# Patient Record
Sex: FEMALE | Race: BLACK OR AFRICAN AMERICAN | Employment: UNEMPLOYED
[De-identification: names, ages, dates, MRNs, and addresses within clinical notes are randomized per-mention and may not be internally consistent; named-entity substitution may affect disease eponyms.]

---

## 2017-09-24 ENCOUNTER — HEALTH MAINTENANCE LETTER (OUTPATIENT)
Age: 52
End: 2017-09-24

## 2021-05-30 ENCOUNTER — RECORDS - HEALTHEAST (OUTPATIENT)
Dept: ADMINISTRATIVE | Facility: CLINIC | Age: 56
End: 2021-05-30

## 2022-01-01 ENCOUNTER — HOSPITAL ENCOUNTER (INPATIENT)
Facility: CLINIC | Age: 57
LOS: 5 days | Discharge: HOSPICE | DRG: 843 | End: 2022-10-02
Attending: HOSPITALIST | Admitting: HOSPITALIST
Payer: COMMERCIAL

## 2022-01-01 ENCOUNTER — HOSPITAL ENCOUNTER (INPATIENT)
Facility: CLINIC | Age: 57
LOS: 20 days | Discharge: HOME HEALTH | DRG: 166 | End: 2022-09-03
Attending: STUDENT IN AN ORGANIZED HEALTH CARE EDUCATION/TRAINING PROGRAM | Admitting: INTERNAL MEDICINE
Payer: MEDICARE

## 2022-01-01 ENCOUNTER — LAB (OUTPATIENT)
Dept: LAB | Facility: CLINIC | Age: 57
End: 2022-01-01
Attending: SURGERY
Payer: COMMERCIAL

## 2022-01-01 ENCOUNTER — TELEPHONE (OUTPATIENT)
Dept: INTERVENTIONAL RADIOLOGY/VASCULAR | Facility: CLINIC | Age: 57
End: 2022-01-01

## 2022-01-01 ENCOUNTER — PATIENT OUTREACH (OUTPATIENT)
Dept: CARE COORDINATION | Facility: CLINIC | Age: 57
End: 2022-01-01

## 2022-01-01 ENCOUNTER — APPOINTMENT (OUTPATIENT)
Dept: SPEECH THERAPY | Facility: CLINIC | Age: 57
DRG: 166 | End: 2022-01-01
Attending: STUDENT IN AN ORGANIZED HEALTH CARE EDUCATION/TRAINING PROGRAM
Payer: MEDICARE

## 2022-01-01 ENCOUNTER — TRANSCRIBE ORDERS (OUTPATIENT)
Dept: OTHER | Age: 57
End: 2022-01-01

## 2022-01-01 ENCOUNTER — APPOINTMENT (OUTPATIENT)
Dept: INTERVENTIONAL RADIOLOGY/VASCULAR | Facility: CLINIC | Age: 57
End: 2022-01-01
Attending: SURGERY
Payer: COMMERCIAL

## 2022-01-01 ENCOUNTER — TELEPHONE (OUTPATIENT)
Dept: OTHER | Facility: CLINIC | Age: 57
End: 2022-01-01
Payer: COMMERCIAL

## 2022-01-01 ENCOUNTER — OFFICE VISIT (OUTPATIENT)
Dept: OTHER | Facility: CLINIC | Age: 57
End: 2022-01-01
Payer: COMMERCIAL

## 2022-01-01 ENCOUNTER — OFFICE VISIT (OUTPATIENT)
Dept: OTHER | Facility: CLINIC | Age: 57
End: 2022-01-01
Attending: SURGERY
Payer: COMMERCIAL

## 2022-01-01 ENCOUNTER — APPOINTMENT (OUTPATIENT)
Dept: OCCUPATIONAL THERAPY | Facility: CLINIC | Age: 57
DRG: 166 | End: 2022-01-01
Attending: STUDENT IN AN ORGANIZED HEALTH CARE EDUCATION/TRAINING PROGRAM
Payer: MEDICARE

## 2022-01-01 ENCOUNTER — HOSPITAL ENCOUNTER (OUTPATIENT)
Dept: ULTRASOUND IMAGING | Facility: CLINIC | Age: 57
End: 2022-03-01
Attending: SURGERY
Payer: COMMERCIAL

## 2022-01-01 ENCOUNTER — HOSPITAL ENCOUNTER (OUTPATIENT)
Facility: CLINIC | Age: 57
Discharge: HOME | End: 2022-08-11
Admitting: RADIOLOGY
Payer: COMMERCIAL

## 2022-01-01 ENCOUNTER — APPOINTMENT (OUTPATIENT)
Dept: GENERAL RADIOLOGY | Facility: CLINIC | Age: 57
DRG: 843 | End: 2022-01-01
Attending: INTERNAL MEDICINE
Payer: COMMERCIAL

## 2022-01-01 ENCOUNTER — TELEPHONE (OUTPATIENT)
Dept: OTHER | Facility: CLINIC | Age: 57
End: 2022-01-01

## 2022-01-01 ENCOUNTER — APPOINTMENT (OUTPATIENT)
Dept: CT IMAGING | Facility: CLINIC | Age: 57
DRG: 166 | End: 2022-01-01
Attending: INTERNAL MEDICINE
Payer: MEDICARE

## 2022-01-01 ENCOUNTER — APPOINTMENT (OUTPATIENT)
Dept: SURGERY | Facility: PHYSICIAN GROUP | Age: 57
End: 2022-01-01
Payer: COMMERCIAL

## 2022-01-01 ENCOUNTER — HOSPITAL ENCOUNTER (INPATIENT)
Facility: CLINIC | Age: 57
LOS: 3 days | End: 2022-10-05
Attending: INTERNAL MEDICINE | Admitting: INTERNAL MEDICINE
Payer: COMMERCIAL

## 2022-01-01 ENCOUNTER — HOSPITAL ENCOUNTER (OUTPATIENT)
Facility: CLINIC | Age: 57
Discharge: HOME | End: 2022-06-28
Attending: SURGERY | Admitting: SURGERY
Payer: COMMERCIAL

## 2022-01-01 ENCOUNTER — MEDICAL CORRESPONDENCE (OUTPATIENT)
Dept: HEALTH INFORMATION MANAGEMENT | Facility: CLINIC | Age: 57
End: 2022-01-01

## 2022-01-01 ENCOUNTER — ANESTHESIA EVENT (OUTPATIENT)
Dept: SURGERY | Facility: CLINIC | Age: 57
End: 2022-01-01
Payer: COMMERCIAL

## 2022-01-01 ENCOUNTER — ANESTHESIA (OUTPATIENT)
Dept: SURGERY | Facility: CLINIC | Age: 57
End: 2022-01-01
Payer: COMMERCIAL

## 2022-01-01 ENCOUNTER — HOSPITAL ENCOUNTER (OUTPATIENT)
Facility: CLINIC | Age: 57
Discharge: HOME | End: 2022-05-31
Admitting: RADIOLOGY
Payer: COMMERCIAL

## 2022-01-01 ENCOUNTER — APPOINTMENT (OUTPATIENT)
Dept: ULTRASOUND IMAGING | Facility: CLINIC | Age: 57
DRG: 252 | End: 2022-01-01
Attending: EMERGENCY MEDICINE
Payer: COMMERCIAL

## 2022-01-01 ENCOUNTER — HOSPITAL ENCOUNTER (OUTPATIENT)
Facility: CLINIC | Age: 57
Discharge: HOME | End: 2022-03-25
Attending: SURGERY | Admitting: SURGERY
Payer: COMMERCIAL

## 2022-01-01 ENCOUNTER — DOCUMENTATION ONLY (OUTPATIENT)
Dept: OTHER | Facility: CLINIC | Age: 57
End: 2022-01-01

## 2022-01-01 ENCOUNTER — ANESTHESIA EVENT (OUTPATIENT)
Dept: SURGERY | Facility: CLINIC | Age: 57
DRG: 252 | End: 2022-01-01
Payer: COMMERCIAL

## 2022-01-01 ENCOUNTER — ANESTHESIA (OUTPATIENT)
Dept: SURGERY | Facility: CLINIC | Age: 57
DRG: 252 | End: 2022-01-01
Payer: COMMERCIAL

## 2022-01-01 ENCOUNTER — APPOINTMENT (OUTPATIENT)
Dept: INTERVENTIONAL RADIOLOGY/VASCULAR | Facility: CLINIC | Age: 57
DRG: 166 | End: 2022-01-01
Attending: NURSE PRACTITIONER
Payer: MEDICARE

## 2022-01-01 ENCOUNTER — APPOINTMENT (OUTPATIENT)
Dept: MRI IMAGING | Facility: CLINIC | Age: 57
DRG: 166 | End: 2022-01-01
Attending: INTERNAL MEDICINE
Payer: MEDICARE

## 2022-01-01 ENCOUNTER — HOSPITAL ENCOUNTER (OUTPATIENT)
Dept: ULTRASOUND IMAGING | Facility: CLINIC | Age: 57
Discharge: HOME | End: 2022-08-10
Payer: COMMERCIAL

## 2022-01-01 ENCOUNTER — APPOINTMENT (OUTPATIENT)
Dept: PHYSICAL THERAPY | Facility: CLINIC | Age: 57
DRG: 166 | End: 2022-01-01
Attending: STUDENT IN AN ORGANIZED HEALTH CARE EDUCATION/TRAINING PROGRAM
Payer: MEDICARE

## 2022-01-01 ENCOUNTER — HOSPITAL ENCOUNTER (OUTPATIENT)
Dept: ULTRASOUND IMAGING | Facility: CLINIC | Age: 57
Discharge: HOME | End: 2022-05-18
Attending: SURGERY
Payer: COMMERCIAL

## 2022-01-01 ENCOUNTER — TRANSFERRED RECORDS (OUTPATIENT)
Dept: HEALTH INFORMATION MANAGEMENT | Facility: CLINIC | Age: 57
End: 2022-01-01

## 2022-01-01 ENCOUNTER — APPOINTMENT (OUTPATIENT)
Dept: CARDIOLOGY | Facility: CLINIC | Age: 57
DRG: 166 | End: 2022-01-01
Attending: INTERNAL MEDICINE
Payer: MEDICARE

## 2022-01-01 ENCOUNTER — LAB REQUISITION (OUTPATIENT)
Dept: LAB | Facility: CLINIC | Age: 57
End: 2022-01-01
Payer: MEDICARE

## 2022-01-01 ENCOUNTER — APPOINTMENT (OUTPATIENT)
Dept: OCCUPATIONAL THERAPY | Facility: CLINIC | Age: 57
DRG: 166 | End: 2022-01-01
Attending: INTERNAL MEDICINE
Payer: MEDICARE

## 2022-01-01 ENCOUNTER — APPOINTMENT (OUTPATIENT)
Dept: ULTRASOUND IMAGING | Facility: CLINIC | Age: 57
DRG: 252 | End: 2022-01-01
Attending: STUDENT IN AN ORGANIZED HEALTH CARE EDUCATION/TRAINING PROGRAM
Payer: COMMERCIAL

## 2022-01-01 ENCOUNTER — APPOINTMENT (OUTPATIENT)
Dept: ULTRASOUND IMAGING | Facility: CLINIC | Age: 57
DRG: 843 | End: 2022-01-01
Attending: NURSE PRACTITIONER
Payer: COMMERCIAL

## 2022-01-01 ENCOUNTER — APPOINTMENT (OUTPATIENT)
Dept: PHYSICAL THERAPY | Facility: CLINIC | Age: 57
DRG: 166 | End: 2022-01-01
Attending: INTERNAL MEDICINE
Payer: MEDICARE

## 2022-01-01 ENCOUNTER — HOSPITAL ENCOUNTER (INPATIENT)
Facility: CLINIC | Age: 57
LOS: 6 days | Discharge: HOME | DRG: 252 | End: 2022-07-13
Attending: EMERGENCY MEDICINE | Admitting: INTERNAL MEDICINE
Payer: COMMERCIAL

## 2022-01-01 ENCOUNTER — APPOINTMENT (OUTPATIENT)
Dept: SPEECH THERAPY | Facility: CLINIC | Age: 57
DRG: 166 | End: 2022-01-01
Attending: INTERNAL MEDICINE
Payer: MEDICARE

## 2022-01-01 ENCOUNTER — APPOINTMENT (OUTPATIENT)
Dept: ULTRASOUND IMAGING | Facility: CLINIC | Age: 57
DRG: 166 | End: 2022-01-01
Attending: NURSE PRACTITIONER
Payer: MEDICARE

## 2022-01-01 ENCOUNTER — APPOINTMENT (OUTPATIENT)
Dept: INTERVENTIONAL RADIOLOGY/VASCULAR | Facility: CLINIC | Age: 57
DRG: 252 | End: 2022-01-01
Attending: SURGERY
Payer: COMMERCIAL

## 2022-01-01 VITALS
HEIGHT: 65 IN | BODY MASS INDEX: 21.38 KG/M2 | SYSTOLIC BLOOD PRESSURE: 131 MMHG | HEART RATE: 103 BPM | WEIGHT: 128.31 LBS | TEMPERATURE: 97.3 F | OXYGEN SATURATION: 100 % | DIASTOLIC BLOOD PRESSURE: 84 MMHG | RESPIRATION RATE: 12 BRPM

## 2022-01-01 VITALS — SYSTOLIC BLOOD PRESSURE: 150 MMHG | DIASTOLIC BLOOD PRESSURE: 88 MMHG | HEART RATE: 98 BPM

## 2022-01-01 VITALS
TEMPERATURE: 97.9 F | RESPIRATION RATE: 16 BRPM | SYSTOLIC BLOOD PRESSURE: 99 MMHG | OXYGEN SATURATION: 90 % | DIASTOLIC BLOOD PRESSURE: 63 MMHG | BODY MASS INDEX: 21.61 KG/M2 | WEIGHT: 129.85 LBS | HEART RATE: 96 BPM

## 2022-01-01 VITALS
RESPIRATION RATE: 18 BRPM | WEIGHT: 124 LBS | SYSTOLIC BLOOD PRESSURE: 128 MMHG | HEIGHT: 67 IN | HEART RATE: 88 BPM | BODY MASS INDEX: 19.46 KG/M2 | OXYGEN SATURATION: 95 % | DIASTOLIC BLOOD PRESSURE: 80 MMHG | TEMPERATURE: 97.7 F

## 2022-01-01 VITALS — OXYGEN SATURATION: 98 % | DIASTOLIC BLOOD PRESSURE: 71 MMHG | SYSTOLIC BLOOD PRESSURE: 131 MMHG | HEART RATE: 87 BPM

## 2022-01-01 VITALS
HEIGHT: 65 IN | DIASTOLIC BLOOD PRESSURE: 65 MMHG | RESPIRATION RATE: 16 BRPM | HEART RATE: 87 BPM | OXYGEN SATURATION: 100 % | BODY MASS INDEX: 23.88 KG/M2 | WEIGHT: 143.3 LBS | SYSTOLIC BLOOD PRESSURE: 112 MMHG | TEMPERATURE: 97.8 F

## 2022-01-01 VITALS
DIASTOLIC BLOOD PRESSURE: 87 MMHG | TEMPERATURE: 98.4 F | HEART RATE: 73 BPM | RESPIRATION RATE: 16 BRPM | WEIGHT: 125 LBS | OXYGEN SATURATION: 94 % | SYSTOLIC BLOOD PRESSURE: 163 MMHG | BODY MASS INDEX: 20.83 KG/M2 | HEIGHT: 65 IN

## 2022-01-01 VITALS
DIASTOLIC BLOOD PRESSURE: 65 MMHG | TEMPERATURE: 97.2 F | HEART RATE: 88 BPM | SYSTOLIC BLOOD PRESSURE: 128 MMHG | OXYGEN SATURATION: 99 % | RESPIRATION RATE: 14 BRPM

## 2022-01-01 VITALS
HEIGHT: 66 IN | HEART RATE: 85 BPM | WEIGHT: 135 LBS | SYSTOLIC BLOOD PRESSURE: 152 MMHG | BODY MASS INDEX: 21.69 KG/M2 | OXYGEN SATURATION: 98 % | DIASTOLIC BLOOD PRESSURE: 83 MMHG

## 2022-01-01 VITALS
RESPIRATION RATE: 20 BRPM | HEART RATE: 106 BPM | TEMPERATURE: 98.6 F | OXYGEN SATURATION: 98 % | DIASTOLIC BLOOD PRESSURE: 51 MMHG | SYSTOLIC BLOOD PRESSURE: 75 MMHG

## 2022-01-01 VITALS — SYSTOLIC BLOOD PRESSURE: 161 MMHG | DIASTOLIC BLOOD PRESSURE: 86 MMHG | HEART RATE: 84 BPM

## 2022-01-01 VITALS
HEIGHT: 65 IN | RESPIRATION RATE: 16 BRPM | OXYGEN SATURATION: 96 % | TEMPERATURE: 98.2 F | SYSTOLIC BLOOD PRESSURE: 140 MMHG | BODY MASS INDEX: 22.99 KG/M2 | WEIGHT: 138 LBS | DIASTOLIC BLOOD PRESSURE: 89 MMHG | HEART RATE: 68 BPM

## 2022-01-01 VITALS — DIASTOLIC BLOOD PRESSURE: 61 MMHG | SYSTOLIC BLOOD PRESSURE: 109 MMHG | HEART RATE: 83 BPM

## 2022-01-01 DIAGNOSIS — Z99.2 ESRD (END STAGE RENAL DISEASE) ON DIALYSIS (H): ICD-10-CM

## 2022-01-01 DIAGNOSIS — I87.8 PROMINENT VEIN: ICD-10-CM

## 2022-01-01 DIAGNOSIS — N18.6 ESRD (END STAGE RENAL DISEASE) ON DIALYSIS (H): Primary | ICD-10-CM

## 2022-01-01 DIAGNOSIS — G89.18 ACUTE POST-OPERATIVE PAIN: Primary | ICD-10-CM

## 2022-01-01 DIAGNOSIS — Z01.818 PREOP TESTING: ICD-10-CM

## 2022-01-01 DIAGNOSIS — I82.291: ICD-10-CM

## 2022-01-01 DIAGNOSIS — Z79.2 NEED FOR PROPHYLACTIC ANTIBIOTIC: Primary | ICD-10-CM

## 2022-01-01 DIAGNOSIS — Z11.59 ENCOUNTER FOR SCREENING FOR OTHER VIRAL DISEASES: Primary | ICD-10-CM

## 2022-01-01 DIAGNOSIS — Z09 SURGICAL FOLLOW-UP CARE: Primary | ICD-10-CM

## 2022-01-01 DIAGNOSIS — N18.6 ESRD (END STAGE RENAL DISEASE) ON DIALYSIS (H): ICD-10-CM

## 2022-01-01 DIAGNOSIS — Z72.0 TOBACCO ABUSE: ICD-10-CM

## 2022-01-01 DIAGNOSIS — T82.9XXA COMPLICATION OF VASCULAR ACCESS FOR DIALYSIS, INITIAL ENCOUNTER: ICD-10-CM

## 2022-01-01 DIAGNOSIS — I87.1 SVC SYNDROME: ICD-10-CM

## 2022-01-01 DIAGNOSIS — C79.89 METASTATIC CANCER TO CHEST WALL (H): ICD-10-CM

## 2022-01-01 DIAGNOSIS — I82.291: Primary | ICD-10-CM

## 2022-01-01 DIAGNOSIS — C7A.8 NEUROENDOCRINE CANCER (H): Primary | ICD-10-CM

## 2022-01-01 DIAGNOSIS — Z99.2 ESRD (END STAGE RENAL DISEASE) ON DIALYSIS (H): Primary | ICD-10-CM

## 2022-01-01 DIAGNOSIS — N18.9 CRF (CHRONIC RENAL FAILURE): Primary | ICD-10-CM

## 2022-01-01 DIAGNOSIS — I82.290 BRACHIOCEPHALIC VEIN THROMBOSIS (H): ICD-10-CM

## 2022-01-01 DIAGNOSIS — I77.0 AVF (ARTERIOVENOUS FISTULA) (H): ICD-10-CM

## 2022-01-01 DIAGNOSIS — R62.7 FAILURE TO THRIVE IN ADULT: Primary | ICD-10-CM

## 2022-01-01 DIAGNOSIS — I82.622 ACUTE DEEP VEIN THROMBOSIS (DVT) OF BRACHIAL VEIN OF LEFT UPPER EXTREMITY (H): ICD-10-CM

## 2022-01-01 DIAGNOSIS — C34.90 LUNG CANCER (H): Primary | ICD-10-CM

## 2022-01-01 DIAGNOSIS — I10 BENIGN ESSENTIAL HYPERTENSION: Primary | ICD-10-CM

## 2022-01-01 DIAGNOSIS — I77.0 AVF (ARTERIOVENOUS FISTULA) (H): Primary | ICD-10-CM

## 2022-01-01 DIAGNOSIS — S40.022A: Primary | ICD-10-CM

## 2022-01-01 DIAGNOSIS — Z11.59 ENCOUNTER FOR SCREENING FOR OTHER VIRAL DISEASES: ICD-10-CM

## 2022-01-01 DIAGNOSIS — Z71.89 OTHER SPECIFIED COUNSELING: ICD-10-CM

## 2022-01-01 DIAGNOSIS — C78.7 METASTASIS TO LIVER (H): ICD-10-CM

## 2022-01-01 DIAGNOSIS — M79.89 LEFT ARM SWELLING: ICD-10-CM

## 2022-01-01 LAB
% LINING CELLS, BODY FLUID: 36 %
ABO/RH(D): NORMAL
ALBUMIN SERPL-MCNC: 1.8 G/DL (ref 3.4–5)
ALBUMIN SERPL-MCNC: 2.1 G/DL (ref 3.4–5)
ALBUMIN SERPL-MCNC: 2.2 G/DL (ref 3.4–5)
ALBUMIN SERPL-MCNC: 2.3 G/DL (ref 3.4–5)
ALBUMIN SERPL-MCNC: 2.3 G/DL (ref 3.4–5)
ALBUMIN SERPL-MCNC: 2.5 G/DL (ref 3.4–5)
ALBUMIN SERPL-MCNC: 2.6 G/DL (ref 3.4–5)
ALBUMIN SERPL-MCNC: 2.8 G/DL (ref 3.4–5)
ALBUMIN SERPL-MCNC: 2.8 G/DL (ref 3.4–5)
ALP SERPL-CCNC: 202 U/L (ref 40–150)
ALP SERPL-CCNC: 211 U/L (ref 40–150)
ALP SERPL-CCNC: 215 U/L (ref 40–150)
ALP SERPL-CCNC: 235 U/L (ref 40–150)
ALP SERPL-CCNC: 257 U/L (ref 40–150)
ALP SERPL-CCNC: 26 U/L (ref 40–150)
ALP SERPL-CCNC: 32 U/L (ref 40–150)
ALP SERPL-CCNC: 34 U/L (ref 40–150)
ALT SERPL W P-5'-P-CCNC: 106 U/L (ref 0–50)
ALT SERPL W P-5'-P-CCNC: 106 U/L (ref 0–50)
ALT SERPL W P-5'-P-CCNC: 107 U/L (ref 0–50)
ALT SERPL W P-5'-P-CCNC: 114 U/L (ref 0–50)
ALT SERPL W P-5'-P-CCNC: 114 U/L (ref 0–50)
ALT SERPL W P-5'-P-CCNC: 19 U/L (ref 0–50)
ALT SERPL W P-5'-P-CCNC: 20 U/L (ref 0–50)
ALT SERPL W P-5'-P-CCNC: 22 U/L (ref 0–50)
AMMONIA PLAS-SCNC: 21 UMOL/L (ref 10–50)
ANION GAP SERPL CALCULATED.3IONS-SCNC: 10 MMOL/L (ref 3–14)
ANION GAP SERPL CALCULATED.3IONS-SCNC: 11 MMOL/L (ref 3–14)
ANION GAP SERPL CALCULATED.3IONS-SCNC: 6 MMOL/L (ref 3–14)
ANION GAP SERPL CALCULATED.3IONS-SCNC: 7 MMOL/L (ref 3–14)
ANION GAP SERPL CALCULATED.3IONS-SCNC: 8 MMOL/L (ref 3–14)
ANION GAP SERPL CALCULATED.3IONS-SCNC: 9 MMOL/L (ref 3–14)
ANTIBODY SCREEN: NEGATIVE
APPEARANCE FLD: CLEAR
APTT PPP: 117 SECONDS (ref 22–38)
APTT PPP: 125 SECONDS (ref 22–38)
APTT PPP: 158 SECONDS (ref 22–38)
APTT PPP: 204 SECONDS (ref 22–38)
APTT PPP: 26 SECONDS (ref 22–38)
APTT PPP: 31 SECONDS (ref 22–38)
APTT PPP: 43 SECONDS (ref 22–38)
APTT PPP: 54 SECONDS (ref 22–38)
APTT PPP: 61 SECONDS (ref 22–38)
APTT PPP: 70 SECONDS (ref 22–38)
APTT PPP: 71 SECONDS (ref 22–38)
APTT PPP: 73 SECONDS (ref 22–38)
APTT PPP: 74 SECONDS (ref 22–38)
AST SERPL W P-5'-P-CCNC: 43 U/L (ref 0–45)
AST SERPL W P-5'-P-CCNC: 48 U/L (ref 0–45)
AST SERPL W P-5'-P-CCNC: 518 U/L (ref 0–45)
AST SERPL W P-5'-P-CCNC: 631 U/L (ref 0–45)
AST SERPL W P-5'-P-CCNC: 637 U/L (ref 0–45)
AST SERPL W P-5'-P-CCNC: 716 U/L (ref 0–45)
AST SERPL W P-5'-P-CCNC: 864 U/L (ref 0–45)
AST SERPL W P-5'-P-CCNC: 96 U/L (ref 0–45)
ATRIAL RATE - MUSE: 106 BPM
ATRIAL RATE - MUSE: 74 BPM
ATRIAL RATE - MUSE: 83 BPM
BACTERIA BLD CULT: NO GROWTH
BACTERIA BLD CULT: NO GROWTH
BACTERIA PLR CULT: NO GROWTH
BASE EXCESS BLDA CALC-SCNC: 3.8 MMOL/L (ref -9–1.8)
BASE EXCESS BLDV CALC-SCNC: -0.2 MMOL/L (ref -7.7–1.9)
BASE EXCESS BLDV CALC-SCNC: -0.9 MMOL/L (ref -7.7–1.9)
BASE EXCESS BLDV CALC-SCNC: -0.9 MMOL/L (ref -7.7–1.9)
BASE EXCESS BLDV CALC-SCNC: 1.1 MMOL/L (ref -7.7–1.9)
BASE EXCESS BLDV CALC-SCNC: 1.4 MMOL/L (ref -7.7–1.9)
BASOPHILS # BLD AUTO: 0 10E3/UL (ref 0–0.2)
BASOPHILS # BLD AUTO: 0 10E3/UL (ref 0–0.2)
BASOPHILS # BLD AUTO: 0.1 10E3/UL (ref 0–0.2)
BASOPHILS NFR BLD AUTO: 0 %
BASOPHILS NFR BLD AUTO: 1 %
BASOPHILS NFR BLD AUTO: 1 %
BILIRUB DIRECT SERPL-MCNC: 0.3 MG/DL (ref 0–0.2)
BILIRUB SERPL-MCNC: 0.3 MG/DL (ref 0.2–1.3)
BILIRUB SERPL-MCNC: 0.4 MG/DL (ref 0.2–1.3)
BILIRUB SERPL-MCNC: 0.4 MG/DL (ref 0.2–1.3)
BILIRUB SERPL-MCNC: 0.8 MG/DL (ref 0.2–1.3)
BILIRUB SERPL-MCNC: 1.1 MG/DL (ref 0.2–1.3)
BILIRUB SERPL-MCNC: 1.2 MG/DL (ref 0.2–1.3)
BLD PROD TYP BPU: NORMAL
BLOOD COMPONENT TYPE: NORMAL
BUN SERPL-MCNC: 103 MG/DL (ref 7–30)
BUN SERPL-MCNC: 13 MG/DL (ref 7–30)
BUN SERPL-MCNC: 16 MG/DL (ref 7–30)
BUN SERPL-MCNC: 18 MG/DL (ref 7–30)
BUN SERPL-MCNC: 19 MG/DL (ref 7–30)
BUN SERPL-MCNC: 19 MG/DL (ref 7–30)
BUN SERPL-MCNC: 20 MG/DL (ref 7–30)
BUN SERPL-MCNC: 24 MG/DL (ref 7–30)
BUN SERPL-MCNC: 24 MG/DL (ref 7–30)
BUN SERPL-MCNC: 25 MG/DL (ref 7–30)
BUN SERPL-MCNC: 26 MG/DL (ref 7–30)
BUN SERPL-MCNC: 28 MG/DL (ref 7–30)
BUN SERPL-MCNC: 29 MG/DL (ref 7–30)
BUN SERPL-MCNC: 32 MG/DL (ref 7–30)
BUN SERPL-MCNC: 33 MG/DL (ref 7–30)
BUN SERPL-MCNC: 41 MG/DL (ref 7–30)
BUN SERPL-MCNC: 58 MG/DL (ref 7–30)
BUN SERPL-MCNC: 59 MG/DL (ref 7–30)
BUN SERPL-MCNC: 62 MG/DL (ref 7–30)
BUN SERPL-MCNC: 63 MG/DL (ref 7–30)
BUN SERPL-MCNC: 66 MG/DL (ref 7–30)
BUN SERPL-MCNC: 68 MG/DL (ref 7–30)
BUN SERPL-MCNC: 69 MG/DL (ref 7–30)
BUN SERPL-MCNC: 72 MG/DL (ref 7–30)
CA-I BLD-MCNC: 5.1 MG/DL (ref 4.4–5.2)
CALCIUM SERPL-MCNC: 10 MG/DL (ref 8.5–10.1)
CALCIUM SERPL-MCNC: 10.4 MG/DL (ref 8.5–10.1)
CALCIUM SERPL-MCNC: 10.4 MG/DL (ref 8.5–10.1)
CALCIUM SERPL-MCNC: 10.5 MG/DL (ref 8.5–10.1)
CALCIUM SERPL-MCNC: 10.7 MG/DL (ref 8.5–10.1)
CALCIUM SERPL-MCNC: 10.8 MG/DL (ref 8.5–10.1)
CALCIUM SERPL-MCNC: 8.3 MG/DL (ref 8.5–10.1)
CALCIUM SERPL-MCNC: 8.6 MG/DL (ref 8.5–10.1)
CALCIUM SERPL-MCNC: 8.7 MG/DL (ref 8.5–10.1)
CALCIUM SERPL-MCNC: 8.7 MG/DL (ref 8.5–10.1)
CALCIUM SERPL-MCNC: 8.8 MG/DL (ref 8.5–10.1)
CALCIUM SERPL-MCNC: 8.9 MG/DL (ref 8.5–10.1)
CALCIUM SERPL-MCNC: 8.9 MG/DL (ref 8.5–10.1)
CALCIUM SERPL-MCNC: 9.1 MG/DL (ref 8.5–10.1)
CALCIUM SERPL-MCNC: 9.2 MG/DL (ref 8.5–10.1)
CALCIUM SERPL-MCNC: 9.2 MG/DL (ref 8.5–10.1)
CALCIUM SERPL-MCNC: 9.3 MG/DL (ref 8.5–10.1)
CALCIUM SERPL-MCNC: 9.4 MG/DL (ref 8.5–10.1)
CALCIUM SERPL-MCNC: 9.5 MG/DL (ref 8.5–10.1)
CALCIUM SERPL-MCNC: 9.6 MG/DL (ref 8.5–10.1)
CALCIUM SERPL-MCNC: 9.7 MG/DL (ref 8.5–10.1)
CALCIUM SERPL-MCNC: 9.9 MG/DL (ref 8.5–10.1)
CELL COUNT BODY FLUID SOURCE: ABNORMAL
CHLORIDE BLD-SCNC: 100 MMOL/L (ref 94–109)
CHLORIDE BLD-SCNC: 101 MMOL/L (ref 94–109)
CHLORIDE BLD-SCNC: 102 MMOL/L (ref 94–109)
CHLORIDE BLD-SCNC: 102 MMOL/L (ref 94–109)
CHLORIDE BLD-SCNC: 103 MMOL/L (ref 94–109)
CHLORIDE BLD-SCNC: 109 MMOL/L (ref 94–109)
CHLORIDE BLD-SCNC: 96 MMOL/L (ref 94–109)
CHLORIDE BLD-SCNC: 97 MMOL/L (ref 94–109)
CHLORIDE BLD-SCNC: 98 MMOL/L (ref 94–109)
CHLORIDE BLD-SCNC: 99 MMOL/L (ref 94–109)
CK SERPL-CCNC: 713 U/L (ref 30–225)
CO2 SERPL-SCNC: 22 MMOL/L (ref 20–32)
CO2 SERPL-SCNC: 23 MMOL/L (ref 20–32)
CO2 SERPL-SCNC: 23 MMOL/L (ref 20–32)
CO2 SERPL-SCNC: 24 MMOL/L (ref 20–32)
CO2 SERPL-SCNC: 25 MMOL/L (ref 20–32)
CO2 SERPL-SCNC: 26 MMOL/L (ref 20–32)
CO2 SERPL-SCNC: 27 MMOL/L (ref 20–32)
CO2 SERPL-SCNC: 28 MMOL/L (ref 20–32)
CO2 SERPL-SCNC: 28 MMOL/L (ref 20–32)
CO2 SERPL-SCNC: 29 MMOL/L (ref 20–32)
CO2 SERPL-SCNC: 30 MMOL/L (ref 20–32)
CO2 SERPL-SCNC: 30 MMOL/L (ref 20–32)
CO2 SERPL-SCNC: 31 MMOL/L (ref 20–32)
CODING SYSTEM: NORMAL
COLOR FLD: ABNORMAL
CREAT SERPL-MCNC: 2.95 MG/DL (ref 0.52–1.04)
CREAT SERPL-MCNC: 3.01 MG/DL (ref 0.52–1.04)
CREAT SERPL-MCNC: 3.55 MG/DL (ref 0.52–1.04)
CREAT SERPL-MCNC: 3.81 MG/DL (ref 0.52–1.04)
CREAT SERPL-MCNC: 3.84 MG/DL (ref 0.52–1.04)
CREAT SERPL-MCNC: 3.85 MG/DL (ref 0.52–1.04)
CREAT SERPL-MCNC: 4.23 MG/DL (ref 0.52–1.04)
CREAT SERPL-MCNC: 4.3 MG/DL (ref 0.52–1.04)
CREAT SERPL-MCNC: 4.45 MG/DL (ref 0.52–1.04)
CREAT SERPL-MCNC: 4.48 MG/DL (ref 0.52–1.04)
CREAT SERPL-MCNC: 4.58 MG/DL (ref 0.52–1.04)
CREAT SERPL-MCNC: 4.73 MG/DL (ref 0.52–1.04)
CREAT SERPL-MCNC: 4.8 MG/DL (ref 0.52–1.04)
CREAT SERPL-MCNC: 4.85 MG/DL (ref 0.52–1.04)
CREAT SERPL-MCNC: 4.91 MG/DL (ref 0.52–1.04)
CREAT SERPL-MCNC: 4.99 MG/DL (ref 0.52–1.04)
CREAT SERPL-MCNC: 5.08 MG/DL (ref 0.52–1.04)
CREAT SERPL-MCNC: 5.08 MG/DL (ref 0.52–1.04)
CREAT SERPL-MCNC: 5.16 MG/DL (ref 0.52–1.04)
CREAT SERPL-MCNC: 5.68 MG/DL (ref 0.52–1.04)
CREAT SERPL-MCNC: 5.97 MG/DL (ref 0.52–1.04)
CREAT SERPL-MCNC: 6.25 MG/DL (ref 0.52–1.04)
CREAT SERPL-MCNC: 6.3 MG/DL (ref 0.52–1.04)
CREAT SERPL-MCNC: 6.76 MG/DL (ref 0.52–1.04)
CREAT SERPL-MCNC: 7.28 MG/DL (ref 0.52–1.04)
CREAT SERPL-MCNC: 8.25 MG/DL (ref 0.52–1.04)
CROSSMATCH: NORMAL
CRP SERPL-MCNC: 13.1 MG/L (ref 0–8)
DIASTOLIC BLOOD PRESSURE - MUSE: NORMAL MMHG
EOSINOPHIL # BLD AUTO: 0 10E3/UL (ref 0–0.7)
EOSINOPHIL # BLD AUTO: 0.1 10E3/UL (ref 0–0.7)
EOSINOPHIL # BLD AUTO: 0.1 10E3/UL (ref 0–0.7)
EOSINOPHIL NFR BLD AUTO: 1 %
ERYTHROCYTE [DISTWIDTH] IN BLOOD BY AUTOMATED COUNT: 12.8 % (ref 10–15)
ERYTHROCYTE [DISTWIDTH] IN BLOOD BY AUTOMATED COUNT: 13.1 % (ref 10–15)
ERYTHROCYTE [DISTWIDTH] IN BLOOD BY AUTOMATED COUNT: 13.1 % (ref 10–15)
ERYTHROCYTE [DISTWIDTH] IN BLOOD BY AUTOMATED COUNT: 13.3 % (ref 10–15)
ERYTHROCYTE [DISTWIDTH] IN BLOOD BY AUTOMATED COUNT: 13.4 % (ref 10–15)
ERYTHROCYTE [DISTWIDTH] IN BLOOD BY AUTOMATED COUNT: 13.6 % (ref 10–15)
ERYTHROCYTE [DISTWIDTH] IN BLOOD BY AUTOMATED COUNT: 13.6 % (ref 10–15)
ERYTHROCYTE [DISTWIDTH] IN BLOOD BY AUTOMATED COUNT: 13.7 % (ref 10–15)
ERYTHROCYTE [DISTWIDTH] IN BLOOD BY AUTOMATED COUNT: 13.7 % (ref 10–15)
ERYTHROCYTE [DISTWIDTH] IN BLOOD BY AUTOMATED COUNT: 13.8 % (ref 10–15)
ERYTHROCYTE [DISTWIDTH] IN BLOOD BY AUTOMATED COUNT: 13.9 % (ref 10–15)
ERYTHROCYTE [DISTWIDTH] IN BLOOD BY AUTOMATED COUNT: 13.9 % (ref 10–15)
ERYTHROCYTE [DISTWIDTH] IN BLOOD BY AUTOMATED COUNT: 14.2 % (ref 10–15)
ERYTHROCYTE [DISTWIDTH] IN BLOOD BY AUTOMATED COUNT: 14.5 % (ref 10–15)
ERYTHROCYTE [DISTWIDTH] IN BLOOD BY AUTOMATED COUNT: 15.6 % (ref 10–15)
ERYTHROCYTE [DISTWIDTH] IN BLOOD BY AUTOMATED COUNT: 15.7 % (ref 10–15)
ERYTHROCYTE [DISTWIDTH] IN BLOOD BY AUTOMATED COUNT: 15.8 % (ref 10–15)
ERYTHROCYTE [DISTWIDTH] IN BLOOD BY AUTOMATED COUNT: 15.9 % (ref 10–15)
ERYTHROCYTE [DISTWIDTH] IN BLOOD BY AUTOMATED COUNT: 15.9 % (ref 10–15)
ERYTHROCYTE [DISTWIDTH] IN BLOOD BY AUTOMATED COUNT: 16 % (ref 10–15)
ERYTHROCYTE [DISTWIDTH] IN BLOOD BY AUTOMATED COUNT: 16 % (ref 10–15)
ERYTHROCYTE [DISTWIDTH] IN BLOOD BY AUTOMATED COUNT: 16.5 % (ref 10–15)
ERYTHROCYTE [DISTWIDTH] IN BLOOD BY AUTOMATED COUNT: 16.8 % (ref 10–15)
ERYTHROCYTE [DISTWIDTH] IN BLOOD BY AUTOMATED COUNT: 17.2 % (ref 10–15)
ERYTHROCYTE [DISTWIDTH] IN BLOOD BY AUTOMATED COUNT: 17.2 % (ref 10–15)
FERRITIN SERPL-MCNC: 1546 NG/ML (ref 8–252)
FIBRINOGEN PPP-MCNC: 532 MG/DL (ref 170–490)
GFR SERPL CREATININE-BSD FRML MDRD: 10 ML/MIN/1.73M2
GFR SERPL CREATININE-BSD FRML MDRD: 11 ML/MIN/1.73M2
GFR SERPL CREATININE-BSD FRML MDRD: 12 ML/MIN/1.73M2
GFR SERPL CREATININE-BSD FRML MDRD: 13 ML/MIN/1.73M2
GFR SERPL CREATININE-BSD FRML MDRD: 14 ML/MIN/1.73M2
GFR SERPL CREATININE-BSD FRML MDRD: 17 ML/MIN/1.73M2
GFR SERPL CREATININE-BSD FRML MDRD: 18 ML/MIN/1.73M2
GFR SERPL CREATININE-BSD FRML MDRD: 5 ML/MIN/1.73M2
GFR SERPL CREATININE-BSD FRML MDRD: 6 ML/MIN/1.73M2
GFR SERPL CREATININE-BSD FRML MDRD: 7 ML/MIN/1.73M2
GFR SERPL CREATININE-BSD FRML MDRD: 8 ML/MIN/1.73M2
GFR SERPL CREATININE-BSD FRML MDRD: 8 ML/MIN/1.73M2
GFR SERPL CREATININE-BSD FRML MDRD: 9 ML/MIN/1.73M2
GLUCOSE BLD-MCNC: 106 MG/DL (ref 70–99)
GLUCOSE BLD-MCNC: 107 MG/DL (ref 70–99)
GLUCOSE BLD-MCNC: 109 MG/DL (ref 70–99)
GLUCOSE BLD-MCNC: 112 MG/DL (ref 70–99)
GLUCOSE BLD-MCNC: 127 MG/DL (ref 70–99)
GLUCOSE BLD-MCNC: 128 MG/DL (ref 70–99)
GLUCOSE BLD-MCNC: 148 MG/DL (ref 70–99)
GLUCOSE BLD-MCNC: 175 MG/DL (ref 70–99)
GLUCOSE BLD-MCNC: 67 MG/DL (ref 70–99)
GLUCOSE BLD-MCNC: 70 MG/DL (ref 70–99)
GLUCOSE BLD-MCNC: 74 MG/DL (ref 70–99)
GLUCOSE BLD-MCNC: 82 MG/DL (ref 70–99)
GLUCOSE BLD-MCNC: 84 MG/DL (ref 70–99)
GLUCOSE BLD-MCNC: 90 MG/DL (ref 70–99)
GLUCOSE BLD-MCNC: 91 MG/DL (ref 70–99)
GLUCOSE BLD-MCNC: 93 MG/DL (ref 70–99)
GLUCOSE BLD-MCNC: 93 MG/DL (ref 70–99)
GLUCOSE BLD-MCNC: 94 MG/DL (ref 70–99)
GLUCOSE BLD-MCNC: 94 MG/DL (ref 70–99)
GLUCOSE BLD-MCNC: 95 MG/DL (ref 70–99)
GLUCOSE BLD-MCNC: 96 MG/DL (ref 70–99)
GLUCOSE BLD-MCNC: 97 MG/DL (ref 70–99)
GLUCOSE BLD-MCNC: 98 MG/DL (ref 70–99)
GLUCOSE BLD-MCNC: 99 MG/DL (ref 70–99)
GLUCOSE BLDC GLUCOMTR-MCNC: 124 MG/DL (ref 70–99)
GLUCOSE BLDC GLUCOMTR-MCNC: 154 MG/DL (ref 70–99)
GLUCOSE BLDC GLUCOMTR-MCNC: 60 MG/DL (ref 70–99)
GLUCOSE BLDC GLUCOMTR-MCNC: 80 MG/DL (ref 70–99)
GLUCOSE BLDC GLUCOMTR-MCNC: 87 MG/DL (ref 70–99)
GLUCOSE BLDC GLUCOMTR-MCNC: 88 MG/DL (ref 70–99)
GLUCOSE BLDC GLUCOMTR-MCNC: 89 MG/DL (ref 70–99)
GLUCOSE BLDC GLUCOMTR-MCNC: 97 MG/DL (ref 70–99)
GLUCOSE BODY FLUID SOURCE: NORMAL
GLUCOSE FLD-MCNC: 79 MG/DL
GRAM STAIN RESULT: NORMAL
GRAM STAIN RESULT: NORMAL
HBA1C MFR BLD: 5.1 % (ref 0–5.6)
HBA1C MFR BLD: 5.5 % (ref 0–5.6)
HBV SURFACE AB SERPL IA-ACNC: 103.04 M[IU]/ML
HBV SURFACE AB SERPL IA-ACNC: 17.8 M[IU]/ML
HBV SURFACE AB SERPL IA-ACNC: REACTIVE M[IU]/ML
HBV SURFACE AG SERPL QL IA: NONREACTIVE
HBV SURFACE AG SERPL QL IA: NONREACTIVE
HCO3 BLD-SCNC: 29 MMOL/L (ref 21–28)
HCO3 BLDV-SCNC: 25 MMOL/L (ref 21–28)
HCO3 BLDV-SCNC: 26 MMOL/L (ref 21–28)
HCO3 BLDV-SCNC: 26 MMOL/L (ref 21–28)
HCO3 BLDV-SCNC: 27 MMOL/L (ref 21–28)
HCO3 BLDV-SCNC: 28 MMOL/L (ref 21–28)
HCT VFR BLD AUTO: 19.4 % (ref 35–47)
HCT VFR BLD AUTO: 21.5 % (ref 35–47)
HCT VFR BLD AUTO: 22 % (ref 35–47)
HCT VFR BLD AUTO: 22.1 % (ref 35–47)
HCT VFR BLD AUTO: 23.7 % (ref 35–47)
HCT VFR BLD AUTO: 23.9 % (ref 35–47)
HCT VFR BLD AUTO: 24.1 % (ref 35–47)
HCT VFR BLD AUTO: 24.5 % (ref 35–47)
HCT VFR BLD AUTO: 24.7 % (ref 35–47)
HCT VFR BLD AUTO: 25.1 % (ref 35–47)
HCT VFR BLD AUTO: 26.2 % (ref 35–47)
HCT VFR BLD AUTO: 26.4 % (ref 35–47)
HCT VFR BLD AUTO: 26.5 % (ref 35–47)
HCT VFR BLD AUTO: 26.8 % (ref 35–47)
HCT VFR BLD AUTO: 27.8 % (ref 35–47)
HCT VFR BLD AUTO: 27.9 % (ref 35–47)
HCT VFR BLD AUTO: 28 % (ref 35–47)
HCT VFR BLD AUTO: 28.1 % (ref 35–47)
HCT VFR BLD AUTO: 28.5 % (ref 35–47)
HCT VFR BLD AUTO: 28.7 % (ref 35–47)
HCT VFR BLD AUTO: 29 % (ref 35–47)
HCT VFR BLD AUTO: 31 % (ref 35–47)
HCT VFR BLD AUTO: 32.1 % (ref 35–47)
HGB BLD-MCNC: 10.4 G/DL (ref 11.7–15.7)
HGB BLD-MCNC: 5.6 G/DL (ref 11.7–15.7)
HGB BLD-MCNC: 5.6 G/DL (ref 11.7–15.7)
HGB BLD-MCNC: 6.1 G/DL (ref 11.7–15.7)
HGB BLD-MCNC: 6.6 G/DL (ref 11.7–15.7)
HGB BLD-MCNC: 6.8 G/DL (ref 11.7–15.7)
HGB BLD-MCNC: 6.9 G/DL (ref 11.7–15.7)
HGB BLD-MCNC: 7 G/DL (ref 11.7–15.7)
HGB BLD-MCNC: 7.3 G/DL (ref 11.7–15.7)
HGB BLD-MCNC: 7.3 G/DL (ref 11.7–15.7)
HGB BLD-MCNC: 7.4 G/DL (ref 11.7–15.7)
HGB BLD-MCNC: 7.5 G/DL (ref 11.7–15.7)
HGB BLD-MCNC: 7.6 G/DL (ref 11.7–15.7)
HGB BLD-MCNC: 7.7 G/DL (ref 11.7–15.7)
HGB BLD-MCNC: 7.8 G/DL (ref 11.7–15.7)
HGB BLD-MCNC: 7.9 G/DL (ref 11.7–15.7)
HGB BLD-MCNC: 8 G/DL (ref 11.7–15.7)
HGB BLD-MCNC: 8.1 G/DL (ref 11.7–15.7)
HGB BLD-MCNC: 8.2 G/DL (ref 11.7–15.7)
HGB BLD-MCNC: 8.2 G/DL (ref 11.7–15.7)
HGB BLD-MCNC: 8.3 G/DL (ref 11.7–15.7)
HGB BLD-MCNC: 8.4 G/DL (ref 11.7–15.7)
HGB BLD-MCNC: 8.4 G/DL (ref 11.7–15.7)
HGB BLD-MCNC: 8.5 G/DL (ref 11.7–15.7)
HGB BLD-MCNC: 8.5 G/DL (ref 11.7–15.7)
HGB BLD-MCNC: 8.7 G/DL (ref 11.7–15.7)
HGB BLD-MCNC: 8.9 G/DL (ref 11.7–15.7)
HGB BLD-MCNC: 9 G/DL (ref 11.7–15.7)
HGB BLD-MCNC: 9.1 G/DL (ref 11.7–15.7)
HGB BLD-MCNC: 9.1 G/DL (ref 11.7–15.7)
HGB BLD-MCNC: 9.2 G/DL (ref 11.7–15.7)
HGB BLD-MCNC: 9.3 G/DL (ref 11.7–15.7)
HGB BLD-MCNC: 9.3 G/DL (ref 11.7–15.7)
HGB BLD-MCNC: 9.4 G/DL (ref 11.7–15.7)
HGB BLD-MCNC: 9.4 G/DL (ref 11.7–15.7)
HGB BLD-MCNC: 9.6 G/DL (ref 11.7–15.7)
HGB BLD-MCNC: 9.7 G/DL (ref 11.7–15.7)
HGB BLD-MCNC: 9.8 G/DL (ref 11.7–15.7)
HIV 1+2 AB+HIV1 P24 AG SERPL QL IA: NONREACTIVE
HOLD SPECIMEN: NORMAL
IMM GRANULOCYTES # BLD: 0 10E3/UL
IMM GRANULOCYTES # BLD: 0 10E3/UL
IMM GRANULOCYTES # BLD: 0.2 10E3/UL
IMM GRANULOCYTES NFR BLD: 0 %
IMM GRANULOCYTES NFR BLD: 0 %
IMM GRANULOCYTES NFR BLD: 1 %
INR PPP: 0.99 (ref 0.85–1.15)
INR PPP: 1.03 (ref 0.85–1.15)
INR PPP: 1.06 (ref 0.85–1.15)
INR PPP: 1.08 (ref 0.85–1.15)
INR PPP: 1.1 (ref 0.85–1.15)
INR PPP: 1.31 (ref 0.85–1.15)
INR PPP: 1.45 (ref 0.85–1.15)
INR PPP: 1.65 (ref 0.85–1.15)
INR PPP: 1.68 (ref 0.85–1.15)
INR PPP: 1.72 (ref 0.85–1.15)
INR PPP: 1.82 (ref 0.85–1.15)
INR PPP: 1.98 (ref 0.85–1.15)
INR PPP: 2.52 (ref 0.85–1.15)
INR PPP: 2.74 (ref 0.85–1.15)
INR PPP: 3.31 (ref 0.85–1.15)
INTERPRETATION ECG - MUSE: NORMAL
IRON SATN MFR SERPL: 87 % (ref 15–46)
IRON SERPL-MCNC: 130 UG/DL (ref 35–180)
ISSUE DATE AND TIME: NORMAL
LACTATE SERPL-SCNC: 1.1 MMOL/L (ref 0.7–2)
LACTATE SERPL-SCNC: 1.2 MMOL/L (ref 0.7–2)
LACTATE SERPL-SCNC: 1.2 MMOL/L (ref 0.7–2)
LACTATE SERPL-SCNC: 1.3 MMOL/L (ref 0.7–2)
LACTATE SERPL-SCNC: 1.4 MMOL/L (ref 0.7–2)
LACTATE SERPL-SCNC: 2.2 MMOL/L (ref 0.7–2)
LD BODY BODY FLUID SOURCE: NORMAL
LDH FLD L TO P-CCNC: 441 U/L
LDH SERPL L TO P-CCNC: 2005 U/L (ref 81–234)
LVEF ECHO: NORMAL
LYMPHOCYTES # BLD AUTO: 0.5 10E3/UL (ref 0.8–5.3)
LYMPHOCYTES # BLD AUTO: 0.7 10E3/UL (ref 0.8–5.3)
LYMPHOCYTES # BLD AUTO: 1.5 10E3/UL (ref 0.8–5.3)
LYMPHOCYTES NFR BLD AUTO: 15 %
LYMPHOCYTES NFR BLD AUTO: 24 %
LYMPHOCYTES NFR BLD AUTO: 4 %
LYMPHOCYTES NFR FLD MANUAL: 54 %
MAGNESIUM SERPL-MCNC: 1.7 MG/DL (ref 1.6–2.3)
MAGNESIUM SERPL-MCNC: 2.3 MG/DL (ref 1.6–2.3)
MCH RBC QN AUTO: 28.6 PG (ref 26.5–33)
MCH RBC QN AUTO: 28.7 PG (ref 26.5–33)
MCH RBC QN AUTO: 28.7 PG (ref 26.5–33)
MCH RBC QN AUTO: 28.8 PG (ref 26.5–33)
MCH RBC QN AUTO: 28.8 PG (ref 26.5–33)
MCH RBC QN AUTO: 28.9 PG (ref 26.5–33)
MCH RBC QN AUTO: 29 PG (ref 26.5–33)
MCH RBC QN AUTO: 29.1 PG (ref 26.5–33)
MCH RBC QN AUTO: 29.1 PG (ref 26.5–33)
MCH RBC QN AUTO: 29.2 PG (ref 26.5–33)
MCH RBC QN AUTO: 29.6 PG (ref 26.5–33)
MCH RBC QN AUTO: 29.6 PG (ref 26.5–33)
MCH RBC QN AUTO: 29.7 PG (ref 26.5–33)
MCH RBC QN AUTO: 29.7 PG (ref 26.5–33)
MCH RBC QN AUTO: 29.8 PG (ref 26.5–33)
MCH RBC QN AUTO: 29.9 PG (ref 26.5–33)
MCH RBC QN AUTO: 30.1 PG (ref 26.5–33)
MCH RBC QN AUTO: 30.3 PG (ref 26.5–33)
MCH RBC QN AUTO: 30.5 PG (ref 26.5–33)
MCH RBC QN AUTO: 31 PG (ref 26.5–33)
MCHC RBC AUTO-ENTMCNC: 31.3 G/DL (ref 31.5–36.5)
MCHC RBC AUTO-ENTMCNC: 31.3 G/DL (ref 31.5–36.5)
MCHC RBC AUTO-ENTMCNC: 31.5 G/DL (ref 31.5–36.5)
MCHC RBC AUTO-ENTMCNC: 31.5 G/DL (ref 31.5–36.5)
MCHC RBC AUTO-ENTMCNC: 31.7 G/DL (ref 31.5–36.5)
MCHC RBC AUTO-ENTMCNC: 31.8 G/DL (ref 31.5–36.5)
MCHC RBC AUTO-ENTMCNC: 31.8 G/DL (ref 31.5–36.5)
MCHC RBC AUTO-ENTMCNC: 31.9 G/DL (ref 31.5–36.5)
MCHC RBC AUTO-ENTMCNC: 32.1 G/DL (ref 31.5–36.5)
MCHC RBC AUTO-ENTMCNC: 32.1 G/DL (ref 31.5–36.5)
MCHC RBC AUTO-ENTMCNC: 32.2 G/DL (ref 31.5–36.5)
MCHC RBC AUTO-ENTMCNC: 32.3 G/DL (ref 31.5–36.5)
MCHC RBC AUTO-ENTMCNC: 32.4 G/DL (ref 31.5–36.5)
MCHC RBC AUTO-ENTMCNC: 32.5 G/DL (ref 31.5–36.5)
MCHC RBC AUTO-ENTMCNC: 32.5 G/DL (ref 31.5–36.5)
MCHC RBC AUTO-ENTMCNC: 32.6 G/DL (ref 31.5–36.5)
MCHC RBC AUTO-ENTMCNC: 32.7 G/DL (ref 31.5–36.5)
MCHC RBC AUTO-ENTMCNC: 32.9 G/DL (ref 31.5–36.5)
MCHC RBC AUTO-ENTMCNC: 33.1 G/DL (ref 31.5–36.5)
MCHC RBC AUTO-ENTMCNC: 33.2 G/DL (ref 31.5–36.5)
MCHC RBC AUTO-ENTMCNC: 33.3 G/DL (ref 31.5–36.5)
MCHC RBC AUTO-ENTMCNC: 33.7 G/DL (ref 31.5–36.5)
MCHC RBC AUTO-ENTMCNC: 34 G/DL (ref 31.5–36.5)
MCV RBC AUTO: 87 FL (ref 78–100)
MCV RBC AUTO: 88 FL (ref 78–100)
MCV RBC AUTO: 89 FL (ref 78–100)
MCV RBC AUTO: 89 FL (ref 78–100)
MCV RBC AUTO: 90 FL (ref 78–100)
MCV RBC AUTO: 91 FL (ref 78–100)
MCV RBC AUTO: 92 FL (ref 78–100)
MCV RBC AUTO: 93 FL (ref 78–100)
MCV RBC AUTO: 94 FL (ref 78–100)
MCV RBC AUTO: 95 FL (ref 78–100)
MCV RBC AUTO: 96 FL (ref 78–100)
MCV RBC AUTO: 96 FL (ref 78–100)
MONOCYTES # BLD AUTO: 0.3 10E3/UL (ref 0–1.3)
MONOCYTES # BLD AUTO: 0.6 10E3/UL (ref 0–1.3)
MONOCYTES # BLD AUTO: 0.8 10E3/UL (ref 0–1.3)
MONOCYTES NFR BLD AUTO: 6 %
MONOCYTES NFR BLD AUTO: 6 %
MONOCYTES NFR BLD AUTO: 9 %
MONOS+MACROS NFR FLD MANUAL: 7 %
MRSA DNA SPEC QL NAA+PROBE: NEGATIVE
NEUTROPHILS # BLD AUTO: 11 10E3/UL (ref 1.6–8.3)
NEUTROPHILS # BLD AUTO: 3.4 10E3/UL (ref 1.6–8.3)
NEUTROPHILS # BLD AUTO: 4.3 10E3/UL (ref 1.6–8.3)
NEUTROPHILS NFR BLD AUTO: 65 %
NEUTROPHILS NFR BLD AUTO: 77 %
NEUTROPHILS NFR BLD AUTO: 88 %
NEUTS BAND NFR FLD MANUAL: 3 %
NRBC # BLD AUTO: 0 10E3/UL
NRBC BLD AUTO-RTO: 0 /100
NT-PROBNP SERPL-MCNC: 3583 PG/ML (ref 0–900)
NT-PROBNP SERPL-MCNC: ABNORMAL PG/ML (ref 0–900)
O2/TOTAL GAS SETTING VFR VENT: 0 %
O2/TOTAL GAS SETTING VFR VENT: 21 %
O2/TOTAL GAS SETTING VFR VENT: 40 %
O2/TOTAL GAS SETTING VFR VENT: 44 %
O2/TOTAL GAS SETTING VFR VENT: 98 %
O2/TOTAL GAS SETTING VFR VENT: NORMAL %
OXYHGB MFR BLDV: 74 % (ref 70–75)
P AXIS - MUSE: 71 DEGREES
P AXIS - MUSE: 82 DEGREES
P AXIS - MUSE: 86 DEGREES
PATH REPORT.COMMENTS IMP SPEC: ABNORMAL
PATH REPORT.COMMENTS IMP SPEC: YES
PATH REPORT.COMMENTS IMP SPEC: YES
PATH REPORT.FINAL DX SPEC: ABNORMAL
PATH REPORT.FINAL DX SPEC: ABNORMAL
PATH REPORT.GROSS SPEC: ABNORMAL
PATH REPORT.GROSS SPEC: ABNORMAL
PATH REPORT.MICROSCOPIC SPEC OTHER STN: ABNORMAL
PATH REPORT.MICROSCOPIC SPEC OTHER STN: ABNORMAL
PATH REPORT.RELEVANT HX SPEC: ABNORMAL
PATH REPORT.RELEVANT HX SPEC: ABNORMAL
PCO2 BLD: 43 MM HG (ref 35–45)
PCO2 BLDV: 44 MM HG (ref 40–50)
PCO2 BLDV: 48 MM HG (ref 40–50)
PCO2 BLDV: 50 MM HG (ref 40–50)
PCO2 BLDV: 50 MM HG (ref 40–50)
PCO2 BLDV: 54 MM HG (ref 40–50)
PH BLD: 7.43 [PH] (ref 7.35–7.45)
PH BLDV: 7.31 [PH] (ref 7.32–7.43)
PH BLDV: 7.33 [PH] (ref 7.32–7.43)
PH BLDV: 7.33 [PH] (ref 7.32–7.43)
PH BLDV: 7.36 [PH] (ref 7.32–7.43)
PH BLDV: 7.36 [PH] (ref 7.32–7.43)
PHOSPHATE SERPL-MCNC: 2.2 MG/DL (ref 2.5–4.5)
PHOSPHATE SERPL-MCNC: 2.4 MG/DL (ref 2.5–4.5)
PHOSPHATE SERPL-MCNC: 3.3 MG/DL (ref 2.5–4.5)
PHOSPHATE SERPL-MCNC: 3.4 MG/DL (ref 2.5–4.5)
PHOSPHATE SERPL-MCNC: 3.9 MG/DL (ref 2.5–4.5)
PHOSPHATE SERPL-MCNC: 4.8 MG/DL (ref 2.5–4.5)
PHOTO IMAGE: ABNORMAL
PLATELET # BLD AUTO: 100 10E3/UL (ref 150–450)
PLATELET # BLD AUTO: 103 10E3/UL (ref 150–450)
PLATELET # BLD AUTO: 104 10E3/UL (ref 150–450)
PLATELET # BLD AUTO: 108 10E3/UL (ref 150–450)
PLATELET # BLD AUTO: 194 10E3/UL (ref 150–450)
PLATELET # BLD AUTO: 220 10E3/UL (ref 150–450)
PLATELET # BLD AUTO: 234 10E3/UL (ref 150–450)
PLATELET # BLD AUTO: 235 10E3/UL (ref 150–450)
PLATELET # BLD AUTO: 264 10E3/UL (ref 150–450)
PLATELET # BLD AUTO: 264 10E3/UL (ref 150–450)
PLATELET # BLD AUTO: 277 10E3/UL (ref 150–450)
PLATELET # BLD AUTO: 284 10E3/UL (ref 150–450)
PLATELET # BLD AUTO: 288 10E3/UL (ref 150–450)
PLATELET # BLD AUTO: 294 10E3/UL (ref 150–450)
PLATELET # BLD AUTO: 297 10E3/UL (ref 150–450)
PLATELET # BLD AUTO: 304 10E3/UL (ref 150–450)
PLATELET # BLD AUTO: 307 10E3/UL (ref 150–450)
PLATELET # BLD AUTO: 314 10E3/UL (ref 150–450)
PLATELET # BLD AUTO: 320 10E3/UL (ref 150–450)
PLATELET # BLD AUTO: 333 10E3/UL (ref 150–450)
PLATELET # BLD AUTO: 371 10E3/UL (ref 150–450)
PLATELET # BLD AUTO: 372 10E3/UL (ref 150–450)
PLATELET # BLD AUTO: 376 10E3/UL (ref 150–450)
PLATELET # BLD AUTO: 80 10E3/UL (ref 150–450)
PLATELET # BLD AUTO: 84 10E3/UL (ref 150–450)
PLATELET # BLD AUTO: ABNORMAL 10*3/UL
PO2 BLD: 62 MM HG (ref 80–105)
PO2 BLDV: 36 MM HG (ref 25–47)
PO2 BLDV: 37 MM HG (ref 25–47)
PO2 BLDV: 43 MM HG (ref 25–47)
PO2 BLDV: 46 MM HG (ref 25–47)
PO2 BLDV: 47 MM HG (ref 25–47)
POTASSIUM BLD-SCNC: 3.3 MMOL/L (ref 3.4–5.3)
POTASSIUM BLD-SCNC: 3.4 MMOL/L (ref 3.4–5.3)
POTASSIUM BLD-SCNC: 3.4 MMOL/L (ref 3.4–5.3)
POTASSIUM BLD-SCNC: 3.5 MMOL/L (ref 3.4–5.3)
POTASSIUM BLD-SCNC: 3.6 MMOL/L (ref 3.4–5.3)
POTASSIUM BLD-SCNC: 3.6 MMOL/L (ref 3.4–5.3)
POTASSIUM BLD-SCNC: 3.7 MMOL/L (ref 3.4–5.3)
POTASSIUM BLD-SCNC: 3.8 MMOL/L (ref 3.4–5.3)
POTASSIUM BLD-SCNC: 3.9 MMOL/L (ref 3.4–5.3)
POTASSIUM BLD-SCNC: 3.9 MMOL/L (ref 3.4–5.3)
POTASSIUM BLD-SCNC: 4 MMOL/L (ref 3.4–5.3)
POTASSIUM BLD-SCNC: 4 MMOL/L (ref 3.4–5.3)
POTASSIUM BLD-SCNC: 4.1 MMOL/L (ref 3.4–5.3)
POTASSIUM BLD-SCNC: 4.2 MMOL/L (ref 3.4–5.3)
POTASSIUM BLD-SCNC: 4.2 MMOL/L (ref 3.4–5.3)
POTASSIUM BLD-SCNC: 4.3 MMOL/L (ref 3.4–5.3)
POTASSIUM BLD-SCNC: 4.3 MMOL/L (ref 3.4–5.3)
POTASSIUM BLD-SCNC: 4.6 MMOL/L (ref 3.4–5.3)
PR INTERVAL - MUSE: 162 MS
PR INTERVAL - MUSE: 168 MS
PR INTERVAL - MUSE: 186 MS
PROCALCITONIN SERPL-MCNC: 72.44 NG/ML
PROCALCITONIN SERPL-MCNC: >200 NG/ML
PROT FLD-MCNC: 1.8 G/DL
PROT SERPL-MCNC: 4.9 G/DL (ref 6.8–8.8)
PROT SERPL-MCNC: 4.9 G/DL (ref 6.8–8.8)
PROT SERPL-MCNC: 5.1 G/DL (ref 6.8–8.8)
PROT SERPL-MCNC: 5.2 G/DL (ref 6.8–8.8)
PROT SERPL-MCNC: 5.2 G/DL (ref 6.8–8.8)
PROT SERPL-MCNC: 5.3 G/DL (ref 6.8–8.8)
PROT SERPL-MCNC: 5.5 G/DL (ref 6.8–8.8)
PROT SERPL-MCNC: 5.6 G/DL (ref 6.8–8.8)
PROT SERPL-MCNC: 5.7 G/DL (ref 6.8–8.8)
PROTEIN BODY FLUID SOURCE: NORMAL
QRS DURATION - MUSE: 76 MS
QRS DURATION - MUSE: 78 MS
QRS DURATION - MUSE: 80 MS
QT - MUSE: 332 MS
QT - MUSE: 388 MS
QT - MUSE: 402 MS
QTC - MUSE: 430 MS
QTC - MUSE: 441 MS
QTC - MUSE: 472 MS
R AXIS - MUSE: 45 DEGREES
R AXIS - MUSE: 5 DEGREES
R AXIS - MUSE: 97 DEGREES
RADIOLOGIST FLAGS: ABNORMAL
RBC # BLD AUTO: 2.19 10E6/UL (ref 3.8–5.2)
RBC # BLD AUTO: 2.31 10E6/UL (ref 3.8–5.2)
RBC # BLD AUTO: 2.35 10E6/UL (ref 3.8–5.2)
RBC # BLD AUTO: 2.44 10E6/UL (ref 3.8–5.2)
RBC # BLD AUTO: 2.55 10E6/UL (ref 3.8–5.2)
RBC # BLD AUTO: 2.55 10E6/UL (ref 3.8–5.2)
RBC # BLD AUTO: 2.56 10E6/UL (ref 3.8–5.2)
RBC # BLD AUTO: 2.59 10E6/UL (ref 3.8–5.2)
RBC # BLD AUTO: 2.66 10E6/UL (ref 3.8–5.2)
RBC # BLD AUTO: 2.69 10E6/UL (ref 3.8–5.2)
RBC # BLD AUTO: 2.74 10E6/UL (ref 3.8–5.2)
RBC # BLD AUTO: 2.76 10E6/UL (ref 3.8–5.2)
RBC # BLD AUTO: 2.82 10E6/UL (ref 3.8–5.2)
RBC # BLD AUTO: 2.85 10E6/UL (ref 3.8–5.2)
RBC # BLD AUTO: 2.9 10E6/UL (ref 3.8–5.2)
RBC # BLD AUTO: 2.92 10E6/UL (ref 3.8–5.2)
RBC # BLD AUTO: 3.07 10E6/UL (ref 3.8–5.2)
RBC # BLD AUTO: 3.08 10E6/UL (ref 3.8–5.2)
RBC # BLD AUTO: 3.13 10E6/UL (ref 3.8–5.2)
RBC # BLD AUTO: 3.18 10E6/UL (ref 3.8–5.2)
RBC # BLD AUTO: 3.2 10E6/UL (ref 3.8–5.2)
RBC # BLD AUTO: 3.24 10E6/UL (ref 3.8–5.2)
RBC # BLD AUTO: 3.28 10E6/UL (ref 3.8–5.2)
RBC # BLD AUTO: 3.37 10E6/UL (ref 3.8–5.2)
RBC # BLD AUTO: 3.64 10E6/UL (ref 3.8–5.2)
RETICS # AUTO: 0.04 10E6/UL (ref 0.03–0.1)
RETICS/RBC NFR AUTO: 2 % (ref 0.5–2)
SA TARGET DNA: NEGATIVE
SARS-COV-2 RNA RESP QL NAA+PROBE: NEGATIVE
SODIUM SERPL-SCNC: 130 MMOL/L (ref 133–144)
SODIUM SERPL-SCNC: 131 MMOL/L (ref 133–144)
SODIUM SERPL-SCNC: 132 MMOL/L (ref 133–144)
SODIUM SERPL-SCNC: 133 MMOL/L (ref 133–144)
SODIUM SERPL-SCNC: 134 MMOL/L (ref 133–144)
SODIUM SERPL-SCNC: 135 MMOL/L (ref 133–144)
SODIUM SERPL-SCNC: 136 MMOL/L (ref 133–144)
SODIUM SERPL-SCNC: 137 MMOL/L (ref 133–144)
SODIUM SERPL-SCNC: 137 MMOL/L (ref 133–144)
SODIUM SERPL-SCNC: 138 MMOL/L (ref 133–144)
SODIUM SERPL-SCNC: 139 MMOL/L (ref 133–144)
SPECIMEN EXPIRATION DATE: NORMAL
SYSTOLIC BLOOD PRESSURE - MUSE: NORMAL MMHG
T AXIS - MUSE: 70 DEGREES
T AXIS - MUSE: 76 DEGREES
T AXIS - MUSE: 91 DEGREES
TIBC SERPL-MCNC: 149 UG/DL (ref 240–430)
TROPONIN I SERPL HS-MCNC: 49 NG/L
UFH PPP CHRO-ACNC: 0.18 IU/ML
UFH PPP CHRO-ACNC: 0.25 IU/ML
UFH PPP CHRO-ACNC: 0.26 IU/ML
UFH PPP CHRO-ACNC: 0.3 IU/ML
UFH PPP CHRO-ACNC: 0.31 IU/ML
UFH PPP CHRO-ACNC: 0.33 IU/ML
UFH PPP CHRO-ACNC: 0.34 IU/ML
UFH PPP CHRO-ACNC: 0.34 IU/ML
UFH PPP CHRO-ACNC: 0.36 IU/ML
UFH PPP CHRO-ACNC: 0.37 IU/ML
UFH PPP CHRO-ACNC: 0.37 IU/ML
UFH PPP CHRO-ACNC: 0.4 IU/ML
UFH PPP CHRO-ACNC: 0.42 IU/ML
UFH PPP CHRO-ACNC: 0.46 IU/ML
UFH PPP CHRO-ACNC: 0.52 IU/ML
UFH PPP CHRO-ACNC: 0.54 IU/ML
UFH PPP CHRO-ACNC: 0.58 IU/ML
UFH PPP CHRO-ACNC: 0.61 IU/ML
UFH PPP CHRO-ACNC: 0.61 IU/ML
UFH PPP CHRO-ACNC: 0.62 IU/ML
UFH PPP CHRO-ACNC: 0.76 IU/ML
UFH PPP CHRO-ACNC: 0.77 IU/ML
UFH PPP CHRO-ACNC: <0.1 IU/ML
UFH PPP CHRO-ACNC: >1.1 IU/ML
UNIT ABO/RH: NORMAL
UNIT NUMBER: NORMAL
UNIT STATUS: NORMAL
UNIT TYPE ISBT: 5100
UNIT TYPE ISBT: 9500
UPPER GI ENDOSCOPY: NORMAL
UPPER GI ENDOSCOPY: NORMAL
URATE SERPL-MCNC: 5.5 MG/DL (ref 2.6–6)
VANCOMYCIN SERPL-MCNC: 18.5 MG/L
VANCOMYCIN SERPL-MCNC: 23.3 MG/L
VENTRICULAR RATE- MUSE: 106 BPM
VENTRICULAR RATE- MUSE: 74 BPM
VENTRICULAR RATE- MUSE: 83 BPM
VIT B12 SERPL-MCNC: 942 PG/ML (ref 232–1245)
WBC # BLD AUTO: 10 10E3/UL (ref 4–11)
WBC # BLD AUTO: 10 10E3/UL (ref 4–11)
WBC # BLD AUTO: 12.5 10E3/UL (ref 4–11)
WBC # BLD AUTO: 14.1 10E3/UL (ref 4–11)
WBC # BLD AUTO: 15.5 10E3/UL (ref 4–11)
WBC # BLD AUTO: 15.9 10E3/UL (ref 4–11)
WBC # BLD AUTO: 16.1 10E3/UL (ref 4–11)
WBC # BLD AUTO: 18.3 10E3/UL (ref 4–11)
WBC # BLD AUTO: 4.4 10E3/UL (ref 4–11)
WBC # BLD AUTO: 4.8 10E3/UL (ref 4–11)
WBC # BLD AUTO: 4.9 10E3/UL (ref 4–11)
WBC # BLD AUTO: 5 10E3/UL (ref 4–11)
WBC # BLD AUTO: 5.3 10E3/UL (ref 4–11)
WBC # BLD AUTO: 5.9 10E3/UL (ref 4–11)
WBC # BLD AUTO: 6.4 10E3/UL (ref 4–11)
WBC # BLD AUTO: 6.5 10E3/UL (ref 4–11)
WBC # BLD AUTO: 6.7 10E3/UL (ref 4–11)
WBC # BLD AUTO: 6.9 10E3/UL (ref 4–11)
WBC # BLD AUTO: 7 10E3/UL (ref 4–11)
WBC # BLD AUTO: 7.7 10E3/UL (ref 4–11)
WBC # BLD AUTO: 7.9 10E3/UL (ref 4–11)
WBC # BLD AUTO: 8.3 10E3/UL (ref 4–11)
WBC # BLD AUTO: 8.5 10E3/UL (ref 4–11)
WBC # BLD AUTO: 9 10E3/UL (ref 4–11)
WBC # BLD AUTO: 9.6 10E3/UL (ref 4–11)
WBC # FLD AUTO: 148 /UL

## 2022-01-01 PROCEDURE — 90935 HEMODIALYSIS ONE EVALUATION: CPT | Performed by: INTERNAL MEDICINE

## 2022-01-01 PROCEDURE — 90937 HEMODIALYSIS REPEATED EVAL: CPT

## 2022-01-01 PROCEDURE — C1725 CATH, TRANSLUMIN NON-LASER: HCPCS

## 2022-01-01 PROCEDURE — 250N000013 HC RX MED GY IP 250 OP 250 PS 637

## 2022-01-01 PROCEDURE — 80048 BASIC METABOLIC PNL TOTAL CA: CPT | Performed by: INTERNAL MEDICINE

## 2022-01-01 PROCEDURE — 36415 COLL VENOUS BLD VENIPUNCTURE: CPT | Performed by: INTERNAL MEDICINE

## 2022-01-01 PROCEDURE — C1769 GUIDE WIRE: HCPCS

## 2022-01-01 PROCEDURE — 272N000001 HC OR GENERAL SUPPLY STERILE: Performed by: SURGERY

## 2022-01-01 PROCEDURE — 250N000013 HC RX MED GY IP 250 OP 250 PS 637: Performed by: INTERNAL MEDICINE

## 2022-01-01 PROCEDURE — 250N000013 HC RX MED GY IP 250 OP 250 PS 637: Performed by: PHYSICIAN ASSISTANT

## 2022-01-01 PROCEDURE — 92526 ORAL FUNCTION THERAPY: CPT | Mod: GN | Performed by: SPEECH-LANGUAGE PATHOLOGIST

## 2022-01-01 PROCEDURE — 86901 BLOOD TYPING SEROLOGIC RH(D): CPT | Performed by: INTERNAL MEDICINE

## 2022-01-01 PROCEDURE — 250N000009 HC RX 250: Performed by: NURSE PRACTITIONER

## 2022-01-01 PROCEDURE — 99207 PR NO BILLABLE SERVICE THIS VISIT: CPT | Mod: GV | Performed by: INTERNAL MEDICINE

## 2022-01-01 PROCEDURE — 027V3ZZ DILATION OF SUPERIOR VENA CAVA, PERCUTANEOUS APPROACH: ICD-10-PCS | Performed by: RADIOLOGY

## 2022-01-01 PROCEDURE — 250N000011 HC RX IP 250 OP 636: Performed by: NURSE PRACTITIONER

## 2022-01-01 PROCEDURE — 250N000013 HC RX MED GY IP 250 OP 250 PS 637: Performed by: STUDENT IN AN ORGANIZED HEALTH CARE EDUCATION/TRAINING PROGRAM

## 2022-01-01 PROCEDURE — 250N000011 HC RX IP 250 OP 636: Performed by: NURSE ANESTHETIST, CERTIFIED REGISTERED

## 2022-01-01 PROCEDURE — 04QY3ZZ REPAIR LOWER ARTERY, PERCUTANEOUS APPROACH: ICD-10-PCS | Performed by: SURGERY

## 2022-01-01 PROCEDURE — 36415 COLL VENOUS BLD VENIPUNCTURE: CPT | Performed by: HOSPITALIST

## 2022-01-01 PROCEDURE — 36902 INTRO CATH DIALYSIS CIRCUIT: CPT

## 2022-01-01 PROCEDURE — 272N000105 HC DEVICE CLIP QUICK: Performed by: INTERNAL MEDICINE

## 2022-01-01 PROCEDURE — 999N000054 HC STATISTIC EKG NON-CHARGEABLE

## 2022-01-01 PROCEDURE — 250N000011 HC RX IP 250 OP 636: Performed by: STUDENT IN AN ORGANIZED HEALTH CARE EDUCATION/TRAINING PROGRAM

## 2022-01-01 PROCEDURE — 77334 RADIATION TREATMENT AID(S): CPT

## 2022-01-01 PROCEDURE — 999N000154 HC STATISTIC RADIOLOGY XRAY, US, CT, MAR, NM

## 2022-01-01 PROCEDURE — 250N000011 HC RX IP 250 OP 636: Performed by: INTERNAL MEDICINE

## 2022-01-01 PROCEDURE — 80202 ASSAY OF VANCOMYCIN: CPT | Performed by: HOSPITALIST

## 2022-01-01 PROCEDURE — 250N000009 HC RX 250: Performed by: SURGERY

## 2022-01-01 PROCEDURE — G0378 HOSPITAL OBSERVATION PER HR: HCPCS

## 2022-01-01 PROCEDURE — 85018 HEMOGLOBIN: CPT | Performed by: INTERNAL MEDICINE

## 2022-01-01 PROCEDURE — 99231 SBSQ HOSP IP/OBS SF/LOW 25: CPT | Performed by: NURSE PRACTITIONER

## 2022-01-01 PROCEDURE — 05CY3ZZ EXTIRPATION OF MATTER FROM UPPER VEIN, PERCUTANEOUS APPROACH: ICD-10-PCS | Performed by: SURGERY

## 2022-01-01 PROCEDURE — 250N000011 HC RX IP 250 OP 636: Performed by: HOSPITALIST

## 2022-01-01 PROCEDURE — 250N000009 HC RX 250: Performed by: REGISTERED NURSE

## 2022-01-01 PROCEDURE — 999N000127 HC STATISTIC PERIPHERAL IV START W US GUIDANCE

## 2022-01-01 PROCEDURE — 250N000013 HC RX MED GY IP 250 OP 250 PS 637: Performed by: NURSE PRACTITIONER

## 2022-01-01 PROCEDURE — 120N000013 HC R&B IMCU

## 2022-01-01 PROCEDURE — 250N000009 HC RX 250: Performed by: INTERNAL MEDICINE

## 2022-01-01 PROCEDURE — P9045 ALBUMIN (HUMAN), 5%, 250 ML: HCPCS | Performed by: INTERNAL MEDICINE

## 2022-01-01 PROCEDURE — 710N000009 HC RECOVERY PHASE 1, LEVEL 1, PER MIN: Performed by: SURGERY

## 2022-01-01 PROCEDURE — 86923 COMPATIBILITY TEST ELECTRIC: CPT | Performed by: INTERNAL MEDICINE

## 2022-01-01 PROCEDURE — 634N000001 HC RX 634: Performed by: INTERNAL MEDICINE

## 2022-01-01 PROCEDURE — 999N000128 HC STATISTIC PERIPHERAL IV START W/O US GUIDANCE

## 2022-01-01 PROCEDURE — 83605 ASSAY OF LACTIC ACID: CPT | Performed by: NURSE PRACTITIONER

## 2022-01-01 PROCEDURE — 110N000005 HC R&B HOSPICE, ACCENT

## 2022-01-01 PROCEDURE — 83615 LACTATE (LD) (LDH) ENZYME: CPT | Performed by: NURSE PRACTITIONER

## 2022-01-01 PROCEDURE — 250N000013 HC RX MED GY IP 250 OP 250 PS 637: Performed by: HOSPITALIST

## 2022-01-01 PROCEDURE — 99233 SBSQ HOSP IP/OBS HIGH 50: CPT | Performed by: INTERNAL MEDICINE

## 2022-01-01 PROCEDURE — 85610 PROTHROMBIN TIME: CPT | Performed by: PHYSICIAN ASSISTANT

## 2022-01-01 PROCEDURE — 85610 PROTHROMBIN TIME: CPT | Performed by: INTERNAL MEDICINE

## 2022-01-01 PROCEDURE — 77387 GUIDANCE FOR RADJ TX DLVR: CPT

## 2022-01-01 PROCEDURE — 84100 ASSAY OF PHOSPHORUS: CPT | Performed by: NURSE PRACTITIONER

## 2022-01-01 PROCEDURE — C9113 INJ PANTOPRAZOLE SODIUM, VIA: HCPCS | Performed by: INTERNAL MEDICINE

## 2022-01-01 PROCEDURE — 87205 SMEAR GRAM STAIN: CPT | Performed by: NURSE PRACTITIONER

## 2022-01-01 PROCEDURE — 85610 PROTHROMBIN TIME: CPT | Performed by: NURSE PRACTITIONER

## 2022-01-01 PROCEDURE — 360N000076 HC SURGERY LEVEL 3, PER MIN: Performed by: SURGERY

## 2022-01-01 PROCEDURE — U0003 INFECTIOUS AGENT DETECTION BY NUCLEIC ACID (DNA OR RNA); SEVERE ACUTE RESPIRATORY SYNDROME CORONAVIRUS 2 (SARS-COV-2) (CORONAVIRUS DISEASE [COVID-19]), AMPLIFIED PROBE TECHNIQUE, MAKING USE OF HIGH THROUGHPUT TECHNOLOGIES AS DESCRIBED BY CMS-2020-01-R: HCPCS | Performed by: EMERGENCY MEDICINE

## 2022-01-01 PROCEDURE — P9040 RBC LEUKOREDUCED IRRADIATED: HCPCS | Performed by: INTERNAL MEDICINE

## 2022-01-01 PROCEDURE — 36415 COLL VENOUS BLD VENIPUNCTURE: CPT | Performed by: EMERGENCY MEDICINE

## 2022-01-01 PROCEDURE — 272N000196 HC ACCESSORY CR5

## 2022-01-01 PROCEDURE — C1768 GRAFT, VASCULAR: HCPCS | Performed by: SURGERY

## 2022-01-01 PROCEDURE — 99239 HOSP IP/OBS DSCHRG MGMT >30: CPT | Performed by: INTERNAL MEDICINE

## 2022-01-01 PROCEDURE — 250N000011 HC RX IP 250 OP 636: Performed by: PHYSICIAN ASSISTANT

## 2022-01-01 PROCEDURE — 258N000003 HC RX IP 258 OP 636: Performed by: INTERNAL MEDICINE

## 2022-01-01 PROCEDURE — C1788 PORT, INDWELLING, IMP: HCPCS

## 2022-01-01 PROCEDURE — 85520 HEPARIN ASSAY: CPT | Performed by: INTERNAL MEDICINE

## 2022-01-01 PROCEDURE — 85018 HEMOGLOBIN: CPT | Performed by: HOSPITALIST

## 2022-01-01 PROCEDURE — 82310 ASSAY OF CALCIUM: CPT | Performed by: INTERNAL MEDICINE

## 2022-01-01 PROCEDURE — 77412 RADIATION TX DELIVERY LVL 3: CPT

## 2022-01-01 PROCEDURE — 250N000011 HC RX IP 250 OP 636: Performed by: SURGERY

## 2022-01-01 PROCEDURE — 255N000002 HC RX 255 OP 636: Performed by: RADIOLOGY

## 2022-01-01 PROCEDURE — 99232 SBSQ HOSP IP/OBS MODERATE 35: CPT | Performed by: INTERNAL MEDICINE

## 2022-01-01 PROCEDURE — 85520 HEPARIN ASSAY: CPT | Performed by: STUDENT IN AN ORGANIZED HEALTH CARE EDUCATION/TRAINING PROGRAM

## 2022-01-01 PROCEDURE — G0463 HOSPITAL OUTPT CLINIC VISIT: HCPCS

## 2022-01-01 PROCEDURE — 99232 SBSQ HOSP IP/OBS MODERATE 35: CPT | Performed by: HOSPITALIST

## 2022-01-01 PROCEDURE — 272N000566 HC SHEATH CR3

## 2022-01-01 PROCEDURE — 87206 SMEAR FLUORESCENT/ACID STAI: CPT | Performed by: NURSE PRACTITIONER

## 2022-01-01 PROCEDURE — 99254 IP/OBS CNSLTJ NEW/EST MOD 60: CPT | Mod: 25 | Performed by: INTERNAL MEDICINE

## 2022-01-01 PROCEDURE — 85027 COMPLETE CBC AUTOMATED: CPT | Performed by: INTERNAL MEDICINE

## 2022-01-01 PROCEDURE — 272N000302 HC DEVICE INFLATION CR5

## 2022-01-01 PROCEDURE — 85730 THROMBOPLASTIN TIME PARTIAL: CPT | Performed by: EMERGENCY MEDICINE

## 2022-01-01 PROCEDURE — 250N000025 HC SEVOFLURANE, PER MIN: Performed by: SURGERY

## 2022-01-01 PROCEDURE — 85520 HEPARIN ASSAY: CPT | Performed by: HOSPITALIST

## 2022-01-01 PROCEDURE — 370N000017 HC ANESTHESIA TECHNICAL FEE, PER MIN: Performed by: SURGERY

## 2022-01-01 PROCEDURE — 36592 COLLECT BLOOD FROM PICC: CPT | Performed by: NURSE PRACTITIONER

## 2022-01-01 PROCEDURE — 71045 X-RAY EXAM CHEST 1 VIEW: CPT

## 2022-01-01 PROCEDURE — 03CY3ZZ EXTIRPATION OF MATTER FROM UPPER ARTERY, PERCUTANEOUS APPROACH: ICD-10-PCS | Performed by: SURGERY

## 2022-01-01 PROCEDURE — 80053 COMPREHEN METABOLIC PANEL: CPT | Performed by: PHYSICIAN ASSISTANT

## 2022-01-01 PROCEDURE — 82607 VITAMIN B-12: CPT | Performed by: STUDENT IN AN ORGANIZED HEALTH CARE EDUCATION/TRAINING PROGRAM

## 2022-01-01 PROCEDURE — 85027 COMPLETE CBC AUTOMATED: CPT | Performed by: HOSPITALIST

## 2022-01-01 PROCEDURE — 85027 COMPLETE CBC AUTOMATED: CPT | Performed by: PHYSICIAN ASSISTANT

## 2022-01-01 PROCEDURE — 272N000104 HC DEVICE CLIP RESOLUTION, EACH: Performed by: INTERNAL MEDICINE

## 2022-01-01 PROCEDURE — 77295 3-D RADIOTHERAPY PLAN: CPT

## 2022-01-01 PROCEDURE — 120N000001 HC R&B MED SURG/OB

## 2022-01-01 PROCEDURE — 85730 THROMBOPLASTIN TIME PARTIAL: CPT | Performed by: NURSE PRACTITIONER

## 2022-01-01 PROCEDURE — 85610 PROTHROMBIN TIME: CPT | Performed by: HOSPITALIST

## 2022-01-01 PROCEDURE — 43255 EGD CONTROL BLEEDING ANY: CPT | Performed by: INTERNAL MEDICINE

## 2022-01-01 PROCEDURE — P9040 RBC LEUKOREDUCED IRRADIATED: HCPCS | Performed by: NURSE PRACTITIONER

## 2022-01-01 PROCEDURE — 250N000009 HC RX 250: Performed by: NURSE ANESTHETIST, CERTIFIED REGISTERED

## 2022-01-01 PROCEDURE — 74176 CT ABD & PELVIS W/O CONTRAST: CPT

## 2022-01-01 PROCEDURE — 76937 US GUIDE VASCULAR ACCESS: CPT

## 2022-01-01 PROCEDURE — 88342 IMHCHEM/IMCYTCHM 1ST ANTB: CPT | Mod: 26 | Performed by: PATHOLOGY

## 2022-01-01 PROCEDURE — 77336 RADIATION PHYSICS CONSULT: CPT

## 2022-01-01 PROCEDURE — 85014 HEMATOCRIT: CPT | Performed by: PHYSICIAN ASSISTANT

## 2022-01-01 PROCEDURE — 99238 HOSP IP/OBS DSCHRG MGMT 30/<: CPT | Performed by: NURSE PRACTITIONER

## 2022-01-01 PROCEDURE — 250N000011 HC RX IP 250 OP 636: Performed by: EMERGENCY MEDICINE

## 2022-01-01 PROCEDURE — 99223 1ST HOSP IP/OBS HIGH 75: CPT | Performed by: INTERNAL MEDICINE

## 2022-01-01 PROCEDURE — 97535 SELF CARE MNGMENT TRAINING: CPT | Mod: GO | Performed by: OCCUPATIONAL THERAPIST

## 2022-01-01 PROCEDURE — 92610 EVALUATE SWALLOWING FUNCTION: CPT | Mod: GN | Performed by: SPEECH-LANGUAGE PATHOLOGIST

## 2022-01-01 PROCEDURE — 82040 ASSAY OF SERUM ALBUMIN: CPT | Performed by: INTERNAL MEDICINE

## 2022-01-01 PROCEDURE — 86923 COMPATIBILITY TEST ELECTRIC: CPT | Performed by: NURSE PRACTITIONER

## 2022-01-01 PROCEDURE — 258N000003 HC RX IP 258 OP 636: Performed by: NURSE ANESTHETIST, CERTIFIED REGISTERED

## 2022-01-01 PROCEDURE — 83605 ASSAY OF LACTIC ACID: CPT | Performed by: INTERNAL MEDICINE

## 2022-01-01 PROCEDURE — 80053 COMPREHEN METABOLIC PANEL: CPT | Performed by: INTERNAL MEDICINE

## 2022-01-01 PROCEDURE — 84100 ASSAY OF PHOSPHORUS: CPT | Performed by: HOSPITALIST

## 2022-01-01 PROCEDURE — G0463 HOSPITAL OUTPT CLINIC VISIT: HCPCS | Mod: 25

## 2022-01-01 PROCEDURE — 87040 BLOOD CULTURE FOR BACTERIA: CPT | Performed by: NURSE PRACTITIONER

## 2022-01-01 PROCEDURE — 258N000003 HC RX IP 258 OP 636: Performed by: SURGERY

## 2022-01-01 PROCEDURE — 85027 COMPLETE CBC AUTOMATED: CPT | Performed by: EMERGENCY MEDICINE

## 2022-01-01 PROCEDURE — 258N000003 HC RX IP 258 OP 636: Performed by: NURSE PRACTITIONER

## 2022-01-01 PROCEDURE — 99253 IP/OBS CNSLTJ NEW/EST LOW 45: CPT | Performed by: INTERNAL MEDICINE

## 2022-01-01 PROCEDURE — 99221 1ST HOSP IP/OBS SF/LOW 40: CPT | Performed by: SURGERY

## 2022-01-01 PROCEDURE — 93010 ELECTROCARDIOGRAM REPORT: CPT | Performed by: INTERNAL MEDICINE

## 2022-01-01 PROCEDURE — 93306 TTE W/DOPPLER COMPLETE: CPT | Mod: 26 | Performed by: INTERNAL MEDICINE

## 2022-01-01 PROCEDURE — 06H033Z INSERTION OF INFUSION DEVICE INTO INFERIOR VENA CAVA, PERCUTANEOUS APPROACH: ICD-10-PCS | Performed by: RADIOLOGY

## 2022-01-01 PROCEDURE — 85025 COMPLETE CBC W/AUTO DIFF WBC: CPT | Performed by: NURSE PRACTITIONER

## 2022-01-01 PROCEDURE — 96366 THER/PROPH/DIAG IV INF ADDON: CPT

## 2022-01-01 PROCEDURE — 84484 ASSAY OF TROPONIN QUANT: CPT | Performed by: INTERNAL MEDICINE

## 2022-01-01 PROCEDURE — 36415 COLL VENOUS BLD VENIPUNCTURE: CPT | Performed by: NURSE PRACTITIONER

## 2022-01-01 PROCEDURE — 80069 RENAL FUNCTION PANEL: CPT | Performed by: INTERNAL MEDICINE

## 2022-01-01 PROCEDURE — C9803 HOPD COVID-19 SPEC COLLECT: HCPCS

## 2022-01-01 PROCEDURE — 85730 THROMBOPLASTIN TIME PARTIAL: CPT | Performed by: INTERNAL MEDICINE

## 2022-01-01 PROCEDURE — 250N000011 HC RX IP 250 OP 636: Performed by: RADIOLOGY

## 2022-01-01 PROCEDURE — 99024 POSTOP FOLLOW-UP VISIT: CPT

## 2022-01-01 PROCEDURE — P9047 ALBUMIN (HUMAN), 25%, 50ML: HCPCS | Performed by: INTERNAL MEDICINE

## 2022-01-01 PROCEDURE — 03743ZZ DILATION OF LEFT SUBCLAVIAN ARTERY, PERCUTANEOUS APPROACH: ICD-10-PCS | Performed by: RADIOLOGY

## 2022-01-01 PROCEDURE — 71260 CT THORAX DX C+: CPT

## 2022-01-01 PROCEDURE — 86706 HEP B SURFACE ANTIBODY: CPT | Performed by: INTERNAL MEDICINE

## 2022-01-01 PROCEDURE — 0W3P8ZZ CONTROL BLEEDING IN GASTROINTESTINAL TRACT, VIA NATURAL OR ARTIFICIAL OPENING ENDOSCOPIC: ICD-10-PCS | Performed by: INTERNAL MEDICINE

## 2022-01-01 PROCEDURE — 82803 BLOOD GASES ANY COMBINATION: CPT

## 2022-01-01 PROCEDURE — 37607 LIG/BANDING ANGIOACS AV FSTL: CPT | Mod: LT | Performed by: SURGERY

## 2022-01-01 PROCEDURE — 84155 ASSAY OF PROTEIN SERUM: CPT | Performed by: NURSE PRACTITIONER

## 2022-01-01 PROCEDURE — 83036 HEMOGLOBIN GLYCOSYLATED A1C: CPT | Performed by: SURGERY

## 2022-01-01 PROCEDURE — 71046 X-RAY EXAM CHEST 2 VIEWS: CPT

## 2022-01-01 PROCEDURE — 36415 COLL VENOUS BLD VENIPUNCTURE: CPT | Performed by: PHYSICIAN ASSISTANT

## 2022-01-01 PROCEDURE — 36901 INTRO CATH DIALYSIS CIRCUIT: CPT

## 2022-01-01 PROCEDURE — 85610 PROTHROMBIN TIME: CPT | Performed by: EMERGENCY MEDICINE

## 2022-01-01 PROCEDURE — 97161 PT EVAL LOW COMPLEX 20 MIN: CPT | Mod: GP

## 2022-01-01 PROCEDURE — 255N000002 HC RX 255 OP 636: Performed by: INTERNAL MEDICINE

## 2022-01-01 PROCEDURE — 36830 ARTERY-VEIN NONAUTOGRAFT: CPT | Mod: LT | Performed by: SURGERY

## 2022-01-01 PROCEDURE — 258N000003 HC RX IP 258 OP 636

## 2022-01-01 PROCEDURE — 85018 HEMOGLOBIN: CPT | Performed by: NURSE PRACTITIONER

## 2022-01-01 PROCEDURE — 272N000194 HC ACCESSORY CR3

## 2022-01-01 PROCEDURE — 250N000011 HC RX IP 250 OP 636: Performed by: REGISTERED NURSE

## 2022-01-01 PROCEDURE — 82248 BILIRUBIN DIRECT: CPT | Performed by: NURSE PRACTITIONER

## 2022-01-01 PROCEDURE — 86850 RBC ANTIBODY SCREEN: CPT | Performed by: HOSPITALIST

## 2022-01-01 PROCEDURE — 5A1D70Z PERFORMANCE OF URINARY FILTRATION, INTERMITTENT, LESS THAN 6 HOURS PER DAY: ICD-10-PCS | Performed by: INTERNAL MEDICINE

## 2022-01-01 PROCEDURE — 99152 MOD SED SAME PHYS/QHP 5/>YRS: CPT

## 2022-01-01 PROCEDURE — 0JHL0WZ INSERTION OF TOTALLY IMPLANTABLE VASCULAR ACCESS DEVICE INTO RIGHT UPPER LEG SUBCUTANEOUS TISSUE AND FASCIA, OPEN APPROACH: ICD-10-PCS | Performed by: RADIOLOGY

## 2022-01-01 PROCEDURE — 86140 C-REACTIVE PROTEIN: CPT | Performed by: INTERNAL MEDICINE

## 2022-01-01 PROCEDURE — 87075 CULTR BACTERIA EXCEPT BLOOD: CPT | Performed by: NURSE PRACTITIONER

## 2022-01-01 PROCEDURE — 82805 BLOOD GASES W/O2 SATURATION: CPT | Performed by: NURSE PRACTITIONER

## 2022-01-01 PROCEDURE — 99223 1ST HOSP IP/OBS HIGH 75: CPT | Mod: AI | Performed by: INTERNAL MEDICINE

## 2022-01-01 PROCEDURE — 999N000208 ECHOCARDIOGRAM COMPLETE

## 2022-01-01 PROCEDURE — 80053 COMPREHEN METABOLIC PANEL: CPT | Performed by: NURSE PRACTITIONER

## 2022-01-01 PROCEDURE — U0003 INFECTIOUS AGENT DETECTION BY NUCLEIC ACID (DNA OR RNA); SEVERE ACUTE RESPIRATORY SYNDROME CORONAVIRUS 2 (SARS-COV-2) (CORONAVIRUS DISEASE [COVID-19]), AMPLIFIED PROBE TECHNIQUE, MAKING USE OF HIGH THROUGHPUT TECHNOLOGIES AS DESCRIBED BY CMS-2020-01-R: HCPCS

## 2022-01-01 PROCEDURE — 96365 THER/PROPH/DIAG IV INF INIT: CPT

## 2022-01-01 PROCEDURE — 99232 SBSQ HOSP IP/OBS MODERATE 35: CPT

## 2022-01-01 PROCEDURE — 97535 SELF CARE MNGMENT TRAINING: CPT | Mod: GO

## 2022-01-01 PROCEDURE — 89051 BODY FLUID CELL COUNT: CPT | Performed by: NURSE PRACTITIONER

## 2022-01-01 PROCEDURE — 250N000009 HC RX 250: Performed by: STUDENT IN AN ORGANIZED HEALTH CARE EDUCATION/TRAINING PROGRAM

## 2022-01-01 PROCEDURE — 70553 MRI BRAIN STEM W/O & W/DYE: CPT

## 2022-01-01 PROCEDURE — 85520 HEPARIN ASSAY: CPT | Performed by: EMERGENCY MEDICINE

## 2022-01-01 PROCEDURE — 250N000009 HC RX 250: Performed by: RADIOLOGY

## 2022-01-01 PROCEDURE — 85049 AUTOMATED PLATELET COUNT: CPT | Performed by: NURSE PRACTITIONER

## 2022-01-01 PROCEDURE — 250N000013 HC RX MED GY IP 250 OP 250 PS 637: Performed by: ANESTHESIOLOGY

## 2022-01-01 PROCEDURE — 88305 TISSUE EXAM BY PATHOLOGIST: CPT | Mod: 26 | Performed by: PATHOLOGY

## 2022-01-01 PROCEDURE — 99222 1ST HOSP IP/OBS MODERATE 55: CPT | Performed by: PHYSICIAN ASSISTANT

## 2022-01-01 PROCEDURE — 97530 THERAPEUTIC ACTIVITIES: CPT | Mod: GP | Performed by: PHYSICAL THERAPIST

## 2022-01-01 PROCEDURE — 99231 SBSQ HOSP IP/OBS SF/LOW 25: CPT | Performed by: INTERNAL MEDICINE

## 2022-01-01 PROCEDURE — 82803 BLOOD GASES ANY COMBINATION: CPT | Performed by: INTERNAL MEDICINE

## 2022-01-01 PROCEDURE — 99207 PR APP CREDIT; MD BILLING SHARED VISIT: CPT | Performed by: INTERNAL MEDICINE

## 2022-01-01 PROCEDURE — 92526 ORAL FUNCTION THERAPY: CPT | Mod: GN

## 2022-01-01 PROCEDURE — 93990 DOPPLER FLOW TESTING: CPT

## 2022-01-01 PROCEDURE — 88341 IMHCHEM/IMCYTCHM EA ADD ANTB: CPT | Mod: 26 | Performed by: PATHOLOGY

## 2022-01-01 PROCEDURE — 83735 ASSAY OF MAGNESIUM: CPT | Performed by: NURSE PRACTITIONER

## 2022-01-01 PROCEDURE — 85520 HEPARIN ASSAY: CPT | Performed by: OTOLARYNGOLOGY

## 2022-01-01 PROCEDURE — 36415 COLL VENOUS BLD VENIPUNCTURE: CPT | Performed by: SURGERY

## 2022-01-01 PROCEDURE — U0005 INFEC AGEN DETEC AMPLI PROBE: HCPCS

## 2022-01-01 PROCEDURE — 87389 HIV-1 AG W/HIV-1&-2 AB AG IA: CPT | Performed by: INTERNAL MEDICINE

## 2022-01-01 PROCEDURE — 85027 COMPLETE CBC AUTOMATED: CPT | Performed by: NURSE PRACTITIONER

## 2022-01-01 PROCEDURE — 999N000163 HC STATISTIC SIMPLE TUBE INSERTION/CHARGE, PORT, CATH, FISTULOGRAM

## 2022-01-01 PROCEDURE — 83735 ASSAY OF MAGNESIUM: CPT | Performed by: HOSPITALIST

## 2022-01-01 PROCEDURE — 86850 RBC ANTIBODY SCREEN: CPT | Performed by: SURGERY

## 2022-01-01 PROCEDURE — 272N000706 US THORACENTESIS

## 2022-01-01 PROCEDURE — P9040 RBC LEUKOREDUCED IRRADIATED: HCPCS | Performed by: STUDENT IN AN ORGANIZED HEALTH CARE EDUCATION/TRAINING PROGRAM

## 2022-01-01 PROCEDURE — 0CJS8ZZ INSPECTION OF LARYNX, VIA NATURAL OR ARTIFICIAL OPENING ENDOSCOPIC: ICD-10-PCS | Performed by: OTOLARYNGOLOGY

## 2022-01-01 PROCEDURE — 99024 POSTOP FOLLOW-UP VISIT: CPT | Performed by: SURGERY

## 2022-01-01 PROCEDURE — 84100 ASSAY OF PHOSPHORUS: CPT | Performed by: SURGERY

## 2022-01-01 PROCEDURE — 85048 AUTOMATED LEUKOCYTE COUNT: CPT | Performed by: INTERNAL MEDICINE

## 2022-01-01 PROCEDURE — 96376 TX/PRO/DX INJ SAME DRUG ADON: CPT

## 2022-01-01 PROCEDURE — 250N000011 HC RX IP 250 OP 636

## 2022-01-01 PROCEDURE — 80202 ASSAY OF VANCOMYCIN: CPT

## 2022-01-01 PROCEDURE — 0W993ZZ DRAINAGE OF RIGHT PLEURAL CAVITY, PERCUTANEOUS APPROACH: ICD-10-PCS | Performed by: RADIOLOGY

## 2022-01-01 PROCEDURE — 999N000040 HC STATISTIC CONSULT NO CHARGE VASC ACCESS

## 2022-01-01 PROCEDURE — 70450 CT HEAD/BRAIN W/O DYE: CPT

## 2022-01-01 PROCEDURE — 80048 BASIC METABOLIC PNL TOTAL CA: CPT | Performed by: SURGERY

## 2022-01-01 PROCEDURE — 99207 PR NO BILLABLE SERVICE THIS VISIT: CPT

## 2022-01-01 PROCEDURE — 99221 1ST HOSP IP/OBS SF/LOW 40: CPT | Performed by: INTERNAL MEDICINE

## 2022-01-01 PROCEDURE — 82945 GLUCOSE OTHER FLUID: CPT | Performed by: NURSE PRACTITIONER

## 2022-01-01 PROCEDURE — 85384 FIBRINOGEN ACTIVITY: CPT | Performed by: NURSE PRACTITIONER

## 2022-01-01 PROCEDURE — 99285 EMERGENCY DEPT VISIT HI MDM: CPT | Mod: 25

## 2022-01-01 PROCEDURE — 87340 HEPATITIS B SURFACE AG IA: CPT | Performed by: INTERNAL MEDICINE

## 2022-01-01 PROCEDURE — 85520 HEPARIN ASSAY: CPT | Performed by: SURGERY

## 2022-01-01 PROCEDURE — G0500 MOD SEDAT ENDO SERVICE >5YRS: HCPCS | Performed by: INTERNAL MEDICINE

## 2022-01-01 PROCEDURE — 85045 AUTOMATED RETICULOCYTE COUNT: CPT | Performed by: INTERNAL MEDICINE

## 2022-01-01 PROCEDURE — 80048 BASIC METABOLIC PNL TOTAL CA: CPT | Performed by: STUDENT IN AN ORGANIZED HEALTH CARE EDUCATION/TRAINING PROGRAM

## 2022-01-01 PROCEDURE — 36600 WITHDRAWAL OF ARTERIAL BLOOD: CPT

## 2022-01-01 PROCEDURE — 86850 RBC ANTIBODY SCREEN: CPT | Performed by: INTERNAL MEDICINE

## 2022-01-01 PROCEDURE — 80048 BASIC METABOLIC PNL TOTAL CA: CPT | Performed by: PHYSICIAN ASSISTANT

## 2022-01-01 PROCEDURE — 36415 COLL VENOUS BLD VENIPUNCTURE: CPT | Performed by: STUDENT IN AN ORGANIZED HEALTH CARE EDUCATION/TRAINING PROGRAM

## 2022-01-01 PROCEDURE — 93005 ELECTROCARDIOGRAM TRACING: CPT

## 2022-01-01 PROCEDURE — 84100 ASSAY OF PHOSPHORUS: CPT | Performed by: INTERNAL MEDICINE

## 2022-01-01 PROCEDURE — 272N000602 HC WOUND GLUE CR1

## 2022-01-01 PROCEDURE — 99223 1ST HOSP IP/OBS HIGH 75: CPT | Mod: AI | Performed by: HOSPITALIST

## 2022-01-01 PROCEDURE — P9040 RBC LEUKOREDUCED IRRADIATED: HCPCS | Performed by: HOSPITALIST

## 2022-01-01 PROCEDURE — 255N000002 HC RX 255 OP 636

## 2022-01-01 PROCEDURE — P9045 ALBUMIN (HUMAN), 5%, 250 ML: HCPCS | Performed by: NURSE PRACTITIONER

## 2022-01-01 PROCEDURE — 84157 ASSAY OF PROTEIN OTHER: CPT | Performed by: NURSE PRACTITIONER

## 2022-01-01 PROCEDURE — 86901 BLOOD TYPING SEROLOGIC RH(D): CPT | Performed by: NURSE PRACTITIONER

## 2022-01-01 PROCEDURE — 36591 DRAW BLOOD OFF VENOUS DEVICE: CPT

## 2022-01-01 PROCEDURE — 99223 1ST HOSP IP/OBS HIGH 75: CPT | Performed by: REGISTERED NURSE

## 2022-01-01 PROCEDURE — 710N000012 HC RECOVERY PHASE 2, PER MINUTE: Performed by: SURGERY

## 2022-01-01 PROCEDURE — 80048 BASIC METABOLIC PNL TOTAL CA: CPT | Performed by: EMERGENCY MEDICINE

## 2022-01-01 PROCEDURE — 999N000141 HC STATISTIC PRE-PROCEDURE NURSING ASSESSMENT: Performed by: SURGERY

## 2022-01-01 PROCEDURE — 85610 PROTHROMBIN TIME: CPT | Performed by: RADIOLOGY

## 2022-01-01 PROCEDURE — 36415 COLL VENOUS BLD VENIPUNCTURE: CPT | Performed by: RADIOLOGY

## 2022-01-01 PROCEDURE — 84550 ASSAY OF BLOOD/URIC ACID: CPT | Performed by: INTERNAL MEDICINE

## 2022-01-01 PROCEDURE — 99207 PR APP CREDIT; MD BILLING SHARED VISIT: CPT | Performed by: NURSE PRACTITIONER

## 2022-01-01 PROCEDURE — 88112 CYTOPATH CELL ENHANCE TECH: CPT | Mod: 26 | Performed by: PATHOLOGY

## 2022-01-01 PROCEDURE — U0003 INFECTIOUS AGENT DETECTION BY NUCLEIC ACID (DNA OR RNA); SEVERE ACUTE RESPIRATORY SYNDROME CORONAVIRUS 2 (SARS-COV-2) (CORONAVIRUS DISEASE [COVID-19]), AMPLIFIED PROBE TECHNIQUE, MAKING USE OF HIGH THROUGHPUT TECHNOLOGIES AS DESCRIBED BY CMS-2020-01-R: HCPCS | Performed by: INTERNAL MEDICINE

## 2022-01-01 PROCEDURE — 88307 TISSUE EXAM BY PATHOLOGIST: CPT | Mod: TC | Performed by: NURSE PRACTITIONER

## 2022-01-01 PROCEDURE — 36832 AV FISTULA REVISION OPEN: CPT | Mod: 78 | Performed by: SURGERY

## 2022-01-01 PROCEDURE — 77293 RESPIRATOR MOTION MGMT SIMUL: CPT

## 2022-01-01 PROCEDURE — 85730 THROMBOPLASTIN TIME PARTIAL: CPT | Performed by: HOSPITALIST

## 2022-01-01 PROCEDURE — 82310 ASSAY OF CALCIUM: CPT | Performed by: SURGERY

## 2022-01-01 PROCEDURE — 84145 PROCALCITONIN (PCT): CPT | Performed by: INTERNAL MEDICINE

## 2022-01-01 PROCEDURE — 83880 ASSAY OF NATRIURETIC PEPTIDE: CPT

## 2022-01-01 PROCEDURE — 86923 COMPATIBILITY TEST ELECTRIC: CPT

## 2022-01-01 PROCEDURE — 85025 COMPLETE CBC W/AUTO DIFF WBC: CPT | Performed by: EMERGENCY MEDICINE

## 2022-01-01 PROCEDURE — 86923 COMPATIBILITY TEST ELECTRIC: CPT | Performed by: HOSPITALIST

## 2022-01-01 PROCEDURE — 99153 MOD SED SAME PHYS/QHP EA: CPT | Performed by: INTERNAL MEDICINE

## 2022-01-01 PROCEDURE — 85520 HEPARIN ASSAY: CPT | Performed by: NURSE PRACTITIONER

## 2022-01-01 PROCEDURE — 250N000013 HC RX MED GY IP 250 OP 250 PS 637: Performed by: SURGERY

## 2022-01-01 PROCEDURE — 87641 MR-STAPH DNA AMP PROBE: CPT | Performed by: INTERNAL MEDICINE

## 2022-01-01 PROCEDURE — 88307 TISSUE EXAM BY PATHOLOGIST: CPT | Mod: 26 | Performed by: PATHOLOGY

## 2022-01-01 PROCEDURE — 93971 EXTREMITY STUDY: CPT | Mod: LT

## 2022-01-01 PROCEDURE — 85027 COMPLETE CBC AUTOMATED: CPT | Performed by: RADIOLOGY

## 2022-01-01 PROCEDURE — 77290 THER RAD SIMULAJ FIELD CPLX: CPT

## 2022-01-01 PROCEDURE — 99233 SBSQ HOSP IP/OBS HIGH 50: CPT | Performed by: HOSPITALIST

## 2022-01-01 PROCEDURE — 36821 AV FUSION DIRECT ANY SITE: CPT | Mod: LT | Performed by: SURGERY

## 2022-01-01 PROCEDURE — 272N000431 US BIOPSY LIVER

## 2022-01-01 PROCEDURE — 36907 BALO ANGIOP CTR DIALYSIS SEG: CPT

## 2022-01-01 PROCEDURE — 86901 BLOOD TYPING SEROLOGIC RH(D): CPT | Performed by: HOSPITALIST

## 2022-01-01 PROCEDURE — 99153 MOD SED SAME PHYS/QHP EA: CPT

## 2022-01-01 PROCEDURE — P9040 RBC LEUKOREDUCED IRRADIATED: HCPCS

## 2022-01-01 PROCEDURE — 70491 CT SOFT TISSUE NECK W/DYE: CPT

## 2022-01-01 PROCEDURE — 258N000003 HC RX IP 258 OP 636: Performed by: HOSPITALIST

## 2022-01-01 PROCEDURE — 77300 RADIATION THERAPY DOSE PLAN: CPT

## 2022-01-01 PROCEDURE — 272N000116 HC CATH CR1

## 2022-01-01 PROCEDURE — 82374 ASSAY BLOOD CARBON DIOXIDE: CPT | Performed by: HOSPITALIST

## 2022-01-01 PROCEDURE — 99222 1ST HOSP IP/OBS MODERATE 55: CPT | Performed by: INTERNAL MEDICINE

## 2022-01-01 PROCEDURE — 05QY3ZZ REPAIR UPPER VEIN, PERCUTANEOUS APPROACH: ICD-10-PCS | Performed by: SURGERY

## 2022-01-01 PROCEDURE — 86923 COMPATIBILITY TEST ELECTRIC: CPT | Performed by: STUDENT IN AN ORGANIZED HEALTH CARE EDUCATION/TRAINING PROGRAM

## 2022-01-01 PROCEDURE — 258N000003 HC RX IP 258 OP 636: Performed by: REGISTERED NURSE

## 2022-01-01 PROCEDURE — 84145 PROCALCITONIN (PCT): CPT | Performed by: HOSPITALIST

## 2022-01-01 PROCEDURE — 0FB23ZX EXCISION OF LEFT LOBE LIVER, PERCUTANEOUS APPROACH, DIAGNOSTIC: ICD-10-PCS | Performed by: RADIOLOGY

## 2022-01-01 PROCEDURE — A9585 GADOBUTROL INJECTION: HCPCS | Performed by: INTERNAL MEDICINE

## 2022-01-01 PROCEDURE — 88112 CYTOPATH CELL ENHANCE TECH: CPT | Mod: TC | Performed by: NURSE PRACTITIONER

## 2022-01-01 PROCEDURE — 96375 TX/PRO/DX INJ NEW DRUG ADDON: CPT

## 2022-01-01 PROCEDURE — 250N000011 HC RX IP 250 OP 636: Performed by: ANESTHESIOLOGY

## 2022-01-01 PROCEDURE — 82310 ASSAY OF CALCIUM: CPT | Performed by: NURSE PRACTITIONER

## 2022-01-01 PROCEDURE — 82728 ASSAY OF FERRITIN: CPT | Performed by: INTERNAL MEDICINE

## 2022-01-01 PROCEDURE — 85018 HEMOGLOBIN: CPT | Performed by: SURGERY

## 2022-01-01 PROCEDURE — 83880 ASSAY OF NATRIURETIC PEPTIDE: CPT | Performed by: INTERNAL MEDICINE

## 2022-01-01 PROCEDURE — 99203 OFFICE O/P NEW LOW 30 MIN: CPT | Performed by: SURGERY

## 2022-01-01 PROCEDURE — 272N000564 HC SHEATH CR2

## 2022-01-01 PROCEDURE — 97165 OT EVAL LOW COMPLEX 30 MIN: CPT | Mod: GO | Performed by: OCCUPATIONAL THERAPIST

## 2022-01-01 PROCEDURE — 83550 IRON BINDING TEST: CPT | Performed by: INTERNAL MEDICINE

## 2022-01-01 PROCEDURE — 82330 ASSAY OF CALCIUM: CPT | Performed by: NURSE PRACTITIONER

## 2022-01-01 PROCEDURE — 84155 ASSAY OF PROTEIN SERUM: CPT | Performed by: INTERNAL MEDICINE

## 2022-01-01 PROCEDURE — 82140 ASSAY OF AMMONIA: CPT | Performed by: INTERNAL MEDICINE

## 2022-01-01 PROCEDURE — 82550 ASSAY OF CK (CPK): CPT | Performed by: INTERNAL MEDICINE

## 2022-01-01 PROCEDURE — 97116 GAIT TRAINING THERAPY: CPT | Mod: GP | Performed by: PHYSICAL THERAPIST

## 2022-01-01 PROCEDURE — 258N000001 HC RX 258: Performed by: HOSPITALIST

## 2022-01-01 DEVICE — PROCOL BIOLOGIC VASCULAR GRAFT, 6MM X 30 CM
Type: IMPLANTABLE DEVICE | Site: ARM | Status: FUNCTIONAL
Brand: PROCOL BIOLOGIC VASCULAR GRAFT

## 2022-01-01 RX ORDER — HEPARIN SODIUM 10000 [USP'U]/100ML
0-5000 INJECTION, SOLUTION INTRAVENOUS CONTINUOUS
Status: DISCONTINUED | OUTPATIENT
Start: 2022-01-01 | End: 2022-01-01

## 2022-01-01 RX ORDER — ALBUMIN (HUMAN) 12.5 G/50ML
50 SOLUTION INTRAVENOUS
Status: DISCONTINUED | OUTPATIENT
Start: 2022-01-01 | End: 2022-01-01

## 2022-01-01 RX ORDER — HEPARIN SODIUM 1000 [USP'U]/ML
INJECTION, SOLUTION INTRAVENOUS; SUBCUTANEOUS PRN
Status: DISCONTINUED | OUTPATIENT
Start: 2022-01-01 | End: 2022-01-01

## 2022-01-01 RX ORDER — BUPIVACAINE HYDROCHLORIDE 2.5 MG/ML
INJECTION, SOLUTION INFILTRATION; PERINEURAL PRN
Status: DISCONTINUED | OUTPATIENT
Start: 2022-01-01 | End: 2022-01-01 | Stop reason: HOSPADM

## 2022-01-01 RX ORDER — CALCIUM ACETATE 667 MG/1
2001 CAPSULE ORAL
COMMUNITY

## 2022-01-01 RX ORDER — EPHEDRINE SULFATE 50 MG/ML
INJECTION, SOLUTION INTRAMUSCULAR; INTRAVENOUS; SUBCUTANEOUS PRN
Status: DISCONTINUED | OUTPATIENT
Start: 2022-01-01 | End: 2022-01-01

## 2022-01-01 RX ORDER — HYDROMORPHONE HYDROCHLORIDE 1 MG/ML
.3-.5 INJECTION, SOLUTION INTRAMUSCULAR; INTRAVENOUS; SUBCUTANEOUS
Status: DISCONTINUED | OUTPATIENT
Start: 2022-01-01 | End: 2022-01-01 | Stop reason: HOSPADM

## 2022-01-01 RX ORDER — PROCHLORPERAZINE 25 MG
25 SUPPOSITORY, RECTAL RECTAL EVERY 12 HOURS PRN
Status: CANCELLED | OUTPATIENT
Start: 2022-01-01

## 2022-01-01 RX ORDER — LORAZEPAM 2 MG/ML
0.5 INJECTION INTRAMUSCULAR DAILY PRN
Status: DISCONTINUED | OUTPATIENT
Start: 2022-01-01 | End: 2022-01-01 | Stop reason: HOSPADM

## 2022-01-01 RX ORDER — BISACODYL 10 MG
10 SUPPOSITORY, RECTAL RECTAL
Status: CANCELLED | OUTPATIENT
Start: 2022-10-05

## 2022-01-01 RX ORDER — FENTANYL CITRATE 0.05 MG/ML
50 INJECTION, SOLUTION INTRAMUSCULAR; INTRAVENOUS EVERY 5 MIN PRN
Status: DISCONTINUED | OUTPATIENT
Start: 2022-01-01 | End: 2022-01-01 | Stop reason: HOSPADM

## 2022-01-01 RX ORDER — PANTOPRAZOLE SODIUM 40 MG/1
40 TABLET, DELAYED RELEASE ORAL
Status: DISCONTINUED | OUTPATIENT
Start: 2022-01-01 | End: 2022-01-01 | Stop reason: HOSPADM

## 2022-01-01 RX ORDER — CALCIUM ACETATE 667 MG/1
2001 CAPSULE ORAL
Status: DISCONTINUED | OUTPATIENT
Start: 2022-01-01 | End: 2022-01-01 | Stop reason: HOSPADM

## 2022-01-01 RX ORDER — NALOXONE HYDROCHLORIDE 0.4 MG/ML
0.2 INJECTION, SOLUTION INTRAMUSCULAR; INTRAVENOUS; SUBCUTANEOUS
Status: DISCONTINUED | OUTPATIENT
Start: 2022-01-01 | End: 2022-01-01 | Stop reason: HOSPADM

## 2022-01-01 RX ORDER — FENTANYL CITRATE 50 UG/ML
25-50 INJECTION, SOLUTION INTRAMUSCULAR; INTRAVENOUS EVERY 5 MIN PRN
Status: DISCONTINUED | OUTPATIENT
Start: 2022-01-01 | End: 2022-01-01

## 2022-01-01 RX ORDER — DIAZEPAM 10 MG/2ML
5 INJECTION, SOLUTION INTRAMUSCULAR; INTRAVENOUS EVERY 6 HOURS PRN
Status: CANCELLED | OUTPATIENT
Start: 2022-01-01

## 2022-01-01 RX ORDER — NALOXONE HYDROCHLORIDE 0.4 MG/ML
0.2 INJECTION, SOLUTION INTRAMUSCULAR; INTRAVENOUS; SUBCUTANEOUS
Status: CANCELLED | OUTPATIENT
Start: 2022-01-01

## 2022-01-01 RX ORDER — ACETAMINOPHEN 650 MG/1
650 SUPPOSITORY RECTAL EVERY 6 HOURS PRN
Status: DISCONTINUED | OUTPATIENT
Start: 2022-01-01 | End: 2022-10-05 | Stop reason: HOSPADM

## 2022-01-01 RX ORDER — PANTOPRAZOLE SODIUM 40 MG/1
40 TABLET, DELAYED RELEASE ORAL
Status: DISCONTINUED | OUTPATIENT
Start: 2022-01-01 | End: 2022-01-01

## 2022-01-01 RX ORDER — HEPARIN SODIUM 1000 [USP'U]/ML
INJECTION, SOLUTION INTRAVENOUS; SUBCUTANEOUS PRN
Status: DISCONTINUED | OUTPATIENT
Start: 2022-01-01 | End: 2022-01-01 | Stop reason: HOSPADM

## 2022-01-01 RX ORDER — NALOXONE HYDROCHLORIDE 0.4 MG/ML
0.4 INJECTION, SOLUTION INTRAMUSCULAR; INTRAVENOUS; SUBCUTANEOUS
Status: DISCONTINUED | OUTPATIENT
Start: 2022-01-01 | End: 2022-01-01 | Stop reason: HOSPADM

## 2022-01-01 RX ORDER — FLUMAZENIL 0.1 MG/ML
0.2 INJECTION, SOLUTION INTRAVENOUS
Status: DISCONTINUED | OUTPATIENT
Start: 2022-01-01 | End: 2022-01-01 | Stop reason: HOSPADM

## 2022-01-01 RX ORDER — ONDANSETRON 4 MG/1
4 TABLET, ORALLY DISINTEGRATING ORAL EVERY 6 HOURS PRN
Status: CANCELLED | OUTPATIENT
Start: 2022-01-01

## 2022-01-01 RX ORDER — AMOXICILLIN 250 MG
1 CAPSULE ORAL 2 TIMES DAILY PRN
Status: CANCELLED | OUTPATIENT
Start: 2022-01-01

## 2022-01-01 RX ORDER — LIDOCAINE 40 MG/G
CREAM TOPICAL
Status: CANCELLED | OUTPATIENT
Start: 2022-01-01

## 2022-01-01 RX ORDER — HYDROMORPHONE HYDROCHLORIDE 1 MG/ML
0.3 INJECTION, SOLUTION INTRAMUSCULAR; INTRAVENOUS; SUBCUTANEOUS
Status: DISCONTINUED | OUTPATIENT
Start: 2022-01-01 | End: 2022-01-01

## 2022-01-01 RX ORDER — SUCRALFATE 1 G/1
1 TABLET ORAL
Qty: 120 TABLET | Refills: 0 | Status: SHIPPED | OUTPATIENT
Start: 2022-01-01

## 2022-01-01 RX ORDER — CALCIUM ACETATE 667 MG/1
2001 CAPSULE ORAL
Status: DISCONTINUED | OUTPATIENT
Start: 2022-01-01 | End: 2022-01-01

## 2022-01-01 RX ORDER — ONDANSETRON 4 MG/1
4 TABLET, ORALLY DISINTEGRATING ORAL EVERY 6 HOURS PRN
Status: DISCONTINUED | OUTPATIENT
Start: 2022-01-01 | End: 2022-01-01

## 2022-01-01 RX ORDER — ONDANSETRON 4 MG/1
4 TABLET, ORALLY DISINTEGRATING ORAL EVERY 6 HOURS PRN
Status: DISCONTINUED | OUTPATIENT
Start: 2022-01-01 | End: 2022-10-05 | Stop reason: HOSPADM

## 2022-01-01 RX ORDER — HEPARIN SODIUM 1000 [USP'U]/ML
INJECTION, SOLUTION INTRAVENOUS; SUBCUTANEOUS
Status: DISCONTINUED
Start: 2022-01-01 | End: 2022-01-01 | Stop reason: HOSPADM

## 2022-01-01 RX ORDER — LORAZEPAM 2 MG/ML
1 CONCENTRATE ORAL
Status: CANCELLED | OUTPATIENT
Start: 2022-01-01

## 2022-01-01 RX ORDER — WARFARIN SODIUM 5 MG/1
5 TABLET ORAL
Status: COMPLETED | OUTPATIENT
Start: 2022-01-01 | End: 2022-01-01

## 2022-01-01 RX ORDER — CEFEPIME HYDROCHLORIDE 1 G/1
1 INJECTION, POWDER, FOR SOLUTION INTRAMUSCULAR; INTRAVENOUS EVERY 24 HOURS
Status: DISCONTINUED | OUTPATIENT
Start: 2022-01-01 | End: 2022-01-01

## 2022-01-01 RX ORDER — IOPAMIDOL 612 MG/ML
50 INJECTION, SOLUTION INTRAVASCULAR ONCE
Status: COMPLETED | OUTPATIENT
Start: 2022-01-01 | End: 2022-01-01

## 2022-01-01 RX ORDER — AMOXICILLIN 250 MG
2 CAPSULE ORAL 2 TIMES DAILY PRN
Status: CANCELLED | OUTPATIENT
Start: 2022-01-01

## 2022-01-01 RX ORDER — FENTANYL CITRATE 50 UG/ML
25-50 INJECTION, SOLUTION INTRAMUSCULAR; INTRAVENOUS EVERY 5 MIN PRN
Status: DISCONTINUED | OUTPATIENT
Start: 2022-01-01 | End: 2022-01-01 | Stop reason: HOSPADM

## 2022-01-01 RX ORDER — NALOXONE HYDROCHLORIDE 0.4 MG/ML
0.2 INJECTION, SOLUTION INTRAMUSCULAR; INTRAVENOUS; SUBCUTANEOUS
Status: DISCONTINUED | OUTPATIENT
Start: 2022-01-01 | End: 2022-01-01

## 2022-01-01 RX ORDER — NICOTINE POLACRILEX 4 MG
15-30 LOZENGE BUCCAL
Status: DISCONTINUED | OUTPATIENT
Start: 2022-01-01 | End: 2022-01-01

## 2022-01-01 RX ORDER — LISINOPRIL 10 MG/1
10 TABLET ORAL EVERY EVENING
Status: ON HOLD | COMMUNITY
Start: 2022-01-01 | End: 2022-01-01

## 2022-01-01 RX ORDER — CEFAZOLIN SODIUM 500 MG/2.2ML
500 INJECTION, POWDER, FOR SOLUTION INTRAMUSCULAR; INTRAVENOUS
Status: CANCELLED | OUTPATIENT
Start: 2022-01-01

## 2022-01-01 RX ORDER — ALLOPURINOL 100 MG/1
100 TABLET ORAL EVERY EVENING
Qty: 30 TABLET | Refills: 0 | Status: SHIPPED | OUTPATIENT
Start: 2022-01-01

## 2022-01-01 RX ORDER — ISOSORBIDE MONONITRATE 30 MG/1
30 TABLET, EXTENDED RELEASE ORAL DAILY
Status: ON HOLD | COMMUNITY
End: 2022-01-01

## 2022-01-01 RX ORDER — AMPICILLIN AND SULBACTAM 2; 1 G/1; G/1
3 INJECTION, POWDER, FOR SOLUTION INTRAMUSCULAR; INTRAVENOUS EVERY 6 HOURS
Status: DISCONTINUED | OUTPATIENT
Start: 2022-01-01 | End: 2022-01-01 | Stop reason: DRUGHIGH

## 2022-01-01 RX ORDER — OLANZAPINE 5 MG/1
5 TABLET, ORALLY DISINTEGRATING ORAL EVERY 6 HOURS PRN
Status: DISCONTINUED | OUTPATIENT
Start: 2022-01-01 | End: 2022-10-05 | Stop reason: HOSPADM

## 2022-01-01 RX ORDER — ALLOPURINOL 100 MG/1
100 TABLET ORAL EVERY EVENING
Status: DISCONTINUED | OUTPATIENT
Start: 2022-01-01 | End: 2022-01-01 | Stop reason: HOSPADM

## 2022-01-01 RX ORDER — WARFARIN SODIUM 7.5 MG/1
7.5 TABLET ORAL
Status: COMPLETED | OUTPATIENT
Start: 2022-01-01 | End: 2022-01-01

## 2022-01-01 RX ORDER — ONDANSETRON 2 MG/ML
4 INJECTION INTRAMUSCULAR; INTRAVENOUS EVERY 30 MIN PRN
Status: DISCONTINUED | OUTPATIENT
Start: 2022-01-01 | End: 2022-01-01 | Stop reason: HOSPADM

## 2022-01-01 RX ORDER — OXYCODONE HYDROCHLORIDE 5 MG/1
5 TABLET ORAL EVERY 4 HOURS PRN
Status: DISCONTINUED | OUTPATIENT
Start: 2022-01-01 | End: 2022-01-01 | Stop reason: HOSPADM

## 2022-01-01 RX ORDER — LIDOCAINE HYDROCHLORIDE 10 MG/ML
10 INJECTION, SOLUTION EPIDURAL; INFILTRATION; INTRACAUDAL; PERINEURAL ONCE
Status: COMPLETED | OUTPATIENT
Start: 2022-01-01 | End: 2022-01-01

## 2022-01-01 RX ORDER — NITROGLYCERIN 0.4 MG/1
0.4 TABLET SUBLINGUAL EVERY 5 MIN PRN
COMMUNITY
End: 2022-01-01

## 2022-01-01 RX ORDER — ATORVASTATIN CALCIUM 20 MG/1
20 TABLET, FILM COATED ORAL DAILY
COMMUNITY
Start: 2020-10-05

## 2022-01-01 RX ORDER — ATORVASTATIN CALCIUM 20 MG/1
20 TABLET, FILM COATED ORAL DAILY
Status: DISCONTINUED | OUTPATIENT
Start: 2022-01-01 | End: 2022-01-01 | Stop reason: HOSPADM

## 2022-01-01 RX ORDER — HEPARIN SODIUM 200 [USP'U]/100ML
1 INJECTION, SOLUTION INTRAVENOUS CONTINUOUS PRN
Status: DISCONTINUED | OUTPATIENT
Start: 2022-01-01 | End: 2022-01-01 | Stop reason: HOSPADM

## 2022-01-01 RX ORDER — ACETAMINOPHEN 325 MG/1
975 TABLET ORAL ONCE
Status: COMPLETED | OUTPATIENT
Start: 2022-01-01 | End: 2022-01-01

## 2022-01-01 RX ORDER — LANOLIN ALCOHOL/MO/W.PET/CERES
3 CREAM (GRAM) TOPICAL AT BEDTIME
Status: DISCONTINUED | OUTPATIENT
Start: 2022-01-01 | End: 2022-01-01 | Stop reason: HOSPADM

## 2022-01-01 RX ORDER — ONDANSETRON 2 MG/ML
4 INJECTION INTRAMUSCULAR; INTRAVENOUS EVERY 6 HOURS PRN
Status: DISCONTINUED | OUTPATIENT
Start: 2022-01-01 | End: 2022-01-01 | Stop reason: HOSPADM

## 2022-01-01 RX ORDER — AMLODIPINE BESYLATE 5 MG/1
5 TABLET ORAL EVERY EVENING
Qty: 30 TABLET | Refills: 0 | Status: SHIPPED | OUTPATIENT
Start: 2022-01-01

## 2022-01-01 RX ORDER — ONDANSETRON 2 MG/ML
INJECTION INTRAMUSCULAR; INTRAVENOUS PRN
Status: DISCONTINUED | OUTPATIENT
Start: 2022-01-01 | End: 2022-01-01

## 2022-01-01 RX ORDER — HEPARIN SODIUM,PORCINE 10 UNIT/ML
5-10 VIAL (ML) INTRAVENOUS EVERY 24 HOURS
Status: DISCONTINUED | OUTPATIENT
Start: 2022-01-01 | End: 2022-01-01

## 2022-01-01 RX ORDER — OXYCODONE HYDROCHLORIDE 5 MG/1
5 TABLET ORAL ONCE
Status: COMPLETED | OUTPATIENT
Start: 2022-01-01 | End: 2022-01-01

## 2022-01-01 RX ORDER — OXYCODONE HYDROCHLORIDE 5 MG/1
5 TABLET ORAL EVERY 4 HOURS PRN
Status: DISCONTINUED | OUTPATIENT
Start: 2022-01-01 | End: 2022-01-01

## 2022-01-01 RX ORDER — AMPICILLIN AND SULBACTAM 2; 1 G/1; G/1
3 INJECTION, POWDER, FOR SOLUTION INTRAMUSCULAR; INTRAVENOUS EVERY 24 HOURS
Status: DISCONTINUED | OUTPATIENT
Start: 2022-01-01 | End: 2022-01-01

## 2022-01-01 RX ORDER — FLUMAZENIL 0.1 MG/ML
0.2 INJECTION, SOLUTION INTRAVENOUS
Status: DISCONTINUED | OUTPATIENT
Start: 2022-01-01 | End: 2022-01-01

## 2022-01-01 RX ORDER — ACETAMINOPHEN 325 MG/1
975 TABLET ORAL EVERY 8 HOURS
Status: DISCONTINUED | OUTPATIENT
Start: 2022-01-01 | End: 2022-01-01

## 2022-01-01 RX ORDER — HYDRALAZINE HYDROCHLORIDE 50 MG/1
50 TABLET, FILM COATED ORAL 3 TIMES DAILY
Status: DISCONTINUED | OUTPATIENT
Start: 2022-01-01 | End: 2022-01-01 | Stop reason: HOSPADM

## 2022-01-01 RX ORDER — CARVEDILOL 12.5 MG/1
12.5 TABLET ORAL 2 TIMES DAILY WITH MEALS
Status: DISCONTINUED | OUTPATIENT
Start: 2022-01-01 | End: 2022-01-01

## 2022-01-01 RX ORDER — ACETAMINOPHEN 325 MG/1
650 TABLET ORAL EVERY 6 HOURS
Status: DISCONTINUED | OUTPATIENT
Start: 2022-01-01 | End: 2022-01-01 | Stop reason: HOSPADM

## 2022-01-01 RX ORDER — LISINOPRIL 10 MG/1
10 TABLET ORAL EVERY EVENING
Status: DISCONTINUED | OUTPATIENT
Start: 2022-01-01 | End: 2022-01-01

## 2022-01-01 RX ORDER — PROCHLORPERAZINE MALEATE 10 MG
10 TABLET ORAL EVERY 6 HOURS PRN
Status: DISCONTINUED | OUTPATIENT
Start: 2022-01-01 | End: 2022-10-05 | Stop reason: HOSPADM

## 2022-01-01 RX ORDER — DEXTROSE MONOHYDRATE 25 G/50ML
25-50 INJECTION, SOLUTION INTRAVENOUS
Status: DISCONTINUED | OUTPATIENT
Start: 2022-01-01 | End: 2022-01-01

## 2022-01-01 RX ORDER — LIDOCAINE 40 MG/G
CREAM TOPICAL
Status: DISCONTINUED | OUTPATIENT
Start: 2022-01-01 | End: 2022-10-05 | Stop reason: HOSPADM

## 2022-01-01 RX ORDER — ACETAMINOPHEN 650 MG/1
650 SUPPOSITORY RECTAL EVERY 6 HOURS PRN
Status: DISCONTINUED | OUTPATIENT
Start: 2022-01-01 | End: 2022-01-01 | Stop reason: HOSPADM

## 2022-01-01 RX ORDER — NITROGLYCERIN 0.4 MG/1
0.4 TABLET SUBLINGUAL EVERY 5 MIN PRN
Status: DISCONTINUED | OUTPATIENT
Start: 2022-01-01 | End: 2022-01-01 | Stop reason: HOSPADM

## 2022-01-01 RX ORDER — AMOXICILLIN 250 MG
1 CAPSULE ORAL 2 TIMES DAILY PRN
Status: DISCONTINUED | OUTPATIENT
Start: 2022-01-01 | End: 2022-10-05 | Stop reason: HOSPADM

## 2022-01-01 RX ORDER — NALOXONE HYDROCHLORIDE 0.4 MG/ML
0.4 INJECTION, SOLUTION INTRAMUSCULAR; INTRAVENOUS; SUBCUTANEOUS
Status: DISCONTINUED | OUTPATIENT
Start: 2022-01-01 | End: 2022-01-01

## 2022-01-01 RX ORDER — ONDANSETRON 2 MG/ML
4 INJECTION INTRAMUSCULAR; INTRAVENOUS EVERY 6 HOURS PRN
Status: DISCONTINUED | OUTPATIENT
Start: 2022-01-01 | End: 2022-01-01

## 2022-01-01 RX ORDER — SODIUM CHLORIDE 9 MG/ML
INJECTION, SOLUTION INTRAVENOUS CONTINUOUS PRN
Status: DISCONTINUED | OUTPATIENT
Start: 2022-01-01 | End: 2022-01-01

## 2022-01-01 RX ORDER — ISOSORBIDE MONONITRATE 30 MG/1
30 TABLET, EXTENDED RELEASE ORAL DAILY
Status: ON HOLD | COMMUNITY
Start: 2022-01-01 | End: 2022-01-01

## 2022-01-01 RX ORDER — CARVEDILOL 12.5 MG/1
12.5 TABLET ORAL 2 TIMES DAILY WITH MEALS
Status: DISCONTINUED | OUTPATIENT
Start: 2022-01-01 | End: 2022-01-01 | Stop reason: HOSPADM

## 2022-01-01 RX ORDER — SALIVA STIMULANT COMB. NO.3
1 SPRAY, NON-AEROSOL (ML) MUCOUS MEMBRANE
Status: DISCONTINUED | OUTPATIENT
Start: 2022-01-01 | End: 2022-10-05 | Stop reason: HOSPADM

## 2022-01-01 RX ORDER — LIDOCAINE HYDROCHLORIDE 20 MG/ML
INJECTION, SOLUTION INFILTRATION; PERINEURAL PRN
Status: DISCONTINUED | OUTPATIENT
Start: 2022-01-01 | End: 2022-01-01

## 2022-01-01 RX ORDER — CARVEDILOL 25 MG/1
25 TABLET ORAL 2 TIMES DAILY WITH MEALS
Status: ON HOLD | COMMUNITY
End: 2022-01-01

## 2022-01-01 RX ORDER — FENTANYL CITRATE 50 UG/ML
INJECTION, SOLUTION INTRAMUSCULAR; INTRAVENOUS PRN
Status: DISCONTINUED | OUTPATIENT
Start: 2022-01-01 | End: 2022-01-01

## 2022-01-01 RX ORDER — LIDOCAINE 40 MG/G
CREAM TOPICAL
Status: DISCONTINUED | OUTPATIENT
Start: 2022-01-01 | End: 2022-01-01 | Stop reason: HOSPADM

## 2022-01-01 RX ORDER — ONDANSETRON 4 MG/1
4 TABLET, ORALLY DISINTEGRATING ORAL EVERY 30 MIN PRN
Status: DISCONTINUED | OUTPATIENT
Start: 2022-01-01 | End: 2022-01-01 | Stop reason: HOSPADM

## 2022-01-01 RX ORDER — GADOBUTROL 604.72 MG/ML
6 INJECTION INTRAVENOUS ONCE
Status: COMPLETED | OUTPATIENT
Start: 2022-01-01 | End: 2022-01-01

## 2022-01-01 RX ORDER — CARVEDILOL 12.5 MG/1
12.5 TABLET ORAL
COMMUNITY
Start: 2022-01-01 | End: 2022-01-01

## 2022-01-01 RX ORDER — LIDOCAINE 40 MG/G
CREAM TOPICAL
Status: DISCONTINUED | OUTPATIENT
Start: 2022-01-01 | End: 2022-01-01

## 2022-01-01 RX ORDER — HEPARIN SODIUM 10000 [USP'U]/100ML
0-5000 INJECTION, SOLUTION INTRAVENOUS CONTINUOUS
Status: DISCONTINUED | OUTPATIENT
Start: 2022-01-01 | End: 2022-01-01 | Stop reason: HOSPADM

## 2022-01-01 RX ORDER — HYDRALAZINE HYDROCHLORIDE 50 MG/1
50 TABLET, FILM COATED ORAL
COMMUNITY
Start: 2021-09-05 | End: 2022-01-01

## 2022-01-01 RX ORDER — NALOXONE HYDROCHLORIDE 0.4 MG/ML
0.4 INJECTION, SOLUTION INTRAMUSCULAR; INTRAVENOUS; SUBCUTANEOUS
Status: CANCELLED | OUTPATIENT
Start: 2022-01-01

## 2022-01-01 RX ORDER — FAMOTIDINE 10 MG
10 TABLET ORAL DAILY
Status: ON HOLD | COMMUNITY
End: 2022-01-01

## 2022-01-01 RX ORDER — HEPARIN SODIUM (PORCINE) LOCK FLUSH IV SOLN 100 UNIT/ML 100 UNIT/ML
500 SOLUTION INTRAVENOUS EVERY 8 HOURS
Status: DISCONTINUED | OUTPATIENT
Start: 2022-01-01 | End: 2022-01-01

## 2022-01-01 RX ORDER — ATROPINE SULFATE 10 MG/ML
2 SOLUTION/ DROPS OPHTHALMIC EVERY 4 HOURS PRN
Status: CANCELLED | OUTPATIENT
Start: 2022-01-01

## 2022-01-01 RX ORDER — HYDROMORPHONE HYDROCHLORIDE 1 MG/ML
.3-.5 INJECTION, SOLUTION INTRAMUSCULAR; INTRAVENOUS; SUBCUTANEOUS
Status: CANCELLED | OUTPATIENT
Start: 2022-01-01

## 2022-01-01 RX ORDER — SALIVA STIMULANT COMB. NO.3
1 SPRAY, NON-AEROSOL (ML) MUCOUS MEMBRANE
Status: CANCELLED | OUTPATIENT
Start: 2022-01-01

## 2022-01-01 RX ORDER — OXYCODONE AND ACETAMINOPHEN 5; 325 MG/1; MG/1
1 TABLET ORAL ONCE
Status: COMPLETED | OUTPATIENT
Start: 2022-01-01 | End: 2022-01-01

## 2022-01-01 RX ORDER — HYDRALAZINE HYDROCHLORIDE 25 MG/1
50 TABLET, FILM COATED ORAL 3 TIMES DAILY
Status: DISCONTINUED | OUTPATIENT
Start: 2022-01-01 | End: 2022-01-01

## 2022-01-01 RX ORDER — GLYCOPYRROLATE 0.2 MG/ML
0.2 INJECTION, SOLUTION INTRAMUSCULAR; INTRAVENOUS EVERY 4 HOURS PRN
Status: CANCELLED | OUTPATIENT
Start: 2022-01-01

## 2022-01-01 RX ORDER — LIDOCAINE HYDROCHLORIDE 10 MG/ML
INJECTION, SOLUTION EPIDURAL; INFILTRATION; INTRACAUDAL; PERINEURAL
Status: DISCONTINUED
Start: 2022-01-01 | End: 2022-01-01 | Stop reason: HOSPADM

## 2022-01-01 RX ORDER — PANTOPRAZOLE SODIUM 40 MG/1
40 TABLET, DELAYED RELEASE ORAL
Qty: 60 TABLET | Refills: 0 | Status: SHIPPED | OUTPATIENT
Start: 2022-01-01

## 2022-01-01 RX ORDER — PROPOFOL 10 MG/ML
INJECTION, EMULSION INTRAVENOUS CONTINUOUS PRN
Status: DISCONTINUED | OUTPATIENT
Start: 2022-01-01 | End: 2022-01-01

## 2022-01-01 RX ORDER — ACETAMINOPHEN 650 MG/1
650 SUPPOSITORY RECTAL EVERY 6 HOURS PRN
Status: CANCELLED | OUTPATIENT
Start: 2022-01-01

## 2022-01-01 RX ORDER — NALOXONE HYDROCHLORIDE 0.4 MG/ML
0.2 INJECTION, SOLUTION INTRAMUSCULAR; INTRAVENOUS; SUBCUTANEOUS
Status: DISCONTINUED | OUTPATIENT
Start: 2022-01-01 | End: 2022-10-05 | Stop reason: HOSPADM

## 2022-01-01 RX ORDER — OXYCODONE HYDROCHLORIDE 5 MG/1
5 TABLET ORAL EVERY 6 HOURS PRN
Status: DISCONTINUED | OUTPATIENT
Start: 2022-01-01 | End: 2022-01-01

## 2022-01-01 RX ORDER — LISINOPRIL 20 MG/1
20 TABLET ORAL EVERY EVENING
Status: DISCONTINUED | OUTPATIENT
Start: 2022-01-01 | End: 2022-01-01 | Stop reason: HOSPADM

## 2022-01-01 RX ORDER — HEPARIN SODIUM 1000 [USP'U]/ML
500 INJECTION, SOLUTION INTRAVENOUS; SUBCUTANEOUS CONTINUOUS
Status: DISCONTINUED | OUTPATIENT
Start: 2022-01-01 | End: 2022-01-01

## 2022-01-01 RX ORDER — CYCLOBENZAPRINE HCL 10 MG
10 TABLET ORAL 3 TIMES DAILY PRN
Status: ON HOLD | COMMUNITY
End: 2022-01-01

## 2022-01-01 RX ORDER — MAGNESIUM HYDROXIDE 1200 MG/15ML
LIQUID ORAL PRN
Status: DISCONTINUED | OUTPATIENT
Start: 2022-01-01 | End: 2022-01-01 | Stop reason: HOSPADM

## 2022-01-01 RX ORDER — PROCHLORPERAZINE 25 MG
25 SUPPOSITORY, RECTAL RECTAL EVERY 12 HOURS PRN
Status: DISCONTINUED | OUTPATIENT
Start: 2022-01-01 | End: 2022-10-05 | Stop reason: HOSPADM

## 2022-01-01 RX ORDER — LORAZEPAM 2 MG/ML
1 INJECTION INTRAMUSCULAR ONCE
Status: DISCONTINUED | OUTPATIENT
Start: 2022-01-01 | End: 2022-01-01

## 2022-01-01 RX ORDER — DIAZEPAM 10 MG/2ML
5 INJECTION, SOLUTION INTRAMUSCULAR; INTRAVENOUS EVERY 6 HOURS PRN
Status: DISCONTINUED | OUTPATIENT
Start: 2022-01-01 | End: 2022-01-01 | Stop reason: HOSPADM

## 2022-01-01 RX ORDER — SODIUM CHLORIDE, SODIUM LACTATE, POTASSIUM CHLORIDE, CALCIUM CHLORIDE 600; 310; 30; 20 MG/100ML; MG/100ML; MG/100ML; MG/100ML
INJECTION, SOLUTION INTRAVENOUS CONTINUOUS
Status: DISCONTINUED | OUTPATIENT
Start: 2022-01-01 | End: 2022-01-01 | Stop reason: HOSPADM

## 2022-01-01 RX ORDER — HYDROMORPHONE HCL IN WATER/PF 6 MG/30 ML
0.2 PATIENT CONTROLLED ANALGESIA SYRINGE INTRAVENOUS
Status: DISCONTINUED | OUTPATIENT
Start: 2022-01-01 | End: 2022-01-01 | Stop reason: HOSPADM

## 2022-01-01 RX ORDER — HEPARIN SODIUM,PORCINE 10 UNIT/ML
5-10 VIAL (ML) INTRAVENOUS
Status: DISCONTINUED | OUTPATIENT
Start: 2022-01-01 | End: 2022-01-01

## 2022-01-01 RX ORDER — SUCRALFATE 1 G/1
1 TABLET ORAL
Status: DISCONTINUED | OUTPATIENT
Start: 2022-01-01 | End: 2022-01-01 | Stop reason: HOSPADM

## 2022-01-01 RX ORDER — LISINOPRIL 20 MG/1
20 TABLET ORAL EVERY EVENING
Qty: 30 TABLET | Refills: 0 | Status: ON HOLD | OUTPATIENT
Start: 2022-01-01 | End: 2022-01-01

## 2022-01-01 RX ORDER — HEPARIN SODIUM 200 [USP'U]/100ML
3 INJECTION, SOLUTION INTRAVENOUS CONTINUOUS PRN
Status: DISCONTINUED | OUTPATIENT
Start: 2022-01-01 | End: 2022-01-01 | Stop reason: HOSPADM

## 2022-01-01 RX ORDER — OXYCODONE HYDROCHLORIDE 5 MG/1
5-10 TABLET ORAL EVERY 4 HOURS PRN
Qty: 10 TABLET | Refills: 0 | Status: ON HOLD | OUTPATIENT
Start: 2022-01-01 | End: 2022-01-01

## 2022-01-01 RX ORDER — ACETAMINOPHEN 325 MG/1
325 TABLET ORAL ONCE
Status: COMPLETED | OUTPATIENT
Start: 2022-01-01 | End: 2022-01-01

## 2022-01-01 RX ORDER — CEFAZOLIN SODIUM 500 MG/2.2ML
500 INJECTION, POWDER, FOR SOLUTION INTRAMUSCULAR; INTRAVENOUS
Status: DISCONTINUED | OUTPATIENT
Start: 2022-01-01 | End: 2022-01-01 | Stop reason: HOSPADM

## 2022-01-01 RX ORDER — BISACODYL 10 MG
10 SUPPOSITORY, RECTAL RECTAL
Status: DISCONTINUED | OUTPATIENT
Start: 2022-10-05 | End: 2022-01-01 | Stop reason: HOSPADM

## 2022-01-01 RX ORDER — DOXERCALCIFEROL 4 UG/2ML
4 INJECTION INTRAVENOUS
Status: COMPLETED | OUTPATIENT
Start: 2022-01-01 | End: 2022-01-01

## 2022-01-01 RX ORDER — IOPAMIDOL 755 MG/ML
62 INJECTION, SOLUTION INTRAVASCULAR ONCE
Status: COMPLETED | OUTPATIENT
Start: 2022-01-01 | End: 2022-01-01

## 2022-01-01 RX ORDER — ONDANSETRON 2 MG/ML
4 INJECTION INTRAMUSCULAR; INTRAVENOUS EVERY 6 HOURS PRN
Status: CANCELLED | OUTPATIENT
Start: 2022-01-01

## 2022-01-01 RX ORDER — ONDANSETRON 4 MG/1
4 TABLET, ORALLY DISINTEGRATING ORAL EVERY 6 HOURS PRN
Status: DISCONTINUED | OUTPATIENT
Start: 2022-01-01 | End: 2022-01-01 | Stop reason: HOSPADM

## 2022-01-01 RX ORDER — BUPIVACAINE HYDROCHLORIDE 2.5 MG/ML
INJECTION, SOLUTION EPIDURAL; INFILTRATION; INTRACAUDAL
Status: DISCONTINUED
Start: 2022-01-01 | End: 2022-01-01 | Stop reason: HOSPADM

## 2022-01-01 RX ORDER — NALOXONE HYDROCHLORIDE 0.4 MG/ML
0.1 INJECTION, SOLUTION INTRAMUSCULAR; INTRAVENOUS; SUBCUTANEOUS
Status: DISCONTINUED | OUTPATIENT
Start: 2022-01-01 | End: 2022-10-05 | Stop reason: HOSPADM

## 2022-01-01 RX ORDER — BISACODYL 10 MG
10 SUPPOSITORY, RECTAL RECTAL DAILY PRN
Status: DISCONTINUED | OUTPATIENT
Start: 2022-01-01 | End: 2022-01-01 | Stop reason: HOSPADM

## 2022-01-01 RX ORDER — PROPOFOL 10 MG/ML
INJECTION, EMULSION INTRAVENOUS PRN
Status: DISCONTINUED | OUTPATIENT
Start: 2022-01-01 | End: 2022-01-01

## 2022-01-01 RX ORDER — HYDRALAZINE HYDROCHLORIDE 20 MG/ML
2.5-5 INJECTION INTRAMUSCULAR; INTRAVENOUS EVERY 10 MIN PRN
Status: DISCONTINUED | OUTPATIENT
Start: 2022-01-01 | End: 2022-01-01 | Stop reason: HOSPADM

## 2022-01-01 RX ORDER — FENTANYL CITRATE 50 UG/ML
INJECTION, SOLUTION INTRAMUSCULAR; INTRAVENOUS PRN
Status: COMPLETED | OUTPATIENT
Start: 2022-01-01 | End: 2022-01-01

## 2022-01-01 RX ORDER — CALCIUM ACETATE 667 MG/1
CAPSULE ORAL
Status: ON HOLD | COMMUNITY
Start: 2022-01-01 | End: 2022-01-01

## 2022-01-01 RX ORDER — NALOXONE HYDROCHLORIDE 0.4 MG/ML
0.1 INJECTION, SOLUTION INTRAMUSCULAR; INTRAVENOUS; SUBCUTANEOUS
Status: DISCONTINUED | OUTPATIENT
Start: 2022-01-01 | End: 2022-01-01 | Stop reason: HOSPADM

## 2022-01-01 RX ORDER — LORAZEPAM 2 MG/ML
1 CONCENTRATE ORAL
Status: DISCONTINUED | OUTPATIENT
Start: 2022-01-01 | End: 2022-10-05 | Stop reason: HOSPADM

## 2022-01-01 RX ORDER — PROCHLORPERAZINE 25 MG
25 SUPPOSITORY, RECTAL RECTAL EVERY 12 HOURS PRN
Status: DISCONTINUED | OUTPATIENT
Start: 2022-01-01 | End: 2022-01-01 | Stop reason: HOSPADM

## 2022-01-01 RX ORDER — AMOXICILLIN 250 MG
1-2 CAPSULE ORAL 2 TIMES DAILY
Qty: 30 TABLET | Refills: 0 | Status: ON HOLD | OUTPATIENT
Start: 2022-01-01 | End: 2022-01-01

## 2022-01-01 RX ORDER — SALIVA STIMULANT COMB. NO.3
1 SPRAY, NON-AEROSOL (ML) MUCOUS MEMBRANE
Status: DISCONTINUED | OUTPATIENT
Start: 2022-01-01 | End: 2022-01-01 | Stop reason: HOSPADM

## 2022-01-01 RX ORDER — HYDROMORPHONE HCL IN WATER/PF 6 MG/30 ML
0.2 PATIENT CONTROLLED ANALGESIA SYRINGE INTRAVENOUS EVERY 5 MIN PRN
Status: DISCONTINUED | OUTPATIENT
Start: 2022-01-01 | End: 2022-01-01 | Stop reason: HOSPADM

## 2022-01-01 RX ORDER — CEFAZOLIN SODIUM 2 G/100ML
2 INJECTION, SOLUTION INTRAVENOUS
Status: COMPLETED | OUTPATIENT
Start: 2022-01-01 | End: 2022-01-01

## 2022-01-01 RX ORDER — MIDODRINE HYDROCHLORIDE 5 MG/1
5 TABLET ORAL
Status: DISCONTINUED | OUTPATIENT
Start: 2022-01-01 | End: 2022-01-01

## 2022-01-01 RX ORDER — FENTANYL CITRATE 50 UG/ML
25 INJECTION, SOLUTION INTRAMUSCULAR; INTRAVENOUS
Status: DISCONTINUED | OUTPATIENT
Start: 2022-01-01 | End: 2022-01-01 | Stop reason: HOSPADM

## 2022-01-01 RX ORDER — ACETAMINOPHEN 325 MG/1
650 TABLET ORAL EVERY 6 HOURS PRN
Status: DISCONTINUED | OUTPATIENT
Start: 2022-01-01 | End: 2022-01-01 | Stop reason: HOSPADM

## 2022-01-01 RX ORDER — SODIUM CHLORIDE 9 MG/ML
INJECTION, SOLUTION INTRAVENOUS CONTINUOUS
Status: ACTIVE | OUTPATIENT
Start: 2022-01-01 | End: 2022-01-01

## 2022-01-01 RX ORDER — ONDANSETRON 2 MG/ML
4 INJECTION INTRAMUSCULAR; INTRAVENOUS EVERY 6 HOURS PRN
Status: DISCONTINUED | OUTPATIENT
Start: 2022-01-01 | End: 2022-10-05 | Stop reason: HOSPADM

## 2022-01-01 RX ORDER — ISOSORBIDE MONONITRATE 60 MG/1
60 TABLET, EXTENDED RELEASE ORAL DAILY
Status: ON HOLD | COMMUNITY
Start: 2021-09-05 | End: 2022-01-01

## 2022-01-01 RX ORDER — WARFARIN SODIUM 5 MG/1
5 TABLET ORAL
Status: DISCONTINUED | OUTPATIENT
Start: 2022-01-01 | End: 2022-01-01 | Stop reason: HOSPADM

## 2022-01-01 RX ORDER — ACETAMINOPHEN 325 MG/1
650 TABLET ORAL EVERY 6 HOURS PRN
COMMUNITY
Start: 2022-01-01

## 2022-01-01 RX ORDER — CEFAZOLIN SODIUM/WATER 2 G/20 ML
2 SYRINGE (ML) INTRAVENOUS SEE ADMIN INSTRUCTIONS
Status: CANCELLED | OUTPATIENT
Start: 2022-01-01

## 2022-01-01 RX ORDER — CARVEDILOL 25 MG/1
25 TABLET ORAL 2 TIMES DAILY WITH MEALS
Status: DISCONTINUED | OUTPATIENT
Start: 2022-01-01 | End: 2022-01-01 | Stop reason: HOSPADM

## 2022-01-01 RX ORDER — HEPARIN SODIUM (PORCINE) LOCK FLUSH IV SOLN 100 UNIT/ML 100 UNIT/ML
5-10 SOLUTION INTRAVENOUS
Status: DISCONTINUED | OUTPATIENT
Start: 2022-01-01 | End: 2022-01-01

## 2022-01-01 RX ORDER — WARFARIN SODIUM 2.5 MG/1
6.25 TABLET ORAL
Status: ON HOLD | COMMUNITY
End: 2022-01-01

## 2022-01-01 RX ORDER — LORAZEPAM 2 MG/ML
1 CONCENTRATE ORAL
Status: DISCONTINUED | OUTPATIENT
Start: 2022-01-01 | End: 2022-01-01 | Stop reason: HOSPADM

## 2022-01-01 RX ORDER — PIPERACILLIN SODIUM, TAZOBACTAM SODIUM 2; .25 G/10ML; G/10ML
2.25 INJECTION, POWDER, LYOPHILIZED, FOR SOLUTION INTRAVENOUS EVERY 6 HOURS
Status: DISCONTINUED | OUTPATIENT
Start: 2022-01-01 | End: 2022-01-01

## 2022-01-01 RX ORDER — LISINOPRIL 10 MG/1
10 TABLET ORAL DAILY
Status: ON HOLD | COMMUNITY
End: 2022-01-01

## 2022-01-01 RX ORDER — CALCIUM ACETATE 667 MG/1
1334 CAPSULE ORAL
Status: DISCONTINUED | OUTPATIENT
Start: 2022-01-01 | End: 2022-01-01

## 2022-01-01 RX ORDER — PROCHLORPERAZINE MALEATE 10 MG
10 TABLET ORAL EVERY 6 HOURS PRN
Status: DISCONTINUED | OUTPATIENT
Start: 2022-01-01 | End: 2022-01-01 | Stop reason: HOSPADM

## 2022-01-01 RX ORDER — AMLODIPINE BESYLATE 5 MG/1
5 TABLET ORAL EVERY EVENING
Status: DISCONTINUED | OUTPATIENT
Start: 2022-01-01 | End: 2022-01-01

## 2022-01-01 RX ORDER — OXYCODONE HYDROCHLORIDE 5 MG/1
5 TABLET ORAL
Status: COMPLETED | OUTPATIENT
Start: 2022-01-01 | End: 2022-01-01

## 2022-01-01 RX ORDER — GLYCOPYRROLATE 0.2 MG/ML
0.2 INJECTION, SOLUTION INTRAMUSCULAR; INTRAVENOUS EVERY 4 HOURS PRN
Status: DISCONTINUED | OUTPATIENT
Start: 2022-01-01 | End: 2022-10-05 | Stop reason: HOSPADM

## 2022-01-01 RX ORDER — WARFARIN SODIUM 2.5 MG/1
5 TABLET ORAL
Status: ON HOLD | COMMUNITY
End: 2022-01-01

## 2022-01-01 RX ORDER — DEXAMETHASONE SODIUM PHOSPHATE 4 MG/ML
INJECTION, SOLUTION INTRA-ARTICULAR; INTRALESIONAL; INTRAMUSCULAR; INTRAVENOUS; SOFT TISSUE PRN
Status: DISCONTINUED | OUTPATIENT
Start: 2022-01-01 | End: 2022-01-01

## 2022-01-01 RX ORDER — LORAZEPAM 0.5 MG/1
1 TABLET ORAL ONCE
Status: COMPLETED | OUTPATIENT
Start: 2022-01-01 | End: 2022-01-01

## 2022-01-01 RX ORDER — AMLODIPINE BESYLATE 10 MG/1
10 TABLET ORAL EVERY EVENING
Status: DISCONTINUED | OUTPATIENT
Start: 2022-01-01 | End: 2022-01-01 | Stop reason: HOSPADM

## 2022-01-01 RX ORDER — CARBOXYMETHYLCELLULOSE SODIUM 5 MG/ML
1-2 SOLUTION/ DROPS OPHTHALMIC
Status: CANCELLED | OUTPATIENT
Start: 2022-01-01

## 2022-01-01 RX ORDER — LISINOPRIL 20 MG/1
20 TABLET ORAL DAILY
Status: DISCONTINUED | OUTPATIENT
Start: 2022-01-01 | End: 2022-01-01

## 2022-01-01 RX ORDER — AMOXICILLIN 500 MG
1200 CAPSULE ORAL DAILY
COMMUNITY
End: 2022-01-01

## 2022-01-01 RX ORDER — HYDROMORPHONE HCL IN WATER/PF 6 MG/30 ML
.2-.5 PATIENT CONTROLLED ANALGESIA SYRINGE INTRAVENOUS
Status: DISCONTINUED | OUTPATIENT
Start: 2022-01-01 | End: 2022-10-05 | Stop reason: HOSPADM

## 2022-01-01 RX ORDER — HYDROMORPHONE HYDROCHLORIDE 1 MG/ML
0.5 INJECTION, SOLUTION INTRAMUSCULAR; INTRAVENOUS; SUBCUTANEOUS
Status: DISCONTINUED | OUTPATIENT
Start: 2022-01-01 | End: 2022-01-01 | Stop reason: HOSPADM

## 2022-01-01 RX ORDER — MIDODRINE HYDROCHLORIDE 5 MG/1
10 TABLET ORAL
Status: DISCONTINUED | OUTPATIENT
Start: 2022-01-01 | End: 2022-01-01

## 2022-01-01 RX ORDER — AMLODIPINE BESYLATE 10 MG/1
10 TABLET ORAL EVERY EVENING
Status: ON HOLD | COMMUNITY
Start: 2022-01-01 | End: 2022-01-01

## 2022-01-01 RX ORDER — WARFARIN SODIUM 2.5 MG/1
5 TABLET ORAL DAILY
Qty: 60 TABLET | Refills: 0 | Status: SHIPPED | OUTPATIENT
Start: 2022-01-01 | End: 2022-01-01

## 2022-01-01 RX ORDER — ATROPINE SULFATE 10 MG/ML
2 SOLUTION/ DROPS OPHTHALMIC EVERY 4 HOURS PRN
Status: DISCONTINUED | OUTPATIENT
Start: 2022-01-01 | End: 2022-01-01 | Stop reason: HOSPADM

## 2022-01-01 RX ORDER — POLYETHYLENE GLYCOL 3350 17 G/17G
17 POWDER, FOR SOLUTION ORAL 2 TIMES DAILY PRN
Status: DISCONTINUED | OUTPATIENT
Start: 2022-01-01 | End: 2022-01-01 | Stop reason: HOSPADM

## 2022-01-01 RX ORDER — AMOXICILLIN 250 MG
1 CAPSULE ORAL 2 TIMES DAILY
Status: DISCONTINUED | OUTPATIENT
Start: 2022-01-01 | End: 2022-01-01 | Stop reason: HOSPADM

## 2022-01-01 RX ORDER — AMLODIPINE BESYLATE 10 MG/1
10 TABLET ORAL
Status: ON HOLD | COMMUNITY
Start: 2021-07-15 | End: 2022-01-01

## 2022-01-01 RX ORDER — BISACODYL 10 MG
10 SUPPOSITORY, RECTAL RECTAL
Status: DISCONTINUED | OUTPATIENT
Start: 2022-10-05 | End: 2022-10-05 | Stop reason: HOSPADM

## 2022-01-01 RX ORDER — ATROPINE SULFATE 10 MG/ML
2 SOLUTION/ DROPS OPHTHALMIC EVERY 4 HOURS PRN
Status: DISCONTINUED | OUTPATIENT
Start: 2022-01-01 | End: 2022-10-05 | Stop reason: HOSPADM

## 2022-01-01 RX ORDER — RENO CAPS 100; 1.5; 1.7; 20; 10; 1; 150; 5; 6 MG/1; MG/1; MG/1; MG/1; MG/1; MG/1; UG/1; MG/1; UG/1
1 CAPSULE ORAL DAILY
COMMUNITY
Start: 2021-01-01

## 2022-01-01 RX ORDER — GLYCOPYRROLATE 0.2 MG/ML
0.2 INJECTION, SOLUTION INTRAMUSCULAR; INTRAVENOUS EVERY 4 HOURS PRN
Status: DISCONTINUED | OUTPATIENT
Start: 2022-01-01 | End: 2022-01-01 | Stop reason: HOSPADM

## 2022-01-01 RX ORDER — CEFAZOLIN SODIUM/WATER 2 G/20 ML
SYRINGE (ML) INTRAVENOUS PRN
Status: DISCONTINUED | OUTPATIENT
Start: 2022-01-01 | End: 2022-01-01

## 2022-01-01 RX ORDER — LORAZEPAM 1 MG/1
1 TABLET ORAL
Status: DISCONTINUED | OUTPATIENT
Start: 2022-01-01 | End: 2022-10-05 | Stop reason: HOSPADM

## 2022-01-01 RX ORDER — LORAZEPAM 1 MG/1
1 TABLET ORAL
Status: DISCONTINUED | OUTPATIENT
Start: 2022-01-01 | End: 2022-01-01 | Stop reason: HOSPADM

## 2022-01-01 RX ORDER — PIPERACILLIN SODIUM, TAZOBACTAM SODIUM 2; .25 G/10ML; G/10ML
2.25 INJECTION, POWDER, LYOPHILIZED, FOR SOLUTION INTRAVENOUS EVERY 8 HOURS
Status: DISCONTINUED | OUTPATIENT
Start: 2022-01-01 | End: 2022-01-01

## 2022-01-01 RX ORDER — AMOXICILLIN 250 MG
2 CAPSULE ORAL 2 TIMES DAILY PRN
Status: DISCONTINUED | OUTPATIENT
Start: 2022-01-01 | End: 2022-01-01 | Stop reason: HOSPADM

## 2022-01-01 RX ORDER — AMOXICILLIN 250 MG
2 CAPSULE ORAL 2 TIMES DAILY PRN
Status: DISCONTINUED | OUTPATIENT
Start: 2022-01-01 | End: 2022-10-05 | Stop reason: HOSPADM

## 2022-01-01 RX ORDER — CARVEDILOL 12.5 MG/1
12.5 TABLET ORAL 2 TIMES DAILY WITH MEALS
Qty: 60 TABLET | Refills: 0 | Status: SHIPPED | OUTPATIENT
Start: 2022-01-01

## 2022-01-01 RX ORDER — OXYCODONE HYDROCHLORIDE 5 MG/1
5 TABLET ORAL EVERY 6 HOURS PRN
Qty: 12 TABLET | Refills: 0 | Status: ON HOLD | OUTPATIENT
Start: 2022-01-01 | End: 2022-01-01

## 2022-01-01 RX ORDER — AMOXICILLIN 250 MG
1 CAPSULE ORAL 2 TIMES DAILY PRN
Status: DISCONTINUED | OUTPATIENT
Start: 2022-01-01 | End: 2022-01-01 | Stop reason: HOSPADM

## 2022-01-01 RX ORDER — HYDROMORPHONE HYDROCHLORIDE 1 MG/ML
0.5 INJECTION, SOLUTION INTRAMUSCULAR; INTRAVENOUS; SUBCUTANEOUS
Status: DISCONTINUED | OUTPATIENT
Start: 2022-01-01 | End: 2022-01-01

## 2022-01-01 RX ORDER — CARVEDILOL 12.5 MG/1
12.5 TABLET ORAL 2 TIMES DAILY WITH MEALS
Status: ON HOLD | COMMUNITY
Start: 2022-01-01 | End: 2022-01-01

## 2022-01-01 RX ORDER — HYDRALAZINE HYDROCHLORIDE 50 MG/1
50 TABLET, FILM COATED ORAL 3 TIMES DAILY
Status: ON HOLD | COMMUNITY
End: 2022-01-01

## 2022-01-01 RX ORDER — CARVEDILOL 25 MG/1
25 TABLET ORAL 2 TIMES DAILY WITH MEALS
Qty: 60 TABLET | Refills: 0 | Status: ON HOLD | OUTPATIENT
Start: 2022-01-01 | End: 2022-01-01

## 2022-01-01 RX ORDER — CEFAZOLIN SODIUM 500 MG/2.2ML
500 INJECTION, POWDER, FOR SOLUTION INTRAMUSCULAR; INTRAVENOUS
Status: COMPLETED | OUTPATIENT
Start: 2022-01-01 | End: 2022-01-01

## 2022-01-01 RX ORDER — ALLOPURINOL 100 MG/1
100 TABLET ORAL EVERY EVENING
Status: DISCONTINUED | OUTPATIENT
Start: 2022-01-01 | End: 2022-01-01

## 2022-01-01 RX ORDER — AMLODIPINE BESYLATE 10 MG/1
10 TABLET ORAL EVERY EVENING
Status: DISCONTINUED | OUTPATIENT
Start: 2022-01-01 | End: 2022-01-01

## 2022-01-01 RX ORDER — ACETAMINOPHEN 325 MG/1
650 TABLET ORAL
Status: DISCONTINUED | OUTPATIENT
Start: 2022-01-01 | End: 2022-01-01 | Stop reason: HOSPADM

## 2022-01-01 RX ORDER — ONDANSETRON 4 MG/1
4 TABLET, ORALLY DISINTEGRATING ORAL EVERY 6 HOURS PRN
Qty: 12 TABLET | Refills: 0 | Status: SHIPPED | OUTPATIENT
Start: 2022-01-01

## 2022-01-01 RX ORDER — CARBOXYMETHYLCELLULOSE SODIUM 5 MG/ML
1-2 SOLUTION/ DROPS OPHTHALMIC
Status: DISCONTINUED | OUTPATIENT
Start: 2022-01-01 | End: 2022-10-05 | Stop reason: HOSPADM

## 2022-01-01 RX ORDER — OLANZAPINE 5 MG/1
5 TABLET, ORALLY DISINTEGRATING ORAL EVERY 6 HOURS PRN
Status: CANCELLED | OUTPATIENT
Start: 2022-01-01

## 2022-01-01 RX ORDER — VANCOMYCIN HYDROCHLORIDE 500 MG/10ML
500 INJECTION, POWDER, LYOPHILIZED, FOR SOLUTION INTRAVENOUS ONCE
Status: COMPLETED | OUTPATIENT
Start: 2022-01-01 | End: 2022-01-01

## 2022-01-01 RX ORDER — HYDRALAZINE HYDROCHLORIDE 25 MG/1
TABLET, FILM COATED ORAL
Status: ON HOLD | COMMUNITY
Start: 2021-06-17 | End: 2022-01-01

## 2022-01-01 RX ORDER — ACETAMINOPHEN 325 MG/1
650 TABLET ORAL EVERY 4 HOURS PRN
Qty: 50 TABLET | Refills: 0 | Status: SHIPPED | OUTPATIENT
Start: 2022-01-01 | End: 2022-01-01

## 2022-01-01 RX ORDER — OXYCODONE HYDROCHLORIDE 5 MG/1
5 TABLET ORAL EVERY 6 HOURS PRN
Qty: 12 TABLET | Refills: 0 | Status: SHIPPED | OUTPATIENT
Start: 2022-01-01

## 2022-01-01 RX ORDER — HYDROXYZINE HYDROCHLORIDE 25 MG/1
25 TABLET, FILM COATED ORAL EVERY 6 HOURS PRN
Status: DISCONTINUED | OUTPATIENT
Start: 2022-01-01 | End: 2022-01-01 | Stop reason: HOSPADM

## 2022-01-01 RX ORDER — ATORVASTATIN CALCIUM 10 MG/1
20 TABLET, FILM COATED ORAL DAILY
Status: DISCONTINUED | OUTPATIENT
Start: 2022-01-01 | End: 2022-01-01

## 2022-01-01 RX ORDER — LORAZEPAM 1 MG/1
1 TABLET ORAL
Status: CANCELLED | OUTPATIENT
Start: 2022-01-01

## 2022-01-01 RX ORDER — CARVEDILOL 25 MG/1
25 TABLET ORAL 2 TIMES DAILY WITH MEALS
Status: DISCONTINUED | OUTPATIENT
Start: 2022-01-01 | End: 2022-01-01

## 2022-01-01 RX ORDER — OXYCODONE HYDROCHLORIDE 5 MG/1
5-10 TABLET ORAL EVERY 4 HOURS PRN
Status: DISCONTINUED | OUTPATIENT
Start: 2022-01-01 | End: 2022-01-01 | Stop reason: HOSPADM

## 2022-01-01 RX ORDER — ISOSORBIDE MONONITRATE 30 MG/1
1 TABLET, EXTENDED RELEASE ORAL DAILY
Status: ON HOLD | COMMUNITY
Start: 2021-09-05 | End: 2022-01-01

## 2022-01-01 RX ORDER — CARBOXYMETHYLCELLULOSE SODIUM 5 MG/ML
1-2 SOLUTION/ DROPS OPHTHALMIC
Status: DISCONTINUED | OUTPATIENT
Start: 2022-01-01 | End: 2022-01-01 | Stop reason: HOSPADM

## 2022-01-01 RX ORDER — LABETALOL HYDROCHLORIDE 5 MG/ML
10 INJECTION, SOLUTION INTRAVENOUS
Status: DISCONTINUED | OUTPATIENT
Start: 2022-01-01 | End: 2022-01-01 | Stop reason: HOSPADM

## 2022-01-01 RX ORDER — LORAZEPAM 2 MG/ML
0.5 INJECTION INTRAMUSCULAR ONCE
Status: COMPLETED | OUTPATIENT
Start: 2022-01-01 | End: 2022-01-01

## 2022-01-01 RX ORDER — HYDROMORPHONE HYDROCHLORIDE 1 MG/ML
.3-.5 INJECTION, SOLUTION INTRAMUSCULAR; INTRAVENOUS; SUBCUTANEOUS
Status: DISCONTINUED | OUTPATIENT
Start: 2022-01-01 | End: 2022-01-01

## 2022-01-01 RX ORDER — HEPARIN SODIUM (PORCINE) LOCK FLUSH IV SOLN 100 UNIT/ML 100 UNIT/ML
5-10 SOLUTION INTRAVENOUS
Status: DISCONTINUED | OUTPATIENT
Start: 2022-01-01 | End: 2022-01-01 | Stop reason: HOSPADM

## 2022-01-01 RX ORDER — PROCHLORPERAZINE MALEATE 10 MG
10 TABLET ORAL EVERY 6 HOURS PRN
Status: CANCELLED | OUTPATIENT
Start: 2022-01-01

## 2022-01-01 RX ORDER — FENTANYL CITRATE 50 UG/ML
25 INJECTION, SOLUTION INTRAMUSCULAR; INTRAVENOUS EVERY 5 MIN PRN
Status: DISCONTINUED | OUTPATIENT
Start: 2022-01-01 | End: 2022-01-01 | Stop reason: HOSPADM

## 2022-01-01 RX ORDER — SODIUM CHLORIDE, SODIUM LACTATE, POTASSIUM CHLORIDE, CALCIUM CHLORIDE 600; 310; 30; 20 MG/100ML; MG/100ML; MG/100ML; MG/100ML
INJECTION, SOLUTION INTRAVENOUS CONTINUOUS
Status: DISCONTINUED | OUTPATIENT
Start: 2022-01-01 | End: 2022-01-01

## 2022-01-01 RX ORDER — IOPAMIDOL 755 MG/ML
125 INJECTION, SOLUTION INTRAVASCULAR ONCE
Status: COMPLETED | OUTPATIENT
Start: 2022-01-01 | End: 2022-01-01

## 2022-01-01 RX ORDER — CARVEDILOL 3.12 MG/1
3.12 TABLET ORAL 2 TIMES DAILY WITH MEALS
Status: DISCONTINUED | OUTPATIENT
Start: 2022-01-01 | End: 2022-01-01

## 2022-01-01 RX ORDER — DIAZEPAM 10 MG/2ML
5 INJECTION, SOLUTION INTRAMUSCULAR; INTRAVENOUS EVERY 6 HOURS PRN
Status: DISCONTINUED | OUTPATIENT
Start: 2022-01-01 | End: 2022-10-05 | Stop reason: HOSPADM

## 2022-01-01 RX ORDER — OLANZAPINE 5 MG/1
5 TABLET, ORALLY DISINTEGRATING ORAL EVERY 6 HOURS PRN
Status: DISCONTINUED | OUTPATIENT
Start: 2022-01-01 | End: 2022-01-01 | Stop reason: HOSPADM

## 2022-01-01 RX ORDER — NALOXONE HYDROCHLORIDE 0.4 MG/ML
0.1 INJECTION, SOLUTION INTRAMUSCULAR; INTRAVENOUS; SUBCUTANEOUS
Status: CANCELLED | OUTPATIENT
Start: 2022-01-01

## 2022-01-01 RX ORDER — DIMENHYDRINATE 50 MG/ML
25 INJECTION, SOLUTION INTRAMUSCULAR; INTRAVENOUS
Status: DISCONTINUED | OUTPATIENT
Start: 2022-01-01 | End: 2022-01-01 | Stop reason: HOSPADM

## 2022-01-01 RX ORDER — WARFARIN SODIUM 5 MG/1
5 TABLET ORAL ONCE
Status: COMPLETED | OUTPATIENT
Start: 2022-01-01 | End: 2022-01-01

## 2022-01-01 RX ORDER — HYDROMORPHONE HCL IN WATER/PF 6 MG/30 ML
0.2 PATIENT CONTROLLED ANALGESIA SYRINGE INTRAVENOUS
Status: DISCONTINUED | OUTPATIENT
Start: 2022-01-01 | End: 2022-01-01

## 2022-01-01 RX ORDER — MEPERIDINE HYDROCHLORIDE 25 MG/ML
12.5 INJECTION INTRAMUSCULAR; INTRAVENOUS; SUBCUTANEOUS
Status: DISCONTINUED | OUTPATIENT
Start: 2022-01-01 | End: 2022-01-01 | Stop reason: HOSPADM

## 2022-01-01 RX ORDER — LISINOPRIL 20 MG/1
20 TABLET ORAL DAILY
Status: ON HOLD | COMMUNITY
End: 2022-01-01

## 2022-01-01 RX ADMIN — MELATONIN TAB 3 MG 3 MG: 3 TAB at 21:01

## 2022-01-01 RX ADMIN — SODIUM CHLORIDE 300 ML: 9 INJECTION, SOLUTION INTRAVENOUS at 08:59

## 2022-01-01 RX ADMIN — PANTOPRAZOLE SODIUM 40 MG: 40 TABLET, DELAYED RELEASE ORAL at 06:54

## 2022-01-01 RX ADMIN — CARVEDILOL 3.12 MG: 3.12 TABLET, FILM COATED ORAL at 17:49

## 2022-01-01 RX ADMIN — Medication 325 MG: at 12:00

## 2022-01-01 RX ADMIN — CALCIUM ACETATE 667 MG: 667 CAPSULE ORAL at 18:32

## 2022-01-01 RX ADMIN — SODIUM CHLORIDE 250 ML: 9 INJECTION, SOLUTION INTRAVENOUS at 15:16

## 2022-01-01 RX ADMIN — PANTOPRAZOLE SODIUM 40 MG: 40 INJECTION, POWDER, FOR SOLUTION INTRAVENOUS at 16:27

## 2022-01-01 RX ADMIN — HUMAN ALBUMIN MICROSPHERES AND PERFLUTREN 9 ML: 10; .22 INJECTION, SOLUTION INTRAVENOUS at 16:24

## 2022-01-01 RX ADMIN — PHENYLEPHRINE HYDROCHLORIDE 100 MCG: 10 INJECTION INTRAVENOUS at 10:00

## 2022-01-01 RX ADMIN — Medication 1 MG: at 09:05

## 2022-01-01 RX ADMIN — CARVEDILOL 25 MG: 25 TABLET, FILM COATED ORAL at 18:42

## 2022-01-01 RX ADMIN — Medication 0.5 G: at 16:01

## 2022-01-01 RX ADMIN — ROCURONIUM BROMIDE 40 MG: 50 INJECTION, SOLUTION INTRAVENOUS at 15:35

## 2022-01-01 RX ADMIN — FAMOTIDINE 20 MG: 10 INJECTION INTRAVENOUS at 04:56

## 2022-01-01 RX ADMIN — AMLODIPINE BESYLATE 10 MG: 10 TABLET ORAL at 20:06

## 2022-01-01 RX ADMIN — EPOETIN ALFA-EPBX 4000 UNITS: 4000 INJECTION, SOLUTION INTRAVENOUS; SUBCUTANEOUS at 10:12

## 2022-01-01 RX ADMIN — ALLOPURINOL 100 MG: 100 TABLET ORAL at 19:38

## 2022-01-01 RX ADMIN — Medication 5 ML: at 09:19

## 2022-01-01 RX ADMIN — OXYCODONE HYDROCHLORIDE 5 MG: 5 TABLET ORAL at 09:36

## 2022-01-01 RX ADMIN — Medication 1 MG: at 17:01

## 2022-01-01 RX ADMIN — HYDROMORPHONE HYDROCHLORIDE 0.2 MG: 0.2 INJECTION, SOLUTION INTRAMUSCULAR; INTRAVENOUS; SUBCUTANEOUS at 00:48

## 2022-01-01 RX ADMIN — ALLOPURINOL 100 MG: 100 TABLET ORAL at 21:09

## 2022-01-01 RX ADMIN — HYDROMORPHONE HYDROCHLORIDE 0.3 MG: 1 INJECTION, SOLUTION INTRAMUSCULAR; INTRAVENOUS; SUBCUTANEOUS at 06:47

## 2022-01-01 RX ADMIN — OXYCODONE HYDROCHLORIDE 5 MG: 5 TABLET ORAL at 10:33

## 2022-01-01 RX ADMIN — PIPERACILLIN SODIUM AND TAZOBACTAM SODIUM 2.25 G: 2; .25 INJECTION, POWDER, LYOPHILIZED, FOR SOLUTION INTRAVENOUS at 13:31

## 2022-01-01 RX ADMIN — ACETAMINOPHEN 650 MG: 325 TABLET ORAL at 21:22

## 2022-01-01 RX ADMIN — CALCIUM ACETATE 2001 MG: 667 CAPSULE ORAL at 17:00

## 2022-01-01 RX ADMIN — Medication 1 CAPSULE: at 08:31

## 2022-01-01 RX ADMIN — ACETAMINOPHEN 975 MG: 325 TABLET ORAL at 15:30

## 2022-01-01 RX ADMIN — MIDODRINE HYDROCHLORIDE 5 MG: 5 TABLET ORAL at 12:23

## 2022-01-01 RX ADMIN — SENNOSIDES AND DOCUSATE SODIUM 1 TABLET: 8.6; 5 TABLET ORAL at 21:22

## 2022-01-01 RX ADMIN — CALCIUM ACETATE 667 MG: 667 CAPSULE ORAL at 18:56

## 2022-01-01 RX ADMIN — SODIUM CHLORIDE 250 ML: 9 INJECTION, SOLUTION INTRAVENOUS at 08:59

## 2022-01-01 RX ADMIN — SODIUM CHLORIDE: 9 INJECTION, SOLUTION INTRAVENOUS at 15:26

## 2022-01-01 RX ADMIN — CALCIUM ACETATE 2001 MG: 667 CAPSULE ORAL at 08:41

## 2022-01-01 RX ADMIN — PHENYLEPHRINE HYDROCHLORIDE 100 MCG: 10 INJECTION INTRAVENOUS at 15:49

## 2022-01-01 RX ADMIN — IOPAMIDOL 62 ML: 755 INJECTION, SOLUTION INTRAVENOUS at 14:55

## 2022-01-01 RX ADMIN — FENTANYL CITRATE 25 MCG: 50 INJECTION, SOLUTION INTRAMUSCULAR; INTRAVENOUS at 13:44

## 2022-01-01 RX ADMIN — PIPERACILLIN SODIUM AND TAZOBACTAM SODIUM 2.25 G: 2; .25 INJECTION, POWDER, LYOPHILIZED, FOR SOLUTION INTRAVENOUS at 23:41

## 2022-01-01 RX ADMIN — CALCIUM ACETATE 1334 MG: 667 CAPSULE ORAL at 08:11

## 2022-01-01 RX ADMIN — SENNOSIDES AND DOCUSATE SODIUM 1 TABLET: 8.6; 5 TABLET ORAL at 16:35

## 2022-01-01 RX ADMIN — OXYCODONE HYDROCHLORIDE 5 MG: 5 TABLET ORAL at 21:46

## 2022-01-01 RX ADMIN — TOPICAL ANESTHETIC 1 SPRAY: 200 SPRAY DENTAL; PERIODONTAL at 13:45

## 2022-01-01 RX ADMIN — OXYCODONE HYDROCHLORIDE 5 MG: 5 TABLET ORAL at 13:27

## 2022-01-01 RX ADMIN — CEFEPIME HYDROCHLORIDE 1 G: 1 INJECTION, POWDER, FOR SOLUTION INTRAMUSCULAR; INTRAVENOUS at 22:44

## 2022-01-01 RX ADMIN — OXYCODONE HYDROCHLORIDE 5 MG: 5 TABLET ORAL at 00:53

## 2022-01-01 RX ADMIN — SENNOSIDES AND DOCUSATE SODIUM 1 TABLET: 8.6; 5 TABLET ORAL at 13:01

## 2022-01-01 RX ADMIN — PANTOPRAZOLE SODIUM 40 MG: 40 TABLET, DELAYED RELEASE ORAL at 15:40

## 2022-01-01 RX ADMIN — LIDOCAINE HYDROCHLORIDE 10 ML: 10 INJECTION, SOLUTION INFILTRATION; PERINEURAL at 16:10

## 2022-01-01 RX ADMIN — SUCRALFATE 1 G: 1 TABLET ORAL at 22:07

## 2022-01-01 RX ADMIN — OXYCODONE HYDROCHLORIDE 10 MG: 5 TABLET ORAL at 15:48

## 2022-01-01 RX ADMIN — SUCRALFATE 1 G: 1 TABLET ORAL at 13:27

## 2022-01-01 RX ADMIN — OXYCODONE HYDROCHLORIDE 5 MG: 5 TABLET ORAL at 08:54

## 2022-01-01 RX ADMIN — FENTANYL CITRATE 25 MCG: 50 INJECTION, SOLUTION INTRAMUSCULAR; INTRAVENOUS at 09:55

## 2022-01-01 RX ADMIN — SODIUM CHLORIDE 250 ML: 9 INJECTION, SOLUTION INTRAVENOUS at 10:58

## 2022-01-01 RX ADMIN — SODIUM CHLORIDE 300 ML: 9 INJECTION, SOLUTION INTRAVENOUS at 07:30

## 2022-01-01 RX ADMIN — CALCIUM ACETATE 667 MG: 667 CAPSULE ORAL at 14:05

## 2022-01-01 RX ADMIN — DEXTROSE MONOHYDRATE 25 ML: 25 INJECTION, SOLUTION INTRAVENOUS at 06:16

## 2022-01-01 RX ADMIN — GLYCOPYRROLATE 0.2 MG: 0.4 INJECTION INTRAMUSCULAR; INTRAVENOUS at 06:13

## 2022-01-01 RX ADMIN — ATORVASTATIN CALCIUM 20 MG: 20 TABLET, FILM COATED ORAL at 08:23

## 2022-01-01 RX ADMIN — Medication 1 CAPSULE: at 09:19

## 2022-01-01 RX ADMIN — ALLOPURINOL 100 MG: 100 TABLET ORAL at 20:35

## 2022-01-01 RX ADMIN — DOXERCALCIFEROL 4 MCG: 4 INJECTION, SOLUTION INTRAVENOUS at 10:50

## 2022-01-01 RX ADMIN — PHENYLEPHRINE HYDROCHLORIDE 100 MCG: 10 INJECTION INTRAVENOUS at 10:10

## 2022-01-01 RX ADMIN — ALLOPURINOL 100 MG: 100 TABLET ORAL at 21:24

## 2022-01-01 RX ADMIN — SENNOSIDES AND DOCUSATE SODIUM 1 TABLET: 8.6; 5 TABLET ORAL at 21:27

## 2022-01-01 RX ADMIN — ATORVASTATIN CALCIUM 20 MG: 20 TABLET, FILM COATED ORAL at 21:35

## 2022-01-01 RX ADMIN — CALCIUM ACETATE 667 MG: 667 CAPSULE ORAL at 09:23

## 2022-01-01 RX ADMIN — Medication 1 MG: at 07:41

## 2022-01-01 RX ADMIN — ATORVASTATIN CALCIUM 20 MG: 20 TABLET, FILM COATED ORAL at 08:04

## 2022-01-01 RX ADMIN — CARVEDILOL 12.5 MG: 12.5 TABLET, FILM COATED ORAL at 10:45

## 2022-01-01 RX ADMIN — LISINOPRIL 10 MG: 10 TABLET ORAL at 20:07

## 2022-01-01 RX ADMIN — HYDROMORPHONE HYDROCHLORIDE 0.2 MG: 0.2 INJECTION, SOLUTION INTRAMUSCULAR; INTRAVENOUS; SUBCUTANEOUS at 19:06

## 2022-01-01 RX ADMIN — EPOETIN ALFA-EPBX 1000 UNITS: 2000 INJECTION, SOLUTION INTRAVENOUS; SUBCUTANEOUS at 09:25

## 2022-01-01 RX ADMIN — ONDANSETRON 4 MG: 2 INJECTION INTRAMUSCULAR; INTRAVENOUS at 02:38

## 2022-01-01 RX ADMIN — ALBUMIN HUMAN 12.5 G: 0.05 INJECTION, SOLUTION INTRAVENOUS at 22:41

## 2022-01-01 RX ADMIN — SUCRALFATE 1 G: 1 TABLET ORAL at 22:02

## 2022-01-01 RX ADMIN — PANTOPRAZOLE SODIUM 40 MG: 40 TABLET, DELAYED RELEASE ORAL at 17:48

## 2022-01-01 RX ADMIN — ACETAMINOPHEN 650 MG: 325 TABLET ORAL at 13:54

## 2022-01-01 RX ADMIN — SUCRALFATE 1 G: 1 TABLET ORAL at 21:24

## 2022-01-01 RX ADMIN — OXYCODONE HYDROCHLORIDE 2.5 MG: 5 TABLET ORAL at 14:37

## 2022-01-01 RX ADMIN — CARVEDILOL 12.5 MG: 12.5 TABLET, FILM COATED ORAL at 18:27

## 2022-01-01 RX ADMIN — HEPARIN SODIUM 700 UNITS/HR: 10000 INJECTION, SOLUTION INTRAVENOUS at 19:57

## 2022-01-01 RX ADMIN — HYDROMORPHONE HYDROCHLORIDE 0.5 MG: 1 INJECTION, SOLUTION INTRAMUSCULAR; INTRAVENOUS; SUBCUTANEOUS at 20:25

## 2022-01-01 RX ADMIN — ATORVASTATIN CALCIUM 20 MG: 20 TABLET, FILM COATED ORAL at 08:22

## 2022-01-01 RX ADMIN — CARVEDILOL 3.12 MG: 3.12 TABLET, FILM COATED ORAL at 12:40

## 2022-01-01 RX ADMIN — OXYCODONE HYDROCHLORIDE 5 MG: 5 TABLET ORAL at 23:41

## 2022-01-01 RX ADMIN — Medication 1 CAPSULE: at 08:04

## 2022-01-01 RX ADMIN — OXYCODONE HYDROCHLORIDE 5 MG: 5 TABLET ORAL at 21:32

## 2022-01-01 RX ADMIN — LIDOCAINE HYDROCHLORIDE 10 ML: 10 INJECTION, SOLUTION EPIDURAL; INFILTRATION; INTRACAUDAL; PERINEURAL at 13:05

## 2022-01-01 RX ADMIN — OXYCODONE HYDROCHLORIDE 5 MG: 5 TABLET ORAL at 17:57

## 2022-01-01 RX ADMIN — SODIUM CHLORIDE 1250 ML: 9 INJECTION, SOLUTION INTRAVENOUS at 21:12

## 2022-01-01 RX ADMIN — CALCIUM ACETATE 2001 MG: 667 CAPSULE ORAL at 13:35

## 2022-01-01 RX ADMIN — CALCIUM ACETATE 2001 MG: 667 CAPSULE ORAL at 07:41

## 2022-01-01 RX ADMIN — HYDROMORPHONE HYDROCHLORIDE 0.5 MG: 1 INJECTION, SOLUTION INTRAMUSCULAR; INTRAVENOUS; SUBCUTANEOUS at 09:44

## 2022-01-01 RX ADMIN — OXYCODONE HYDROCHLORIDE 10 MG: 5 TABLET ORAL at 17:10

## 2022-01-01 RX ADMIN — PANTOPRAZOLE SODIUM 40 MG: 40 TABLET, DELAYED RELEASE ORAL at 09:20

## 2022-01-01 RX ADMIN — ACETAMINOPHEN 650 MG: 325 TABLET ORAL at 09:05

## 2022-01-01 RX ADMIN — ATORVASTATIN CALCIUM 20 MG: 20 TABLET, FILM COATED ORAL at 08:49

## 2022-01-01 RX ADMIN — CALCIUM ACETATE 1334 MG: 667 CAPSULE ORAL at 12:24

## 2022-01-01 RX ADMIN — PANTOPRAZOLE SODIUM 40 MG: 40 INJECTION, POWDER, FOR SOLUTION INTRAVENOUS at 22:05

## 2022-01-01 RX ADMIN — CARVEDILOL 12.5 MG: 12.5 TABLET, FILM COATED ORAL at 19:11

## 2022-01-01 RX ADMIN — PIPERACILLIN SODIUM AND TAZOBACTAM SODIUM 2.25 G: 2; .25 INJECTION, POWDER, LYOPHILIZED, FOR SOLUTION INTRAVENOUS at 17:18

## 2022-01-01 RX ADMIN — CALCIUM ACETATE 2001 MG: 667 CAPSULE ORAL at 13:01

## 2022-01-01 RX ADMIN — ACETAMINOPHEN 650 MG: 325 TABLET ORAL at 07:57

## 2022-01-01 RX ADMIN — ROCURONIUM BROMIDE 10 MG: 50 INJECTION, SOLUTION INTRAVENOUS at 16:15

## 2022-01-01 RX ADMIN — FENTANYL CITRATE 25 MCG: 50 INJECTION, SOLUTION INTRAMUSCULAR; INTRAVENOUS at 10:51

## 2022-01-01 RX ADMIN — TOPICAL ANESTHETIC 1 EACH: 200 SPRAY DENTAL; PERIODONTAL at 09:52

## 2022-01-01 RX ADMIN — Medication: at 15:17

## 2022-01-01 RX ADMIN — ONDANSETRON 4 MG: 2 INJECTION INTRAMUSCULAR; INTRAVENOUS at 16:38

## 2022-01-01 RX ADMIN — WARFARIN SODIUM 5 MG: 5 TABLET ORAL at 00:13

## 2022-01-01 RX ADMIN — MIDODRINE HYDROCHLORIDE 5 MG: 5 TABLET ORAL at 08:29

## 2022-01-01 RX ADMIN — CALCIUM ACETATE 2001 MG: 667 CAPSULE ORAL at 17:39

## 2022-01-01 RX ADMIN — Medication 1 CAPSULE: at 12:40

## 2022-01-01 RX ADMIN — VANCOMYCIN HYDROCHLORIDE 750 MG: 1 INJECTION, POWDER, LYOPHILIZED, FOR SOLUTION INTRAVENOUS at 18:14

## 2022-01-01 RX ADMIN — ATORVASTATIN CALCIUM 20 MG: 20 TABLET, FILM COATED ORAL at 21:22

## 2022-01-01 RX ADMIN — FENTANYL CITRATE 25 MCG: 50 INJECTION, SOLUTION INTRAMUSCULAR; INTRAVENOUS at 10:06

## 2022-01-01 RX ADMIN — PANTOPRAZOLE SODIUM 40 MG: 40 INJECTION, POWDER, FOR SOLUTION INTRAVENOUS at 06:11

## 2022-01-01 RX ADMIN — EPOETIN ALFA-EPBX 2000 UNITS: 2000 INJECTION, SOLUTION INTRAVENOUS; SUBCUTANEOUS at 11:53

## 2022-01-01 RX ADMIN — HYDRALAZINE HYDROCHLORIDE 50 MG: 50 TABLET, FILM COATED ORAL at 21:22

## 2022-01-01 RX ADMIN — SENNOSIDES AND DOCUSATE SODIUM 1 TABLET: 8.6; 5 TABLET ORAL at 13:46

## 2022-01-01 RX ADMIN — ALLOPURINOL 100 MG: 100 TABLET ORAL at 19:10

## 2022-01-01 RX ADMIN — FENTANYL CITRATE 50 MCG: 50 INJECTION, SOLUTION INTRAMUSCULAR; INTRAVENOUS at 13:39

## 2022-01-01 RX ADMIN — PANTOPRAZOLE SODIUM 40 MG: 40 TABLET, DELAYED RELEASE ORAL at 13:12

## 2022-01-01 RX ADMIN — SUCRALFATE 1 G: 1 TABLET ORAL at 18:56

## 2022-01-01 RX ADMIN — HYDROMORPHONE HYDROCHLORIDE 0.2 MG: 0.2 INJECTION, SOLUTION INTRAMUSCULAR; INTRAVENOUS; SUBCUTANEOUS at 20:29

## 2022-01-01 RX ADMIN — PANTOPRAZOLE SODIUM 40 MG: 40 INJECTION, POWDER, FOR SOLUTION INTRAVENOUS at 17:58

## 2022-01-01 RX ADMIN — PROPOFOL 150 MG: 10 INJECTION, EMULSION INTRAVENOUS at 07:52

## 2022-01-01 RX ADMIN — PANTOPRAZOLE SODIUM 40 MG: 40 TABLET, DELAYED RELEASE ORAL at 10:28

## 2022-01-01 RX ADMIN — CARVEDILOL 12.5 MG: 12.5 TABLET, FILM COATED ORAL at 17:12

## 2022-01-01 RX ADMIN — CARVEDILOL 25 MG: 25 TABLET, FILM COATED ORAL at 17:35

## 2022-01-01 RX ADMIN — HYDROMORPHONE HYDROCHLORIDE 0.2 MG: 0.2 INJECTION, SOLUTION INTRAMUSCULAR; INTRAVENOUS; SUBCUTANEOUS at 11:23

## 2022-01-01 RX ADMIN — SODIUM CHLORIDE 250 ML: 9 INJECTION, SOLUTION INTRAVENOUS at 07:55

## 2022-01-01 RX ADMIN — FENTANYL CITRATE 25 MCG: 50 INJECTION, SOLUTION INTRAMUSCULAR; INTRAVENOUS at 09:56

## 2022-01-01 RX ADMIN — CEFEPIME HYDROCHLORIDE 1 G: 1 INJECTION, POWDER, FOR SOLUTION INTRAMUSCULAR; INTRAVENOUS at 19:42

## 2022-01-01 RX ADMIN — ATORVASTATIN CALCIUM 20 MG: 20 TABLET, FILM COATED ORAL at 13:28

## 2022-01-01 RX ADMIN — PANTOPRAZOLE SODIUM 40 MG: 40 INJECTION, POWDER, FOR SOLUTION INTRAVENOUS at 06:40

## 2022-01-01 RX ADMIN — OXYCODONE HYDROCHLORIDE 10 MG: 5 TABLET ORAL at 18:26

## 2022-01-01 RX ADMIN — ALLOPURINOL 100 MG: 100 TABLET ORAL at 19:37

## 2022-01-01 RX ADMIN — SUCRALFATE 1 G: 1 TABLET ORAL at 08:48

## 2022-01-01 RX ADMIN — PIPERACILLIN SODIUM AND TAZOBACTAM SODIUM 2.25 G: 2; .25 INJECTION, POWDER, LYOPHILIZED, FOR SOLUTION INTRAVENOUS at 09:19

## 2022-01-01 RX ADMIN — PANTOPRAZOLE SODIUM 40 MG: 40 INJECTION, POWDER, FOR SOLUTION INTRAVENOUS at 18:13

## 2022-01-01 RX ADMIN — PHENYLEPHRINE HYDROCHLORIDE 100 MCG: 10 INJECTION INTRAVENOUS at 10:46

## 2022-01-01 RX ADMIN — HYDRALAZINE HYDROCHLORIDE 50 MG: 50 TABLET, FILM COATED ORAL at 13:38

## 2022-01-01 RX ADMIN — ALLOPURINOL 100 MG: 100 TABLET ORAL at 20:20

## 2022-01-01 RX ADMIN — CARVEDILOL 25 MG: 25 TABLET, FILM COATED ORAL at 07:42

## 2022-01-01 RX ADMIN — HYDRALAZINE HYDROCHLORIDE 50 MG: 50 TABLET, FILM COATED ORAL at 13:34

## 2022-01-01 RX ADMIN — HYDROMORPHONE HYDROCHLORIDE 0.2 MG: 0.2 INJECTION, SOLUTION INTRAMUSCULAR; INTRAVENOUS; SUBCUTANEOUS at 11:12

## 2022-01-01 RX ADMIN — LIDOCAINE HYDROCHLORIDE 10 ML: 10; .005 INJECTION, SOLUTION EPIDURAL; INFILTRATION; INTRACAUDAL; PERINEURAL at 16:12

## 2022-01-01 RX ADMIN — OXYCODONE HYDROCHLORIDE 5 MG: 5 TABLET ORAL at 16:32

## 2022-01-01 RX ADMIN — OXYCODONE HYDROCHLORIDE 5 MG: 5 TABLET ORAL at 04:58

## 2022-01-01 RX ADMIN — Medication 1 MG: at 21:22

## 2022-01-01 RX ADMIN — SUCRALFATE 1 G: 1 TABLET ORAL at 18:14

## 2022-01-01 RX ADMIN — SODIUM CHLORIDE 300 ML: 9 INJECTION, SOLUTION INTRAVENOUS at 15:17

## 2022-01-01 RX ADMIN — PANTOPRAZOLE SODIUM 40 MG: 40 TABLET, DELAYED RELEASE ORAL at 06:41

## 2022-01-01 RX ADMIN — SUCRALFATE 1 G: 1 TABLET ORAL at 08:40

## 2022-01-01 RX ADMIN — HYDROMORPHONE HYDROCHLORIDE 0.5 MG: 1 INJECTION, SOLUTION INTRAMUSCULAR; INTRAVENOUS; SUBCUTANEOUS at 21:09

## 2022-01-01 RX ADMIN — ALLOPURINOL 100 MG: 100 TABLET ORAL at 20:25

## 2022-01-01 RX ADMIN — CALCIUM ACETATE 2001 MG: 667 CAPSULE ORAL at 15:59

## 2022-01-01 RX ADMIN — ATORVASTATIN CALCIUM 20 MG: 10 TABLET, FILM COATED ORAL at 08:41

## 2022-01-01 RX ADMIN — AMPICILLIN SODIUM AND SULBACTAM SODIUM 3 G: 2; 1 INJECTION, POWDER, FOR SOLUTION INTRAMUSCULAR; INTRAVENOUS at 15:36

## 2022-01-01 RX ADMIN — LISINOPRIL 10 MG: 10 TABLET ORAL at 21:22

## 2022-01-01 RX ADMIN — LIDOCAINE HYDROCHLORIDE 30 ML: 20 SOLUTION ORAL; TOPICAL at 06:44

## 2022-01-01 RX ADMIN — HEPARIN SODIUM 2000 UNITS: 1000 INJECTION INTRAVENOUS; SUBCUTANEOUS at 10:08

## 2022-01-01 RX ADMIN — Medication 1 CAPSULE: at 09:00

## 2022-01-01 RX ADMIN — CARVEDILOL 12.5 MG: 12.5 TABLET, FILM COATED ORAL at 09:05

## 2022-01-01 RX ADMIN — PIPERACILLIN SODIUM AND TAZOBACTAM SODIUM 2.25 G: 2; .25 INJECTION, POWDER, LYOPHILIZED, FOR SOLUTION INTRAVENOUS at 05:02

## 2022-01-01 RX ADMIN — HYDROMORPHONE HYDROCHLORIDE 0.5 MG: 1 INJECTION, SOLUTION INTRAMUSCULAR; INTRAVENOUS; SUBCUTANEOUS at 08:41

## 2022-01-01 RX ADMIN — MIDODRINE HYDROCHLORIDE 10 MG: 5 TABLET ORAL at 09:22

## 2022-01-01 RX ADMIN — OXYCODONE HYDROCHLORIDE 5 MG: 5 TABLET ORAL at 04:48

## 2022-01-01 RX ADMIN — SUCRALFATE 1 G: 1 TABLET ORAL at 16:30

## 2022-01-01 RX ADMIN — ATORVASTATIN CALCIUM 20 MG: 20 TABLET, FILM COATED ORAL at 09:19

## 2022-01-01 RX ADMIN — SUCRALFATE 1 G: 1 TABLET ORAL at 12:22

## 2022-01-01 RX ADMIN — FENTANYL CITRATE 25 MCG: 50 INJECTION, SOLUTION INTRAMUSCULAR; INTRAVENOUS at 10:15

## 2022-01-01 RX ADMIN — OXYCODONE HYDROCHLORIDE 5 MG: 5 TABLET ORAL at 04:10

## 2022-01-01 RX ADMIN — CARVEDILOL 25 MG: 25 TABLET, FILM COATED ORAL at 09:04

## 2022-01-01 RX ADMIN — HYDROMORPHONE HYDROCHLORIDE 0.5 MG: 1 INJECTION, SOLUTION INTRAMUSCULAR; INTRAVENOUS; SUBCUTANEOUS at 12:00

## 2022-01-01 RX ADMIN — CALCIUM ACETATE 2001 MG: 667 CAPSULE ORAL at 13:38

## 2022-01-01 RX ADMIN — OXYCODONE HYDROCHLORIDE 10 MG: 5 TABLET ORAL at 13:54

## 2022-01-01 RX ADMIN — ATORVASTATIN CALCIUM 20 MG: 20 TABLET, FILM COATED ORAL at 12:49

## 2022-01-01 RX ADMIN — GADOBUTROL 6 ML: 604.72 INJECTION INTRAVENOUS at 17:42

## 2022-01-01 RX ADMIN — ALLOPURINOL 100 MG: 100 TABLET ORAL at 20:44

## 2022-01-01 RX ADMIN — PANTOPRAZOLE SODIUM 40 MG: 40 TABLET, DELAYED RELEASE ORAL at 17:54

## 2022-01-01 RX ADMIN — MIDODRINE HYDROCHLORIDE 5 MG: 5 TABLET ORAL at 08:02

## 2022-01-01 RX ADMIN — IOPAMIDOL 20 ML: 612 INJECTION, SOLUTION INTRAVENOUS at 10:17

## 2022-01-01 RX ADMIN — ALBUMIN HUMAN 12.5 G: 50 SOLUTION INTRAVENOUS at 06:17

## 2022-01-01 RX ADMIN — PANTOPRAZOLE SODIUM 40 MG: 40 TABLET, DELAYED RELEASE ORAL at 18:47

## 2022-01-01 RX ADMIN — MIDAZOLAM HYDROCHLORIDE 1 MG: 1 INJECTION, SOLUTION INTRAMUSCULAR; INTRAVENOUS at 13:22

## 2022-01-01 RX ADMIN — PIPERACILLIN SODIUM AND TAZOBACTAM SODIUM 2.25 G: 2; .25 INJECTION, POWDER, LYOPHILIZED, FOR SOLUTION INTRAVENOUS at 15:58

## 2022-01-01 RX ADMIN — Medication 5 MG: at 10:34

## 2022-01-01 RX ADMIN — ALBUMIN HUMAN 250 ML: 50 SOLUTION INTRAVENOUS at 09:35

## 2022-01-01 RX ADMIN — SUCRALFATE 1 G: 1 TABLET ORAL at 06:41

## 2022-01-01 RX ADMIN — SODIUM CHLORIDE 300 ML: 9 INJECTION, SOLUTION INTRAVENOUS at 13:46

## 2022-01-01 RX ADMIN — OXYCODONE HYDROCHLORIDE 5 MG: 5 TABLET ORAL at 06:00

## 2022-01-01 RX ADMIN — PROPOFOL 10 MCG/KG/MIN: 10 INJECTION, EMULSION INTRAVENOUS at 08:19

## 2022-01-01 RX ADMIN — EPOETIN ALFA-EPBX 4000 UNITS: 4000 INJECTION, SOLUTION INTRAVENOUS; SUBCUTANEOUS at 10:11

## 2022-01-01 RX ADMIN — AMLODIPINE BESYLATE 10 MG: 10 TABLET ORAL at 19:49

## 2022-01-01 RX ADMIN — PANTOPRAZOLE SODIUM 40 MG: 40 TABLET, DELAYED RELEASE ORAL at 16:32

## 2022-01-01 RX ADMIN — OXYCODONE HYDROCHLORIDE 5 MG: 5 TABLET ORAL at 09:32

## 2022-01-01 RX ADMIN — SODIUM CHLORIDE: 9 INJECTION, SOLUTION INTRAVENOUS at 07:46

## 2022-01-01 RX ADMIN — CARVEDILOL 12.5 MG: 12.5 TABLET, FILM COATED ORAL at 17:43

## 2022-01-01 RX ADMIN — PANTOPRAZOLE SODIUM 80 MG: 40 INJECTION, POWDER, FOR SOLUTION INTRAVENOUS at 13:04

## 2022-01-01 RX ADMIN — SUCRALFATE 1 G: 1 TABLET ORAL at 06:16

## 2022-01-01 RX ADMIN — APIXABAN 5 MG: 5 TABLET, FILM COATED ORAL at 09:52

## 2022-01-01 RX ADMIN — SUCRALFATE 1 G: 1 TABLET ORAL at 12:40

## 2022-01-01 RX ADMIN — Medication 500 UNITS: at 16:24

## 2022-01-01 RX ADMIN — HYDROMORPHONE HYDROCHLORIDE 0.5 MG: 1 INJECTION, SOLUTION INTRAMUSCULAR; INTRAVENOUS; SUBCUTANEOUS at 10:51

## 2022-01-01 RX ADMIN — ACETAMINOPHEN 975 MG: 325 TABLET ORAL at 09:16

## 2022-01-01 RX ADMIN — OXYCODONE HYDROCHLORIDE 5 MG: 5 TABLET ORAL at 17:14

## 2022-01-01 RX ADMIN — EPOETIN ALFA-EPBX 6000 UNITS: 3000 INJECTION, SOLUTION INTRAVENOUS; SUBCUTANEOUS at 10:52

## 2022-01-01 RX ADMIN — PHENYLEPHRINE HYDROCHLORIDE 100 MCG: 10 INJECTION INTRAVENOUS at 10:25

## 2022-01-01 RX ADMIN — ACETAMINOPHEN 650 MG: 325 TABLET ORAL at 00:47

## 2022-01-01 RX ADMIN — HEPARIN SODIUM 900 UNITS/HR: 10000 INJECTION, SOLUTION INTRAVENOUS at 22:40

## 2022-01-01 RX ADMIN — CARVEDILOL 12.5 MG: 12.5 TABLET, FILM COATED ORAL at 12:56

## 2022-01-01 RX ADMIN — ALLOPURINOL 100 MG: 100 TABLET ORAL at 21:23

## 2022-01-01 RX ADMIN — LIDOCAINE HYDROCHLORIDE 80 MG: 20 INJECTION, SOLUTION INFILTRATION; PERINEURAL at 07:52

## 2022-01-01 RX ADMIN — HEPARIN SODIUM 1150 UNITS/HR: 10000 INJECTION, SOLUTION INTRAVENOUS at 22:57

## 2022-01-01 RX ADMIN — PANTOPRAZOLE SODIUM 40 MG: 40 INJECTION, POWDER, FOR SOLUTION INTRAVENOUS at 18:36

## 2022-01-01 RX ADMIN — Medication 1 CAPSULE: at 09:03

## 2022-01-01 RX ADMIN — ACETAMINOPHEN 650 MG: 325 TABLET ORAL at 20:33

## 2022-01-01 RX ADMIN — HEPARIN SODIUM 1000 UNITS/HR: 10000 INJECTION, SOLUTION INTRAVENOUS at 00:16

## 2022-01-01 RX ADMIN — OXYCODONE HYDROCHLORIDE 5 MG: 5 TABLET ORAL at 06:32

## 2022-01-01 RX ADMIN — CARVEDILOL 25 MG: 25 TABLET, FILM COATED ORAL at 09:19

## 2022-01-01 RX ADMIN — ACETAMINOPHEN 650 MG: 325 TABLET ORAL at 13:34

## 2022-01-01 RX ADMIN — HYDRALAZINE HYDROCHLORIDE 50 MG: 50 TABLET, FILM COATED ORAL at 14:40

## 2022-01-01 RX ADMIN — CALCIUM ACETATE 2001 MG: 667 CAPSULE ORAL at 12:40

## 2022-01-01 RX ADMIN — LORAZEPAM 1 MG: 0.5 TABLET ORAL at 08:04

## 2022-01-01 RX ADMIN — OXYCODONE HYDROCHLORIDE 5 MG: 5 TABLET ORAL at 14:38

## 2022-01-01 RX ADMIN — HYDROMORPHONE HYDROCHLORIDE 0.2 MG: 0.2 INJECTION, SOLUTION INTRAMUSCULAR; INTRAVENOUS; SUBCUTANEOUS at 23:35

## 2022-01-01 RX ADMIN — ATORVASTATIN CALCIUM 20 MG: 20 TABLET, FILM COATED ORAL at 08:11

## 2022-01-01 RX ADMIN — ATORVASTATIN CALCIUM 20 MG: 20 TABLET, FILM COATED ORAL at 09:20

## 2022-01-01 RX ADMIN — HEPARIN SODIUM 900 UNITS/HR: 10000 INJECTION, SOLUTION INTRAVENOUS at 00:47

## 2022-01-01 RX ADMIN — Medication 1 CAPSULE: at 09:22

## 2022-01-01 RX ADMIN — HEPARIN SODIUM 2200 UNITS: 1000 INJECTION INTRAVENOUS; SUBCUTANEOUS at 12:09

## 2022-01-01 RX ADMIN — OXYCODONE HYDROCHLORIDE 2.5 MG: 5 TABLET ORAL at 04:37

## 2022-01-01 RX ADMIN — ATORVASTATIN CALCIUM 20 MG: 20 TABLET, FILM COATED ORAL at 10:45

## 2022-01-01 RX ADMIN — FENTANYL CITRATE 25 MCG: 50 INJECTION, SOLUTION INTRAMUSCULAR; INTRAVENOUS at 16:02

## 2022-01-01 RX ADMIN — OXYCODONE HYDROCHLORIDE 5 MG: 5 TABLET ORAL at 08:05

## 2022-01-01 RX ADMIN — OXYCODONE HYDROCHLORIDE 5 MG: 5 TABLET ORAL at 04:04

## 2022-01-01 RX ADMIN — Medication 1 CAPSULE: at 10:20

## 2022-01-01 RX ADMIN — CEFAZOLIN SODIUM 2 G: 2 INJECTION, SOLUTION INTRAVENOUS at 15:10

## 2022-01-01 RX ADMIN — CALCIUM ACETATE 2001 MG: 667 CAPSULE ORAL at 17:38

## 2022-01-01 RX ADMIN — CARVEDILOL 25 MG: 25 TABLET, FILM COATED ORAL at 18:25

## 2022-01-01 RX ADMIN — OXYCODONE HYDROCHLORIDE 5 MG: 5 TABLET ORAL at 13:18

## 2022-01-01 RX ADMIN — ATORVASTATIN CALCIUM 20 MG: 20 TABLET, FILM COATED ORAL at 19:51

## 2022-01-01 RX ADMIN — ALLOPURINOL 100 MG: 100 TABLET ORAL at 19:41

## 2022-01-01 RX ADMIN — Medication 5 MG: at 13:23

## 2022-01-01 RX ADMIN — SODIUM CHLORIDE: 9 INJECTION, SOLUTION INTRAVENOUS at 18:47

## 2022-01-01 RX ADMIN — CALCIUM ACETATE 2001 MG: 667 CAPSULE ORAL at 09:05

## 2022-01-01 RX ADMIN — LIDOCAINE HYDROCHLORIDE 5 ML: 10 INJECTION, SOLUTION INFILTRATION; PERINEURAL at 09:57

## 2022-01-01 RX ADMIN — ALBUMIN HUMAN 12.5 G: 50 SOLUTION INTRAVENOUS at 00:45

## 2022-01-01 RX ADMIN — OXYCODONE HYDROCHLORIDE 5 MG: 5 TABLET ORAL at 17:29

## 2022-01-01 RX ADMIN — OXYCODONE HYDROCHLORIDE 5 MG: 5 TABLET ORAL at 10:45

## 2022-01-01 RX ADMIN — PANTOPRAZOLE SODIUM 40 MG: 40 TABLET, DELAYED RELEASE ORAL at 08:31

## 2022-01-01 RX ADMIN — HYDRALAZINE HYDROCHLORIDE 50 MG: 50 TABLET, FILM COATED ORAL at 09:17

## 2022-01-01 RX ADMIN — SUCRALFATE 1 G: 1 TABLET ORAL at 22:23

## 2022-01-01 RX ADMIN — HYDROMORPHONE HYDROCHLORIDE 0.2 MG: 0.2 INJECTION, SOLUTION INTRAMUSCULAR; INTRAVENOUS; SUBCUTANEOUS at 01:22

## 2022-01-01 RX ADMIN — SODIUM CHLORIDE 250 ML: 9 INJECTION, SOLUTION INTRAVENOUS at 09:14

## 2022-01-01 RX ADMIN — CARVEDILOL 12.5 MG: 12.5 TABLET, FILM COATED ORAL at 18:16

## 2022-01-01 RX ADMIN — CALCIUM ACETATE 2001 MG: 667 CAPSULE ORAL at 17:55

## 2022-01-01 RX ADMIN — MIDAZOLAM HYDROCHLORIDE 0.5 MG: 1 INJECTION, SOLUTION INTRAMUSCULAR; INTRAVENOUS at 16:02

## 2022-01-01 RX ADMIN — CARVEDILOL 25 MG: 25 TABLET, FILM COATED ORAL at 09:20

## 2022-01-01 RX ADMIN — HEPARIN SODIUM 1000 UNITS/HR: 10000 INJECTION, SOLUTION INTRAVENOUS at 08:31

## 2022-01-01 RX ADMIN — CARVEDILOL 12.5 MG: 12.5 TABLET, FILM COATED ORAL at 17:33

## 2022-01-01 RX ADMIN — OXYCODONE HYDROCHLORIDE 5 MG: 5 TABLET ORAL at 08:31

## 2022-01-01 RX ADMIN — SODIUM CHLORIDE 250 ML: 9 INJECTION, SOLUTION INTRAVENOUS at 10:10

## 2022-01-01 RX ADMIN — HEPARIN SODIUM 2300 UNITS: 1000 INJECTION INTRAVENOUS; SUBCUTANEOUS at 12:10

## 2022-01-01 RX ADMIN — CALCIUM ACETATE 2001 MG: 667 CAPSULE ORAL at 09:19

## 2022-01-01 RX ADMIN — HYDROMORPHONE HYDROCHLORIDE 0.2 MG: 0.2 INJECTION, SOLUTION INTRAMUSCULAR; INTRAVENOUS; SUBCUTANEOUS at 14:34

## 2022-01-01 RX ADMIN — OXYCODONE HYDROCHLORIDE AND ACETAMINOPHEN 1 TABLET: 5; 325 TABLET ORAL at 12:00

## 2022-01-01 RX ADMIN — EPOETIN ALFA-EPBX 4000 UNITS: 4000 INJECTION, SOLUTION INTRAVENOUS; SUBCUTANEOUS at 10:50

## 2022-01-01 RX ADMIN — HYDROMORPHONE HYDROCHLORIDE 0.2 MG: 0.2 INJECTION, SOLUTION INTRAMUSCULAR; INTRAVENOUS; SUBCUTANEOUS at 04:34

## 2022-01-01 RX ADMIN — MIDAZOLAM 1 MG: 1 INJECTION INTRAMUSCULAR; INTRAVENOUS at 13:52

## 2022-01-01 RX ADMIN — OXYCODONE HYDROCHLORIDE 5 MG: 5 TABLET ORAL at 10:41

## 2022-01-01 RX ADMIN — PANTOPRAZOLE SODIUM 40 MG: 40 INJECTION, POWDER, FOR SOLUTION INTRAVENOUS at 11:27

## 2022-01-01 RX ADMIN — PANTOPRAZOLE SODIUM 40 MG: 40 INJECTION, POWDER, FOR SOLUTION INTRAVENOUS at 06:08

## 2022-01-01 RX ADMIN — PANTOPRAZOLE SODIUM 40 MG: 40 TABLET, DELAYED RELEASE ORAL at 16:11

## 2022-01-01 RX ADMIN — Medication 5 MG: at 08:31

## 2022-01-01 RX ADMIN — ATORVASTATIN CALCIUM 20 MG: 20 TABLET, FILM COATED ORAL at 09:22

## 2022-01-01 RX ADMIN — OXYCODONE HYDROCHLORIDE 5 MG: 5 TABLET ORAL at 08:41

## 2022-01-01 RX ADMIN — ACETAMINOPHEN 650 MG: 325 TABLET ORAL at 03:20

## 2022-01-01 RX ADMIN — HYDROMORPHONE HYDROCHLORIDE 0.2 MG: 0.2 INJECTION, SOLUTION INTRAMUSCULAR; INTRAVENOUS; SUBCUTANEOUS at 09:20

## 2022-01-01 RX ADMIN — EPOETIN ALFA-EPBX 4000 UNITS: 4000 INJECTION, SOLUTION INTRAVENOUS; SUBCUTANEOUS at 09:50

## 2022-01-01 RX ADMIN — HYDROMORPHONE HYDROCHLORIDE 0.2 MG: 0.2 INJECTION, SOLUTION INTRAMUSCULAR; INTRAVENOUS; SUBCUTANEOUS at 06:17

## 2022-01-01 RX ADMIN — HEPARIN SODIUM 1050 UNITS/HR: 10000 INJECTION, SOLUTION INTRAVENOUS at 10:45

## 2022-01-01 RX ADMIN — CALCIUM ACETATE 2001 MG: 667 CAPSULE ORAL at 09:20

## 2022-01-01 RX ADMIN — SUCRALFATE 1 G: 1 TABLET ORAL at 13:35

## 2022-01-01 RX ADMIN — ALLOPURINOL 100 MG: 100 TABLET ORAL at 21:13

## 2022-01-01 RX ADMIN — HYDROMORPHONE HYDROCHLORIDE 0.5 MG: 1 INJECTION, SOLUTION INTRAMUSCULAR; INTRAVENOUS; SUBCUTANEOUS at 16:49

## 2022-01-01 RX ADMIN — ACETAMINOPHEN 650 MG: 325 TABLET ORAL at 20:06

## 2022-01-01 RX ADMIN — CARVEDILOL 12.5 MG: 12.5 TABLET, FILM COATED ORAL at 17:01

## 2022-01-01 RX ADMIN — ATORVASTATIN CALCIUM 20 MG: 20 TABLET, FILM COATED ORAL at 09:16

## 2022-01-01 RX ADMIN — PANTOPRAZOLE SODIUM 40 MG: 40 TABLET, DELAYED RELEASE ORAL at 08:23

## 2022-01-01 RX ADMIN — SUCRALFATE 1 G: 1 TABLET ORAL at 06:32

## 2022-01-01 RX ADMIN — PIPERACILLIN SODIUM AND TAZOBACTAM SODIUM 2.25 G: 2; .25 INJECTION, POWDER, LYOPHILIZED, FOR SOLUTION INTRAVENOUS at 14:02

## 2022-01-01 RX ADMIN — MIDAZOLAM 2 MG: 1 INJECTION INTRAMUSCULAR; INTRAVENOUS at 10:02

## 2022-01-01 RX ADMIN — ACETAMINOPHEN 650 MG: 325 TABLET ORAL at 18:18

## 2022-01-01 RX ADMIN — Medication 1 CAPSULE: at 08:29

## 2022-01-01 RX ADMIN — Medication 1 MG: at 13:02

## 2022-01-01 RX ADMIN — HYDRALAZINE HYDROCHLORIDE 50 MG: 50 TABLET, FILM COATED ORAL at 09:05

## 2022-01-01 RX ADMIN — LORAZEPAM 1 MG: 2 LIQUID ORAL at 18:43

## 2022-01-01 RX ADMIN — MELATONIN TAB 3 MG 3 MG: 3 TAB at 21:21

## 2022-01-01 RX ADMIN — OXYCODONE HYDROCHLORIDE 5 MG: 5 TABLET ORAL at 03:27

## 2022-01-01 RX ADMIN — CARVEDILOL 25 MG: 25 TABLET, FILM COATED ORAL at 13:50

## 2022-01-01 RX ADMIN — CALCIUM ACETATE 2001 MG: 667 CAPSULE ORAL at 13:34

## 2022-01-01 RX ADMIN — Medication 1 CAPSULE: at 12:52

## 2022-01-01 RX ADMIN — SENNOSIDES AND DOCUSATE SODIUM 1 TABLET: 8.6; 5 TABLET ORAL at 21:35

## 2022-01-01 RX ADMIN — HYDROMORPHONE HYDROCHLORIDE 0.5 MG: 1 INJECTION, SOLUTION INTRAMUSCULAR; INTRAVENOUS; SUBCUTANEOUS at 13:04

## 2022-01-01 RX ADMIN — OXYCODONE HYDROCHLORIDE 5 MG: 5 TABLET ORAL at 12:27

## 2022-01-01 RX ADMIN — Medication 5 MG: at 08:22

## 2022-01-01 RX ADMIN — Medication 5 MG: at 13:03

## 2022-01-01 RX ADMIN — PHENYLEPHRINE HYDROCHLORIDE 100 MCG: 10 INJECTION INTRAVENOUS at 09:07

## 2022-01-01 RX ADMIN — ALLOPURINOL 100 MG: 100 TABLET ORAL at 20:36

## 2022-01-01 RX ADMIN — ATORVASTATIN CALCIUM 20 MG: 10 TABLET, FILM COATED ORAL at 08:31

## 2022-01-01 RX ADMIN — PHENYLEPHRINE HYDROCHLORIDE 100 MCG: 10 INJECTION INTRAVENOUS at 07:54

## 2022-01-01 RX ADMIN — SUCRALFATE 1 G: 1 TABLET ORAL at 12:50

## 2022-01-01 RX ADMIN — OXYCODONE HYDROCHLORIDE 10 MG: 5 TABLET ORAL at 08:12

## 2022-01-01 RX ADMIN — ALLOPURINOL 100 MG: 100 TABLET ORAL at 19:56

## 2022-01-01 RX ADMIN — FENTANYL CITRATE 50 MCG: 50 INJECTION, SOLUTION INTRAMUSCULAR; INTRAVENOUS at 08:18

## 2022-01-01 RX ADMIN — SODIUM CHLORIDE 300 ML: 9 INJECTION, SOLUTION INTRAVENOUS at 09:35

## 2022-01-01 RX ADMIN — ONDANSETRON 4 MG: 2 INJECTION INTRAMUSCULAR; INTRAVENOUS at 08:03

## 2022-01-01 RX ADMIN — Medication 1 CAPSULE: at 08:49

## 2022-01-01 RX ADMIN — Medication 1 CAPSULE: at 13:28

## 2022-01-01 RX ADMIN — PANTOPRAZOLE SODIUM 40 MG: 40 TABLET, DELAYED RELEASE ORAL at 19:42

## 2022-01-01 RX ADMIN — SENNOSIDES AND DOCUSATE SODIUM 1 TABLET: 8.6; 5 TABLET ORAL at 20:33

## 2022-01-01 RX ADMIN — HEPARIN SODIUM 900 UNITS/HR: 10000 INJECTION, SOLUTION INTRAVENOUS at 19:18

## 2022-01-01 RX ADMIN — OXYCODONE HYDROCHLORIDE 5 MG: 5 TABLET ORAL at 00:24

## 2022-01-01 RX ADMIN — OXYCODONE HYDROCHLORIDE 5 MG: 5 TABLET ORAL at 15:11

## 2022-01-01 RX ADMIN — OXYCODONE HYDROCHLORIDE 5 MG: 5 TABLET ORAL at 10:30

## 2022-01-01 RX ADMIN — CARVEDILOL 12.5 MG: 12.5 TABLET, FILM COATED ORAL at 17:38

## 2022-01-01 RX ADMIN — CALCIUM ACETATE 2001 MG: 667 CAPSULE ORAL at 11:19

## 2022-01-01 RX ADMIN — Medication: at 09:18

## 2022-01-01 RX ADMIN — OXYCODONE HYDROCHLORIDE 5 MG: 5 TABLET ORAL at 17:46

## 2022-01-01 RX ADMIN — PROPOFOL 30 MG: 10 INJECTION, EMULSION INTRAVENOUS at 10:41

## 2022-01-01 RX ADMIN — OXYCODONE HYDROCHLORIDE 5 MG: 5 TABLET ORAL at 15:28

## 2022-01-01 RX ADMIN — CARVEDILOL 12.5 MG: 12.5 TABLET, FILM COATED ORAL at 19:10

## 2022-01-01 RX ADMIN — HYDROMORPHONE HYDROCHLORIDE 0.5 MG: 1 INJECTION, SOLUTION INTRAMUSCULAR; INTRAVENOUS; SUBCUTANEOUS at 10:55

## 2022-01-01 RX ADMIN — HEPARIN SODIUM 3000 UNITS: 1000 INJECTION INTRAVENOUS; SUBCUTANEOUS at 10:56

## 2022-01-01 RX ADMIN — HYDROMORPHONE HYDROCHLORIDE 0.2 MG: 0.2 INJECTION, SOLUTION INTRAMUSCULAR; INTRAVENOUS; SUBCUTANEOUS at 20:56

## 2022-01-01 RX ADMIN — PROPOFOL 30 MG: 10 INJECTION, EMULSION INTRAVENOUS at 08:18

## 2022-01-01 RX ADMIN — OXYCODONE HYDROCHLORIDE 10 MG: 5 TABLET ORAL at 17:44

## 2022-01-01 RX ADMIN — FENTANYL CITRATE 25 MCG: 50 INJECTION, SOLUTION INTRAMUSCULAR; INTRAVENOUS at 10:05

## 2022-01-01 RX ADMIN — CARVEDILOL 25 MG: 25 TABLET, FILM COATED ORAL at 17:29

## 2022-01-01 RX ADMIN — SUCRALFATE 1 G: 1 TABLET ORAL at 16:32

## 2022-01-01 RX ADMIN — CARVEDILOL 12.5 MG: 12.5 TABLET, FILM COATED ORAL at 17:36

## 2022-01-01 RX ADMIN — CALCIUM ACETATE 2001 MG: 667 CAPSULE ORAL at 12:51

## 2022-01-01 RX ADMIN — MIDAZOLAM HYDROCHLORIDE 0.5 MG: 1 INJECTION, SOLUTION INTRAMUSCULAR; INTRAVENOUS at 10:50

## 2022-01-01 RX ADMIN — CEFEPIME HYDROCHLORIDE 1 G: 1 INJECTION, POWDER, FOR SOLUTION INTRAMUSCULAR; INTRAVENOUS at 22:25

## 2022-01-01 RX ADMIN — IOPAMIDOL 78 ML: 612 INJECTION, SOLUTION INTRAVENOUS at 11:16

## 2022-01-01 RX ADMIN — OXYCODONE HYDROCHLORIDE 5 MG: 5 TABLET ORAL at 03:14

## 2022-01-01 RX ADMIN — SODIUM CHLORIDE 250 ML: 9 INJECTION, SOLUTION INTRAVENOUS at 09:49

## 2022-01-01 RX ADMIN — HYDROMORPHONE HYDROCHLORIDE 0.2 MG: 0.2 INJECTION, SOLUTION INTRAMUSCULAR; INTRAVENOUS; SUBCUTANEOUS at 13:26

## 2022-01-01 RX ADMIN — ACETAMINOPHEN 650 MG: 325 TABLET ORAL at 08:41

## 2022-01-01 RX ADMIN — MIDODRINE HYDROCHLORIDE 10 MG: 5 TABLET ORAL at 17:05

## 2022-01-01 RX ADMIN — SUCRALFATE 1 G: 1 TABLET ORAL at 20:15

## 2022-01-01 RX ADMIN — AMLODIPINE BESYLATE 10 MG: 10 TABLET ORAL at 23:48

## 2022-01-01 RX ADMIN — HEPARIN SODIUM (PORCINE) LOCK FLUSH IV SOLN 100 UNIT/ML 5 ML: 100 SOLUTION at 14:13

## 2022-01-01 RX ADMIN — SODIUM CHLORIDE 300 ML: 9 INJECTION, SOLUTION INTRAVENOUS at 09:25

## 2022-01-01 RX ADMIN — LIDOCAINE HYDROCHLORIDE 10 ML: 10 INJECTION, SOLUTION EPIDURAL; INFILTRATION; INTRACAUDAL; PERINEURAL at 10:05

## 2022-01-01 RX ADMIN — SUCRALFATE 1 G: 1 TABLET ORAL at 08:14

## 2022-01-01 RX ADMIN — SODIUM CHLORIDE 250 ML: 9 INJECTION, SOLUTION INTRAVENOUS at 10:53

## 2022-01-01 RX ADMIN — Medication 1 CAPSULE: at 18:36

## 2022-01-01 RX ADMIN — PANTOPRAZOLE SODIUM 40 MG: 40 TABLET, DELAYED RELEASE ORAL at 08:12

## 2022-01-01 RX ADMIN — CEFAZOLIN 500 MG: 225 INJECTION, POWDER, FOR SOLUTION INTRAMUSCULAR; INTRAVENOUS at 12:30

## 2022-01-01 RX ADMIN — FENTANYL CITRATE 50 MCG: 50 INJECTION, SOLUTION INTRAMUSCULAR; INTRAVENOUS at 13:22

## 2022-01-01 RX ADMIN — OXYCODONE HYDROCHLORIDE 5 MG: 5 TABLET ORAL at 18:55

## 2022-01-01 RX ADMIN — PANTOPRAZOLE SODIUM 40 MG: 40 TABLET, DELAYED RELEASE ORAL at 06:36

## 2022-01-01 RX ADMIN — ATORVASTATIN CALCIUM 20 MG: 20 TABLET, FILM COATED ORAL at 09:00

## 2022-01-01 RX ADMIN — SUCRALFATE 1 G: 1 TABLET ORAL at 13:48

## 2022-01-01 RX ADMIN — HEPARIN SODIUM 3 BAG: 200 INJECTION, SOLUTION INTRAVENOUS at 09:46

## 2022-01-01 RX ADMIN — ALLOPURINOL 100 MG: 100 TABLET ORAL at 20:30

## 2022-01-01 RX ADMIN — MIDODRINE HYDROCHLORIDE 5 MG: 5 TABLET ORAL at 17:39

## 2022-01-01 RX ADMIN — CARVEDILOL 25 MG: 25 TABLET, FILM COATED ORAL at 08:04

## 2022-01-01 RX ADMIN — CARVEDILOL 25 MG: 25 TABLET, FILM COATED ORAL at 17:59

## 2022-01-01 RX ADMIN — OXYCODONE HYDROCHLORIDE 5 MG: 5 TABLET ORAL at 23:45

## 2022-01-01 RX ADMIN — PROPOFOL 180 MG: 10 INJECTION, EMULSION INTRAVENOUS at 15:34

## 2022-01-01 RX ADMIN — HYDRALAZINE HYDROCHLORIDE 50 MG: 50 TABLET, FILM COATED ORAL at 20:34

## 2022-01-01 RX ADMIN — IOPAMIDOL 125 ML: 755 INJECTION, SOLUTION INTRAVENOUS at 00:15

## 2022-01-01 RX ADMIN — HYDROMORPHONE HYDROCHLORIDE 0.4 MG: 1 INJECTION, SOLUTION INTRAMUSCULAR; INTRAVENOUS; SUBCUTANEOUS at 14:03

## 2022-01-01 RX ADMIN — PHENYLEPHRINE HYDROCHLORIDE 100 MCG: 10 INJECTION INTRAVENOUS at 10:16

## 2022-01-01 RX ADMIN — OXYCODONE HYDROCHLORIDE 10 MG: 5 TABLET ORAL at 21:35

## 2022-01-01 RX ADMIN — MIDAZOLAM HYDROCHLORIDE 0.5 MG: 1 INJECTION, SOLUTION INTRAMUSCULAR; INTRAVENOUS at 10:15

## 2022-01-01 RX ADMIN — VANCOMYCIN HYDROCHLORIDE 1250 MG: 10 INJECTION, POWDER, LYOPHILIZED, FOR SOLUTION INTRAVENOUS at 22:45

## 2022-01-01 RX ADMIN — OXYCODONE HYDROCHLORIDE 5 MG: 5 TABLET ORAL at 06:39

## 2022-01-01 RX ADMIN — FENTANYL CITRATE 25 MCG: 50 INJECTION, SOLUTION INTRAMUSCULAR; INTRAVENOUS at 09:46

## 2022-01-01 RX ADMIN — PANTOPRAZOLE SODIUM 40 MG: 40 INJECTION, POWDER, FOR SOLUTION INTRAVENOUS at 06:32

## 2022-01-01 RX ADMIN — PANTOPRAZOLE SODIUM 40 MG: 40 TABLET, DELAYED RELEASE ORAL at 08:48

## 2022-01-01 RX ADMIN — AMLODIPINE BESYLATE 10 MG: 10 TABLET ORAL at 19:54

## 2022-01-01 RX ADMIN — OXYCODONE HYDROCHLORIDE 10 MG: 5 TABLET ORAL at 10:01

## 2022-01-01 RX ADMIN — OXYCODONE HYDROCHLORIDE 5 MG: 5 TABLET ORAL at 02:06

## 2022-01-01 RX ADMIN — PIPERACILLIN SODIUM AND TAZOBACTAM SODIUM 2.25 G: 2; .25 INJECTION, POWDER, LYOPHILIZED, FOR SOLUTION INTRAVENOUS at 05:17

## 2022-01-01 RX ADMIN — PANTOPRAZOLE SODIUM 40 MG: 40 TABLET, DELAYED RELEASE ORAL at 17:26

## 2022-01-01 RX ADMIN — PANTOPRAZOLE SODIUM 40 MG: 40 TABLET, DELAYED RELEASE ORAL at 07:50

## 2022-01-01 RX ADMIN — PANTOPRAZOLE SODIUM 40 MG: 40 TABLET, DELAYED RELEASE ORAL at 17:04

## 2022-01-01 RX ADMIN — CARVEDILOL 12.5 MG: 12.5 TABLET, FILM COATED ORAL at 10:02

## 2022-01-01 RX ADMIN — AMPICILLIN SODIUM AND SULBACTAM SODIUM 3 G: 2; 1 INJECTION, POWDER, FOR SOLUTION INTRAMUSCULAR; INTRAVENOUS at 15:30

## 2022-01-01 RX ADMIN — HYDROMORPHONE HYDROCHLORIDE 0.5 MG: 1 INJECTION, SOLUTION INTRAMUSCULAR; INTRAVENOUS; SUBCUTANEOUS at 12:22

## 2022-01-01 RX ADMIN — OXYCODONE HYDROCHLORIDE 5 MG: 5 TABLET ORAL at 20:41

## 2022-01-01 RX ADMIN — CALCIUM ACETATE 2001 MG: 667 CAPSULE ORAL at 18:13

## 2022-01-01 RX ADMIN — PROPOFOL 20 MG: 10 INJECTION, EMULSION INTRAVENOUS at 13:05

## 2022-01-01 RX ADMIN — ATORVASTATIN CALCIUM 20 MG: 20 TABLET, FILM COATED ORAL at 09:04

## 2022-01-01 RX ADMIN — WARFARIN SODIUM 7.5 MG: 7.5 TABLET ORAL at 18:42

## 2022-01-01 RX ADMIN — HEPARIN SODIUM 900 UNITS/HR: 10000 INJECTION, SOLUTION INTRAVENOUS at 19:56

## 2022-01-01 RX ADMIN — PANTOPRAZOLE SODIUM 40 MG: 40 TABLET, DELAYED RELEASE ORAL at 08:41

## 2022-01-01 RX ADMIN — ALLOPURINOL 100 MG: 100 TABLET ORAL at 20:15

## 2022-01-01 RX ADMIN — HYDROMORPHONE HYDROCHLORIDE 0.2 MG: 0.2 INJECTION, SOLUTION INTRAMUSCULAR; INTRAVENOUS; SUBCUTANEOUS at 11:43

## 2022-01-01 RX ADMIN — HEPARIN SODIUM 2000 UNITS: 1000 INJECTION INTRAVENOUS; SUBCUTANEOUS at 16:06

## 2022-01-01 RX ADMIN — ACETAMINOPHEN 975 MG: 325 TABLET ORAL at 02:39

## 2022-01-01 RX ADMIN — IOPAMIDOL 24 ML: 612 INJECTION, SOLUTION INTRAVENOUS at 13:54

## 2022-01-01 RX ADMIN — SUCRALFATE 1 G: 1 TABLET ORAL at 17:43

## 2022-01-01 RX ADMIN — DIAZEPAM 5 MG: 5 INJECTION, SOLUTION INTRAMUSCULAR; INTRAVENOUS at 05:01

## 2022-01-01 RX ADMIN — CARVEDILOL 12.5 MG: 12.5 TABLET, FILM COATED ORAL at 08:20

## 2022-01-01 RX ADMIN — FENTANYL CITRATE 25 MCG: 50 INJECTION, SOLUTION INTRAMUSCULAR; INTRAVENOUS at 13:47

## 2022-01-01 RX ADMIN — OXYCODONE HYDROCHLORIDE 5 MG: 5 TABLET ORAL at 13:57

## 2022-01-01 RX ADMIN — PANTOPRAZOLE SODIUM 40 MG: 40 TABLET, DELAYED RELEASE ORAL at 08:14

## 2022-01-01 RX ADMIN — EPOETIN ALFA-EPBX 4000 UNITS: 4000 INJECTION, SOLUTION INTRAVENOUS; SUBCUTANEOUS at 10:52

## 2022-01-01 RX ADMIN — CARVEDILOL 25 MG: 25 TABLET, FILM COATED ORAL at 17:39

## 2022-01-01 RX ADMIN — OXYCODONE HYDROCHLORIDE 2.5 MG: 5 TABLET ORAL at 08:28

## 2022-01-01 RX ADMIN — ATORVASTATIN CALCIUM 20 MG: 20 TABLET, FILM COATED ORAL at 20:34

## 2022-01-01 RX ADMIN — OXYCODONE HYDROCHLORIDE 5 MG: 5 TABLET ORAL at 04:00

## 2022-01-01 RX ADMIN — CALCIUM ACETATE 2001 MG: 667 CAPSULE ORAL at 16:35

## 2022-01-01 RX ADMIN — OXYCODONE HYDROCHLORIDE 5 MG: 5 TABLET ORAL at 21:17

## 2022-01-01 RX ADMIN — DOXERCALCIFEROL 4 MCG: 4 INJECTION, SOLUTION INTRAVENOUS at 10:51

## 2022-01-01 RX ADMIN — HYDROMORPHONE HYDROCHLORIDE 0.5 MG: 1 INJECTION, SOLUTION INTRAMUSCULAR; INTRAVENOUS; SUBCUTANEOUS at 02:46

## 2022-01-01 RX ADMIN — Medication 1 MG: at 20:36

## 2022-01-01 RX ADMIN — HEPARIN SODIUM 5000 UNITS: 1000 INJECTION INTRAVENOUS; SUBCUTANEOUS at 13:23

## 2022-01-01 RX ADMIN — DOXERCALCIFEROL 4 MCG: 4 INJECTION, SOLUTION INTRAVENOUS at 10:52

## 2022-01-01 RX ADMIN — HEPARIN SODIUM 1000 UNITS/HR: 10000 INJECTION, SOLUTION INTRAVENOUS at 17:30

## 2022-01-01 RX ADMIN — PHENYLEPHRINE HYDROCHLORIDE 200 MCG: 10 INJECTION INTRAVENOUS at 10:49

## 2022-01-01 RX ADMIN — ONDANSETRON 4 MG: 2 INJECTION INTRAMUSCULAR; INTRAVENOUS at 13:34

## 2022-01-01 RX ADMIN — PIPERACILLIN SODIUM AND TAZOBACTAM SODIUM 2.25 G: 2; .25 INJECTION, POWDER, LYOPHILIZED, FOR SOLUTION INTRAVENOUS at 21:07

## 2022-01-01 RX ADMIN — LIDOCAINE HYDROCHLORIDE 100 MG: 20 INJECTION, SOLUTION INFILTRATION; PERINEURAL at 12:34

## 2022-01-01 RX ADMIN — PANTOPRAZOLE SODIUM 40 MG: 40 INJECTION, POWDER, FOR SOLUTION INTRAVENOUS at 17:50

## 2022-01-01 RX ADMIN — LIDOCAINE HYDROCHLORIDE 3 ML: 10 INJECTION, SOLUTION INFILTRATION; PERINEURAL at 10:26

## 2022-01-01 RX ADMIN — HEPARIN SODIUM 1000 UNITS/HR: 10000 INJECTION, SOLUTION INTRAVENOUS at 00:35

## 2022-01-01 RX ADMIN — SODIUM CHLORIDE 300 ML: 9 INJECTION, SOLUTION INTRAVENOUS at 09:49

## 2022-01-01 RX ADMIN — SODIUM CHLORIDE: 9 INJECTION, SOLUTION INTRAVENOUS at 12:30

## 2022-01-01 RX ADMIN — HYDROMORPHONE HYDROCHLORIDE 0.2 MG: 0.2 INJECTION, SOLUTION INTRAMUSCULAR; INTRAVENOUS; SUBCUTANEOUS at 06:46

## 2022-01-01 RX ADMIN — ALBUMIN HUMAN 25 G: 50 SOLUTION INTRAVENOUS at 22:18

## 2022-01-01 RX ADMIN — ALLOPURINOL 100 MG: 100 TABLET ORAL at 22:56

## 2022-01-01 RX ADMIN — DOXERCALCIFEROL 4 MCG: 4 INJECTION, SOLUTION INTRAVENOUS at 10:24

## 2022-01-01 RX ADMIN — CALCIUM ACETATE 667 MG: 667 CAPSULE ORAL at 08:04

## 2022-01-01 RX ADMIN — PANTOPRAZOLE SODIUM 40 MG: 40 INJECTION, POWDER, FOR SOLUTION INTRAVENOUS at 22:28

## 2022-01-01 RX ADMIN — OXYCODONE HYDROCHLORIDE 5 MG: 5 TABLET ORAL at 06:41

## 2022-01-01 RX ADMIN — PIPERACILLIN SODIUM AND TAZOBACTAM SODIUM 2.25 G: 2; .25 INJECTION, POWDER, LYOPHILIZED, FOR SOLUTION INTRAVENOUS at 09:23

## 2022-01-01 RX ADMIN — OXYCODONE HYDROCHLORIDE 5 MG: 5 TABLET ORAL at 09:17

## 2022-01-01 RX ADMIN — HYDROMORPHONE HYDROCHLORIDE 0.5 MG: 0.2 INJECTION, SOLUTION INTRAMUSCULAR; INTRAVENOUS; SUBCUTANEOUS at 20:22

## 2022-01-01 RX ADMIN — OXYCODONE HYDROCHLORIDE 5 MG: 5 TABLET ORAL at 07:50

## 2022-01-01 RX ADMIN — ATORVASTATIN CALCIUM 20 MG: 20 TABLET, FILM COATED ORAL at 08:48

## 2022-01-01 RX ADMIN — AMLODIPINE BESYLATE 10 MG: 10 TABLET ORAL at 20:35

## 2022-01-01 RX ADMIN — WARFARIN SODIUM 7.5 MG: 7.5 TABLET ORAL at 17:39

## 2022-01-01 RX ADMIN — HYDRALAZINE HYDROCHLORIDE 50 MG: 50 TABLET, FILM COATED ORAL at 07:42

## 2022-01-01 RX ADMIN — Medication 5 MG: at 08:19

## 2022-01-01 RX ADMIN — SENNOSIDES AND DOCUSATE SODIUM 1 TABLET: 8.6; 5 TABLET ORAL at 09:05

## 2022-01-01 RX ADMIN — OXYCODONE HYDROCHLORIDE 2.5 MG: 5 TABLET ORAL at 20:06

## 2022-01-01 RX ADMIN — HYDRALAZINE HYDROCHLORIDE 50 MG: 50 TABLET, FILM COATED ORAL at 19:53

## 2022-01-01 RX ADMIN — SODIUM CHLORIDE 300 ML: 9 INJECTION, SOLUTION INTRAVENOUS at 10:10

## 2022-01-01 RX ADMIN — PANTOPRAZOLE SODIUM 40 MG: 40 TABLET, DELAYED RELEASE ORAL at 18:22

## 2022-01-01 RX ADMIN — SENNOSIDES AND DOCUSATE SODIUM 1 TABLET: 8.6; 5 TABLET ORAL at 20:35

## 2022-01-01 RX ADMIN — EPOETIN ALFA-EPBX 2000 UNITS: 2000 INJECTION, SOLUTION INTRAVENOUS; SUBCUTANEOUS at 09:15

## 2022-01-01 RX ADMIN — PANTOPRAZOLE SODIUM 40 MG: 40 TABLET, DELAYED RELEASE ORAL at 19:52

## 2022-01-01 RX ADMIN — OXYCODONE HYDROCHLORIDE 5 MG: 5 TABLET ORAL at 18:06

## 2022-01-01 RX ADMIN — CARVEDILOL 3.12 MG: 3.12 TABLET, FILM COATED ORAL at 10:31

## 2022-01-01 RX ADMIN — AMLODIPINE BESYLATE 10 MG: 10 TABLET ORAL at 21:21

## 2022-01-01 RX ADMIN — Medication: at 10:57

## 2022-01-01 RX ADMIN — Medication 1 CAPSULE: at 12:48

## 2022-01-01 RX ADMIN — HEPARIN SODIUM 250 UNITS/HR: 10000 INJECTION, SOLUTION INTRAVENOUS at 19:40

## 2022-01-01 RX ADMIN — MELATONIN TAB 3 MG 3 MG: 3 TAB at 21:35

## 2022-01-01 RX ADMIN — OXYCODONE HYDROCHLORIDE 5 MG: 5 TABLET ORAL at 06:10

## 2022-01-01 RX ADMIN — FENTANYL CITRATE 25 MCG: 50 INJECTION, SOLUTION INTRAMUSCULAR; INTRAVENOUS at 07:52

## 2022-01-01 RX ADMIN — HYDRALAZINE HYDROCHLORIDE 50 MG: 50 TABLET, FILM COATED ORAL at 13:55

## 2022-01-01 RX ADMIN — SUCRALFATE 1 G: 1 TABLET ORAL at 18:22

## 2022-01-01 RX ADMIN — ACETAMINOPHEN 650 MG: 325 TABLET ORAL at 13:38

## 2022-01-01 RX ADMIN — SODIUM CHLORIDE 300 ML: 9 INJECTION, SOLUTION INTRAVENOUS at 09:14

## 2022-01-01 RX ADMIN — FENTANYL CITRATE 25 MCG: 50 INJECTION, SOLUTION INTRAMUSCULAR; INTRAVENOUS at 08:15

## 2022-01-01 RX ADMIN — MIDAZOLAM 2 MG: 1 INJECTION INTRAMUSCULAR; INTRAVENOUS at 13:46

## 2022-01-01 RX ADMIN — PANTOPRAZOLE SODIUM 40 MG: 40 TABLET, DELAYED RELEASE ORAL at 09:04

## 2022-01-01 RX ADMIN — LISINOPRIL 10 MG: 10 TABLET ORAL at 20:35

## 2022-01-01 RX ADMIN — Medication 1 CAPSULE: at 13:48

## 2022-01-01 RX ADMIN — OXYCODONE HYDROCHLORIDE 5 MG: 5 TABLET ORAL at 08:04

## 2022-01-01 RX ADMIN — PHENYLEPHRINE HYDROCHLORIDE 100 MCG: 10 INJECTION INTRAVENOUS at 09:10

## 2022-01-01 RX ADMIN — OXYCODONE HYDROCHLORIDE 5 MG: 5 TABLET ORAL at 08:03

## 2022-01-01 RX ADMIN — HYDROMORPHONE HYDROCHLORIDE 0.2 MG: 0.2 INJECTION, SOLUTION INTRAMUSCULAR; INTRAVENOUS; SUBCUTANEOUS at 09:15

## 2022-01-01 RX ADMIN — ONDANSETRON 4 MG: 2 INJECTION INTRAMUSCULAR; INTRAVENOUS at 17:58

## 2022-01-01 RX ADMIN — EPOETIN ALFA-EPBX 8000 UNITS: 4000 INJECTION, SOLUTION INTRAVENOUS; SUBCUTANEOUS at 11:54

## 2022-01-01 RX ADMIN — HYDROMORPHONE HYDROCHLORIDE 0.2 MG: 0.2 INJECTION, SOLUTION INTRAMUSCULAR; INTRAVENOUS; SUBCUTANEOUS at 17:31

## 2022-01-01 RX ADMIN — FENTANYL CITRATE 50 MCG: 50 INJECTION, SOLUTION INTRAMUSCULAR; INTRAVENOUS at 15:34

## 2022-01-01 RX ADMIN — CALCIUM ACETATE 2001 MG: 667 CAPSULE ORAL at 17:56

## 2022-01-01 RX ADMIN — HEPARIN SODIUM 1200 UNITS/HR: 10000 INJECTION, SOLUTION INTRAVENOUS at 10:20

## 2022-01-01 RX ADMIN — OXYCODONE HYDROCHLORIDE 5 MG: 5 TABLET ORAL at 01:51

## 2022-01-01 RX ADMIN — CALCIUM ACETATE 2001 MG: 667 CAPSULE ORAL at 13:26

## 2022-01-01 RX ADMIN — PIPERACILLIN SODIUM AND TAZOBACTAM SODIUM 2.25 G: 2; .25 INJECTION, POWDER, LYOPHILIZED, FOR SOLUTION INTRAVENOUS at 22:34

## 2022-01-01 RX ADMIN — OXYCODONE HYDROCHLORIDE 10 MG: 5 TABLET ORAL at 05:15

## 2022-01-01 RX ADMIN — SODIUM CHLORIDE 250 ML: 9 INJECTION, SOLUTION INTRAVENOUS at 10:50

## 2022-01-01 RX ADMIN — OXYCODONE HYDROCHLORIDE 5 MG: 5 TABLET ORAL at 22:05

## 2022-01-01 RX ADMIN — OXYCODONE HYDROCHLORIDE 5 MG: 5 TABLET ORAL at 13:41

## 2022-01-01 RX ADMIN — HYDRALAZINE HYDROCHLORIDE 50 MG: 50 TABLET, FILM COATED ORAL at 13:50

## 2022-01-01 RX ADMIN — OXYCODONE HYDROCHLORIDE 5 MG: 5 TABLET ORAL at 18:28

## 2022-01-01 RX ADMIN — CEFEPIME HYDROCHLORIDE 1 G: 1 INJECTION, POWDER, FOR SOLUTION INTRAMUSCULAR; INTRAVENOUS at 20:36

## 2022-01-01 RX ADMIN — ONDANSETRON 4 MG: 2 INJECTION INTRAMUSCULAR; INTRAVENOUS at 10:58

## 2022-01-01 RX ADMIN — ALLOPURINOL 100 MG: 100 TABLET ORAL at 19:52

## 2022-01-01 RX ADMIN — HYDROMORPHONE HYDROCHLORIDE 0.5 MG: 1 INJECTION, SOLUTION INTRAMUSCULAR; INTRAVENOUS; SUBCUTANEOUS at 09:03

## 2022-01-01 RX ADMIN — CARVEDILOL 12.5 MG: 12.5 TABLET, FILM COATED ORAL at 09:22

## 2022-01-01 RX ADMIN — LISINOPRIL 20 MG: 20 TABLET ORAL at 19:53

## 2022-01-01 RX ADMIN — PANTOPRAZOLE SODIUM 40 MG: 40 TABLET, DELAYED RELEASE ORAL at 15:52

## 2022-01-01 RX ADMIN — LISINOPRIL 10 MG: 10 TABLET ORAL at 19:49

## 2022-01-01 RX ADMIN — SUCRALFATE 1 G: 1 TABLET ORAL at 21:13

## 2022-01-01 RX ADMIN — HEPARIN SODIUM 1000 UNITS/HR: 10000 INJECTION, SOLUTION INTRAVENOUS at 20:31

## 2022-01-01 RX ADMIN — Medication 1 MG: at 16:36

## 2022-01-01 RX ADMIN — HEPARIN SODIUM 850 UNITS/HR: 10000 INJECTION, SOLUTION INTRAVENOUS at 07:50

## 2022-01-01 RX ADMIN — Medication 1 CAPSULE: at 10:45

## 2022-01-01 RX ADMIN — ALBUMIN (HUMAN) 250 ML: 12.5 INJECTION, SOLUTION INTRAVENOUS at 13:45

## 2022-01-01 RX ADMIN — HYDROMORPHONE HYDROCHLORIDE 0.5 MG: 1 INJECTION, SOLUTION INTRAMUSCULAR; INTRAVENOUS; SUBCUTANEOUS at 04:14

## 2022-01-01 RX ADMIN — OXYCODONE HYDROCHLORIDE 5 MG: 5 TABLET ORAL at 19:37

## 2022-01-01 RX ADMIN — ALBUMIN HUMAN 50 ML: 0.25 SOLUTION INTRAVENOUS at 16:00

## 2022-01-01 RX ADMIN — PHENYLEPHRINE HYDROCHLORIDE 50 MCG: 10 INJECTION INTRAVENOUS at 08:22

## 2022-01-01 RX ADMIN — Medication 5 ML: at 12:18

## 2022-01-01 RX ADMIN — Medication 1 CAPSULE: at 08:41

## 2022-01-01 RX ADMIN — SUCRALFATE 1 G: 1 TABLET ORAL at 21:23

## 2022-01-01 RX ADMIN — HYDROMORPHONE HYDROCHLORIDE 0.2 MG: 0.2 INJECTION, SOLUTION INTRAMUSCULAR; INTRAVENOUS; SUBCUTANEOUS at 14:05

## 2022-01-01 RX ADMIN — HYDROMORPHONE HYDROCHLORIDE 0.2 MG: 0.2 INJECTION, SOLUTION INTRAMUSCULAR; INTRAVENOUS; SUBCUTANEOUS at 22:59

## 2022-01-01 RX ADMIN — PHENYLEPHRINE HYDROCHLORIDE 100 MCG: 10 INJECTION INTRAVENOUS at 07:58

## 2022-01-01 RX ADMIN — PHENYLEPHRINE HYDROCHLORIDE 0.5 MCG/KG/MIN: 10 INJECTION INTRAVENOUS at 15:43

## 2022-01-01 RX ADMIN — MIDAZOLAM 2 MG: 1 INJECTION INTRAMUSCULAR; INTRAVENOUS at 09:52

## 2022-01-01 RX ADMIN — CALCIUM ACETATE 2001 MG: 667 CAPSULE ORAL at 18:07

## 2022-01-01 RX ADMIN — ATORVASTATIN CALCIUM 20 MG: 20 TABLET, FILM COATED ORAL at 20:06

## 2022-01-01 RX ADMIN — HYDRALAZINE HYDROCHLORIDE 50 MG: 50 TABLET, FILM COATED ORAL at 14:08

## 2022-01-01 RX ADMIN — PHENYLEPHRINE HYDROCHLORIDE 150 MCG: 10 INJECTION INTRAVENOUS at 08:05

## 2022-01-01 RX ADMIN — OXYCODONE HYDROCHLORIDE 10 MG: 5 TABLET ORAL at 03:56

## 2022-01-01 RX ADMIN — CEFAZOLIN 500 MG: 225 INJECTION, POWDER, FOR SOLUTION INTRAMUSCULAR; INTRAVENOUS at 07:46

## 2022-01-01 RX ADMIN — OXYCODONE HYDROCHLORIDE 5 MG: 5 TABLET ORAL at 18:47

## 2022-01-01 RX ADMIN — HYDROMORPHONE HYDROCHLORIDE 0.5 MG: 1 INJECTION, SOLUTION INTRAMUSCULAR; INTRAVENOUS; SUBCUTANEOUS at 07:23

## 2022-01-01 RX ADMIN — HYDROMORPHONE HYDROCHLORIDE 0.2 MG: 0.2 INJECTION, SOLUTION INTRAMUSCULAR; INTRAVENOUS; SUBCUTANEOUS at 21:22

## 2022-01-01 RX ADMIN — ALBUMIN HUMAN 12.5 G: 50 SOLUTION INTRAVENOUS at 21:19

## 2022-01-01 RX ADMIN — MIDAZOLAM HYDROCHLORIDE 1 MG: 1 INJECTION, SOLUTION INTRAMUSCULAR; INTRAVENOUS at 13:40

## 2022-01-01 RX ADMIN — LISINOPRIL 20 MG: 20 TABLET ORAL at 20:34

## 2022-01-01 RX ADMIN — PANTOPRAZOLE SODIUM 40 MG: 40 INJECTION, POWDER, FOR SOLUTION INTRAVENOUS at 04:26

## 2022-01-01 RX ADMIN — SUCRALFATE 1 G: 1 TABLET ORAL at 12:24

## 2022-01-01 RX ADMIN — ACETAMINOPHEN 975 MG: 325 TABLET ORAL at 17:37

## 2022-01-01 RX ADMIN — HYDROMORPHONE HYDROCHLORIDE 0.2 MG: 0.2 INJECTION, SOLUTION INTRAMUSCULAR; INTRAVENOUS; SUBCUTANEOUS at 08:39

## 2022-01-01 RX ADMIN — ONDANSETRON 4 MG: 2 INJECTION INTRAMUSCULAR; INTRAVENOUS at 10:29

## 2022-01-01 RX ADMIN — SUCRALFATE 1 G: 1 TABLET ORAL at 15:34

## 2022-01-01 RX ADMIN — ALLOPURINOL 100 MG: 100 TABLET ORAL at 21:07

## 2022-01-01 RX ADMIN — ATORVASTATIN CALCIUM 20 MG: 20 TABLET, FILM COATED ORAL at 10:01

## 2022-01-01 RX ADMIN — DOXERCALCIFEROL 4 MCG: 4 INJECTION, SOLUTION INTRAVENOUS at 09:51

## 2022-01-01 RX ADMIN — CARVEDILOL 25 MG: 25 TABLET, FILM COATED ORAL at 17:54

## 2022-01-01 RX ADMIN — SODIUM CHLORIDE 300 ML: 9 INJECTION, SOLUTION INTRAVENOUS at 10:53

## 2022-01-01 RX ADMIN — MIDODRINE HYDROCHLORIDE 5 MG: 5 TABLET ORAL at 13:12

## 2022-01-01 RX ADMIN — AMLODIPINE BESYLATE 10 MG: 10 TABLET ORAL at 20:34

## 2022-01-01 RX ADMIN — CARVEDILOL 12.5 MG: 12.5 TABLET, FILM COATED ORAL at 09:16

## 2022-01-01 RX ADMIN — HEPARIN SODIUM 850 UNITS/HR: 10000 INJECTION, SOLUTION INTRAVENOUS at 15:20

## 2022-01-01 RX ADMIN — CALCIUM ACETATE 1334 MG: 667 CAPSULE ORAL at 09:22

## 2022-01-01 RX ADMIN — LIDOCAINE HYDROCHLORIDE 60 MG: 20 INJECTION, SOLUTION INFILTRATION; PERINEURAL at 15:34

## 2022-01-01 RX ADMIN — ONDANSETRON 4 MG: 4 TABLET, ORALLY DISINTEGRATING ORAL at 09:12

## 2022-01-01 RX ADMIN — HEPARIN SODIUM 5000 UNITS: 1000 INJECTION INTRAVENOUS; SUBCUTANEOUS at 09:09

## 2022-01-01 RX ADMIN — FENTANYL CITRATE 50 MCG: 50 INJECTION, SOLUTION INTRAMUSCULAR; INTRAVENOUS at 10:13

## 2022-01-01 RX ADMIN — PANTOPRAZOLE SODIUM 40 MG: 40 INJECTION, POWDER, FOR SOLUTION INTRAVENOUS at 00:54

## 2022-01-01 RX ADMIN — OXYCODONE HYDROCHLORIDE 5 MG: 5 TABLET ORAL at 19:46

## 2022-01-01 RX ADMIN — HYDROMORPHONE HYDROCHLORIDE 0.3 MG: 1 INJECTION, SOLUTION INTRAMUSCULAR; INTRAVENOUS; SUBCUTANEOUS at 08:37

## 2022-01-01 RX ADMIN — HYDRALAZINE HYDROCHLORIDE 50 MG: 50 TABLET, FILM COATED ORAL at 20:35

## 2022-01-01 RX ADMIN — SUCRALFATE 1 G: 1 TABLET ORAL at 07:50

## 2022-01-01 RX ADMIN — CALCIUM ACETATE 2001 MG: 667 CAPSULE ORAL at 17:36

## 2022-01-01 RX ADMIN — OXYCODONE HYDROCHLORIDE 5 MG: 5 TABLET ORAL at 12:48

## 2022-01-01 RX ADMIN — PIPERACILLIN SODIUM AND TAZOBACTAM SODIUM 2.25 G: 2; .25 INJECTION, POWDER, LYOPHILIZED, FOR SOLUTION INTRAVENOUS at 01:28

## 2022-01-01 RX ADMIN — SUCRALFATE 1 G: 1 TABLET ORAL at 08:23

## 2022-01-01 RX ADMIN — VANCOMYCIN HYDROCHLORIDE 500 MG: 500 INJECTION, POWDER, LYOPHILIZED, FOR SOLUTION INTRAVENOUS at 16:42

## 2022-01-01 RX ADMIN — HYDROMORPHONE HYDROCHLORIDE 0.2 MG: 0.2 INJECTION, SOLUTION INTRAMUSCULAR; INTRAVENOUS; SUBCUTANEOUS at 19:22

## 2022-01-01 RX ADMIN — AMPICILLIN SODIUM AND SULBACTAM SODIUM 3 G: 2; 1 INJECTION, POWDER, FOR SOLUTION INTRAMUSCULAR; INTRAVENOUS at 02:11

## 2022-01-01 RX ADMIN — FENTANYL CITRATE 50 MCG: 50 INJECTION, SOLUTION INTRAMUSCULAR; INTRAVENOUS at 12:34

## 2022-01-01 RX ADMIN — SODIUM CHLORIDE 500 ML: 9 INJECTION, SOLUTION INTRAVENOUS at 19:56

## 2022-01-01 RX ADMIN — LORAZEPAM 0.5 MG: 2 INJECTION INTRAMUSCULAR at 16:28

## 2022-01-01 RX ADMIN — ONDANSETRON 4 MG: 2 INJECTION INTRAMUSCULAR; INTRAVENOUS at 14:02

## 2022-01-01 RX ADMIN — LORAZEPAM 1 MG: 2 LIQUID ORAL at 09:15

## 2022-01-01 RX ADMIN — PROPOFOL 200 MG: 10 INJECTION, EMULSION INTRAVENOUS at 12:34

## 2022-01-01 RX ADMIN — SODIUM CHLORIDE 70 ML: 900 INJECTION INTRAVENOUS at 00:15

## 2022-01-01 RX ADMIN — SODIUM CHLORIDE 300 ML: 9 INJECTION, SOLUTION INTRAVENOUS at 10:11

## 2022-01-01 RX ADMIN — PHENYLEPHRINE HYDROCHLORIDE 100 MCG: 10 INJECTION INTRAVENOUS at 10:34

## 2022-01-01 RX ADMIN — MIDAZOLAM HYDROCHLORIDE 1 MG: 1 INJECTION, SOLUTION INTRAMUSCULAR; INTRAVENOUS at 09:46

## 2022-01-01 RX ADMIN — HEPARIN SODIUM 1000 UNITS/HR: 10000 INJECTION, SOLUTION INTRAVENOUS at 13:04

## 2022-01-01 RX ADMIN — SODIUM CHLORIDE 60 ML: 9 INJECTION, SOLUTION INTRAVENOUS at 14:55

## 2022-01-01 RX ADMIN — SODIUM CHLORIDE 250 ML: 9 INJECTION, SOLUTION INTRAVENOUS at 10:11

## 2022-01-01 RX ADMIN — SUCRALFATE 1 G: 1 TABLET ORAL at 21:33

## 2022-01-01 RX ADMIN — ATORVASTATIN CALCIUM 20 MG: 20 TABLET, FILM COATED ORAL at 10:27

## 2022-01-01 RX ADMIN — WARFARIN SODIUM 5 MG: 5 TABLET ORAL at 17:38

## 2022-01-01 RX ADMIN — PROPOFOL 20 MG: 10 INJECTION, EMULSION INTRAVENOUS at 07:53

## 2022-01-01 RX ADMIN — SUGAMMADEX 200 MG: 100 INJECTION, SOLUTION INTRAVENOUS at 16:58

## 2022-01-01 RX ADMIN — FENTANYL CITRATE 25 MCG: 50 INJECTION, SOLUTION INTRAMUSCULAR; INTRAVENOUS at 13:07

## 2022-01-01 RX ADMIN — CALCIUM ACETATE 667 MG: 667 CAPSULE ORAL at 17:32

## 2022-01-01 RX ADMIN — HEPARIN SODIUM 2 BAG: 200 INJECTION, SOLUTION INTRAVENOUS at 10:10

## 2022-01-01 RX ADMIN — DEXAMETHASONE SODIUM PHOSPHATE 4 MG: 4 INJECTION, SOLUTION INTRA-ARTICULAR; INTRALESIONAL; INTRAMUSCULAR; INTRAVENOUS; SOFT TISSUE at 07:52

## 2022-01-01 RX ADMIN — PHENYLEPHRINE HYDROCHLORIDE 150 MCG: 10 INJECTION INTRAVENOUS at 08:08

## 2022-01-01 RX ADMIN — CALCIUM ACETATE 2001 MG: 667 CAPSULE ORAL at 13:54

## 2022-01-01 RX ADMIN — OXYCODONE HYDROCHLORIDE 5 MG: 5 TABLET ORAL at 01:28

## 2022-01-01 RX ADMIN — OXYCODONE HYDROCHLORIDE 5 MG: 5 TABLET ORAL at 19:38

## 2022-01-01 RX ADMIN — ACETAMINOPHEN 650 MG: 325 TABLET ORAL at 03:59

## 2022-01-01 RX ADMIN — PHENYLEPHRINE HYDROCHLORIDE 100 MCG: 10 INJECTION INTRAVENOUS at 10:31

## 2022-01-01 RX ADMIN — CALCIUM ACETATE 1334 MG: 667 CAPSULE ORAL at 17:35

## 2022-01-01 RX ADMIN — ACETAMINOPHEN 650 MG: 325 TABLET ORAL at 10:47

## 2022-01-01 RX ADMIN — PANTOPRAZOLE SODIUM 40 MG: 40 TABLET, DELAYED RELEASE ORAL at 17:43

## 2022-01-01 RX ADMIN — OXYCODONE HYDROCHLORIDE 5 MG: 5 TABLET ORAL at 20:47

## 2022-01-01 RX ADMIN — CALCIUM ACETATE 667 MG: 667 CAPSULE ORAL at 18:27

## 2022-01-01 RX ADMIN — LIDOCAINE HYDROCHLORIDE 2 ML: 10 INJECTION, SOLUTION INFILTRATION; PERINEURAL at 13:30

## 2022-01-01 RX ADMIN — OXYCODONE HYDROCHLORIDE 5 MG: 5 TABLET ORAL at 17:01

## 2022-01-01 RX ADMIN — HEPARIN SODIUM 1000 UNITS/HR: 10000 INJECTION, SOLUTION INTRAVENOUS at 15:46

## 2022-01-01 RX ADMIN — OXYCODONE HYDROCHLORIDE 5 MG: 5 TABLET ORAL at 15:31

## 2022-01-01 RX ADMIN — SODIUM CHLORIDE 250 ML: 9 INJECTION, SOLUTION INTRAVENOUS at 18:50

## 2022-01-01 RX ADMIN — LIDOCAINE HYDROCHLORIDE 4 ML: 10 INJECTION, SOLUTION INFILTRATION; PERINEURAL at 14:32

## 2022-01-01 RX ADMIN — FENTANYL CITRATE 50 MCG: 50 INJECTION, SOLUTION INTRAMUSCULAR; INTRAVENOUS at 13:52

## 2022-01-01 RX ADMIN — CALCIUM ACETATE 1334 MG: 667 CAPSULE ORAL at 09:03

## 2022-01-01 RX ADMIN — WARFARIN SODIUM 5 MG: 5 TABLET ORAL at 17:36

## 2022-01-01 RX ADMIN — HYDRALAZINE HYDROCHLORIDE 50 MG: 50 TABLET, FILM COATED ORAL at 19:49

## 2022-01-01 RX ADMIN — Medication 1 MG: at 02:39

## 2022-01-01 RX ADMIN — SUCRALFATE 1 G: 1 TABLET ORAL at 17:04

## 2022-01-01 RX ADMIN — MIDAZOLAM HYDROCHLORIDE 0.5 MG: 1 INJECTION, SOLUTION INTRAMUSCULAR; INTRAVENOUS at 13:48

## 2022-01-01 RX ADMIN — HYDRALAZINE HYDROCHLORIDE 50 MG: 50 TABLET, FILM COATED ORAL at 20:06

## 2022-01-01 RX ADMIN — PROPOFOL 30 MG: 10 INJECTION, EMULSION INTRAVENOUS at 08:50

## 2022-01-01 RX ADMIN — CEFEPIME HYDROCHLORIDE 1 G: 1 INJECTION, POWDER, FOR SOLUTION INTRAMUSCULAR; INTRAVENOUS at 19:52

## 2022-01-01 RX ADMIN — SODIUM CHLORIDE 250 ML: 9 INJECTION, SOLUTION INTRAVENOUS at 09:25

## 2022-01-01 RX ADMIN — PROPOFOL 20 MG: 10 INJECTION, EMULSION INTRAVENOUS at 13:04

## 2022-01-01 RX ADMIN — Medication 5 MG: at 08:08

## 2022-01-01 RX ADMIN — LORAZEPAM 0.5 MG: 2 INJECTION INTRAMUSCULAR at 08:05

## 2022-01-01 RX ADMIN — HYDROMORPHONE HYDROCHLORIDE 0.2 MG: 0.2 INJECTION, SOLUTION INTRAMUSCULAR; INTRAVENOUS; SUBCUTANEOUS at 15:50

## 2022-01-01 RX ADMIN — Medication 5 ML: at 12:40

## 2022-01-01 RX ADMIN — SUCRALFATE 1 G: 1 TABLET ORAL at 06:54

## 2022-01-01 RX ADMIN — OXYCODONE HYDROCHLORIDE 5 MG: 5 TABLET ORAL at 15:40

## 2022-01-01 RX ADMIN — HEPARIN SODIUM 3 BAG: 200 INJECTION, SOLUTION INTRAVENOUS at 13:32

## 2022-01-01 RX ADMIN — HYDROMORPHONE HYDROCHLORIDE 0.2 MG: 0.2 INJECTION, SOLUTION INTRAMUSCULAR; INTRAVENOUS; SUBCUTANEOUS at 04:21

## 2022-01-01 RX ADMIN — SODIUM CHLORIDE 300 ML: 9 INJECTION, SOLUTION INTRAVENOUS at 10:58

## 2022-01-01 RX ADMIN — MIDAZOLAM HYDROCHLORIDE 1 MG: 1 INJECTION, SOLUTION INTRAMUSCULAR; INTRAVENOUS at 10:05

## 2022-01-01 RX ADMIN — SUCRALFATE 1 G: 1 TABLET ORAL at 18:17

## 2022-01-01 RX ADMIN — CARVEDILOL 3.12 MG: 3.12 TABLET, FILM COATED ORAL at 17:51

## 2022-01-01 RX ADMIN — PANTOPRAZOLE SODIUM 40 MG: 40 TABLET, DELAYED RELEASE ORAL at 15:58

## 2022-01-01 RX ADMIN — SODIUM CHLORIDE 300 ML: 9 INJECTION, SOLUTION INTRAVENOUS at 10:49

## 2022-01-01 ASSESSMENT — ACTIVITIES OF DAILY LIVING (ADL)
ADLS_ACUITY_SCORE: 22
ADLS_ACUITY_SCORE: 26
ADLS_ACUITY_SCORE: 26
ADLS_ACUITY_SCORE: 31
TOILETING_ISSUES: NO
ADLS_ACUITY_SCORE: 26
ADLS_ACUITY_SCORE: 28
ADLS_ACUITY_SCORE: 24
ADLS_ACUITY_SCORE: 29
ADLS_ACUITY_SCORE: 39
ADLS_ACUITY_SCORE: 31
ADLS_ACUITY_SCORE: 28
ADLS_ACUITY_SCORE: 26
ADLS_ACUITY_SCORE: 22
ADLS_ACUITY_SCORE: 26
ADLS_ACUITY_SCORE: 39
ADLS_ACUITY_SCORE: 26
ADLS_ACUITY_SCORE: 30
ADLS_ACUITY_SCORE: 39
ADLS_ACUITY_SCORE: 28
ADLS_ACUITY_SCORE: 22
ADLS_ACUITY_SCORE: 27
ADLS_ACUITY_SCORE: 28
ADLS_ACUITY_SCORE: 31
ADLS_ACUITY_SCORE: 31
ADLS_ACUITY_SCORE: 26
ADLS_ACUITY_SCORE: 31
ADLS_ACUITY_SCORE: 26
ADLS_ACUITY_SCORE: 22
ADLS_ACUITY_SCORE: 23
ADLS_ACUITY_SCORE: 22
DRESSING/BATHING_DIFFICULTY: NO
ADLS_ACUITY_SCORE: 26
ADLS_ACUITY_SCORE: 37
ADLS_ACUITY_SCORE: 27
ADLS_ACUITY_SCORE: 23
ADLS_ACUITY_SCORE: 26
ADLS_ACUITY_SCORE: 26
WALKING_OR_CLIMBING_STAIRS_DIFFICULTY: NO
ADLS_ACUITY_SCORE: 26
ADLS_ACUITY_SCORE: 26
ADLS_ACUITY_SCORE: 22
EATING/SWALLOWING: SWALLOWING SOLID FOOD;SWALLOWING LIQUIDS
ADLS_ACUITY_SCORE: 26
ADLS_ACUITY_SCORE: 25
ADLS_ACUITY_SCORE: 20
ADLS_ACUITY_SCORE: 39
ADLS_ACUITY_SCORE: 39
ADLS_ACUITY_SCORE: 26
ADLS_ACUITY_SCORE: 39
ADLS_ACUITY_SCORE: 31
WEAR_GLASSES_OR_BLIND: NO
ADLS_ACUITY_SCORE: 39
ADLS_ACUITY_SCORE: 39
ADLS_ACUITY_SCORE: 30
ADLS_ACUITY_SCORE: 26
ADLS_ACUITY_SCORE: 28
ADLS_ACUITY_SCORE: 22
ADLS_ACUITY_SCORE: 22
ADLS_ACUITY_SCORE: 27
ADLS_ACUITY_SCORE: 26
ADLS_ACUITY_SCORE: 31
ADLS_ACUITY_SCORE: 26
ADLS_ACUITY_SCORE: 24
ADLS_ACUITY_SCORE: 31
ADLS_ACUITY_SCORE: 22
ADLS_ACUITY_SCORE: 35
ADLS_ACUITY_SCORE: 30
ADLS_ACUITY_SCORE: 30
DIFFICULTY_EATING/SWALLOWING: YES
ADLS_ACUITY_SCORE: 27
ADLS_ACUITY_SCORE: 22
ADLS_ACUITY_SCORE: 28
ADLS_ACUITY_SCORE: 26
ADLS_ACUITY_SCORE: 23
FALL_HISTORY_WITHIN_LAST_SIX_MONTHS: NO
ADLS_ACUITY_SCORE: 39
ADLS_ACUITY_SCORE: 27
ADLS_ACUITY_SCORE: 23
ADLS_ACUITY_SCORE: 26
ADLS_ACUITY_SCORE: 31
ADLS_ACUITY_SCORE: 22
ADLS_ACUITY_SCORE: 26
ADLS_ACUITY_SCORE: 29
ADLS_ACUITY_SCORE: 22
ADLS_ACUITY_SCORE: 31
ADLS_ACUITY_SCORE: 22
ADLS_ACUITY_SCORE: 25
ADLS_ACUITY_SCORE: 31
ADLS_ACUITY_SCORE: 22
ADLS_ACUITY_SCORE: 22
ADLS_ACUITY_SCORE: 31
ADLS_ACUITY_SCORE: 29
ADLS_ACUITY_SCORE: 23
ADLS_ACUITY_SCORE: 26
ADLS_ACUITY_SCORE: 22
ADLS_ACUITY_SCORE: 31
ADLS_ACUITY_SCORE: 26
ADLS_ACUITY_SCORE: 26
ADLS_ACUITY_SCORE: 39
ADLS_ACUITY_SCORE: 31
DRESSING/BATHING_DIFFICULTY: NO
ADLS_ACUITY_SCORE: 26
ADLS_ACUITY_SCORE: 39
ADLS_ACUITY_SCORE: 39
DOING_ERRANDS_INDEPENDENTLY_DIFFICULTY: NO
ADLS_ACUITY_SCORE: 23
ADLS_ACUITY_SCORE: 24
ADLS_ACUITY_SCORE: 31
ADLS_ACUITY_SCORE: 27
ADLS_ACUITY_SCORE: 29
ADLS_ACUITY_SCORE: 23
ADLS_ACUITY_SCORE: 22
ADLS_ACUITY_SCORE: 31
ADLS_ACUITY_SCORE: 39
DOING_ERRANDS_INDEPENDENTLY_DIFFICULTY: NO
ADLS_ACUITY_SCORE: 31
WEAR_GLASSES_OR_BLIND: NO
ADLS_ACUITY_SCORE: 29
ADLS_ACUITY_SCORE: 28
ADLS_ACUITY_SCORE: 26
ADLS_ACUITY_SCORE: 31
ADLS_ACUITY_SCORE: 26
ADLS_ACUITY_SCORE: 23
ADLS_ACUITY_SCORE: 28
ADLS_ACUITY_SCORE: 39
ADLS_ACUITY_SCORE: 20
ADLS_ACUITY_SCORE: 26
ADLS_ACUITY_SCORE: 37
ADLS_ACUITY_SCORE: 26
ADLS_ACUITY_SCORE: 39
ADLS_ACUITY_SCORE: 26
ADLS_ACUITY_SCORE: 26
ADLS_ACUITY_SCORE: 28
ADLS_ACUITY_SCORE: 31
TOILETING_ISSUES: NO
ADLS_ACUITY_SCORE: 23
ADLS_ACUITY_SCORE: 26
ADLS_ACUITY_SCORE: 27
ADLS_ACUITY_SCORE: 39
ADLS_ACUITY_SCORE: 26
DIFFICULTY_EATING/SWALLOWING: NO
ADLS_ACUITY_SCORE: 28
ADLS_ACUITY_SCORE: 28
ADLS_ACUITY_SCORE: 29
ADLS_ACUITY_SCORE: 26
ADLS_ACUITY_SCORE: 27
ADLS_ACUITY_SCORE: 31
ADLS_ACUITY_SCORE: 27
ADLS_ACUITY_SCORE: 27
ADLS_ACUITY_SCORE: 23
ADLS_ACUITY_SCORE: 26
ADLS_ACUITY_SCORE: 27
ADLS_ACUITY_SCORE: 27
ADLS_ACUITY_SCORE: 26
ADLS_ACUITY_SCORE: 22
ADLS_ACUITY_SCORE: 26
ADLS_ACUITY_SCORE: 23
ADLS_ACUITY_SCORE: 29
ADLS_ACUITY_SCORE: 26
ADLS_ACUITY_SCORE: 35
ADLS_ACUITY_SCORE: 31
ADLS_ACUITY_SCORE: 26
ADLS_ACUITY_SCORE: 22
ADLS_ACUITY_SCORE: 26
ADLS_ACUITY_SCORE: 27
ADLS_ACUITY_SCORE: 26
ADLS_ACUITY_SCORE: 23
ADLS_ACUITY_SCORE: 26
ADLS_ACUITY_SCORE: 27
ADLS_ACUITY_SCORE: 27
ADLS_ACUITY_SCORE: 30
ADLS_ACUITY_SCORE: 26
ADLS_ACUITY_SCORE: 26
ADLS_ACUITY_SCORE: 28
ADLS_ACUITY_SCORE: 26
ADLS_ACUITY_SCORE: 30
ADLS_ACUITY_SCORE: 39
ADLS_ACUITY_SCORE: 26
ADLS_ACUITY_SCORE: 22
ADLS_ACUITY_SCORE: 30
CHANGE_IN_FUNCTIONAL_STATUS_SINCE_ONSET_OF_CURRENT_ILLNESS/INJURY: YES
ADLS_ACUITY_SCORE: 39
ADLS_ACUITY_SCORE: 31
ADLS_ACUITY_SCORE: 26
ADLS_ACUITY_SCORE: 30
ADLS_ACUITY_SCORE: 20
CONCENTRATING,_REMEMBERING_OR_MAKING_DECISIONS_DIFFICULTY: NO
ADLS_ACUITY_SCORE: 22
ADLS_ACUITY_SCORE: 26
ADLS_ACUITY_SCORE: 31
ADLS_ACUITY_SCORE: 22
ADLS_ACUITY_SCORE: 30
ADLS_ACUITY_SCORE: 26
ADLS_ACUITY_SCORE: 22
WALKING_OR_CLIMBING_STAIRS_DIFFICULTY: NO
ADLS_ACUITY_SCORE: 26
ADLS_ACUITY_SCORE: 22
ADLS_ACUITY_SCORE: 31
ADLS_ACUITY_SCORE: 22
ADLS_ACUITY_SCORE: 27
ADLS_ACUITY_SCORE: 26
DEPENDENT_IADLS:: INDEPENDENT
ADLS_ACUITY_SCORE: 26
ADLS_ACUITY_SCORE: 30
ADLS_ACUITY_SCORE: 39
ADLS_ACUITY_SCORE: 26
ADLS_ACUITY_SCORE: 39
ADLS_ACUITY_SCORE: 26
ADLS_ACUITY_SCORE: 27
ADLS_ACUITY_SCORE: 22
ADLS_ACUITY_SCORE: 28
ADLS_ACUITY_SCORE: 23
ADLS_ACUITY_SCORE: 27
ADLS_ACUITY_SCORE: 39
ADLS_ACUITY_SCORE: 27
ADLS_ACUITY_SCORE: 39
ADLS_ACUITY_SCORE: 31
ADLS_ACUITY_SCORE: 24
DRESSING/BATHING_DIFFICULTY: NO
ADLS_ACUITY_SCORE: 27
ADLS_ACUITY_SCORE: 23
ADLS_ACUITY_SCORE: 31
PREVIOUS_RESPONSIBILITIES: HOUSEKEEPING;LAUNDRY;DRIVING
ADLS_ACUITY_SCORE: 29
ADLS_ACUITY_SCORE: 31
ADLS_ACUITY_SCORE: 26
ADLS_ACUITY_SCORE: 39
ADLS_ACUITY_SCORE: 20
DOING_ERRANDS_INDEPENDENTLY_DIFFICULTY: YES
ADLS_ACUITY_SCORE: 26
ADLS_ACUITY_SCORE: 26
ADLS_ACUITY_SCORE: 31
ADLS_ACUITY_SCORE: 23
ADLS_ACUITY_SCORE: 26
ADLS_ACUITY_SCORE: 30
ADLS_ACUITY_SCORE: 29
ADLS_ACUITY_SCORE: 39
ADLS_ACUITY_SCORE: 31
ADLS_ACUITY_SCORE: 26
ADLS_ACUITY_SCORE: 31
ADLS_ACUITY_SCORE: 26
ADLS_ACUITY_SCORE: 22
ADLS_ACUITY_SCORE: 26
ADLS_ACUITY_SCORE: 27
ADLS_ACUITY_SCORE: 39
ADLS_ACUITY_SCORE: 24
ADLS_ACUITY_SCORE: 31
FALL_HISTORY_WITHIN_LAST_SIX_MONTHS: NO
ADLS_ACUITY_SCORE: 26
ADLS_ACUITY_SCORE: 20
ADLS_ACUITY_SCORE: 30
ADLS_ACUITY_SCORE: 26
ADLS_ACUITY_SCORE: 26
CONCENTRATING,_REMEMBERING_OR_MAKING_DECISIONS_DIFFICULTY: NO
ADLS_ACUITY_SCORE: 27
ADLS_ACUITY_SCORE: 28
ADLS_ACUITY_SCORE: 26
FALL_HISTORY_WITHIN_LAST_SIX_MONTHS: NO
ADLS_ACUITY_SCORE: 27
ADLS_ACUITY_SCORE: 39
ADLS_ACUITY_SCORE: 35
ADLS_ACUITY_SCORE: 31
CONCENTRATING,_REMEMBERING_OR_MAKING_DECISIONS_DIFFICULTY: NO
ADLS_ACUITY_SCORE: 31
ADLS_ACUITY_SCORE: 31
ADLS_ACUITY_SCORE: 26
ADLS_ACUITY_SCORE: 39
ADLS_ACUITY_SCORE: 30
ADLS_ACUITY_SCORE: 26
ADLS_ACUITY_SCORE: 27
ADLS_ACUITY_SCORE: 26
ADLS_ACUITY_SCORE: 26
ADLS_ACUITY_SCORE: 35
ADLS_ACUITY_SCORE: 26
ADLS_ACUITY_SCORE: 26
ADLS_ACUITY_SCORE: 39
ADLS_ACUITY_SCORE: 30
ADLS_ACUITY_SCORE: 27
ADLS_ACUITY_SCORE: 26
ADLS_ACUITY_SCORE: 26
ADLS_ACUITY_SCORE: 28
ADLS_ACUITY_SCORE: 26
ADLS_ACUITY_SCORE: 26
ADLS_ACUITY_SCORE: 22
ADLS_ACUITY_SCORE: 26
ADLS_ACUITY_SCORE: 26
ADLS_ACUITY_SCORE: 37
ADLS_ACUITY_SCORE: 31
ADLS_ACUITY_SCORE: 26
ADLS_ACUITY_SCORE: 26
ADLS_ACUITY_SCORE: 29
ADLS_ACUITY_SCORE: 30
ADLS_ACUITY_SCORE: 26
ADLS_ACUITY_SCORE: 31
ADLS_ACUITY_SCORE: 26
ADLS_ACUITY_SCORE: 28
ADLS_ACUITY_SCORE: 23
ADLS_ACUITY_SCORE: 26
ADLS_ACUITY_SCORE: 44
ADLS_ACUITY_SCORE: 22
ADLS_ACUITY_SCORE: 39
ADLS_ACUITY_SCORE: 31
ADLS_ACUITY_SCORE: 22
ADLS_ACUITY_SCORE: 26
ADLS_ACUITY_SCORE: 26
ADLS_ACUITY_SCORE: 23
ADLS_ACUITY_SCORE: 23
ADLS_ACUITY_SCORE: 30
ADLS_ACUITY_SCORE: 31
ADLS_ACUITY_SCORE: 39
ADLS_ACUITY_SCORE: 27
ADLS_ACUITY_SCORE: 39
ADLS_ACUITY_SCORE: 26
ADLS_ACUITY_SCORE: 22
ADLS_ACUITY_SCORE: 26
ADLS_ACUITY_SCORE: 22
ADLS_ACUITY_SCORE: 23
TOILETING_ISSUES: NO
ADLS_ACUITY_SCORE: 26
ADLS_ACUITY_SCORE: 26
ADLS_ACUITY_SCORE: 35
ADLS_ACUITY_SCORE: 26
ADLS_ACUITY_SCORE: 24
ADLS_ACUITY_SCORE: 31
ADLS_ACUITY_SCORE: 22
ADLS_ACUITY_SCORE: 22
ADLS_ACUITY_SCORE: 44
ADLS_ACUITY_SCORE: 27
ADLS_ACUITY_SCORE: 23
ADLS_ACUITY_SCORE: 24
ADLS_ACUITY_SCORE: 26
ADLS_ACUITY_SCORE: 23
ADLS_ACUITY_SCORE: 29
ADLS_ACUITY_SCORE: 27
ADLS_ACUITY_SCORE: 26
ADLS_ACUITY_SCORE: 26
WEAR_GLASSES_OR_BLIND: NO
ADLS_ACUITY_SCORE: 31
ADLS_ACUITY_SCORE: 26
ADLS_ACUITY_SCORE: 22
ADLS_ACUITY_SCORE: 39
ADLS_ACUITY_SCORE: 26
ADLS_ACUITY_SCORE: 27
DIFFICULTY_EATING/SWALLOWING: NO
ADLS_ACUITY_SCORE: 26
ADLS_ACUITY_SCORE: 26
CHANGE_IN_FUNCTIONAL_STATUS_SINCE_ONSET_OF_CURRENT_ILLNESS/INJURY: NO
ADLS_ACUITY_SCORE: 26
ADLS_ACUITY_SCORE: 29
ADLS_ACUITY_SCORE: 26
ADLS_ACUITY_SCORE: 30
ADLS_ACUITY_SCORE: 26
ADLS_ACUITY_SCORE: 27
ADLS_ACUITY_SCORE: 26
ADLS_ACUITY_SCORE: 26
ADLS_ACUITY_SCORE: 22
ADLS_ACUITY_SCORE: 23
ADLS_ACUITY_SCORE: 26
ADLS_ACUITY_SCORE: 22
ADLS_ACUITY_SCORE: 24
ADLS_ACUITY_SCORE: 26
ADLS_ACUITY_SCORE: 26
ADLS_ACUITY_SCORE: 22
CHANGE_IN_FUNCTIONAL_STATUS_SINCE_ONSET_OF_CURRENT_ILLNESS/INJURY: NO
ADLS_ACUITY_SCORE: 30
ADLS_ACUITY_SCORE: 26
WALKING_OR_CLIMBING_STAIRS_DIFFICULTY: NO
ADLS_ACUITY_SCORE: 22
ADLS_ACUITY_SCORE: 31
ADLS_ACUITY_SCORE: 30
ADLS_ACUITY_SCORE: 31
ADLS_ACUITY_SCORE: 26
ADLS_ACUITY_SCORE: 35
ADLS_ACUITY_SCORE: 26
ADLS_ACUITY_SCORE: 39
ADLS_ACUITY_SCORE: 26
ADLS_ACUITY_SCORE: 31
ADLS_ACUITY_SCORE: 39
ADLS_ACUITY_SCORE: 39
ADLS_ACUITY_SCORE: 23
ADLS_ACUITY_SCORE: 22
ADLS_ACUITY_SCORE: 31
ADLS_ACUITY_SCORE: 26
ADLS_ACUITY_SCORE: 24
ADLS_ACUITY_SCORE: 39
ADLS_ACUITY_SCORE: 29
ADLS_ACUITY_SCORE: 35
ADLS_ACUITY_SCORE: 26
ADLS_ACUITY_SCORE: 31
ADLS_ACUITY_SCORE: 39
ADLS_ACUITY_SCORE: 26

## 2022-01-01 ASSESSMENT — ENCOUNTER SYMPTOMS
HEMATOLOGIC/LYMPHATIC NEGATIVE: 1
SEIZURES: 1
CONSTITUTIONAL NEGATIVE: 1
FEVER: 0
GASTROINTESTINAL NEGATIVE: 1
RESPIRATORY NEGATIVE: 1
ENDOCRINE NEGATIVE: 1
NECK PAIN: 1
CONSTIPATION: 1
NEUROLOGICAL NEGATIVE: 1
MYALGIAS: 1
CARDIOVASCULAR NEGATIVE: 1
EYES NEGATIVE: 1
SHORTNESS OF BREATH: 1
MUSCULOSKELETAL NEGATIVE: 1
PSYCHIATRIC NEGATIVE: 1
COUGH: 1
ALLERGIC/IMMUNOLOGIC NEGATIVE: 1

## 2022-01-01 ASSESSMENT — COPD QUESTIONNAIRES
COPD: 1

## 2022-01-01 ASSESSMENT — LIFESTYLE VARIABLES
TOBACCO_USE: 1

## 2022-02-02 NOTE — TELEPHONE ENCOUNTER
Scheduled for 2/15/22.       Aimee Panchal    [facility]   [phone]

## 2022-02-02 NOTE — TELEPHONE ENCOUNTER
[facility] nurse called to get patient in for a consult for possible new dialysis creation. Pts insurance is no longer accepted with [facility] where she was going before and needs to switch to another provider.    ? Possible previous fistula that has failed according to care everywhere notes.     Dialysis center will call back in a few days to see if patient has been scheduled. FYI   Detail Level: Detailed

## 2022-02-02 NOTE — TELEPHONE ENCOUNTER
"- pt has 2 charts -    [facility]    Who is the name of the provider?:  New Self Referral       What is the location you see this provider at?: [place]    Can we leave a detailed message on this number? Yes    Reason for call:  New Self Referral for possibe fistuala creation. Currently has fistula in R arm and states that it is \"not working\".     Imaging: [facility], asked pt to have imaging faxed to us.     Call back: [phone]      Aimee Panchal    [facility]  [facility]   [phone]      "

## 2022-02-02 NOTE — TELEPHONE ENCOUNTER
Brief chart review:    10/26/21 - RUE venogram performed for right arm swelling.  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  11/10/21 - ligation of right radiocephalic AVF  11/22/21 - LOV with Vascular surgery at [facility]    Patient will need left upper extremity vein mapping and in clinic consult with Vascular Surgery.  Please schedule at next available.  Order pended.    Appt note:  Ref by Claudia for fistula creation; history of ligation of right radiocephalic AVF on 11/10/21 at [facility]; chronic occlusion of right innominate vein; dialyzing via CVC.    Lindsey Noel, BSN, RN-[facility] [place]

## 2022-03-01 NOTE — PROGRESS NOTES
[facility] INITIAL VASCULAR SURGERY CONSULT    Impression:   1.  End-stage renal disease on dialysis.  She currently dialyzes via a left IJ tunneled catheter.    2.  History of a right Jayson AV fistula.    3.  Right innominate vein thrombosis.    Plan:   I reviewed all the above with Gina and her daughter.  They understand that with the right innominate vein thrombosis we are unable to place other accesses in her right arm given her prior history of significant post procedure edema.  She has inadequate veins of the left forearm on ultrasound.  She has a reasonable left basilic vein which joins her brachial vein at the mid humeral level but will likely work.  I favor creation of a first stage left brachiobasilic AV fistula.  This will be scheduled as an outpatient on a future Friday at [facility].  Assuming adequate basilic vein maturation, she will be scheduled for a transposition at a future date.      HPI:   Gina Simpson is a 56-year-old female who is dialysis dependent secondary to hypertension.  She is status post creation of a right Jayson AV fistula few months ago at [facility].  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  This could not be recanalized.  She was being evaluated for creation of a left upper extremity AV fistula.  For insurance reasons she is now transferring her care to [facility] and she is referred to me today to discuss creation of a new left arm AV access.    She was accompanied today by her daughter.  She has no prior history of left upper extremity AV access.  Her past medical history is otherwise noncontributory.      CURRENT MEDICATIONS  amLODIPine (NORVASC) 10 MG tablet, Take 10 mg by mouth  atorvastatin (LIPITOR) 20 MG tablet, Take 20 mg by mouth  calcium acetate (PHOSLO) 667 MG CAPS capsule, TAKE 3 CAPSULES BY  MOUTH 3 TIMES DAILY WITH MEALS  carvedilol (COREG) 12.5 MG tablet, Take 12.5 mg by mouth  hydrALAZINE (APRESOLINE) 50 MG tablet, Take 50 mg by mouth  isosorbide mononitrate (IMDUR) 30 MG 24 hr tablet, Take 1 tablet by mouth daily  isosorbide mononitrate (IMDUR) 60 MG 24 hr tablet, Take 60 mg by mouth  lisinopril (ZESTRIL) 10 MG tablet, TAKE 1 TABLET BY MOUTH EVERYDAY AT BEDTIME  amLODIPine (NORVASC) 10 MG tablet, TAKE 1 TABLET BY MOUTH EVERY DAY IN THE EVENING  B Complex-C-Folic Acid (DEEPALI CAPS) 1 MG CAPS, TAKE 1 CAPSULE BY MOUTH EVERY DAY  calcium acetate (PHOSLO) 667 MG CAPS capsule,   carvedilol (COREG) 12.5 MG tablet, TAKE 1 TABLET BY MOUTH TWO TIMES A DAY WITH MEALS.  hydrALAZINE (APRESOLINE) 25 MG tablet, TAKE 1.5 TABLETS BY MOUTH TWO TIMES A DAY.  hydrALAZINE (APRESOLINE) 50 MG tablet, TAKE 1 TABLET BY MOUTH 3 TIMES DAILY  isosorbide mononitrate (IMDUR) 30 MG 24 hr tablet, Take 30 mg by mouth daily    No current facility-administered medications on file prior to visit.        PAST MEDICAL HISTORY  History reviewed. No pertinent past medical history.      PAST SURGICAL HISTORY:  History reviewed. No pertinent surgical history.    ALLERGIES   Not on File    FAMILY HISTORY  History reviewed. No pertinent family history.    SOCIAL HISTORY  Social History     Tobacco Use     Smoking status: Current Every Day Smoker     Years: 40.00     Types: Cigarettes     Smokeless tobacco: Never Used     Tobacco comment: 6 cigarettes per day.    Substance Use Topics     Alcohol use: Never     Drug use: None       ROS:   Review of Systems   Constitutional: Negative.   HENT: Negative.    Eyes: Negative.    Cardiovascular: Negative.    Respiratory: Negative.    Endocrine: Negative.    Hematologic/Lymphatic: Negative.    Skin: Negative.    Musculoskeletal: Negative.    Gastrointestinal: Negative.    Genitourinary:        Dialyzes on Tuesdays, Thursdays, and Saturdays at [facility] in [place].  She utilizes a left IJ tunneled catheter.  "  Neurological: Negative.    Psychiatric/Behavioral: Negative.    Allergic/Immunologic: Negative.          EXAM:  BP (!) 152/83 (BP Location: Right arm, Patient Position: Sitting, Cuff Size: Adult Regular)   Pulse 85   Ht 5' 5.5\" (1.664 m)   Wt 135 lb (61.2 kg)   SpO2 98%   BMI 22.12 kg/m    Physical Exam  Constitutional:       Appearance: Normal appearance.   HENT:      Head: Normocephalic and atraumatic.   Eyes:      General: No scleral icterus.     Extraocular Movements: Extraocular movements intact.      Pupils: Pupils are equal, round, and reactive to light.   Neck:      Comments: Left IJ tunneled dialysis catheter  Cardiovascular:      Pulses:           Radial pulses are 0 on the right side and 2+ on the left side.      Comments: No prominent superficial veins in the left forearm.  Musculoskeletal:         General: Normal range of motion.      Cervical back: Normal range of motion.   Skin:     General: Skin is warm and dry.      Findings: No rash.   Neurological:      General: No focal deficit present.      Mental Status: She is alert and oriented to person, place, and time. Mental status is at baseline.   Psychiatric:         Mood and Affect: Mood normal.         Behavior: Behavior normal.         Thought Content: Thought content normal.         Judgment: Judgment normal.       Labs:  LIPID RESULTS:  No results found for: CHOL, HDL, LDL, TRIG, CHOLHDLRATIO    CBC RESULTS:  No results found for: WBC, RBC, HGB, HCT, MCV, MCH, MCHC, RDW, PLT    BMP RESULTS:  No results found for: NA, POTASSIUM, CHLORIDE, CO2, ANIONGAP, GLC, BUN, CR, GFRESTIMATED, GFRESTBLACK, JEWELL     A1C RESULTS:  No results found for: A1C      Imaging:    EXAM: LEFT UPPER EXTREMITY VENOUS ULTRASOUND AND VEIN MAPPING     INDICATION: Chronic renal failure, preoperative vein mapping for  possible dialysis AV fistula      TECHNIQUE: Duplex imaging was performed utilizing gray-scale,  compression, augmentation as appropriate, color-flow, " Doppler waveform  analysis, and spectral Doppler imaging. The cephalic and basilic veins  were measured bilaterally.     COMPARISON: None.     FINDINGS:  LEFT ARM: The visualized innominate and subclavian veins are patent  with normal phasic waveforms. The left basilic, and brachial veins are  patent and compressible. The left cephalic vein is not visualized at  the distal humerus suggestive of chronic occlusion. Where visualized  in the forearm, it measures 1.7 to 3.3 mm in diameter. Where  visualized in the upper arm, it measures 1.1 to 3.0 mm in diameter.  The left basilic vein appears patent measuring 2.3 to 4.7 mm in  diameter and courses into the brachial vein at the level of the  mid/upper left arm.                                                                      IMPRESSION:   1.  The cephalic vein is nonvisualized level of the distal humerus  suggestive of chronic occlusion. Basilic vein appears patent.  Measurement of the superficial veins as above.      ORTIZ WILKINSON MD      Total length of this encounter was 30 minutes.        Akash Miller MD

## 2022-03-01 NOTE — PROGRESS NOTES
"[facility]    [facility] T-TH-Sat via CVC.     Patient is here for a consult to discuss Potential AVF Creation.    Pt is currently taking no meds that would impact our treatment plan.    BP (!) 152/83 (BP Location: Right arm, Patient Position: Sitting, Cuff Size: Adult Regular)   Pulse 85   Ht 5' 5.5\" (1.664 m)   Wt 135 lb (61.2 kg)   SpO2 98%   BMI 22.12 kg/m      The provider has been notified that the patient has no concerns.     Questions patient would like addressed today are: N/A.    Refills are needed: No    Has homecare services and agency name:  Janeen Toth RN      "

## 2022-03-01 NOTE — NURSING NOTE
Patient Education    Procedure: CREATION OF FIRST STAGE LEFT BRACHIOBASILIC AV-FISTULA  Diagnosis: ESRD ON DIALYSIS  Anticoagulation Instruction: N/A  Pre-Operative Physical Exam: You need to have a pre-op physical exam within 30 days of your procedure. Your procedure may be cancelled if you do not have a current History and Physical. Call your PCP's office to schedule.  Allergies:  Updated in Epic  Bowel Prep: NPO per protocol.   Post Procedure Education: [facility] patient post-procedure fact sheet reviewed with patient.    COVID-19 test to be done within 2-4 days prior to procedure. Our surgery scheduler or staff at the hospital will call you to coordinate the COVID test date/time. Once COVID test is obtained, pt to isolate to reduce risk of exposure up to date of surgery.    Learner(s):patient and family  Method: Listening, Reading  Barriers to Learning:No Barrier  Outcome: Patient did verbalize understanding of above education.    Lindsey Noel, BSN, RN-[facility]

## 2022-03-07 NOTE — TELEPHONE ENCOUNTER
[facility]    Who is the name of the provider?:  Paul      What is the location you see this provider at?: [place]    Reason for call:  Requesting report on plan for access.     Can we leave a detailed message on this number?  YES - can speak with any nurse

## 2022-03-07 NOTE — TELEPHONE ENCOUNTER
Returned call to [facility] [phone] .  I explained Gina is being scheduled for CREATION OF FIRST STAGE LEFT BRACHIOBASILIC AV-FISTULA but the date of surgery is not known yet. She verbalized understanding.    Corrie KEMPN, RN    [facility]  Vascular [facility]  Office: [phone]  Fax: [phone]

## 2022-03-15 NOTE — TELEPHONE ENCOUNTER
Jaimie followed up wanting a status update.    Brian Mckeon I   [facility]  Vascular [facility]   [address] [place] [address] [address] [address] [phone]

## 2022-03-15 NOTE — TELEPHONE ENCOUNTER
Spoke with Gina this morning regarding possible dates/times.  Called her back this afternoon to provide date/time of surgery and post op.  I told her I will have central scheduling call to schedule her COVID testing.  Also spoke with nurse at [facility] as patient does not have PCP.  Asked [facility] to talk with patient at next dialysis run and see if they can help navigate her to a PCP.

## 2022-03-25 NOTE — BRIEF OP NOTE
[facility]    Brief Operative Note    Pre-operative diagnosis: ESRD (end stage renal disease) on dialysis (H) [N18.6, Z99.2]  Post-operative diagnosis Same as pre-operative diagnosis    Procedure: Procedure(s):  CREATION OF FIRST STAGE LEFT BRACHIOBASILIC ARTERIOVENOUS FISTULA  Surgeon: Surgeon(s) and Role:     * Akash Miller MD - Primary  Anesthesia: General   Estimated Blood Loss: 10 mL from 3/25/2022 12:30 PM to 3/25/2022  2:24 PM      Drains: None  Specimens: * No specimens in log *  Findings:   see op note for details.  Complications: None.  Implants: * No implants in log *      Megan Alford MD  03/25/22  2:25 PM

## 2022-03-25 NOTE — ANESTHESIA PREPROCEDURE EVALUATION
Anesthesia Pre-Procedure Evaluation    Patient: Gina Simpson   MRN: [id number] : 1965        Procedure : Procedure(s):  CREATION OF FIRST STAGE LEFT BRACHIOBASILIC ARTERIOVENOUS FISTULA          Past Medical History:   Diagnosis Date     Anemia      Coronary artery disease      Dialysis complication      Dialysis patient (H)      Embolism (H)      ESRD (end stage renal disease) (H)      Hypertension      Ischemic cardiomyopathy      Renal failure       Past Surgical History:   Procedure Laterality Date     ANESTH,UPPER ARM AV FISTULA REPAIR       WISDOM TEETH        No Known Allergies   Social History     Tobacco Use     Smoking status: Current Every Day Smoker     Years: 40.00     Types: Cigarettes     Smokeless tobacco: Never Used     Tobacco comment: 6 cigarettes per day.    Substance Use Topics     Alcohol use: Never      Wt Readings from Last 1 Encounters:   22 62.6 kg (138 lb)        Anesthesia Evaluation   Pt has had prior anesthetic. Type: General.    No history of anesthetic complications       ROS/MED HX  ENT/Pulmonary:     (+) tobacco use, COPD,     Neurologic:       Cardiovascular:     (+) Dyslipidemia hypertension--CAD -past MI --CHF     METS/Exercise Tolerance:     Hematologic:     (+) History of blood clots (Hx of jugular thrombosis, provoked, completed course of Eliquis), anemia,     Musculoskeletal:       GI/Hepatic:       Renal/Genitourinary:     (+) renal disease, type: ESRD, Pt requires dialysis, type: Hemodialysis,     Endo:       Psychiatric/Substance Use:       Infectious Disease:       Malignancy:       Other:            Physical Exam    Airway        Mallampati: II   TM distance: > 3 FB   Neck ROM: full   Mouth opening: > 3 cm    Respiratory Devices and Support         Dental       (+) caps      Cardiovascular          Rhythm and rate: regular and normal     Pulmonary           breath sounds clear to auscultation           OUTSIDE LABS:  CBC: No results found for: WBC,  HGB, HCT, PLT  BMP: No results found for: NA, POTASSIUM, CHLORIDE, CO2, BUN, CR, GLC  COAGS: No results found for: PTT, INR, FIBR  POC: No results found for: BGM, HCG, HCGS  HEPATIC: No results found for: ALBUMIN, PROTTOTAL, ALT, AST, GGT, ALKPHOS, BILITOTAL, BILIDIRECT, CALDERON  OTHER: No results found for: PH, LACT, A1C, JEWELL, PHOS, MAG, LIPASE, AMYLASE, TSH, T4, T3, CRP, SED    Anesthesia Plan    ASA Status:  4   NPO Status:  NPO Appropriate    Anesthesia Type: General.     - Airway: LMA   Induction: Intravenous.   Maintenance: Balanced.        Consents    Anesthesia Plan(s) and associated risks, benefits, and realistic alternatives discussed. Questions answered and patient/representative(s) expressed understanding.    - Discussed:     - Discussed with:  Patient         Postoperative Care    Pain management: IV analgesics, Multi-modal analgesia.   PONV prophylaxis: Ondansetron (or other 5HT-3)     Comments:                Akash Reid MD

## 2022-03-25 NOTE — ANESTHESIA POSTPROCEDURE EVALUATION
Patient: Gina Simpson    Procedure: Procedure(s):  CREATION OF FIRST STAGE LEFT BRACHIOBASILIC ARTERIOVENOUS FISTULA       Anesthesia Type:  General    Note:     Postop Pain Control: Uneventful            Sign Out: Well controlled pain   PONV: No   Neuro/Psych: Uneventful            Sign Out: Acceptable/Baseline neuro status   Airway/Respiratory: Uneventful            Sign Out: Acceptable/Baseline resp. status   CV/Hemodynamics: Uneventful            Sign Out: Acceptable CV status   Other NRE: NONE   DID A NON-ROUTINE EVENT OCCUR? No           Last vitals:  Vitals Value Taken Time   /99 03/25/22 1530   Temp 36.8  C (98.2  F) 03/25/22 1515   Pulse 75 03/25/22 1536   Resp 0 03/25/22 1536   SpO2 96 % 03/25/22 1536   Vitals shown include unvalidated device data.    Electronically Signed By: Akash Reid MD  March 25, 2022  3:36 PM

## 2022-03-25 NOTE — DISCHARGE INSTRUCTIONS
Same Day Surgery Discharge Instructions for  Sedation and General Anesthesia       It's not unusual to feel dizzy, light-headed or faint for up to 24 hours after surgery or while taking pain medication.  If you have these symptoms: sit for a few minutes before standing and have someone assist you when you get up to walk or use the bathroom.      You should rest and relax for the next 24 hours. We recommend you make arrangements to have an adult stay with you for at least 24 hours after your discharge.  Avoid hazardous and strenuous activity.      DO NOT DRIVE any vehicle or operate mechanical equipment for 24 hours following the end of your surgery.  Even though you may feel normal, your reactions may be affected by the medication you have received.      Do not drink alcoholic beverages for 24 hours following surgery.       Slowly progress to your regular diet as you feel able. It's not unusual to feel nauseated and/or vomit after receiving anesthesia.  If you develop these symptoms, drink clear liquids (apple juice, ginger ale, broth, 7-up, etc. ) until you feel better.  If your nausea and vomiting persists for 24 hours, please notify your surgeon.        All narcotic pain medications, along with inactivity and anesthesia, can cause constipation. Drinking plenty of liquids and increasing fiber intake will help.      For any questions of a medical nature, call your surgeon.      Do not make important decisions for 24 hours.      If you had general anesthesia, you may have a sore throat for a couple of days related to the breathing tube used during surgery.  You may use Cepacol lozenges to help with this discomfort.  If it worsens or if you develop a fever, contact your surgeon.       If you feel your pain is not well managed with the pain medications prescribed by your surgeon, please contact your surgeon's office to let them know so they can address your concerns.       CoVid 19 Information    We want to give you  information regarding Covid. Please consult your primary care provider with any questions you might have.     Patient who have symptoms (cough, fever, or shortness of breath), need to isolate for 7 days from when symptoms started OR 72 hours after fever resolves (without fever reducing medications) AND improvement of respiratory symptoms (whichever is longer).      Isolate yourself at home (in own room/own bathroom if possible)    Do Not allow any visitors    Do Not go to work or school    Do Not go to Jewish,  centers, shopping, or other public places.    Do Not shake hands.    Avoid close and intimate contact with others (hugging, kissing).    Follow CDC recommendations for household cleaning of frequently touched services.     After the initial 7 days, continue to isolate yourself from household members as much as possible. To continue decrease the risk of community spread and exposure, you and any members of your household should limit activities in public for 14 days after starting home isolation.     You can reference the following CDC link for helpful home isolation/care tips:  https://www.cdc.gov/coronavirus/2019-ncov/downloads/10Things.pdf    Protect Others:    Cover Your Mouth and Nose with a mask, disposable tissue or wash cloth to avoid spreading germs to others.    Wash your hands and face frequently with soap and water    Call Your Primary Doctor If: Breathing difficulty develops or you become worse.    For more information about COVID19 and options for caring for yourself at home, please visit the CDC website at https://www.cdc.gov/coronavirus/2019-ncov/about/steps-when-sick.html  For more options for care at [facility], please visit our website at [email or web address]        ARTERIAL VENOUS FISTULA  Discharge Instructions       You may be able to feel the blood flowing through your fistula, it feels similar to a purring cat. This is called a  "\"thrill\"  Remove outer dressing in 48 hours, leave steri strips (small white pieces of tape) on.  Let them fall off on their own or gently remove them in 7 days.  Okay to shower once outer dressing is removed.  No soaking for 4 weeks.    Call your Surgeon If You Have Any of the Following:  Fever of 100.4 F or higher  Signs of infection at the incision site, such as increased redness or swelling, warmth, worsening pain, bleeding, or foul-smelling drainage  Lack of a \"thrill\" (you can t feel it)  Pain or numbness in your fingers, hand, or arm  Bleeding, redness, or warmth around your fistula  Sudden bulging of the fistula (more than usual; a slight bulge is normal)   Follow-Up  The doctor will check your fistula within 1 to 2 weeks after the procedure.     It will likely take about 6 to 8 weeks for the fistula to enlarge enough to start dialysis. After that, make sure the fistula is checked each time you have dialysis.       Today you were given 975 mg of Tylenol at 3:30pm. The recommended daily maximum dose is 4000 mg.       **If you have questions or concerns about your procedure,   call Dr. Miller at [phone]**  "

## 2022-03-25 NOTE — OP NOTE
Procedure Date: 03/25/2022    PREOPERATIVE DIAGNOSIS:  End-stage renal disease.    POSTOPERATIVE DIAGNOSIS:  End-stage renal disease.    PROCEDURE PERFORMED:  Creation of first stage left brachiobasilic AV fistula.    SURGEON:  Chaz Miller MD    :  Megan Alford MD    ANESTHESIA:  LMA general anesthesia.    ESTIMATED BLOOD LOSS:  10 mL    INDICATIONS FOR PROCEDURE:  This patient is a 56-year-old female who is dialysis dependent.  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  That procedure was performed at an outside institution.  She presents at this time for creation of a new fistula.  She has inadequate veins of the forearm.  Preoperative vein mapping suggests a moderate-sized basilic vein.  Our plan for today will be construction of a first stage left brachiobasilic AV fistula.    OPERATIVE FINDINGS:  The basilic vein merges with the brachial vein at the mid humeral level.  Below that point, it is at least a 5 mm structure and gives off a good 3 mm median cubital vein.  We used the median cubital vein and anastomosed it to a 4 mm brachial artery just below the antecubital crease.  At the completion of this procedure, there was a thrill palpated in the median cubital vein and a 2+ palpable left radial artery pulse at the wrist.    DESCRIPTION OF PROCEDURE:  After informed consent was obtained, the patient was brought to the operating room and placed on the table in a supine position.  LMA general anesthesia was achieved without incident.  I utilized a portable ultrasound to map and melissa the course of the basilic vein with findings noted as above.  Her left upper extremity was prepped and draped in the usual sterile fashion.  A transverse skin incision was made 1 fingerbreadth below the antecubital crease.  Dissection proceeded sharply downward to isolate the median cubital vein, which was mobilized throughout the length of our  incision.  Dissection continued more posteriorly to expose the brachial artery at this level.  The brachial artery was dissected free for about 2 cm, and proximal and distal control was achieved with vessel loops.  The patient was systemically heparinized with 5000 units of intravenous heparin.  After a 5-minute circulation time, proximal and distal control was achieved on the brachial artery with vessel loops.  A 6 mm brachial arteriotomy was made, and I incised a thin rim of the brachial artery wall.  Heparinized saline was instilled up and down the brachial artery.  Our median cubital vein was cut to an appropriate length in a spatulated manner.  We proceeded with an end-to-side median cubital vein to distal brachial artery anastomosis using running 7-0 Prolene suture.  Prior to securing the suture line, all vessel loops were briefly released to flush any debris out of the arterial lumen.  The anastomosis was subsequently secured and observed to be hemostatic as flow was restored first up the vein.  Ultimately, we released control of the distal brachial artery.  Surgicel gauze and gentle compression was held over the anastomosis for a few minutes.  We utilized the pinpoint electrocautery.  We had satisfactory hemostasis.  The wound was infiltrated with 1% lidocaine and 0.25% Marcaine.      The wound was then closed in layers with interrupted subdermal sutures, and skin was closed with 4-0 Monocryl running subcuticular stitch.  Biologic glue was placed over the incision.  Final sponge and needle count were reported as correct.  She tolerated the procedure without incident.  She was returned awake and hemodynamically stable to the recovery room.  Again, there was a 2+ palpable pulse in the left radial artery at the wrist and a palpable thrill in the median cubital vein at case completion.    Akash Miller MD        D: 03/25/2022   T: 03/25/2022   MT: [facility]    Name:     GIO HAMMOND  MRN:       [id number]        Account:        [id number]   :      1965           Procedure Date: 2022     Document: [id number]

## 2022-03-25 NOTE — ANESTHESIA CARE TRANSFER NOTE
Patient: Gina Simpson    Procedure: Procedure(s):  CREATION OF FIRST STAGE LEFT BRACHIOBASILIC ARTERIOVENOUS FISTULA       Diagnosis: ESRD (end stage renal disease) on dialysis (H) [N18.6, Z99.2]  Diagnosis Additional Information: No value filed.    Anesthesia Type:   General     Note:    Oropharynx: oropharynx clear of all foreign objects and spontaneously breathing  Level of Consciousness: drowsy  Oxygen Supplementation: face mask  Level of Supplemental Oxygen (L/min / FiO2): 8  Independent Airway: airway patency satisfactory and stable  Dentition: dentition unchanged  Vital Signs Stable: post-procedure vital signs reviewed and stable  Report to RN Given: handoff report given  Patient transferred to: PACU    Handoff Report: Identifed the Patient, Identified the Reponsible Provider, Reviewed the pertinent medical history, Discussed the surgical course, Reviewed Intra-OP anesthesia mangement and issues during anesthesia, Set expectations for post-procedure period and Allowed opportunity for questions and acknowledgement of understanding      Vitals:  Vitals Value Taken Time   /102 03/25/22 1426   Temp     Pulse 64 03/25/22 1427   Resp 15 03/25/22 1427   SpO2 100 % 03/25/22 1427   Vitals shown include unvalidated device data.    Electronically Signed By: ERIC Perkins CRNA  March 25, 2022  2:28 PM

## 2022-04-06 NOTE — TELEPHONE ENCOUNTER
Per Dr. Miller's visit on 4/6/22, pt to follow up in approximately 6 weeks with the following:    AVF US     In clinic visit to discuss results    Appt note: 6 week follow up to 4/6/22 visit; history of first stage left brachiobasilic AVF    Routing to scheduling to contact patient to coordinate above.    Lindsey Noel, VIRIDIANAN, RN-[facility] [place]

## 2022-04-06 NOTE — PROGRESS NOTES
Gina Simpson returns POD #12 status post creation of a first stage left brachiobasilic AV fistula.  Apart from some mild left arm supriya- incisional swelling she otherwise has no complaints.    Exam:  Well-developed female alert and oriented x3 no acute distress.  Blood pressure 150/88 with a pulse of 88.  Nicely healing left antecubital incision.  Palpable thrill in the left median cubital vein.  2+ palpable left radial pulse at the wrist.    ASSESSMENT:  POD #12 status post for stage left brachiobasilic AV fistula clinically doing well.    RECOMMENDATION:  I reviewed the above with Gina and her daughter.  She will resume her vein building exercises.  She will continue to avoid left arm venipunctures.  Follow-up will be with me in roughly 6 weeks for a repeat left AV fistula ultrasound.  Assuming adequate vein maturation, she will then be considered for a second stage transposition.    All of their questions were answered and they verbalized full understanding to the above and agreement with this management plan.    Chaz Miller MD

## 2022-04-06 NOTE — PROGRESS NOTES
[facility]        Patient is here for a  follow up.      Pt is currently taking Statin.    BP (!) 150/88 (BP Location: Right arm, Patient Position: Chair, Cuff Size: Adult Regular)   Pulse 98   Breastfeeding No     The provider has been notified that the patient has no concerns.     Questions patient would like addressed today are: N/A.    Refills are needed: N/A    Has homecare services and agency name:  Janeen Wu MA

## 2022-04-07 NOTE — TELEPHONE ENCOUNTER
Jaimie ZAAYS RN at [facility] [phone] requested the following for patient:      Future Appointments   Date Time Provider Department Center   5/18/2022  1:00 PM [id number]   5/18/2022  2:00 PM Akash Miller MD [facility]     Brian Mckeon I   [address] [id number] [phone]

## 2022-04-07 NOTE — TELEPHONE ENCOUNTER
[facility]    Who is the name of the provider?:  Paul      What is the location you see this provider at?: [place]    Reason for call:  Jaimie hameed [facility] is requesting a summary from yesterdays office visit with Dr. Miller.     Can we leave a detailed message on this number?  YES     Brian Mckeon I   [facility]  Vascular [facility]   [address] [place] [id number] [phone]

## 2022-04-07 NOTE — TELEPHONE ENCOUNTER
Notes faxed via [employer] to [phone].  VIRIDIANA SánchezN, RN-[facility] [place]

## 2022-05-18 NOTE — PROGRESS NOTES
Gina Simpson returns roughly 7 weeks status post creation of a first stage left brachiobasilic AV fistula.  She now complains of increased swelling of her left arm.  She does have a left IJ tunneled catheter.  She also complains of a fullness in her neck with occasional mild irritation or difficulty in swallowing.  She is status post creation of a right Jayson AV fistula last year at [facility].  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    Gina presents today for routine postoperative right AV fistula ultrasound.    Exam:  Well-developed female alert and oriented x3.  Blood pressure 161/86 with a pulse of 84.  Prominent veins on the right aspect of her neck.  Her left upper extremity is obviously larger than the right.  Circumference at the mid humeral level on the right is 29 cm.  Circumference at the mid humeral level on the left is 32 cm.  Palpable thrill in her fistula.  2+ palpable left radial pulse at the wrist.    Imaging:  Left AV fistula ultrasound today shows nice maturation of her basilic vein.  There is a stenosis near the AV anastomosis that involves the median cubital vein.  The remainder of her basilic vein has nicely matured.    ASSESSMENT  1.  7 weeks status post first stage left brachiobasilic AV fistula now with worsening left upper arm swelling.  She has a left IJ tunneled dialysis catheter in place.  She has history of right innominate vein thrombosis.  Her left basilic vein has adequately matured to consider her for a second stage transposition.    RECOMMENDATION:  I reviewed all the above with Gina.  I am concerned about a developing left-sided central venous stenosis potentially secondary to the existing left IJ tunneled dialysis catheter.  She has a history of a right innominate vein thrombosis.  We will consult our interventional radiologic colleagues to determine the  best possible imaging to evaluate her central veins.  We will contact her with the results of the study.  If she in fact has a developing stenosis of the left innominate vein we will need to reconsider treatment options.  For now I plan to hold off on the second stage left arm transposition.  She may eventually require vascular medicine consultation if the left innominate vein is thrombosed.  She would need work-up to rule out a hypercoagulable syndrome.  Ultimate treatment recommendations will be pending the outcome of the as yet undetermined study to evaluate her central venous anatomy.    Total length of this encounter was 20 minutes.    Chaz Miller MD

## 2022-05-18 NOTE — PROGRESS NOTES
[facility]        Patient is here for a  follow up.     Pt is currently taking Statin.    BP (!) 161/86 (BP Location: Right arm, Patient Position: Chair, Cuff Size: Adult Regular)   Pulse 84   Breastfeeding No     The provider has been notified that the patient has no concerns.     Questions patient would like addressed today are: N/A.    Refills are needed: N/A    Has homecare services and agency name:  Janeen Wu MA

## 2022-05-20 NOTE — TELEPHONE ENCOUNTER
Dr. Miller and Dr. Magana spoke regarding this patient and recent c/o of neck fullness/swelling, visible neck veins.  Recommendation for patient to have a diagnostic only left upper extremity fistulogram to r/o central venous stenosis.    Spoke with patient and discuss recommendations.  She verbalized understanding and would like to proceed with scheduling.    Patient Education    Procedure: left upper extremity fistulogram, r/o central venous stenosis; dx only, no fistula intervention  Diagnosis: s/p first stage brachiobasilic AVF with known right innominate vein stenosis; neck swelling/fullness, prominent neck veins.  Anticoagulation Instruction: n/a  Pre-Operative Physical Exam: You need to have a pre-op physical exam within 30 days of your procedure. Your procedure may be cancelled if you do not have a current History and Physical. Call your PCP's office to schedule.  Allergies:  Updated in Epic  Bowel Prep: n/a  NPO per protocol.   Post Procedure Education: [facility] patient post-procedure fact sheet reviewed with patient.    COVID-19 test to be done within 2-4 days prior to procedure. Our surgery scheduler or staff at the hospital will call you to coordinate the COVID test date/time. Once COVID test is obtained, pt to isolate to reduce risk of exposure up to date of surgery.    Showering instructions reviewed: N/A    Learner(s):patient  Method: Listening  Barriers to Learning:No Barrier  Outcome: Patient did verbalize understanding of above education.    Lindsey Noel, BSN, RN-[facility]

## 2022-05-23 NOTE — TELEPHONE ENCOUNTER
Spoke with Gina about scheduling procedure.  She needs a Tuesday or Thursday.  Called  Gina back and she is scheduled for her fistulogram on 05/31/22.  She will have her pre-op at the [facility] in [place], [place] along with her COVID test.  Patient is aware it must be Nasal PCR and no earlier than 05/27/22.

## 2022-05-27 NOTE — TELEPHONE ENCOUNTER
Spoke with Gina on 05/23/22 about possible dates and times.  Spoke with patient multiple times over the last few days to figure out pre-op, COVID testing, etc.  Confirmed all details with patient yesterday.  ERI Mosquera will do the pre-op the morning of procedure since patient can't get into PCP until 06/08/22 for a pre-op and procedure needs to be done sooner.

## 2022-05-31 NOTE — PRE-PROCEDURE
GENERAL PRE-PROCEDURE:   Procedure:  Left upper arm fistula fistulagram with possible angioplasty and/or stent placement with IV moderate sedation   Date/Time:  5/31/2022 9:55 AM    Written consent obtained?: Yes    Risks and benefits: Risks, benefits and alternatives were discussed    Consent given by:  Patient  Patient states understanding of procedure being performed: Yes    Patient's understanding of procedure matches consent: Yes    Procedure consent matches procedure scheduled: Yes    Expected level of sedation:  Moderate  Appropriately NPO:  Yes  ASA Class:  3  Mallampati  :  Grade 1- soft palate, uvula, tonsillar pillars, and posterior pharyngeal wall visible  Lungs:  Lungs clear with good breath sounds bilaterally and other (comment)  Lung exam comment:  Decreased in bases   Heart:  Normal heart sounds and rate  History & Physical reviewed:  History and physical reviewed and no updates needed  Statement of review:  I have reviewed the lab findings, diagnostic data, medications, and the plan for sedation    Abbreviated H and P for ZEFERINO fistula fistulagram with Sedation    Reason for Procedure: Left arm, chest and facial swelling     HPI: Recent history of left facial, neck, chest and arm swelling which is very uncomfortable and causes a choking sensation. Recent history of first stage left upper arm fistula creation per Dr Miller 7 weeks ago. Patient also has a left internal jugular tunneled dialysis catheter which could possibly be causing venous occlusion. Per Dr Torres the consent includes possible intervention, though he is aware that Dr Miller wants diagnostic only, and this is if he sees something he could intervene on and discusses with Dr Miller first.     PMH:  Past Medical History:   Diagnosis Date     Anemia      Coronary artery disease      Dialysis complication      Dialysis patient (H)      Embolism (H)      ESRD (end stage renal disease) (H)      Hypertension      Ischemic cardiomyopathy       Renal failure       ROS: 10 point ROS neg other than the symptoms noted above in the HPI.    History of sleep apnea? No  History of problems with sedation? None  History of blood thinners? None   NPO? Yes  Current smoker?   Negative for recent fever, cough, chest or sinus congestion, SOB, weight loss, chest pain, diarrhea or constipation.     Focused Physical exam pertinent to procedure:          (Details of heart, lung and mallampati assessment in pre procedure assessment)  General/neuro: Alert, oriented X 3, pleasant, cooperative, woman in NAD  Recent vital signs Temp:  [98.4  F (36.9  C)] 98.4  F (36.9  C)  Pulse:  [75] 75  Resp:  [16] 16  BP: (141)/(87) 141/87  SpO2:  [96 %] 96 %    Other:     A/P:Reviewed history, medications, allergies, clinical information and pre procedure assessment with MD.  Patient consent obtained by myself and she is approved for moderate sedation for above procedure and will proceed when called for.      Thanks [facility] Interventional Radiology CNP ([phone]) (phone [phone])

## 2022-05-31 NOTE — PROGRESS NOTES
Care Suites Post Procedure Note    Patient Information  Name: Gina Simpson  Age: 56 year old    Post Procedure  Time patient returned to Care Suites: 1130  Concerns/abnormal assessment: no  If abnormal assessment, provider notified: N/A  Plan/Other: Recover in care suites and discharge later this afternoon    Dee Dee Kilgore RN

## 2022-05-31 NOTE — IR NOTE
Interventional Radiology Intra-procedural Nursing Note    Patient Name: Gina Simpson  Medical Record Number: [id number]  Today's Date: May 31, 2022    Procedure: Left upper extremity dialysis fistulogram with possible intervention and iv moderate sedation  Start time: 1023  End time: 1114  Report provided to: ESPERANZA RN  Patient depart time and location: 1130 to CS21    Note: Patient entered Interventional Radiology Suite number 1 via cart. Patient awake, alert and orientated. Assisted onto procedural table in supine position. Prepped and draped.  Dr. Multani in room. Time out and procedure started. Vital signs stable. Telemetry reading SR.    Procedure well tolerated by patient without complications. Procedure end with debrief by Dr. Multani.  Manual pressure applied until hemostasis achieved. Quick clot and tegaderm dressing applied to Left upper extremity interventional procedure access site.    Administered medication totals:  Lidocaine 1% 2 mL Intradermal  Versed 1 mg IVP  Fentanyl 50 mcg IVP    Last dose of sedation administered at 1050.

## 2022-05-31 NOTE — DISCHARGE SUMMARY
Care Suites Discharge Nursing Note    Patient Information  Name: Gina Simpson  Age: 56 year old    Discharge Education:  Discharge instructions reviewed: Yes  Additional education/resources provided: no  Patient/patient representative verbalizes understanding: Yes  Patient discharging on new medications: No  Medication education completed: Yes    Discharge Plans:   Discharge location: home  Discharge ride contacted: Yes  Approximate discharge time: 1430    Discharge Criteria:  Discharge criteria met and vital signs stable: Yes    Patient Belongs:  Patient belongings returned to patient: Yes    Tristin Cornelius RN

## 2022-05-31 NOTE — DISCHARGE INSTRUCTIONS
Fistulagram Discharge Instructions     After you go home:    You may resume your normal diet  Have an adult stay with you for 6 hours if you received sedation       For 24 hours - due to the sedation you received:  Relax and take it easy  Do NOT make any important or legal decisions  Do NOT drive or operate machines at home or at work  Do NOT drink alcohol    Care of Puncture Site:    For the first 48 hrs, check your puncture site every couple hours while you are awake   You may remove/change the bandage tomorrow  You may shower tomorrow  No tub baths, whirlpools or swimming until your puncture site has fully healed  If puncture site starts to bleed - apply light pressure to site for 5 minutes or until bleeding stops     Activity     You should try to elevate your arm for the remainder of today to prevent increased swelling  You may go back to your normal activity in 24 hours   Wait 48 hours before lifting, straining, exercise or other strenuous activity    Medicines:    You may resume all your medications  For minor pain, you may take Acetaminophen (Tylenol) or Ibuprofen (Advil)                 Call the provider who ordered this procedure if:    Increased pain or a large or growing hard lump around the site  Blood or fluid is draining from the site  The site is red, swollen, hot or tender  Chills or a fever greater than 101 F (38 C)  Pain that is getting worse  Any questions or concerns      Call  911 or go to the Emergency Room if:  Severe pain or trouble breathing  Bleeding that you cannot control      If you have questions call:          [facility] @ [phone]        The provider who performed your procedure was ______Dr Multani___________.

## 2022-05-31 NOTE — PROGRESS NOTES
Care Suites Admission Nursing Note    Patient Information  Name: Gina Simpson  Age: 56 year old  Reason for admission: fistulagram  Care Suites arrival time: 0900    Visitor Information  Name: Monica - Dtr - [phone]  Informed of visitor restrictions: Yes  1 visitor allowed per patient   Visitor must screen negative for COVID symptoms   Visitor must wear a mask  Waiting rooms closed to visitors    Patient Admission/Assessment   Pre-procedure assessment complete: Yes  If abnormal assessment/labs, provider notified: N/A  NPO: Yes  Medications held per instructions/orders: N/A  Consent: declined  If applicable, pregnancy test status: declined  Patient oriented to room: Yes  Education/questions answered: Yes  Plan/other: Review labs, obtain consent and proceed with scheduled treatment or intervention      Discharge Planning  Discharge name/phone number: Monica - Dtr - [phone]  Overnight post sedation caregiver:Monica White Dtr - [phone]  Discharge location: [place]    Tristin Cornelius RN         PATIENT/VISITOR WELLNESS SCREENING    Step 1 Patient Screening    1. In the last month, have you been in contact with someone who was confirmed or suspected to have Coronavirus/COVID-19? No    2. Do you have the following symptoms?  Fever/Chills? No   Cough? No   Shortness of breath? No   New loss of taste or smell? No  Sore throat? No  Muscle or body aches? No  Headaches? No  Fatigue? No  Vomiting or diarrhea? No

## 2022-06-02 NOTE — TELEPHONE ENCOUNTER
Case received for procedure SECOND STAGE LEFT BRACHIOBASILIC ARTERIAL VENOUS FISTULA.     CaseID: [id number]      Aimee Panchal    [facility]   [phone]

## 2022-06-02 NOTE — TELEPHONE ENCOUNTER
Spoke with pt to go over available dates/times for surgery.     Preferred date: Tuesday, Thursday  Dates to avoid: Has an appt on 6/6 and would like to schedule after that.   Dialysis: RENU Panchal    [facility]   [phone]

## 2022-06-02 NOTE — TELEPHONE ENCOUNTER
Patient Education completed with patient via phone.    Procedure: Second stage left brachio-basilic AVF  Diagnosis: ESRD on dialysis  Anticoagulation Instruction: n/a  Pre-Operative Physical Exam: You need to have a pre-op physical exam within 30 days of your procedure. Your procedure may be cancelled if you do not have a current History and Physical. Call your PCP's office to schedule.  Allergies:  Updated in Epic  Bowel Prep: n/a  NPO per protocol.   Post Procedure Education: [facility] patient post-procedure fact sheet reviewed with patient.    COVID-19 testing for Same Day Surgery procedures:    1 to 2 days before your procedure, take an at-home, rapid antigen test. You can buy these at many pharmacy stores. Or you can order free, at-home tests at [email or web address]/tests. If you can't find an at-home, rapid antigen test, please schedule a PCR test with [facility] by calling [phone]-XZABPEVA, or visiting [email or web address].     Once COVID test is obtained, pt to isolate to reduce risk of exposure up to date of surgery.    Showering instructions reviewed: Yes    Learner(s):patient  Method: Listening  Barriers to Learning:No Barrier  Outcome: Patient did verbalize understanding of above education.    Written surgery order given to surgery scheduler.    VIRIDIANA SánchezN, RN-[facility]

## 2022-06-09 NOTE — TELEPHONE ENCOUNTER
Spoke with pt to go over surgery date/time, pre-op, covid testing, and post-op instructions.       Aimee Panchal    [facility]   [phone]

## 2022-06-10 NOTE — TELEPHONE ENCOUNTER
Type of surgery: SECOND STAGE LEFT BRACHIO-BASILIC ARTERIOVENOUS FISTULA   Location of surgery: [facility]  Date and time of surgery: 6/28/22 at 7:30 am  Surgeon: Dr. Miller  Pre-Op Appt Date: Dr. Santos  Post-Op Appt Date: 7/12/22   Packet sent out: Yes, mailed  Pre-cert/Authorization completed:  Yes  Date: 6/10/22      Aimee Panchal    [facility]   [phone]

## 2022-06-22 NOTE — TELEPHONE ENCOUNTER
Mailed surgery packet was returned.     Spoke to pt to verify address. She states that this is the correct address but did add apartment number.     Address was updated and resent to patient.       Aimee Panchal    [facility]   [phone]

## 2022-06-27 NOTE — ANESTHESIA PREPROCEDURE EVALUATION
Anesthesia Pre-Procedure Evaluation    Patient: Gina Simpson   MRN: [id number] : 1965        Procedure : Procedure(s):  SECOND STAGE LEFT BRACHIO-BASILIC ARTERIOVENOUS FISTULA          Past Medical History:   Diagnosis Date     Anemia      Coronary artery disease      Dialysis complication      Dialysis patient (H)      Embolism (H)      ESRD (end stage renal disease) (H)      Hypertension      Ischemic cardiomyopathy      Renal failure       Past Surgical History:   Procedure Laterality Date     ANESTH,UPPER ARM AV FISTULA REPAIR       CREATE FISTULA ARTERIOVENOUS UPPER EXTREMITY Left 3/25/2022    Procedure: CREATION OF FIRST STAGE LEFT BRACHIOBASILIC ARTERIOVENOUS FISTULA;  Surgeon: Akash Miller MD;  Location: [facility] OR     IR DIALYSIS FISTULOGRAM LEFT  2022     WISDOM TEETH        No Known Allergies   Social History     Tobacco Use     Smoking status: Current Every Day Smoker     Years: 40.00     Types: Cigarettes     Smokeless tobacco: Never Used     Tobacco comment: 6 cigarettes per day.    Substance Use Topics     Alcohol use: Never      Wt Readings from Last 1 Encounters:   22 56.7 kg (125 lb)        Anesthesia Evaluation   Pt has had prior anesthetic. Type: General.    No history of anesthetic complications       ROS/MED HX  ENT/Pulmonary:     (+) tobacco use, Current use, COPD,  (-) sleep apnea   Neurologic:  - neg neurologic ROS     Cardiovascular:     (+) Dyslipidemia hypertension--CAD -past MI -stent-Drug Eluting Stent. CHF Last EF: 40 - 45%     METS/Exercise Tolerance:     Hematologic: Comments: R innonimate vein thrombosis    (+) History of blood clots (Hx of jugular thrombosis, provoked, completed course of Eliquis), pt is not anticoagulated, anemia,     Musculoskeletal:       GI/Hepatic:    (-) GERD   Renal/Genitourinary:     (+) renal disease, type: ESRD, Pt requires dialysis, type: Hemodialysis,     Endo:    (-) Type II DM   Psychiatric/Substance Use:       Infectious  Disease:       Malignancy:       Other:            Physical Exam    Airway        Mallampati: II   TM distance: > 3 FB   Neck ROM: full   Mouth opening: > 3 cm    Respiratory Devices and Support         Dental       (+) missing and caps      Cardiovascular   cardiovascular exam normal          Pulmonary   pulmonary exam normal                OUTSIDE LABS:  CBC: No results found for: WBC, HGB, HCT, PLT  BMP:   Lab Results   Component Value Date     03/25/2022    POTASSIUM 4.6 03/25/2022    CHLORIDE 109 03/25/2022    CO2 23 03/25/2022    BUN 68 (H) 03/25/2022    CR 8.25 (H) 03/25/2022    GLC 90 03/25/2022     COAGS:   Lab Results   Component Value Date    INR 1.03 05/31/2022     POC: No results found for: BGM, HCG, HCGS  HEPATIC: No results found for: ALBUMIN, PROTTOTAL, ALT, AST, GGT, ALKPHOS, BILITOTAL, BILIDIRECT, CALDERON  OTHER:   Lab Results   Component Value Date    A1C 5.1 03/25/2022    JEWELL 9.2 03/25/2022       Anesthesia Plan    ASA Status:  3   NPO Status:  NPO Appropriate    Anesthesia Type: General.     - Airway: LMA   Induction: Intravenous, Propofol.   Maintenance: Balanced.        Consents    Anesthesia Plan(s) and associated risks, benefits, and realistic alternatives discussed. Questions answered and patient/representative(s) expressed understanding.    - Discussed:     - Discussed with:  Patient         Postoperative Care    Pain management: IV analgesics.   PONV prophylaxis: Ondansetron (or other 5HT-3), Dexamethasone or Solumedrol, Background Propofol Infusion     Comments:                Dee Ceja MD

## 2022-06-28 NOTE — ANESTHESIA CARE TRANSFER NOTE
Patient: Gina Simpson    Procedure: Procedure(s):  LEFT BRACHIO-BASILIC ARTERIOVENOUS FISTULA WITH BIOPROSTHETIC GRAFT  Ligate fistula arteriovenous upper extremity       Diagnosis: ESRD (end stage renal disease) on dialysis (H) [N18.6, Z99.2]  Diagnosis Additional Information: No value filed.    Anesthesia Type:   General     Note:    Oropharynx: oropharynx clear of all foreign objects and spontaneously breathing  Level of Consciousness: awake  Oxygen Supplementation: face mask  Level of Supplemental Oxygen (L/min / FiO2): 6  Independent Airway: airway patency satisfactory and stable  Dentition: dentition unchanged  Vital Signs Stable: post-procedure vital signs reviewed and stable  Report to RN Given: handoff report given  Patient transferred to: PACU  Comments: At end of procedure, spontaneous respirations, adequate tidal volumes, followed commands to voice, LMA removed atraumatically, oropharynx suctioned, airway patent after LMA removal. Oxygen via facemask at 6 liters per minute to PACU. Oxygen tubing connected to wall O2 in PACU, SpO2, NiBP, and EKG monitors and alarms on and functioning, Cuauhtemoc Hugger warmer connected to patient gown, report on patient's clinical status given to PACU RN, RN questions answered.  Handoff Report: Identifed the Patient, Identified the Reponsible Provider, Reviewed the pertinent medical history, Discussed the surgical course, Reviewed Intra-OP anesthesia mangement and issues during anesthesia, Set expectations for post-procedure period and Allowed opportunity for questions and acknowledgement of understanding      Vitals:  Vitals Value Taken Time   BP     Temp     Pulse     Resp     SpO2         Electronically Signed By: ERIC Vickers CRNA  June 28, 2022  11:18 AM

## 2022-06-28 NOTE — OP NOTE
Procedure Date: 06/28/2022    PREOPERATIVE DIAGNOSIS:  End-stage renal disease, status post first stage left brachiobasilic AV fistula formation.    POSTOPERATIVE DIAGNOSIS:  End-stage renal disease, status post first stage left brachiobasilic arteriovenous fistula formation.    PROCEDURES PERFORMED:     1.  Ligation of left brachiobasilic AV fistula.  2.  Creation of left brachial artery to axillary vein ProCol AV bridge graft.    SURGEON:  Chaz Miller MD.    :  Kentrell Valenzuela MD.    ANESTHESIA:  LMA general anesthesia.    ESTIMATED BLOOD LOSS:  20 mL.    INDICATIONS FOR PROCEDURE:  This patient is a 56-year-old female who is status post placement of a right arm AV access at an outside facility a few years ago.  She subsequently developed significant right arm swelling and is noted to have a chronically occluded right innominate vein, which previously could not be recanalized.  She is now 2 months status post creation of a first stage left brachiobasilic AV fistula.  She has an existing left IJ tunneled dialysis catheter.  A few weeks ago, she developed left arm swelling and venography demonstrated a stenosis of the left innominate vein, which was appropriately balloon dilated.  Additionally, she has venous anatomy, such that her basilic vein merges with her brachial vein in the lower third of her humerus.  Given all of the above, I felt that attempts at a formal second stage brachiobasilic transposition was inappropriate, as I would be sacrificing multiple venous collaterals and potentially exacerbating left arm edema.  I chose to ligate the existing first stage fistula and proceeded with creation of a standard left brachial artery to axillary vein ProCol AV graft.    OPERATIVE FINDINGS:  Upon ligating the fistula, we had an excellent multiphasic Doppler signal in the radial artery and ulnar artery at the wrist.  The brachial artery above the antecubital crease was an excellent quality 5 mm structure.   The axillary vein was an excellent quality 7 or 8 mm structure.  We created a standard brachial artery to axillary vein ProCol AV graft.  At completion, there was a very nice thrill palpated along the course of the ProCol graft and continued with multiphasic Doppler signals at the wrist with a systolic blood pressure in the 90s.    DESCRIPTION OF TECHNIQUE:  After informed consent was obtained, the patient was brought to the operating room and placed on the table in a supine position.  LMA general anesthesia was achieved without incident.  I interrogated her left upper extremity with a portable ultrasound and found the venous anatomy as noted above.  I chose to proceed with my planned ligation of her existing fistula and creation of the AV bridge graft.  Her left upper extremity was prepped and draped in the usual sterile fashion.  Timeout was called, and we verified the patient's identity, the operative site, and the proposed procedure.  We reincised the old transverse incision 1 fingerbreadth below the antecubital crease.  Dissection proceeded sharply downward to identify the median cubital vein anastomosed to the brachial artery at that level.  It was dissected free and a vascular clamp was placed across the vein at the level of the anastomosis.  The vein was ligated and transected.  The remnant venous cuff on the brachial artery side was oversewn with a double running layer of 5-0 Prolene suture.  Surgicel gauze was placed over this incision.  Next, a vertical incision was made centered over the brachial artery just above the antecubital crease.  Dissection proceeded sharply downward to expose that vessel for 2 cm.  Proximal and distal control was achieved with vessel loops.  A vertical incision was made up near the axilla over the pre-marked course of the axillary vein.  Dissection proceeded sharply downward to identify the axillary vein.  We preserved all venous side branches and the proximal and distal  control was achieved with vessel loops.  The Camille-Wijennifer tunneler was used to create a gentle curvilinear subcutaneous tunnel connecting the 2 incisions.  That area had been locally anesthetized with a lidocaine/Marcaine mixture.  Our ProCol graft had been appropriately benched on the back table.  The patient was given 5000 units of intravenous heparin.  After a 5-minute circulation time, proximal and distal control was achieved of the brachial artery.  A 6 mm brachial arteriotomy was made.  The ProCol graft was appropriately oriented.  We proceeded with an end-to-side brachial artery to ProCol graft anastomosis using running 6-0 Prolene suture.  That anastomosis was secured and observed to be hemostatic as flow was restored back down the left arm.  The graft was given 5 minutes to appropriately elongate.  It did require a single repair suture on her previously ligated venous side branch.  Our graft was then connected to the tunneler and drawn through our subcutaneous tunnel, taking great care to avoid any twisting or kinking.  Proximal and distal control was achieved on the exposed axillary vein.  A 12 mm venotomy was made.  Our graft was cut to length in an appropriately spatulated manner.  We proceeded with an end-to-side ProCol graft to axillary vein anastomosis using running 6-0 Prolene suture.  That anastomosis was subsequently secured.  Prior to doing so, we ensured that we could pass a 5 mm dilator through the toe of the anastomosis and up into the axillary vein.  Immediately noted was an excellent thrill along the course of the ProCol graft.  Hemostasis for all 3 incisions was assured.  All 3 incisions had been injected with our lidocaine/Marcaine mixture.  Closure then proceeded utilizing interrupted 3-0 Vicryl, taking care to avoid any fascial impingement on the graft.  Skin for all 3 incisions was closed with 4-0 Monocryl subcuticular sutures.  Steri-Strips and sterile dressings were applied.  Final  sponge and needle count were reported as correct.  The patient tolerated this procedure without incident.  She was subsequently returned, extubated and hemodynamically stable to the outpatient recovery area.    Akash Miller MD        D: 2022   T: 2022   MT: NAOMI    Name:     GIO HAMMOND  MRN:      [id number]        Account:        [id number]   :      1965           Procedure Date: 2022     Document: [id number]

## 2022-06-28 NOTE — INTERVAL H&P NOTE
SUBJECTIVE:   Torsten Nicholson is a 4 month old male, here for a routine health maintenance visit,   accompanied by his mother and sister.    Patient was roomed by: Shakira Madera CMA on 2019 at 4:45 PM    Do you have any forms to be completed?  no    SOCIAL HISTORY  Child lives with: mother, father and sister  Who takes care of your infant:   Language(s) spoken at home: English  Recent family changes/social stressors: none noted    SAFETY/HEALTH RISK  Is your child around anyone who smokes?  No   TB exposure:           None  Car seat less than 6 years old, in the back seat, rear-facing, 5-point restraint: Yes    DAILY ACTIVITIES  WATER SOURCE:  WELL WATER    NUTRITION: breastmilk     SLEEP       Arrangements:    bassinet    sleeps on back  Problems    none    ELIMINATION     Stools:    normal breast milk stools    normal wet diapers    HEARING/VISION: no concerns, hearing and vision subjectively normal.    DEVELOPMENT  Screening tool used, reviewed with parent or guardian: No screening tool used   Milestones (by observation/ exam/ report) 75-90% ile   PERSONAL/ SOCIAL/COGNITIVE:    Smiles responsively    Looks at hands/feet    Recognizes familiar people  LANGUAGE:    Squeals,  coos    Responds to sound    Laughs  GROSS MOTOR:    Starting to roll    Bears weight    Head more steady  FINE MOTOR/ ADAPTIVE:    Hands together    Grasps rattle or toy    Eyes follow 180 degrees    QUESTIONS/CONCERNS: None    PROBLEM LIST  Patient Active Problem List   Diagnosis     Single liveborn, born in hospital, delivered     Heart murmur     Congenital short femur     MEDICATIONS  Current Outpatient Medications   Medication Sig Dispense Refill     cholecalciferol (VITAMIN D/ D-VI-SOL) 400 UNIT/ML LIQD liquid Take 400 Units by mouth daily        ALLERGY  No Known Allergies    IMMUNIZATIONS  Immunization History   Administered Date(s) Administered     DTAP-IPV/HIB (PENTACEL) 2019     Hep B, Peds or Adolescent 2019  I have reviewed the surgical (or preoperative) H&P that is linked to this encounter, and examined the patient. There are no significant changes    Clinical Conditions Present on Arrival:  Clinically Significant Risk Factors Present on Admission                         "    Pneumo Conj 13-V (2010&after) 2019     Rotavirus, monovalent, 2-dose 2019       HEALTH HISTORY SINCE LAST VISIT  No surgery, major illness or injury since last physical exam    ROS  Constitutional, eye, ENT, skin, respiratory, cardiac, and GI are normal except as otherwise noted.    OBJECTIVE:   EXAM  Temp 99.7  F (37.6  C) (Rectal)   Ht 2' 0.75\" (0.629 m)   Wt 15 lb 1 oz (6.832 kg)   HC 15.83\" (40.2 cm)   BMI 17.29 kg/m    10 %ile based on WHO (Boys, 0-2 years) head circumference-for-age based on Head Circumference recorded on 2019.  40 %ile based on WHO (Boys, 0-2 years) weight-for-age data based on Weight recorded on 2019.  29 %ile based on WHO (Boys, 0-2 years) Length-for-age data based on Length recorded on 2019.  56 %ile based on WHO (Boys, 0-2 years) weight-for-recumbent length based on body measurements available as of 2019.  GENERAL: Active, alert, in no acute distress.  SKIN: Clear. No significant rash, abnormal pigmentation or lesions  HEAD: Normocephalic. Normal fontanels and sutures.  EYES: Conjunctivae and cornea normal. Red reflexes present bilaterally.  EARS: Normal canals. Tympanic membranes are normal; gray and translucent.  NOSE: Normal without discharge.  MOUTH/THROAT: Clear. No oral lesions.  NECK: Supple, no masses.  LYMPH NODES: No adenopathy  LUNGS: Clear. No rales, rhonchi, wheezing or retractions  HEART: Regular rhythm. Normal S1/S2. No murmurs. Normal femoral pulses.  ABDOMEN: Soft, non-tender, not distended, no masses or hepatosplenomegaly. Normal umbilicus and bowel sounds.   GENITALIA: Normal male external genitalia. Iraj stage I,  Testes descended bilateraly, no hernia or hydrocele.    EXTREMITIES: right thigh is noticeably shorter than left thigh - difficult to assess hip on right side; left leg and hip are normal  NEUROLOGIC: Normal tone throughout. Normal reflexes for age    ASSESSMENT/PLAN:   1. Encounter for routine child health " examination w/o abnormal findings  Appropriate growth and development  - DTAP - HIB - IPV VACCINE, IM USE (Pentacel) [13687]  - PNEUMOCOCCAL CONJ VACCINE 13 VALENT IM [95011]  - ROTAVIRUS VACC 2 DOSE ORAL    2. Congenital longitudinal deficiency of right femur  Follows at [facility]  Unsure if right hip is normal - will be followed at [place]      Anticipatory Guidance  The following topics were discussed:  SOCIAL / FAMILY    on stomach to play    reading to baby  NUTRITION:    solid food introduction at 4-6 months old  HEALTH/ SAFETY:    teething    sleep patterns    safe crib    car seat    falls/ rolling    sunscreen/ insect repellent    Preventive Care Plan  Immunizations     See orders in EpicCare.  I reviewed the signs and symptoms of adverse effects and when to seek medical care if they should arise.  Referrals/Ongoing Specialty care: Ongoing Specialty care by Orthopedics at [place]  See other orders in [facility]    Resources:  [place] Child and Teen Checkups (C&TC) Schedule of Age-Related Screening Standards     FOLLOW-UP:    6 month Preventive Care visit    STEPHY Garcia [facility]

## 2022-06-28 NOTE — ANESTHESIA PROCEDURE NOTES
Airway       Patient location during procedure: OR  Staff -        CRNA: Raiza Torres APRN CRNA       Performed By: CRNA  Consent for Airway        Urgency: elective  Indications and Patient Condition       Indications for airway management: supriya-procedural       Induction type:intravenous       Mask difficulty assessment: 0 - not attempted    Final Airway Details       Final airway type: supraglottic airway    Supraglottic Airway Details        Type: LMA       Brand: I-Gel       LMA size: 4    Post intubation assessment        Placement verified by: capnometry, equal breath sounds and chest rise        Number of attempts at approach: 1       Ease of procedure: easy       Dentition: Unchanged

## 2022-06-28 NOTE — BRIEF OP NOTE
[facility] [place]    Brief Operative Note    Pre-operative diagnosis: ESRD (end stage renal disease) on dialysis (H) [N18.6, Z99.2]  Post-operative diagnosis Same as pre-operative diagnosis    Procedure:   1) Left brachio-axillary arteriovenous graft creation using Procol graft     Surgeon: Surgeon(s) and Role:     * Akash Miller MD - Primary     * Kentrell Valenzuela MD - Fellow - Assisting  Anesthesia: General   Estimated Blood Loss: 20 mL from 6/28/2022  7:45 AM to 6/28/2022 11:14 AM      Drains: None  Specimens: * No specimens in log *  Findings:   Palpable thrill over brachioaxillary arteriovenous graft . Monophasic radial and ulnar signals   Complications: None.  Implants:   Implant Name Type Inv. Item Serial No.  Lot No. LRB No. Used Action   GRAFT VASC BIOPROSTHESIS PROCOL [id number] -  Graft GRAFT VASC BIOPROSTHESIS PROCOL [id number] [facility] VASCULAR IN [id number] Left 1 Implanted

## 2022-06-28 NOTE — ANESTHESIA POSTPROCEDURE EVALUATION
Patient: Gina Simpson    Procedure: Procedure(s):  LEFT BRACHIO-BASILIC ARTERIOVENOUS FISTULA WITH BIOPROSTHETIC GRAFT  Ligate fistula arteriovenous upper extremity       Anesthesia Type:  General    Note:  Disposition: Outpatient   Postop Pain Control: Uneventful            Sign Out: Well controlled pain   PONV: No   Neuro/Psych: Uneventful            Sign Out: Acceptable/Baseline neuro status   Airway/Respiratory: Uneventful            Sign Out: Acceptable/Baseline resp. status   CV/Hemodynamics: Uneventful            Sign Out: Acceptable CV status; No obvious hypovolemia; No obvious fluid overload   Other NRE: NONE   DID A NON-ROUTINE EVENT OCCUR? No           Last vitals:  Vitals Value Taken Time   /77 06/28/22 1200   Temp 36.3  C (97.3  F) 06/28/22 1115   Pulse 80 06/28/22 1202   Resp 12 06/28/22 1202   SpO2 91 % 06/28/22 1202   Vitals shown include unvalidated device data.    Electronically Signed By: Dee Ceja MD  June 28, 2022  12:07 PM

## 2022-07-07 PROBLEM — I82.622 ACUTE DEEP VEIN THROMBOSIS (DVT) OF BRACHIAL VEIN OF LEFT UPPER EXTREMITY (H): Status: ACTIVE | Noted: 2022-01-01

## 2022-07-07 NOTE — ED PROVIDER NOTES
History   Chief Complaint:  Post-op Problem     The history is provided by the patient and medical records.      Gina Simpson is a 56 year old female with history of coronary artery disease, cardiomyopathy, NSTEMI, COPD, hypertension, and ESRD on dialysis who presents with left upper extremity swelling. The patient had a second left brachio-basilic arteriovenous fistula with bioprosthetic graft done by Dr. Miller on 06/28. Two days after the surgery, she had a sudden onset of shortness of breath and sensation of fullness in her neck. This was then followed by left upper extremity swelling and pain. She denies fevers. She endorses a chronic dry cough due to tobacco use and constipation. She was prescribed pain medications, however has not taken them due to constipation. She is not currently anticoagulated. Her last dialysis was today, without any complications.     Review of Systems   Constitutional: Negative for fever.   Respiratory: Positive for cough and shortness of breath.    Gastrointestinal: Positive for constipation.   Musculoskeletal: Positive for myalgias (LUE; swelling) and neck pain.   All other systems reviewed and are negative.    Allergies:  The patient has no known allergies.     Medications:  Norvasc  Lipitor  Coreg  Apresoline  Imdur  Zestril  Nitrostat     Past Medical History:     Anemia associated with chronic renal failure   Hypertension  End stage renal disease on dialysis   Ischemic cardiomyopathy   Coronary atherosclerosis   Hyperlipidemia   Acute embolism and thrombosis of right internal jugular vein   Hypertensive urgency   Coronary artery disease   NSTEMI  COPD     Past Surgical History:    AV fistula repair, left upper extremity   AV fistula creation, left upper extremity   AV fistula ligation, left upper extremity   [place] teeth extraction   Coronary angiogram      Family History:    Mother - Hypertension  Father - Substance Abuse   Sister - Kidney Disorder     Social  "History:  The patient presents to the ED with a family member.   PCP:  Clari Garza MD    Physical Exam     Patient Vitals for the past 24 hrs:   BP Temp Temp src Pulse Resp SpO2 Height Weight   07/07/22 0000 (!) 146/81 -- -- 93 13 96 % -- --   07/06/22 2329 -- -- -- -- -- 97 % -- --   07/06/22 2215 -- -- -- -- -- 94 % -- --   07/06/22 2200 -- -- -- -- -- 97 % -- --   07/06/22 2145 -- -- -- -- -- 97 % -- --   07/06/22 2130 -- -- -- -- -- 97 % -- --   07/06/22 2115 -- -- -- -- -- 96 % -- --   07/06/22 2100 -- -- -- -- -- 97 % -- --   07/06/22 2045 -- -- -- -- -- 97 % -- --   07/06/22 2015 -- -- -- -- -- 98 % -- --   07/06/22 2000 116/73 -- -- 81 -- 98 % -- --   07/06/22 1937 117/65 98.5  F (36.9  C) Temporal 95 28 98 % 1.651 m (5' 5\") 55.8 kg (123 lb)     Physical Exam  Vitals and nursing note reviewed.   Constitutional:       General: She is not in acute distress.     Appearance: She is not diaphoretic.   HENT:      Head: Atraumatic.      Mouth/Throat:      Pharynx: Oropharynx is clear. No oropharyngeal exudate.   Eyes:      General: No scleral icterus.     Pupils: Pupils are equal, round, and reactive to light.   Neck:      Comments: Jugular vein distention present  Cardiovascular:      Rate and Rhythm: Normal rate and regular rhythm.      Heart sounds: Normal heart sounds.   Pulmonary:      Effort: No respiratory distress.      Breath sounds: Normal breath sounds.   Abdominal:      General: Bowel sounds are normal.      Palpations: Abdomen is soft.      Tenderness: There is no abdominal tenderness.   Musculoskeletal:         General: Swelling (Diffusely to the left upper and lower arm) present. No tenderness.      Comments: Left chest dialysis fistula in place   Skin:     General: Skin is warm.      Findings: No rash.      Comments: Left upper extremity well healed surgical wound with no bleeding   Neurological:      General: No focal deficit present.      Mental Status: She is oriented to person, " place, and time. Mental status is at baseline.   Psychiatric:         Mood and Affect: Mood normal.         Behavior: Behavior normal.       Emergency Department Course   Imaging:  US Upper Extremity Venous Duplex Left   Final Result   Abnormal   IMPRESSION:    1.  Partially occlusive thrombus within the left brachial vein at the level of the midhumerus    2.  Thrombus within the basilic vein proximal to the AV fistula            [Urgent Result: Partially occlusive DVT of the left brachial vein and superficial thrombophlebitis of the basilic vein]      Finding was identified on 7/6/2022 11:28 PM.       Dr DUYEN VEGAS was contacted by me on 7/6/2022 11:54 PM and verbalized understanding of the critical result .         US Ext Arterial Venous Dialys Acs Graft    (Results Pending)   Report per radiology    Laboratory:  Labs Ordered and Resulted from Time of ED Arrival to Time of ED Departure   BASIC METABOLIC PANEL - Abnormal       Result Value    Sodium 136      Potassium 3.3 (*)     Chloride 100      Carbon Dioxide (CO2) 30      Anion Gap 6      Urea Nitrogen 13      Creatinine 3.81 (*)     Calcium 10.4 (*)     Glucose 148 (*)     GFR Estimate 13 (*)    CBC WITH PLATELETS AND DIFFERENTIAL - Abnormal    WBC Count 6.5      RBC Count 3.28 (*)     Hemoglobin 9.4 (*)     Hematocrit 29.0 (*)     MCV 88      MCH 28.7      MCHC 32.4      RDW 12.8      Platelet Count 288      % Neutrophils 65      % Lymphocytes 24      % Monocytes 9      % Eosinophils 1      % Basophils 1      % Immature Granulocytes 0      NRBCs per 100 WBC 0      Absolute Neutrophils 4.3      Absolute Lymphocytes 1.5      Absolute Monocytes 0.6      Absolute Eosinophils 0.0      Absolute Basophils 0.0      Absolute Immature Granulocytes 0.0      Absolute NRBCs 0.0     INR - Normal    INR 0.99     PARTIAL THROMBOPLASTIN TIME - Normal    aPTT 26        Emergency Department Course:     Reviewed:  I reviewed nursing notes, vitals, past medical history and  Care Everywhere    Assessments:  2006 I obtained history and examined the patient as noted above.   0015 I rechecked the patient and explained findings.     Consults:  Dr Rodriguez, vascular surgery, at 0010 AM    Interventions:  Heparin    Disposition:  Admission    Impression & Plan   Medical Decision Making:  This patient is a 56-year-old woman who recently had stage II surgery for an AV fistula on the left.  She now presents with left arm swelling and extensive DVT.  She also has sensation of shortness of breath with bilateral JVD.  She is not hypoxic though her stridorous or in respiratory distress at this time.  Given the thrombus seen on the ultrasound I spoke with vascular surgery.  Heparin was recommended.  Bedside ultrasound does not demonstrate any pericardial effusion or any clear sign of right ventricular strain.  The patient may ultimately require CT angiogram but given her renal failure vascular surgery will see her first.  They will be seeing her shortly and this will be followed up by Dr. Gu.    Diagnosis:    ICD-10-CM    1. Acute deep vein thrombosis (DVT) of brachial vein of left upper extremity (H)  I82.622        Discharge Medications:  New Prescriptions    No medications on file     Scribe Disclosure:  WILLEM, Camille Berrios, am serving as a scribe at 8:00 PM on 7/6/2022 to document services personally performed by Lucas Valerio MD based on my observations and the provider's statements to me.     Lucas Valerio MD  07/07/22 0033

## 2022-07-07 NOTE — ED TRIAGE NOTES
Fistula placed in 6/28 to left upper arm. Swelling and pain increased today. Denies sensation to left arm, reports SOB and neck veins distension.      Triage Assessment     Row Name 07/06/22 1937       Triage Assessment (Adult)    Airway WDL WDL       Respiratory WDL    Respiratory WDL X;rhythm/pattern    Rhythm/Pattern, Respiratory tachypneic;shortness of breath       Skin Circulation/Temperature WDL    Skin Circulation/Temperature WDL WDL       Cardiac WDL    Cardiac WDL WDL       Peripheral/Neurovascular WDL    Peripheral Neurovascular WDL X;neurovascular assessment upper       LUE Neurovascular Assessment    Temperature LUE hot     Sensation LUE sensation absent        Cognitive/Neuro/Behavioral WDL    Cognitive/Neuro/Behavioral WDL WDL

## 2022-07-07 NOTE — PROVIDER NOTIFICATION
Writer spoke with Vasc Surg, Dr. Gandhi. Inquired about Hosp and Nephrology consult. Informed patient request for stool softeners and complaints of tightness in her neck. Dr. Gandhi stated he will order consults, keep LUE arm elevated, and patient could have a diet.

## 2022-07-07 NOTE — PROVIDER NOTIFICATION
RECEIVING UNIT ED HANDOFF REVIEW    ED Nurse Handoff Report was reviewed by: Guillermo Santoro RN on July 7, 2022 at 4:01 AM

## 2022-07-07 NOTE — PROVIDER NOTIFICATION
"Paged Dr. Des Marsh \"Pt does not have hospitalist following her. she is new admitt from ED.  her K+ is 3.3, can you please order K+ replacement protocol. Thanks\" MD called back and he said pt can't be on K+ protocol due to being on dialysis.   "

## 2022-07-07 NOTE — PLAN OF CARE
Goal Outcome Evaluation:    Reason for Admission: transfered from ED around 0430. cute deep vein thrombosis (DVT) of brachial vein of left upper extremity. Patient reports she is in a lot of pain as well. She started experiencing these symptoms after her placement of a fistula on June 28th.    Code status:  FC  Seizure or isolation precaution: bleeding precautions due to pt being on heparin drip   Cognitive/Mentation: A/Ox 4  Use of CPAP: No  Neuros/CMS: no new changes, neuro status is at baseline. see in flow sheet for details. CMS and Neuro intact ex. No sensation from the left elbow to the left axillary area. Used doppler to find left radial pulse   CIWA Protocol: NA  VS: stable   Tele: N/A   GI: BS active x4, passing flatus,  Continent.  : voiding w/o difficulty. Continent.  Use of reyes, external catheter, or urinal: N/A  Pulmonary: LS clear   Pain: Pt has pain on the left arm fistula site 7/10, Ice pack applied. Pt went down to 5/10, pt declined additional pain med until the next scheduled dose of tylenol. Pt said her pain is tolerateable   Critical Labs to Monitor: N/A  Electrolyte Replacement Protocol: K+ was 3.3, needs replacement   Use of oxygen: pt is on 2L via NC   Heparin or other drips: pt is on heparin drip infusing at 1000 units/hr, recheck of 10a lab is due in the morning 7/7 at 0600     BG checks: N/A  Tests, MRI, CT, KHANG, Echo: pt might have venography to evaluate central stenosis    Drains: heparin infusing at this time, Lactate ringer is on hold due heparin infusing at this time. Charge nurse was notified.   Skin: intact   Edema: L. Arm is swollen, ice pack applied. Not warm at the site   Surgical site: L. Upper arm fistula incision is glued and open to air. Approximated and intact, no symptom of infection   Activity: Assist x 1 with GB  Diet: NPO due to possible surgery    Tube Feeding: N/A   Discharge: pending. Back to home? Pt needs nephrology consult and possibly hospitalist. Left sticky  note to MD

## 2022-07-07 NOTE — ED NOTES
[facility]  ED Nurse Handoff Report    ED Chief complaint: Post-op Problem      ED Diagnosis:   Final diagnoses:   Acute deep vein thrombosis (DVT) of brachial vein of left upper extremity (H)       Code Status:     Allergies: No Known Allergies    Patient Story: Patient presented to the ED with complaints of left arm and left hand swelling. Patient reports she is in a lot of pain as well. She started experiencing these symptoms after her placement of a fistula on June 28th. Patient came to the ED from home.  Focused Assessment: Patient's airway is intact. Patient is breathing on her own in RA. Patient denies chest pain/cardiac complaints, but is experiencing some SOB. Patient is alert and oriented X4. Patient is ambulatory.  Labs Ordered and Resulted from Time of ED Arrival to Time of ED Departure   BASIC METABOLIC PANEL - Abnormal       Result Value    Sodium 136      Potassium 3.3 (*)     Chloride 100      Carbon Dioxide (CO2) 30      Anion Gap 6      Urea Nitrogen 13      Creatinine 3.81 (*)     Calcium 10.4 (*)     Glucose 148 (*)     GFR Estimate 13 (*)    CBC WITH PLATELETS AND DIFFERENTIAL - Abnormal    WBC Count 6.5      RBC Count 3.28 (*)     Hemoglobin 9.4 (*)     Hematocrit 29.0 (*)     MCV 88      MCH 28.7      MCHC 32.4      RDW 12.8      Platelet Count 288      % Neutrophils 65      % Lymphocytes 24      % Monocytes 9      % Eosinophils 1      % Basophils 1      % Immature Granulocytes 0      NRBCs per 100 WBC 0      Absolute Neutrophils 4.3      Absolute Lymphocytes 1.5      Absolute Monocytes 0.6      Absolute Eosinophils 0.0      Absolute Basophils 0.0      Absolute Immature Granulocytes 0.0      Absolute NRBCs 0.0     INR - Normal    INR 0.99     PARTIAL THROMBOPLASTIN TIME - Normal    aPTT 26       US Upper Extremity Venous Duplex Left   Final Result   Abnormal   IMPRESSION:    1.  Partially occlusive thrombus within the left brachial vein at the level of the midhumerus    2.   Thrombus within the basilic vein proximal to the AV fistula            [Urgent Result: Partially occlusive DVT of the left brachial vein and superficial thrombophlebitis of the basilic vein]      Finding was identified on 7/6/2022 11:28 PM.       Dr DUYEN VEGAS was contacted by me on 7/6/2022 11:54 PM and verbalized understanding of the critical result .             Treatments and/or interventions provided: Patient had a bedside ultra sound done. Patient is now in line for radiology to check for clots in her arm.  Patient's response to treatments and/or interventions: Patient tolerated tests well.    To be done/followed up on inpatient unit:  Continue with plan of care.    Does this patient have any cognitive concerns?: N/A    Activity level - Baseline/Home:  Independent  Activity Level - Current:   Independent    Patient's Preferred language: English   Needed?: No    Isolation: None  Infection: Not Applicable  Patient tested for COVID 19 prior to admission: YES  Bariatric?: No    Vital Signs:   Vitals:    07/06/22 2200 07/06/22 2215 07/06/22 2329 07/07/22 0000   BP:    (!) 146/81   Pulse:    93   Resp:    13   Temp:       TempSrc:       SpO2: 97% 94% 97% 96%   Weight:       Height:           Cardiac Rhythm:     Was the PSS-3 completed:   Yes  What interventions are required if any?               Family Comments: Two family members are present with patient in ED.  OBS brochure/video discussed/provided to patient/family: N/A              Name of person given brochure if not patient:               Relationship to patient:     For the majority of the shift this patient's behavior was Green.   Behavioral interventions performed were N/A.    ED NURSE PHONE NUMBER: *[phone]

## 2022-07-07 NOTE — PLAN OF CARE
Pt here with acute DVT on LUE brachial vein. Pt A&O x4. VSS, on RA. LS clear. LUE +3 edema, numbness from elbow up to shoulder otherwise CMS intact, strong hand . Tylenol given for pain. LUE elevated, compression like sleeve place by Vasc. Surg. Up SBA with GB. LUE AV fistula, L upper chest CVC. Dialysis MWF, plans for dialysis tomorrow. Hep drip maintained 10mL/hr, Hep 10A lab redraw scheduled for 1500, Coumadin ordered to bridge. Nephrology and Hosp following. Renal diet, takes pills whole with water. Pt scoring green on Aggression Stop Light Tool. Discharge pending.

## 2022-07-07 NOTE — CONSULTS
Vascular Surgery Consultation    Gina Simpson MRN# [id number]   Age: 56 year old YOB: 1965     Date of Consult:   7/06/22    Reason for consult: LUE DVT       Requesting service: Emergency medicine; requesting provider: Dr. Valerio                   Assessment and Plan:   55 yo female with h/o ESRD, central venous stenosis s/p left innominate vein balloon angioplasty, failed RUE dialysis access, POD#9 s/p left brachial artery to axillary vein ProCol AV bridge graft and ligation of left median cubital to brachial artery AV fistula, now with DVT in the left brachial vein and superficial thrombophlebitis of the left basilic vein. Graft is patent on exam with good thrill.  -admit to obs  -heparin gtt  -Duplex ultrasound of dialysis graft  -npo  -possible venography to evaluate central stenosis, defer to day team    Plan discussed with ED staff, Dr. Gu.      Discussed with staff, Dr. Gandhi.    Cesario Hare MD  Vascular Surgery resident             Chief Complaint:   Left arm swelling          History of Present Illness:   55 yo female with h/o ESRD, failed dialysis access in the right arm, chronic right innominate vein occlusion, central venous stenosis with left innominate vein stenosis s/p balloon angioplasty in May 2022, s/p left brachial artery to median cubital vein AV fistula that has since been ligated, now POD#9 s/p left brachial artery to axillary vein ProCol AV bridge graft with Dr. Miller on 6/28/22 who presents with two days of left arm swelling and pain. Also complains of increased left sided neck swelling. No arm weakness, numbness, or parasthesias. Venous duplex shows a nonocclusive DVT in the left brachial vein and superficial thrombophlebitis of the left basilic vein. The graft itself was not evaluated on ultrasound but does have a palpable thrill on exam. She has a left sided tunneled IJ line in place and last dialyzed Weds 7/6/22.              Past Medical History:      Past Medical History:   Diagnosis Date     Anemia      Cancer (H)      Coronary artery disease      Dialysis complication      Dialysis patient (H)      Embolism (H)      ESRD (end stage renal disease) (H)      Hypertension      Ischemic cardiomyopathy      Renal failure              Past Surgical History:     Past Surgical History:   Procedure Laterality Date     ANESTH,UPPER ARM AV FISTULA REPAIR       CREATE FISTULA ARTERIOVENOUS UPPER EXTREMITY Left 3/25/2022    Procedure: CREATION OF FIRST STAGE LEFT BRACHIOBASILIC ARTERIOVENOUS FISTULA;  Surgeon: Akash Miller MD;  Location: [facility] OR     CREATE FISTULA ARTERIOVENOUS UPPER EXTREMITY Left 6/28/2022    Procedure: LEFT BRACHIO-BASILIC ARTERIOVENOUS FISTULA WITH BIOPROSTHETIC GRAFT;  Surgeon: Akash Miller MD;  Location: [facility] OR     IR DIALYSIS FISTULOGRAM LEFT  5/31/2022     LIGATE FISTULA ARTERIOVENOUS UPPER EXTREMITY Left 6/28/2022    Procedure: [place] fistula arteriovenous upper extremity;  Surgeon: Akash Miller MD;  Location: [facility] OR     WISDOM TEETH               Social History:     Social History     Tobacco Use     Smoking status: Current Every Day Smoker     Years: 40.00     Types: Cigarettes     Smokeless tobacco: Never Used     Tobacco comment: 6 cigarettes per day.    Substance Use Topics     Alcohol use: Never             Family History:   No family history on file.             Allergies:   No Known Allergies          Medications:     Current Facility-Administered Medications   Medication     heparin 25,000 units in 0.45% NaCl 250 mL ANTICOAGULANT infusion     Current Outpatient Medications   Medication Sig     amLODIPine (NORVASC) 10 MG tablet Take 10 mg by mouth every evening     atorvastatin (LIPITOR) 20 MG tablet Take 20 mg by mouth daily     B Complex-C-Folic Acid (DEEPALI CAPS) 1 MG CAPS Take 1 capsule by mouth daily     calcium acetate (PHOSLO) 667 MG CAPS capsule Take 2,001 mg by mouth 3 times daily (with meals)     carvedilol  "(COREG) 12.5 MG tablet Take 12.5 mg by mouth 2 times daily (with meals)     hydrALAZINE (APRESOLINE) 50 MG tablet Take 50 mg by mouth 3 times daily     isosorbide mononitrate (IMDUR) 30 MG 24 hr tablet Take 30 mg by mouth daily In addition to 60 mg tablet to total 90 mg daily     isosorbide mononitrate (IMDUR) 60 MG 24 hr tablet Take 60 mg by mouth daily With 30 mg tablet to total 90 mg daily     lisinopril (ZESTRIL) 10 MG tablet Take 10 mg by mouth every evening     oxyCODONE (ROXICODONE) 5 MG tablet Take 1 tablet (5 mg) by mouth every 6 hours as needed for pain               Review of Systems:   A 12 point review of systems was completed and found to be negative unless otherwise stated in the above HPI  =       Physical Exam:   BP (!) 139/92   Pulse 91   Temp 98.5  F (36.9  C) (Temporal)   Resp 14   Ht 1.651 m (5' 5\")   Wt 55.8 kg (123 lb)   SpO2 100%   BMI 20.47 kg/m      No intake or output data in the 24 hours ending 07/07/22 0104    GEN: A&Ox3, no acute distress  NEURO: CN II-XII grossly intact. No gross neurologic deficits. Normal and symmetric  strength. Normal and symmetric sensation over both hands.  NECK: trachea midline, bilateral JVD  HEENT: No scleral icterus.  RESP: Nonlabored breathing on room air, no cough  CV: RRR by radial pulse, noncyanotic  ABD: soft, non-tender, nondistended. No guarding or rebound tenderness  MSK: Moves all extremities independently. Left arm edema. Left arm incisions at the upper arm and antecubital fossa are clean, dry, no erythema, no drainage.   PSYCH: cooperative   PULSES: Palpable left and right radial pulses. Palpable thrill over LUE graft.          Data:   All laboratory data reviewed    Results:  BMP  Recent Labs   Lab 07/06/22 2038      POTASSIUM 3.3*   CHLORIDE 100   CO2 30   BUN 13   CR 3.81*   *     CBC  Recent Labs   Lab 07/06/22 2038   WBC 6.5   HGB 9.4*        LFT  Recent Labs   Lab 07/06/22 2038   INR 0.99     Recent Labs "   Lab 07/06/22 2038   *       Imaging:  EXAM: US UPPER EXTREMITY VENOUS DUPLEX LEFT  LOCATION: [facility]  DATE/TIME: 7/6/2022 10:29 PM     INDICATION: arm swelling  COMPARISON: None.  TECHNIQUE: Venous Duplex ultrasound of the left upper extremity with (when possible) and without compression, augmentation, and duplex. Color flow and spectral Doppler with waveform analysis performed.     FINDINGS: Ultrasound includes evaluation of the internal jugular vein, innominate vein, subclavian vein, axillary vein, and brachial vein. The superficial cephalic and basilic veins were also evaluated where seen.      LEFT: Partially occlusive thrombus seen within the left brachial vein in the mid humerus . Additionally there is a thrombus within the basilic vein from the mid humerus distal to the AV fistula.                                                                       IMPRESSION:   1.  Partially occlusive thrombus within the left brachial vein at the level of the midhumerus   2.  Thrombus within the basilic vein proximal to the AV fistula

## 2022-07-07 NOTE — UTILIZATION REVIEW
"Admission Status; Secondary Review Determination         Under the authority of the Utilization Management Committee, the utilization review process indicated a secondary review on the above patient.  The review outcome is based on review of the medical records, discussions with staff, and applying clinical experience noted on the date of the review.          (x) Observation Status Appropriate - This patient does not meet hospital inpatient criteria and is placed in observation status. If this patient's primary payer is Medicare and was admitted as an inpatient, Condition Code 44 should be used and patient status changed to \"observation\".     RATIONALE FOR DETERMINATION     Gina Simpson is a 55 yo female with h/o ESRD, central venous stenosis s/p left innominate vein balloon angioplasty, and failed RUE dialysis access for which she is now POD#9 s/p left brachial artery to axillary vein ProCol AV bridge graft and ligation of left median cubital to brachial artery AV fistula. She presented to the ED on 7/6/22 for evaluation of swelling and pain in the left arm, numbness in left arm, shortness of breath, distention of neck veins.  Emergency department evaluation showed stable vital signs.  Laboratory evaluation showed potassium 3.3, creatinine 3.81, hemoglobin 9.4, and otherwise unremarkable labs.  Upper extremity venous  ultrasound showed partially occlusive DVT in the left brachial vein and thrombus within the left basilic vein. Graft is patent on exam with good thrill.  She was started on heparin drip and admitted to the hospital.  Upper extremity arterial ultrasound showed patent arteriovenous fistula.  There was a 4 x 2.7 x 1.5 cm antecolic fluid collection in the axilla and proximal upper arm favored to reflect a postoperative seroma.  Patient has been seen by vascular surgery and nephrology.  There are no plans for surgical intervention or dialysis at this time.    The severity of illness, intensity of " service provided, expected LOS and risk for adverse outcome make the care appropriate for further observation; however, doesn't meet criteria for hospital inpatient admission.  Keagan HWANG was notified of this determination by text page.    This document was produced using voice recognition software.      The information on this document is developed by the utilization review team in order for the business office to ensure compliance.  This only denotes the appropriateness of proper admission status and does not reflect the quality of care rendered.         The definitions of Inpatient Status and Observation Status used in making the determination above are those provided in the CMS Coverage Manual, Chapter 1 and Chapter 6, section 70.4.      Sincerely,    Navin Hargrove MD    Utilization Review  Physician Advisor  [facility].

## 2022-07-07 NOTE — H&P
[facility]    History and Physical  Hospitalist       Date of Admission:  7/6/2022  Date of Service (when I saw the patient): 07/07/22    Assessment & Plan   Gina Simpson is a 56 year old female with history of coronary artery disease, cardiomyopathy, NSTEMI, COPD, hypertension, ESRD, history of failed right upper extremity fistula, POD #9 status post left upper extremity AV fistula placement who now presents with pain and swelling of left arm and neck.  Evaluation here shows nonocclusive DVT in left brachial vein and superficial thrombophlebitis of left basilic vein.  Left upper extremity graft noted to be patent on ultrasound.  Hospitalist service was asked to assume care of the patient on 7/7/2022.    Left upper extremity DVT  Vascular surgery has seen the patient, and a ultrasound was done that shows patent flow to the AV fistula.  There is noted DVT in the left brachial vein and superficial thrombophlebitis of the left basilic vein.  Vascular surgery does not recommend any further procedure at this time.  Patient has been on a heparin drip, and will need anticoagulation going forward.   -- Patient started on heparin drip, will continue for now start warfarin  -- We will start warfarin, DOAC contraindicated in hemodialysis patient.  -- Daily INR, can discontinue heparin when INR herapeutic.  Can transition to subcutaneous Lovenox if patient is ready for discharge.  -- Pain management with Tylenol scheduled 975 mg TID.  IV dilaudid as needed for severe pain.  -- Continue supportive cares with compression sleeve, elevation.  -- Recommend close vascular surgery follow-up at discharge, and appreciate their service following in the hospital.     Status post left AV fistula formation 6/28/2022  End-stage renal disease on hemodialysis  Patient usually has her [facility] dialysis MWF - Dr. Gonsales  --Nephrology consulted for management while hospitalized  --Last dialysis 7/6, plan for next  dialysis 7/8.  --Monitor electrolytes  --Renal diet    CAD   Hx Cardiomyopathy  Hypertension  -- Continue PTA medications including hydralazine, lisinopril, carvedilol, amlodipine    Hyperphosphatemia  -- Continue PhosLo with meals    Chronic anemia  Related to ESRD  -- Monitor hemoglobin  -- Epogen with dialysis  Recent Labs   Lab 07/06/22 2038   HGB 9.4*       Constipation  --Bowel regimen ordered with scheduled senna-docusate twice daily, MiraLAX as needed.  Dulcolax available as needed.      Diet: Renal Diet (dialysis)     DVT Prophylaxis: Heparin drip, transitioning to warfarin as above  Antoine Catheter: Not present  Code Status: Full Code     Disposition Plan       Expected Discharge Date: 07/09/2022        Discharge Comments: hep gtt      Entered: ERI Rodrigues 07/07/2022, 10:14 AM          The patient's care was discussed with the Bedside Nurse and Patient.    The patient has been discussed with Dr. Gallagher, who agrees with the assessment and plan at this time.     Securely message with the Vocera Web Console (learn more here)  Text page via [employer]         ERI Rodrigues    Primary Care Physician   Dr. Clari Mohr MD    Chief Complaint   Left arm pain, DVT    History is obtained from the patient and in review of the EMR.    History of Present Illness   Gina Simpson is a very pleasant 56 year old female with history of coronary artery disease, cardiomyopathy, NSTEMI, COPD, hypertension, ESRD, history of failed right upper extremity axis, POD #9 status post left upper extremity AV fistula placement who now presents with pain and swelling of left arm and neck.  Evaluation shows nonocclusive DVT in left brachial vein and superficial thrombophlebitis of left basilic vein.  Left upper extremity graft noted to be patent on ultrasound.  Hospitalist service was asked to assume care of the patient on 7/7/2022.  Of note her last dialysis was done 7/6 through  her tunneled catheter and was without any complication.    Patient resting in bed on my arrival.  She reports pain in the anterior neck, right and left side.  Reports discomfort in the left upper extremity below the elbow extending to the wrist.  She has her left upper extremity wrapped.  AV fistula appears to be functioning on exam.  Vital signs are stable.  No fever, chills, chest pain, shortness of breath, abdominal pain, nausea, vomiting, diarrhea, dysuria, lightheadedness, or focal weakness/numbness.  Plan for dialysis tomorrow.  She offers no other concerns currently.  Followed by nephrology and vascular surgery.    Past Medical History    I have reviewed this patient's medical history and updated it with pertinent information if needed.   Past Medical History:   Diagnosis Date     Anemia      Cancer (H)      Coronary artery disease      Dialysis complication      Dialysis patient (H)      Embolism (H)      ESRD (end stage renal disease) (H)      Hypertension      Ischemic cardiomyopathy      Renal failure        Past Surgical History   I have reviewed this patient's surgical history and updated it with pertinent information if needed.  Past Surgical History:   Procedure Laterality Date     ANESTH,UPPER ARM AV FISTULA REPAIR       CREATE FISTULA ARTERIOVENOUS UPPER EXTREMITY Left 3/25/2022    Procedure: CREATION OF FIRST STAGE LEFT BRACHIOBASILIC ARTERIOVENOUS FISTULA;  Surgeon: Akash Miller MD;  Location:  OR     CREATE FISTULA ARTERIOVENOUS UPPER EXTREMITY Left 6/28/2022    Procedure: LEFT BRACHIO-BASILIC ARTERIOVENOUS FISTULA WITH BIOPROSTHETIC GRAFT;  Surgeon: Akash Miller MD;  Location:  OR     IR DIALYSIS FISTULOGRAM LEFT  5/31/2022     LIGATE FISTULA ARTERIOVENOUS UPPER EXTREMITY Left 6/28/2022    Procedure: Ligate fistula arteriovenous upper extremity;  Surgeon: Akash Miller MD;  Location:  OR     WISDOM TEETH         Social History   I have reviewed this patient's social  history and updated it with pertinent information if needed.  .    Social History     Tobacco Use     Smoking status: Current Every Day Smoker     Years: 40.00     Types: Cigarettes     Smokeless tobacco: Never Used     Tobacco comment: 6 cigarettes per day.    Substance Use Topics     Alcohol use: Never     Drug use: Never       Family History   I have reviewed this patient's family history and updated it with pertinent information if needed.   Reviewed with the patient, noncontributory to this presentation.    Medications   Prior to Admission Medications   Prescriptions Last Dose Informant Patient Reported? Taking?   B Complex-C-Folic Acid (DEEPALI CAPS) 1 MG CAPS 7/6/2022 at Unknown time  Yes Yes   Sig: Take 1 capsule by mouth daily   amLODIPine (NORVASC) 10 MG tablet 7/5/2022 at Unknown time  Yes Yes   Sig: Take 10 mg by mouth every evening   atorvastatin (LIPITOR) 20 MG tablet 7/6/2022 at Unknown time  Yes Yes   Sig: Take 20 mg by mouth daily   calcium acetate (PHOSLO) 667 MG CAPS capsule 7/6/2022 at Unknown time  Yes Yes   Sig: Take 2,001 mg by mouth 3 times daily (with meals)   carvedilol (COREG) 12.5 MG tablet 7/6/2022 at x1  Yes Yes   Sig: Take 12.5 mg by mouth 2 times daily (with meals)   hydrALAZINE (APRESOLINE) 50 MG tablet 7/6/2022 at x2  Yes Yes   Sig: Take 50 mg by mouth 3 times daily   isosorbide mononitrate (IMDUR) 30 MG 24 hr tablet 7/6/2022 at Unknown time  Yes Yes   Sig: Take 30 mg by mouth daily In addition to 60 mg tablet to total 90 mg daily   isosorbide mononitrate (IMDUR) 60 MG 24 hr tablet 7/6/2022 at Unknown time  Yes Yes   Sig: Take 60 mg by mouth daily With 30 mg tablet to total 90 mg daily   lisinopril (ZESTRIL) 10 MG tablet 7/5/2022 at Unknown time  Yes Yes   Sig: Take 10 mg by mouth every evening   oxyCODONE (ROXICODONE) 5 MG tablet  at has not taken  No Yes   Sig: Take 1 tablet (5 mg) by mouth every 6 hours as needed for pain      Facility-Administered Medications: None     Allergies    No Known Allergies    Review of Systems   The 10 point Review of Systems is negative other than noted in the HPI.    Physical Exam   Temp: 97.9  F (36.6  C) Temp src: Oral BP: 127/73 Pulse: 78   Resp: 16 SpO2: 100 % O2 Device: Nasal cannula Oxygen Delivery: 2 LPM  Vital Signs with Ranges  Temp:  [97.9  F (36.6  C)-98.5  F (36.9  C)] 97.9  F (36.6  C)  Pulse:  [75-95] 78  Resp:  [13-28] 16  BP: (116-151)/(65-92) 127/73  SpO2:  [86 %-100 %] 100 %  123 lbs 0 oz    Constitutional: Awake, alert, cooperative, no apparent distress.    ENT: Normocephalic, without obvious abnormality, atraumatic, oropharynx with moist mucus membranes.  Eyes pupils are equal, round; extra occular movements intact.  Normal sclera.    Neck: Supple, symmetrical, trachea midline, no adenopathy.  Jugular vein distention present.  Pulmonary: No increased work of breathing, good air exchange, clear to auscultation bilaterally, no crackles or wheezing.  Cardiovascular: Regular rate and rhythm, normal S1 and S2, and no murmur noted.  GI: Normal bowel sounds, soft, non-distended, non-tender.   Skin/Integumen: Warm, dry, nondiaphoretic  Neuro: CN II-XII grossly intact.  Moves all 4 extremities with 5/5 strength.  Speech normal.  No facial droop.  Psych:  Alert and oriented to self, place, situation. Normal affect.  Extremities: Left lower extremity with dressing.  Left upper extremity surgical wound healing without any bleeding noted.  Her left upper extremity does have diffuse swelling.  No bilateral lower extremity swelling noted.    Data   Data reviewed today:  I personally reviewed all labs and imaging reports from the last 24 hours.  Recent Labs   Lab 07/06/22 2038   WBC 6.5   HGB 9.4*   MCV 88      INR 0.99      POTASSIUM 3.3*   CHLORIDE 100   CO2 30   BUN 13   CR 3.81*   ANIONGAP 6   JEWELL 10.4*   *       Results for orders placed or performed during the hospital encounter of 07/06/22 (from the past 24 hour(s))   CBC with  platelets differential    Narrative    The following orders were created for panel order CBC with platelets differential.  Procedure                               Abnormality         Status                     ---------                               -----------         ------                     CBC with platelets and d...[[id number]]  Abnormal            Final result                 Please view results for these tests on the individual orders.   INR   Result Value Ref Range    INR 0.99 0.85 - 1.15   Partial thromboplastin time   Result Value Ref Range    aPTT 26 22 - 38 Seconds   Basic metabolic panel   Result Value Ref Range    Sodium 136 133 - 144 mmol/L    Potassium 3.3 (L) 3.4 - 5.3 mmol/L    Chloride 100 94 - 109 mmol/L    Carbon Dioxide (CO2) 30 20 - 32 mmol/L    Anion Gap 6 3 - 14 mmol/L    Urea Nitrogen 13 7 - 30 mg/dL    Creatinine 3.81 (H) 0.52 - 1.04 mg/dL    Calcium 10.4 (H) 8.5 - 10.1 mg/dL    Glucose 148 (H) 70 - 99 mg/dL    GFR Estimate 13 (L) >60 mL/min/1.73m2   CBC with platelets and differential   Result Value Ref Range    WBC Count 6.5 4.0 - 11.0 10e3/uL    RBC Count 3.28 (L) 3.80 - 5.20 10e6/uL    Hemoglobin 9.4 (L) 11.7 - 15.7 g/dL    Hematocrit 29.0 (L) 35.0 - 47.0 %    MCV 88 78 - 100 fL    MCH 28.7 26.5 - 33.0 pg    MCHC 32.4 31.5 - 36.5 g/dL    RDW 12.8 10.0 - 15.0 %    Platelet Count 288 150 - 450 10e3/uL    % Neutrophils 65 %    % Lymphocytes 24 %    % Monocytes 9 %    % Eosinophils 1 %    % Basophils 1 %    % Immature Granulocytes 0 %    NRBCs per 100 WBC 0 <1 /100    Absolute Neutrophils 4.3 1.6 - 8.3 10e3/uL    Absolute Lymphocytes 1.5 0.8 - 5.3 10e3/uL    Absolute Monocytes 0.6 0.0 - 1.3 10e3/uL    Absolute Eosinophils 0.0 0.0 - 0.7 10e3/uL    Absolute Basophils 0.0 0.0 - 0.2 10e3/uL    Absolute Immature Granulocytes 0.0 <=0.4 10e3/uL    Absolute NRBCs 0.0 10e3/uL   US Upper Extremity Venous Duplex Left   Result Value Ref Range    Radiologist flags (Urgent)      Partially occlusive DVT  of the left brachial vein and superficial thrombophlebitis of the basilic vein    Narrative    EXAM: US UPPER EXTREMITY VENOUS DUPLEX LEFT  LOCATION: [facility]  DATE/TIME: 7/6/2022 10:29 PM    INDICATION: arm swelling  COMPARISON: None.  TECHNIQUE: Venous Duplex ultrasound of the left upper extremity with (when possible) and without compression, augmentation, and duplex. Color flow and spectral Doppler with waveform analysis performed.    FINDINGS: Ultrasound includes evaluation of the internal jugular vein, innominate vein, subclavian vein, axillary vein, and brachial vein. The superficial cephalic and basilic veins were also evaluated where seen.     LEFT: Partially occlusive thrombus seen within the left brachial vein in the mid humerus . Additionally there is a thrombus within the basilic vein from the mid humerus distal to the AV fistula.       Impression    IMPRESSION:   1.  Partially occlusive thrombus within the left brachial vein at the level of the midhumerus   2.  Thrombus within the basilic vein proximal to the AV fistula        [Urgent Result: Partially occlusive DVT of the left brachial vein and superficial thrombophlebitis of the basilic vein]    Finding was identified on 7/6/2022 11:28 PM.     Dr DUYEN VEGAS was contacted by me on 7/6/2022 11:54 PM and verbalized understanding of the critical result .     Asymptomatic COVID-19 Virus (Coronavirus) by PCR Nasopharyngeal    Specimen: Nasopharyngeal; Swab   Result Value Ref Range    SARS CoV2 PCR Negative Negative    Narrative    Testing was performed using the Xpert Xpress SARS-CoV-2 Assay on the   Cepheid Gene-Xpert Instrument Systems. Additional information about   this Emergency Use Authorization (EUA) assay can be found via the Lab   Guide. This test should be ordered for the detection of SARS-CoV-2 in   individuals who meet SARS-CoV-2 clinical and/or epidemiological   criteria. Test performance is unknown in  asymptomatic patients. This   test is for in vitro diagnostic use under the FDA EUA for   laboratories certified under CLIA to perform high complexity testing.   This test has not been FDA cleared or approved. A negative result   does not rule out the presence of PCR inhibitors in the specimen or   target RNA in concentration below the limit of detection for the   assay. The possibility of a false negative should be considered if   the patient's recent exposure or clinical presentation suggests   COVID-19. This test was validated by the [facility]. This laboratory is certified under the Clinical Laboratory Improvement Amendments of 1988 (CLIA-88) as qualified to perform high complexity laboratory testing.     US Ext Arterial Venous Dialys Acs Graft    Narrative    EXAM: Duplex ultrasonography of upper extremity fistula graft  LOCATION: [facility]  DATE/TIME: 7/7/2022 1:43 AM    INDICATION: s p left brachial artery to left axillary vein dialysis graft, s p left median cubital vein ligation  COMPARISON: None    TECHNIQUE: Gray-scale imaging, spectral wave analysis, and color-flow imaging was performed of left upper extremity    FINDINGS:   DUPLEX:   The AV fistula is patent.     The velocities throughout the fistula are as follows.      Brachial artery proximal to 280/142 cm/s  diameter  6.5 mm  Brachial artery mid to 257/151 cm/s         diameter 6.4 mm  Brachial artery distal to 252/148 cm/s       diameter 6.6 mm    Arterial venous anastomosis 360/180 cm/s  diameter  5.8 mm    Brachial vein distal  360/152 cm/sec          diameter 5.7 mm  Brachial vein Mid  292/152 cm/sec            diameter 6.3 mm  Brachial vein proximal  370/194 cm/sec     diameter 6.0 mm      Additionally there is a anechoic cystic structure seen in the proximal humerus extending towards the axilla measuring 4 x 2.7 x 1.5 cm.        Impression    IMPRESSION:  1.  PATENT ARTERIOVENOUS  FISTULA  2.  4 X 2.7 X 1.5 CM ANECHOIC FLUID COLLECTION IN THE AXILLA AND PROXIMAL UPPER ARM, FAVORED TO REFLECT A POSTOPERATIVE SEROMA.   Heparin Unfractionated Anti Xa Level   Result Value Ref Range    Anti Xa Unfractionated Heparin 0.58 For Reference Range, See Comment IU/mL    Narrative    Therapeutic Range: UFH: 0.25-0.50 IU/mL for low intensity dosing,  0.30-0.70 IU/mL for high intensity dosing DVT and PE.  This test is not validated for other direct factor X inhibitors (e.g. rivaroxaban, apixaban, edoxaban, betrixaban, fondaparinux) and should not be used for monitoring of other medications.   Phosphorus   Result Value Ref Range    Phosphorus 3.3 2.5 - 4.5 mg/dL

## 2022-07-07 NOTE — CONSULTS
Consult Date: 07/07/2022    HEMATOLOGY CONSULTATION    REQUESTING PHYSICIAN:    This consult has been requested by Dr. Kentrell Valenzuela for left upper extremity deep venous thrombosis.    HISTORY OF PRESENT ILLNESS:    Ms. Simpson is a 56-year-old female with multiple medical problems including end-stage renal disease on dialysis.  On 06/28/2022, patient had surgery on the left upper extremity.  The patient had ligation of left brachiobasilic AV fistula and creation of left brachial artery to axillary vein ProCol AV bridge graft.    The patient presented to the emergency room on 07/06/2022 because of left upper extremity swelling.  She had multiple investigations done.  -CBC revealed anemia. Normal WBC and platelet.  -BMP revealed elevated creatinine.  -Normal INR and PTT.  -Left upper extremity ultrasound revealed partially occlusive thrombus within the left brachial vein at the level of mid humerus.  There is thrombus within the basilic vein proximal to the AV fistula.  -Arterial duplex ultrasonography reveals a patent AV fistula.  There is a 4 cm fluid collection in the axilla and proximal upper arm.    The patient was admitted to the hospital for further management.  The patient is on heparin drip. In 09/2021, the patient had catheter-associated right IJ thrombus extending into brachiocephalic vein.  Ultrasound on 09/30/2021 revealed occlusion of right internal jugular vein with clots seen extending into right brachiocephalic vein.  No extension into the right subclavian, axillary or brachial vein.    The patient denies bleeding from any site.  No headache.  No dizziness.  No chest pain.  No shortness of breath.  No nausea or vomiting.  Appetite is decreased.  Left upper extremity swelling is stable to slightly better.  All other review of systems is negative.    ALLERGIES:  Reviewed.    MEDICATIONS:  Reviewed.    PAST MEDICAL HISTORY:    1.  End-stage renal disease on dialysis.  2.  Hypertension.  3.  Ischemic  cardiomyopathy.  4.  Coronary artery disease.  5.  Anemia from renal disease.  6.  Coronary angiogram and stent placement.    SOCIAL HISTORY:    -The patient is a chronic smoker.  -No alcohol use.    PHYSICAL EXAMINATION:    GENERAL:  She is alert and oriented x 3.  Not in distress.  VITALS:  Reviewed.  Rest of systems not examined.    LABORATORY DATA:  Reviewed.    IMPRESSION:     1.  A 56-year-old female with left upper extremity deep venous thrombosis.  There is partially occlusive thrombus within the left brachial vein and there is thrombus within the cephalic vein.  This is secondary to her recent vascular procedure on left upper extremity on 2022.  2.  History of catheter-associated right IJ thrombus.  3.  End-stage renal disease on dialysis.  4.  Normocytic anemia from renal disease and anemia of chronic disease.  5.  Other medical problems including coronary artery disease.    RECOMMENDATIONS:     1.  The patient has left upper extremity deep venous thrombosis.  I explained to her this is provoked from her vascular procedure last week.  Since this is provoked, no hypercoagulable workup needed.  2.  Discussed regarding treatment.  She is on heparin drip.  Agree with hospitalist team to transition to warfarin.  I would recommend 3 months of anticoagulation.  As this is provoked thrombosis, no need for indefinite anticoagulation.  3.  The patient had a few questions, which were all answered.  Case discussed with ERI Nevarez. Hematology/Oncology will sign off.  Please call us with any questions.    Total time spent:  55 minutes.  Time spent in today's visit, review of chart/investigations today, communicating with other providers and documentation.    Arlene Reyna MD        D: 2022   T: 2022   MT: [id number]    Name:     GIO HAMMOND  MRN:      [id number]        Account:      [id number]   :      1965           Consult Date: 2022     Document: [id number]     no previous reaction

## 2022-07-07 NOTE — CONSULTS
Nephrology Initial Consult  July 7, 2022      Gina Simpson MRN:[id number] YOB: 1965  Date of Admission:7/6/2022  Primary care provider: Clari Garza MD  Requesting physician: Maggie Gandhi*    ASSESSMENT AND RECOMMENDATIONS:   1 ESRD  -   Outpatient dialysis orders-  Monday Wednesday Friday, Dr Ilda Taylor  Last reported Target weight-61 kg.  Well below that here.  Epogen-600 units with dialysis  Dialysate  Base Sodium 138 mEq/L    Dialysate  Potassium 2k    Dialysate  Calcium 2.5 mEq/L    Dialysate  BiCarb 35 mEq/L    Dialysate Flow Rate  500 ML/min    Blood Flow Rate  400 ML/min    Treatment Time  195 min    Access  CVC Catheter Jugular (Left)    Heparin Hourly Dose  500 Units/hr (1:1,000 concentration)    Heparin Load  1000 Units (1:1,000 concentration)      Last dialysis yesterday.  No indication for dialysis today.  Plan for dialysis tomorrow.    2 anemia in ESRD-Epogen 1000 unit with dialysis    3 left upper extremity DVT-on heparin drip.  Vascular surgery on board.    4 hypertension-continue PTA hydralazine, lisinopril, carvedilol, amlodipine    5 hyperphosphatemia-PhosLo with meals    Thank you for the consult. Will continue to follow along with you .          Guero Foy MD  [facility] Nephrology   [phone]        REASON FOR CONSULT: ESRD    HISTORY OF PRESENT ILLNESS:  Gina Simpson is a 56 year old female with history of hypertension, ESRD, history of failed right upper extremity axis, POD#9 status post left upper extremity AV fistula placement who now presents with pain and swelling of left arm and neck.  Evaluation here shows nonocclusive DVT in left brachial vein and superficial thrombophlebitis of left basilic vein.  Left upper extremity graft noted to be patent on exam (ultrasound not done)    Currently on heparin drip.    We have been consulted to provide routine hemodialysis while she is here.  Her last dialysis was  yesterday through tunneled catheter and was uneventful.  Appears comfortable.  No shortness of breath.  Blood pressure okay.    Outpatient dialysis orders-  Monday Wednesday Friday, Dr Ilda Taylor  Last reported Target weight-61 kg.  Well below that here.  Epogen-600 units with dialysis  Dialysate  Base Sodium 138 mEq/L    Dialysate  Potassium 2k    Dialysate  Calcium 2.5 mEq/L    Dialysate  BiCarb 35 mEq/L    Dialysate Flow Rate  500 ML/min    Blood Flow Rate  400 ML/min    Treatment Time  195 min    Access  CVC Catheter Jugular (Left)    Heparin Hourly Dose  500 Units/hr (1:1,000 concentration)    Heparin Load  1000 Units (1:1,000 concentration)          PAST MEDICAL HISTORY:  Reviewed with patient on 07/07/2022  and is as listed in HPI.       MEDICATIONS:  PTA Meds  Prior to Admission medications    Medication Sig Last Dose Taking? Auth Provider Long Term End Date   amLODIPine (NORVASC) 10 MG tablet Take 10 mg by mouth every evening 7/5/2022 at Unknown time Yes Reported, Patient     atorvastatin (LIPITOR) 20 MG tablet Take 20 mg by mouth daily 7/6/2022 at Unknown time Yes Reported, Patient     B Complex-C-Folic Acid (DEEPALI CAPS) 1 MG CAPS Take 1 capsule by mouth daily 7/6/2022 at Unknown time Yes Reported, Patient     calcium acetate (PHOSLO) 667 MG CAPS capsule Take 2,001 mg by mouth 3 times daily (with meals) 7/6/2022 at Unknown time Yes Unknown, Entered By History     carvedilol (COREG) 12.5 MG tablet Take 12.5 mg by mouth 2 times daily (with meals) 7/6/2022 at x1 Yes Reported, Patient     hydrALAZINE (APRESOLINE) 50 MG tablet Take 50 mg by mouth 3 times daily 7/6/2022 at x2 Yes Reported, Patient     isosorbide mononitrate (IMDUR) 30 MG 24 hr tablet Take 30 mg by mouth daily In addition to 60 mg tablet to total 90 mg daily 7/6/2022 at Unknown time Yes Unknown, Entered By History     isosorbide mononitrate (IMDUR) 60 MG 24 hr tablet Take 60 mg by mouth daily With 30 mg tablet to total 90 mg daily  "2022 at Unknown time Yes Reported, Patient  22   lisinopril (ZESTRIL) 10 MG tablet Take 10 mg by mouth every evening 2022 at Unknown time Yes Reported, Patient     oxyCODONE (ROXICODONE) 5 MG tablet Take 1 tablet (5 mg) by mouth every 6 hours as needed for pain  at has not taken Yes Akash Miller MD        Current Meds    - MEDICATION INSTRUCTIONS for Dialysis Patients -   Does not apply See Admin Instructions     acetaminophen  975 mg Oral Q8H     amLODIPine  10 mg Oral QPM     atorvastatin  20 mg Oral Daily     calcium acetate  2,001 mg Oral TID w/meals     carvedilol  12.5 mg Oral BID w/meals     hydrALAZINE  50 mg Oral TID     lisinopril  10 mg Oral QPM     Vladimir Caps  1 capsule Oral Daily     senna-docusate  1 tablet Oral BID     Infusion Meds    heparin 1,000 Units/hr (22 0442)     lactated ringers Stopped (22 0240)       ALLERGIES:    No Known Allergies    REVIEW OF SYSTEMS:  A comprehensive of systems was negative except as noted above.    SOCIAL HISTORY:   Reviewed with patient on 2022     FAMILY MEDICAL HISTORY:   No family history on file.  Reviewed with patient on 2022     PHYSICAL EXAM:   Temp  Av.1  F (36.7  C)  Min: 97.9  F (36.6  C)  Max: 98.5  F (36.9  C)      Pulse  Av.6  Min: 75  Max: 95 Resp  Av.8  Min: 13  Max: 28  SpO2  Av.5 %  Min: 86 %  Max: 100 %       /73 (BP Location: Right arm)   Pulse 78   Temp 97.9  F (36.6  C) (Oral)   Resp 16   Ht 1.651 m (5' 5\")   Wt 55.8 kg (123 lb)   SpO2 100%   BMI 20.47 kg/m        Admit Weight: 55.8 kg (123 lb)     GENERAL APPEARANCE: no distress,  awake  EYES: no scleral icterus, pupils equal  HENT: NC/AT,  mouth  without ulcers or lesions  Lymphatics: no cervical or supraclavicular LAD  Endo: no moon facies, no goiter  Pulmonary: lungs clear to auscultation with equal breath sounds bilaterally, no clubbing  CV: regular rhythm, normal rate, no rub   - JVP -   - Edema-  GI: soft, " nontender,   MS: no evidence of inflammation in joints  : no reyes  SKIN: no rash, warm, dry, no cyanosis  NEURO: face symmetric, no asterixis   internal jugular TDC - site looks okay   Lt UE  With bandage     LABS:   CMP  Recent Labs   Lab 07/07/22  0845 07/06/22 2038   NA  --  136   POTASSIUM  --  3.3*   CHLORIDE  --  100   CO2  --  30   ANIONGAP  --  6   GLC  --  148*   BUN  --  13   CR  --  3.81*   GFRESTIMATED  --  13*   JEWELL  --  10.4*   PHOS 3.3  --      CBC  Recent Labs   Lab 07/06/22 2038   HGB 9.4*   WBC 6.5   RBC 3.28*   HCT 29.0*   MCV 88   MCH 28.7   MCHC 32.4   RDW 12.8        INR  Recent Labs   Lab 07/06/22 2038   INR 0.99   PTT 26       IMAGING:  Personally reviewed the images and findings are as listed in HPI     Guero Foy MD

## 2022-07-07 NOTE — PROGRESS NOTES
Observation Note    -diagnostic tests and consults completed and resulted NOT MET  -vital signs normal or at patient baseline MET  -dyspnea improved and O2 sats greater than 88% on room air or prior home oxygen levels MET  -safe disposition plan has been identified NOT MET  - Specialist consults and work-up complete MET  - dialysis complete on 7/8, NOT MET  - symptoms managed on p.o. medication NOT MET  -  tolerating diet MET    Hemtalogy/oncology have signed off. Continues to be followed by hospitalist and nephrology team. Plan to begin bridging with po Coumadin tonight. INR check in AM.

## 2022-07-07 NOTE — PHARMACY-ADMISSION MEDICATION HISTORY
Pharmacy Medication History  Admission medication history interview status for the 7/6/2022  admission is complete. See EPIC admission navigator for prior to admission medications     Location of Interview: Patient room  Medication history sources: Patient and Surescripts    Significant changes made to the medication list:  Added Phoslo    In the past week, patient estimated taking medication this percent of the time: greater than 90%    Additional medication history information:   None    Medication reconciliation completed by provider prior to medication history? No    Time spent in this activity: 15 min    Prior to Admission medications    Medication Sig Last Dose Taking? Auth Provider Long Term End Date   amLODIPine (NORVASC) 10 MG tablet Take 10 mg by mouth every evening 7/5/2022 at Unknown time Yes Reported, Patient     atorvastatin (LIPITOR) 20 MG tablet Take 20 mg by mouth daily 7/6/2022 at Unknown time Yes Reported, Patient     B Complex-C-Folic Acid (DEEPALI CAPS) 1 MG CAPS Take 1 capsule by mouth daily 7/6/2022 at Unknown time Yes Reported, Patient     calcium acetate (PHOSLO) 667 MG CAPS capsule Take 2,001 mg by mouth 3 times daily (with meals) 7/6/2022 at Unknown time Yes Unknown, Entered By History     carvedilol (COREG) 12.5 MG tablet Take 12.5 mg by mouth 2 times daily (with meals) 7/6/2022 at x1 Yes Reported, Patient     hydrALAZINE (APRESOLINE) 50 MG tablet Take 50 mg by mouth 3 times daily 7/6/2022 at x2 Yes Reported, Patient     isosorbide mononitrate (IMDUR) 30 MG 24 hr tablet Take 30 mg by mouth daily In addition to 60 mg tablet to total 90 mg daily 7/6/2022 at Unknown time Yes Unknown, Entered By History     isosorbide mononitrate (IMDUR) 60 MG 24 hr tablet Take 60 mg by mouth daily With 30 mg tablet to total 90 mg daily 7/6/2022 at Unknown time Yes Reported, Patient  9/5/22   lisinopril (ZESTRIL) 10 MG tablet Take 10 mg by mouth every evening 7/5/2022 at Unknown time Yes Reported, Patient      oxyCODONE (ROXICODONE) 5 MG tablet Take 1 tablet (5 mg) by mouth every 6 hours as needed for pain  at has not taken Yes Akash Miller MD       The information provided in this note is only as accurate as the sources available at the time of update(s)     Christel Hdez, SimonD, BCPS

## 2022-07-08 NOTE — PROGRESS NOTES
PRIMARY DIAGNOSIS: DVT  OUTPATIENT/OBSERVATION GOALS TO BE MET BEFORE DISCHARGE:  1. Anticoagulant treatment initiated: Yes; Warfarin per flow sheet- bridging from hep drip    2. Diagnostic testing complete (if applicable): No    3. Adequate home care or support arranged for LMWH administration: Yes    4. Return to near baseline physical activity: Yes    5. Pain Status: Improved-controlled with oral pain medications.    Discharge Planner Nurse   Safe discharge environment identified: Yes  Barriers to discharge: Continue testing ordered       Entered by: Shu Duckworth RN 07/08/2022 11:01 AM     Please review provider order for any additional goals.   Nurse to notify provider when observation goals have been met and patient is ready for discharge.

## 2022-07-08 NOTE — BRIEF OP NOTE
[facility]    Brief Operative Note    Pre-operative diagnosis: Fistula [L98.8]  Post-operative diagnosis Left upper arm hematoma and AV graft bleed    Procedure: Procedure(s):  EXPLORE LEFT ARM, REPAIR OF LEFT UPPER ARM AV DIALYSIS GRAFT, EVACUATE HEMATOMA  Surgeon: Surgeon(s) and Role:     * Akash Miller MD - Primary  Anesthesia: General   Estimated Blood Loss: 50 mL from 7/8/2022  3:25 PM to 7/8/2022  5:08 PM      Drains: None  Specimens:   ID Type Source Tests Collected by Time Destination   A :  Blood Vein BASIC METABOLIC PANEL, ABO/RH TYPE AND SCREEN, HEMOGLOBIN Akash Miller MD 7/8/2022  3:52 PM      Findings:   left AV graft with two needle stick defects with active bleeding, repaired and hematoma evacuated. Thrill intact post op.  Complications: None.  Implants: * No implants in log *

## 2022-07-08 NOTE — PROGRESS NOTES
Notified by dialysis nurse that patient is going to the OR from IR for evacuation of hematoma.  I have not been able to see the patient is he has been off the floor.  Chart reviewed.  Vitals okay.  She remains under target weight.  BUN 24.  Potassium 3.6.    Will defer dialysis today and plan to do it tomorrow.    Guero Foy MD  [facility] - Nephrology   [phone]

## 2022-07-08 NOTE — ANESTHESIA PREPROCEDURE EVALUATION
Anesthesia Pre-Procedure Evaluation    Patient: Gina Simpson   MRN: [id number] : 1965        Procedure : Procedure(s):  CREATION, GRAFT, ARTERIOVENOUS, LEFT UPPER EXTREMITY          Past Medical History:   Diagnosis Date     Anemia      Cancer (H)      Coronary artery disease      Dialysis complication      Dialysis patient (H)      Embolism (H)      ESRD (end stage renal disease) (H)      Hypertension      Ischemic cardiomyopathy      Renal failure       Past Surgical History:   Procedure Laterality Date     ANESTH,UPPER ARM AV FISTULA REPAIR       CREATE FISTULA ARTERIOVENOUS UPPER EXTREMITY Left 3/25/2022    Procedure: CREATION OF FIRST STAGE LEFT BRACHIOBASILIC ARTERIOVENOUS FISTULA;  Surgeon: Akash Miller MD;  Location: [facility] OR     CREATE FISTULA ARTERIOVENOUS UPPER EXTREMITY Left 2022    Procedure: LEFT BRACHIO-BASILIC ARTERIOVENOUS FISTULA WITH BIOPROSTHETIC GRAFT;  Surgeon: Akash Miller MD;  Location: [facility] OR     IR DIALYSIS FISTULOGRAM LEFT  2022     LIGATE FISTULA ARTERIOVENOUS UPPER EXTREMITY Left 2022    Procedure: Ligate fistula arteriovenous upper extremity;  Surgeon: Akash Miller MD;  Location: [facility] OR     WISDOM TEETH        No Known Allergies   Social History     Tobacco Use     Smoking status: Current Every Day Smoker     Years: 40.00     Types: Cigarettes     Smokeless tobacco: Never Used     Tobacco comment: 6 cigarettes per day.    Substance Use Topics     Alcohol use: Never      Wt Readings from Last 1 Encounters:   22 55.8 kg (123 lb)        Anesthesia Evaluation   Pt has had prior anesthetic.         ROS/MED HX  ENT/Pulmonary:     (+) tobacco use, COPD,     Neurologic:     (+) seizures, CVA,     Cardiovascular:     (+) Dyslipidemia hypertension--CAD --stent (drug eluding)-CHF etiology: EF 40-45%     METS/Exercise Tolerance:     Hematologic:       Musculoskeletal:       GI/Hepatic:    (-) GERD   Renal/Genitourinary:     (+) renal disease, type:  ESRD, Pt requires dialysis,     Endo:       Psychiatric/Substance Use:       Infectious Disease:       Malignancy:       Other: Comment: Right inominate vein thrombosis  Hx of jugular thrombosis, eliquis hx           Physical Exam    Airway        Mallampati: I   TM distance: > 3 FB   Neck ROM: full   Mouth opening: > 3 cm    Respiratory Devices and Support         Dental  no notable dental history         Cardiovascular   cardiovascular exam normal          Pulmonary   pulmonary exam normal                OUTSIDE LABS:  CBC:   Lab Results   Component Value Date    WBC 6.4 07/08/2022    WBC 6.9 07/08/2022    HGB 9.7 (L) 07/08/2022    HGB 10.4 (L) 07/08/2022    HCT 31.0 (L) 07/08/2022    HCT 32.1 (L) 07/08/2022     07/08/2022     07/08/2022     BMP:   Lab Results   Component Value Date     07/08/2022     07/06/2022    POTASSIUM 3.6 07/08/2022    POTASSIUM 3.3 (L) 07/06/2022    CHLORIDE 98 07/08/2022    CHLORIDE 100 07/06/2022    CO2 26 07/08/2022    CO2 30 07/06/2022    BUN 24 07/08/2022    BUN 13 07/06/2022    CR 6.30 (H) 07/08/2022    CR 3.81 (H) 07/06/2022    GLC 91 07/08/2022     (H) 07/06/2022     COAGS:   Lab Results   Component Value Date    PTT 26 07/06/2022    INR 1.06 07/08/2022     POC: No results found for: BGM, HCG, HCGS  HEPATIC: No results found for: ALBUMIN, PROTTOTAL, ALT, AST, GGT, ALKPHOS, BILITOTAL, BILIDIRECT, CALDERON  OTHER:   Lab Results   Component Value Date    A1C 5.5 06/28/2022    JEWELL 10.5 (H) 07/08/2022    PHOS 3.3 07/07/2022       Anesthesia Plan    ASA Status:  3, emergent       Anesthesia Type: General.   Induction: Intravenous.   Maintenance: Balanced.        Consents    Anesthesia Plan(s) and associated risks, benefits, and realistic alternatives discussed. Questions answered and patient/representative(s) expressed understanding.    - Discussed:     - Discussed with:  Patient         Postoperative Care    Pain management: IV analgesics, Oral pain  medications.   PONV prophylaxis: Ondansetron (or other 5HT-3), Background Propofol Infusion, Dexamethasone or Solumedrol     Comments:                Charis Beach

## 2022-07-08 NOTE — ANESTHESIA CARE TRANSFER NOTE
Patient: Gina Simpson    Procedure: Procedure(s):  EXPLORE LEFT ARM, REPAIR OF LEFT UPPER ARM AV DIALYSIS GRAFT, EVACUATE HEMATOMA       Diagnosis: Fistula [L98.8]  Diagnosis Additional Information: No value filed.    Anesthesia Type:   General     Note:    Oropharynx: oropharynx clear of all foreign objects and spontaneously breathing  Level of Consciousness: awake  Oxygen Supplementation: face mask  Level of Supplemental Oxygen (L/min / FiO2): 6  Independent Airway: airway patency satisfactory and stable  Dentition: dentition unchanged  Vital Signs Stable: post-procedure vital signs reviewed and stable  Report to RN Given: handoff report given  Patient transferred to: PACU  Comments: Neuromuscular blockade reversed with sugammadex, spontaneous respirations, adequate tidal volumes, followed commands to voice, oropharynx suctioned with soft flexible catheter, extubated atraumatically, extubated with suction, airway patent after extubation.  Oxygen via facemask at 6 liters per minute to PACU. Oxygen tubing connected to wall O2 in PACU, SpO2, NiBP, and EKG monitors and alarms on and functioning, Cuauhtemoc Hugger warmer connected to patient gown, report on patient's clinical status given to PACU RN, RN questions answered.     Handoff Report: Identifed the Patient, Identified the Reponsible Provider, Reviewed the pertinent medical history, Discussed the surgical course, Reviewed Intra-OP anesthesia mangement and issues during anesthesia, Set expectations for post-procedure period and Allowed opportunity for questions and acknowledgement of understanding      Vitals:  Vitals Value Taken Time   /92 07/08/22 1709   Temp 36.5  C (97.7  F) 07/08/22 1709   Pulse 69 07/08/22 1713   Resp 16 07/08/22 1713   SpO2 100 % 07/08/22 1713   Vitals shown include unvalidated device data.    Electronically Signed By: ERIC Vickers CRNA  July 8, 2022  5:14 PM

## 2022-07-08 NOTE — IR NOTE
Interventional Radiology Intra-procedural Nursing Note    Patient Name: Gina Simpson  Medical Record Number: [id number]  Today's Date: July 8, 2022    Start Time: 1330  End of procedure time: 1350  Restart Time: 1431  End of 2nd procedure time: 1454  Procedure: Left arm fistula fistulagram with possible angioplasty and/or stent placement with IV moderate sedation   Report given to: Shu Neuro RN  Time pt departs:  1403    Other Notes: Pt into IR suite 2 via cart. Pt awake and alert. To table in Supine position. Monitoring equipment applied. VSS. Tele SR. Dr. Magana in room. Time out and procedure started. Pt tolerated procedure well. Debrief with Dr. Magana. Dressing placed and pressure held until hemostasis achieved. Dressing CDI. No complications. Pt transferred back to Station 73.    Medications:    Versed 2.5 mg  Fentanyl 125 mcg  Lidocaine 1% 2 ml    Curtis Souza RN

## 2022-07-08 NOTE — PLAN OF CARE
Reason for Admission: DVT    Cognitive/Mentation: A/Ox 4  Neuros/CMS: Intact ex numbness L hand  VS: intact.  GI: BS active x4, passing flatus, last BM 7/7/22. Continent.  : Voiding . inContinent.  Pulmonary: LS clear throughout.  Pain: reports some headache pain, 6/10, declined intervention.     Drains/Lines: central port for dialysis  Skin:  L upper arm incision from fistula/hematoma evacuation    Activity: Assist x 1 with GB.  Diet: renal with thin liquids. Takes pills whole with water.     Therapies recs: pending  Discharge: pending, bridging from heparin to coumadin    Aggression Stoplight Tool: green    End of shift summary: Pt arrived back from PACU at 1810. Pt vitals stable, CMS intact. No change in drainage at incision site. Declined pain at incision.

## 2022-07-08 NOTE — PLAN OF CARE
Goal Outcome Evaluation:        Uneventful night for pt. Pt slept entire shift, denied any concerns. LUE has +2/3 edema, pt reports edema has improved since compression sleeve was applied to left forearm. LUE elevated on pillow. Numbness present from left elbow to shoulder. Chest CVC site CDI. LUE AV fistula. Receives dialysis MWF, plan for dialysis today. Renal diet. Hep drip @ 10ml/hr, Hep10a recheck this morning. Coumadin bridge has been started. VSS on RA. A&O x 4. Up SBA. Nephrology and Hosp following. Discharge plan pending.

## 2022-07-08 NOTE — PROGRESS NOTES
Patient is on IR schedule today Friday 7/8/22 for a Left arm fistula fistulagram.     IV Heparin to be turned off now for procedure in one to 2 hours.     -Labs WNL for procedure.    -Orders for NPO have been entered.    -Consent was obtained from patient Gina after explaining the procedure, risks and benefits and consent is in IR.     Please contact the IR department at [phone] for procedural related questions.     Thanks, Marisol [facility] Interventional Radiology CNP ([phone]) (phone [phone])

## 2022-07-08 NOTE — OR NURSING
Removed 4 gold hoop earrings, 1 gold marcus chain anklet in OR. Labeled and placed in specimen cup. Will hand off to PACU after procedure.

## 2022-07-08 NOTE — PROGRESS NOTES
PREOP NOTE    I was called to see the patient following a left upper extremity fistulogram with venoplasty of the left innominate vein.  That procedure went uneventfully.  Following sheath removal she developed significant left arm swelling.  Ultrasound confirms an expanding perigraft hematoma.  Repeat imaging demonstrates extravasation at the prior access site.  We are heading to the operating room now for left upper arm exploration with repair of the AV graft access site and evacuation of left arm hematoma.    I called the patient's daughter, Monica to inform her of these developments.  The patient is hemodynamically stable with a post fistulogram hemoglobin of 9.6 and a potassium of 3.6.  She is complaining of left upper arm pain but denies left hand pain or paresthesias.  Normal handgrip strength.    Chaz Miller MD

## 2022-07-08 NOTE — UTILIZATION REVIEW
"  Admission Status; Secondary Review Determination         Under the authority of the Utilization Management Committee, the utilization review process indicated a secondary review on the above patient.  The review outcome is based on review of the medical records, discussions with staff, and applying clinical experience noted on the date of the review.        (xxx)      Inpatient Status Appropriate - This patient's medical care is consistent with medical management for inpatient care and reasonable inpatient medical practice.      () Observation Status Appropriate - This patient does not meet hospital inpatient criteria and is placed in observation status. If this patient's primary payer is Medicare and was admitted as an inpatient, Condition Code 44 should be used and patient status changed to \"observation\".   () Admission Status NOT Appropriate - This patient's medical care is not consistent with medical management for Inpatient or Observation Status.          RATIONALE FOR DETERMINATION     Gina Simpson is a 56 year old female with a history of coronary artery disease, cardiomyopathy, NSTEMI, COPD, hypertension, ESRD, history of failed right upper extremity fistula, who is s/p left upper extremity AV fistula placement and was admitted on 7/6/2022 with pain and swelling of left arm and neck.  Evaluation showed nonocclusive DVT in left brachial vein and superficial thrombophlebitis of left basilic vein.  Left upper extremity graft noted to be patent on ultrasound.  Vascular surgery has seen the patient and does not recommend any further procedure at this time.  Patient has been on a heparin drip, and will need anticoagulation going forward. Plan is to continue heparin drip bridge to warfarin.  She will require continued hospitalization until INR therapeutic.  IP status is appropriate at this time. I have paged Dr. Smallwood.    The severity of illness, intensity of service provided, expected LOS and risk for " adverse outcome make the care complex, high risk and appropriate for hospital admission.        The information on this document is developed by the utilization review team in order for the business office to ensure compliance.  This only denotes the appropriateness of proper admission status and does not reflect the quality of care rendered.         The definitions of Inpatient Status and Observation Status used in making the determination above are those provided in the CMS Coverage Manual, Chapter 1 and Chapter 6, section 70.4.      Sincerely,     Montserrat Haji MD  Physician Advisor   Utilization Review/ Case Management  [facility].

## 2022-07-08 NOTE — PROGRESS NOTES
Observation Note     -diagnostic tests and consults completed and resulted NOT MET  -vital signs normal or at patient baseline MET  -dyspnea improved and O2 sats greater than 88% on room air or prior home oxygen levels MET  -safe disposition plan has been identified NOT MET  - Specialist consults and work-up complete NOT MET  - dialysis complete on 7/8, NOT MET  - symptoms managed on p.o. medication NOT MET  -  tolerating diet MET    AM labs ordered to follow-up on heparin/Coumadin levels to see if therapeutic. Safe disposition not yet established, likely discharge to home. Plan to complete dialysis on 7/8 tomorrow on date initially planned. PRN IV dilaudid given x1 this shift at 2029 for 7/10 pain in LUE. Elevation and ice packs encouraged.

## 2022-07-08 NOTE — PLAN OF CARE
Pt here with acute DVT on left brachial vein. A&Ox4. 3+ swelling to lower half of LUE, compression stocking in place. Numbness from elbow to shoulder on LUE. Otherwise CMS and VSS. Heparin gtt continues to run at 10mL/hr. Hep10A therapeutic and bridged with first dose of Coumadin tonight. Lab rechecks in AM.  Pain to LUE 7/10. Decreased with scheduled Tylenol, cold packs and given PRN IV Dilaudid x1. Dressing to port on left side of chest C/D/I. Up with SBA, makes needs known via call light. Tolerating renal diet with good appetite. Plan for dialysis tomorrow and lab follow-up. Pt scoring green on Aggression Score tool. Discharge plan pending.

## 2022-07-08 NOTE — ANESTHESIA POSTPROCEDURE EVALUATION
Patient: Gina Simpson    Procedure: Procedure(s):  EXPLORE LEFT ARM, REPAIR OF LEFT UPPER ARM AV DIALYSIS GRAFT, EVACUATE HEMATOMA       Anesthesia Type:  General    Note:  Disposition: Inpatient   Postop Pain Control: Uneventful            Sign Out: Well controlled pain   PONV: No   Neuro/Psych: Uneventful            Sign Out: Acceptable/Baseline neuro status   Airway/Respiratory: Uneventful            Sign Out: Acceptable/Baseline resp. status   CV/Hemodynamics: Uneventful            Sign Out: Acceptable CV status; No obvious hypovolemia; No obvious fluid overload   Other NRE: NONE   DID A NON-ROUTINE EVENT OCCUR?            Last vitals:  Vitals Value Taken Time   /81 07/08/22 1730   Temp 36.5  C (97.7  F) 07/08/22 1709   Pulse 75 07/08/22 1735   Resp 21 07/08/22 1735   SpO2 98 % 07/08/22 1735   Vitals shown include unvalidated device data.    Electronically Signed By: Charis Beach  July 8, 2022  5:36 PM

## 2022-07-08 NOTE — PRE-PROCEDURE
GENERAL PRE-PROCEDURE:   Procedure:  Left arm fistula fistulagram with possible angioplasty and/or stent placement with IV moderate sedation   Date/Time:  7/8/2022 11:37 AM    Written consent obtained?: Yes    Risks and benefits: Risks, benefits and alternatives were discussed    Consent given by:  Patient  Patient states understanding of procedure being performed: Yes    Patient's understanding of procedure matches consent: Yes    Procedure consent matches procedure scheduled: Yes    Expected level of sedation:  Moderate  Appropriately NPO:  Yes  ASA Class:  3  Mallampati  :  Grade 2- soft palate, base of uvula, tonsillar pillars, and portion of posterior pharyngeal wall visible  Lungs:  Other (comment)  Lung exam comment:  Lungs decreased bilaterally, occasional congested, loose cough  Heart:  Normal heart sounds and rate and systolic murmur  History & Physical reviewed:  History and physical reviewed and no updates needed  Statement of review:  I have reviewed the lab findings, diagnostic data, medications, and the plan for sedation

## 2022-07-08 NOTE — OP NOTE
Procedure Date: 07/08/2022    PREOPERATIVE DIAGNOSIS:  Left upper arm hematoma secondary to a bleeding arteriovenous dialysis graft.    POSTOPERATIVE DIAGNOSIS:  Left upper arm hematoma secondary to a bleeding arteriovenous dialysis graft.    PROCEDURES PERFORMED:     1.  Exploration of left upper extremity with repair of arteriovenous dialysis graft.  2.  Evacuation of left upper arm hematoma.    SURGEON:  Akash Miller MD    ASSISTANT:  Yamile Squires MD.  She is a [facility] surgery resident.    ANESTHESIA:  General endotracheal anesthesia.    ESTIMATED BLOOD LOSS:  50 mL.    INDICATIONS FOR PROCEDURE:  This patient is a 56-year-old female who is postoperative day #9 status post placement of a left brachial artery to axillary vein ProCol AV graft.  She has a history of a left-sided central venous stenosis.  She was readmitted 2 days ago with increased left arm swelling.  The graft remains widely patent.  Her symptoms have failed to improve despite a heparin drip.  She underwent a left arm fistulogram with repeat venoplasty of a stenosis of the left innominate vein.  Post-procedure, she developed a significant hematoma secondary to extravasation from her access site.    OPERATIVE FINDINGS:  There was a micropuncture catheter left in place intentionally by the interventional radiology team in the proximal portion of her graft within 2 cm of the AV anastomosis.  There was a second puncture site within 1 cm of that area.  There was active extravasation from both sites.  Working through the prior brachial artery exposure incision, I obtained control proximal and distal to these puncture sites.  They were subsequently repaired.  There was still a good thrill throughout the ProCol graft.  There was also a significant perigraft hematoma, which has dissected along the graft up towards the axilla and to a lesser degree, underneath the brachial artery fascia on the medial aspect of the upper arm.  I utilized a Cipriano  suction to remove as much of this as possible.  Post-procedure, there was significant edema of the left upper arm, but a palpable thrill overlying the graft and a multiphasic Doppler signal in the radial artery at her wrist.    DESCRIPTION OF PROCEDURE:  The patient was brought directly from the interventional radiologic suite to the operating room.  We did not have time to obtain consent, as this patient was lightly sedated.  I did speak with her daughter briefly by phone to inform her of the events, and I verbally had her permission to proceed.  Direct compression was held overlying the micropuncture catheter in the proximal graft while her left upper extremity was prepped and draped in the usual sterile fashion.  Timeout was called and I verified the patient's identity, the operative site, and the proposed procedure.  The old incision overlying the brachial artery exposure just above the antecubital crease was reincised.  I immediately visualized the AV graft, which was not incorporated in any way to the surrounding tissues.  I could see the micropuncture catheter entering the graft.  I obtained proximal control with a vessel loop.  I then worked to obtain distal control beyond the micropuncture catheter.  In doing so, I noticed additional arterial bleeding from the second puncture site.  This area was encircled with a vessel loop and direct compression was held over it.  The patient was lightly heparinized with 2000 units of intravenous heparin.  After a 5-minute circulation time, proximal and distal control was achieved on the ProCol graft while the micropuncture catheter was removed.  That puncture site was repaired with 2 interrupted 6-0 Prolene sutures.  The second puncture site was also repaired with 2 interrupted 6-0 Prolene sutures.  Prior to securing that repair, proximal and distal control was released to try to flush any debris out of the ProCol graft lumen.  Those stitches were subsequently secured  and flow was restored up the graft.  Immediately noted was a preserved thrill within the ProCol graft.  There was a significant hematoma that had dissected proximally along the graft up towards the axilla.  I utilized a Yankauer suction, and I tried to remove as much of this as possible.  The graft was not incorporated.  There was also a defect in the brachial artery fascia and some of the hematoma had dissected underneath that.  I again used the Yankauer suction tip to try to remove as much of that as possible.  At this point, the upper left arm was fairly soft.  I did not want to leave a drain, as I did not want to take the risk of secondarily infecting the hematoma.  Hemostasis for the incision was assured.  Closure then proceeded, utilizing interrupted 3-0 Vicryl to reapproximate the subdermal layer.  Skin was closed with interrupted 3-0 nylon vertical mattress sutures.  A sterile dressing was applied.  Final sponge and needle counts were reported as correct. The patient tolerated the procedure without incident.  She was returned, extubated and hemodynamically stable to the recovery room.    Akash Miller MD        D: 2022   T: 2022   MT: JEFF    Name:     GIO HAMMOND  MRN:      [id number]        Account:        [id number]   :      1965           Procedure Date: 2022     Document: [id number]

## 2022-07-08 NOTE — PROGRESS NOTES
[facility]    Hospitalist Progress Note      Assessment & Plan   Gina Simpson is a 56 year old female ith history of coronary artery disease, cardiomyopathy, NSTEMI, COPD, hypertension, ESRD, history of failed right upper extremity fistula, POD #9 status post left upper extremity AV fistula placement who now presents with pain and swelling of left arm and neck.  Evaluation here shows nonocclusive DVT in left brachial vein and superficial thrombophlebitis of left basilic vein.  Left upper extremity graft noted to be patent on ultrasound.  Hospitalist service was asked to assume care of the patient on 7/7/2022.    Left upper extremity DVT  Vascular surgery has seen the patient, and a ultrasound was done that shows patent flow to the AV fistula.  There is noted DVT in the left brachial vein and superficial thrombophlebitis of the left basilic vein.  Vascular surgery does not recommend any further procedure at this time.  Patient has been on a heparin drip, and will need anticoagulation going forward.   * 7/8 left arm dialysis fistulogram with venoplasty of left innominate vein: complicated by post-sheath removal arm swelling and expanding perigraft hematoma. Taken to OR for repair of left upper arm AV graft and evacuate hematoma. . EBL 50ml  -- Continue heparin drip bridge to warfarin (held warfarin 7/8), she will be here next few days until INR 2-3  -- DOAC/lovenox contraindicated in hemodialysis patient.  -- Daily INR, can discontinue heparin when INR therapeutic.   -- Pain management with Tylenol scheduled 650mg QID.  IV dilaudid or oral oxycodone as needed for severe pain.  -- appreciate hematology consult, recommends therapeutic warfarin for 3mo course  -- Continue supportive cares with compression sleeve, elevation.  -- Recommend close vascular surgery follow-up at discharge     Status post left AV fistula formation 6/28/2022  End-stage renal disease on hemodialysis  Patient  usually has her [facility] dialysis MWF - Dr. Gonsales  --Nephrology consulted for management while hospitalized  --Dialysis 7/9 (unable to complete on 7/8 due to OR), if still in hospital next session will be 7/11 to resume her normal MWF schedule  --Renal diet     CAD   Hx Cardiomyopathy  Hypertension  -- Continue PTA medications including hydralazine, lisinopril, carvedilol, amlodipine     Hyperphosphatemia  -- Continue PhosLo with meals     Chronic anemia  Related to ESRD  -- Monitor hemoglobin, so far stable ~10--expect some drop post hematoma evacuation, check again 7/9 AM  -- Epogen with dialysis     Constipation  --Bowel regimen ordered with scheduled senna-docusate twice daily, MiraLAX as needed.  Dulcolax available as needed.    Clinically Significant Risk Factors Present on Admission         # Acute Kidney Injury, unspecified: based on a >150% or 0.3 mg/dL increase in creatinine on admission compared to past 90 day average, will monitor renal function                DVT Prophylaxis: heparin gtt, warfarin  Code Status: Full Code     Expected Discharge Date: 07/10/2022        Discharge Comments: hep gtt       Susie Smallwood DO  Hospitalist Service    Securely message with the [employer] Web Console (learn more here)  Text Page (7am - 6pm) via [employer] Paging/Directory      Interval History   Patient seen and examined. Has some pain in left arm, but tolerable. Swelling stable. She has dialysis today and has been NPO for IR procedure. No chest pain, shortness of breath. Tolerated diet yesterday. Discussed plan for heparin gtt and bridging, she'll be here over the weekend.    -Data reviewed today: I reviewed all new labs and imaging results over the last 24 hours. I personally reviewed no images or EKG's today.    Physical Exam   Temp: 97.4  F (36.3  C) Temp src: Temporal BP: 136/76 Pulse: 77   Resp: 21 SpO2: 97 % O2 Device: Nasal cannula Oxygen Delivery: 6 LPM  Vitals:    07/06/22 1937    Weight: 55.8 kg (123 lb)     Vital Signs with Ranges  Temp:  [97.4  F (36.3  C)-98.3  F (36.8  C)] 97.4  F (36.3  C)  Pulse:  [68-89] 77  Resp:  [9-29] 21  BP: (124-183)/(63-97) 136/76  SpO2:  [91 %-100 %] 97 %  I/O last 3 completed shifts:  In: 240 [P.O.:240]  Out: -     Constitutional: Awake, alert, cooperative, no apparent distress  Respiratory: Clear to auscultation bilaterally, no crackles or wheezing  Cardiovascular: Regular rate and rhythm, normal S1 and S2, and no murmur noted  GI: Normal bowel sounds, soft, non-distended, non-tender  Skin/Integumen: No rashes, no cyanosis, LUE +2/3 edema, incisions intact without erythema. Palpable thrill at fistula.  Other:     Medications     [START ON 7/9/2022] heparin       [START ON 7/9/2022] Warfarin Therapy Reminder         [Auto Hold] - MEDICATION INSTRUCTIONS for Dialysis Patients -   Does not apply See Admin Instructions     [Auto Hold] acetaminophen  650 mg Oral Q6H     [Auto Hold] amLODIPine  10 mg Oral QPM     [Auto Hold] atorvastatin  20 mg Oral Daily     [Auto Hold] calcium acetate  2,001 mg Oral TID w/meals     [Auto Hold] carvedilol  12.5 mg Oral BID w/meals     [Auto Hold] hydrALAZINE  50 mg Oral TID     [Auto Hold] lisinopril  10 mg Oral QPM     [Auto Hold] multivitamin RENAL   Oral Daily     [Auto Hold] senna-docusate  1 tablet Oral BID       Data   Recent Labs   Lab 07/08/22  1552 07/08/22  1500 07/08/22  0758 07/06/22  2038   WBC  --  6.4 6.9 6.5   HGB 9.8* 9.7* 10.4* 9.4*   MCV  --  92 88 88   PLT  --  294 333 288   INR  --   --  1.06 0.99     --  135 136   POTASSIUM 3.6  --  3.6 3.3*   CHLORIDE 98  --  98 100   CO2 29  --  26 30   BUN 26  --  24 13   CR 6.76*  --  6.30* 3.81*   ANIONGAP 6  --  11 6   JEWELL 9.5  --  10.5* 10.4*   GLC 99  --  91 148*       No results found for this or any previous visit (from the past 24 hour(s)).

## 2022-07-08 NOTE — ANESTHESIA PROCEDURE NOTES
Airway       Patient location during procedure: OR       Procedure Start/Stop Times: 7/8/2022 3:37 PM  Staff -        CRNA: Marisol Holcomb APRN CRNA       Performed By: CRNA  Consent for Airway        Urgency: elective  Indications and Patient Condition       Indications for airway management: supriya-procedural       Induction type:intravenous       Mask difficulty assessment: 1 - vent by mask    Final Airway Details       Final airway type: endotracheal airway       Successful airway: ETT - single  Endotracheal Airway Details        ETT size (mm): 7.0       Cuffed: yes       Successful intubation technique: video laryngoscopy       VL Blade Size: Lubin 3       Grade View of Cords: 1       Adjucts: stylet       Position: Right       Measured from: gums/teeth       Secured at (cm): 22       Bite block used: None    Post intubation assessment        Placement verified by: capnometry, equal breath sounds and chest rise        Number of attempts at approach: 1       Number of other approaches attempted: 0       Secured with: pink tape       Ease of procedure: easy       Dentition: Intact and Unchanged    Medication(s) Administered   Medication Administration Time: 7/8/2022 3:37 PM

## 2022-07-09 NOTE — PROGRESS NOTES
This patient was seen and examined while on dialysis. Professional oversight of the patient's dialysis care, access care and dialysis related co-morbidities were addressed as necessary with the patient and / or staff.     Nephrology Progress Note  07/09/2022       ASSESSMENT AND RECOMMENDATIONS:   55 yo  F with recent left upper extremity AV fistula placement admitted 7/7 with nonocclusive DVT in left brachial vein and superficial thrombophlebitis of left basilic vein.  s/p LUE fistulogram and venoplasty of left innominate vein complicated by expanding perigraft hematoma requiring OR repair of AVG and evacuation of left arm hematoma (7/8/2022).     1 ESRD  -   Outpatient dialysis orders-  Monday Wednesday Friday, Dr Ilda Taylor  Last reported Target weight-61 kg.  Well below that here.  Epogen-600 units with dialysis  Dialysate  Base Sodium 138 mEq/L    Dialysate  Potassium 2k    Dialysate  Calcium 2.5 mEq/L    Dialysate  BiCarb 35 mEq/L    Dialysate Flow Rate  500 ML/min    Blood Flow Rate  400 ML/min    Treatment Time  195 min    Access  CVC Catheter Jugular (Left)    Heparin Hourly Dose  500 Units/hr (1:1,000 concentration)    Heparin Load  1000 Units (1:1,000 concentration)       Missed dialysis yesterday secondary to being in OR.  Dialyzed today without issues.     2 anemia in ESRD-Epogen 1000 unit with dialysis     3 left upper extremity DVT-on heparin drip.  Vascular surgery on board.     4 hypertension-continue PTA hydralazine, lisinopril, carvedilol, amlodipine     5 hyperphosphatemia-PhosLo with meals     6 s/p LUE fistulogram and venoplasty of left innominate vein complicated by expanding perigraft hematoma requiring OR repair of AVG and evacuation of left arm hematoma (7/8/2022).   -Describes postoperative pain.    Next dialysis on Monday    Guero Foy MD  [facility] - Nephrology   [phone]      Interval History :   Seen / examined on dialysis  1.5 L UF.  Had mild dizziness  "which is resolved.  Hemodynamically stable.  Dialysis running okay.        Physical Exam:   I/O last 3 completed shifts:  In: 440 [P.O.:240; I.V.:200]  Out: 50 [Blood:50]    /70 (BP Location: Right arm)   Pulse 88   Temp 98.7  F (37.1  C) (Oral)   Resp 17   Ht 1.651 m (5' 5\")   Wt 55.8 kg (123 lb)   SpO2 98%   BMI 20.47 kg/m      GENERAL APPEARANCE: alert and no distress  Pulmonary: lungs clear to auscultation with equal breath sounds bilaterally, no clubbing  CV: regular rhythm, normal rate, no rub   - JVP -   - Edema-  GI: soft, nontender,   MS: no evidence of inflammation in joints  : no reyes  SKIN: no rash, warm, dry, no cyanosis  NEURO: face symmetric, no asterixis   internal jugular TDC - site looks okay   Lt UE  With bandage     Labs:   All labs reviewed by me  Electrolytes/Renal - Recent Labs   Lab Test 07/09/22  0830 07/08/22  1552 07/08/22  0758 07/07/22  0845    133 135  --    POTASSIUM 3.7 3.6 3.6  --    CHLORIDE 99 98 98  --    CO2 28 29 26  --    BUN 33* 26 24  --    CR 7.28* 6.76* 6.30*  --    GLC 93 99 91  --    JEWELL 9.2 9.5 10.5*  --    PHOS  --   --   --  3.3       CBC -   Recent Labs   Lab Test 07/09/22  0830 07/08/22  1552 07/08/22  1500 07/08/22  0758 07/06/22  2038   WBC  --   --  6.4 6.9 6.5   HGB 9.4* 9.8* 9.7* 10.4* 9.4*   PLT  --   --  294 333 288           Current Medications:    - MEDICATION INSTRUCTIONS for Dialysis Patients -   Does not apply See Admin Instructions     acetaminophen  650 mg Oral Q6H     amLODIPine  10 mg Oral QPM     atorvastatin  20 mg Oral Daily     calcium acetate  2,001 mg Oral TID w/meals     carvedilol  12.5 mg Oral BID w/meals     hydrALAZINE  50 mg Oral TID     lisinopril  10 mg Oral QPM     multivitamin RENAL   Oral Daily     - MEDICATION INSTRUCTIONS -   Does not apply Once     senna-docusate  1 tablet Oral BID       heparin 700 Units/hr (07/09/22 1131)     Warfarin Therapy Reminder       Guero Foy MD      "

## 2022-07-09 NOTE — PLAN OF CARE
Goal Outcome Evaluation:    Evacuation of hematoma/left arm POD 1. Dialysis patient. CMS/NEURO- oriented x 4, left upper arm/compression sleeve on left forearm intact/extremity elevated on pillows/cold packs applied/warm & swelling continue and increased today (vascular notified & aware). VSS, titrated to room air. Heparin infusing-stopped & decreased rate on this shift, recheck at 1730. INR elevated, rechecked scheduled. Renal diet/pills whole with water/fair appetite. Up with standby assist this am/voiding/no bm today. Moderate pain in left arm - tylenol/oxycodone/ice packs utilized.  Off floor for dialysis run from [id number]. Pt scoring green on the Aggression Stop Light Tool.

## 2022-07-09 NOTE — PROGRESS NOTES
[facility]    Hospitalist Progress Note      Assessment & Plan   Gina Simpson is a 56 year old female ith history of coronary artery disease, cardiomyopathy, NSTEMI, COPD, hypertension, ESRD, history of failed right upper extremity fistula, POD #9 status post left upper extremity AV fistula placement who now presents with pain and swelling of left arm and neck.  Evaluation here shows nonocclusive DVT in left brachial vein and superficial thrombophlebitis of left basilic vein.  Left upper extremity graft noted to be patent on ultrasound.  Hospitalist service was asked to assume care of the patient on 7/7/2022.    Left upper extremity DVT  Vascular surgery has seen the patient, and a ultrasound was done that shows patent flow to the AV fistula.  There is noted DVT in the left brachial vein and superficial thrombophlebitis of the left basilic vein.    * 7/8 left arm dialysis fistulogram with venoplasty of left innominate vein: complicated by post-sheath removal arm swelling and expanding perigraft hematoma. Taken to OR for repair of left upper arm AV graft and evacuate hematoma. . EBL 50ml  -- Continue heparin drip bridge to warfarin (held warfarin 7/8), she will be here next few days until INR 2-3   - INR 3.31 on 7/9--error? Recheck post dialysis   -- DOAC/lovenox contraindicated in hemodialysis patient.  -- Daily INR, can discontinue heparin when INR therapeutic.   -- Pain management with Tylenol scheduled 650mg QID.  IV dilaudid or oral oxycodone as needed for severe pain.  -- appreciate hematology consult, recommends therapeutic warfarin for 3mo course  -- Continue supportive cares with compression sleeve, elevation.  -- Recommend close vascular surgery follow-up at discharge     Status post left AV fistula formation 6/28/2022  End-stage renal disease on hemodialysis  Patient usually has her [facility] dialysis MWF - Dr. Gonsales  --Nephrology consulted for management while  hospitalized  --Dialysis 7/9 (unable to complete on 7/8 due to OR), then resume regular schedule on 7/11  --Renal diet     CAD   Hx Cardiomyopathy  Hypertension  -- Continue PTA medications including hydralazine, lisinopril, carvedilol, amlodipine     Hyperphosphatemia  -- Continue PhosLo with meals     Chronic anemia  Related to ESRD  -- Monitor hemoglobin, so far stable ~10--expect some drop post hematoma evacuation, down to 9.4 AM of 7/9  -- Epogen with dialysis     Constipation  --Bowel regimen ordered with scheduled senna-docusate twice daily, MiraLAX as needed.  Dulcolax available as needed.    Clinically Significant Risk Factors Present on Admission         # Acute Kidney Injury, unspecified: based on a >150% or 0.3 mg/dL increase in creatinine on admission compared to past 90 day average, will monitor renal function                DVT Prophylaxis: heparin gtt, warfarin  Code Status: Full Code     Expected Discharge Date: 07/13/2022        Discharge Comments: hep gtt       Susie Smallwood DO  Hospitalist Service  [facility]  Securely message with the Vocera Web Console (learn more here)  Text Page (7am - 6pm) via [employer] Paging/Directory      Interval History   Patient seen and examined. Pain in left arm continues, swelling is overall a bit better. Dialysis today. Feels a little short of breath this morning, she isn't sure if it is related to late run of dialysis--will monitor post dialysis. Reviewed plan to stay likely through Monday/Tuesday until INR at goal.    -Data reviewed today: I reviewed all new labs and imaging results over the last 24 hours. I personally reviewed no images or EKG's today.    Physical Exam   Temp: 98.3  F (36.8  C) Temp src: Oral BP: 125/73 Pulse: 81   Resp: 14 SpO2: 98 % O2 Device: None (Room air) Oxygen Delivery: 1 LPM  Vitals:    07/06/22 1937   Weight: 55.8 kg (123 lb)     Vital Signs with Ranges  Temp:  [97.4  F (36.3  C)-98.4  F (36.9  C)] 98.3  F (36.8   C)  Pulse:  [66-89] 81  Resp:  [9-29] 14  BP: (111-183)/(60-97) 125/73  SpO2:  [92 %-100 %] 98 %  I/O last 3 completed shifts:  In: 440 [P.O.:240; I.V.:200]  Out: 50 [Blood:50]    Constitutional: Awake, alert, cooperative, no apparent distress  Respiratory: Clear to auscultation bilaterally, no crackles or wheezing  Cardiovascular: Regular rate and rhythm, normal S1 and S2, and no murmur noted  GI: Normal bowel sounds, soft, non-distended, non-tender  Skin/Integumen: No rashes, no cyanosis, LUE +2 edema (softer laterally, but then later in afternoon after dialysis more tense edema noted inner arm near bicep), incisions intact without erythema. Palpable thrill at fistula-dressing in place  Other:     Medications     heparin Stopped (07/09/22 1030)     Warfarin Therapy Reminder         - MEDICATION INSTRUCTIONS for Dialysis Patients -   Does not apply See Admin Instructions     acetaminophen  650 mg Oral Q6H     amLODIPine  10 mg Oral QPM     atorvastatin  20 mg Oral Daily     calcium acetate  2,001 mg Oral TID w/meals     carvedilol  12.5 mg Oral BID w/meals     hydrALAZINE  50 mg Oral TID     lisinopril  10 mg Oral QPM     multivitamin RENAL   Oral Daily     - MEDICATION INSTRUCTIONS -   Does not apply Once     senna-docusate  1 tablet Oral BID       Data   Recent Labs   Lab 07/09/22  0830 07/08/22  1552 07/08/22  1500 07/08/22  0758 07/06/22  2038   WBC  --   --  6.4 6.9 6.5   HGB 9.4* 9.8* 9.7* 10.4* 9.4*   MCV  --   --  92 88 88   PLT  --   --  294 333 288   INR 3.31*  --   --  1.06 0.99    133  --  135 136   POTASSIUM 3.7 3.6  --  3.6 3.3*   CHLORIDE 99 98  --  98 100   CO2 28 29  --  26 30   BUN 33* 26  --  24 13   CR 7.28* 6.76*  --  6.30* 3.81*   ANIONGAP 9 6  --  11 6   JEWELL 9.2 9.5  --  10.5* 10.4*   GLC 93 99  --  91 148*       No results found for this or any previous visit (from the past 24 hour(s)).

## 2022-07-09 NOTE — PROGRESS NOTES
Receiving report from dialysis nurse, patient already back on floor. Dialysis nurse pulled one liter and half, some dizziness/stopped, sbp >100, last bp 128/72, pain 2/10. Will now assess patient.

## 2022-07-09 NOTE — PROGRESS NOTES
"VASCULAR SURGERY PROGRESS NOTE    Subjective:  Reports some ongoing left arm swelling.     Objective:  Intake/Output Summary (Last 24 hours) at 7/9/2022 0801  Last data filed at 7/9/2022 0500  Gross per 24 hour   Intake 440 ml   Output 50 ml   Net 390 ml     PHYSICAL EXAM:  /73 (BP Location: Right arm, Patient Position: Semi-Fuentes's)   Pulse 84   Temp 98.2  F (36.8  C) (Oral)   Resp 16   Ht 1.651 m (5' 5\")   Wt 55.8 kg (123 lb)   SpO2 97%   BMI 20.47 kg/m    Resting comfortably in bed  Unlabored respirations on room air  Regular rate and rhythm    Compression sleeve in place over left arm. Left upper arm swelling. Dressing in place with minimal serosanguineous drainage.       ASSESSMENT:  55 yo F s/p LUE fistulogram and venoplasty of left innominate vein complicated by expanding perigraft hematoma requiring OR repair of AVG and evacuation of left arm hematoma (7/8/2022).       PLAN:  - OK to resume heparin gtt; Hgb - 9.8 (Hgb pending this morning); OK to bridge to coumadin once Hgb has stabilized   - Continue compression sleeve on left lower arm   - OK to change surgical dressing on left arm PRN  - Dispo: Floor     Discussed pt history, exam, assessment and plan with Dr. Miller of the vascular surgery service, who is in agreement with the above.    Kentrell Valenzuela MD  Division of Vascular Surgery   Pager: [phone]    I have seen and examined this patient with the Vascular Fellow. I was involved with the assessment and plan, and I agree with the findings and plan of care as documented in the fellow's note.  Palpable thrill in left arm graft.  Left arm edema slightly improved.  Chaz Miller MD              "

## 2022-07-09 NOTE — PROGRESS NOTES
Pt here with DVT in L brachial vein, hematoma evacuation on 7/9/22 . A&O.. VSS. Renal diet, thin liquids. Takes pills whole with water. Up with A1/GB. Pain managed with PRN oxycodone and scheduled tylenol. Pt scoring green on the Aggression Stop Light Tool. Plan continue hep drip, rechecking INR, briding to coumadin from hep drip. Discharge pending INR levels.

## 2022-07-09 NOTE — PLAN OF CARE
Goal Outcome Evaluation:  DATE: 7/9/2022 7828-3759  SUMMARY: Fistulogram with post-procedure left arm swelling.  Pt had hematoma  with extravasation at the prior access site.  Repair of the AV graft access site and evacuation of left arm hematoma occurred on 7/8/2022.  ORIENTATION: A&OX4, pleasant and cooperative  VS: VSS on R/A  ACTIVITY:  SBA  DIET: Renal  BOWEL/BLADDER: Continent  DRAINS/IV: IV infusing Heparin  PAIN MANAGEMENT: Tylenol given 1X during shift and effective for left-arm pain  DISCHARGE PLAN: Pending  OTHER IMPORTANT INFORMATION: Pt. Sleepy during shift.  Pt jewelry from PACU was found and is in specimen cup on bedside table.  Is on dialysis M,W,F.  Did not receive dialysis Friday due to fistulogram and hematoma.

## 2022-07-09 NOTE — PROVIDER NOTIFICATION
Paged Vascular fellow Kentrell Valenzuela and Dr. Smallwood re: left arm swelling appears increased. Noting warmth & increased pain in left upper extremity. Pulses palpable in left upper. Writer just gave Tylenol & applied ice packs. Patient feels chilled, applied warm blankets.    Hospitalist on unit assessing, agree that inner bicep appears more swollen. Aware page has been made to vascular fellow for update.    Dr. Pfeiffer phoned back, aware of swelling and expected. No new orders.  Will continue with pain management as ordered.

## 2022-07-09 NOTE — PROGRESS NOTES
Potassium   Date Value Ref Range Status   07/09/2022 3.7 3.4 - 5.3 mmol/L Final     Hemoglobin   Date Value Ref Range Status   07/09/2022 9.4 (L) 11.7 - 15.7 g/dL Final     Creatinine   Date Value Ref Range Status   07/09/2022 7.28 (H) 0.52 - 1.04 mg/dL Final     Urea Nitrogen   Date Value Ref Range Status   07/09/2022 33 (H) 7 - 30 mg/dL Final     Sodium   Date Value Ref Range Status   07/09/2022 136 133 - 144 mmol/L Final     INR   Date Value Ref Range Status   07/09/2022 3.31 (H) 0.85 - 1.15 Final       DIALYSIS PROCEDURE NOTE  Hepatitis status of previous patient on machine log was checked and verified ok to use with this patients hepatitis status.  Patient dialyzed for 3 hrs. on a K3 bath with a net fluid removal of  1.5L.  A BFR of 400 ml/min was obtained via a Left CVC.  The treatment plan was discussed with Dr. Gamble during the treatment.    Total heparin received during the treatment: 0 units.   Line flushed, clamped and capped with heparin 1:1000 2.2 mL and 2.3 (2200 units and 2300) per lumen    Meds  given: Epogen   Complications: Dizziness in last 37 minutes of run. UF turned off 100mL saline bolus bp WDL. MD notified. Dizziness resolved after 15 minutes.       Person educated: Patient. Knowledge base substancial. Barriers to learning: none. Educated on procedure via verbal mode. Patient verbalized understanding. Pt prefers verbal education style.     ICEBOAT? Timeout performed pre-treatment  I: Patient was identified using 2 identifiers  C:  Consent Signed Yes  E: Equipment preventative maintenance is current and dialysis delivery system OK to use  B: Hepatitis B Surface Antigen: Negative; Draw Date: 4/13/2022      Hepatitis B Surface Antibody: Immune; Draw Date: 4/13/2022  O: Dialysis orders present and complete prior to treatment  A: Vascular access verified and assessed prior to treatment  T: Treatment was performed at a clinically appropriate time  ?: Patient was allowed to ask questions and address  concerns prior to treatment  [facility] for further details and post assessment.  Machine water alarm in place and functioning. Transducer pods intact and checked every 15min.   Pt returned via bed.  Chlorine/Chloramine water system checked every 4 hours.  Outpatient Dialysis at [facility]      Post treatment report given to Nancy Briones RN regarding 1.5L of fluid removed, last BP of 128/72, and patient pain rating of 2/10.

## 2022-07-09 NOTE — PHARMACY-ANTICOAGULATION SERVICE
Clinical Pharmacy - Warfarin Dosing Consult     Pharmacy has been consulted to manage this patient s warfarin therapy.  Indication: DVT/ PE Treatment  Therapy Goal: INR 2-3  Warfarin Prior to Admission: No  Significant drug interactions: acetaminophen    INR   Date Value Ref Range Status   07/09/2022 3.31 (H) 0.85 - 1.15 Final   07/08/2022 1.06 0.85 - 1.15 Final     7/7: no INR = gave 5mg   7/8: INR 1.06 = no dose today   7/9:  INR 3. 31= redrawing INR     Patient's INR Jumped from 1.06 to 3.31 in one day. Redrawing INR to [facility]   Pharmacy will monitor Gina Simpson daily and order warfarin doses to achieve specified goal.      Please contact pharmacy as soon as possible if the warfarin needs to be held for a procedure or if the warfarin goals change.    .    Estela MontesD

## 2022-07-10 NOTE — PROGRESS NOTES
"VASCULAR SURGERY PROGRESS NOTE    Subjective:  Reports some ongoing left arm swelling. Slightly improved     Objective:  Intake/Output Summary (Last 24 hours) at 7/9/2022 0801  Last data filed at 7/9/2022 0500  Gross per 24 hour   Intake 440 ml   Output 50 ml   Net 390 ml     PHYSICAL EXAM:  /72 (BP Location: Left arm)   Pulse 89   Temp 98.6  F (37  C) (Oral)   Resp 16   Ht 1.651 m (5' 5\")   Wt 65 kg (143 lb 4.8 oz)   SpO2 97%   BMI 23.85 kg/m    Resting comfortably in bed  Unlabored respirations on room air  Regular rate and rhythm    Compression sleeve in place over left arm. Left upper arm swelling. Dressing taken down - interrupted nylon sutures in place.      ASSESSMENT:  57 yo F s/p LUE fistulogram and venoplasty of left innominate vein complicated by expanding perigraft hematoma requiring OR repair of AVG and evacuation of left arm hematoma (7/8/2022).       PLAN:  - Heparin gtt, coumadin bridge (Hgb stable 9.1 from 9.4); INR: 1.10   - Continue compression sleeve on left lower arm   - OK to change surgical dressing on left arm PRN  - Dispo: Floor     Discussed pt history, exam, assessment and plan with Dr. Miller of the vascular surgery service, who is in agreement with the above.    Kentrell Valenzuela MD  Division of Vascular Surgery   Pager: [phone]    I have seen and examined this patient with the Vascular Fellow. I was involved with the assessment and plan, and I agree with the findings and plan of care as documented in the fellow's note.   Overall left upper arm edema is decreased since yesterday.  Palpable thrill.  She has been started on Coumadin.  Continue present cares.    Chza Miller MD              "

## 2022-07-10 NOTE — PLAN OF CARE
Pt here with DVT in L brachial vein, hematoma evacuation on 7/8/22.  Up SBA. A&Ox4. VSS RA. LUE compression sleeve in place. CMS intact. Dressing marked, unchanged. Pain managed with scheduled Tylenol. Fistula site WNL, bruit and thrill present. Heparin PIV at 850u/hr. Oral Coumadin started.10a recheck this morning. Discharge pending progress.

## 2022-07-10 NOTE — PROGRESS NOTES
Chart reviewed. Labs and vitals reviewed.   Plan for HD tomorrow.     Recent Labs   Lab Test 07/10/22  0433 07/09/22  0830    136   POTASSIUM 3.8 3.7   CHLORIDE 99 99   CO2 30 28   ANIONGAP 7 9   GLC 97 93   BUN 19 33*   CR 4.80* 7.28*   JEWELL 9.1 9.2       Guero Foy MD  [facility] - Nephrology   [phone]

## 2022-07-10 NOTE — PLAN OF CARE
Reason for Admission: L brachial vein DVT, hematoma evac 7/9    Cognitive/Mentation: A/Ox 4  Neuros/CMS: Intact  VS: Stable.  GI: BS +, + flatus, last BM yesterday. Continent.  : Voids without issue. Continent.  Pulmonary: LS clear.  Pain: 4/10 pain controlled with scheduled tylenol.     Drains: None  Skin: LUE hematoma site and fistula site, both dressings changed today, WNL. Slight firmness above evac site, MD aware  Activity: Independent.  Diet: Renal with thin liquids. Takes pills whole.     Aggression Stoplight Score: Green  Therapies recs: Pending  Discharge: Pending    End of shift summary: Heparin running at 850 unit(s)/hr, recheck drawn and pending. Fistula intact with bruit and trill. Compression dressing in place LUE. Patient requesting to not receive narcotics, wants to control pain with tylenol.

## 2022-07-10 NOTE — PROGRESS NOTES
[facility]    Hospitalist Progress Note      Assessment & Plan   Gina Simpson is a 56 year old female ith history of coronary artery disease, cardiomyopathy, NSTEMI, COPD, hypertension, ESRD, history of failed right upper extremity fistula, POD #9 status post left upper extremity AV fistula placement who now presents with pain and swelling of left arm and neck.  Evaluation here shows nonocclusive DVT in left brachial vein and superficial thrombophlebitis of left basilic vein.  Left upper extremity graft noted to be patent on ultrasound.  Hospitalist service was asked to assume care of the patient on 7/7/2022.    Left upper extremity DVT  Vascular surgery has seen the patient, and a ultrasound was done that shows patent flow to the AV fistula.  There is noted DVT in the left brachial vein and superficial thrombophlebitis of the left basilic vein.    * 7/8 left arm dialysis fistulogram with venoplasty of left innominate vein: complicated by post-sheath removal arm swelling and expanding perigraft hematoma. Taken to OR for repair of left upper arm AV graft and evacuate hematoma. . EBL 50ml  -- Continue heparin drip bridge to warfarin (held warfarin 7/8), she will be here next few days until INR 2-3  -- DOAC/lovenox contraindicated in hemodialysis patient.  -- Daily INR, can discontinue heparin when INR therapeutic.   -- Tylenol scheduled 650mg QID.  IV dilaudid or oral oxycodone as needed for severe pain.  -- appreciate hematology consult, recommends therapeutic warfarin for 3mo course  -- Continue supportive cares with compression sleeve, elevation.  -- Recommend close vascular surgery follow-up at discharge     Status post left AV fistula formation 6/28/2022  End-stage renal disease on hemodialysis  Patient usually has her [facility] dialysis MWF - Dr. Gonsales  --Nephrology consulted for management while hospitalized  --Dialysis 7/9 (unable to complete on 7/8 due to OR), then resume regular  MWF schedule on 7/11  --Renal diet     CAD   Hx Cardiomyopathy  Hypertension  -- Continue PTA medications including hydralazine, lisinopril, carvedilol, amlodipine     Hyperphosphatemia  -- Continue PhosLo with meals     Chronic anemia  Related to ESRD  -- Monitor hemoglobin, so far stable ~10--expect some drop post hematoma evacuation, down to 9.1 AM of 7/10  -- Epogen with dialysis     Constipation  --Bowel regimen ordered with scheduled senna-docusate twice daily, MiraLAX as needed.  Dulcolax available as needed.    Clinically Significant Risk Factors Present on Admission                        DVT Prophylaxis: heparin gtt, warfarin  Code Status: Full Code     Expected Discharge Date: 07/13/2022        Discharge Comments: Hep gtt, need therapeutic INR       Susie Smallwood, DO  Hospitalist Service  [facility]  Securely message with the [employer] Web Console (learn more here)  Text Page (7am - 6pm) via [employer] Paging/Directory      Interval History   Patient seen and examined. Pain in arm is a little better, swelling seems better. Difficulty sleeping, will trial melatonin tonight. Discussed need to stay in hospital at [facility] until mid week until INR at goal--she is disappointed to hear this. Discussed care plan with vascular surgery fellow.    -Data reviewed today: I reviewed all new labs and imaging results over the last 24 hours. I personally reviewed no images or EKG's today.    Physical Exam   Temp: 98.6  F (37  C) Temp src: Oral BP: 127/72 Pulse: 72   Resp: 16 SpO2: 97 % O2 Device: None (Room air)    Vitals:    07/06/22 1937 07/10/22 0300   Weight: 55.8 kg (123 lb) 65 kg (143 lb 4.8 oz)     Vital Signs with Ranges  Temp:  [98.2  F (36.8  C)-98.7  F (37.1  C)] 98.6  F (37  C)  Pulse:  [68-89] 72  Resp:  [16-17] 16  BP: ()/(67-80) 127/72  SpO2:  [94 %-98 %] 97 %  I/O last 3 completed shifts:  In: 515.72 [I.V.:515.72]  Out: 1500 [Other:1500]    Constitutional: Awake, alert, cooperative,  no apparent distress  Respiratory: Clear to auscultation bilaterally, no crackles or wheezing  Cardiovascular: Regular rate and rhythm, normal S1 and S2, and no murmur noted  GI: Normal bowel sounds, soft, non-distended, non-tender  Skin/Integumen: No rashes, no cyanosis, LUE +2 edema, getting softer--still with firmness medially near incision towards axilla, incisions intact without erythema. Palpable thrill at fistula  Other:     Medications     heparin 850 Units/hr (07/09/22 2215)     Warfarin Therapy Reminder         - MEDICATION INSTRUCTIONS for Dialysis Patients -   Does not apply See Admin Instructions     acetaminophen  650 mg Oral Q6H     amLODIPine  10 mg Oral QPM     atorvastatin  20 mg Oral Daily     calcium acetate  2,001 mg Oral TID w/meals     carvedilol  12.5 mg Oral BID w/meals     hydrALAZINE  50 mg Oral TID     lisinopril  10 mg Oral QPM     melatonin  3 mg Oral At Bedtime     multivitamin RENAL   Oral Daily     senna-docusate  1 tablet Oral BID     warfarin ANTICOAGULANT  5 mg Oral ONCE at 18:00       Data   Recent Labs   Lab 07/10/22  0433 07/09/22  2137 07/09/22  0830 07/08/22  1552 07/08/22  1500 07/08/22  0758   WBC 8.5  --   --   --  6.4 6.9   HGB 9.1*  --  9.4* 9.8* 9.7* 10.4*   MCV 91  --   --   --  92 88     --   --   --  294 333   INR 1.10 1.08 3.31*  --   --  1.06     --  136 133  --  135   POTASSIUM 3.8  --  3.7 3.6  --  3.6   CHLORIDE 99  --  99 98  --  98   CO2 30  --  28 29  --  26   BUN 19  --  33* 26  --  24   CR 4.80*  --  7.28* 6.76*  --  6.30*   ANIONGAP 7  --  9 6  --  11   JEWELL 9.1  --  9.2 9.5  --  10.5*   GLC 97  --  93 99  --  91       No results found for this or any previous visit (from the past 24 hour(s)).

## 2022-07-10 NOTE — PLAN OF CARE
[id number]:   Pt here with L brachial DVT/ Hematoma evac on 7/8/22. A&O. Neuros intact. VSS. Renal diet diet, thin liquids. Takes pills whole with water. Independent in room. L arm pain 3/10, managing with scheduled tylenol and ice, does not want to use narcotics Pt scoring green on the Aggression Stop Light Tool. Plan: bridging from hep drip to coumadin, continue INR checks. Discharge pending d/t INR levels.

## 2022-07-11 NOTE — PROGRESS NOTES
Goal Outcome Evaluation:  DATE: 7/10/2022 [id number]  SUMMARY: Fistulogram with post-procedure left arm swelling.  Pt had hematoma  with extravasation at the prior access site.  Repair of the AV graft access site and evacuation of left arm hematoma occurred on 7/8/2022.  ORIENTATION: A&OX4, pleasant and cooperative  VS: VSS on R/A  ACTIVITY:  Independent  DIET: Renal  BOWEL/BLADDER: Continent  DRAINS/IV: IV infusing Heparin  PAIN MANAGEMENT: Denies this shift  DISCHARGE PLAN: Pending INR goal being reached  OTHER IMPORTANT INFORMATION:  Is on dialysis M,W,MEGA.

## 2022-07-11 NOTE — PROGRESS NOTES
[facility]    Hospitalist Progress Note      Assessment & Plan   Gina Simpson is a 56 year old female ith history of coronary artery disease, cardiomyopathy, NSTEMI, COPD, hypertension, ESRD, history of failed right upper extremity fistula, POD #9 status post left upper extremity AV fistula placement who now presents with pain and swelling of left arm and neck.  Evaluation here shows nonocclusive DVT in left brachial vein and superficial thrombophlebitis of left basilic vein.  Left upper extremity graft noted to be patent on ultrasound.  Hospitalist service was asked to assume care of the patient on 7/7/2022.    Left upper extremity DVT  Vascular surgery has seen the patient, and a ultrasound was done that shows patent flow to the AV fistula.  There is noted DVT in the left brachial vein and superficial thrombophlebitis of the left basilic vein.    * 7/8 left arm dialysis fistulogram with venoplasty of left innominate vein: complicated by post-sheath removal arm swelling and expanding perigraft hematoma. Taken to OR for repair of left upper arm AV graft and evacuate hematoma. . EBL 50ml  -- Continue heparin drip bridge to warfarin (held warfarin 7/8), here until INR 2-3  -- DOAC/lovenox contraindicated in hemodialysis patient.  -- Tylenol scheduled 650mg QID.  IV dilaudid or oral oxycodone as needed for severe pain.  -- appreciate hematology consult, recommends therapeutic warfarin for 3mo course on discharge  -- Continue supportive cares with compression sleeve, elevation.  -- Recommend close vascular surgery follow-up at discharge     Status post left AV fistula formation 6/28/2022  End-stage renal disease on hemodialysis  Patient usually has her [facility] dialysis MWF - Dr. Gonsales  --Nephrology consulted for management while hospitalized  --Dialysis 7/9 (unable to complete on 7/8 due to OR), resumed regular MWF dialysis on 7/11  --Renal diet     CAD   Hx  Cardiomyopathy  Hypertension  -- Continue PTA medications including hydralazine 50mg TID, amlodipine 10mg  - PTA imdur discontinued  - *7/11 PTA coreg increased from 12.5mg BID to 25mg BID, Lisinopril increased from 10mg to 20mg     Hyperphosphatemia  -- Continue PhosLo with meals     Chronic anemia  Related to ESRD  -- Monitor hemoglobin, so far stable ~10--expect some drop post hematoma evacuation, down to nicolle 9 on 7/10.  -- Epogen with dialysis     Constipation  --Bowel regimen ordered with scheduled senna-docusate twice daily, MiraLAX as needed.  Dulcolax available as needed.    Clinically Significant Risk Factors Present on Admission          # Acute Kidney Injury, unspecified: based on a >150% or 0.3 mg/dL increase in creatinine on admission compared to past 90 day average, will monitor renal function                DVT Prophylaxis: heparin gtt, warfarin  Code Status: Full Code     Expected Discharge Date: 07/12/2022        Discharge Comments: Hep gtt, need therapeutic INR       Susie Smallwood,   Hospitalist Service  [facility]  Securely message with the Vocera Web Console (learn more here)  Text Page (7am - 6pm) via [employer] Paging/Directory      Interval History   Patient seen and examined. Pain in arm is stable with tylenol. Had dialysis today. Little dizzy at one point, improved after some fluid given back. She wants to go home, discussed need to stay for therapeutic INR and she understands. Discussed care plan with RN    -Data reviewed today: I reviewed all new labs and imaging results over the last 24 hours. I personally reviewed no images or EKG's today.    Physical Exam   Temp: 97.6  F (36.4  C) Temp src: Oral BP: 130/77 Pulse: 93   Resp: 16 SpO2: 96 % O2 Device: None (Room air)    Vitals:    07/06/22 1937 07/10/22 0300   Weight: 55.8 kg (123 lb) 65 kg (143 lb 4.8 oz)     Vital Signs with Ranges  Temp:  [97.6  F (36.4  C)-98.3  F (36.8  C)] 97.6  F (36.4  C)  Pulse:  [78-93]  93  Resp:  [16-20] 16  BP: (120-150)/(69-87) 130/77  SpO2:  [92 %-100 %] 96 %  I/O last 3 completed shifts:  In: 200 [P.O.:200]  Out: -     Constitutional: Awake, alert, cooperative, no apparent distress  Respiratory: Clear to auscultation bilaterally, no crackles or wheezing  Cardiovascular: Regular rate and rhythm, normal S1 and S2, and no murmur noted  GI: Normal bowel sounds, soft, non-distended, non-tender  Skin/Integumen: No rashes, no cyanosis, LUE +2 edema, softer laterally--still with firmness medially near incision towards axilla, incisions intact without erythema. Palpable thrill at fistula  Other:     Medications     heparin 850 Units/hr (07/11/22 1254)     Warfarin Therapy Reminder         - MEDICATION INSTRUCTIONS for Dialysis Patients -   Does not apply See Admin Instructions     acetaminophen  650 mg Oral Q6H     amLODIPine  10 mg Oral QPM     atorvastatin  20 mg Oral Daily     calcium acetate  2,001 mg Oral TID w/meals     carvedilol  25 mg Oral BID w/meals     hydrALAZINE  50 mg Oral TID     lisinopril  20 mg Oral QPM     melatonin  3 mg Oral At Bedtime     multivitamin RENAL   Oral Daily     senna-docusate  1 tablet Oral BID     warfarin ANTICOAGULANT  7.5 mg Oral ONCE at 18:00       Data   Recent Labs   Lab 07/11/22  0750 07/10/22  0921 07/10/22  0433 07/09/22  2137 07/09/22  0830   WBC 7.0 8.3 8.5  --   --    HGB 9.2* 9.0* 9.1*  --  9.4*   MCV 88 89 91  --   --     264 277  --   --    INR 1.31*  --  1.10 1.08 3.31*   *  --  136  --  136   POTASSIUM 3.8  --  3.8  --  3.7   CHLORIDE 97  --  99  --  99   CO2 27  --  30  --  28   BUN 28  --  19  --  33*   CR 6.25*  --  4.80*  --  7.28*   ANIONGAP 7  --  7  --  9   JEWELL 9.6  --  9.1  --  9.2   GLC 94  --  97  --  93       No results found for this or any previous visit (from the past 24 hour(s)).

## 2022-07-11 NOTE — PROVIDER NOTIFICATION
Paged Hospitalist re: unusually high 10 a result.  Verifying whether she wants lab redrawn or pauses for hour as order states and decrease dose in one hour.      MD texted right back, retest only.

## 2022-07-11 NOTE — PROVIDER NOTIFICATION
Paged Hospitalist re: update.  back to floor from dialysis, heparin no change recheck at 2. patient asking if she can go home today. Will await advisement.

## 2022-07-11 NOTE — PROVIDER NOTIFICATION
Paged Hospitalist re: blood pressure med clarification. In speaking with hospitalist wanted to double check dose increases and making sure provider aware that various doses held am of dialysis recent.    MD called right back. Yes aware, new dose increase ok.

## 2022-07-11 NOTE — TELEPHONE ENCOUNTER
Called patient with appointment reminder for appointment scheduled for 7/12/22 at 1:30pm with Rayne Lawrence. Patient is in the hospital. Patient would like someone to call her to reschedule her appointment,  [phone].

## 2022-07-11 NOTE — PROGRESS NOTES
Potassium   Date Value Ref Range Status   07/11/2022 3.8 3.4 - 5.3 mmol/L Final     Hemoglobin   Date Value Ref Range Status   07/11/2022 9.2 (L) 11.7 - 15.7 g/dL Final     Creatinine   Date Value Ref Range Status   07/11/2022 6.25 (H) 0.52 - 1.04 mg/dL Final     Urea Nitrogen   Date Value Ref Range Status   07/11/2022 28 7 - 30 mg/dL Final     Sodium   Date Value Ref Range Status   07/11/2022 131 (L) 133 - 144 mmol/L Final     INR   Date Value Ref Range Status   07/11/2022 1.31 (H) 0.85 - 1.15 Final       DIALYSIS PROCEDURE NOTE  Hepatitis status of previous patient on machine log was checked and verified ok to use with this patients hepatitis status.  Patient dialyzed for 3 hrs. on a K3 bath with a net fluid removal of  1L.  A BFR of 400 ml/min was obtained via a left CVC.      The treatment plan was discussed with Dr. Louise during the treatment.    Total heparin received during the treatment: 0 units- pt is on a heparin drip.   Line flushed, clamped and capped with heparin 1:1000 2.3 mL (2300 units) per lumen    Meds  given: epogen   Complications: pt reported feeling dizzy ~ 1 hour into treatment (SBP 120s at the time, pt had recently gotten done eating)- UF paused, 200ml NS bolus given. Pt reported feeling better shortly after that and UF resumed with reduced UF goal. No further complications, MD updated.     Person educated: patient. Knowledge base substantial. Barriers to learning: none. Educated on procedure via verbal mode. patient verbalized understanding. Pt prefers verbal education style.     ICEBOAT? Timeout performed pre-treatment  I: Patient was identified using 2 identifiers  C:  Consent Signed Yes  E: Equipment preventative maintenance is current and dialysis delivery system OK to use  B: Hepatitis B Surface Antigen: negative; Draw Date: 4/13/22      Hepatitis B Surface Antibody: immune; Draw Date: 4/13/22  O: Dialysis orders present and complete prior to treatment  A: Vascular access verified and  assessed prior to treatment  T: Treatment was performed at a clinically appropriate time  ?: Patient was allowed to ask questions and address concerns prior to treatment  See Adult Hemodialysis flowsheet in EPIC for further details and post assessment.  Machine water alarm in place and functioning. Transducer pods intact and checked every 15min.   Pt returned via bed.  Chlorine/Chloramine water system checked every 4 hours.  Outpatient Dialysis at [facility]      Post treatment report given to GAIL Briones RN regarding 1L of fluid removed, last BP of 143/77, and patient pain rating of 0/10.

## 2022-07-11 NOTE — TELEPHONE ENCOUNTER
Spoke with patient and rescheduled her appointment to 7/20/22.  She can only be scheduled on Tuesdays or Thursdays, as she has dialysis on Monday, Wednesday and Fridays.

## 2022-07-11 NOTE — PROGRESS NOTES
Assessment and Plan:   ESRD: seen on HD. Running on L CVC. She is on a heparin infusion. 2L UF, L CVC, 400 BFR, 3h, K protocol, 35 HCO3 and 140 Na.     Runs MWF as an outpt. EPO on the run.      S/P Fistulogram with post-procedure left arm swelling.  Pt had hematoma  with extravasation.  Repair of the AV graft access site and evacuation of left arm hematoma occurred on 7/8/2022.            Interval History:   HT:  Amlodipine, coreg, hydralazine,lisinopril. Will adjust doses.        55 yo F s/p LUE fistulogram and venoplasty of left innominate vein complicated by expanding perigraft hematoma requiring OR repair of AVG and evacuation of left arm hematoma (7/8/2022).            Review of Systems:   No complaints on dialysis.           Medications:       - MEDICATION INSTRUCTIONS for Dialysis Patients -   Does not apply See Admin Instructions     acetaminophen  650 mg Oral Q6H     amLODIPine  10 mg Oral QPM     atorvastatin  20 mg Oral Daily     calcium acetate  2,001 mg Oral TID w/meals     carvedilol  12.5 mg Oral BID w/meals     hydrALAZINE  50 mg Oral TID     lisinopril  10 mg Oral QPM     melatonin  3 mg Oral At Bedtime     multivitamin RENAL   Oral Daily     senna-docusate  1 tablet Oral BID       heparin 850 Units/hr (07/11/22 0750)     Warfarin Therapy Reminder       Current active medications and PTA medications reviewed, see medication list for details.            Physical Exam:   Vitals were reviewed  Patient Vitals for the past 24 hrs:   BP Temp Temp src Pulse Resp SpO2   07/11/22 0915 135/73 -- -- 82 -- --   07/11/22 0900 138/78 -- -- 85 -- --   07/11/22 0845 (!) 143/85 -- -- 89 -- --   07/11/22 0830 (!) 148/78 -- -- 82 -- --   07/11/22 0820 (!) 149/84 -- -- 87 -- --   07/11/22 0800 (!) 150/81 98.2  F (36.8  C) Oral 81 16 94 %   07/11/22 0755 -- -- -- -- 16 --   07/11/22 0723 133/79 98.3  F (36.8  C) Oral 86 16 96 %   07/11/22 0454 (!) 144/80 98.3  F (36.8  C) Oral 83 16 93 %   07/11/22 0034 138/76  98.1  F (36.7  C) Oral 84 20 92 %   07/10/22 2118 130/72 -- -- 90 -- --   07/10/22 2021 (!) 144/79 97.9  F (36.6  C) Oral 89 16 96 %   07/10/22 1736 -- -- -- 78 -- --   07/10/22 1518 130/76 98  F (36.7  C) Oral 87 16 98 %   07/10/22 1105 137/73 98.3  F (36.8  C) Oral 82 16 100 %       Temp:  [97.9  F (36.6  C)-98.3  F (36.8  C)] 98.2  F (36.8  C)  Pulse:  [78-90] 82  Resp:  [16-20] 16  BP: (130-150)/(72-85) 135/73  SpO2:  [92 %-100 %] 94 %    Temperatures:  Current - Temp: 98.2  F (36.8  C); Max - Temp  Av.2  F (36.8  C)  Min: 97.9  F (36.6  C)  Max: 98.3  F (36.8  C)  Respiration range: Resp  Av.5  Min: 16  Max: 20  Pulse range: Pulse  Av.6  Min: 78  Max: 90  Blood pressure range: Systolic (24hrs), Av , Min:130 , Max:150   ; Diastolic (24hrs), Av, Min:72, Max:85    Pulse oximetry range: SpO2  Av.6 %  Min: 92 %  Max: 100 %    I/O last 3 completed shifts:  In: 200 [P.O.:200]  Out: -       Intake/Output Summary (Last 24 hours) at 2022 0923  Last data filed at 2022 0755  Gross per 24 hour   Intake 50 ml   Output --   Net 50 ml       Resting in bed, NAD  R CVC with no redness or tenderness       Wt Readings from Last 4 Encounters:   07/10/22 65 kg (143 lb 4.8 oz)   22 56.2 kg (124 lb)   22 56.7 kg (125 lb)   22 62.6 kg (138 lb)          Data:          Lab Results   Component Value Date     2022     07/10/2022     2022    Lab Results   Component Value Date    CHLORIDE 97 2022    CHLORIDE 99 07/10/2022    CHLORIDE 99 2022    Lab Results   Component Value Date    BUN 28 2022    BUN 19 07/10/2022    BUN 33 2022      Lab Results   Component Value Date    POTASSIUM 3.8 2022    POTASSIUM 3.8 07/10/2022    POTASSIUM 3.7 2022    Lab Results   Component Value Date    CO2 27 2022    CO2 30 07/10/2022    CO2 28 2022    Lab Results   Component Value Date    CR 6.25 2022    CR 4.80 07/10/2022     CR 7.28 07/09/2022        Recent Labs   Lab Test 07/11/22  0750 07/10/22  0921 07/10/22  0433   WBC 7.0 8.3 8.5   HGB 9.2* 9.0* 9.1*   HCT 28.1* 27.8* 28.0*   MCV 88 89 91    264 277     No results for input(s): AST, ALT, GGT, ALKPHOS, BILITOTAL, BILICONJ, BILIDIRECT, CALDERON in the last 84169 hours.    Invalid input(s): BILIRUBININDIRECT    Recent Labs   Lab Test 07/11/22  0750   MAG 2.3     Recent Labs   Lab Test 07/11/22  0750 07/07/22  0845   PHOS 2.4* 3.3     Recent Labs   Lab Test 07/11/22  0750 07/10/22  0433 07/09/22  0830   JEWELL 9.6 9.1 9.2       Lab Results   Component Value Date    JEWELL 9.6 07/11/2022     Lab Results   Component Value Date    WBC 7.0 07/11/2022    HGB 9.2 (L) 07/11/2022    HCT 28.1 (L) 07/11/2022    MCV 88 07/11/2022     07/11/2022     Lab Results   Component Value Date     (L) 07/11/2022    POTASSIUM 3.8 07/11/2022    CHLORIDE 97 07/11/2022    CO2 27 07/11/2022    GLC 94 07/11/2022     Lab Results   Component Value Date    BUN 28 07/11/2022    CR 6.25 (H) 07/11/2022     Lab Results   Component Value Date    MAG 2.3 07/11/2022     Lab Results   Component Value Date    PHOS 2.4 (L) 07/11/2022       Creatinine   Date Value Ref Range Status   07/11/2022 6.25 (H) 0.52 - 1.04 mg/dL Final   07/10/2022 4.80 (H) 0.52 - 1.04 mg/dL Final   07/09/2022 7.28 (H) 0.52 - 1.04 mg/dL Final   07/08/2022 6.76 (H) 0.52 - 1.04 mg/dL Final   07/08/2022 6.30 (H) 0.52 - 1.04 mg/dL Final   07/06/2022 3.81 (H) 0.52 - 1.04 mg/dL Final       Attestation:  I have reviewed today's vital signs, notes, medications, labs and imaging.  Seen on dialysis.      Hari Louise MD

## 2022-07-11 NOTE — PLAN OF CARE
Goal Outcome Evaluation:    Plan of Care Reviewed With: patient     Evacuation of left upper arm hematoma POD 3, status post left AV fistula formation POD 14.   Neuros/CMS - alert & oriented, left upper extremity swollen bicep/tenderness; dressing on upper arm clean/dry & intact; no dizziness or shortness of breath. VSS,RA. Heparin infusing at 850 units/hour, recheck for 1400/unusually high(alerted MD & lab will recheck). Tolerating regular diet/appetite fair/meds whole with water/late lunch ordered after 1330. Up with standby assist. Continent of bladder/hemodialysis this am, off floor 0800 until approximately 12.  Mild pain in left upper/tylenol scheduled provided am & declined in afternoon. Pt scoring green on the Aggression Stop Light Tool. Anticipate discharge home in coming days once INR therapeutic. Plan for dialysis on Wednesday. Left arm compression sleeve in place on left forearm.

## 2022-07-11 NOTE — PLAN OF CARE
Goal Outcome Evaluation:     Reason for Admission: deep vein thrombosis (DVT) of brachial vein of left upper extremity. she started experiencing these symptoms after her placement of a fistula on June 28th.     Code status:  FC  Seizure or isolation precaution: bleeding precautions due to pt being on heparin drip   Cognitive/Mentation: A/Ox 4  Use of CPAP: No  Neuros/CMS: no new changes, CMS intact. Upper and lower ext. strength 5/5. No sensation from the left elbow to the left axillary area.   CIWA Protocol: NA  VS: stable   Tele: N/A   GI: BS active x4, passing flatus,  Continent.  : voiding w/o difficulty. Continent.  Use of reyes, external catheter, or urinal: N/A  Pulmonary: LS clear   Pain: no pain  Critical Labs to Monitor: N/A  Electrolyte Replacement Protocol: N/A  Use of oxygen: N/A   Heparin or other drips: pt is on heparin drip infusing at 850 units/hr, recheck of 10a lab is due in the morning 7/11 at 0600. The plan is to discontinue heparin once INR is therapeutic      BG checks: N/A  Tests, MRI, CT, KHANG, Echo: no testes pending at this time.  Drains: heparin infusing   Skin: intact   Edema: L. Upper arrm is swollen, trace edema, ice pack applied.   Surgical site: L. Upper arm fistula is covered with dressing, CDI. no symptom of infection. Thrill and bruit present at the fistula site   Activity: up indep.   Diet: renal diet   Tube Feeding: N/A   Discharge: pending. Back to home once INR is therapeutic, the gaol is 2-3. Pt have dialysis on MWF. Pt will have dialysis in the morning.

## 2022-07-12 NOTE — PROGRESS NOTES
[facility]    Hospitalist Progress Note    Assessment & Plan   Gina Simpson is a 56 year old female with PMHx of CAD, cardiomyopathy, hypertension, COPD and ESRD with hx of failed RUE fistula who underwent creation of LUE AV fistula on 6/28/22 who was admitted on 7/6/2022 with increased pain/swelling in LUE and neck with findings of a nonocclusive DVT in the left brachial vein and superficial thrombophlebitis of left basilic vein.  LUE graft appeared patent on US. Ultimately underwent exploration of LUE with repair of graft and evacuation of hematoma per vascular surgery on 7/8/22.      Left upper extremity DVT  S/p evacuation of hematoma on 7/8/22  * Presented with increased pain/swelling in LUE and neck with findings of a nonocclusive DVT in the left brachial vein and superficial thrombophlebitis of left basilic vein. LUE graft appeared patent on US.   * Underwent LUE fistulogram with venoplasty of left innominate vein on 7/8. Procedure was complicated by post-sheath removal arm swelling and expanding perigraft hematoma. Taken to OR on 7/8 for exploration of LUE with repair of AV graft and evacuation of hematoma per vascular surgery.  * Seen by heme/onc this stay, recommended 3mth course of anticoagulation with warfarin.  -- started on warfarin and bridging with heparin gtt (DOAC and Lovenox contraindicated dt HD)  -- pharmacy managing warfarin, goal INR 2-3; INR 1.63 today  -- cont compression sleeve, elevation of LUE  -- sched Tylenol and prns available for discomfort  -- will need to follow up with vascular surgery after discharge    ESRD on HD  S/p left AV fistula formation 6/28/22  * Dialyzes [facility] - Dr. Gonsales  -- nephrology following, conts on MWF dialysis sched    CAD   Hx Cardiomyopathy  Hypertension  -- conts on hydralazine 50mg TID and amlodipine 10mg daily as per prior to admission  -- Coreg dose increased to 25mg BID and lisinopril increased to 20mg daily this stay  -- PTA  Imdur stopped      Acute on chronic anemia  Related to ESRD  * Baseline hgb is around 10. Underwent evacuation of hematoma this stay as above.   * Hgb now stable at 9.   * Cont EPO with dialysis.     Hyperphosphatemia  -- cont PhosLo with meals     Constipation  * Has sched/prn bowel regimen.     FEN: no IVFs, lytes stable, renal diet  DVT Prophylaxis: heparin gtt while awaiting therapeutic INR  Code Status: Full Code    Disposition: INR 1.6 today. Cont heparin gtt. Anticipate discharge home in 1-2d pending therapeutic INR.     Misti Perry, DO    Interval History   Seen this morning. Feeling okay. Anxious to get home. No specific complaints. Denies cp/sob/cough, abd pain/n/v.     -Data reviewed today: I reviewed all new labs and imaging results over the last 24 hours. I personally reviewed no images or EKG's today.    Physical Exam   Temp: 97.9  F (36.6  C) Temp src: Oral BP: 131/72 Pulse: 88   Resp: 16 SpO2: 97 % O2 Device: None (Room air)    Vitals:    07/06/22 1937 07/10/22 0300   Weight: 55.8 kg (123 lb) 65 kg (143 lb 4.8 oz)     Vital Signs with Ranges  Temp:  [97.6  F (36.4  C)-98.5  F (36.9  C)] 97.9  F (36.6  C)  Pulse:  [82-93] 88  Resp:  [16] 16  BP: (116-150)/(69-87) 131/72  SpO2:  [95 %-100 %] 97 %  I/O last 3 completed shifts:  In: 150 [P.O.:150]  Out: 1000 [Other:1000]    Constitutional: Resting comfortably, alert and conversing appropriately, NAD  Respiratory: CTAB, no wheeze/rales/rhonchi, no increased work of breathing  Cardiovascular: HRRR, no MGR, no LE edema  GI: S, NT, ND, +BS  Skin/Integumen: warm/dry  Other:      Medications     heparin 900 Units/hr (07/12/22 0737)     Warfarin Therapy Reminder         - MEDICATION INSTRUCTIONS for Dialysis Patients -   Does not apply See Admin Instructions     acetaminophen  650 mg Oral Q6H     amLODIPine  10 mg Oral QPM     atorvastatin  20 mg Oral Daily     calcium acetate  2,001 mg Oral TID w/meals     carvedilol  25 mg Oral BID w/meals      hydrALAZINE  50 mg Oral TID     lisinopril  20 mg Oral QPM     melatonin  3 mg Oral At Bedtime     multivitamin RENAL   Oral Daily     senna-docusate  1 tablet Oral BID       Data   Recent Labs   Lab 07/12/22  0659 07/11/22  0750 07/10/22  0921 07/10/22  0433   WBC  --  7.0 8.3 8.5   HGB 9.1* 9.2* 9.0* 9.1*   MCV  --  88 89 91   PLT  --  284 264 277   INR 1.65* 1.31*  --  1.10    131*  --  136   POTASSIUM 3.8 3.8  --  3.8   CHLORIDE 100 97  --  99   CO2 29 27  --  30   BUN 18 28  --  19   CR 4.48* 6.25*  --  4.80*   ANIONGAP 7 7  --  7   JEWELL 9.3 9.6  --  9.1   GLC 95 94  --  97       No results found for this or any previous visit (from the past 24 hour(s)).

## 2022-07-12 NOTE — PLAN OF CARE
Admitted for LUE DVT. POD 4 s/p fistulogram and evacuation Of LUE hematoma. A&Ox4. VSS on RA. Denies pain; declined scheduled Tylenol. CMS intact. Independent in room. Tolerating renal diet, but poor appetite. Only ate bites of food today. Continent. LUE fistula WNL. L chest dialysis catheter CDI. Dialysis tomorrow. Hep gtt infusing at 900 units/hr, next recheck tomorrow morning.  INR 1.65. Discharge pending therapeutic INR.

## 2022-07-12 NOTE — PLAN OF CARE
Goal Outcome Evaluation:    Plan of Care Reviewed With: patient     Overall Patient Progress: improving    Pt here POD 3 evacuation of LUE hematoma. A&Ox4. Neuros intact ex LUE swollen/tender/ slight weakness. Reg diet, thin liquids. Takes pills whole. Up with SBA. Denies pain. Pt scoring green on the Aggression Stop Light Tool. Plan Heparin gtt until INR therapeutic bridging to coumadin, goal per MD INR 2-3. Discharge pending.    This evening patient was requesting to go outside with her daughters to get some fresh air, patient states she was allowed to do this the other day as well. RN described in detail the risks of patient leaving the floor especially while on heparin gtt. Patient still insistent on wanting to leave floor. RN consulted day shift CN and floor patient care supervisor who state that patient has right to leave the floor but should be thoroughly educated of risks. Restated risks to patient who still wishes to go outside. Got wheelchair for patient and assisted in attaching IV pump to wheelchair. Patient daughters accompanied patient off the floor for approximately 15 minutes.

## 2022-07-12 NOTE — PROVIDER NOTIFICATION
"MD Notification    Notified Person: MD    Notified Person Name: Misti Perry     Notification Date/Time: 7/12/22 at 1043    Notification Interaction: [employer] Messaging     Purpose of Notification: \"Pt has been eating poorly. No appetite. Last phos 2.4. Should I hold phoslo? \"    Orders Received: Hold phoslo per MD     Comments:      "

## 2022-07-12 NOTE — PROGRESS NOTES
Assessment and Plan:   ESRD: HD . Orders reviewed and written. Has L CVC. She runs at the [facility] dialysis unit with Dr. Gonsales.         S/P Fistulogram with post-procedure left arm swelling.  Pt had hematoma  with extravasation.  Repair of the AV graft access site and evacuation of left arm hematoma occurred on 2022.      Interval History:   Hypertension: on amlod, coreg, lisinopril, hydral.   Anemia: on EPO.                  Review of Systems:   No problems on dialysis.           Medications:       - MEDICATION INSTRUCTIONS for Dialysis Patients -   Does not apply See Admin Instructions     acetaminophen  650 mg Oral Q6H     amLODIPine  10 mg Oral QPM     atorvastatin  20 mg Oral Daily     calcium acetate  2,001 mg Oral TID w/meals     carvedilol  25 mg Oral BID w/meals     hydrALAZINE  50 mg Oral TID     lisinopril  20 mg Oral QPM     melatonin  3 mg Oral At Bedtime     multivitamin RENAL   Oral Daily     senna-docusate  1 tablet Oral BID       heparin 900 Units/hr (22 0737)     Warfarin Therapy Reminder       Current active medications and PTA medications reviewed, see medication list for details.            Physical Exam:   Vitals were reviewed  Patient Vitals for the past 24 hrs:   BP Temp Temp src Pulse Resp SpO2   22 0723 131/72 97.9  F (36.6  C) Oral 88 16 97 %   22 0513 (!) 143/77 97.9  F (36.6  C) Oral 90 16 95 %   22 2345 125/69 97.8  F (36.6  C) Oral 88 16 95 %   22 1900 130/70 98.5  F (36.9  C) Oral 85 16 100 %   22 1841 136/74 -- -- -- -- --   22 1520 116/70 98.1  F (36.7  C) Oral 87 16 96 %   22 1352 130/77 -- -- 93 16 96 %       Temp:  [97.8  F (36.6  C)-98.5  F (36.9  C)] 97.9  F (36.6  C)  Pulse:  [85-93] 88  Resp:  [16] 16  BP: (116-143)/(69-77) 131/72  SpO2:  [95 %-100 %] 97 %    Temperatures:  Current - Temp: 97.9  F (36.6  C); Max - Temp  Av  F (36.7  C)  Min: 97.8  F (36.6  C)  Max: 98.5  F (36.9  C)  Respiration  range: Resp  Av  Min: 16  Max: 16  Pulse range: Pulse  Av.5  Min: 85  Max: 93  Blood pressure range: Systolic (24hrs), Av , Min:116 , Max:143   ; Diastolic (24hrs), Av, Min:69, Max:77    Pulse oximetry range: SpO2  Av.5 %  Min: 95 %  Max: 100 %    I/O last 3 completed shifts:  In: 150 [P.O.:150]  Out: 1000 [Other:1000]      Intake/Output Summary (Last 24 hours) at 2022 1257  Last data filed at 2022 0900  Gross per 24 hour   Intake 220 ml   Output --   Net 220 ml       Alert and responsive  L CVC with no redness or tenderness         Wt Readings from Last 4 Encounters:   07/10/22 65 kg (143 lb 4.8 oz)   22 56.2 kg (124 lb)   22 56.7 kg (125 lb)   22 62.6 kg (138 lb)          Data:          Lab Results   Component Value Date     2022     2022     07/10/2022    Lab Results   Component Value Date    CHLORIDE 100 2022    CHLORIDE 97 2022    CHLORIDE 99 07/10/2022      Lab Results   Component Value Date    BUN 18 2022    BUN 28 2022    BUN 19 07/10/2022      Lab Results   Component Value Date    POTASSIUM 3.8 2022    POTASSIUM 3.8 2022    POTASSIUM 3.8 07/10/2022    Lab Results   Component Value Date    CO2 29 2022    CO2 27 2022    CO2 30 07/10/2022    Lab Results   Component Value Date    CR 4.48 2022    CR 6.25 2022    CR 4.80 07/10/2022        Recent Labs   Lab Test 22  0659 22  0750 07/10/22  0921 07/10/22  0433   WBC  --  7.0 8.3 8.5   HGB 9.1* 9.2* 9.0* 9.1*   HCT  --  28.1* 27.8* 28.0*   MCV  --  88 89 91   PLT  --  284 264 277     No results for input(s): AST, ALT, GGT, ALKPHOS, BILITOTAL, BILICONJ, BILIDIRECT, CALDERON in the last 98041 hours.    Invalid input(s): BILIRUBININDIRECT    Recent Labs   Lab Test 22  0750   MAG 2.3     Recent Labs   Lab Test 22  0659 22  0750 22  0845   PHOS 2.2* 2.4* 3.3     Recent Labs   Lab Test 22  0659  07/11/22  0750 07/10/22  0433   JEWELL 9.3 9.6 9.1     Lab Results   Component Value Date    JEWELL 9.3 07/12/2022     Lab Results   Component Value Date    WBC 7.0 07/11/2022    HGB 9.1 (L) 07/12/2022    HCT 28.1 (L) 07/11/2022    MCV 88 07/11/2022     07/11/2022     Lab Results   Component Value Date     07/12/2022    POTASSIUM 3.8 07/12/2022    CHLORIDE 100 07/12/2022    CO2 29 07/12/2022    GLC 95 07/12/2022     Lab Results   Component Value Date    BUN 18 07/12/2022    CR 4.48 (H) 07/12/2022     Lab Results   Component Value Date    MAG 2.3 07/11/2022     Lab Results   Component Value Date    PHOS 2.2 (L) 07/12/2022       Creatinine   Date Value Ref Range Status   07/12/2022 4.48 (H) 0.52 - 1.04 mg/dL Final   07/11/2022 6.25 (H) 0.52 - 1.04 mg/dL Final   07/10/2022 4.80 (H) 0.52 - 1.04 mg/dL Final   07/09/2022 7.28 (H) 0.52 - 1.04 mg/dL Final   07/08/2022 6.76 (H) 0.52 - 1.04 mg/dL Final   07/08/2022 6.30 (H) 0.52 - 1.04 mg/dL Final       Attestation:  I have reviewed today's vital signs, notes, medications, labs and imaging.     Hari Louise MD

## 2022-07-12 NOTE — PROGRESS NOTES
"VASCULAR SURGERY PROGRESS NOTE    Subjective:  Patient resting comfortably in bed. Denies left arm pain. Reports left arm swelling that has significantly improved.     Objective:  Intake/Output Summary (Last 24 hours) at 7/12/2022 0850  Last data filed at 7/11/2022 1815  Gross per 24 hour   Intake 100 ml   Output 1000 ml   Net -900 ml     PHYSICAL EXAM:  /72 (BP Location: Right arm)   Pulse 88   Temp 97.9  F (36.6  C) (Oral)   Resp 16   Ht 1.651 m (5' 5\")   Wt 65 kg (143 lb 4.8 oz)   SpO2 97%   BMI 23.85 kg/m    General: The patient is alert and oriented. Appropriate. No acute distress  Psych: pleasant affect, answers questions appropriately  Skin: Color appropriate for race, warm, dry.  Respiratory: The patient does not require supplemental oxygen. Breathing unlabored  GI:  Abdomen soft, nontender to light palpation.  Extremities: Radial pulses are 2+. Left upper arm swelling, per patient has improved significantly since admission. Excellent thrill noted from fistula. Elevating left arm, compression sleeve in place.     ASSESSMENT:  55 yo F s/p LUE fistulogram and venoplasty of left innominate vein complicated by expanding perigraft hematoma requiring OR repair of AVG and evacuation of left arm hematoma (7/8/2022). Patient remains on heparin at this time, due to subtherapeutic INR, currently 1.65. Patient denies numbness/tingling of the left arm.    PLAN:  -Heparin drip with Warfarin bridge, the patient's INR is 1.65 on 7/12, goal of 2-3   -encourage elevation of the left arm, continue compression sleeve    Rayne Lawrence NP  VASCULAR SURGERY                   "

## 2022-07-13 NOTE — PROGRESS NOTES
Potassium   Date Value Ref Range Status   07/12/2022 3.8 3.4 - 5.3 mmol/L Final     Hemoglobin   Date Value Ref Range Status   07/13/2022 9.3 (L) 11.7 - 15.7 g/dL Final     Creatinine   Date Value Ref Range Status   07/12/2022 4.48 (H) 0.52 - 1.04 mg/dL Final     Urea Nitrogen   Date Value Ref Range Status   07/12/2022 18 7 - 30 mg/dL Final     Sodium   Date Value Ref Range Status   07/12/2022 136 133 - 144 mmol/L Final     INR   Date Value Ref Range Status   07/13/2022 1.98 (H) 0.85 - 1.15 Final       DIALYSIS PROCEDURE NOTE  Hepatitis status of previous patient on machine log was checked and verified ok to use with this patients hepatitis status.  Patient dialyzed for 3 hrs. on a K3 bath with a net fluid removal of  2L.  A BFR of 400 ml/min was obtained via a left tunneled CVC.      The treatment plan was discussed with Dr. Louise during the treatment.    Total heparin received during the treatment: 0 units.   Line flushed, clamped and capped with heparin 1:1000 2.3 mL (2300 units) per lumen    Meds  given: epogen 4,000 units, hectorol 4mcg  Complications: none      Person educated: patient. Knowledge base substantial. Barriers to learning: none. Educated on procedure via verbal mode. Patient verbalized understanding. Pt prefers oral education style.     ICEBOAT? Timeout performed pre-treatment  I: Patient was identified using 2 identifiers  C:  Consent Signed Yes  E: Equipment preventative maintenance is current and dialysis delivery system OK to use  B: Hepatitis B Surface Antigen: NEG; Draw Date: 4.13.22      Hepatitis B Surface Antibody: IMMUNE; Draw Date: 4.13.222  O: Dialysis orders present and complete prior to treatment  A: Vascular access verified and assessed prior to treatment  T: Treatment was performed at a clinically appropriate time  ?: Patient was allowed to ask questions and address concerns prior to treatment  See Adult Hemodialysis flowsheet in [facility] for further details and post  assessment.  Machine water alarm in place and functioning. Transducer pods intact and checked every 15min.   Chlorine/Chloramine water system checked every 4 hours.  Outpatient Dialysis at [facility] on MWF.      Called floor x2 and attempted to reach floor nurse to give post tx report, nurse unavailable. Left message for floor nurse to call back for report and no call returned. Patient VSS (/70, no c/o pain) and transported safely back to room. 701 via bed.

## 2022-07-13 NOTE — CONSULTS
Care Coordination:    Patient will discharge today.  She will need an INR on 7/15 and be set up with anticoagulation clinic.  She goes to dialysis at [facility].  Contacted clinic listed as her PCP ([facility] [phone]).  Informed that she has been seen there for a pre-op exam but has not established care with a provider.  Spoke to patient about this and she confirmed she has not established care.  Discussed that she will most likely need to be seen at the clinic in order to be set up with the anticoagulation clinic.  She stated she could also go to the medical clinic in [place].    Contacted the [facility] ([phone]).  She has been seen at this clinic but does not have an established provider.  The soonest appointment they could offer was 7/25.    Called [facility] back.  The earliest they could get patient in for an appointment is 7/18.   has sent a message to Dr. Santos's care team to see if she would follow patient for anticoagulation (needs INR on 7/15). Awaiting call back from clinic.    Addendum @ 3650:  Spoke to Kinza at the anticoagulation clinic at the [facility].  She was aware of patient and is checking to see if there is a provider that will be able to sign orders.  Dr. Santos is out of the office and patient does not have an established PCP.    Addendum @ 8959:    Received a call back from Kinza.  Patient will have an INR appointment on Friday 7/15 at 3:30 p.m. and a hospital follow up with Dr. Santos on 7/20 at  3:30 p.m (both at [facility]).  AVS updated.  Per Kinza's request, faxed INR/Warfarin dosing information, hematology consult and discharge summary to [phone].    Fabi Shelton RN, BSN, PHN  Inpatient Care Coordination  [facility]  Phone: [phone]

## 2022-07-13 NOTE — PROGRESS NOTES
Assessment and Plan:   ESRD on HD: run today for 2L UF, 3 h, no heparin (pt on heparin drip), L  BFR, 3K 33 HCO3 140 NA. Hectorol and EPO on the run.             Interval History:   Anemia: EPO on dialysis.     Hypertension: amlodipine, coreg, hydralazine, lisinopril. BP now well controlled.     S/P Fistulogram with post-procedure left arm swelling.  Pt had hematoma  with extravasation.  Repair of the AV graft access site and evacuation of left arm hematoma occurred on 7/8/2022.  On heparin and coumadin.             Review of Systems:   No complaints on dialysis.          Medications:       - MEDICATION INSTRUCTIONS for Dialysis Patients -   Does not apply See Admin Instructions     sodium chloride 0.9%  250 mL Intravenous Once in dialysis/CRRT     sodium chloride 0.9%  300 mL Hemodialysis Machine Once     acetaminophen  650 mg Oral Q6H     amLODIPine  10 mg Oral QPM     atorvastatin  20 mg Oral Daily     calcium acetate  2,001 mg Oral TID w/meals     carvedilol  25 mg Oral BID w/meals     doxercalciferol  4 mcg Intravenous Once in dialysis/CRRT     epoetin delma-epbx (RETACRIT) inj ESRD  4,000 Units Intravenous Once in dialysis/CRRT     sodium chloride (PF) 0.9%  1.3-2.6 mL Intracatheter Once in dialysis/CRRT    Followed by     heparin  3 mL Intracatheter Once in dialysis/CRRT     sodium chloride (PF) 0.9%  1.3-2.6 mL Intracatheter Once in dialysis/CRRT    Followed by     heparin  3 mL Intracatheter Once in dialysis/CRRT     hydrALAZINE  50 mg Oral TID     lisinopril  20 mg Oral QPM     melatonin  3 mg Oral At Bedtime     multivitamin RENAL   Oral Daily     - MEDICATION INSTRUCTIONS -   Does not apply Once     senna-docusate  1 tablet Oral BID     sodium chloride (PF)  9 mL Intracatheter During Dialysis/CRRT (from stock)     sodium chloride (PF)  9 mL Intracatheter During Dialysis/CRRT (from stock)       heparin 900 Units/hr (07/12/22 1956)     Warfarin Therapy Reminder       Current active  medications and PTA medications reviewed, see medication list for details.            Physical Exam:   Vitals were reviewed  Patient Vitals for the past 24 hrs:   BP Temp Temp src Pulse Resp SpO2   22 0915 127/68 -- -- 97 -- --   22 0900 115/67 -- -- 94 -- --   22 0845 119/66 -- -- 73 -- --   22 0830 115/67 -- -- 73 -- --   22 0815 135/77 -- -- 73 -- --   22 0800 133/71 -- -- 76 -- --   22 0755 130/74 -- -- 78 -- --   22 0753 132/69 97.9  F (36.6  C) Oral 79 16 94 %   22 0724 123/64 97.6  F (36.4  C) Oral 77 16 93 %   22 0031 116/67 97.8  F (36.6  C) Oral 80 16 95 %   22 2134 138/73 -- -- 91 -- --   22 1950 121/67 97.9  F (36.6  C) Oral 78 16 98 %   22 1519 134/71 98.3  F (36.8  C) Oral 86 16 98 %   22 1406 132/66 -- -- 89 14 96 %       Temp:  [97.6  F (36.4  C)-98.3  F (36.8  C)] 97.9  F (36.6  C)  Pulse:  [73-97] 97  Resp:  [14-16] 16  BP: (115-138)/(64-77) 127/68  SpO2:  [93 %-98 %] 94 %    Temperatures:  Current - Temp: 97.9  F (36.6  C); Max - Temp  Av.9  F (36.6  C)  Min: 97.6  F (36.4  C)  Max: 98.3  F (36.8  C)  Respiration range: Resp  Avg: 15.7  Min: 14  Max: 16  Pulse range: Pulse  Av.7  Min: 73  Max: 97  Blood pressure range: Systolic (24hrs), Av , Min:115 , Max:138   ; Diastolic (24hrs), Av, Min:64, Max:77    Pulse oximetry range: SpO2  Av.7 %  Min: 93 %  Max: 98 %    I/O last 3 completed shifts:  In: 320 [P.O.:320]  Out: -       Intake/Output Summary (Last 24 hours) at 2022 0918  Last data filed at 2022 0030  Gross per 24 hour   Intake 200 ml   Output --   Net 200 ml       Resting in bed, NAD  L CVC with clean exit site     Wt Readings from Last 4 Encounters:   07/10/22 65 kg (143 lb 4.8 oz)   22 56.2 kg (124 lb)   22 56.7 kg (125 lb)   22 62.6 kg (138 lb)          Data:          Lab Results   Component Value Date     2022     2022      07/10/2022      Lab Results   Component Value Date    CHLORIDE 100 07/12/2022    CHLORIDE 97 07/11/2022    CHLORIDE 99 07/10/2022    Lab Results   Component Value Date    BUN 18 07/12/2022    BUN 28 07/11/2022    BUN 19 07/10/2022      Lab Results   Component Value Date    POTASSIUM 3.8 07/12/2022    POTASSIUM 3.8 07/11/2022    POTASSIUM 3.8 07/10/2022    Lab Results   Component Value Date    CO2 29 07/12/2022    CO2 27 07/11/2022    CO2 30 07/10/2022    Lab Results   Component Value Date    CR 4.48 07/12/2022    CR 6.25 07/11/2022    CR 4.80 07/10/2022        Recent Labs   Lab Test 07/13/22  0635 07/12/22  0659 07/11/22  0750 07/10/22  0921   WBC 10.0  --  7.0 8.3   HGB 9.3* 9.1* 9.2* 9.0*   HCT 27.9*  --  28.1* 27.8*   MCV 87  --  88 89     --  284 264     No results for input(s): AST, ALT, GGT, ALKPHOS, BILITOTAL, BILICONJ, BILIDIRECT, CALDERON in the last 37860 hours.    Invalid input(s): BILIRUBININDIRECT    Recent Labs   Lab Test 07/11/22  0750   MAG 2.3     Recent Labs   Lab Test 07/12/22  0659 07/11/22  0750 07/07/22  0845   PHOS 2.2* 2.4* 3.3     Recent Labs   Lab Test 07/12/22  0659 07/11/22  0750 07/10/22  0433   JEWELL 9.3 9.6 9.1       Lab Results   Component Value Date    JEWELL 9.3 07/12/2022     Lab Results   Component Value Date    WBC 10.0 07/13/2022    HGB 9.3 (L) 07/13/2022    HCT 27.9 (L) 07/13/2022    MCV 87 07/13/2022     07/13/2022     Lab Results   Component Value Date     07/12/2022    POTASSIUM 3.8 07/12/2022    CHLORIDE 100 07/12/2022    CO2 29 07/12/2022    GLC 95 07/12/2022     Lab Results   Component Value Date    BUN 18 07/12/2022    CR 4.48 (H) 07/12/2022     Lab Results   Component Value Date    MAG 2.3 07/11/2022     Lab Results   Component Value Date    PHOS 2.2 (L) 07/12/2022       Creatinine   Date Value Ref Range Status   07/12/2022 4.48 (H) 0.52 - 1.04 mg/dL Final   07/11/2022 6.25 (H) 0.52 - 1.04 mg/dL Final   07/10/2022 4.80 (H) 0.52 - 1.04 mg/dL Final    07/09/2022 7.28 (H) 0.52 - 1.04 mg/dL Final   07/08/2022 6.76 (H) 0.52 - 1.04 mg/dL Final   07/08/2022 6.30 (H) 0.52 - 1.04 mg/dL Final       Attestation:  I have reviewed today's vital signs, notes, medications, labs and imaging.  Seen on dialysis.      Hari Louise MD

## 2022-07-13 NOTE — DISCHARGE SUMMARY
[facility]    Discharge Summary  Hospitalist    Date of Admission:  7/6/2022  Date of Discharge:  7/13/2022  Discharging Provider: Misti Perry,     Discharge Diagnoses   Left upper extremity DVT  S/p evacuation of hematoma on 7/8/22  ESRD on HD  S/p left AV fistula formation 6/28/22  CAD   Hx Cardiomyopathy  Hypertension  Acute on chronic anemia  Related to ESRD  Hyperphosphatemia  Constipation    History of Present Illness   Gina Simpson is a 56 year old female with PMHx of CAD, cardiomyopathy, hypertension, COPD and ESRD with hx of failed RUE fistula who underwent creation of LUE AV fistula on 6/28/22 who was admitted on 7/6/2022 with increased pain/swelling in LUE and neck with findings of a nonocclusive DVT in the left brachial vein and superficial thrombophlebitis of left basilic vein.  LUE graft appeared patent on US. Ultimately underwent exploration of LUE with repair of graft and evacuation of hematoma per vascular surgery on 7/8/22.     Hospital Course   Gina Simpson was admitted on 7/6/2022.  The following problems were addressed during her hospitalization:    Left upper extremity DVT  S/p evacuation of hematoma on 7/8/22  * Presented with increased pain/swelling in LUE and neck with findings of a nonocclusive DVT in the left brachial vein and superficial thrombophlebitis of left basilic vein. LUE graft appeared patent on US.   * Underwent LUE fistulogram with venoplasty of left innominate vein on 7/8. Procedure was complicated by post-sheath removal arm swelling and expanding perigraft hematoma. Taken to OR on 7/8 for exploration of LUE with repair of AV graft and evacuation of hematoma per vascular surgery.  * Seen by heme/onc this stay, recommended 3mth course of anticoagulation with warfarin.  * Started on warfarin (goal INR 2-3) and bridged with heparin gtt (DOAC and Lovenox contraindicated dt HD)  * INR 1.98 on day of discharge -- recommended to  take 5mg on 7/13, 5mg on 7/14 and have next INR check on 7/15.   * Recommended to continue compression sleeve, elevation of LUE  * Can use Tylenol prn for pain.   * Follow up with vascular surgery after discharge     ESRD on HD  S/p left AV fistula formation 6/28/22  * Dialyzes MWF - Dr. Gonsales  * Continued on MWF dialysis schedule this stay per nephrology.     CAD   Hx Cardiomyopathy  Hypertension  * Continued on hydralazine 50mg TID and amlodipine 10mg daily without changes this stay.  * Coreg dose increased to 25mg BID and lisinopril increased to 20mg daily this stay  * PTA Imdur (90mg daily) stopped      Acute on chronic anemia  Related to ESRD  * Baseline hgb is around 10. Underwent evacuation of hematoma this stay as above.   * Hgb now stable at 9.   * Cont EPO with dialysis.      Hyperphosphatemia  * Cont PhosLo with meals     Constipation  * Has sched/prn bowel regimen.     Misti Perry DO    Significant Results and Procedures   Procedure Date: 07/08/2022     PREOPERATIVE DIAGNOSIS:  Left upper arm hematoma secondary to a bleeding arteriovenous dialysis graft.     POSTOPERATIVE DIAGNOSIS:  Left upper arm hematoma secondary to a bleeding arteriovenous dialysis graft.     PROCEDURES PERFORMED:     1.  Exploration of left upper extremity with repair of arteriovenous dialysis graft.  2.  Evacuation of left upper arm hematoma.     SURGEON:  Akash Miller MD    Pending Results   These results will be followed up by nephrology  Unresulted Labs Ordered in the Past 30 Days of this Admission     Date and Time Order Name Status Description    7/13/2022  8:30 AM Hepatitis B Surface Antibody In process           Code Status   Full Code       Primary Care Physician   Clair Mohr MD    Physical Exam   Temp: 97.9  F (36.6  C) Temp src: Oral BP: 115/73 Pulse: 80   Resp: 16 SpO2: 94 % O2 Device: None (Room air)    Vitals:    07/06/22 1937 07/10/22 0300   Weight: 55.8 kg (123 lb) 65 kg (143 lb 4.8 oz)      Vital Signs with Ranges  Temp:  [97.6  F (36.4  C)-98.3  F (36.8  C)] 97.9  F (36.6  C)  Pulse:  [73-97] 80  Resp:  [14-16] 16  BP: (110-138)/(59-77) 115/73  SpO2:  [93 %-98 %] 94 %  I/O last 3 completed shifts:  In: 320 [P.O.:320]  Out: -     General: Resting comfortably, alert, conversive, NAD  CVS: HRRR, no MGR, no LE edema  Respiratory: CTAB, no wheeze/rales/rhonchi, no increased work of breathing  GI: S, NT, ND, +BS  Skin: Warm/dry  Neuro: CNs 2-12 intact, no focal motor/sensory deficits    Discharge Disposition   Discharged to home  Condition at discharge: Stable    Consultations This Hospital Stay   HOSPITALIST IP CONSULT  NEPHROLOGY IP CONSULT  HEMATOLOGY & ONCOLOGY IP CONSULT  --------------------------------------  PHARMACY TO DOSE WARFARIN  VASCULAR ACCESS ADULT IP CONSULT    Time Spent on this Encounter   IMisti DO, personally saw the patient today and spent greater than 30 minutes discharging this patient.    Discharge Orders      INR     Reason for your hospital stay    Management of the blood clot in your arm and resultant hematoma, for which you underwent surgery to manage the hematoma and your dialysis graft. You were started on a new blood thinner this stay (called warfarin) to prevent future blood clots.     Follow-up and recommended labs and tests     Continue dialysis per your usual routine  Follow up with your vascular surgeon as advised  Follow up with your PCP in the next week.   You will need to have your INR checked on 7/15/22     Activity    Your activity upon discharge: activity as tolerated     Discharge Instructions    You were started on a new blood thinner this stay (called warfarin) to prevent future clots. It is anticipated you will need to take this for at least 3 months. You will need to establish with an INR clinic to have your levels monitored and ensure they remain at goal (your goal INR is 2-3)     Diet    Follow this diet upon discharge: Regular      Discharge Medications   Current Discharge Medication List      START taking these medications    Details   warfarin ANTICOAGULANT (COUMADIN) 2.5 MG tablet Take 2 tablets (5 mg) by mouth daily for 30 days  Qty: 60 tablet, Refills: 0    Associated Diagnoses: Acute deep vein thrombosis (DVT) of brachial vein of left upper extremity (H)         CONTINUE these medications which have CHANGED    Details   carvedilol (COREG) 25 MG tablet Take 1 tablet (25 mg) by mouth 2 times daily (with meals) for 30 days  Qty: 60 tablet, Refills: 0    Associated Diagnoses: Benign essential hypertension      lisinopril (ZESTRIL) 20 MG tablet Take 1 tablet (20 mg) by mouth every evening for 30 days  Qty: 30 tablet, Refills: 0    Associated Diagnoses: Benign essential hypertension         CONTINUE these medications which have NOT CHANGED    Details   amLODIPine (NORVASC) 10 MG tablet Take 10 mg by mouth every evening      atorvastatin (LIPITOR) 20 MG tablet Take 20 mg by mouth daily      B Complex-C-Folic Acid (DEEPALI CAPS) 1 MG CAPS Take 1 capsule by mouth daily      calcium acetate (PHOSLO) 667 MG CAPS capsule Take 2,001 mg by mouth 3 times daily (with meals)      hydrALAZINE (APRESOLINE) 50 MG tablet Take 50 mg by mouth 3 times daily      oxyCODONE (ROXICODONE) 5 MG tablet Take 1 tablet (5 mg) by mouth every 6 hours as needed for pain  Qty: 12 tablet, Refills: 0    Associated Diagnoses: Acute post-operative pain         STOP taking these medications       isosorbide mononitrate (IMDUR) 30 MG 24 hr tablet Comments:   Reason for Stopping:         isosorbide mononitrate (IMDUR) 60 MG 24 hr tablet Comments:   Reason for Stopping:             Allergies   No Known Allergies     Data   Most Recent 3 CBC's:Recent Labs   Lab Test 07/13/22  0635 07/12/22  0659 07/11/22  0750 07/10/22  0921   WBC 10.0  --  7.0 8.3   HGB 9.3* 9.1* 9.2* 9.0*   MCV 87  --  88 89     --  284 264      Most Recent 3 BMP's:  Recent Labs   Lab Test 07/12/22  0659  07/11/22  0750 07/10/22  0433    131* 136   POTASSIUM 3.8 3.8 3.8   CHLORIDE 100 97 99   CO2 29 27 30   BUN 18 28 19   CR 4.48* 6.25* 4.80*   ANIONGAP 7 7 7   JEWELL 9.3 9.6 9.1   GLC 95 94 97     Most Recent INR's and Anticoagulation Dosing History:  Anticoagulation Dose History     Recent Dosing and Labs Latest Ref Rng & Units 7/9/2022 7/10/2022 7/10/2022 7/10/2022 7/11/2022 7/12/2022 7/13/2022    Warfarin 5 mg - - 5 mg - 5 mg - - -    Warfarin 7.5 mg - - - - - 7.5 mg 7.5 mg -    INR 0.85 - 1.15 1.08 - 1.10 - 1.31(H) 1.65(H) 1.98(H)        Most Recent TSH, T4 and A1c Labs:  Recent Labs   Lab Test 06/28/22  0649   A1C 5.5     Results for orders placed or performed during the hospital encounter of 07/06/22   US Upper Extremity Venous Duplex Left     Value    Radiologist flags (Urgent)     Partially occlusive DVT of the left brachial vein and superficial thrombophlebitis of the basilic vein    Narrative    EXAM: US UPPER EXTREMITY VENOUS DUPLEX LEFT  LOCATION: [facility]  DATE/TIME: 7/6/2022 10:29 PM    INDICATION: arm swelling  COMPARISON: None.  TECHNIQUE: Venous Duplex ultrasound of the left upper extremity with (when possible) and without compression, augmentation, and duplex. Color flow and spectral Doppler with waveform analysis performed.    FINDINGS: Ultrasound includes evaluation of the internal jugular vein, innominate vein, subclavian vein, axillary vein, and brachial vein. The superficial cephalic and basilic veins were also evaluated where seen.     LEFT: Partially occlusive thrombus seen within the left brachial vein in the mid humerus . Additionally there is a thrombus within the basilic vein from the mid humerus distal to the AV fistula.       Impression    IMPRESSION:   1.  Partially occlusive thrombus within the left brachial vein at the level of the midhumerus   2.  Thrombus within the basilic vein proximal to the AV fistula        [Urgent Result: Partially occlusive  DVT of the left brachial vein and superficial thrombophlebitis of the basilic vein]    Finding was identified on 7/6/2022 11:28 PM.     Dr DUYEN VEGAS was contacted by me on 7/6/2022 11:54 PM and verbalized understanding of the critical result .     US Ext Arterial Venous Dialys Acs Graft    Narrative    EXAM: Duplex ultrasonography of upper extremity fistula graft  LOCATION: [facility]  DATE/TIME: 7/7/2022 1:43 AM    INDICATION: s p left brachial artery to left axillary vein dialysis graft, s p left median cubital vein ligation  COMPARISON: None    TECHNIQUE: Gray-scale imaging, spectral wave analysis, and color-flow imaging was performed of left upper extremity    FINDINGS:   DUPLEX:   The AV fistula is patent.     The velocities throughout the fistula are as follows.      Brachial artery proximal to 280/142 cm/s  diameter  6.5 mm  Brachial artery mid to 257/151 cm/s         diameter 6.4 mm  Brachial artery distal to 252/148 cm/s       diameter 6.6 mm    Arterial venous anastomosis 360/180 cm/s  diameter  5.8 mm    Brachial vein distal  360/152 cm/sec          diameter 5.7 mm  Brachial vein Mid  292/152 cm/sec            diameter 6.3 mm  Brachial vein proximal  370/194 cm/sec     diameter 6.0 mm      Additionally there is a anechoic cystic structure seen in the proximal humerus extending towards the axilla measuring 4 x 2.7 x 1.5 cm.        Impression    IMPRESSION:  1.  PATENT ARTERIOVENOUS FISTULA  2.  4 X 2.7 X 1.5 CM ANECHOIC FLUID COLLECTION IN THE AXILLA AND PROXIMAL UPPER ARM, FAVORED TO REFLECT A POSTOPERATIVE SEROMA.

## 2022-07-13 NOTE — PROGRESS NOTES
"VASCULAR SURGERY PROGRESS NOTE    Subjective:  Patient resting comfortably in bed. Reports an increase in swelling in the left upper arm, denies new numbness or pain.   Objective:  Intake/Output Summary (Last 24 hours) at 7/13/2022 0751  Last data filed at 7/13/2022 0030  Gross per 24 hour   Intake 320 ml   Output --   Net 320 ml     PHYSICAL EXAM:  /64   Pulse 77   Temp 97.6  F (36.4  C) (Oral)   Resp 16   Ht 1.651 m (5' 5\")   Wt 65 kg (143 lb 4.8 oz)   SpO2 93%   BMI 23.85 kg/m    General: The patient is alert and oriented. Appropriate. No acute distress  Psych: pleasant affect, answers questions appropriately  Skin: Color appropriate for race, warm, dry.  Respiratory: The patient does not require supplemental oxygen. Breathing unlabored  GI:  Abdomen soft, nontender to light palpation.  Extremities: AV incision is intact. Excellent thrill upon palpation. 2+ radial pulses. Increased swelling of patient's left upper arm, left lower arm is unchanged. Patient is continuing to elevate her arm and wear her compression wrap.     ASSESSMENT:  55 yo F s/p LUE fistulogram and venoplasty of left innominate vein complicated by expanding perigraft hematoma requiring OR repair of AVG and evacuation of left arm hematoma (7/8/2022). Patient's left upper arm is visibly more swollen compared to 7/12, however CMS intact and no changes in edema noted in her left lower arm.       PLAN:  -continue elevation of the left arm, continue compression sleeve  -plan to dialyze today via tunneled catheter  -continue heparin drip until INR is therapeutic from Warfarin, INR on 7/13 is now 1.98, patient ok to discharge.     Rayne Lawrence, NP  VASCULAR SURGERY                   "

## 2022-07-13 NOTE — PLAN OF CARE
Goal Outcome Evaluation:   Okay to discharge per provider. Discharge medications and instructions reviewed with patient. Follow up appointments discussed. Post op clinic number highlighted for patient to call nurse line with questions.   Pt daughter to transport back home. Pt and family appreciative of nursing cares. Pt left with all belongings.

## 2022-07-13 NOTE — PLAN OF CARE
Reason for Admission: POD 5 evacuation of LUE hematoma  Cognitive/Mentation: A&Ox4  Neuros/CMS: Intact, no numbness or tingling.   VS: VSS on RA  Tele: N/A   GI: Continent. Last BM 7/12 per pt report.   : Continent  Pulmonary: LS clear  Pain: Denies  Drains/Lines: Left AV fistula. Thrill noted. R hand PIV infusing Heparin at 900 unit(s)/hr. CVC left subclavian hep locked.   Skin: Intact, L arm swollen. Compression wrap in place.  Activity: Independent  Diet: Renal diet with thin liquids.   Discharge: Pending therapeutic INR  Aggression Stoplight Tool: Green  End of shift summary: Plan for dialysis today (MWF schedule). Pt continues to have poor appetite. Heparin remains at 9 units/hr, recheck scheduled for this morning.

## 2022-07-14 NOTE — PROGRESS NOTES
Clinic Care Coordination Contact  [facility]/Voicemail    Clinical Data: Care Coordinator Outreach  Outreach attempted x 1. Not Available - Patient's voicemailbox is currently full. CHW was unable to leave a message on patient's voicemail with call back information and a request for a return call.     Plan:Care Coordinator will try to reach patient again in 1-2 business days.    MARKO Mcginnis  [phone]  [facility]

## 2022-07-15 NOTE — PROGRESS NOTES
"Clinic Care Coordination Contact  [facility]: Post-Discharge Note  SITUATION                                                      Admission:    Admission Date: 07/06/22   Reason for Admission: Post-op problem, Increased pain/swelling in LUE and neck  Discharge:   Discharge Date: 07/13/22  Discharge Diagnosis: Acute deep vein thrombosis (DVT) of brachial vein of left upper extremity (H)    BACKGROUND                                                      Per hospital discharge summary and inpatient provider notes:    Gina Simpson is a 56 year old female with PMHx of CAD, cardiomyopathy, hypertension, COPD and ESRD with hx of failed RUE fistula who underwent creation of LUE AV fistula on 6/28/22 who was admitted on 7/6/2022 with increased pain/swelling in LUE and neck with findings of a nonocclusive DVT in the left brachial vein and superficial thrombophlebitis of left basilic vein.  LUE graft appeared patent on US. Ultimately underwent exploration of LUE with repair of graft and evacuation of hematoma per vascular surgery on 7/8/22.     ASSESSMENT      Enrollment  Primary Care Care Coordination Status: Not a Candidate    Discharge Assessment  How are you doing now that you are home?: \"Ummm doing a little better. The pain with my arm is better, but my shortness of breath is still there.\"  How are your symptoms? (Red Flag symptoms escalate to triage hotline per guidelines): Improved  Do you feel your condition is stable enough to be safe at home until your provider visit?: Yes  Does the patient have their discharge instructions? : Yes  Does the patient have questions regarding their discharge instructions? : No  Were you started on any new medications or were there changes to any of your previous medications? : Yes  Does the patient have all of their medications?: Yes  Do you have questions regarding any of your medications? : No  Do you have all of your needed medical supplies or equipment (DME)?  (i.e. " oxygen tank, CPAP, cane, etc.): Yes  Discharge follow-up appointment scheduled within 14 calendar days? : Yes  Discharge Follow Up Appointment Date: 07/19/22  Discharge Follow Up Appointment Scheduled with?: Specialty Care Provider (7/19/2022 Vascular Surg appt. 7/20/2022 PCP appt.)    Post-op ([facility] CTA Only)  If the patient had a surgery or procedure, do they have any questions for a nurse?: No      PLAN                                                      Outpatient Plan:     Follow-up and recommended labs and tests  Continue dialysis per your usual routine  Follow up with your vascular surgeon as advised  Follow up with your PCP in the next week.    You will need to have your INR checked on 7/15/22  A lab appointment is scheduled at the [facility] for Friday July  15 at 3:30 p.m. for the INR draw    An appointment is scheduled with  at the [facility]  for July 20 at 3:30 p.m.    [facility] PHONE [phone]  [address] [place] [address]      Future Appointments   Date Time Provider Department Center   7/19/2022 11:30 AM Rayne Lawrence NP [facility]         For any urgent concerns, please contact our 24 hour nurse triage line: [phone] ([phone])         MARKO Mcginnis  [phone]  Connected Care Resource [facility]

## 2022-07-19 NOTE — PROGRESS NOTES
VASCULAR SURGERY PROGRESS NOTE    LOCATION[facility]  Gina Simpson  Medical Record #:  [id number]  YOB: 1965  Age:  56 year old     Date of Service: 7/19/2022    PRIMARY CARE PROVIDER: Clari Garza MD    Reason for visit:  Post-operative visit following 6/28 second-stage brachio-basilic fistula with bioprosthetic graft(left arm)    IMPRESSION: Ms. Simpson is a 56 year old female who is being seen following left brachio-basilic AVF with bioprosthetic graft of the left arm. The patient on 7/8 underwent a LUE fistulogram and venoplasty of the left innominate vein complicated by expanding perigraft hematoma requiring OR repair of AVG and evacuation of left arm hematoma, found to have DVT in left upper arm as well. The patient today has 2+ radial pulses. Excellent thrill+pulse of graft noted on examination. Patient denies numbness/tingling/weakness of the left arm. Continues to have left arm swelling, however has decreased since recent hospitalization. Patient continues to elevate her left arm, use compression, and is on Warfarin for 3 months on recommendations from Hematology. The patient denies any pressure/swelling of the left neck that was previously noted.     RECOMMENDATION/RISKS: Discussed with patient about continue to elevate left arm, use compression to help decrease swelling. Discussed with patient about vein building exercises. Patient to follow-up in 4 weeks with U/S of the AVG on the left arm and then follow-up with Dr. Miller.     HPI:  Gina Simpson is a 56 year old female with past medical history significant for coronary artery disease, cardiomyopathy, NSTEMI, COPD, hypertension, ESRD, history of failed right upper extremity fistula,now with recent creation of left brachio-basilic AVG placement on 6/28, history of nonocclusive DVT in left brachial vein. The patient started on Warfarin inpatient, tolerating well.     REVIEW OF SYSTEMS:    A 12  point ROS was reviewed and is negative except for what is listed above in HPI.    PHH:    Past Medical History:   Diagnosis Date     Anemia      Cancer (H)      Coronary artery disease      Dialysis complication      Dialysis patient (H)      Embolism (H)      ESRD (end stage renal disease) (H)      Hypertension      Ischemic cardiomyopathy      Renal failure           Past Surgical History:   Procedure Laterality Date     ANESTH,UPPER ARM AV FISTULA REPAIR       CREATE FISTULA ARTERIOVENOUS UPPER EXTREMITY Left 3/25/2022    Procedure: CREATION OF FIRST STAGE LEFT BRACHIOBASILIC ARTERIOVENOUS FISTULA;  Surgeon: Akash Miller MD;  Location: [facility] OR     CREATE FISTULA ARTERIOVENOUS UPPER EXTREMITY Left 6/28/2022    Procedure: LEFT BRACHIO-BASILIC ARTERIOVENOUS FISTULA WITH BIOPROSTHETIC GRAFT;  Surgeon: Akash Miller MD;  Location: [facility] OR     IR DIALYSIS FISTULOGRAM LEFT  5/31/2022     LIGATE FISTULA ARTERIOVENOUS UPPER EXTREMITY Left 6/28/2022    Procedure: Ligate fistula arteriovenous upper extremity;  Surgeon: Akash Miller MD;  Location: [facility] OR     REPAIR FISTULA ARTERIOVENOUS UPPER EXTREMITY Left 7/8/2022    Procedure: EXPLORE LEFT ARM, REPAIR OF LEFT UPPER ARM AV DIALYSIS GRAFT, EVACUATE HEMATOMA;  Surgeon: Akash Miller MD;  Location: [facility] OR     WISDOM TEETH         ALLERGIES:  Patient has no known allergies.    MEDS:    Current Outpatient Medications:      amLODIPine (NORVASC) 10 MG tablet, Take 10 mg by mouth every evening, Disp: , Rfl:      atorvastatin (LIPITOR) 20 MG tablet, Take 20 mg by mouth daily, Disp: , Rfl:      B Complex-C-Folic Acid (DEEPALI CAPS) 1 MG CAPS, Take 1 capsule by mouth daily, Disp: , Rfl:      calcium acetate (PHOSLO) 667 MG CAPS capsule, Take 2,001 mg by mouth 3 times daily (with meals), Disp: , Rfl:      hydrALAZINE (APRESOLINE) 50 MG tablet, Take 50 mg by mouth 3 times daily, Disp: , Rfl:      lisinopril (ZESTRIL) 20 MG tablet, Take 1 tablet (20 mg) by mouth every  evening for 30 days, Disp: 30 tablet, Rfl: 0     oxyCODONE (ROXICODONE) 5 MG tablet, Take 1 tablet (5 mg) by mouth every 6 hours as needed for pain, Disp: 12 tablet, Rfl: 0     warfarin ANTICOAGULANT (COUMADIN) 2.5 MG tablet, Take 2 tablets (5 mg) by mouth daily for 30 days, Disp: 60 tablet, Rfl: 0     carvedilol (COREG) 25 MG tablet, Take 1 tablet (25 mg) by mouth 2 times daily (with meals) for 30 days, Disp: 60 tablet, Rfl: 0    SOCIAL HABITS:    History   Smoking Status     Current Every Day Smoker     Years: 40.00     Types: Cigarettes   Smokeless Tobacco     Never Used     Comment: 6 cigarettes per day.      Social History    Substance and Sexual Activity      Alcohol use: Never      History   Drug Use Unknown       FAMILY HISTORY:  No family history on file.    PE:  /61 (BP Location: Right arm, Patient Position: Chair, Cuff Size: Adult Regular)   Pulse 83   Breastfeeding No   Wt Readings from Last 1 Encounters:   07/10/22 143 lb 4.8 oz (65 kg)     There is no height or weight on file to calculate BMI.    EXAM:  GENERAL: well-developed 56 year old female who appears her stated age  CARDIAC: normal   CHEST/LUNG: normal respiratory effort   MUSCULOSKELETAL: grossly normal and both lower extremities are intact, no lower extremity edema  NEUROLOGIC: focally intact, alert and oriented x 3  PSYCH: appropriate affect  VASCULAR:       DIAGNOSTIC STUDIES:     Images:  US Ext Arterial Venous Dialys Acs Graft    Result Date: 7/7/2022  EXAM: Duplex ultrasonography of upper extremity fistula graft LOCATION: [facility] DATE/TIME: 7/7/2022 1:43 AM INDICATION: s p left brachial artery to left axillary vein dialysis graft, s p left median cubital vein ligation COMPARISON: None TECHNIQUE: Gray-scale imaging, spectral wave analysis, and color-flow imaging was performed of left upper extremity FINDINGS: DUPLEX: The AV fistula is patent. The velocities throughout the fistula are as follows. Brachial  artery proximal to 280/142 cm/s  diameter  6.5 mm Brachial artery mid to 257/151 cm/s         diameter 6.4 mm Brachial artery distal to 252/148 cm/s       diameter 6.6 mm Arterial venous anastomosis 360/180 cm/s  diameter  5.8 mm Brachial vein distal  360/152 cm/sec          diameter 5.7 mm Brachial vein Mid  292/152 cm/sec            diameter 6.3 mm Brachial vein proximal  370/194 cm/sec     diameter 6.0 mm Additionally there is a anechoic cystic structure seen in the proximal humerus extending towards the axilla measuring 4 x 2.7 x 1.5 cm.     IMPRESSION: 1.  PATENT ARTERIOVENOUS FISTULA 2.  4 X 2.7 X 1.5 CM ANECHOIC FLUID COLLECTION IN THE AXILLA AND PROXIMAL UPPER ARM, FAVORED TO REFLECT A POSTOPERATIVE SEROMA.    US Upper Extremity Venous Duplex Left    Result Date: 7/6/2022  EXAM: US UPPER EXTREMITY VENOUS DUPLEX LEFT LOCATION: [facility] DATE/TIME: 7/6/2022 10:29 PM INDICATION: arm swelling COMPARISON: None. TECHNIQUE: Venous Duplex ultrasound of the left upper extremity with (when possible) and without compression, augmentation, and duplex. Color flow and spectral Doppler with waveform analysis performed. FINDINGS: Ultrasound includes evaluation of the internal jugular vein, innominate vein, subclavian vein, axillary vein, and brachial vein. The superficial cephalic and basilic veins were also evaluated where seen. LEFT: Partially occlusive thrombus seen within the left brachial vein in the mid humerus . Additionally there is a thrombus within the basilic vein from the mid humerus distal to the AV fistula.     IMPRESSION: 1.  Partially occlusive thrombus within the left brachial vein at the level of the midhumerus 2.  Thrombus within the basilic vein proximal to the AV fistula [Urgent Result: Partially occlusive DVT of the left brachial vein and superficial thrombophlebitis of the basilic vein] Finding was identified on 7/6/2022 11:28 PM. Dr DUYEN VEGAS was contacted by me on  7/6/2022 11:54 PM and verbalized understanding of the critical result .     IR Dialysis Fistulogram Left    Result Date: 7/15/2022  INTERVENTIONAL RADIOLOGY DIALYSIS FISTULOGRAM LEFT 7/8/2022 3:06 PM PROCEDURE(S): 1. Ultrasound-guided fistula access x2. 2. Left upper extremity fistulography. 3. Dilation of left subclavian artery and junction of the brachiocephalic vein and superior vena cava with post angioplasty angiography. MODERATE SEDATION: 2.5 mg Versed IV, 125 mcg fentanyl IV. During the time out, immediately prior to the administration of medications, the patient was reassessed for adequacy to receive conscious sedation. Under physician supervision, Versed and fentanyl were administered for moderate sedation. Pulse oximetry, heart rate and blood pressure were continuously monitored by an independent trained observer. The physician spent 20 minutes of face-to-face sedation time with the patient. CONTRAST: 16 mL Isovue 300 IV/IA FLUOROSCOPY TIME: 2.4 minutes AIR KERMA: 16.62 mGy COMPLICATIONS: None CLINICAL HISTORY/INDICATION: Left arm swelling. Patient has known distal brachial vein and basilic vein DVT. History of central stenosis. PROCEDURE AND FINDINGS: Following a discussion of the risks, benefits, indications, and alternatives to treatment, appropriate informed consent was obtained. The patient was brought to the interventional radiology suite and placed supine on the table. The left upper extremity was prepped and draped in a sterile fashion.  A timeout was performed per universal protocol policy to confirm the correct patient, site and procedure to be performed. Limited preprocedure ultrasound of the left upper extremity fistula shows the fistula to be patent. 1% lidocaine was used for local anesthesia. Under direct ultrasound guidance a 21-gauge micropuncture needle was advanced into the fistula directed towards the venous outflow. An image was archived. A 0.018 wire was advanced through the needle and  exchange was made for a 5 Austrian micropuncture sheath. A fistulogram was performed which demonstrates the basilic and axillary veins are patent. Stenosis of the subclavian vein and junction of the brachiocephalic vein and superior vena cava with large collateral vessels. Superior vena cava is otherwise patent without evidence of stenosis.  A guidewire was then advanced through the micropuncture dilator, and exchange was made for a 7 Austrian vascular sheath.  Angioplasty was performed across both areas of stenosis with 10 x 40 mm and 12 x 40 mm.  Post dilation venography was performed and reveals a significant improvement in the appearance of the vein.  The fistula has a strong thrill upon palpation.  All wires and sheaths were removed and manual compression was held, until hemostasis was achieved. After approximately 5 minutes the technologist came in to report that patient had worsening arm swelling. Limited ultrasound shows a large hematoma in the left arm. The decision was made to do repeat fistulogram. Limited preprocedure ultrasound of the left upper extremity shows the fistula to be patent. 1% lidocaine was used for local anesthesia. Under direct ultrasound guidance a 21-gauge micropuncture needle was advanced into the fistula directed towards the venous outflow. An image was archived. A 0.018 wire was advanced through the needle and exchange was made for a 5 Austrian micropuncture sheath. Left upper extremity fistulogram was performed, images show the venous anastomosis is patent. A reflux image was obtained. Images show the arterial anastomosis is intact. Small area of active extravasation is at the access site. Findings were discussed with Dr. Miller who took the patient to the OR. Throughout the procedure, the patient was monitored by a radiology nurse for cardiac rhythm which remained stable. The patient tolerated the procedure well and left the interventional radiology suite in stable condition.      IMPRESSION: 1. Fistulography demonstrating significant stenosis of the left subclavian vein and junction of the brachiocephalic and subclavian veins, improved status post angioplasty. 2. Repeat fistulogram was performed after patient had worsening arm swelling after manual compression and ultrasound shows a large hematoma. Images show a small area of active extravasation at the access site. Findings were discussed with Dr. Miller who took the patient to the OR.     LABS:      Sodium   Date Value Ref Range Status   07/12/2022 136 133 - 144 mmol/L Final   07/11/2022 131 (L) 133 - 144 mmol/L Final   07/10/2022 136 133 - 144 mmol/L Final     Urea Nitrogen   Date Value Ref Range Status   07/12/2022 18 7 - 30 mg/dL Final   07/11/2022 28 7 - 30 mg/dL Final   07/10/2022 19 7 - 30 mg/dL Final     Hemoglobin   Date Value Ref Range Status   07/13/2022 9.3 (L) 11.7 - 15.7 g/dL Final   07/12/2022 9.1 (L) 11.7 - 15.7 g/dL Final   07/11/2022 9.2 (L) 11.7 - 15.7 g/dL Final     Platelet Count   Date Value Ref Range Status   07/13/2022 297 150 - 450 10e3/uL Final   07/11/2022 284 150 - 450 10e3/uL Final   07/10/2022 264 150 - 450 10e3/uL Final     INR   Date Value Ref Range Status   07/13/2022 1.98 (H) 0.85 - 1.15 Final   07/12/2022 1.65 (H) 0.85 - 1.15 Final   07/11/2022 1.31 (H) 0.85 - 1.15 Final       30 minutes spent on the day of encounter doing chart review, history and exam, documentation, and further activities as noted.     Rayne Lawrence, NP  VASCULAR SURGERY

## 2022-07-19 NOTE — PROGRESS NOTES
[facility]        Patient is here for a post-op to discuss AV fistula.     Pt is currently taking Statin and Warfarin.    /61 (BP Location: Right arm, Patient Position: Chair, Cuff Size: Adult Regular)   Pulse 83   Breastfeeding No     The provider has been notified that the patient has no concerns.     Questions patient would like addressed today are: N/A.    Refills are needed: N/A    Has homecare services and agency name:  Janeen Wu MA

## 2022-08-02 NOTE — TELEPHONE ENCOUNTER
"Per LOV 7/19/22 with Rayne Lawrence NP  \"Patient to follow-up in 4 weeks with U/S of the AVG on the left arm and then follow-up with Dr. Miller.\"    Pt was scheduled for 8/16/22 OV with Rayne Lawrence NP with U/S AVF prior. Verified with Rayne this needs to be with Dr. Miller.     Routing to  to reschedule OV with Dr. Miller (please ensure U/S AVF is done prior to OV as well.     VIRIDIANA MarvinN, RN  [facility]  Office:  [phone] Fax: [phone]    "

## 2022-08-10 NOTE — PROGRESS NOTES
[facility]        Patient is here for a  follow up.      Pt is currently taking Statin and Warfarin.    /71 (BP Location: Right arm, Patient Position: Chair, Cuff Size: Adult Regular)   Pulse 87   SpO2 98%   Breastfeeding No     The provider has been notified that the patient has concerns of swelling and toothache pain, SOB.     Questions patient would like addressed today are: N/A.    Refills are needed: N/A    Has homecare services and agency name:  Janeen Wu MA

## 2022-08-11 NOTE — PROGRESS NOTES
Care Suites Post Procedure Note    Patient Information  Name: Gina Simpson  Age: 56 year old    Post Procedure  Time patient returned to Care Suites: 1045  Concerns/abnormal assessment: None at this time.  If abnormal assessment, provider notified: N/A  Plan/Other: Per orders.    Left upper arm incision site covered with guaze and tegaderm.  Site CDI, soft, flat.    Alix Zaidi RN

## 2022-08-11 NOTE — PROGRESS NOTES
"Gina Simpson is a 56-year-old female who is status post placement of a right Jayson last year at [facility].  Postoperatively she developed severe right upper extremity and facial edema and was found to have thrombosis of her right innominate vein which could not be recanalized.  Late this spring I performed a first stage left brachiobasilic AV fistula.  Roughly 6 weeks post procedure she developed left arm swelling and complained of fullness in her neck.  She underwent left arm fistulogram with angioplasty of the left innominate vein using a 12 mm balloon with good result.    I chose not to proceed with a second stage left brachiobasilic AV fistula.  Rather, I ligated that fistula and created a left brachial artery to axillary vein ProCol AV bridge graft on 6/28/22  in an effort to minimize disruption of left arm venous collaterals.  She was readmitted on 7/6/22 with complaints of worsening left arm swelling.  She underwent repeat fistulogram with dilatation of the left subclavian vein and the junction of the brachiocephalic and subclavian vein.  Post procedurally, she developed a left arm hematoma that required evacuation and repair of the access site.  We utilized Tubigrip for left hand and forearm edema control.  She was empirically started on warfarin and discharged home.    She returns today for a 6-week post procedure AV graft ultrasound.  She is accompanied by her daughter.  She is complained of a \"tooth ache\" on her lower right jaw for the past 2 weeks.  She describes feeling poorly in general with diffuse body aches.  She reports an unintentional approximately 20 pound weight loss over the past 2 months with poor appetite.  For the past 24 hours she complains of significant new onset swelling of her left arm and left breast with associated right facial swelling.  She denies fever or chills.  She has been taking a considerable amount of ibuprofen for her diffuse myalgias.    Exam:  Somewhat " ill-appearing female alert and oriented x3.  Temperature 98.4.  Blood pressure 131/71 with a pulse of 87.  O2 saturation 98% on room air.  I do not appreciate significant facial edema.  She lacks upper teeth.  No obvious dental caries or lower right teeth abscesses.  Distended neck veins.  Her left arm is obviously larger than the right.  Circumference of the mid right upper  arm is 27.5 cm while circumference of the mid left upper arm is 32 cm.  Moderate hematomas in the left axilla and at the left antecubital crease which are residuals from the prior fistulogram.  Palpable thrill within the left upper arm AV graft.  Palpable left radial pulse.  The left breast is swollen compared to the right with some peau d'orange type skin changes.        Upper left arm with circumference 4.5 cm greater than the right.          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    Last INR was 2.6 last week.      Imaging:    US EXTREMITY ARTERIAL VENOUS DIALYSIS ACCESS GRAFT  8/10/2022 2:18 PM      HISTORY:  Patient is status post a left brachial artery to basilic  vein dialysis fistula.     COMPARISON: Ultrasound dated 7/7/2022, fistulogram dated 7/8/2022.     FINDINGS: Color Doppler and spectral waveform analysis performed.     The left brachial artery to basilic vein dialysis fistula is patent.  The outflow volume is measured to be 1400 mL/min. The outflow vein is  patent without definite significant stenosis. There is a primarily  simple fluid collection adjacent to the fistula at the level of the  antecubital fossa. This measures 2.8 x 3.4 cm. There is a primarily  simple fluid collection at the proximal medial upper arm. This  measures 4.3 x 5.0 cm.                                                                      IMPRESSION: Patent left upper extremity dialysis fistula without  definite evidence for  "significant stenosis. Nonspecific simple fluid  collections as above.     ASSESSMENT:  1.  6-week status post placement of a left brachial artery to axillary vein ProCol AV bridge graft now with recurrent left upper extremity and left breast swelling.  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  She still has a tunneled left IJ dialysis catheter.    2.  Empirically anticoagulated with INR last week of 2.6.    3.  Diffuse myalgias with unintentional 20 pound weight loss and complaints of a \"right sided tooth ache\".    RECOMMENDATION:  I reviewed all the above with Gina and her daughter.  I recommended admission tonight but she politely declined.  Rather, I will arrange for an expedited repeat left arm fistulogram tomorrow.  She will hold her Coumadin tonight.  Hopefully, she has a recurrent central stenosis amenable to venoplasty.  I would like to begin utilizing her left upper arm AV graft so that hopefully we can get the tunneled dialysis catheter out of her central veins.  I am also concerned about her constitutional symptoms and her complaints of right jaw pain with diffuse myalgias.  She will likely require more extensive medical work-up with possible consultation from oral surgery if there is evidence of any type of a dental abscess.    All of their questions were answered and they verbalized full understanding to the above and complete agreement with this management plan.    Chaz Miller MD        "

## 2022-08-11 NOTE — PROGRESS NOTES
Care Suites Discharge Nursing Note    Patient Information  Name: Gina Simpson  Age: 56 year old    Discharge Education:  Discharge instructions reviewed: Yes  Additional education/resources provided: NA  Patient/patient representative verbalizes understanding: Yes  Patient discharging on new medications: No  Medication education completed: N/A    Discharge Plans:   Discharge location: home  Discharge ride contacted: Yes  Approximate discharge time: 1225    Discharge Criteria:  Discharge criteria met and vital signs stable: Yes    Patient Belongs:  Patient belongings returned to patient: Yes    Alix Zaidi RN

## 2022-08-11 NOTE — PROGRESS NOTES
"Went to assess the patient, placed for patient to have CBC with CRP at 1115. Patient stated \"I feel better and just want to go home.\"    Patient reports a tooth ache for 2 weeks of her lower right jaw, diffuse body aches. Unintentional 20 lb weight loss over 2 months and poor appetite. In addition to shortness of breath.     Patient two days ago reported significant swelling of her left arm and left breast with associated right facial swelling.      Reported to have fevers, shortness of breath, swelling of breast and R arm, and myalgias for 2-3 weeks in addition to a 20 lb weight loss unintentional.     The patient this morning reports the swelling of her left arm and left breast has improved, facial swelling decreased as well. Reports R arm myalgia at this time.     Discussed with patient about admission over night, the patient declined.    Will order CBC and CRP tests for outpatient for PCP, also encouraged patient to make appointment sooner with her PCP to workup for failure to thrive. Current apt is set for 8/20. Discussed with   "

## 2022-08-11 NOTE — PROGRESS NOTES
Care Suites Admission Nursing Note    Patient Information  Name: Gina Simpson  Age: 56 year old  Reason for admission: Fistulogram  Care Suites arrival time: 0805    Visitor Information  Name: Monica  Informed of visitor restrictions: Yes  1 visitor allowed per patient   Visitor must screen negative for COVID symptoms   Visitor must wear a mask  Waiting rooms closed to visitors    Patient Admission/Assessment   Pre-procedure assessment complete: Yes  If abnormal assessment/labs, provider notified: Yes.  Patient unable to find an at home covid test yesterday.  Monica purchased a covid test from the hospital out patient pharmacy.  Covid test completed and result is negative.  NPO: Yes  Medications held per instructions/orders: Yes.  Consent: obtained  If applicable, pregnancy test status: deferred  Patient oriented to room: Yes  Education/questions answered: Yes  Plan/other: Proceed as scheduled.    Discharge Planning  Discharge name/phone number: Monica-str. [phone]  Overnight post sedation caregiver: Monica  Discharge location: home    Alix Zaidi RN

## 2022-08-11 NOTE — PRE-PROCEDURE
GENERAL PRE-PROCEDURE:   Procedure:  Left arm fistula fistulogram with possible angioplasty with IV moderate sedation   Date/Time:  8/11/2022 8:22 AM    Written consent obtained?: Yes    Risks and benefits: Risks, benefits and alternatives were discussed    Consent given by:  Patient  Patient states understanding of procedure being performed: Yes    Patient's understanding of procedure matches consent: Yes    Procedure consent matches procedure scheduled: Yes    Expected level of sedation:  Moderate  Appropriately NPO:  Yes  ASA Class:  3  Mallampati  :  Grade 1- soft palate, uvula, tonsillar pillars, and posterior pharyngeal wall visible  Lungs:  Lungs clear with good breath sounds bilaterally  Heart:  Normal heart sounds and rate  History & Physical reviewed:  History and physical reviewed and no updates needed  Statement of review:  I have reviewed the lab findings, diagnostic data, medications, and the plan for sedation

## 2022-08-11 NOTE — DISCHARGE INSTRUCTIONS
Fistulagram Discharge Instructions     After you go home:    You may resume your normal diet  Have an adult stay with you for 6 hours if you received sedation       For 24 hours - due to the sedation you received:  Relax and take it easy  Do NOT make any important or legal decisions  Do NOT drive or operate machines at home or at work  Do NOT drink alcohol    Care of Puncture Site:    For the first 48 hrs, check your puncture site every couple hours while you are awake   You may remove/change the bandage tomorrow  You may shower tomorrow  No tub baths, whirlpools or swimming until your puncture site has fully healed  If puncture site starts to bleed - apply light pressure to site for 5 minutes or until bleeding stops     Activity     You should try to elevate your arm for the remainder of today to prevent increased swelling  You may go back to your normal activity in 24 hours   Wait 48 hours before lifting, straining, exercise or other strenuous activity    Medicines:    You may resume all your medications  For minor pain, you may take Acetaminophen (Tylenol) or Ibuprofen (Advil)                 Call the provider who ordered this procedure if:    Increased pain or a large or growing hard lump around the site  Blood or fluid is draining from the site  The site is red, swollen, hot or tender  Chills or a fever greater than 101 F (38 C)  Pain that is getting worse  Any questions or concerns      Call  911 or go to the Emergency Room if:  Severe pain or trouble breathing  Bleeding that you cannot control      If you have questions call:          [facility] Radiology Dept @ [phone]        The provider who performed your procedure was Dr. Magana

## 2022-08-14 PROBLEM — R59.1 LYMPHADENOPATHY: Status: ACTIVE | Noted: 2022-01-01

## 2022-08-15 NOTE — CONSULTS
[facility]  Gastroenterology Consultation         Gina Simpson  [address]   [place] [address]  56 year old female    Admission Date/Time: 8/14/2022  Primary Care Provider: Clari Garza MD  Referring / Attending Physician:  Dr. Severiano Johnson    We were asked to see the patient in consultation by Dr. Severiano Johnson for evaluation of upper GI bleed.      CC: hematemesis    HPI:  Gina Simpson is a 56 year old female who has a past medical history of CAD, cardiomyopathy, hypertension, COPD and ESRD with hx of failed RUE fistula who underwent creation of LUE AV fistula on 6/28/22 complicated by hematoma s/p evacuation and LUE DVT who is a direct admission to [facility] from [facility] after presenting with 2+weeks of facial swelling, fatigue, dysphagia and being found to have prevertebral soft tissue swelling potentially presenting retropharyngeal abscess as well as new pulmonary nodule, mediastinal mass, and extensive adenopathy and admitted on 8/14/22    She had an episode of dark red hematemesis while receiving dialysis this a.m. She has no history of PUD or prior GI bleed. She has been receiving heparin. Has had no melena, hematochezia or epistaxis. Hemoglobin baselin in 7-8 range and stat hemoglobin this a.m. revealed hemoglobin of 5.6. Has been consented for a transfusion or PRBC.    Has had a 20 pound weight loss, poor appetite, denies fever, chills, abdominal or chest apin    ROS: A comprehensive ten point review of systems was negative aside from those in mentioned in the HPI.      PAST MED HX:  I have reviewed this patient's medical history and updated it with pertinent information if needed.   Past Medical History:   Diagnosis Date     Anemia      Cancer (H)      Coronary artery disease      Dialysis complication      Dialysis patient (H)      Embolism (H)      ESRD (end stage renal disease) (H)      Hypertension      Ischemic cardiomyopathy       Renal failure        MEDICATIONS:   Prior to Admission Medications   Prescriptions Last Dose Informant Patient Reported? Taking?   B Complex-C-Folic Acid (DEEPALI CAPS) 1 MG CAPS 8/14/2022 at am  Yes Yes   Sig: Take 1 capsule by mouth daily   amLODIPine (NORVASC) 10 MG tablet 8/13/2022 at Unknown time  Yes Yes   Sig: Take 10 mg by mouth every evening   atorvastatin (LIPITOR) 20 MG tablet 8/13/2022 at Unknown time  Yes Yes   Sig: Take 20 mg by mouth daily   calcium acetate (PHOSLO) 667 MG CAPS capsule 8/8/2022  Yes Yes   Sig: Take 2,001 mg by mouth 3 times daily (with meals)   carvedilol (COREG) 25 MG tablet 8/14/2022 at am  Yes Yes   Sig: Take 25 mg by mouth 2 times daily (with meals)   hydrALAZINE (APRESOLINE) 50 MG tablet 8/14/2022 at am  Yes Yes   Sig: Take 50 mg by mouth 3 times daily   lisinopril (ZESTRIL) 20 MG tablet 8/13/2022  Yes Yes   Sig: Take 20 mg by mouth daily   oxyCODONE (ROXICODONE) 5 MG tablet prn  No Yes   Sig: Take 1 tablet (5 mg) by mouth every 6 hours as needed for pain      Facility-Administered Medications: None       ALLERGIES: No Known Allergies    SOCIAL HISTORY:  Social History     Tobacco Use     Smoking status: Current Every Day Smoker     Years: 40.00     Types: Cigarettes     Smokeless tobacco: Never Used     Tobacco comment: 6 cigarettes per day.    Substance Use Topics     Alcohol use: Never     Drug use: Never       FAMILY HISTORY:  No family history on file.    PHYSICAL EXAM:   General  Alert, oriented and comfortable  Vital Signs with Ranges  Temp: 97.4  F (36.3  C) Temp src: Axillary BP: 126/73 Pulse: 106   Resp: 16 SpO2: 100 % O2 Device: Nasal cannula Oxygen Delivery: 2 LPM  No intake/output data recorded.    Constitutional: healthy, alert and no distress   Cardiovascular: negative, PMI normal. No lifts, heaves, or thrills. RRR. No murmurs, clicks gallops or rub  Respiratory: negative, Percussion normal. Good diaphragmatic excursion. Lungs clear  Abdomen: Abdomen soft, non-tender.  BS normal. No masses, organomegaly          ADDITIONAL COMMENTS:   I reviewed the patient's new clinical lab test results.   Recent Labs   Lab Test 08/15/22  1115 08/15/22  0451 08/14/22  2149 08/11/22  0913 07/13/22  0635   WBC  --  6.7 7.7  --  10.0   HGB 5.6* 7.0* 8.4*  --  9.3*   MCV  --  90 90  --  87   PLT  --  376 372  --  297   INR  --  1.68*  --  2.52* 1.98*     Recent Labs   Lab Test 08/15/22  1115 08/15/22  0451 07/12/22  0659   POTASSIUM 4.0 4.3 3.8   CHLORIDE 102 99 100   CO2 23 24 29   BUN 72* 69* 18   ANIONGAP 10 11 7     Recent Labs   Lab Test 08/15/22  1115 08/15/22  0451   ALBUMIN 2.2* 2.5*   BILITOTAL 0.3 0.4   ALT 19 22   AST 43 48*       I reviewed the patient's new imaging results.        CONSULTATION ASSESSMENT AND PLAN:    Active Problems:  Lymphadenopathy  Hematemesis  Acute on chronic anemia  New mediastinal mass  Has acute episode of dark red hematemesis  CT neck shows new mediastinal mass on right 5.8 x 6.1cm as well as pulmonary nodule measuring 9mm- IR consulted and planning biopsy of mediastinal mass.   CT of chest also notes multiple large hepatic masses concerning for metastatic disease  Drop in hemoglobin 7-8 to 5.6 and hematemesis are concerning for neoplastic cause such as fungating mediastinal mass to esophagus or mets to stomach vs given liver masses concern for portal hypertension  vs PUD vs other source of blood loss.     -- recommend urgent EGD today  -- NPO  -- Stop heparin  -- IV pantoprazole 40 mg BID  -- Give transfusion or PRBC and serial hemoglobin. Transfuse for hemoglobin of 7 or less                    [facility].  Office: [phone]  Cell : [phone] (Dr. Camacho)  Cell: [phone] (Sussy Tenorio PA-C)

## 2022-08-15 NOTE — PROGRESS NOTES
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 RRT was called for hematemesis (about 300 - 400ml) which occurred while patient was dialyzing. At the time of response, patients primary attending - Dr. Johnson was already present and managing - Please see his progress note 8/15/22 for details. This writer took over from Dr. Johnson after initial management for further monitoring of patient. Patient continued to complain of chest pain which she had on admission - suspected to be due to  Mass effect vs impingement of structures of the new mass. Patient already receiving opiates - dilaudid for this - additional dose of dilaudid 0..5mg was ordered. Patient remained hemodynamically stable.     Discussed with and defer further cares to hospitalist attending physician- Dr Johnson.    Code Status: Full Code    Allergies   No Known Allergies    Physical Exam   Vital Signs with Ranges:  Temp:  [97.4  F (36.3  C)-98.2  F (36.8  C)] 98.1  F (36.7  C)  Pulse:  [] 105  Resp:  [12-22] 16  BP: ()/(61-87) 112/63  Cuff Mean (mmHg):  [93] 93  SpO2:  [95 %-100 %] 95 %  No intake/output data recorded.      General Appearance:  adult pt lying in bed, appears to be in pain  Neuro: moving all extremities spontaneously   Head, ENT & mouth: NC/AT,  mouth moist oral mucosa  Neck: supple  CV S1S2  resp: CTAB upper and lower lobes  gi:normoactive bowel sounds, soft, nontender, nondisteded  Ext: no edema    Data     Sheila Castellano - MD Andre[facility]  Hospitalist   [phone]

## 2022-08-15 NOTE — PLAN OF CARE
Goal Outcome Evaluation:  8/14/2022, [id number]  Pt is A&Ox4. VSS on RA. C/o mild to moderate pain, decreased w/. Denies N/V. Up SBA. Plan; continue current care. Tolerated full liquid diet. Dialysis pt. L CVC in place, L upper arm fistula. Plan; CT scan ordered, continue to monitor.Discharge pending improvement.

## 2022-08-15 NOTE — CONSULTS
Patient is on US schedule today Monday 8/15/22 for a Liver lesion biopsy with IV moderate sedation.      -IV Heparin will need to be off for 4 hours prior to biopsy. (Heparin has been off since this morning at some point for a drop in hemoglobin/GI bleed? Patient is transferring to [facility])     -Consider doing tomorrow depending on patient stability and radiology availability.     -Labs WNL for procedure.    -Orders for NPO have been entered.    -Consent will be done prior to procedure.     Please contact the [place] department at [phone] for procedural related questions.     Discussed above with Hospitalist Dr Johnson today. Reviewed imaging with Radiologist Dr Alcantara today and approved.     Thanks, Marisol [facility] Interventional Radiology CNP ([phone]) (phone [phone])

## 2022-08-15 NOTE — PHARMACY-ADMISSION MEDICATION HISTORY
Pharmacy Medication History  Admission medication history interview status for the 8/14/2022  admission is complete. See EPIC admission navigator for prior to admission medications     Location of Interview: Patient room  Medication history sources: Patient and Surescripts    Significant changes made to the medication list:  Added Warfarin    In the past week, patient estimated taking medication this percent of the time: greater than 90%    Additional medication history information:   none    Medication reconciliation completed by provider prior to medication history? No    Time spent in this activity: 20 min    Prior to Admission medications    Medication Sig Last Dose Taking? Auth Provider Long Term End Date   amLODIPine (NORVASC) 10 MG tablet Take 10 mg by mouth every evening 8/13/2022 at Unknown time Yes Reported, Patient     atorvastatin (LIPITOR) 20 MG tablet Take 20 mg by mouth daily 8/13/2022 at Unknown time Yes Reported, Patient     B Complex-C-Folic Acid (DEEPALI CAPS) 1 MG CAPS Take 1 capsule by mouth daily 8/14/2022 at am Yes Reported, Patient     calcium acetate (PHOSLO) 667 MG CAPS capsule Take 2,001 mg by mouth 3 times daily (with meals) 8/8/2022 Yes Unknown, Entered By History     carvedilol (COREG) 25 MG tablet Take 25 mg by mouth 2 times daily (with meals) 8/14/2022 at am Yes Unknown, Entered By History No    hydrALAZINE (APRESOLINE) 50 MG tablet Take 50 mg by mouth 3 times daily 8/14/2022 at am Yes Reported, Patient     lisinopril (ZESTRIL) 20 MG tablet Take 20 mg by mouth daily 8/13/2022 Yes Unknown, Entered By History No    warfarin ANTICOAGULANT (COUMADIN) 2.5 MG tablet Take 5 mg by mouth three times a week Mon-Wed-Fri Unknown at Unknown time Yes Unknown, Entered By History     warfarin ANTICOAGULANT (COUMADIN) 2.5 MG tablet Take 6.25 mg by mouth four times a week Sat-Sun-Tue-Thur Unknown at Unknown time Yes Unknown, Entered By History     oxyCODONE (ROXICODONE) 5 MG tablet Take 1 tablet (5 mg) by  mouth every 6 hours as needed for pain  Patient not taking: Reported on 8/15/2022 Not Taking at Unknown time  Akash Miller MD         The information provided in this note is only as accurate as the sources available at the time of update(s)

## 2022-08-15 NOTE — PLAN OF CARE
Goal Outcome Evaluation:  Pt A&Ox4. VSS on RA. R neck/facial pain managed w/ IV Dilaudid. Swelling in neck area noted. Pt reports some difficulty swallowing at times, tolerating liquids. NPO since midnight. C/o burning/pain in chest. Pt stated this is something she has experienced before. MD aware. EKG- Sinus Tach. IV Protonix order added along with GI cocktail. HD cath to L chest and L fistula WNL. PIV infusing Heparin at 11.5 ml/hr. Next Hep10A scheduled for 1145. On intermittent Unasyn. Hgb 7.0 this AM. MD aware and consent signed. Up SBA. Chest&Neck CT completed. IR consulted for biopsies. Hem/Onc, Vascular Surg, Thoracic Surg, Speech, ENT and Nephrology all consulted. Plan for dialysis today.      Plan of Care Reviewed With: patient     Overall Patient Progress: no change

## 2022-08-15 NOTE — PROGRESS NOTES
Pt is A/O x4. .VSS, on RA. Pt was in a lot of pain this AM, c/o severe 10/10 chest pain/burning sensation with minimal relief with prn IV dilaudid. MD notified and ordered additional dose of IV dilaudid, and changed dose and frequency of IV dilaudid. Pt went to dialysis around 09:15 AM, I got a call from dialysis RN around 09:35AM saying pt was c/o severe pain. I went down and gave another dose of IV dilaudid at 09:45AM and left pt in dialysis. Around 10:40 AM charge RN got a call from dialysis RN saying pt is having hematemesis,  RRT was called. See RRT note. Pt was transferred to [facility] after dialysis, report given to [facility] RN. Pt's daughter updated and belongings sent up with pt's daughter.

## 2022-08-15 NOTE — PLAN OF CARE
Received pt from Gen Surg at 1130    A&O x4, VSS, on RA, Lung sounds clear in all fields, tele SR/ST low 100s, Bowel sounds hypoactive, urine output low d/t hemodialysis, incision LUE open to air, ambulates with SBA, diet strict NPO, pain controlled by IV dilaudid. Endo appt revealed pt had bleeding in small intestine. Pt received 1 unit of blood this afternoon, HgB 7.0 at 1802, 1 additional unit ordered. L dialysis port heparin locked. Family at bedside updated. Plan for possible dialysis and and IR consult.

## 2022-08-15 NOTE — PROVIDER NOTIFICATION
MD Notification    Notified Person: MD    Notified Person Name: Dr. Kan    Notification Date/Time: 0445 8/15/22    Notification Interaction: Phone    Purpose of Notification: C/o 8/10 burning chest pain. Does not radiate, no numbness/tingling. VSS. Says this as not the first time she's felt this way. Do you want to do cardiac workup?     Orders Received: EKG    Comments:

## 2022-08-15 NOTE — CONSULTS
[facility]    Nephrology Consultation     Date of Admission:  8/14/2022    Assessment & Plan     Gina Simpson is a 56 year old female who was admitted on 8/14/2022.     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    S/p left brachiobasilic fistula 2022 with complicatons of arm swelling, s/p innominate vein venoplasty. Last procedur 6/28/22 revision but admission 7/5 with left arm swelling, s/p repeat venoplasty complicated by hematoma requiring evacuation.      3) ESRD on MWF HD, [facility]             Treatment Type  Hemodialysis (procedure)    Dialyzer  : CodeHS Series: AlertMe Model: 17H RENÉE Factor: 1455    Dialysate  Potassium 2k    Dialysate  Calcium 2.5 mEq/L    Dialysate  BiCarb 35 mEq/L    Dialysate  Base Sodium 138 mEq/L    Dialysate Flow Rate  500 ML/min    Blood Flow Rate  400 ML/min    Treatment Time  195 min    Temperature  Standard    Max UF Rate  2 L/Hr    Access  CVC Catheter Chest (Left)    Access Concurrent  No    Schedule  3 Times per week    Heparin Load  1000 Units (1:1,000 concentration)    Heparin Hourly Dose  500 Units/hr (1:1,000 concentration)         4) facial swelling. Less likely related to left AVF, CVC or prior right AVF and more underlying likely metastatic process.     Other:  Hx HTN  Hx ASCVD  Hematemesis 8/15, GI consulted, EGD planned      Plan/Recs:  1) Attempted HD today aborted early with hematemesis  2) HD likely tomorrow without critical indications to reattempt today  3) epo with HD but needing transfusion with acute blood loss anemia  4) will follow    [facility] - Nephrology  Cell: [phone]  Office:  Patient notified needs a refill of Freestyle strips to [facility] in [place] [phone]  ------------------------------------------------------------------------------------------    Reason for Consult     I was asked to see the patient for end stage renal disease.     Gina Simpson is a 56 year old female who presented to outside ED with progressive facial swelling, fatigue and dysphagia. Direct admission to this hospital with concerns over imaging fidings in neck and chest. Pt on dialysis, has been having significant weight loss. Runs MWF with a dialysis catheter but also with fistula in maturation.    Pt seen in room, complaining of pain in throat/neck area. Later on dialysis developed hematemesis and taken of HD. Both times in somewhat distress and not able to get much history from patinet, mostly obtained from chart review.     Past Medical History   I have reviewed this patient's medical history and updated it with pertinent information if needed.   Past Medical History:   Diagnosis Date     Anemia      Cancer (H)      Coronary artery disease      Dialysis complication      Dialysis patient (H)      Embolism (H)      ESRD (end stage renal disease) (H)      Hypertension      Ischemic cardiomyopathy      Renal failure        Past Surgical History   I have reviewed this patient's surgical history and updated it with pertinent information if needed.  Past Surgical History:   Procedure Laterality Date     ANESTH,UPPER ARM AV FISTULA REPAIR       CREATE FISTULA ARTERIOVENOUS UPPER EXTREMITY Left 3/25/2022    Procedure: CREATION OF FIRST STAGE LEFT BRACHIOBASILIC ARTERIOVENOUS FISTULA;  Surgeon: Akash Miller MD;  Location:  OR     CREATE FISTULA ARTERIOVENOUS UPPER EXTREMITY Left 6/28/2022    Procedure: LEFT BRACHIO-BASILIC ARTERIOVENOUS FISTULA WITH BIOPROSTHETIC GRAFT;  Surgeon: Akash Miller MD;  Location: [facility] OR     IR DIALYSIS FISTULOGRAM LEFT  5/31/2022     IR DIALYSIS FISTULOGRAM LEFT  7/8/2022     IR DIALYSIS FISTULOGRAM LEFT  8/11/2022     LIGATE FISTULA  ARTERIOVENOUS UPPER EXTREMITY Left 6/28/2022    Procedure: L[place] fistula arteriovenous upper extremity;  Surgeon: Akash Miller MD;  Location: [facility] OR     REPAIR FISTULA ARTERIOVENOUS UPPER EXTREMITY Left 7/8/2022    Procedure: EXPLORE LEFT ARM, REPAIR OF LEFT UPPER ARM AV DIALYSIS GRAFT, EVACUATE HEMATOMA;  Surgeon: Akash Miller MD;  Location: [facility] OR     WISDOM TEETH         Prior to Admission Medications   Prior to Admission Medications   Prescriptions Last Dose Informant Patient Reported? Taking?   B Complex-C-Folic Acid (DEEPALI CAPS) 1 MG CAPS 8/14/2022 at am  Yes Yes   Sig: Take 1 capsule by mouth daily   amLODIPine (NORVASC) 10 MG tablet 8/13/2022 at Unknown time  Yes Yes   Sig: Take 10 mg by mouth every evening   atorvastatin (LIPITOR) 20 MG tablet 8/13/2022 at Unknown time  Yes Yes   Sig: Take 20 mg by mouth daily   calcium acetate (PHOSLO) 667 MG CAPS capsule 8/8/2022  Yes Yes   Sig: Take 2,001 mg by mouth 3 times daily (with meals)   carvedilol (COREG) 25 MG tablet 8/14/2022 at am  Yes Yes   Sig: Take 25 mg by mouth 2 times daily (with meals)   hydrALAZINE (APRESOLINE) 50 MG tablet 8/14/2022 at am  Yes Yes   Sig: Take 50 mg by mouth 3 times daily   lisinopril (ZESTRIL) 20 MG tablet 8/13/2022  Yes Yes   Sig: Take 20 mg by mouth daily   oxyCODONE (ROXICODONE) 5 MG tablet prn  No Yes   Sig: Take 1 tablet (5 mg) by mouth every 6 hours as needed for pain      Facility-Administered Medications: None     Allergies   No Known Allergies    Social History   I have reviewed this patient's social history and updated it with pertinent information if needed. Gina Simpson  reports that she has been smoking cigarettes. She has smoked for the past 40.00 years. She has never used smokeless tobacco. She reports that she does not drink alcohol and does not use drugs.    Family History   I have reviewed this patient's family history and updated it with pertinent information if needed.   No family history on  file.    Review of Systems   The 10 point Review of Systems is negative other than noted in the HPI.     Physical Exam   Temp: 98.1  F (36.7  C) Temp src: Axillary BP: 103/65 Pulse: 105   Resp: 13 SpO2: 95 % O2 Device: None (Room air) Oxygen Delivery: 2 LPM  Vital Signs with Ranges  Temp:  [97.4  F (36.3  C)-98.2  F (36.8  C)] 98.1  F (36.7  C)  Pulse:  [] 105  Resp:  [12-22] 13  BP: ()/(61-83) 103/65  Cuff Mean (mmHg):  [93] 93  SpO2:  [95 %-100 %] 95 %  127 lbs 13.87 oz    GENERAL:  alert, in moderate distress from pain  HEENT:  Neck swelling noted  CV: tachyardic, regular, nomurmurs, no clicks, gallops, or rubs, edema in legs  RESP: Clear bilaterally with good efforts  GI: Abdomen soft/nt/nd, BS normal. No masses, organomegaly  MUSCULOSKELETAL: extremities nl - no gross deformities noted  SKIN: no suspicious lesions or rashes, dry to touch  NEURO:  Strength normal and symmetric.   PSYCH: affect appropriate      Data   BMP  Recent Labs   Lab 08/15/22  1115 08/15/22  0451    134   POTASSIUM 4.0 4.3   CHLORIDE 102 99   JEWELL 8.3* 8.9   CO2 23 24   BUN 72* 69*   CR 4.73* 5.68*   * 127*     Phos@LABRCNTIPR(phos:4)  CBC)  Recent Labs   Lab 08/15/22  1115 08/15/22  0451 08/14/22  2149   WBC  --  6.7 7.7   HGB 5.6* 7.0* 8.4*   HCT  --  22.0* 26.2*   MCV  --  90 90   PLT  --  376 372     Recent Labs   Lab 08/15/22  1115   AST 43   ALT 19   ALKPHOS 26*   BILITOTAL 0.3     Recent Labs   Lab 08/15/22  0451   INR 1.68*     No results found for: D2VIT, D3VIT, DTOT  Recent Labs   Lab 08/15/22  1115 08/15/22  0451   HGB 5.6* 7.0*   HCT  --  22.0*   MCV  --  90     No results for input(s): PTHI in the last 168 hours.

## 2022-08-15 NOTE — PROGRESS NOTES
"[facility]    Medicine Progress Note - Hospitalist Service    Date of Admission:  8/14/2022    Assessment & Plan          Gina Simpson is a 56 year old female with PMHx of CAD, cardiomyopathy, hypertension, COPD and ESRD with hx of failed RUE fistula who underwent creation of LUE AV fistula on 6/28/22 complicated by hematoma s/p evacuation and LUE DVT who is a direct admission to [facility] from [facility] after presenting with 2+weeks of facial swelling, fatigue, dysphagia and being found to have prevertebral soft tissue swelling potentially presenting retropharyngeal abscess as well as new pulmonary nodule, mediastinal mass, and extensive adenopathy.      Symmetric prevertebral soft tissue swelling, inflammation vs retropharyngeal/prevertebral abscess  Patient presented with 2+ weeks of fatigue, facial swelling, feeling food/pills getting \"stuck\" in her throat.   CT soft tissue neck w/o contrast in the ED shows new symmetrical prevertebral soft tissue swelling and hypodensity bilaterally from the C2-C5 levels which could represent inflammatory or infectious cellulitis or potential retropharyngeal/prevertebral abscess. Additionally new mediastinal mass causing displacement of trachea and esophagus to the left as discussed below. Unclear if this represents true infection vs inflammation. She reports foreign body R mandible behind lower molar for several weeks though no obvious infection on exam.   No stridor, respiratory distress. She is managing secretions without difficulty and denies significant throat pain. S/p clindamycin, azithromycin, dexamethasone 10mg prior to transfer  --ENT consulted  -- hold on steroids for now  --close monitoring for any evidence of airway compromise     New mediastinal mass  Extensive cervical, supraclavicular, right thoracic inlet and right mediastinal/hilar adenopathy  New 9mm ARIA pulmonary nodule  Patient reports significant fatigue, 20lb weight loss and " poor appetite over past two months or so. No known malignancy.   CT neck shows new mediastinal mass on right 5.8 x 6.1cm as well as pulmonary nodule measuring 9mm. CT 2019 showed 3mm nodule in R lung apex. Extensive adenopathy seen as well and causing compression of brachiocephalic and mediastinal vasculature which is likely contributing to swelling.   -- Appreciate reviewed by thoracic surgery, vascular surgery, IR and oncology  -Await biopsy: Likely tomorrow     RUL opacities, CT concerning for malignancy  CT neck shows gildardo opacities RUL with moderate R pleural effusion. Reports cough for several days. Unsure of fever. WBC normal. LA normal. mediastinal mass is causing displacement of trachea. Oxygen stable on room air.     --blood cultures obtained in the ED, pending      ESRD on HD  S/p left AV fistula formation 6/28/22  S/p fistulogram with dilatation of L subclavian and junction of brachiocephalic and subclavian vein complicated by hematoma requiring evacuation 7/8/22  Dialyzes MWF - Dr. Gonsales. She is still using internal jugular tunneled dialysis catheter at this time. She continues to make urine.   She underwent outpatient fistulogram 8/11/22.   Cr 5.85 in the ED, K normal at 3.6.   --Nephrology consulted, discussed 08/15.  Dialysis was stopped after 40 minutes due to RRT from upper GI bleed     Left brachial vein DVT 7/6/22  CT neck 8/14: Filling defect within the right internal jugular vein, worrisome for intraluminal thrombus.   Started on warfarin at the time due to ESRD. INR is subtheraputic in the ED 1.4.   --started heparin drip in place of warfarin given plan for biopsy   8/15: HOLD until cleared by GI       CAD   Hx Cardiomyopathy  Hypertension  Hx stenting in 2019. Last ECHO 2020 showed Ef 40-45%.   PTA:  hydralazine 50mg TID, carvedilol 25mg bid, lisinopril 20mg daily, amlodipine 10mg every evening  --continue PTA carvedilol pending med rec   --HOLD lisinopril, hydralazine, amlodipine due to  upper GI bleed      Acute upper GI bleed: 8/15  - ensure 2 large bore IV Access  - type and screen and transfuse to ensure Hb>7 , platelet > 50 and INR <2   - NPO  - Protonix bid   - D/W GI consult Dr. Camacho : planned for EGD in 1-2 hrs   -Patient was managed in RRT.  Will transfuse 2 unit PRBC for hemoglobin of 5.6 and active bleed.  [place] [facility] [place]    chronic anemia related to ESRD  Most recent hgb on hospital discharge 7/13 is 9.3. hgb on admission 8.2.   --Cont EPO with dialysis.      Hyperphosphatemia  --Cont PhosLo with meals     Diet: NPO for Medical/Clinical Reasons Except for: No Exceptions    DVT Prophylaxis: Heparin gtt once cleared by GI  Antoine Catheter: Not present  Central Lines: PRESENT  CVC Double Lumen Left Subclavian-Site Assessment: WDL (HD cath)  Cardiac Monitoring: ACTIVE order. Indication: unstable  Code Status: Full Code      Disposition Plan      Expected Discharge Date: 08/19/2022                The patient's care was discussed with the Bedside Nurse, Patient, GI, Nephrology Consultant and IR Team.    Severiano Johnson MD, MD  Hospitalist Service  [facility]  Securely message with the Vocera Web Console (learn more here)  Text page via [employer] Paging/Directory         Clinically Significant Risk Factors Present on Admission             # Hypoalbuminemia: Albumin = 2.2 g/dL (Ref range: 3.4 - 5.0 g/dL) on admission, will monitor as appropriate   # Coagulation Defect: INR = 1.68 (Ref range: 0.85 - 1.15) and/or PTT = N/A on admission, will monitor for bleeding   # Hypertension: home medication list includes antihypertensive(s)          ______________________________________________________________________    Interval History   Patient was seen in the dialysis unit.   Complains of significant pain in the abdomen and started vomiting blood.    Denies any chest pain/palpitations  Complains of shortness of breath but no cough    4 point ROS negative unless mentioned    Data  reviewed today: I reviewed all medications, new labs and imaging results over the last 24 hours. I personally reviewed the CT image(s) showing As below.    Physical Exam   /87   Pulse 105   Temp 98.1  F (36.7  C) (Axillary)   Resp 16   Wt 58 kg (127 lb 13.9 oz)   SpO2 100%   BMI 21.28 kg/m    Gen- pleasant lying in bed, uncomfortable  HEENT- ALEENA  Neck-swelling  CVS- I+II+ no m/r/g, tachycardia  RS- CTAB  Abdo- soft, mild tenderness in epigastrium , No g/r/r   Ext- no edema   CNS-oriented to person, place, time    Data   Recent Results (from the past 24 hour(s))   CT Chest w Contrast    Narrative    EXAM: CT CHEST W CONTRAST  LOCATION: [facility]  DATE/TIME: 8/15/2022 12:14 AM    INDICATION: mediastinal mass  COMPARISON: CT soft tissue neck obtained earlier on 8/15/2022  TECHNIQUE: CT chest with IV contrast. Multiplanar reformats were obtained. Dose reduction techniques were used.    CONTRAST: 75mL Isovue 370    FINDINGS:   LUNGS AND PLEURA: There is a small right-sided pleural effusion. Masslike consolidation is seen within the right upper lobe measuring 4 x 2 x 4.8 cm, there is adjacent septal thickening which is somewhat nodular in appearance and architectural distortion   seen adjacent mass. Nodular thickening is also seen along the minor fissure (image 46, series 7). A 9 x 6 mm solid nodule seen in the left upper lobe. (Image 40, series 5).. There is right apical pleural thickening. Tree-in-bud opacities are seen in the   left lower lobe (image 137 &170, series 5). A 5.5 mm nodule solid nodule seen in the right lower lobe. (Image 242 series 5)    MEDIASTINUM/AXILLAE: There is a large mediastinal mass measuring roughly 10 x 8 x 6 cm, which has mass effect upon the trachea and and narrows the SVC as well as compresses and occludes the right upper lobe pulmonary are artery right upper lobe bronchus.   Hilar and mediastinal adenopathy is noted. The esophagus is unremarkable.  The heart is normal in size. As a small pericardial effusion. A left IJ central venous catheter is seen in place with the tip in the right atrium.    CORONARY ARTERY CALCIFICATION: Mild.    UPPER ABDOMEN: There are multiple large hypodense masses seen throughout the liver, the largest of which measures 4 cm in diameter, within the right lobe of the liver (image 60, series 3).    MUSCULOSKELETAL: Contrast reflux is seen along the right chest wall and upper abdomen. No worrisome osseous lesions are noted        Impression    IMPRESSION:   1.  10 x 8 x 6 cm large mediastinal mass which has mass effect upon the trachea, narrows the SVC and occludes the right upper lobe pulmonary bronchus and artery, likely reflecting conglomerate adenopathy secondary to neoplasm.  2.  Masslike thickening in the right upper lobe with architectural distortion as well as adjacent nodular septal thickening, concerning for a combination of atelectasis and neoplastic mass, with findings indicative of lymphangitic spread of neoplasm.  3.  Tree-in-bud opacities within the left lower lobe, concerning for endobronchial spread of the neoplastic process  4.  2 solid nodules within the left lung as described above, concerning for metastatic foci  5.  Multiple large hepatic masses, concerning for hepatic metastatic disease  6.  Contrast reflux within the subcutaneous vessels of the right chest wall and upper abdomen, consistent with SVC syndrome/compression secondary to the large mediastinal mass/adenopathy  7.  Small left-sided pleural effusion   8.  Small pericardial effusion   CT Soft Tissue Neck w Contrast    Narrative    EXAM: CT SOFT TISSUE NECK W CONTRAST  LOCATION: [facility]  DATE/TIME: 8/15/2022 12:14 AM    INDICATION: Mediastinal mass. Soft tissue swelling.  COMPARISON: None.  CONTRAST: 125 mL Isovue 370.  TECHNIQUE: Routine CT Soft Tissue Neck with IV contrast. Multiplanar reformats. Dose reduction techniques  were used.    FINDINGS:     MUCOSAL SPACES/SOFT TISSUES: Multifocal dental and periodontal disease. There is a thin prevertebral collection extending from C2-C3 through C6-C7, measuring up to 4 mm in thickness. No definite associated enhancement. Diffuse subcutaneous fat stranding.   No abnormal enhancement or discrete enhancing mass along the mucosal spaces of the upper aerodigestive tract. Normal vocal cords and infraglottic trachea. Normal parapharyngeal spaces. Normal oral cavity,  spaces, and floor of mouth   structures.    LYMPH NODES: Abnormally enlarged and cystic/necrotic appearing cervical lymph nodes, right greater than left. The largest lymph nodes on the right are paratracheal and supraclavicular in location, with a lymph node along the right thyroid that measures   2.3 x 1.9 x 3.0 cm (series 505 image 79, series 503 image 88). There is a centrally necrotic right paratracheal isis conglomeration that is incompletely evaluated, but measures at least 2.0 x 3.6 cm in AP x transverse dimension (series 503 image 100).   On the left the largest lymph nodes are in the left subclavicular region, measuring up to 1.3 x 1.9 x 1.7 cm (series 505 image 83, series 503 image 80).     SALIVARY GLANDS: Normal parotid and submandibular glands.    THYROID: Tiny subcentimeter nodules in the thyroid. Further follow-up is not indicated by size criteria.     VESSELS: There is a filling defect in the right internal jugular vein, measuring approximately 5 cm in craniocaudal dimension (series 505 image 73), concerning for intraluminal thrombus. Mild nonflow limiting atherosclerotic plaque at the carotid   bifurcations.    VISUALIZED INTRACRANIAL/ORBITS/SINUSES: Mild age-related changes of the visualized intracranial compartment. Mild mucosal thickening of the left maxillary sinus.    OTHER: No mastoid or middle ear effusion. See separately dictated chest CT.      Impression    IMPRESSION:   1.  Cystic/necrotic  cervical adenopathy, concerning for metastatic disease.  2.  Filling defect within the right internal jugular vein, worrisome for intraluminal thrombus.  3.  Diffuse third spacing with a nonenhancing retropharyngeal fluid collection. Findings may be related to SVC syndrome.  4.  No suspicious osseous lesions.  5.  Multifocal dental and periodontal disease.    Findings were discussed with Dr. Kan at 1:44 AM on 08/15/2022.

## 2022-08-15 NOTE — PROVIDER NOTIFICATION
MD Notification    Notified Person: MD    Notified Person Name: Dr. Kan    Notification Date/Time: 8/14/2022 2330    Notification Interaction: Telephone    Purpose of Notification: Called CT, concern noted about patients GFR. I see dialysis scheduled for tomorrow. Just wanted to clarify we are okay to go ahead with contrast for CT.    Orders Received: Dr. Kan gave the OK to CT for the use of contrast.     Comments:

## 2022-08-15 NOTE — PROGRESS NOTES
THORACIC SURGERY    Reviewed CT chest with contrast    Large hilar/mediastinal with occlusion of the SVC  Multiple liver lesion c/w metastases    Suspicious for malignancy, would not be surprised if this small cell lung cancer    Recommend US guided biopsy of liver lesion  Oncology consult    RUBY METCALF MD [facility] ONCOLOGY THORACIC SURGERY  CELL:  [phone]  OFFICE: [phone]

## 2022-08-15 NOTE — PROVIDER NOTIFICATION
MD Notification    Notified Person: MD    Notified Person Name: Dr. Cantor    Notification Date/Time: 8/14/22 2024    Notification Interaction:paging    Purpose of Notification: 2218-LA, Pt arrived on the floor. Awaiting orders to start plan of care. Thank you.    Orders Received:    Comments:

## 2022-08-15 NOTE — PROVIDER NOTIFICATION
MD Notification    Notified Person: MD    Notified Person Name: Severiano Johnson, Hospitalist    Notification Date/Time: 8/15/22 1700    Notification Interaction: [employer] Messaging    Purpose of Notification: Pt's son requested to speak directly with the doctor.     Orders Received: Dr. Johnson will call son by phone to speak with him.    Comments:

## 2022-08-15 NOTE — CONSULTS
Consultation    Gina Simpson MRN# [id number]   YOB: 1965 Age: 56 year old   Date of Admission: 8/14/2022  Requesting physician: Dr. Silverio  Reason for consult: Mediastinal mass           Assessment and Plan:   1. SVC syndrome  - Large mediastinal mass [id number], mass effect on trachea, narrows SVC, and compresses and occludes right upper lobe pulmonary artery  - Facial edema, engorgement of the neck veins upon waking in the morning, mild prominence to veins on exam, cough / shortness of breath at presentation. At least grade 2.   - CT with possible RUL mass, multiple hepatic masses. Cervical and supraclavicular lymphadenopathy  - She has smoked since age 11  - A/w 10 pound weight loss     2. Anemia   - In setting of CKD on dialysis  - Had episode of hematemesis today   - Hg 5.6 on 8/15, down from baseline of 9 a month ago   - Was started on anticoagulation for intraluminal thrombus -> bleed     3. Intraluminal thrombus  - Restart heparin inpatient when Hg stabilized and no further bleeding  - Will need lifelong anticoagulation, if tolerated.     4. Smoker  - Interested in quitting  - Consider nicotine replacement while inpatient    PLAN  - Recommend IR consult for endovascular stenting SVC, this will buy us time from what is likely a rapidly progressive malignancy   - F/U EGD report, management of upper GIB per GI and primary, transfusion support Hg <7. Need to be mindful not to give her too much intravascular volume.  - Discussed with primary, would hold off on anticoagulation until tomorrow   - Avoid putting the bed flat overnight, best to sleep at an angle if tolerated   - Biopsy per IR least invasive metastatic site   - Differential includes small cell carcinoma, I discussed most likely diagnosis of cancer with her. She was somewhat sedated still from endoscopy, I will be by tomorrow to discuss further.  - Will check basic anemia work up      Patrick Dreyer, DO [facility]  [phone] (office); [phone] (cell)              Chief Complaint:   No chief complaint on file.           History of Present Illness:   This patient is a 56 year old female who presented to the hospital as a direct admission from [place] emergency department today for evaluation of facial swelling, weakness and fatigue with intermittent dysphagia.  The patient feels like she has a foreign body in her right lower mouth, she feels like something is sticking out of it.  No pus or change in odor.  She has been having dysphagia for the past few weeks, food will get stuck in her throat when she swallows.  She reports that when she wakes up in the morning she has trouble breathing and her neck is engorged and she can see the veins in her neck.  She notes weight loss over the past month.  She also notes increasing fatigue with poor appetite.  She denies any fevers. She is active smoker since age 11. Interested in quitting.          Physical Exam:   Vitals were reviewed  Blood pressure 105/61, pulse 100, temperature (!) 96  F (35.6  C), resp. rate 16, weight 58 kg (127 lb 13.9 oz), SpO2 94 %, not currently breastfeeding.  Temperatures:  Current - Temp: (!) 96  F (35.6  C); Max - Temp  Av.7  F (36.5  C)  Min: 96  F (35.6  C)  Max: 98.2  F (36.8  C)  Respiration range: Resp  Av  Min: 12  Max: 22  Pulse range: Pulse  Av.8  Min: 96  Max: 120  Blood pressure range: Systolic (24hrs), Av , Min:91 , Max:146   ; Diastolic (24hrs), Av, Min:61, Max:87    Pulse oximetry range: SpO2  Av.6 %  Min: 94 %  Max: 100 %    Intake/Output Summary (Last 24 hours) at 8/15/2022 1506  Last data filed at 8/15/2022 1442  Gross per 24 hour   Intake 300 ml   Output --   Net 300 ml       GENERAL: Mild engorgement to R neck vein.  SKIN: No rashes or jaundice.  HEENT: I cannot see anything in her R inferolateral mouth, but she does have poor dentition in this area   LYMPH: Fullness/lymph nodes in cervical  and supraclavicualr   HEART: Regular rate and rhythm with no murmurs.  LUNGS: Clear bilaterally.  ABDOMEN: Soft, nontender, nondistended with no palpable hepatosplenomegaly.  EXTREMITIES: No clubbing, cyanosis, or edema.  MENTAL: Alert and oriented to person, place, and time.  NEURO: Cranial nerves II through XII grossly intact with no focal motor or sensory deficits.              Past Medical History:   I have reviewed this patient's past medical history  Past Medical History:   Diagnosis Date     Anemia      Cancer (H)      Coronary artery disease      Dialysis complication      Dialysis patient (H)      Embolism (H)      ESRD (end stage renal disease) (H)      Hypertension      Ischemic cardiomyopathy      Renal failure              Past Surgical History:   I have reviewed this patient's past surgical history  Past Surgical History:   Procedure Laterality Date     ANESTH,UPPER ARM AV FISTULA REPAIR       CREATE FISTULA ARTERIOVENOUS UPPER EXTREMITY Left 3/25/2022    Procedure: CREATION OF FIRST STAGE LEFT BRACHIOBASILIC ARTERIOVENOUS FISTULA;  Surgeon: Akash Miller MD;  Location: [facility] OR     CREATE FISTULA ARTERIOVENOUS UPPER EXTREMITY Left 6/28/2022    Procedure: LEFT BRACHIO-BASILIC ARTERIOVENOUS FISTULA WITH BIOPROSTHETIC GRAFT;  Surgeon: Akash Miller MD;  Location: [facility] OR     IR DIALYSIS FISTULOGRAM LEFT  5/31/2022     IR DIALYSIS FISTULOGRAM LEFT  7/8/2022     IR DIALYSIS FISTULOGRAM LEFT  8/11/2022     LIGATE FISTULA ARTERIOVENOUS UPPER EXTREMITY Left 6/28/2022    Procedure: Ligate fistula arteriovenous upper extremity;  Surgeon: Akash Miller MD;  Location: [facility] OR     REPAIR FISTULA ARTERIOVENOUS UPPER EXTREMITY Left 7/8/2022    Procedure: EXPLORE LEFT ARM, REPAIR OF LEFT UPPER ARM AV DIALYSIS GRAFT, EVACUATE HEMATOMA;  Surgeon: Akash Miller MD;  Location: [facility] OR     WISDOM TEETH                 Social History:   I have reviewed this patient's social history  Social History      Tobacco Use     Smoking status: Current Every Day Smoker     Years: 40.00     Types: Cigarettes     Smokeless tobacco: Never Used     Tobacco comment: 6 cigarettes per day.    Substance Use Topics     Alcohol use: Never             Family History:   I have reviewed this patient's family history  History reviewed. No pertinent family history.          Allergies:   No Known Allergies          Medications:   I have reviewed this patient's current medications  Medications Prior to Admission   Medication Sig Dispense Refill Last Dose     amLODIPine (NORVASC) 10 MG tablet Take 10 mg by mouth every evening   8/13/2022 at Unknown time     atorvastatin (LIPITOR) 20 MG tablet Take 20 mg by mouth daily   8/13/2022 at Unknown time     B Complex-C-Folic Acid (DEEPALI CAPS) 1 MG CAPS Take 1 capsule by mouth daily   8/14/2022 at am     calcium acetate (PHOSLO) 667 MG CAPS capsule Take 2,001 mg by mouth 3 times daily (with meals)   8/8/2022     carvedilol (COREG) 25 MG tablet Take 25 mg by mouth 2 times daily (with meals)   8/14/2022 at am     hydrALAZINE (APRESOLINE) 50 MG tablet Take 50 mg by mouth 3 times daily   8/14/2022 at am     lisinopril (ZESTRIL) 20 MG tablet Take 20 mg by mouth daily   8/13/2022     oxyCODONE (ROXICODONE) 5 MG tablet Take 1 tablet (5 mg) by mouth every 6 hours as needed for pain 12 tablet 0 prn     Current Facility-Administered Medications Ordered in Epic   Medication Dose Route Frequency Last Rate Last Admin     0.9% sodium chloride BOLUS  250 mL Intravenous Once in dialysis/CRRT         0.9% sodium chloride BOLUS  300 mL Hemodialysis Machine Once         0.9% sodium chloride BOLUS  100-150 mL Intravenous Q15 Min PRN         acetaminophen (TYLENOL) tablet 650 mg  650 mg Oral Q6H PRN        Or     acetaminophen (TYLENOL) Suppository 650 mg  650 mg Rectal Q6H PRN         [Held by provider] amLODIPine (NORVASC) tablet 10 mg  10 mg Oral QPM   10 mg at 08/14/22 8028     ampicillin-sulbactam (UNASYN) 3 g  vial to attach to  mL bag  3 g Intravenous Q6H   3 g at 08/15/22 0211     atorvastatin (LIPITOR) tablet 20 mg  20 mg Oral Daily         calcium acetate (PHOSLO) capsule 2,001 mg  2,001 mg Oral TID w/meals         carvedilol (COREG) tablet 25 mg  25 mg Oral BID w/meals         epoetin delma-epbx (RETACRIT) injection 10,000 Units  10,000 Units Intravenous Once in dialysis/CRRT         famotidine (PEPCID) injection 20 mg  20 mg Intravenous Q12H   20 mg at 08/15/22 0456     heparin (porcine) injection  500 Units Hemodialysis Machine OR IV Push Once in dialysis/CRRT         heparin 10,000 units/10 mL infusion (DIALYSIS USE)  500 Units/hr Hemodialysis Machine Continuous         [Held by provider] heparin 25,000 units in 0.45% NaCl 250 mL ANTICOAGULANT infusion  0-5,000 Units/hr Intravenous Continuous   Stopped at 08/15/22 1035     [Held by provider] hydrALAZINE (APRESOLINE) tablet 50 mg  50 mg Oral TID         HYDROmorphone (PF) (DILAUDID) injection 0.5 mg  0.5 mg Intravenous Q2H PRN   0.5 mg at 08/15/22 1304     lidocaine (LMX4) cream   Topical Q1H PRN         lidocaine (LMX4) cream   Topical Q1H PRN         lidocaine 1 % 0.1-1 mL  0.1-1 mL Other Q1H PRN         lidocaine 1 % 0.1-1 mL  0.1-1 mL Other Q1H PRN         [Held by provider] lisinopril (ZESTRIL) tablet 20 mg  20 mg Oral Daily         melatonin tablet 1 mg  1 mg Oral At Bedtime PRN         multivitamin RENAL (NEPHROCAPS/TRIPHROCAPS) capsule 1 capsule  1 capsule Oral Daily         naloxone (NARCAN) injection 0.2 mg  0.2 mg Intravenous Q2 Min PRN        Or     naloxone (NARCAN) injection 0.4 mg  0.4 mg Intravenous Q2 Min PRN        Or     naloxone (NARCAN) injection 0.2 mg  0.2 mg Intramuscular Q2 Min PRN        Or     naloxone (NARCAN) injection 0.4 mg  0.4 mg Intramuscular Q2 Min PRN         nitroGLYcerin (NITROSTAT) sublingual tablet 0.4 mg  0.4 mg Sublingual Q5 Min PRN         ondansetron (ZOFRAN ODT) ODT tab 4 mg  4 mg Oral Q6H PRN        Or      ondansetron (ZOFRAN) injection 4 mg  4 mg Intravenous Q6H PRN   4 mg at 08/15/22 1058     pantoprazole (PROTONIX) IV push injection 40 mg  40 mg Intravenous Q12H         Patient is already receiving anticoagulation with heparin, enoxaparin (LOVENOX), warfarin (COUMADIN)  or other anticoagulant medication   Does not apply Continuous PRN         sodium chloride (PF) 0.9% PF flush 3 mL  3 mL Intracatheter Q8H         sodium chloride (PF) 0.9% PF flush 3 mL  3 mL Intracatheter q1 min prn         sodium chloride (PF) 0.9% PF flush 3 mL  3 mL Intracatheter Q8H   3 mL at 08/15/22 0456     sodium chloride (PF) 0.9% PF flush 3 mL  3 mL Intracatheter q1 min prn         No current Epic-ordered outpatient medications on file.             Review of Systems:     The 10 point Review of Systems is negative other than noted in the HPI.            Data:   Data   Results for orders placed or performed during the hospital encounter of 08/14/22 (from the past 24 hour(s))   CBC with platelets   Result Value Ref Range    WBC Count 7.7 4.0 - 11.0 10e3/uL    RBC Count 2.90 (L) 3.80 - 5.20 10e6/uL    Hemoglobin 8.4 (L) 11.7 - 15.7 g/dL    Hematocrit 26.2 (L) 35.0 - 47.0 %    MCV 90 78 - 100 fL    MCH 29.0 26.5 - 33.0 pg    MCHC 32.1 31.5 - 36.5 g/dL    RDW 13.9 10.0 - 15.0 %    Platelet Count 372 150 - 450 10e3/uL   ABO/Rh type and screen    Narrative    The following orders were created for panel order ABO/Rh type and screen.  Procedure                               Abnormality         Status                     ---------                               -----------         ------                     Adult Type and Screen[[id number]]                            Final result                 Please view results for these tests on the individual orders.   Adult Type and Screen   Result Value Ref Range    ABO/RH(D) O POS     Antibody Screen Negative Negative    SPECIMEN EXPIRATION DATE [id number]    CT Chest w Contrast    Narrative    EXAM: CT  CHEST W CONTRAST  LOCATION: [facility]  DATE/TIME: 8/15/2022 12:14 AM    INDICATION: mediastinal mass  COMPARISON: CT soft tissue neck obtained earlier on 8/15/2022  TECHNIQUE: CT chest with IV contrast. Multiplanar reformats were obtained. Dose reduction techniques were used.    CONTRAST: 75mL Isovue 370    FINDINGS:   LUNGS AND PLEURA: There is a small right-sided pleural effusion. Masslike consolidation is seen within the right upper lobe measuring 4 x 2 x 4.8 cm, there is adjacent septal thickening which is somewhat nodular in appearance and architectural distortion   seen adjacent mass. Nodular thickening is also seen along the minor fissure (image 46, series 7). A 9 x 6 mm solid nodule seen in the left upper lobe. (Image 40, series 5).. There is right apical pleural thickening. Tree-in-bud opacities are seen in the   left lower lobe (image 137 &170, series 5). A 5.5 mm nodule solid nodule seen in the right lower lobe. (Image 242 series 5)    MEDIASTINUM/AXILLAE: There is a large mediastinal mass measuring roughly 10 x 8 x 6 cm, which has mass effect upon the trachea and and narrows the SVC as well as compresses and occludes the right upper lobe pulmonary are artery right upper lobe bronchus.   Hilar and mediastinal adenopathy is noted. The esophagus is unremarkable. The heart is normal in size. As a small pericardial effusion. A left IJ central venous catheter is seen in place with the tip in the right atrium.    CORONARY ARTERY CALCIFICATION: Mild.    UPPER ABDOMEN: There are multiple large hypodense masses seen throughout the liver, the largest of which measures 4 cm in diameter, within the right lobe of the liver (image 60, series 3).    MUSCULOSKELETAL: Contrast reflux is seen along the right chest wall and upper abdomen. No worrisome osseous lesions are noted        Impression    IMPRESSION:   1.  10 x 8 x 6 cm large mediastinal mass which has mass effect upon the trachea,  narrows the SVC and occludes the right upper lobe pulmonary bronchus and artery, likely reflecting conglomerate adenopathy secondary to neoplasm.  2.  Masslike thickening in the right upper lobe with architectural distortion as well as adjacent nodular septal thickening, concerning for a combination of atelectasis and neoplastic mass, with findings indicative of lymphangitic spread of neoplasm.  3.  Tree-in-bud opacities within the left lower lobe, concerning for endobronchial spread of the neoplastic process  4.  2 solid nodules within the left lung as described above, concerning for metastatic foci  5.  Multiple large hepatic masses, concerning for hepatic metastatic disease  6.  Contrast reflux within the subcutaneous vessels of the right chest wall and upper abdomen, consistent with SVC syndrome/compression secondary to the large mediastinal mass/adenopathy  7.  Small left-sided pleural effusion   8.  Small pericardial effusion   CT Soft Tissue Neck w Contrast    Narrative    EXAM: CT SOFT TISSUE NECK W CONTRAST  LOCATION: [facility]  DATE/TIME: 8/15/2022 12:14 AM    INDICATION: Mediastinal mass. Soft tissue swelling.  COMPARISON: None.  CONTRAST: 125 mL Isovue 370.  TECHNIQUE: Routine CT Soft Tissue Neck with IV contrast. Multiplanar reformats. Dose reduction techniques were used.    FINDINGS:     MUCOSAL SPACES/SOFT TISSUES: Multifocal dental and periodontal disease. There is a thin prevertebral collection extending from C2-C3 through C6-C7, measuring up to 4 mm in thickness. No definite associated enhancement. Diffuse subcutaneous fat stranding.   No abnormal enhancement or discrete enhancing mass along the mucosal spaces of the upper aerodigestive tract. Normal vocal cords and infraglottic trachea. Normal parapharyngeal spaces. Normal oral cavity,  spaces, and floor of mouth   structures.    LYMPH NODES: Abnormally enlarged and cystic/necrotic appearing cervical lymph  nodes, right greater than left. The largest lymph nodes on the right are paratracheal and supraclavicular in location, with a lymph node along the right thyroid that measures   2.3 x 1.9 x 3.0 cm (series 505 image 79, series 503 image 88). There is a centrally necrotic right paratracheal isis conglomeration that is incompletely evaluated, but measures at least 2.0 x 3.6 cm in AP x transverse dimension (series 503 image 100).   On the left the largest lymph nodes are in the left subclavicular region, measuring up to 1.3 x 1.9 x 1.7 cm (series 505 image 83, series 503 image 80).     SALIVARY GLANDS: Normal parotid and submandibular glands.    THYROID: Tiny subcentimeter nodules in the thyroid. Further follow-up is not indicated by size criteria.     VESSELS: There is a filling defect in the right internal jugular vein, measuring approximately 5 cm in craniocaudal dimension (series 505 image 73), concerning for intraluminal thrombus. Mild nonflow limiting atherosclerotic plaque at the carotid   bifurcations.    VISUALIZED INTRACRANIAL/ORBITS/SINUSES: Mild age-related changes of the visualized intracranial compartment. Mild mucosal thickening of the left maxillary sinus.    OTHER: No mastoid or middle ear effusion. See separately dictated chest CT.      Impression    IMPRESSION:   1.  Cystic/necrotic cervical adenopathy, concerning for metastatic disease.  2.  Filling defect within the right internal jugular vein, worrisome for intraluminal thrombus.  3.  Diffuse third spacing with a nonenhancing retropharyngeal fluid collection. Findings may be related to SVC syndrome.  4.  No suspicious osseous lesions.  5.  Multifocal dental and periodontal disease.    Findings were discussed with Dr. Kan at 1:44 AM on 08/15/2022.   Comprehensive metabolic panel   Result Value Ref Range    Sodium 134 133 - 144 mmol/L    Potassium 4.3 3.4 - 5.3 mmol/L    Chloride 99 94 - 109 mmol/L    Carbon Dioxide (CO2) 24 20 - 32 mmol/L    Anion  Gap 11 3 - 14 mmol/L    Urea Nitrogen 69 (H) 7 - 30 mg/dL    Creatinine 5.68 (H) 0.52 - 1.04 mg/dL    Calcium 8.9 8.5 - 10.1 mg/dL    Glucose 127 (H) 70 - 99 mg/dL    Alkaline Phosphatase 32 (L) 40 - 150 U/L    AST 48 (H) 0 - 45 U/L    ALT 22 0 - 50 U/L    Protein Total 5.7 (L) 6.8 - 8.8 g/dL    Albumin 2.5 (L) 3.4 - 5.0 g/dL    Bilirubin Total 0.4 0.2 - 1.3 mg/dL    GFR Estimate 8 (L) >60 mL/min/1.73m2   CBC with platelets   Result Value Ref Range    WBC Count 6.7 4.0 - 11.0 10e3/uL    RBC Count 2.44 (L) 3.80 - 5.20 10e6/uL    Hemoglobin 7.0 (L) 11.7 - 15.7 g/dL    Hematocrit 22.0 (L) 35.0 - 47.0 %    MCV 90 78 - 100 fL    MCH 28.7 26.5 - 33.0 pg    MCHC 31.8 31.5 - 36.5 g/dL    RDW 13.6 10.0 - 15.0 %    Platelet Count 376 150 - 450 10e3/uL   INR   Result Value Ref Range    INR 1.68 (H) 0.85 - 1.15   Heparin Unfractionated Anti Xa Level   Result Value Ref Range    Anti Xa Unfractionated Heparin 0.37 For Reference Range, See Comment IU/mL    Narrative    Therapeutic Range: UFH: 0.25-0.50 IU/mL for low intensity dosing,  0.30-0.70 IU/mL for high intensity dosing DVT and PE.  This test is not validated for other direct factor X inhibitors (e.g. rivaroxaban, apixaban, edoxaban, betrixaban, fondaparinux) and should not be used for monitoring of other medications.   Troponin I   Result Value Ref Range    Troponin I High Sensitivity 49 <54 ng/L   Nt probnp inpatient   Result Value Ref Range    N terminal Pro BNP Inpatient 3,583 (H) 0 - 900 pg/mL   EKG 12-lead, tracing only   Result Value Ref Range    Systolic Blood Pressure  mmHg    Diastolic Blood Pressure  mmHg    Ventricular Rate 106 BPM    Atrial Rate 106 BPM    MI Interval 186 ms    QRS Duration 78 ms     ms    QTc 441 ms    P Axis 86 degrees    R AXIS 97 degrees    T Axis 70 degrees    Interpretation ECG       Sinus tachycardia  Rightward axis  Pulmonary disease pattern  Abnormal ECG  When compared with ECG of 28-JUN-2022 07:03,  Nonspecific T wave abnormality  no longer evident in Lateral leads     Prepare red blood cells (unit)   Result Value Ref Range    CROSSMATCH Compatible     UNIT ABO/RH O Pos     Unit Number [id number]     Unit Status Ready     Blood Component Type Red Blood Cells     Product Code H4443I50     CODING SYSTEM [id number]     UNIT TYPE ISBT 5100    Prepare red blood cells (unit)   Result Value Ref Range    CROSSMATCH Compatible     UNIT ABO/RH O Pos     Unit Number [id number]     Unit Status Issued     Blood Component Type Red Blood Cells     Product Code T9340E13     CODING SYSTEM [id number]     UNIT TYPE ISBT 5100     ISSUE DATE AND TIME [id number]    Heparin Unfractionated Anti Xa Level   Result Value Ref Range    Anti Xa Unfractionated Heparin >1.10 (HH) For Reference Range, See Comment IU/mL    Narrative    Therapeutic Range: UFH: 0.25-0.50 IU/mL for low intensity dosing,  0.30-0.70 IU/mL for high intensity dosing DVT and PE.  This test is not validated for other direct factor X inhibitors (e.g. rivaroxaban, apixaban, edoxaban, betrixaban, fondaparinux) and should not be used for monitoring of other medications.   Hemoglobin   Result Value Ref Range    Hemoglobin 5.6 (LL) 11.7 - 15.7 g/dL   Comprehensive metabolic panel   Result Value Ref Range    Sodium 135 133 - 144 mmol/L    Potassium 4.0 3.4 - 5.3 mmol/L    Chloride 102 94 - 109 mmol/L    Carbon Dioxide (CO2) 23 20 - 32 mmol/L    Anion Gap 10 3 - 14 mmol/L    Urea Nitrogen 72 (H) 7 - 30 mg/dL    Creatinine 4.73 (H) 0.52 - 1.04 mg/dL    Calcium 8.3 (L) 8.5 - 10.1 mg/dL    Glucose 175 (H) 70 - 99 mg/dL    Alkaline Phosphatase 26 (L) 40 - 150 U/L    AST 43 0 - 45 U/L    ALT 19 0 - 50 U/L    Protein Total 4.9 (L) 6.8 - 8.8 g/dL    Albumin 2.2 (L) 3.4 - 5.0 g/dL    Bilirubin Total 0.3 0.2 - 1.3 mg/dL    GFR Estimate 10 (L) >60 mL/min/1.73m2   Lactic acid whole blood   Result Value Ref Range    Lactic Acid 2.2 (H) 0.7 - 2.0 mmol/L   Prepare red blood cells (unit)   Result Value Ref Range     CROSSMATCH Compatible     UNIT ABO/RH O Pos     Unit Number [id number]     Unit Status Ready     Blood Component Type Red Blood Cells     Product Code E8637T97     CODING SYSTEM [id number]     UNIT TYPE ISBT 5100    UPPER GI ENDOSCOPY   Result Value Ref Range    Upper GI Endoscopy       [address] [address] [place]  [address]  _______________________________________________________________________________  Patient Name: Gina Simpson       Procedure Date: 8/15/2022 1:06 PM  MRN: [id number]                       Account Number: [id number]  YOB: 1965               Admit Type: Inpatient  Age: 56                               Room: 1                          Note Status: Finalized                Attending MD: MOO VIRAMONTES MD  Instrument Name: 405 GIF- Gastroscope   _______________________________________________________________________________     Procedure:                Upper GI endoscopy  Indications:              Hematemesis  Providers:                MOO VIRAMONTES MD, Candy Mata RN  Referring MD:               Medicines:                Fentanyl 50 micrograms IV, Midazolam 3 mg IV,                             Cetacaine spray  Complications:            No immediate complications.  ____________________ ___________________________________________________________  Procedure:                Pre-Anesthesia Assessment:                            - Prior to the procedure, a History and Physical                             was performed, and patient medications and                             allergies were reviewed. The risks and benefits of                             the procedure and the sedation options and risks                             were discussed with the patient. All questions were                             answered and informed consent was obtained. Patient                             identification and proposed procedure were verified                              by the physician. Mental Status Examination:                             normal. Prophylactic Antibiotics: The patient does                             not require prophylactic antibiotics. Prior                             Anticoagulants: The patient has taken no                             anticoagulant or antip latelet agents. After                             reviewing the risks and benefits, the patient was                             deemed in satisfactory condition to undergo the                             procedure. The anesthesia plan was to use minimal                             sedation / analgesia (anxiolysis). Immediately                             prior to administration of medications, the patient                             was re-assessed for adequacy to receive sedatives.                             The heart rate, respiratory rate, oxygen                             saturations, blood pressure, adequacy of pulmonary                             ventilation, and response to care were monitored                             throughout the procedure. The physical status of                             the patient was re-assessed after the procedure.                            After obtaining informed consent, the endoscope was                             passed under direct vision . Throughout the                             procedure, the patient's blood pressure, pulse, and                             oxygen saturations were monitored continuously. The                             Endoscope was introduced through the mouth, and                             advanced to the third part of duodenum. The upper                             GI endoscopy was accomplished without difficulty.                             The patient tolerated the procedure well.                                                                                   Findings:       The examined esophagus was  normal.       Clotted blood was found in the entire examined stomach.       The cardia and gastric fundus were normal on retroflexion.       One spurting cratered duodenal ulcer with a visible vessel was found in        the duodenal bulb. The lesion was 10 mm in largest dimension. Area was        successfully injected with 2 mL of a 0.1 mg/mL solution of epinephrine        for hemostasis. For  hemostasis, two hemostatic clips were successfully        placed. There was no bleeding at the end of the procedure. Coagulation        for hemostasis using argon plasma at 0.9 liters/minute and 45 garcia was        successful.       The second portion of the duodenum was normal.                                                                                   Moderate Sedation:       Moderate (conscious) sedation was administered by the endoscopy nurse        and supervised by the endoscopist. The following parameters were        monitored: oxygen saturation, heart rate, blood pressure, and response        to care. Total physician intraservice time was 15 minutes.  Impression:               - Normal esophagus.                            - Clotted blood in the entire stomach.                            - Spurting duodenal ulcer with a visible vessel.                             Injected. Clips were placed. Treated with argon                             plasma coagulation (APC).                             - Normal second portion of the duodenum.                            - No specimens collected.  Recommendation:           - [facility] patient to [facility] for [facility] care.                            - Clear liquid diet.                            - Check hemoglobin q 6 hours for one day.                            - Continue present medications.                                                                                   Procedure Code(s):       --- Professional ---       13449, Esophagogastroduodenoscopy, flexible,  transoral; with control of        bleeding, any method       , Moderate sedation services provided by the same physician or        other qualified health care professional performing a gastrointestinal        endoscopic service that sedation supports, requiring the presence of an        independent trained observer to assist in the monitoring of the        patient's level of consciousness and physiological status; initial 15        minutes of i ntra-service time; patient age 5 years or older (additional        time may be reported with 10265, as appropriate)  Diagnosis Code(s):       --- Professional ---       K92.2, Gastrointestinal hemorrhage, unspecified       K26.4, Chronic or unspecified duodenal ulcer with hemorrhage       K92.0, Hematemesis    CPT copyright 2020 [employer]. All rights reserved.    The codes documented in this report are preliminary and upon  review may   be revised to meet current compliance requirements.    Electronically Signed by Raul Liriano  ________________________  RAUL VIRAMONTES MD  8/15/2022 2:16:03 PM  I was physically present for the entire viewing portion of the exam.  RAUL VIRAMONTES MD  Number of Addenda: 0    Note Initiated On: 8/15/2022 1:06 PM  Total Procedure Duration: 0 hours 13 minutes 34 seconds   Scope In: 1:46:22 PM  Scope Out: 1:59:56 PM

## 2022-08-15 NOTE — PROVIDER NOTIFICATION
MD Notification    Notified Person: MD    Notified Person Name: Dr. Kan     Notification Date/Time: 8/15/2022 0132    Notification Interaction: Page    Purpose of Notification: CT results back. Radiologist looking to talk with you.  [phone]    Orders Received:    Comments:

## 2022-08-15 NOTE — CONSULTS
VASCULAR SURGERY INPATIENT CONSULTATION / Initial In-Patient visit    VASCULAR SURGEON: Dr. English    LOCATION: [facility]    Gina Simpson  Medical Record #:  [id number]  YOB: 1965  Age:  56 year old     Date of Service: 8/14/2022    PRIMARY CARE PROVIDER: Clari Garza MD    Reason for consultation: R internal jugular thrombus likely secondary to metastatic malignancy, SVC syndrome    IMPRESSION: Ms. Simpson is a 56 year old female who presented to the hospital with two weeks of facial swelling, fatigue, dysphagia, and found tissue swelling of her R jaw. On admission, patient found to have potential retropharyngeal abscess, new pulmonary nodule, mediastinal mass, and extensive adenopathy. The patient also found to have large right internal jugular occlusive thrombus.  Thrill is present of the L fistula.     The patient has significant swelling of her R arm, breast, neck and face, continues to have swelling of her L arm and face as well.     RECOMMENDATION:  Continue elevation of the L arm, heparin drip for prior DVT of the L arm. No indication for thrombectomy.      PHH:    Past Medical History:   Diagnosis Date     Anemia      Cancer (H)      Coronary artery disease      Dialysis complication      Dialysis patient (H)      Embolism (H)      ESRD (end stage renal disease) (H)      Hypertension      Ischemic cardiomyopathy      Renal failure          Past Surgical History:   Procedure Laterality Date     ANESTH,UPPER ARM AV FISTULA REPAIR       CREATE FISTULA ARTERIOVENOUS UPPER EXTREMITY Left 3/25/2022    Procedure: CREATION OF FIRST STAGE LEFT BRACHIOBASILIC ARTERIOVENOUS FISTULA;  Surgeon: Akash Miller MD;  Location: [facility] OR     CREATE FISTULA ARTERIOVENOUS UPPER EXTREMITY Left 6/28/2022    Procedure: LEFT BRACHIO-BASILIC ARTERIOVENOUS FISTULA WITH BIOPROSTHETIC GRAFT;  Surgeon: Akash Miller MD;  Location: [facility] OR     IR DIALYSIS FISTULOGRAM LEFT   5/31/2022     IR DIALYSIS FISTULOGRAM LEFT  7/8/2022     LIGATE FISTULA ARTERIOVENOUS UPPER EXTREMITY Left 6/28/2022    Procedure: Ligate fistula arteriovenous upper extremity;  Surgeon: Akash Miller MD;  Location: [facility] OR     REPAIR FISTULA ARTERIOVENOUS UPPER EXTREMITY Left 7/8/2022    Procedure: EXPLORE LEFT ARM, REPAIR OF LEFT UPPER ARM AV DIALYSIS GRAFT, EVACUATE HEMATOMA;  Surgeon: Akash Miller MD;  Location: [facility] OR     WISDOM TEETH          ALLERGIES:   No Known Allergies     MEDS:    Current Facility-Administered Medications:      0.9% sodium chloride BOLUS, 250 mL, Intravenous, Once in dialysis/CRRT, Kj Taveras DO     0.9% sodium chloride BOLUS, 300 mL, Hemodialysis Machine, Once, Kj Taveras DO     0.9% sodium chloride BOLUS, 100-150 mL, Intravenous, Q15 Min PRN, Kj Taveras DO     acetaminophen (TYLENOL) tablet 650 mg, 650 mg, Oral, Q6H PRN **OR** acetaminophen (TYLENOL) Suppository 650 mg, 650 mg, Rectal, Q6H PRN, Maria Isabel Cantor PA-C     amLODIPine (NORVASC) tablet 10 mg, 10 mg, Oral, QPM, Maria Isabel Cantor PA-C, 10 mg at 08/14/22 2348     ampicillin-sulbactam (UNASYN) 3 g vial to attach to  mL bag, 3 g, Intravenous, Q6H, Skip Kan MD, 3 g at 08/15/22 0211     atorvastatin (LIPITOR) tablet 20 mg, 20 mg, Oral, Daily, Maria Isabel Cantor PA-C     calcium acetate (PHOSLO) capsule 2,001 mg, 2,001 mg, Oral, TID w/meals, Maria Isabel Cantor PA-C     carvedilol (COREG) tablet 25 mg, 25 mg, Oral, BID w/meals, Maria Isabel Cantor PA-C     epoetin delma-epbx (RETACRIT) injection 10,000 Units, 10,000 Units, Intravenous, Once in dialysis/CRRT, Kj Taveras DO     famotidine (PEPCID) injection 20 mg, 20 mg, Intravenous, Q12H, Skip Kan MD, 20 mg at 08/15/22 0456     heparin (porcine) injection, 500 Units, Hemodialysis Machine OR IV Push, Once in dialysis/CRRT, Kj Taveras DO     heparin 10,000 units/10 mL infusion (DIALYSIS USE), 500 Units/hr,  Hemodialysis Machine, Continuous, Kj Taveras DO     heparin 25,000 units in 0.45% NaCl 250 mL ANTICOAGULANT infusion, 0-5,000 Units/hr, Intravenous, Continuous, Maria Isabel Cantor PA-C, Last Rate: 11.5 mL/hr at 08/14/22 2257, 1,150 Units/hr at 08/14/22 2257     hydrALAZINE (APRESOLINE) tablet 50 mg, 50 mg, Oral, TID, Maria Isabel Cantor PA-C     HYDROmorphone (PF) (DILAUDID) injection 0.3 mg, 0.3 mg, Intravenous, Q3H PRN, Skip Kan MD, 0.3 mg at 08/15/22 0837     lidocaine (LMX4) cream, , Topical, Q1H PRN, Maria Isabel Cantor PA-C     lidocaine 1 % 0.1-1 mL, 0.1-1 mL, Other, Q1H PRN, Maria Isabel Cantor PA-C     lisinopril (ZESTRIL) tablet 20 mg, 20 mg, Oral, Daily, Maria Isabel Cantor PA-C     melatonin tablet 1 mg, 1 mg, Oral, At Bedtime PRN, Maria Isabel Cantor PA-C     multivitamin RENAL (NEPHROCAPS/TRIPHROCAPS) capsule 1 capsule, 1 capsule, Oral, Daily, Maria Isabel Cantor PA-C     naloxone (NARCAN) injection 0.2 mg, 0.2 mg, Intravenous, Q2 Min PRN **OR** naloxone (NARCAN) injection 0.4 mg, 0.4 mg, Intravenous, Q2 Min PRN **OR** naloxone (NARCAN) injection 0.2 mg, 0.2 mg, Intramuscular, Q2 Min PRN **OR** naloxone (NARCAN) injection 0.4 mg, 0.4 mg, Intramuscular, Q2 Min PRN, Kobe Quarles MD     ondansetron (ZOFRAN ODT) ODT tab 4 mg, 4 mg, Oral, Q6H PRN **OR** ondansetron (ZOFRAN) injection 4 mg, 4 mg, Intravenous, Q6H PRN, Maria Isabel Cantor PA-C     Patient is already receiving anticoagulation with heparin, enoxaparin (LOVENOX), warfarin (COUMADIN)  or other anticoagulant medication, , Does not apply, Continuous PRN, Maria Isabel Cantor PA-C     sodium chloride (PF) 0.9% PF flush 3 mL, 3 mL, Intracatheter, Q8H, Maria Isabel Cantor PA-C, 3 mL at 08/15/22 0456     sodium chloride (PF) 0.9% PF flush 3 mL, 3 mL, Intracatheter, q1 min prn, Maria Isabel Cantor PA-C     SOCIAL HABITS:    Tobacco Use      Smoking status: Current Every Day Smoker        Years: 40.00        Types:  "Cigarettes      Smokeless tobacco: Never Used      Tobacco comment: 6 cigarettes per day.      Social History    Substance and Sexual Activity      Alcohol use: Never       History   Drug Use Unknown        FAMILY HISTORY:  No family history on file.    REVIEW OF SYSTEMS:    A 12 point ROS was reviewed and except for what is listed in the HPI above, all others are negative    PE:    Vital signs:  Temp: 98.1  F (36.7  C) Temp src: Oral BP: 121/72 Pulse: 115   Resp: 22 SpO2: 97 % O2 Device: None (Room air)     Weight: 58 kg (127 lb 13.9 oz)  Estimated body mass index is 21.28 kg/m  as calculated from the following:    Height as of 7/6/22: 1.651 m (5' 5\").    Weight as of this encounter: 58 kg (127 lb 13.9 oz).       Wt Readings from Last 1 Encounters:   08/15/22 58 kg (127 lb 13.9 oz)     Body mass index is 21.28 kg/m .          DIAGNOSTIC STUDIES:     Images:  US Ext Arterial Venous Dialys Acs Graft    Result Date: 8/11/2022  US EXTREMITY ARTERIAL VENOUS DIALYSIS ACCESS GRAFT  8/10/2022 2:18 PM HISTORY:  Patient is status post a left brachial artery to basilic vein dialysis fistula. COMPARISON: Ultrasound dated 7/7/2022, fistulogram dated 7/8/2022. FINDINGS: Color Doppler and spectral waveform analysis performed. The left brachial artery to basilic vein dialysis fistula is patent. The outflow volume is measured to be 1400 mL/min. The outflow vein is patent without definite significant stenosis. There is a primarily simple fluid collection adjacent to the fistula at the level of the antecubital fossa. This measures 2.8 x 3.4 cm. There is a primarily simple fluid collection at the proximal medial upper arm. This measures 4.3 x 5.0 cm.     IMPRESSION: Patent left upper extremity dialysis fistula without definite evidence for significant stenosis. Nonspecific simple fluid collections as above. VERA VAUGHN MD   SYSTEM ID:  [id number]    CT Chest w Contrast    Result Date: 8/15/2022  EXAM: CT CHEST W CONTRAST LOCATION: " [facility] DATE/TIME: 8/15/2022 12:14 AM INDICATION: mediastinal mass COMPARISON: CT soft tissue neck obtained earlier on 8/15/2022 TECHNIQUE: CT chest with IV contrast. Multiplanar reformats were obtained. Dose reduction techniques were used. CONTRAST: 75mL Isovue 370 FINDINGS: LUNGS AND PLEURA: There is a small right-sided pleural effusion. Masslike consolidation is seen within the right upper lobe measuring 4 x 2 x 4.8 cm, there is adjacent septal thickening which is somewhat nodular in appearance and architectural distortion  seen adjacent mass. Nodular thickening is also seen along the minor fissure (image 46, series 7). A 9 x 6 mm solid nodule seen in the left upper lobe. (Image 40, series 5).. There is right apical pleural thickening. Tree-in-bud opacities are seen in the  left lower lobe (image 137 &170, series 5). A 5.5 mm nodule solid nodule seen in the right lower lobe. (Image 242 series 5) MEDIASTINUM/AXILLAE: There is a large mediastinal mass measuring roughly 10 x 8 x 6 cm, which has mass effect upon the trachea and and narrows the SVC as well as compresses and occludes the right upper lobe pulmonary are artery right upper lobe bronchus.  Hilar and mediastinal adenopathy is noted. The esophagus is unremarkable. The heart is normal in size. As a small pericardial effusion. A left IJ central venous catheter is seen in place with the tip in the right atrium. CORONARY ARTERY CALCIFICATION: Mild. UPPER ABDOMEN: There are multiple large hypodense masses seen throughout the liver, the largest of which measures 4 cm in diameter, within the right lobe of the liver (image 60, series 3). MUSCULOSKELETAL: Contrast reflux is seen along the right chest wall and upper abdomen. No worrisome osseous lesions are noted     IMPRESSION: 1.  10 x 8 x 6 cm large mediastinal mass which has mass effect upon the trachea, narrows the SVC and occludes the right upper lobe pulmonary bronchus and artery,  likely reflecting conglomerate adenopathy secondary to neoplasm. 2.  Masslike thickening in the right upper lobe with architectural distortion as well as adjacent nodular septal thickening, concerning for a combination of atelectasis and neoplastic mass, with findings indicative of lymphangitic spread of neoplasm. 3.  Tree-in-bud opacities within the left lower lobe, concerning for endobronchial spread of the neoplastic process 4.  2 solid nodules within the left lung as described above, concerning for metastatic foci 5.  Multiple large hepatic masses, concerning for hepatic metastatic disease 6.  Contrast reflux within the subcutaneous vessels of the right chest wall and upper abdomen, consistent with SVC syndrome/compression secondary to the large mediastinal mass/adenopathy 7.  Small left-sided pleural effusion 8.  Small pericardial effusion    CT Soft Tissue Neck w Contrast    Result Date: 8/15/2022  EXAM: CT SOFT TISSUE NECK W CONTRAST LOCATION: [facility] DATE/TIME: 8/15/2022 12:14 AM INDICATION: Mediastinal mass. Soft tissue swelling. COMPARISON: None. CONTRAST: 125 mL Isovue 370. TECHNIQUE: Routine CT Soft Tissue Neck with IV contrast. Multiplanar reformats. Dose reduction techniques were used. FINDINGS: MUCOSAL SPACES/SOFT TISSUES: Multifocal dental and periodontal disease. There is a thin prevertebral collection extending from C2-C3 through C6-C7, measuring up to 4 mm in thickness. No definite associated enhancement. Diffuse subcutaneous fat stranding.  No abnormal enhancement or discrete enhancing mass along the mucosal spaces of the upper aerodigestive tract. Normal vocal cords and infraglottic trachea. Normal parapharyngeal spaces. Normal oral cavity,  spaces, and floor of mouth structures. LYMPH NODES: Abnormally enlarged and cystic/necrotic appearing cervical lymph nodes, right greater than left. The largest lymph nodes on the right are paratracheal and  supraclavicular in location, with a lymph node along the right thyroid that measures 2.3 x 1.9 x 3.0 cm (series 505 image 79, series 503 image 88). There is a centrally necrotic right paratracheal isis conglomeration that is incompletely evaluated, but measures at least 2.0 x 3.6 cm in AP x transverse dimension (series 503 image 100). On the left the largest lymph nodes are in the left subclavicular region, measuring up to 1.3 x 1.9 x 1.7 cm (series 505 image 83, series 503 image 80). SALIVARY GLANDS: Normal parotid and submandibular glands. THYROID: Tiny subcentimeter nodules in the thyroid. Further follow-up is not indicated by size criteria. VESSELS: There is a filling defect in the right internal jugular vein, measuring approximately 5 cm in craniocaudal dimension (series 505 image 73), concerning for intraluminal thrombus. Mild nonflow limiting atherosclerotic plaque at the carotid bifurcations. VISUALIZED INTRACRANIAL/ORBITS/SINUSES: Mild age-related changes of the visualized intracranial compartment. Mild mucosal thickening of the left maxillary sinus. OTHER: No mastoid or middle ear effusion. See separately dictated chest CT.     IMPRESSION: 1.  Cystic/necrotic cervical adenopathy, concerning for metastatic disease. 2.  Filling defect within the right internal jugular vein, worrisome for intraluminal thrombus. 3.  Diffuse third spacing with a nonenhancing retropharyngeal fluid collection. Findings may be related to SVC syndrome. 4.  No suspicious osseous lesions. 5.  Multifocal dental and periodontal disease. Findings were discussed with Dr. Kan at 1:44 AM on 08/15/2022.        LABS:      Sodium   Date Value Ref Range Status   08/15/2022 134 133 - 144 mmol/L Final   07/12/2022 136 133 - 144 mmol/L Final   07/11/2022 131 (L) 133 - 144 mmol/L Final     Urea Nitrogen   Date Value Ref Range Status   08/15/2022 69 (H) 7 - 30 mg/dL Final   07/12/2022 18 7 - 30 mg/dL Final   07/11/2022 28 7 - 30 mg/dL Final      Hemoglobin   Date Value Ref Range Status   08/15/2022 7.0 (L) 11.7 - 15.7 g/dL Final   08/14/2022 8.4 (L) 11.7 - 15.7 g/dL Final   07/13/2022 9.3 (L) 11.7 - 15.7 g/dL Final     Platelet Count   Date Value Ref Range Status   08/15/2022 376 150 - 450 10e3/uL Final   08/14/2022 372 150 - 450 10e3/uL Final   07/13/2022 297 150 - 450 10e3/uL Final     INR   Date Value Ref Range Status   08/15/2022 1.68 (H) 0.85 - 1.15 Final   08/11/2022 2.52 (H) 0.85 - 1.15 Final   07/13/2022 1.98 (H) 0.85 - 1.15 Final           Rayne ARORAVikash Lawrence, NP, PA-[facility]    STAFF: Patient examined on dialysis via the left fistula which is working well placed by Dr. Miller.  Presents with right facial swelling and possible upper GI bleed with hematemesis and marked anemia receiving transfusion.  CTA was reviewed which reveals large right mediastinal mass essentially occluding the superior vena cava.  Also evidence of metastatic disease to her lungs and liver.  Multiple enlarged and partially necrotic cervical lymph nodes also consistent with metastatic disease.  Partially occlusive thrombus of the right internal jugular vein.    Typically would recommend anticoagulation but due to marked anemia and GI bleeding this is not feasible.    Patient is going to be evaluated by oncology and also GI medicine for endoscopy due to bleed.  Very concerning overall picture with likely very poor prognosis.    Patient underwent fistulogram on 8/11/2022.  This revealed a widely patent fistula but evidence of central venous stenosis which was successfully angioplastied by Dr. Magana.  Fistula functioning well on dialysis today.    Over 45 minutes with patient       Eyal English MD

## 2022-08-15 NOTE — PROVIDER NOTIFICATION
MD Notification    Notified Person: MD    Notified Person Name: Dr. Kan    Notification Date/Time: 8/15/2022 0510    Notification Interaction: Page    Purpose of Notification: FYI Hgb 7.0.    Orders Received:    Comments:

## 2022-08-15 NOTE — PROGRESS NOTES
Potassium   Date Value Ref Range Status   08/15/2022 4.3 3.4 - 5.3 mmol/L Final     Hemoglobin   Date Value Ref Range Status   08/15/2022 7.0 (L) 11.7 - 15.7 g/dL Final     Creatinine   Date Value Ref Range Status   08/15/2022 5.68 (H) 0.52 - 1.04 mg/dL Final     Urea Nitrogen   Date Value Ref Range Status   08/15/2022 69 (H) 7 - 30 mg/dL Final     Sodium   Date Value Ref Range Status   08/15/2022 134 133 - 144 mmol/L Final     INR   Date Value Ref Range Status   08/15/2022 1.68 (H) 0.85 - 1.15 Final       DIALYSIS PROCEDURE NOTE  Hepatitis status of previous patient on machine log was checked and verified ok to use with this patients hepatitis status.  Patient dialyzed for 40 min on a K3 bath with a net fluid removal of  0.2L.  A BFR of 350 ml/min was obtained via a Left CVC   The treatment plan was discussed with Dr. Taveras during the treatment.    Total heparin received during the treatment: 0 units.     Line flushed, clamped and capped with heparin 1:1000 0 mL (0 units) per lumen    Meds  given: None   Complications: Patient presented to dialysis with severe belly pain. Dialysis initiated at 10:00 am. Patient was thrashing around consistently complaining of pain in abdomen.  Primary nurse called immediately and brought pain medication. Patient stated pain medication did not help and wanted more. Called primary nurse again requesting patient be seen by MD. Dr. Johnson came to bedside to assess patient. Patient leaned over the bed and had approximately 100mL of hematemesis . Primary nurse called again. Rapid nurse called and Dialysis treatment terminated at 1035 and blood rinsed back. Patient presented to dialysis on heparin pump. Heparin discontinued immediately per MD.     Person educated: Patient. Knowledge base Substantial. Barriers to learning: None. Educated on Procedure via Verbal mode. patient/family verbalized understanding. Pt prefers Verbal education style.     ICEBOAT? Timeout performed pre-treatment  I:  Patient was identified using 2 identifiers  C:  Consent Signed Yes  E: Equipment preventative maintenance is current and dialysis delivery system OK to use  B:   Latest Reference Range & Units 07/09/22 21:37 07/13/22 09:08   Hep B Surface Agn Nonreactive  Nonreactive    Hepatitis B Surface Antibody <8.00 m[IU]/mL  17.80    O: Dialysis orders present and complete prior to treatment  A: Vascular access verified and assessed prior to treatment  T: Treatment was performed at a clinically appropriate time  ?: Patient was allowed to ask questions and address concerns prior to treatment  See Adult Hemodialysis flowsheet in [facility] for further details and post assessment.  Machine water alarm in place and functioning. Transducer pods intact and checked every 15min.   Pt returned via Bed.  Chlorine/Chloramine water system checked every 4 hours.  Outpatient Dialysis at [place], [place] on MWF      Post treatment report given to PAOLA Bowman regarding 0.2L of fluid removed, last BP of 91/68, and patient pain rating of 10/10.

## 2022-08-15 NOTE — PROGRESS NOTES
[facility]    Consult noted  Dr. Dreyer will be by this afternoon.    Kevin Berger  Nurse Practitioner  [facility]  [phone]    Cell [phone]

## 2022-08-16 NOTE — PROVIDER NOTIFICATION
MD Notification    Notified Person: MD    Notified Person Name: Gricel Zhu     Notification Date/Time: 0403    Notification Interaction: paged     Purpose of Notification: 10/10 burning back pain. Cannot get prn IV Dilaudid again until 0445. Patient strict NPO.     Orders Received: prn IV Dilaudid 0.5 mg changed from Q2h to Q1h.    Comments:

## 2022-08-16 NOTE — PROGRESS NOTES
"[facility]    Medicine Progress Note - Hospitalist Service    Date of Admission:  8/14/2022    Assessment & Plan          Gina Simpson is a 56 year old female with PMHx of CAD, cardiomyopathy, hypertension, COPD and ESRD with hx of failed RUE fistula who underwent creation of LUE AV fistula on 6/28/22 complicated by hematoma s/p evacuation and LUE DVT who is a direct admission to [facility] from [place] ED after presenting with 2+weeks of facial swelling, fatigue, dysphagia and being found to have prevertebral soft tissue swelling potentially presenting retropharyngeal abscess as well as new pulmonary nodule, mediastinal mass, and extensive adenopathy.      Symmetric prevertebral soft tissue swelling, inflammation vs retropharyngeal/prevertebral abscess  Patient presented with 2+ weeks of fatigue, facial swelling, feeling food/pills getting \"stuck\" in her throat.   CT soft tissue neck w/o contrast in the ED shows new symmetrical prevertebral soft tissue swelling and hypodensity bilaterally from the C2-C5 levels which could represent inflammatory or infectious cellulitis or potential retropharyngeal/prevertebral abscess. Additionally new mediastinal mass causing displacement of trachea and esophagus to the left as discussed below. Unclear if this represents true infection vs inflammation. She reports foreign body R mandible behind lower molar for several weeks though no obvious infection on exam.   No stridor, respiratory distress. She is managing secretions without difficulty and denies significant throat pain. S/p clindamycin, azithromycin, dexamethasone 10mg prior to transfer  --ENT consulted- reviewed 8/16   -- hold on steroids for now  --close monitoring for any evidence of airway compromise     New mediastinal mass  Extensive cervical, supraclavicular, right thoracic inlet and right mediastinal/hilar adenopathy  New 9mm ARIA pulmonary nodule  Patient reports significant fatigue, 20lb " weight loss and poor appetite over past two months or so. No known malignancy.   CT neck shows new mediastinal mass on right 5.8 x 6.1cm as well as pulmonary nodule measuring 9mm. CT 2019 showed 3mm nodule in R lung apex. Extensive adenopathy seen as well and causing compression of brachiocephalic and mediastinal vasculature which is likely contributing to swelling.   -- Appreciate reviewed by thoracic surgery, vascular surgery, IR and oncology  -Await biopsy: planned for 8/16  Discussed with oncology and IR.:  Having a multispecialty discussion about pros and cons of SVC stenting  -Continues on IV Dilaudid every hour as needed.  We will add oxycodone 5 mg p.o. every 4 hours.  If pain is still not controlled may consider PCA    RUL opacities, CT concerning for malignancy  CT neck shows gildardo opacities RUL with moderate R pleural effusion. Reports cough for several days. Unsure of fever. WBC normal. LA normal. mediastinal mass is causing displacement of trachea. Oxygen stable on room air.     --blood cultures obtained in the ED, pending      Leukocytosis 8/16: Likely reactive/stress response.  Monitor for now    ESRD on HD  S/p left AV fistula formation 6/28/22  S/p fistulogram with dilatation of L subclavian and junction of brachiocephalic and subclavian vein complicated by hematoma requiring evacuation 7/8/22  Dialyzes MWF - Dr. Gonsales. She is still using internal jugular tunneled dialysis catheter at this time. She continues to make urine.   She underwent outpatient fistulogram 8/11/22.   Cr 5.85 in the ED, K normal at 3.6.   --Nephrology consulted, discussed 08/15.  Dialysis was stopped after 40 minutes due to RRT from upper GI bleed  -Appreciate dialysis sessions as per nephrology.     Left brachial vein DVT 7/6/22  CT neck 8/14: Filling defect within the right internal jugular vein, worrisome for intraluminal thrombus.   Started on warfarin at the time due to ESRD. INR is subtheraputic in the ED 1.4.   --started  heparin drip in place of warfarin given plan for biopsy   8/15: Heparin on hold  8/16: May consider resuming heparin once cleared by IR after biopsy       CAD   Hx Cardiomyopathy  Hypertension  Hx stenting in 2019. Last ECHO 2020 showed Ef 40-45%.   PTA:  hydralazine 50mg TID, carvedilol 25mg bid, lisinopril 20mg daily, amlodipine 10mg every evening  --continue PTA carvedilol pending med rec   --HOLD lisinopril, hydralazine, amlodipine due to upper GI bleed      Acute upper GI bleed: 8/15  - ensure 2 large bore IV Access  - type and screen and transfuse to ensure Hb>7 , platelet > 50 and INR <2   - NPO  - Protonix bid   -Patient was managed in RRT.  s/p 2 unit PRBC for hemoglobin of 5.6 and active bleed.  - s/p EGD 8/15: 8/15 EGD noted clotted blood in the entire stomach, spurting duoeneal ulcer with visible vessel, injected, clips placed, argon plasma coag used.       chronic anemia related to ESRD  Most recent hgb on hospital discharge 7/13 is 9.3. hgb on admission 8.2.   --Cont EPO with dialysis.      Hyperphosphatemia  --Cont PhosLo with meals     Diet: NPO for Medical/Clinical Reasons Except for: No Exceptions    DVT Prophylaxis: Heparin gtt once cleared by GI  Antoine Catheter: Not present  Central Lines: PRESENT  CVC Double Lumen Left Subclavian-Site Assessment: WDL  Cardiac Monitoring: ACTIVE order. Indication: unstable  Code Status: Full Code      Disposition Plan      Expected Discharge Date: 08/20/2022        Discharge Comments: 8/16 SVC stenting and biposy pending        The patient's care was discussed with the Bedside Nurse, Patient, GI, Nephrology Consultant and IR Team.    > 30-minute discussion with the patient's daughter and son separately yesterday.  Explained them over the course of events since admission.  Still very frustrated related to events leading to this admission and wanted to talk to provider who knows if anything was missed as she follows closely in this hospital.   I offered him to look  "into the records but he continues to remain frustrated and wanted to know about the last provider before this admission.  He was referred to Dr. Miller's last clinic visit note.    Severiano Johnson MD, MD  Hospitalist Service  [facility]  Securely message with the Vocera Web Console (learn more here)  Text page via Table[employer] Paging/Directory     \"This dictation was performed with voice recognition software and may contain errors,  omissions and inadvertent word substitution.\"         Clinically Significant Risk Factors Present on Admission         # Acute Kidney Injury, unspecified: based on a >150% or 0.3 mg/dL increase in creatinine on admission compared to past 90 day average, will monitor renal function              ______________________________________________________________________    Interval History   Better compared to yesterday but still has significant pain in upper chest and neck/head  Denies nausea/vomiting or any further episodes of epistaxis    4 point ROS negative unless mentioned    Data reviewed today: I reviewed all medications, new labs and imaging results over the last 24 hours. I personally reviewed no images or EKG's today.    Physical Exam   /71   Pulse 100   Temp 97.9  F (36.6  C) (Oral)   Resp 14   Wt 58 kg (127 lb 13.9 oz)   SpO2 99%   BMI 21.28 kg/m    Gen- pleasant lying in bed, uncomfortable  HEENT- ALEENA  Neck-swelling  CVS- I+II+ no m/r/g  RS- CTAB  Abdo- soft, mild tenderness in epigastrium , No g/r/r   Ext- no edema   CNS-oriented to person, place, time    Data   No results found for this or any previous visit (from the past 24 hour(s)).   BMPRecent Labs   Lab 08/16/22  0516 08/15/22  1115 08/15/22  0451    135 134   POTASSIUM 4.6 4.0 4.3   CHLORIDE 102 102 99   JEWELL 9.1 8.3* 8.9   CO2 22 23 24   * 72* 69*   CR 5.97* 4.73* 5.68*   * 175* 127*     CBC  Recent Labs   Lab 08/16/22  0516 08/15/22  1115 08/15/22  0451 08/14/22  2149 "   WBC 15.5*  --  6.7 7.7   RBC 2.76*  --  2.44* 2.90*   HGB 8.2*   < > 7.0* 8.4*   HCT 24.7*  --  22.0* 26.2*   MCV 90  --  90 90   MCH 29.7  --  28.7 29.0   MCHC 33.2  --  31.8 32.1   RDW 13.7  --  13.6 13.9     --  376 372    < > = values in this interval not displayed.     INR  Recent Labs   Lab 08/15/22  0451 08/11/22  0913   INR 1.68* 2.52*     LFTs  Recent Labs   Lab 08/15/22  1115 08/15/22  0451   ALKPHOS 26* 32*   AST 43 48*   ALT 19 22   BILITOTAL 0.3 0.4   PROTTOTAL 4.9* 5.7*   ALBUMIN 2.2* 2.5*      PANCNo lab results found in last 7 days.

## 2022-08-16 NOTE — PROGRESS NOTES
Renal Medicine Progress Note            Assessment/Plan:       Gina Simpson is a 56 year old female who was admitted on 8/14/2022.      Assessment:  1) Weight loss, likely underlying malignacny under evaluation. Bx today, heme/onc following.   Last target weight order 53.5kg. Difficulty pulling fluid today, likely will not be able to dialyze off her upper ext and facial/neck edema but needing treatment of her SVC syndrome.      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    S/p left brachiobasilic fistula 2022 with complicatons of arm swelling, s/p innominate vein venoplasty. Last procedur 6/28/22 revision but admission 7/5 with left arm swelling, s/p repeat venoplasty complicated by hematoma requiring evacuation.       3) ESRD on MWF HD, Dr. Gonsales, [facility] unit              Treatment Type  Hemodialysis (procedure)    Dialyzer  : [employer] Series: [employer] Model: 17H RENÉE Factor: 1455    Dialysate  Potassium 2k    Dialysate  Calcium 2.5 mEq/L    Dialysate  BiCarb 35 mEq/L    Dialysate  Base Sodium 138 mEq/L    Dialysate Flow Rate  500 ML/min    Blood Flow Rate  400 ML/min    Treatment Time  195 min    Temperature  Standard    Max UF Rate  2 L/Hr    Access  CVC Catheter Chest (Left)    Access Concurrent  No    Schedule  3 Times per week    Heparin Load  1000 Units (1:1,000 concentration)    Heparin Hourly Dose  500 Units/hr (1:1,000 concentration)          4) Upper ext, neck/face edema: related to SVC compression, will attempt UF as able with HD.      Other:  Hx HTN:   amlodipine, coreg PTA  Hx ASCVD  S/p GI bleed        Plan/Recs:  1) HD today. Attempt some ultrafiltration.     2) Next dialysis 8/17 or 8/18, will decide based on dialysis nursing staffing and other procedures/tests planned. Not urgent to get her back on prior outpatient schedule     [facility] - Nephrology  Cell:  [phone]  Office: [phone]    Addendum 1709:  Called by Vinicius, informed of issues of SVC stenting (I was not involved in multi-disciplinary discussions with IR, surgery, oncology). With left arm graft being placed 7 weeks ago, main issue is placing needles in arm with edema and resolving hematoma. I feel safe to attempt and note by Dr Collazo 8/10 suggests this also. If graft usable without issues, then CVC can be removed and stenting may be facilitated of SVC.     Will thus:  1) do HD tomorrow per outpatient schedule  2) Use left upper arm AVG for access  3) based on how this goes, can then discuss timing of removal of SVC and further procedures              Interval History:      Pt seen on dialysis, much improved general state and less pain. Seen by many consults, s/p EGD with bleeding vessel yesterday, this hematemesis abruptly cut short her dialysis today. She is aware of planned biopsy later today.         Medications and Allergies:       - MEDICATION INSTRUCTIONS for Dialysis Patients -   Does not apply See Admin Instructions     sodium chloride 0.9%  250 mL Intravenous Once in dialysis/CRRT     sodium chloride 0.9%  300 mL Hemodialysis Machine Once     [Held by provider] amLODIPine  10 mg Oral QPM     ampicillin-sulbactam (UNASYN) IV  3 g Intravenous Q24H     atorvastatin  20 mg Oral Daily     calcium acetate  2,001 mg Oral TID w/meals     carvedilol  25 mg Oral BID w/meals     epoetin delma-epbx (RETACRIT) inj ESRD  10,000 Units Intravenous Once in dialysis/CRRT     [Held by provider] famotidine  20 mg Intravenous Q48H     heparin  500 Units Hemodialysis Machine OR IV Push Once in dialysis/CRRT     [Held by provider] hydrALAZINE  50 mg Oral TID     [Held by provider] lisinopril  20 mg Oral Daily     multivitamin RENAL  1 capsule Oral Daily     - MEDICATION INSTRUCTIONS -   Does not apply Once     pantoprazole (PROTONIX) IV  40 mg Intravenous Q12H     sodium chloride (PF)  3 mL Intracatheter Q8H      sodium chloride (PF)  3 mL Intracatheter Q8H      No Known Allergies         Physical Exam:   Vitals were reviewed  /64   Pulse 106   Temp 97.7  F (36.5  C) (Axillary)   Resp 18   Wt 58 kg (127 lb 13.9 oz)   SpO2 100%   BMI 21.28 kg/m      Wt Readings from Last 3 Encounters:   08/15/22 58 kg (127 lb 13.9 oz)   07/10/22 65 kg (143 lb 4.8 oz)   06/28/22 56.2 kg (124 lb)       Intake/Output Summary (Last 24 hours) at 8/16/2022 1045  Last data filed at 8/16/2022 0600  Gross per 24 hour   Intake 600 ml   Output --   Net 600 ml       GENERAL APPEARANCE: alert  HEENT:  Mild neck edema  RESP: lungs cta b c good efforts, no crackles, rhonchi or wheezes  CV: RRR, nl S1/S2, no m/r/g   ABDOMEN: o/s/nt/nd, bs present  EXTREMITIES/SKIN: no c/c/rashes/lesions; no lower ext edema, b/l arms with edema, left more then right. Left AVF with bruit and thrill  NEURO:  Grossly non-focal  LINES:  Dialysis catheter present, in use during exam            Data:     BMP  Recent Labs   Lab 08/16/22  0516 08/15/22  1115 08/15/22  0451    135 134   POTASSIUM 4.6 4.0 4.3   CHLORIDE 102 102 99   JEWELL 9.1 8.3* 8.9   CO2 22 23 24   * 72* 69*   CR 5.97* 4.73* 5.68*   * 175* 127*     CBC  Recent Labs   Lab 08/16/22  0516 08/16/22  0021 08/15/22  1802 08/15/22  1115 08/15/22  0451 08/14/22  2149   WBC 15.5*  --   --   --  6.7 7.7   HGB 8.2* 8.1* 7.0* 5.6* 7.0* 8.4*   HCT 24.7*  --   --   --  22.0* 26.2*   MCV 90  --   --   --  90 90     --   --   --  376 372     Lab Results   Component Value Date    AST 43 08/15/2022    ALT 19 08/15/2022    ALKPHOS 26 (L) 08/15/2022    BILITOTAL 0.3 08/15/2022     Lab Results   Component Value Date    INR 1.68 (H) 08/15/2022       Attestation:  I have reviewed today's vital signs, notes, medications, labs and imaging.    [facility] - Nephrology  Office: [phone]  Cell: [phone]

## 2022-08-16 NOTE — CONSULTS
[facility]  Otolaryngology Consultation         Keagan Myers MD    Gina Simpson MRN# [id number]   YOB: 1965 Age: 56 year old      Date of Admission:  8/14/2022  Date of Consult: 8/16/2022         Assessment and Plan:   No evidence of any infection of the pharynx and no evidence of retropharyngeal abscess.  Most likely the edema of the retropharyngeal space is related to her superior vena cava syndrome   With generalized edema of the head and neck area. No specific treatment of the retropharynx is indicated. On scope exam airway widely patent and both vocal cords fully mobile.    Incidental finding of right mandible with exposed bone, she also has evidence of dental caries on the CT.  Recommend dental evaluation as outpatient.             Primary Care Physician:   Clari Garza MD None         Requesting Physician:      Devaughn         Chief Complaint:       Thickening of retropharyngeal space on CT scan with concern for possible infection or abscess.    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  On CT scan of the neck there was some hypodensity and mild thickening of the retropharyngeal space without any signs of contrast enhancement.  ENT consult requested to evaluate the retropharynx for possible infection.           Past Medical History:     Past Medical History:   Diagnosis Date     Anemia      Cancer (H)      Coronary artery disease      Dialysis complication      Dialysis patient (H)      Embolism (H)      ESRD (end stage renal disease) (H)      Hypertension      Ischemic cardiomyopathy      Renal failure                Past Surgical History:     Past Surgical History:   Procedure Laterality Date     ANESTH,UPPER ARM AV FISTULA REPAIR        CREATE FISTULA ARTERIOVENOUS UPPER EXTREMITY Left 3/25/2022    Procedure: CREATION OF FIRST STAGE LEFT BRACHIOBASILIC ARTERIOVENOUS FISTULA;  Surgeon: Akash Miller MD;  Location: [facility] OR     CREATE FISTULA ARTERIOVENOUS UPPER EXTREMITY Left 6/28/2022    Procedure: LEFT BRACHIO-BASILIC ARTERIOVENOUS FISTULA WITH BIOPROSTHETIC GRAFT;  Surgeon: Akash Miller MD;  Location: [facility] OR     IR DIALYSIS FISTULOGRAM LEFT  5/31/2022     IR DIALYSIS FISTULOGRAM LEFT  7/8/2022     IR DIALYSIS FISTULOGRAM LEFT  8/11/2022     LIGATE FISTULA ARTERIOVENOUS UPPER EXTREMITY Left 6/28/2022    Procedure: Ligate fistula arteriovenous upper extremity;  Surgeon: Akash Miller MD;  Location: [facility] OR     REPAIR FISTULA ARTERIOVENOUS UPPER EXTREMITY Left 7/8/2022    Procedure: EXPLORE LEFT ARM, REPAIR OF LEFT UPPER ARM AV DIALYSIS GRAFT, EVACUATE HEMATOMA;  Surgeon: Akash Miller MD;  Location: [facility] OR     WISDOM TEETH                Home Medications:     Prior to Admission medications    Medication Sig Last Dose Taking? Auth Provider Long Term End Date   amLODIPine (NORVASC) 10 MG tablet Take 10 mg by mouth every evening 8/13/2022 at Unknown time Yes Reported, Patient     atorvastatin (LIPITOR) 20 MG tablet Take 20 mg by mouth daily 8/13/2022 at Unknown time Yes Reported, Patient     B Complex-C-Folic Acid (DEEPALI CAPS) 1 MG CAPS Take 1 capsule by mouth daily 8/14/2022 at am Yes Reported, Patient     calcium acetate (PHOSLO) 667 MG CAPS capsule Take 2,001 mg by mouth 3 times daily (with meals) 8/8/2022 Yes Unknown, Entered By History     carvedilol (COREG) 25 MG tablet Take 25 mg by mouth 2 times daily (with meals) 8/14/2022 at am Yes Unknown, Entered By History No    hydrALAZINE (APRESOLINE) 50 MG tablet Take 50 mg by mouth 3 times daily 8/14/2022 at am Yes Reported, Patient     lisinopril (ZESTRIL) 20 MG tablet Take 20 mg by mouth daily 8/13/2022 Yes Unknown, Entered By History No    warfarin ANTICOAGULANT (COUMADIN) 2.5  MG tablet Take 5 mg by mouth three times a week Mon-Wed-Fri Unknown at Unknown time Yes Unknown, Entered By History     warfarin ANTICOAGULANT (COUMADIN) 2.5 MG tablet Take 6.25 mg by mouth four times a week Sat-Sun-Tue-Thur Unknown at Unknown time Yes Unknown, Entered By History     oxyCODONE (ROXICODONE) 5 MG tablet Take 1 tablet (5 mg) by mouth every 6 hours as needed for pain  Patient not taking: Reported on 8/15/2022 Not Taking at Unknown time  Akash Miller MD              Current Medications:           - MEDICATION INSTRUCTIONS for Dialysis Patients -   Does not apply See Admin Instructions     sodium chloride 0.9%  250 mL Intravenous Once in dialysis/CRRT     sodium chloride 0.9%  300 mL Hemodialysis Machine Once     sodium chloride 0.9%  250 mL Intravenous Once in dialysis/CRRT     sodium chloride 0.9%  300 mL Hemodialysis Machine Once     [Held by provider] amLODIPine  10 mg Oral QPM     ampicillin-sulbactam (UNASYN) IV  3 g Intravenous Q24H     atorvastatin  20 mg Oral Daily     calcium acetate  2,001 mg Oral TID w/meals     carvedilol  25 mg Oral BID w/meals     epoetin delma-epbx (RETACRIT) inj ESRD  10,000 Units Intravenous Once in dialysis/CRRT     [START ON 8/17/2022] famotidine  20 mg Intravenous Q48H     heparin  500 Units Hemodialysis Machine OR IV Push Once in dialysis/CRRT     [Held by provider] hydrALAZINE  50 mg Oral TID     [Held by provider] lisinopril  20 mg Oral Daily     multivitamin RENAL  1 capsule Oral Daily     - MEDICATION INSTRUCTIONS -   Does not apply Once     pantoprazole (PROTONIX) IV  40 mg Intravenous Q12H     sodium chloride (PF)  3 mL Intracatheter Q8H     sodium chloride (PF)  3 mL Intracatheter Q8H     sodium chloride 0.9%, sodium chloride 0.9%, acetaminophen **OR** acetaminophen, HYDROmorphone, lidocaine 4%, lidocaine 4%, lidocaine (buffered or not buffered), lidocaine (buffered or not buffered), melatonin, naloxone **OR** naloxone **OR** naloxone **OR** naloxone,  nitroGLYcerin, ondansetron **OR** ondansetron, - MEDICATION INSTRUCTIONS -, sodium chloride (PF), sodium chloride (PF)         Allergies:   No Known Allergies         Social History:   Gina Simpson  reports that she has been smoking cigarettes. She has smoked for the past 40.00 years. She has never used smokeless tobacco. She reports that she does not drink alcohol and does not use drugs.            Family History:   History reviewed. No pertinent family history.            Review of Systems:   The 10 point Review of Systems is negative other than noted in the HPI or here.               Physical Exam:    , Blood pressure 118/71, pulse 96, temperature 97.7  F (36.5  C), temperature source Oral, resp. rate 12, weight 58 kg (127 lb 13.9 oz), SpO2 96 %, not currently breastfeeding.  127 lbs 13.87 oz    Constitutional: Awake alert in bed, no stridor or stertor, voice mild raspy   Psych: Conversational pleasant   Neuro: GUTIÉRREZ, normal   Eyes: EOMI, no injections,    ENT: Nose normal, OC/OP with missing teeth, small tonsils, no concerning lesions, 2mm exposed bone on right lingual aspect of body of mandible with mild abrasion of tongue from rubbing   Lymph: Dilated veins in neck, mild fullness lower neck, no large masses, no thyromegally          Data:   All new lab and imaging data was reviewed.   Recent Labs   Lab Test 08/16/22  0516 08/16/22  0021 08/15/22  1115 08/15/22  0451 08/14/22  2149 08/11/22  0913   WBC 15.5*  --   --  6.7   < >  --    HGB 8.2* 8.1*   < > 7.0*   < >  --    MCV 90  --   --  90   < >  --      --   --  376   < >  --    INR  --   --   --  1.68*  --  2.52*    < > = values in this interval not displayed.      Recent Labs   Lab Test 08/16/22  0516 08/15/22  1115    135   POTASSIUM 4.6 4.0   CHLORIDE 102 102   CO2 22 23   * 72*   CR 5.97* 4.73*   ANIONGAP 11 10   JEWELL 9.1 8.3*   * 175*          CT NECK reviewed both images and report:    IMPRESSION:   1.  Cystic/necrotic  cervical adenopathy, concerning for metastatic disease.  2.  Filling defect within the right internal jugular vein, worrisome for intraluminal thrombus.  3.  Diffuse third spacing with a nonenhancing retropharyngeal fluid collection. Findings may be related to SVC syndrome.  4.  No suspicious osseous lesions.  5.  Multifocal dental and periodontal disease.     FLEXIBLE FIBEROPTIC LARYNGOSCOPY  Nasal cavities: patent and normal.    Nasopharynx examination: Normal   Base of tongue, Pharyngeal walls, Vallecula and Pyriform Sinuses: Normal  Larynx: Normal, no edema, vocal cords symmetrically mobile,   Subglottis and trachea: Normal        Keagan Myers M.D.

## 2022-08-16 NOTE — PROGRESS NOTES
[facility]  Gastroenterology Progress Note     Gina Simpson MRN# [id number]   YOB: 1965 Age: 56 year old          Assessment and Plan:     Gina Simpson is a 56 year old female who has a past medical history of CAD, cardiomyopathy, hypertension, COPD and ESRD who underwent creation of LUE AV fistula on 6/28/22 complicated by hematoma s/p evacuation and LUE DVT found to have prevertebral soft tissue swelling potentially presenting retropharyngeal abscess as well as new pulmonary nodule, mediastinal mass, and extensive adenopathy and admitted on 8/14/22 and had hematemesis on 8/15/22.    Lymphadenopathy  Hematemesis  Acute on chronic anemia  New mediastinal mass  Has acute episode of dark red hematemesis  CT neck shows new mediastinal mass on right 5.8 x 6.1cm as well as pulmonary nodule measuring 9mm- IR consulted and planning biopsy of mediastinal and/or liver mass today  CT of chest also notes multiple large hepatic masses concerning for metastatic disease  Hemoglobin stable around 8  8/15 EGD noted clotted blood in the entire stomach, spurting duoeneal ulcer with visible vessel, injected, clips placed, argon plasma coag used.     -- ok to restart heparin 8/17 per GI defer to IR after liver biopsy if would like held longer  -- IV pantoprazole 40 mg BID  -- Daily CBC  - Regular diet when able to restart a diet               Interval History:     doing well; no cp, sob, n/v/d, or abd pain.              Review of Systems:     C: NEGATIVE for fever, chills, change in weight  E/M: NEGATIVE for ear, mouth and throat problems  R: NEGATIVE for significant cough or SOB  CV: NEGATIVE for chest pain, palpitations or peripheral edema             Medications:   I have reviewed this patient's current medications   - MEDICATION INSTRUCTIONS for Dialysis Patients -   Does not apply See Admin Instructions    sodium chloride 0.9%  250 mL Intravenous Once in dialysis/CRRT    sodium  chloride 0.9%  300 mL Hemodialysis Machine Once    [Held by provider] amLODIPine  10 mg Oral QPM    ampicillin-sulbactam (UNASYN) IV  3 g Intravenous Q24H    atorvastatin  20 mg Oral Daily    calcium acetate  2,001 mg Oral TID w/meals    carvedilol  25 mg Oral BID w/meals    epoetin delma-epbx (RETACRIT) inj ESRD  10,000 Units Intravenous Once in dialysis/CRRT    [START ON 8/17/2022] famotidine  20 mg Intravenous Q48H    heparin  500 Units Hemodialysis Machine OR IV Push Once in dialysis/CRRT    [Held by provider] hydrALAZINE  50 mg Oral TID    [Held by provider] lisinopril  20 mg Oral Daily    multivitamin RENAL  1 capsule Oral Daily    - MEDICATION INSTRUCTIONS -   Does not apply Once    pantoprazole (PROTONIX) IV  40 mg Intravenous Q12H    sodium chloride (PF)  3 mL Intracatheter Q8H    sodium chloride (PF)  3 mL Intracatheter Q8H                  Physical Exam:   Vitals were reviewed  Vital Signs with Ranges  Temp:  [96  F (35.6  C)-98.1  F (36.7  C)] 97.7  F (36.5  C)  Pulse:  [] 101  Resp:  [6-17] 14  BP: ()/(51-92) 113/74  SpO2:  [94 %-100 %] 100 %  I/O last 3 completed shifts:  In: 600   Out: -   Constitutional: healthy, alert, and no distress   Cardiovascular: negative, PMI normal. No lifts, heaves, or thrills. RRR. No murmurs, clicks gallops or rub  Respiratory: negative, Percussion normal. Good diaphragmatic excursion. Lungs clear  Abdomen: Abdomen soft, non-tender. BS normal. No masses, organomegaly           Data:   I reviewed the patient's new clinical lab test results.   Recent Labs   Lab Test 08/16/22  0516 08/16/22  0021 08/15/22  1802 08/15/22  1115 08/15/22  0451 08/14/22  2149 08/11/22  0913 07/13/22  0635   WBC 15.5*  --   --   --  6.7 7.7  --  10.0   HGB 8.2* 8.1* 7.0*   < > 7.0* 8.4*  --  9.3*   MCV 90  --   --   --  90 90  --  87     --   --   --  376 372  --  297   INR  --   --   --   --  1.68*  --  2.52* 1.98*    < > = values in this interval not displayed.     Recent Labs    Lab Test 08/16/22  0516 08/15/22  1115 08/15/22  0451   POTASSIUM 4.6 4.0 4.3   CHLORIDE 102 102 99   CO2 22 23 24   * 72* 69*   ANIONGAP 11 10 11     Recent Labs   Lab Test 08/15/22  1115 08/15/22  0451   ALBUMIN 2.2* 2.5*   BILITOTAL 0.3 0.4   ALT 19 22   AST 43 48*       I reviewed the patient's new imaging results.    All laboratory data reviewed  All imaging studies reviewed by me.    Sussy Tenorio PA-C,  8/16/2022  [facility]  Office : [phone]  Cell: [phone] (Dr. Camacho)  Cell: [phone] (Sussy Tenorio PA-C)

## 2022-08-16 NOTE — CONSULTS
Gina Simpson is a 56 year old female with PMHx of CAD, cardiomyopathy, hypertension, COPD and ESRD with hx of failed RUE fistula who underwent creation of LUE AV fistula on 6/28/22 complicated by hematoma s/p evacuation and LUE DVT who is a direct admission to [facility] from [facility] after presenting with 2+weeks of facial swelling, fatigue, dysphagia and being found to have new pulmonary nodule, mediastinal mass, liver lesions and extensive adenopathy felt to possibly be from lung cancer. She had a GI bleed yesterday after being placed on IV Heparin.     She has been seen by IR and Vascular surgery in the past for fistulagrams and plasties of the left subclavian vein. She has struggled with left arm swelling and neck and face swelling felt to be attributed to subclavian stenosis from the dialysis catheter and increased arterial venous flow from fistula outflow. She had gotten better after angioplasty and was in IR last on Thursday 8/11/22. The left fistula is almost mature and it was felt that her symptoms would improve after the tunneled dialysis catheter was removed.     Per CT scan there is a large mediastinal mass causing mass effect on the trachea and SVC contributing to upper body swelling and her symptoms.     An SVC stent has been requested.     IR Dr Torres reviewed. He felt that tissue diagnosis was first priority (biopsy today), then appropriate chemo and radiation if applicable to shrink the tumor. An SVC stent would not have much hope of staying open without Gina being able to take blood thinners.      The tunneled dialysis catheter goes through the SVC and would need to be pulled back to place a stent and then replaced through the stent which could be difficult. Also the patient will need a port for chemo access, which would also go through the SVC. The right side is unable to be used as there is an internal jugular clot. If a SVC stent is placed this would FPC off the right side.  Gina is running out of upper body central line options.     Dr Torres discussed the above with Oncologist Dr Dreyer, Hospitalist Dr Johnson, Vascular Surgeon Dr Miller and IR Dr Lopez who will be at [facility] tomorrow.     Will discuss again tomorrow and work on a plan.    Thanks, [facility] Interventional Radiology CNP ([phone]) (phone [phone])

## 2022-08-16 NOTE — PROGRESS NOTES
Care Suites Procedure Nursing Note    Patient Information  Name: Gina Simpson  Age: 56 year old    Procedure  Procedure: US guided liver biopsy  Procedure start time: 1300  Procedure complete time: 1310  Concerns/abnormal assessment: VSS BP soft. Pt dozing. Liver Biopsy site D/I. Denies pain when awakened.  If abnormal assessment, provider notified: Yes Pt snoring when assessed. Had IV Dilaudid at 1222. Denies pain. BP soft.   Plan/Other: No sedation used. Flying squad called to return pt to floor.    Windy Swain RN

## 2022-08-16 NOTE — PROGRESS NOTES
Progress Note    Gina Simpson MRN# [id number]   YOB: 1965 Age: 56 year old   Date of Admission: 8/14/2022  Requesting physician: Dr. Silverio  Reason for consult: Mediastinal mass           Assessment and Plan:   1. SVC syndrome  - Large mediastinal mass [id number], mass effect on trachea, narrows SVC, and compresses and occludes right upper lobe pulmonary artery  - Facial edema, engorgement of the neck veins upon waking in the morning, mild prominence to veins on exam, cough / shortness of breath at presentation. At least grade 2.   - CT with possible RUL mass, multiple hepatic masses. Cervical and supraclavicular lymphadenopathy  - She has smoked since age 11  - A/w 10 pound weight loss     2. Anemia   - In setting of CKD on dialysis  - Had episode of hematemesis 8/15  - Hg 5.6 on 8/15, down from baseline of 9 a month ago   - Was started on anticoagulation for intraluminal thrombus -> bleed  - Actively bleeding ulcer in the duodenum was clipped on 8/15  - EPO per nephro    - Hg now stable around 8    3. Intraluminal thrombus  - Restart heparin inpatient when Hg stabilized and no further bleeding  - Will need lifelong anticoagulation, if tolerated.     4. Smoker  - Interested in quitting  - Doing well inpatient without nicotine     PLAN  - Placement of SVC stent complicated by need for vascular access for dialysis, likely future need for chemoport, recent balloon dilation of neck vein and her maturing fistula in the LUE. Discussed case w/ IR twice, nephrology and with primary. Recommend consult her vascular surgeon Dr. Miller to see if fistula mature and also weigh in on management of SVC syndrome. We will need to make a decision tomorrow with what to do: if no SVC stent then we will ask radiation oncology to see her. We are still waiting for pathology as well, hopefully will be back in next 2 days.  - Restart heparin when OK by primary, will need indefinite anticoagulation unless she develops a  contraindication  - Avoid putting the bed flat overnight, best to sleep at an angle if tolerated   - Biopsy of liver today  - Differential includes small cell carcinoma, I discussed most likely diagnosis of cancer with her.     Patrick Dreyer, DO  [facility]  [phone] (office); [phone] (cell)              Chief Complaint:   No chief complaint on file.           History of Present Illness:   This patient is a 56 year old female who presented to the hospital as a direct admission from [place] emergency department today for evaluation of facial swelling, weakness and fatigue with intermittent dysphagia.  The patient feels like she has a foreign body in her right lower mouth, she feels like something is sticking out of it.  No pus or change in odor.  She has been having dysphagia for the past few weeks, food will get stuck in her throat when she swallows.  She reports that when she wakes up in the morning she has trouble breathing and her neck is engorged and she can see the veins in her neck.  She notes weight loss over the past month.  She also notes increasing fatigue with poor appetite.  She denies any fevers. She is active smoker since age 11. Interested in quitting.     22 She slept much better last night with the head of bed raised. Did not wake up this morning short of breath.          Physical Exam:   Vitals were reviewed  Blood pressure 114/67, pulse 101, temperature 97.7  F (36.5  C), temperature source Oral, resp. rate 8, weight 58 kg (127 lb 13.9 oz), SpO2 95 %, not currently breastfeeding.  Temperatures:  Current - Temp: (!) 96  F (35.6  C); Max - Temp  Av.7  F (36.5  C)  Min: 96  F (35.6  C)  Max: 98.2  F (36.8  C)  Respiration range: Resp  Av  Min: 12  Max: 22  Pulse range: Pulse  Av.8  Min: 96  Max: 120  Blood pressure range: Systolic (24hrs), Av , Min:91 , Max:146   ; Diastolic (24hrs), Av, Min:61, Max:87    Pulse oximetry range: SpO2  Av.6 %  Min:  94 %  Max: 100 %    Intake/Output Summary (Last 24 hours) at 8/15/2022 1506  Last data filed at 8/15/2022 1442  Gross per 24 hour   Intake 300 ml   Output --   Net 300 ml       GENERAL: Mild engorgement to R neck vein.  SKIN: No rashes or jaundice.  HEENT: I cannot see anything in her R inferolateral mouth, but she does have poor dentition in this area   LYMPH: Fullness/lymph nodes in cervical and supraclavicualr   HEART: Regular rate and rhythm with no murmurs.  LUNGS: Clear bilaterally.  ABDOMEN: Soft, nontender, nondistended with no palpable hepatosplenomegaly.  EXTREMITIES: No clubbing, cyanosis, or edema.  MENTAL: Alert and oriented to person, place, and time.  NEURO: Cranial nerves II through XII grossly intact with no focal motor or sensory deficits.              Past Medical History:   I have reviewed this patient's past medical history  Past Medical History:   Diagnosis Date     Anemia      Cancer (H)      Coronary artery disease      Dialysis complication      Dialysis patient (H)      Embolism (H)      ESRD (end stage renal disease) (H)      Hypertension      Ischemic cardiomyopathy      Renal failure              Past Surgical History:   I have reviewed this patient's past surgical history  Past Surgical History:   Procedure Laterality Date     ANESTH,UPPER ARM AV FISTULA REPAIR       CREATE FISTULA ARTERIOVENOUS UPPER EXTREMITY Left 3/25/2022    Procedure: CREATION OF FIRST STAGE LEFT BRACHIOBASILIC ARTERIOVENOUS FISTULA;  Surgeon: Akash Miller MD;  Location: [facility] OR     CREATE FISTULA ARTERIOVENOUS UPPER EXTREMITY Left 6/28/2022    Procedure: LEFT BRACHIO-BASILIC ARTERIOVENOUS FISTULA WITH BIOPROSTHETIC GRAFT;  Surgeon: Akash Miller MD;  Location: [facility] OR     IR DIALYSIS FISTULOGRAM LEFT  5/31/2022     IR DIALYSIS FISTULOGRAM LEFT  7/8/2022     IR DIALYSIS FISTULOGRAM LEFT  8/11/2022     LIGATE FISTULA ARTERIOVENOUS UPPER EXTREMITY Left 6/28/2022    Procedure: [place] fistula arteriovenous  upper extremity;  Surgeon: Akash Miller MD;  Location: [facility] OR     REPAIR FISTULA ARTERIOVENOUS UPPER EXTREMITY Left 7/8/2022    Procedure: EXPLORE LEFT ARM, REPAIR OF LEFT UPPER ARM AV DIALYSIS GRAFT, EVACUATE HEMATOMA;  Surgeon: Akash Miller MD;  Location: [facility] OR     WISDOM TEETH                 Social History:   I have reviewed this patient's social history  Social History     Tobacco Use     Smoking status: Current Every Day Smoker     Years: 40.00     Types: Cigarettes     Smokeless tobacco: Never Used     Tobacco comment: 6 cigarettes per day.    Substance Use Topics     Alcohol use: Never             Family History:   I have reviewed this patient's family history  History reviewed. No pertinent family history.          Allergies:   No Known Allergies          Medications:   I have reviewed this patient's current medications  Medications Prior to Admission   Medication Sig Dispense Refill Last Dose     amLODIPine (NORVASC) 10 MG tablet Take 10 mg by mouth every evening   8/13/2022 at Unknown time     atorvastatin (LIPITOR) 20 MG tablet Take 20 mg by mouth daily   8/13/2022 at Unknown time     B Complex-C-Folic Acid (DEEPALI CAPS) 1 MG CAPS Take 1 capsule by mouth daily   8/14/2022 at am     calcium acetate (PHOSLO) 667 MG CAPS capsule Take 2,001 mg by mouth 3 times daily (with meals)   8/8/2022     carvedilol (COREG) 25 MG tablet Take 25 mg by mouth 2 times daily (with meals)   8/14/2022 at am     hydrALAZINE (APRESOLINE) 50 MG tablet Take 50 mg by mouth 3 times daily   8/14/2022 at am     lisinopril (ZESTRIL) 20 MG tablet Take 20 mg by mouth daily   8/13/2022     warfarin ANTICOAGULANT (COUMADIN) 2.5 MG tablet Take 5 mg by mouth three times a week Mon-Wed-Fri   Unknown at Unknown time     warfarin ANTICOAGULANT (COUMADIN) 2.5 MG tablet Take 6.25 mg by mouth four times a week Sat-Sun-Tue-Thur   Unknown at Unknown time     oxyCODONE (ROXICODONE) 5 MG tablet Take 1 tablet (5 mg) by mouth every 6 hours as  needed for pain (Patient not taking: Reported on 8/15/2022) 12 tablet 0 Not Taking at Unknown time     Current Facility-Administered Medications Ordered in Epic   Medication Dose Route Frequency Last Rate Last Admin     0.9% sodium chloride BOLUS  250 mL Intravenous Once in dialysis/CRRT         0.9% sodium chloride BOLUS  300 mL Hemodialysis Machine Once         0.9% sodium chloride BOLUS  100-150 mL Intravenous Q15 Min PRN         acetaminophen (TYLENOL) tablet 650 mg  650 mg Oral Q6H PRN        Or     acetaminophen (TYLENOL) Suppository 650 mg  650 mg Rectal Q6H PRN         [Held by provider] amLODIPine (NORVASC) tablet 10 mg  10 mg Oral QPM   10 mg at 08/14/22 2348     ampicillin-sulbactam (UNASYN) 3 g vial to attach to  mL bag  3 g Intravenous Q6H   3 g at 08/15/22 0211     atorvastatin (LIPITOR) tablet 20 mg  20 mg Oral Daily         calcium acetate (PHOSLO) capsule 2,001 mg  2,001 mg Oral TID w/meals         carvedilol (COREG) tablet 25 mg  25 mg Oral BID w/meals         epoetin delma-epbx (RETACRIT) injection 10,000 Units  10,000 Units Intravenous Once in dialysis/CRRT         famotidine (PEPCID) injection 20 mg  20 mg Intravenous Q12H   20 mg at 08/15/22 0456     heparin (porcine) injection  500 Units Hemodialysis Machine OR IV Push Once in dialysis/CRRT         heparin 10,000 units/10 mL infusion (DIALYSIS USE)  500 Units/hr Hemodialysis Machine Continuous         [Held by provider] heparin 25,000 units in 0.45% NaCl 250 mL ANTICOAGULANT infusion  0-5,000 Units/hr Intravenous Continuous   Stopped at 08/15/22 1035     [Held by provider] hydrALAZINE (APRESOLINE) tablet 50 mg  50 mg Oral TID         HYDROmorphone (PF) (DILAUDID) injection 0.5 mg  0.5 mg Intravenous Q2H PRN   0.5 mg at 08/15/22 1304     lidocaine (LMX4) cream   Topical Q1H PRN         lidocaine (LMX4) cream   Topical Q1H PRN         lidocaine 1 % 0.1-1 mL  0.1-1 mL Other Q1H PRN         lidocaine 1 % 0.1-1 mL  0.1-1 mL Other Q1H PRN          [Held by provider] lisinopril (ZESTRIL) tablet 20 mg  20 mg Oral Daily         melatonin tablet 1 mg  1 mg Oral At Bedtime PRN         multivitamin RENAL (NEPHROCAPS/TRIPHROCAPS) capsule 1 capsule  1 capsule Oral Daily         naloxone (NARCAN) injection 0.2 mg  0.2 mg Intravenous Q2 Min PRN        Or     naloxone (NARCAN) injection 0.4 mg  0.4 mg Intravenous Q2 Min PRN        Or     naloxone (NARCAN) injection 0.2 mg  0.2 mg Intramuscular Q2 Min PRN        Or     naloxone (NARCAN) injection 0.4 mg  0.4 mg Intramuscular Q2 Min PRN         nitroGLYcerin (NITROSTAT) sublingual tablet 0.4 mg  0.4 mg Sublingual Q5 Min PRN         ondansetron (ZOFRAN ODT) ODT tab 4 mg  4 mg Oral Q6H PRN        Or     ondansetron (ZOFRAN) injection 4 mg  4 mg Intravenous Q6H PRN   4 mg at 08/15/22 1058     pantoprazole (PROTONIX) IV push injection 40 mg  40 mg Intravenous Q12H         Patient is already receiving anticoagulation with heparin, enoxaparin (LOVENOX), warfarin (COUMADIN)  or other anticoagulant medication   Does not apply Continuous PRN         sodium chloride (PF) 0.9% PF flush 3 mL  3 mL Intracatheter Q8H         sodium chloride (PF) 0.9% PF flush 3 mL  3 mL Intracatheter q1 min prn         sodium chloride (PF) 0.9% PF flush 3 mL  3 mL Intracatheter Q8H   3 mL at 08/15/22 0456     sodium chloride (PF) 0.9% PF flush 3 mL  3 mL Intracatheter q1 min prn         No current [facility]-ordered outpatient medications on file.             Review of Systems:     The 10 point Review of Systems is negative other than noted in the HPI.            Data:   Data   Results for orders placed or performed during the hospital encounter of 08/14/22 (from the past 24 hour(s))   Hemoglobin   Result Value Ref Range    Hemoglobin 7.0 (L) 11.7 - 15.7 g/dL   Hemoglobin   Result Value Ref Range    Hemoglobin 8.1 (L) 11.7 - 15.7 g/dL   CBC with platelets   Result Value Ref Range    WBC Count 15.5 (H) 4.0 - 11.0 10e3/uL    RBC Count 2.76 (L) 3.80 - 5.20  10e6/uL    Hemoglobin 8.2 (L) 11.7 - 15.7 g/dL    Hematocrit 24.7 (L) 35.0 - 47.0 %    MCV 90 78 - 100 fL    MCH 29.7 26.5 - 33.0 pg    MCHC 33.2 31.5 - 36.5 g/dL    RDW 13.7 10.0 - 15.0 %    Platelet Count 235 150 - 450 10e3/uL   Basic metabolic panel   Result Value Ref Range    Sodium 135 133 - 144 mmol/L    Potassium 4.6 3.4 - 5.3 mmol/L    Chloride 102 94 - 109 mmol/L    Carbon Dioxide (CO2) 22 20 - 32 mmol/L    Anion Gap 11 3 - 14 mmol/L    Urea Nitrogen 103 (H) 7 - 30 mg/dL    Creatinine 5.97 (H) 0.52 - 1.04 mg/dL    Calcium 9.1 8.5 - 10.1 mg/dL    Glucose 107 (H) 70 - 99 mg/dL    GFR Estimate 8 (L) >60 mL/min/1.73m2   US Biopsy Liver    Narrative    EXAM:  1. PERCUTANEOUS CORE BIOPSY LIVER MASS  2. ULTRASOUND GUIDANCE  DATE/TIME: 8/16/2022 1:24 PM    INDICATION: Lung mass with multiple liver masses    PROCEDURE: Informed consent obtained. Site marked. Prior images  reviewed. Required items made available. Patient identity confirmed  verbally and with arm band. Universal protocol was followed. Time out  performed. The site was prepped and draped in sterile fashion. 10 mL  of 1% lidocaine was infused into the local soft tissues. Using  standard technique and under direct ultrasound guidance, 3 18-gauge  core biopsies obtained from a mass in the left hepatic lobe. The  patient tolerated the procedure well. No complications.    RADIOLOGIC SUPERVISION AND INTERPRETATION:   ULTRASOUND GUIDANCE: Images demonstrate the biopsy needle in  satisfactory position.      Impression    IMPRESSION:  Status post ultrasound-guided core biopsy of a liver mass.    MAURO KEYS MD         SYSTEM ID:  [id number]

## 2022-08-16 NOTE — PLAN OF CARE
Pt A&Ox4. VSS on RA. Tele: SR/ST (90-100s). R neck/facial burning pain and back pain managed w/ IV Dilaudid. Swelling in neck. Hoarse voice, difficulty swallowing. Strict NPO.     HD cath to L chest and L fistula WNL. 1 Unit of RBC completed, midnight Hgb 8.1, and 8.2 this AM. +BM x2, large black, loose. Liver biopsy today.

## 2022-08-16 NOTE — PLAN OF CARE
A&O x4, VSS, on RA, Lung sounds diminished. Pt reports she becomes SOB when HOB is low, HOB maintained at >45. Tele SR/ST low 100s, Bowel sounds hypoactive, BM x2 overnight, urine output low d/t hemodialysis. Incision LUE open to air, ambulates with SBA, regular diet, pain controlled by IV and oral dilaudid. Intermittent nausea, managed with zofran. Had ENT consult today. 1L removed from dialysis. Liver biopsy performed, pending results. Swallow study scheduled for tomorrow and anticipate restarting Heparin.

## 2022-08-16 NOTE — PROVIDER NOTIFICATION
Paged Dr. Johnson-    Pt done with dialysis & liver biopsy completed. Can she have an updated diet order and order for PO dilaudid  --- New orders for Regular diet and PRN oxycodone placed

## 2022-08-16 NOTE — PROGRESS NOTES
Potassium   Date Value Ref Range Status   08/16/2022 4.6 3.4 - 5.3 mmol/L Final     Hemoglobin   Date Value Ref Range Status   08/16/2022 8.2 (L) 11.7 - 15.7 g/dL Final     Creatinine   Date Value Ref Range Status   08/16/2022 5.97 (H) 0.52 - 1.04 mg/dL Final     Urea Nitrogen   Date Value Ref Range Status   08/16/2022 103 (H) 7 - 30 mg/dL Final     Sodium   Date Value Ref Range Status   08/16/2022 135 133 - 144 mmol/L Final     INR   Date Value Ref Range Status   08/15/2022 1.68 (H) 0.85 - 1.15 Final      Latest Reference Range & Units 07/09/22 21:37 07/13/22 09:08   Hep B Surface Agn Nonreactive  Nonreactive    Hepatitis B Surface Antibody <8.00 m[IU]/mL  17.80       DIALYSIS PROCEDURE NOTE  Hepatitis status of previous patient on machine log was checked and verified ok to use with this patients hepatitis status.  Patient dialyzed for 3 hrs. on a K2 bath with a net fluid removal of  1L.  A BFR of 400 ml/min was obtained via a LCVC Tunelled.  The treatment plan was discussed with Dr. Taveras during the treatment.    Total heparin received during the treatment: 0 units.     Line flushed, clamped and capped with heparin 1:1000 2.2 & 2.3 mL (2200 & 2300 units) for arterial and venous lumen.    Meds  given: epogen   Complications: UF removed 1 L due to symptomatic hypotension. UF paused intermittently. MD aware. No further complications.     Person educated: person. Knowledge base substantial. Barriers to learning: none. Educated on oral via procedure mode. Patient verbalized understanding. Pt prefers oral education style.     ICEBOAT? Timeout performed pre-treatment  I: Patient was identified using 2 identifiers  C:  Consent Signed Yes  E: Equipment preventative maintenance is current and dialysis delivery system OK to use  B: Hepatitis B Surface Antigen: 7/9/2022; Draw Date: negative      Hepatitis B Surface Antibody: 7/13/2022; Draw Date: immune  O: Dialysis orders present and complete prior to treatment  A: Vascular  access verified and assessed prior to treatment  T: Treatment was performed at a clinically appropriate time  ?: Patient was allowed to ask questions and address concerns prior to treatment  See Adult Hemodialysis flowsheet in EPIC for further details and post assessment.  Machine water alarm in place and functioning. Transducer pods intact and checked every 15min. .  Chlorine/Chloramine water system checked every 4 hours.  Outpatient Dialysis at [facility], [facility]      Post treatment report given to ROBYN Silva RN regarding 1L of fluid removed, last BP of 118/66.

## 2022-08-16 NOTE — CONSULTS
Care Management Initial Consult    General Information  Assessment completed with: Patient,    Type of CM/SW Visit: Initial Assessment    Primary Care Provider verified and updated as needed: Yes   Readmission within the last 30 days: no previous admission in last 30 days      Reason for Consult: care coordination/care conference, discharge planning  Advance Care Planning: Advance Care Planning Reviewed: no concerns identified     General Information Comments: High readmission risk    Communication Assessment  Patient's communication style: spoken language (English or Bilingual)    Hearing Difficulty or Deaf: no   Wear Glasses or Blind: no    Cognitive  Cognitive/Neuro/Behavioral: WDL                      Living Environment:   People in home: child(cornelius), adult daughter lives w/pt  Current living Arrangements: apartment      Able to return to prior arrangements: yes       Family/Social Support:  Care provided by: self  Provides care for: no one  Marital Status: Single  Children          Description of Support System: Supportive         Current Resources:   Patient receiving home care services: No     Community Resources: Dialysis Services  Equipment currently used at home: none  Supplies currently used at home: None    Employment/Financial:  Employment Status: disabled        Financial Concerns: No concerns identified           Lifestyle & Psychosocial Needs:  Social Determinants of Health     Tobacco Use: High Risk     Smoking Tobacco Use: Current Every Day Smoker     Smokeless Tobacco Use: Never Used   Alcohol Use: Not on file   Financial Resource Strain: Not on file   Food Insecurity: Not on file   Transportation Needs: Not on file   Physical Activity: Not on file   Stress: Not on file   Social Connections: Not on file   Intimate Partner Violence: Not on file   Depression: Not on file   Housing Stability: Not on file       Functional Status:  Prior to admission patient needed assistance:   Dependent ADLs::  Independent  Dependent IADLs:: Independent       Mental Health Status:  Mental Health Status: No Current Concerns       Chemical Dependency Status:  Chemical Dependency Status: No Current Concerns             Values/Beliefs:  Spiritual, Cultural Beliefs, Mandaen Practices, Values that affect care:   NA              Additional Information:  Care Coordinator met with patient and explained role in discharge planning.  Patient's daughter lives with her in an apartment in [place].  Patient is on dialysis M/W/F [facility].  She recently established care with Dr Santos, [facility].  Her INRs are done at this clinic.  Very complicated situation with multi disciplines involved (Vascular Surgery, Thoracic Surgery, IR, GI, Hem/Onc, Otolaryngology, Hospitalist) await liver biopsy results.  Pt reports she is feeling much better than 24 hours ago.  It was difficult for patient to have long conversation due to discomfort with talking.  Liver biopsy is pending.  Care Management will continue to follow and help with needs patient may have.    Mindy Falcon, RN   Inpatient Care Management  [phone]

## 2022-08-17 NOTE — PLAN OF CARE
Goal Outcome Evaluation:    A/O x 4. Tele: SR. Tachycardic at times, low 100's. On RA during day, 2L NC at HS as patient de-sats to mid-upper 80s when sleeping. LS diminished lower lobes. Moderate pain managed w/PO oxycodone. On Hemodialysis, left chest tunneled catheter. Regular diet. Up SBA. Heparin held. Regular diet, poor appetite, less than 25% of dinner. Episode of SOB, increased WOB, see MD notification for details.

## 2022-08-17 NOTE — PROVIDER NOTIFICATION
MD Notification    Notified Person: MD    Notified Person Name: Severiano Johnson    Notification Date/Time: 8/17/2022 0650     Notification Interaction: twyla paged    Purpose of Notification: Hgb 7.0. No transfuse orders, would you like to transfuse?    Orders Received:     Comments: No call back,passed on to day shift to obtain orders

## 2022-08-17 NOTE — PROGRESS NOTES
Renal Medicine Progress Note            Assessment/Plan:          Gina Simpson is a 56 year old female who was admitted on 8/14/2022.      Assessment:  1) Weight loss, likely underlying malignacny under evaluation. Bx today, heme/onc following.   Last target weight order 53.5kg but with ongoing weight loss.      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    S/p left brachiobasilic fistula 2022 with complicatons of arm swelling, s/p innominate vein venoplasty. Last procedure 6/28/22 revision but admission 7/5 with left arm swelling, s/p repeat venoplasty complicated by hematoma requiring evacuation.  Note  8/10 by Dr. Miller with goal to cannulate graft. Edema gauatm make this a little difficult.      3) ESRD on MWF HD, Dr. Gonsales, [facility] [place] [facility]              Treatment Type  Hemodialysis (procedure)    Dialyzer  : [employer] Series: VenueSpot Model: 17H RENÉE Factor: 1455    Dialysate  Potassium 2k    Dialysate  Calcium 2.5 mEq/L    Dialysate  BiCarb 35 mEq/L    Dialysate  Base Sodium 138 mEq/L    Dialysate Flow Rate  500 ML/min    Blood Flow Rate  400 ML/min    Treatment Time  195 min    Temperature  Standard    Max UF Rate  2 L/Hr    Access  CVC Catheter Chest (Left)    Access Concurrent  No    Schedule  3 Times per week    Heparin Load  1000 Units (1:1,000 concentration)    Heparin Hourly Dose  500 Units/hr (1:1,000 concentration)          4) Upper ext, neck/face edema: related to SVC compression, will attempt UF as able with HD.      Other:  Hx HTN:   amlodipine, coreg PTA  Hx ASCVD  S/p GI bleed        Plan/Recs:  1) HD today. Will attempt access of her left AVG. This will also get her on her chronic MWF schedule  2) Epo with HD  3)  Based on today, can discuss with other teams if CVC Removal an option.  4) Can give blood on HD if indicated (Hgb 7.0 but with a decrease from yesterday)    Kj Taveras, DO  [facility] - Nephrology  Cell: [phone]  Office: [phone]               Interval History:     Pt doing well, ambulated to bathroom. Willing to do dialysis again today and also attempt to cannulate her graft. Denied new complaints, no dypspea.           Medications and Allergies:       - MEDICATION INSTRUCTIONS for Dialysis Patients -   Does not apply See Admin Instructions     sodium chloride 0.9%  250 mL Intravenous Once in dialysis/CRRT     sodium chloride 0.9%  300 mL Hemodialysis Machine Once     [Held by provider] amLODIPine  10 mg Oral QPM     ampicillin-sulbactam (UNASYN) IV  3 g Intravenous Q24H     atorvastatin  20 mg Oral Daily     calcium acetate  2,001 mg Oral TID w/meals     carvedilol  25 mg Oral BID w/meals     [Held by provider] famotidine  20 mg Intravenous Q48H     [Held by provider] hydrALAZINE  50 mg Oral TID     [Held by provider] lisinopril  20 mg Oral Daily     multivitamin RENAL  1 capsule Oral Daily     - MEDICATION INSTRUCTIONS -   Does not apply Once     pantoprazole (PROTONIX) IV  40 mg Intravenous Q12H     sodium chloride (PF)  3 mL Intracatheter Q8H     sodium chloride (PF)  3 mL Intracatheter Q8H      No Known Allergies         Physical Exam:   Vitals were reviewed  /74   Pulse 101   Temp 97.8  F (36.6  C) (Oral)   Resp 24   Wt 58 kg (127 lb 13.9 oz)   SpO2 95%   BMI 21.28 kg/m      Wt Readings from Last 3 Encounters:   08/15/22 58 kg (127 lb 13.9 oz)   07/10/22 65 kg (143 lb 4.8 oz)   06/28/22 56.2 kg (124 lb)       Intake/Output Summary (Last 24 hours) at 8/17/2022 1011  Last data filed at 8/17/2022 0500  Gross per 24 hour   Intake 480 ml   Output 1 ml   Net 479 ml          GENERAL APPEARANCE: alert  HEENT:  continue neck edema  RESP: lungs cta b c good efforts, no crackles, rhonchi or wheezes  CV: RRR, nl S1/S2, no m/r/g   ABDOMEN: o/s/nt/nd, bs present  EXTREMITIES/SKIN: no c/c/rashes/lesions; no lower ext edema, b/l arms with edema, left more then right. Left  AVF with bruit and thrill  NEURO:  Grossly non-focal  LINES:  Dialysis catheter present, exit site not inspected           Data:     BMP  Recent Labs   Lab 08/17/22  0554 08/16/22  0516 08/15/22  1115 08/15/22  0451    135 135 134   POTASSIUM 3.9 4.6 4.0 4.3   CHLORIDE 99 102 102 99   JEWELL 8.8 9.1 8.3* 8.9   CO2 27 22 23 24   BUN 66* 103* 72* 69*   CR 4.45* 5.97* 4.73* 5.68*   * 107* 175* 127*     CBC  Recent Labs   Lab 08/17/22  0554 08/16/22  0516 08/16/22  0021 08/15/22  1802 08/15/22  1115 08/15/22  0451 08/14/22  2149   WBC 16.1* 15.5*  --   --   --  6.7 7.7   HGB 7.0* 8.2* 8.1* 7.0*   < > 7.0* 8.4*   HCT 21.5* 24.7*  --   --   --  22.0* 26.2*   MCV 92 90  --   --   --  90 90    235  --   --   --  376 372    < > = values in this interval not displayed.     Lab Results   Component Value Date    AST 43 08/15/2022    ALT 19 08/15/2022    ALKPHOS 26 (L) 08/15/2022    BILITOTAL 0.3 08/15/2022     Lab Results   Component Value Date    INR 1.68 (H) 08/15/2022       Attestation:  I have reviewed today's vital signs, notes, medications, labs and imaging.    DO [facility]  Office: [phone]  Cell: [phone]

## 2022-08-17 NOTE — PROGRESS NOTES
Latest Reference Range & Units 07/09/22 21:37 07/13/22 09:08   Hep B Surface Agn Nonreactive  Nonreactive     Hepatitis B Surface Antibody <8.00 m[IU]/mL   17.80         DIALYSIS PROCEDURE NOTE  Hepatitis status of previous patient on machine log was checked and verified ok to use with this patients hepatitis status.  Patient dialyzed for 2.5 hrs. on a K3 bath with a net fluid removal of  0L.  A BFR of 350 ml/min was obtained via aLUE AVG.  The treatment plan was discussed with Dr. Taveras during the treatment.    Total heparin received during the treatment: 0 units.      Meds  given: none ordered  Complications: None, tx tolerated well and AVG was patent, cannulated without issue.   Person educated: person. Knowledge base substantial. Barriers to learning: none. Educated on oral via procedure mode. Patient verbalized understanding. Pt prefers oral education style.      ICEBOAT? Timeout performed pre-treatment  I: Patient was identified using 2 identifiers  C:  Consent Signed Yes  E: Equipment preventative maintenance is current and dialysis delivery system OK to use  B: Hepatitis B Surface Antigen: 7/9/2022; Draw Date: negative      Hepatitis B Surface Antibody: 7/13/2022; Draw Date: immune  O: Dialysis orders present and complete prior to treatment  A: Vascular access verified and assessed prior to treatment  T: Treatment was performed at a clinically appropriate time  ?: Patient was allowed to ask questions and address concerns prior to treatment  See Adult Hemodialysis flowsheet in [facility] for further details and post assessment.  Machine water alarm in place and functioning. Transducer pods intact and checked every 15min. .  Chlorine/Chloramine water system checked every 4 hours.  Outpatient Dialysis at [facility] by [facility]        Post treatment report given to NORBERT Lloyd RN regarding 0L of fluid removed, last BP of 145/80.

## 2022-08-17 NOTE — PLAN OF CARE
Goal Outcome Evaluation:    Plan of Care Reviewed With: patient     Overall Patient Progress: no change    Outcome Evaluation: Eating < 50% of meals, added Ensure BID between meals for extra nutrition.    Angela Lea RD, LD, CNSC   Clinical Dietitian - [facility]

## 2022-08-17 NOTE — PROGRESS NOTES
Progress Note    Gina Simpson MRN# [id number]   YOB: 1965 Age: 56 year old   Date of Admission: 8/14/2022  Requesting physician: Dr. Silvreio  Reason for consult: Mediastinal mass           Assessment and Plan:   New items are underlined     1. SVC syndrome  - Large mediastinal mass [id number], mass effect on trachea, narrows SVC, and compresses and occludes right upper lobe pulmonary artery  - Facial edema, engorgement of the neck veins upon waking in the morning, mild prominence to veins on exam, cough / shortness of breath at presentation. At least grade 2.   - CT with possible RUL mass, multiple hepatic masses. Cervical and supraclavicular lymphadenopathy  - She has smoked since age 11  - A/w 10 pound weight loss     2. Anemia   - In setting of CKD on dialysis  - Had episode of hematemesis 8/15  - Hg 5.6 on 8/15, down from baseline of 9 a month ago   - Was started on anticoagulation for intraluminal thrombus -> bleed, heparin has been held since 8/15  - Actively bleeding ulcer in the duodenum was clipped on 8/15  - EPO per nephro    - Hg 7 this AM, receiving 1unit prbc  - Had melenous stool this morning, but may be from previous bleed?     3. Intraluminal thrombus  - Restart heparin inpatient when Hg stabilized and no further bleeding  - Will need lifelong anticoagulation, if tolerated.     4. Smoker  - Interested in quitting  - Doing well inpatient without nicotine     PLAN  - Placement of SVC stent complicated by need for vascular access for dialysis, likely future need for chemoport, recent balloon dilation of neck vein and her maturing fistula in the LUE. Discussed case w/ IR twice, nephrology and with primary. Recommend consult her vascular surgeon Dr. Miller to see if fistula mature and also weigh in on management of SVC syndrome.  We will need to make a decision today/soon as possible with what to do: if no SVC stent then we will ask radiation oncology to see her. We are still waiting  for pathology as well, hopefully will be back tomorrow.  - Restart heparin when OK by primary, will need indefinite anticoagulation unless she develops a contraindication. Currently receiving 1u prbc for drop in hemoglobin to 7, recommend recheck after transfusion finishes, GI following   - Avoid putting the bed flat overnight, best to sleep at an angle if tolerated   - Biopsy of liver 8/16 pending   - Differential includes small cell carcinoma, I discussed most likely diagnosis of cancer with her  - Nephro is going to attempt to dialyze with her LUE fistula today      Patrick Dreyer,   [facility]  [phone] (office); [phone] (cell)              Chief Complaint:   No chief complaint on file.           History of Present Illness:   This patient is a 56 year old female who presented to the hospital as a direct admission from [place] emergency department today for evaluation of facial swelling, weakness and fatigue with intermittent dysphagia.  The patient feels like she has a foreign body in her right lower mouth, she feels like something is sticking out of it.  No pus or change in odor.  She has been having dysphagia for the past few weeks, food will get stuck in her throat when she swallows.  She reports that when she wakes up in the morning she has trouble breathing and her neck is engorged and she can see the veins in her neck.  She notes weight loss over the past month.  She also notes increasing fatigue with poor appetite.  She denies any fevers. She is active smoker since age 11. Interested in quitting.     8/16/22 She slept much better last night with the head of bed raised. Did not wake up this morning short of breath.     8/17/22 She notes persistent mild dyspnea. She feels fatigued. She had an episode of black stool this morning.          Physical Exam:   Vitals were reviewed  Blood pressure (!) 141/83, pulse 102, temperature 98.2  F (36.8  C), resp. rate 21, weight 58 kg (127 lb 13.9  oz), SpO2 96 %, not currently breastfeeding.  Temperatures:  Current - Temp: (!) 96  F (35.6  C); Max - Temp  Av.7  F (36.5  C)  Min: 96  F (35.6  C)  Max: 98.2  F (36.8  C)  Respiration range: Resp  Av  Min: 12  Max: 22  Pulse range: Pulse  Av.8  Min: 96  Max: 120  Blood pressure range: Systolic (24hrs), Av , Min:91 , Max:146   ; Diastolic (24hrs), Av, Min:61, Max:87    Pulse oximetry range: SpO2  Av.6 %  Min: 94 %  Max: 100 %    Intake/Output Summary (Last 24 hours) at 8/15/2022 1506  Last data filed at 8/15/2022 1442  Gross per 24 hour   Intake 300 ml   Output --   Net 300 ml       GENERAL: Mild increase in the engorgement to R neck vein.  SKIN: No rashes or jaundice.  HEENT: I cannot see anything in her R inferolateral mouth, but she does have poor dentition in this area   LYMPH: Fullness/lymph nodes in cervical and supraclavicualr   HEART: Regular rate and rhythm with no murmurs.  LUNGS: Clear bilaterally.  ABDOMEN: Soft, nontender, nondistended with no palpable hepatosplenomegaly.  EXTREMITIES: No clubbing, cyanosis, or edema.  MENTAL: Alert and oriented to person, place, and time.  NEURO: Cranial nerves II through XII grossly intact with no focal motor or sensory deficits.              Past Medical History:   I have reviewed this patient's past medical history  Past Medical History:   Diagnosis Date     Anemia      Cancer (H)      Coronary artery disease      Dialysis complication      Dialysis patient (H)      Embolism (H)      ESRD (end stage renal disease) (H)      Hypertension      Ischemic cardiomyopathy      Renal failure              Past Surgical History:   I have reviewed this patient's past surgical history  Past Surgical History:   Procedure Laterality Date     ANESTH,UPPER ARM AV FISTULA REPAIR       CREATE FISTULA ARTERIOVENOUS UPPER EXTREMITY Left 3/25/2022    Procedure: CREATION OF FIRST STAGE LEFT BRACHIOBASILIC ARTERIOVENOUS FISTULA;  Surgeon: Akash Miller,  MD;  Location: [facility] OR     CREATE FISTULA ARTERIOVENOUS UPPER EXTREMITY Left 6/28/2022    Procedure: LEFT BRACHIO-BASILIC ARTERIOVENOUS FISTULA WITH BIOPROSTHETIC GRAFT;  Surgeon: Akash Miller MD;  Location: [facility] OR     IR DIALYSIS FISTULOGRAM LEFT  5/31/2022     IR DIALYSIS FISTULOGRAM LEFT  7/8/2022     IR DIALYSIS FISTULOGRAM LEFT  8/11/2022     LIGATE FISTULA ARTERIOVENOUS UPPER EXTREMITY Left 6/28/2022    Procedure: Ligate fistula arteriovenous upper extremity;  Surgeon: Akash Miller MD;  Location: [facility] OR     REPAIR FISTULA ARTERIOVENOUS UPPER EXTREMITY Left 7/8/2022    Procedure: EXPLORE LEFT ARM, REPAIR OF LEFT UPPER ARM AV DIALYSIS GRAFT, EVACUATE HEMATOMA;  Surgeon: Akash Miller MD;  Location: [facility] OR     WISDOM TEETH                 Social History:   I have reviewed this patient's social history  Social History     Tobacco Use     Smoking status: Current Every Day Smoker     Years: 40.00     Types: Cigarettes     Smokeless tobacco: Never Used     Tobacco comment: 6 cigarettes per day.    Substance Use Topics     Alcohol use: Never             Family History:   I have reviewed this patient's family history  History reviewed. No pertinent family history.          Allergies:   No Known Allergies          Medications:   I have reviewed this patient's current medications  Medications Prior to Admission   Medication Sig Dispense Refill Last Dose     amLODIPine (NORVASC) 10 MG tablet Take 10 mg by mouth every evening   8/13/2022 at Unknown time     atorvastatin (LIPITOR) 20 MG tablet Take 20 mg by mouth daily   8/13/2022 at Unknown time     B Complex-C-Folic Acid (DEEPALI CAPS) 1 MG CAPS Take 1 capsule by mouth daily   8/14/2022 at am     calcium acetate (PHOSLO) 667 MG CAPS capsule Take 2,001 mg by mouth 3 times daily (with meals)   8/8/2022     carvedilol (COREG) 25 MG tablet Take 25 mg by mouth 2 times daily (with meals)   8/14/2022 at am     hydrALAZINE (APRESOLINE) 50 MG tablet Take 50 mg by mouth  3 times daily   8/14/2022 at am     lisinopril (ZESTRIL) 20 MG tablet Take 20 mg by mouth daily   8/13/2022     warfarin ANTICOAGULANT (COUMADIN) 2.5 MG tablet Take 5 mg by mouth three times a week Mon-Wed-Fri   Unknown at Unknown time     warfarin ANTICOAGULANT (COUMADIN) 2.5 MG tablet Take 6.25 mg by mouth four times a week Sat-Sun-Tue-Thur   Unknown at Unknown time     oxyCODONE (ROXICODONE) 5 MG tablet Take 1 tablet (5 mg) by mouth every 6 hours as needed for pain (Patient not taking: Reported on 8/15/2022) 12 tablet 0 Not Taking at Unknown time     Current Facility-Administered Medications Ordered in Epic   Medication Dose Route Frequency Last Rate Last Admin     0.9% sodium chloride BOLUS  250 mL Intravenous Once in dialysis/CRRT         0.9% sodium chloride BOLUS  300 mL Hemodialysis Machine Once         0.9% sodium chloride BOLUS  100-150 mL Intravenous Q15 Min PRN         acetaminophen (TYLENOL) tablet 650 mg  650 mg Oral Q6H PRN        Or     acetaminophen (TYLENOL) Suppository 650 mg  650 mg Rectal Q6H PRN         [Held by provider] amLODIPine (NORVASC) tablet 10 mg  10 mg Oral QPM   10 mg at 08/14/22 2348     ampicillin-sulbactam (UNASYN) 3 g vial to attach to  mL bag  3 g Intravenous Q6H   3 g at 08/15/22 0211     atorvastatin (LIPITOR) tablet 20 mg  20 mg Oral Daily         calcium acetate (PHOSLO) capsule 2,001 mg  2,001 mg Oral TID w/meals         carvedilol (COREG) tablet 25 mg  25 mg Oral BID w/meals         epoetin delma-epbx (RETACRIT) injection 10,000 Units  10,000 Units Intravenous Once in dialysis/CRRT         famotidine (PEPCID) injection 20 mg  20 mg Intravenous Q12H   20 mg at 08/15/22 0456     heparin (porcine) injection  500 Units Hemodialysis Machine OR IV Push Once in dialysis/CRRT         heparin 10,000 units/10 mL infusion (DIALYSIS USE)  500 Units/hr Hemodialysis Machine Continuous         [Held by provider] heparin 25,000 units in 0.45% NaCl 250 mL ANTICOAGULANT infusion   0-5,000 Units/hr Intravenous Continuous   Stopped at 08/15/22 1035     [Held by provider] hydrALAZINE (APRESOLINE) tablet 50 mg  50 mg Oral TID         HYDROmorphone (PF) (DILAUDID) injection 0.5 mg  0.5 mg Intravenous Q2H PRN   0.5 mg at 08/15/22 1304     lidocaine (LMX4) cream   Topical Q1H PRN         lidocaine (LMX4) cream   Topical Q1H PRN         lidocaine 1 % 0.1-1 mL  0.1-1 mL Other Q1H PRN         lidocaine 1 % 0.1-1 mL  0.1-1 mL Other Q1H PRN         [Held by provider] lisinopril (ZESTRIL) tablet 20 mg  20 mg Oral Daily         melatonin tablet 1 mg  1 mg Oral At Bedtime PRN         multivitamin RENAL (NEPHROCAPS/TRIPHROCAPS) capsule 1 capsule  1 capsule Oral Daily         naloxone (NARCAN) injection 0.2 mg  0.2 mg Intravenous Q2 Min PRN        Or     naloxone (NARCAN) injection 0.4 mg  0.4 mg Intravenous Q2 Min PRN        Or     naloxone (NARCAN) injection 0.2 mg  0.2 mg Intramuscular Q2 Min PRN        Or     naloxone (NARCAN) injection 0.4 mg  0.4 mg Intramuscular Q2 Min PRN         nitroGLYcerin (NITROSTAT) sublingual tablet 0.4 mg  0.4 mg Sublingual Q5 Min PRN         ondansetron (ZOFRAN ODT) ODT tab 4 mg  4 mg Oral Q6H PRN        Or     ondansetron (ZOFRAN) injection 4 mg  4 mg Intravenous Q6H PRN   4 mg at 08/15/22 1058     pantoprazole (PROTONIX) IV push injection 40 mg  40 mg Intravenous Q12H         Patient is already receiving anticoagulation with heparin, enoxaparin (LOVENOX), warfarin (COUMADIN)  or other anticoagulant medication   Does not apply Continuous PRN         sodium chloride (PF) 0.9% PF flush 3 mL  3 mL Intracatheter Q8H         sodium chloride (PF) 0.9% PF flush 3 mL  3 mL Intracatheter q1 min prn         sodium chloride (PF) 0.9% PF flush 3 mL  3 mL Intracatheter Q8H   3 mL at 08/15/22 0456     sodium chloride (PF) 0.9% PF flush 3 mL  3 mL Intracatheter q1 min prn         No current Epic-ordered outpatient medications on file.             Review of Systems:     The 10 point Review  of Systems is negative other than noted in the HPI.            Data:   Data   Results for orders placed or performed during the hospital encounter of 08/14/22 (from the past 24 hour(s))   US Biopsy Liver    Narrative    EXAM:  1. PERCUTANEOUS CORE BIOPSY LIVER MASS  2. ULTRASOUND GUIDANCE  DATE/TIME: 8/16/2022 1:24 PM    INDICATION: Lung mass with multiple liver masses    PROCEDURE: Informed consent obtained. Site marked. Prior images  reviewed. Required items made available. Patient identity confirmed  verbally and with arm band. Universal protocol was followed. Time out  performed. The site was prepped and draped in sterile fashion. 10 mL  of 1% lidocaine was infused into the local soft tissues. Using  standard technique and under direct ultrasound guidance, 3 18-gauge  core biopsies obtained from a mass in the left hepatic lobe. The  patient tolerated the procedure well. No complications.    RADIOLOGIC SUPERVISION AND INTERPRETATION:   ULTRASOUND GUIDANCE: Images demonstrate the biopsy needle in  satisfactory position.      Impression    IMPRESSION:  Status post ultrasound-guided core biopsy of a liver mass.    MAURO KEYS MD         SYSTEM ID:  [id number]   CBC with platelets   Result Value Ref Range    WBC Count 16.1 (H) 4.0 - 11.0 10e3/uL    RBC Count 2.35 (L) 3.80 - 5.20 10e6/uL    Hemoglobin 7.0 (L) 11.7 - 15.7 g/dL    Hematocrit 21.5 (L) 35.0 - 47.0 %    MCV 92 78 - 100 fL    MCH 29.8 26.5 - 33.0 pg    MCHC 32.6 31.5 - 36.5 g/dL    RDW 13.9 10.0 - 15.0 %    Platelet Count 234 150 - 450 10e3/uL   Basic metabolic panel   Result Value Ref Range    Sodium 134 133 - 144 mmol/L    Potassium 3.9 3.4 - 5.3 mmol/L    Chloride 99 94 - 109 mmol/L    Carbon Dioxide (CO2) 27 20 - 32 mmol/L    Anion Gap 8 3 - 14 mmol/L    Urea Nitrogen 66 (H) 7 - 30 mg/dL    Creatinine 4.45 (H) 0.52 - 1.04 mg/dL    Calcium 8.8 8.5 - 10.1 mg/dL    Glucose 106 (H) 70 - 99 mg/dL    GFR Estimate 11 (L) >60 mL/min/1.73m2   Prepare red blood  cells (unit)   Result Value Ref Range    CROSSMATCH Compatible     UNIT ABO/RH O Pos     Unit Number [id number]     Unit Status Issued     Blood Component Type Red Blood Cells     Product Code T6440D11     CODING SYSTEM [id number]     UNIT TYPE ISBT 5100     ISSUE DATE AND TIME [id number]

## 2022-08-17 NOTE — PROVIDER NOTIFICATION
MD Notification    Notified Person: MD    Notified Person Name: Gricel Zhu    Notification Date/Time: 8/17/2022 0527    Notification Interaction: paged    Purpose of Notification: Pt c/o increased respiratory effort. O2 sats 94 on RA, RR 21, accessory muscles in use, c/o SOB. Please advise    Orders Received: MD saw pt, no need for intervention at this time. This SOB is related to SVC syndrome and is positional.     Comments:

## 2022-08-17 NOTE — PROGRESS NOTES
"CLINICAL SWALLOW EVALUATION       08/17/22 0857   General Information   Onset of Illness/Injury or Date of Surgery 08/14/22   Referring Physician Kan, Skip Torres MD   Patient/Family Therapy Goal Statement (SLP) Patient did not state; agreed to swallow evaluation.   Pertinent History of Current Problem Per MD note; \"Gina Simpson is a 56 year old female with PMHx of CAD, cardiomyopathy, hypertension, COPD and ESRD with hx of failed RUE fistula who underwent creation of LUE AV fistula on 6/28/22 complicated by hematoma s/p evacuation and LUE DVT who is a direct admission to [facility] from [facility] after presenting with 2+weeks of facial swelling, fatigue, dysphagia and being found to have prevertebral soft tissue swelling potentially presenting retropharyngeal abscess as well as new pulmonary nodule, mediastinal mass, and extensive adenopathy. CT soft tissue neck w/o contrast in the ED shows new symmetrical prevertebral soft tissue swelling and hypodensity bilaterally from the C2-C5 levels which could represent inflammatory or infectious cellulitis or potential retropharyngeal/prevertebral abscess. Additionally new mediastinal mass causing displacement of trachea and esophagus to the left as discussed below. Unclear if this represents true infection vs inflammation. She reports foreign body R mandible behind lower molar for several weeks though no obvious infection on exam.\" Per ENT: \"No evidence of any infection of the pharynx and no evidence of retropharyngeal abscess.  Most likely the edema of the retropharyngeal space is related to her superior vena cava syndrome   With generalized edema of the head and neck area. No specific treatment of the retropharynx is indicated. On scope exam airway widely patent and both vocal cords fully mobile. Incidental finding of right mandible with exposed bone, she also has evidence of dental caries on the CT.  Recommend dental evaluation as outpatient.\"   General " "Observations Patient alert, pleasant, cooperative. Note inspiratory breath sounds. Patient reported she has to sleep on her side at night due to difficulty breathing.   Past History of Dysphagia None per EMR review. Patient reported occasional coughing with PO intake.   Type of Evaluation   Type of Evaluation Swallow Evaluation   Oral Motor   Oral Musculature generally intact   Structural Abnormalities present   Mucosal Quality adequate   Dentition (Oral Motor)   Comment, Dentition (Oral Motor) Patient pointed to back right molar feeling like \"a wire is sticking out\". ENT recommended OP dental exam.   Dentition (Oral Motor) natural dentition;adequate dentition   Facial Symmetry (Oral Motor)   Facial Symmetry (Oral Motor) right side impairment   Comment, Facial Symmetry (Oral Motor) Facial swelling lower right side.   Right Side Facial Asymmetry minimal impairment   Lip Function (Oral Motor)   Lip Range of Motion (Oral Motor) WNL   Lip Strength (Oral Motor) WFL   Tongue Function (Oral Motor)   Tongue Strength (Oral Motor) WFL   Tongue Coordination/Speed (Oral Motor) reduced rate   Tongue ROM (Oral Motor) lateralization is impaired   Lateralization, Tongue ROM Impairment (Oral Motor) minimal impairment   Jaw Function (Oral Motor)   Jaw Function (Oral Motor) WNL   Cough/Swallow/Gag Reflex (Oral Motor)   Soft Palate/Velum (Oral Motor) WNL   Volitional Throat Clear/Cough (Oral Motor) WNL   Volitional Swallow (Oral Motor) WNL   Vocal Quality/Secretion Management (Oral Motor)   Vocal Quality (Oral Motor) WFL   Secretion Management (Oral Motor) WNL   General Swallowing Observations   Respiratory Support (General Swallowing Observations) none   Current Diet/Method of Nutritional Intake (General Swallowing Observations, NIS) regular diet;thin liquids (level 0)  (MD ordered 8/16/2022 at 1400)   Swallowing Evaluation Clinical swallow evaluation   Clinical Swallow Evaluation   Feeding Assistance no assistance needed   Additional " evaluation(s) completed today Recommended   Rationale for completing additional evaluation Further assess oropharyngeal swallow function given inconsistent aspiration signs at bedside and CT soft neck tissue findings.   Clinical Swallow Evaluation Textures Trialed thin liquids;mildly thick liquids;pureed;solid foods   Clinical Swallow Eval: Thin Liquid Texture Trial   Mode of Presentation, Thin Liquids spoon;cup;straw;fed by clinician;self-fed   Volume of Liquid or Food Presented 1 ice chip; 5-6 oz thin water   Oral Phase of Swallow WFL   Pharyngeal Phase of Swallow coughing/choking  (x1 with larger consecutive sips by straw)   Diagnostic Statement No overt aspiration signs with small single sips by spoon or cup. Note soft audible possible regurgitation after swallows.   Clinical Swallow Eval: Mildly Thick Liquids   Mode of Presentation cup;self-fed   Volume Presented 2 oz   Oral Phase WFL   Pharyngeal Phase   (NO overt aspiration signs)   Diagnostic Statement Patient did not like taste.   Clinical Swallow Evaluation: Puree Solid Texture Trial   Mode of Presentation, Puree spoon;self-fed   Volume of Puree Presented 3 bites applesauce   Oral Phase, Puree WFL   Pharyngeal Phase, Puree repeated swallows  (no overt aspiration signs)   Diagnostic Statement Note soft audible possible regurgitation after swallows.   Clinical Swallow Evaluation: Solid Food Texture Trial   Mode of Presentation self-fed   Volume Presented 1/2 lilo cracker square   Oral Phase delayed AP movement   Pharyngeal Phase repeated swallows  (no overt aspiration signs)   Diagnostic Statement Note soft audible possible regurgitation after swallows.   Esophageal Phase of Swallow   Patient reports or presents with symptoms of esophageal dysphagia Yes   Esophageal comments See above.   Swallowing Recommendations   Diet Consistency Recommendations regular diet;thin liquids (level 0)  (hold PO if increased/persistent aspiration signs occur)   Supervision  Level for Intake distant supervision needed   Mode of Delivery Recommendations bolus size, small;no straws;slow rate of intake   Swallowing Maneuver Recommendations alternate food and liquid intake   Monitoring/Assistance Required (Eating/Swallowing) stop eating activities when fatigue is present;monitor for cough or change in vocal quality with intake   Recommended Feeding/Eating Techniques (Swallow Eval) maintain upright posture during/after eating for 30 minutes   Medication Administration Recommendations, Swallowing (SLP) One at a time.   General Therapy Interventions   Planned Therapy Interventions Dysphagia Treatment   Dysphagia treatment Modified diet education;Instruction of safe swallow strategies;Compensatory strategies for swallowing   Clinical Impression   Criteria for Skilled Therapeutic Interventions Met (SLP Eval) Yes, treatment indicated   SLP Diagnosis Dysphagia   Risks & Benefits of therapy have been explained evaluation/treatment results reviewed;care plan/treatment goals reviewed;risks/benefits reviewed;current/potential barriers reviewed;participants voiced agreement with care plan;participants included;patient   Clinical Impression Comments Mild oropharyngeal dysphagia characterized by mild delayed AP bolus transit, coughing after larger consecutive sips of thin liquid and soft, audible possible regurgiation sounds after oral intake. Question potential for pharyngeal residue and aspiration risk. Recommend video swallow study to further assess oropharyngeal swallow function given inconsistent aspiration signs at bedside and CT soft neck tissue findings. Patient may continue regular diet with thin liquids given swallow strategies for now (fully upright position, no straws, small single sips/bites, slow pace). Please serve pills one at a time. Monitor for increased/persistent signs/symptoms of aspiration and hold PO if occur.   SLP Discharge Planning   SLP Discharge Recommendation home with  outpatient speech therapy   SLP Rationale for DC Rec Patient may benefit from SLP for swallowing pending progress during admission.   SLP Brief overview of current status  Recommend video swallow study to further assess oropharyngeal swallow function given inconsistent aspiration signs at bedside and CT soft neck tissue findings. Patient may continue regular diet with thin liquids given swallow strategies for now (fully upright position, no straws, small single sips/bites, slow pace). Please serve pills one at a time. Monitor for increased/persistent signs/symptoms of aspiration and hold PO if occur.    Total Evaluation Time   Total Evaluation Time (Minutes) 10   SLP Goals   Therapy Frequency (SLP Eval) 5 times/wk   SLP Predicted Duration/Target Date for Goal Attainment 09/14/22   SLP Goals Swallow

## 2022-08-17 NOTE — CONSULTS
"CLINICAL NUTRITION SERVICES  -  ASSESSMENT NOTE      Recommendations Ordered by Registered Dietitian (RD): Ensure BID between meals    Malnutrition: % Weight Loss:  Up to 5% in 1 month (moderate malnutrition)  % Intake:  </= 50% for >/= 5 days (severe malnutrition)  Subcutaneous Fat Loss:  Orbital region mild depletion and Upper arm region moderate depletion  Muscle Loss:  Temporal region mild depletion, Clavicle bone region mild depletion, Patellar region moderate depletion and Posterior calf region moderate-severe depletion  Fluid Retention:  Mild as above     Malnutrition Diagnosis: Severe malnutrition  In Context of:  Acute illness or injury        REASON FOR ASSESSMENT  Gina Simpson is a 56 year old female seen by Registered Dietitian for Admission Nutrition Risk Screen for Have you recently lost weight without trying? - Yes, 2-13#  Have you eaten poorly because of a decreased appetite? - Yes       NUTRITION HISTORY  - Information obtained from patient.  She notes that her appetite has been very poor for about 1 month.  During this time period she has been eating about 50% of her normal intake and taking 2 bottles of Ensure per day (strawberry and/or banana).      CURRENT NUTRITION ORDERS  Diet Order:     Regular     Current Intake/Tolerance:  Patient has been eating < 50% of meals    Lunch today was a cheeseburger, fried potatoes, soda, and a fruit plate.   Dinner last night consisted of turkey, fried potatoes, fruit plate, and lemonade.       NUTRITION FOCUSED PHYSICAL ASSESSMENT FOR DIAGNOSING MALNUTRITION)  Yes             Observed:    Muscle wasting (refer to documentation in Malnutrition section) and Subcutaneous fat loss (refer to documentation in Malnutrition section)    Obtained from Chart/Interdisciplinary Team:  Edema mild (arm)    ANTHROPOMETRICS  Height: 5'5\"  Weight: 58 kg (128#)(8/15)  Body mass index is 21.28 kg/m   Weight Status:  Normal BMI  IBW: 56.8 kg   % IBW: 102%  Weight History: "   Wt Readings from Last 10 Encounters:   08/15/22 58 kg (127 lb 13.9 oz)   07/10/22 65 kg (143 lb 4.8 oz)   06/28/22 56.2 kg (124 lb)   05/31/22 56.7 kg (125 lb)   03/25/22 62.6 kg (138 lb)   03/01/22 61.2 kg (135 lb)     Patient states that weight is down 20# or 14% over the last 2 months     LABS  BUN 66 (H), Cr 4.45 (H) - Dialysis     MEDICATIONS  Nephrocaps   Phoslo       ASSESSED NUTRITION NEEDS PER APPROVED PRACTICE GUIDELINES:    Dosing Weight 58 kg   Estimated Energy Needs: 6483-1553 kcals (25-30 Kcal/Kg)  Justification: maintenance  Estimated Protein Needs: 70-90 grams protein (1.2-1.5 g pro/Kg)  Justification: hypercatabolism with acute illness and dialysis  Estimated Fluid Needs: 5768-8095 mL (1 mL/Kcal)  Justification: maintenance    MALNUTRITION:  % Weight Loss:  Up to 5% in 1 month (moderate malnutrition)  % Intake:  </= 50% for >/= 5 days (severe malnutrition)  Subcutaneous Fat Loss:  Orbital region mild depletion and Upper arm region moderate depletion  Muscle Loss:  Temporal region mild depletion, Clavicle bone region mild depletion, Patellar region moderate depletion and Posterior calf region moderate-severe depletion  Fluid Retention:  Mild as above     Malnutrition Diagnosis: Severe malnutrition  In Context of:  Acute illness or injury    NUTRITION DIAGNOSIS:  Inadequate oral intake related to poor appetite as evidenced by consuming < 50% of meals, need for an oral nutritional supplement       NUTRITION INTERVENTIONS  Recommendations / Nutrition Prescription  Regular diet as tolerated  Ensure BID between meals     Implementation  Nutrition education: Not appropriate at this time due to patient condition  Medical Food Supplement:  Ordered as above     Nutrition Goals  Patient will increase intake to 50-75% of meals     MONITORING AND EVALUATION:  Progress towards goals will be monitored and evaluated per protocol and Practice Guidelines    Angela Lea, RD, LD, CNSC   Clinical Dietitian -  [facility] [place]

## 2022-08-17 NOTE — PROGRESS NOTES
Heme Onc Quick Note     Reviewed vascular note.   Will consult radiation   Discussed w/ Dr. Polanco, he will be by to see her today  Start allopurinol for TLS prophylaxis     Patrick Dreyer, DO   [facility]

## 2022-08-17 NOTE — CONSULTS
55 y/o female with SOB & facial edema with CT chest showing large mediastinal mass with SVC compression & multiple hepatic masses S/P biopsy of liver lesion 8/16/22 with pathology pending.  Pt considered for IR vasacular stent but this has been ruled out.  Rec: consideration of XRT treatment to mass & area of SVC compression.  I met with pt & discussed this.  Will review status of pathology report in AM & consider radiation treatment planning simulation in AM & possible initiation of XRT rx. tomorrow PM.  Pt is agreeable. I discussed with Dr. Dreyer in oncology. MARY Polanco MD.

## 2022-08-17 NOTE — PROGRESS NOTES
"[facility]    Medicine Progress Note - Hospitalist Service    Date of Admission:  8/14/2022    Assessment & Plan            Gina Simpson is a 56 year old female with PMHx of CAD, cardiomyopathy, hypertension, COPD and ESRD with hx of failed RUE fistula who underwent creation of LUE AV fistula on 6/28/22 complicated by hematoma s/p evacuation and LUE DVT who is a direct admission to [facility] from [place] ED after presenting with 2+weeks of facial swelling, fatigue, dysphagia and being found to have prevertebral soft tissue swelling potentially presenting retropharyngeal abscess as well as new pulmonary nodule, mediastinal mass, and extensive adenopathy.      Symmetric prevertebral soft tissue swelling, inflammation vs retropharyngeal/prevertebral abscess  Patient presented with 2+ weeks of fatigue, facial swelling, feeling food/pills getting \"stuck\" in her throat.   CT soft tissue neck w/o contrast in the ED shows new symmetrical prevertebral soft tissue swelling and hypodensity bilaterally from the C2-C5 levels which could represent inflammatory or infectious cellulitis or potential retropharyngeal/prevertebral abscess. Additionally new mediastinal mass causing displacement of trachea and esophagus to the left as discussed below. Unclear if this represents true infection vs inflammation. She reports foreign body R mandible behind lower molar for several weeks though no obvious infection on exam.   No stridor, respiratory distress. She is managing secretions without difficulty and denies significant throat pain. S/p clindamycin, azithromycin, dexamethasone 10mg prior to transfer  --ENT consulted- reviewed 8/16   -- hold on steroids for now  --close monitoring for any evidence of airway compromise     New mediastinal mass  Extensive cervical, supraclavicular, right thoracic inlet and right mediastinal/hilar adenopathy  New 9mm ARIA pulmonary nodule  Patient reports significant fatigue, " 20lb weight loss and poor appetite over past two months or so. No known malignancy.   CT neck shows new mediastinal mass on right 5.8 x 6.1cm as well as pulmonary nodule measuring 9mm. CT 2019 showed 3mm nodule in R lung apex. Extensive adenopathy seen as well and causing compression of brachiocephalic and mediastinal vasculature which is likely contributing to swelling.   -- Appreciate reviewed by thoracic surgery, vascular surgery, IR and oncology  -s/p biopsy:  8/16.   Discussed with oncology and IR.:  Having a multispecialty discussion about pros and cons of SVC stenting  -Continues on IV Dilaudid every hour as needed.  ct oxycodone 5 mg p.o. every 4 hours.  If pain is still not controlled may consider PCA    -Appreciate heme-onc follow-up and vascular surgery review today.    RUL opacities, CT concerning for malignancy  CT neck shows gildardo opacities RUL with moderate R pleural effusion. Reports cough for several days. Unsure of fever. WBC normal. LA normal. mediastinal mass is causing displacement of trachea. Oxygen stable on room air.     --blood cultures obtained in the ED, pending      Leukocytosis: We will change to Unasyn to Zosyn 8/17.  Patient had significant hemoptysis and could have component of aspiration    ESRD on HD  S/p left AV fistula formation 6/28/22  S/p fistulogram with dilatation of L subclavian and junction of brachiocephalic and subclavian vein complicated by hematoma requiring evacuation 7/8/22  Dialyzes MWF - Dr. Gonsales. She is still using internal jugular tunneled dialysis catheter at this time. She continues to make urine.   She underwent outpatient fistulogram 8/11/22.   Cr 5.85 in the ED, K normal at 3.6.   --Nephrology consulted,   Dialysis was stopped after 40 minutes due to RRT from upper GI bleed  -Appreciate dialysis sessions as per nephrology.     Left brachial vein DVT 7/6/22  CT neck 8/14: Filling defect within the right internal jugular vein, worrisome for intraluminal  thrombus.   Started on warfarin at the time due to ESRD. INR is subtheraputic in the ED 1.4.   --started heparin drip in place of warfarin given plan for biopsy   8/15: Heparin on hold  8/16: May consider resuming heparin once hemoglobin is stable     CAD   Hx Cardiomyopathy  Hypertension  Hx stenting in 2019. Last ECHO 2020 showed Ef 40-45%.   PTA:  hydralazine 50mg TID, carvedilol 25mg bid, lisinopril 20mg daily, amlodipine 10mg every evening  --continue PTA carvedilol pending med rec   --HOLD lisinopril, hydralazine, amlodipine due to upper GI bleed      Acute upper GI bleed: 8/15  - ensure 2 large bore IV Access  - type and screen and transfuse to ensure Hb>7 , platelet > 50 and INR <2   - NPO  - Protonix bid   -Patient was managed in RRT.  s/p 2 unit PRBC for hemoglobin of 5.6 and active bleed.  - s/p EGD 8/15: 8/15 EGD noted clotted blood in the entire stomach, spurting duoeneal ulcer with visible vessel, injected, clips placed, argon plasma coag used.  8/17: Hemoglobin again dropped to 7.  We will transfuse 1 unit of PRBC and continue to monitor hemoglobin closely    chronic anemia related to ESRD  Most recent hgb on hospital discharge 7/13 is 9.3. hgb on admission 8.2.   --Cont EPO with dialysis.      Hyperphosphatemia  --Cont PhosLo with meals     Diet: Regular Diet Adult    DVT Prophylaxis: Heparin gtt once cleared by GI  Antoine Catheter: Not present  Central Lines: PRESENT  CVC Double Lumen Left Subclavian-Site Assessment: WDL  Cardiac Monitoring: ACTIVE order. Indication: unstable  Code Status: Full Code      Disposition Plan     Expected Discharge Date: 08/20/2022      Destination: home with family  Discharge Comments: 8/16 SVC stenting and biposy pending        The patient's care was discussed with the Bedside Nurse and Patient.    Severiano Johnson MD, MD  Hospitalist Service  [facility]  Securely message with the Vocera Web Console (learn more here)  Text page via [employer]  "Paging/Directory     \"This dictation was performed with voice recognition software and may contain errors,  omissions and inadvertent word substitution.\"    Clinically Significant Risk Factors Present on Admission                  _____________________________________________________________________    Interval History     Feels pain is better with oral oxycodone.  Breathing stable  Denies nausea/vomiting or any further episodes of epistaxis.  Having black stools  Denies any new rash  4 point ROS negative unless mentioned    Data reviewed today: I reviewed all medications, new labs and imaging results over the last 24 hours. I personally reviewed no images or EKG's today.    Physical Exam   /60   Pulse 102   Temp 98  F (36.7  C) (Oral)   Resp 27   Wt 58 kg (127 lb 13.9 oz)   SpO2 97%   BMI 21.28 kg/m    Gen- pleasant lying in bed  HEENT- ALEENA  Neck-swelling  CVS- I+II+ soft ESM  RS- CTAB  Abdo- soft, mild tenderness in epigastrium , No g/r/r   Ext- b/l arms edema Lt >Rt   CNS-oriented to person, place, time    Left AVF     Data   No results found for this or any previous visit (from the past 24 hour(s)).   BMP  Recent Labs   Lab 08/17/22  0554 08/16/22  0516 08/15/22  1115 08/15/22  0451    135 135 134   POTASSIUM 3.9 4.6 4.0 4.3   CHLORIDE 99 102 102 99   JEWELL 8.8 9.1 8.3* 8.9   CO2 27 22 23 24   BUN 66* 103* 72* 69*   CR 4.45* 5.97* 4.73* 5.68*   * 107* 175* 127*     CBC  Recent Labs   Lab 08/17/22  0554 08/16/22  0516 08/15/22  1115 08/15/22  0451 08/14/22  2149   WBC 16.1* 15.5*  --  6.7 7.7   RBC 2.35* 2.76*  --  2.44* 2.90*   HGB 7.0* 8.2*   < > 7.0* 8.4*   HCT 21.5* 24.7*  --  22.0* 26.2*   MCV 92 90  --  90 90   MCH 29.8 29.7  --  28.7 29.0   MCHC 32.6 33.2  --  31.8 32.1   RDW 13.9 13.7  --  13.6 13.9    235  --  376 372    < > = values in this interval not displayed.     INR  Recent Labs   Lab 08/15/22  0451 08/11/22  0913   INR 1.68* 2.52*     LFTs  Recent Labs   Lab " 08/15/22  1115 08/15/22  0451   ALKPHOS 26* 32*   AST 43 48*   ALT 19 22   BILITOTAL 0.3 0.4   PROTTOTAL 4.9* 5.7*   ALBUMIN 2.2* 2.5*      PANCNo lab results found in last 7 days.

## 2022-08-17 NOTE — CONSULTS
VASCULAR SURGERY INPATIENT CONSULTATION / Initial In-Patient visit    VASCULAR SURGEON: Dr. Miller    LOCATION: [facility]    Gina Simpson  Medical Record #:  [id number]  YOB: 1965  Age:  56 year old     Date of Service: 8/14/2022    PRIMARY CARE PROVIDER: Clari Garza MD    Reason for consultation:  SVC syndrome and LUE fistula    IMPRESSION:  On examination today Ms. Simpson reports that she continues to feel short of breath, using more accessory muscles today, despite sitting up in bed. The patient is sinus tachycardic. No stridor noted. The patient at the time of examination had her AV graft in use for dialysis, no issues with access or pulling at this time. Discussed with dialysis nurse over if the patient had easy access in addition to being able to pull adequately. No issues noted.     The patient has facial edema with prominent neck veins, shortness of breath and cough upon examination. Patient reported increased respiratory effort earlier this AM with laying down with increased SOB, however O2 saturations did not drop. The patient recovered once sitting up in bed. Patient's HGB is currently 7.0, recent GI bleed noted on 8/15. The patient on exam was understandably tearful given all of the rapid developments in terms of probable metastatic cancer and a significant decline in her health.     RECOMMENDATION:    The patient's AV graft has been working well for dialysis, can continue to use graft. Per the dialysis nurse, access site was easily found despite the swelling, excellent thrill noted. Defer to nephrology on when they would like to pull tunneled catheter, however from Vascular Surgery standpoint, as long as the graft is working for dialysis, can continue to use instead of tunneled catheter.     Given the patient's symptoms, an SVC stent will most likely not alleviate the patient's symptoms that she currently is having. Will defer to heme/onc. Of  note, the patient has had a recent GI bleed with anticoagulation currently being held. This patient will need anticoagulation if an SVC stent were placed.     HPI:  Gina Simpson is a 56 year old female who was seen today in consultation for possibility of SVC stent placement and AV graft. The patient was admitted to the hospital after a significant decline in health that started a few weeks to months ago. The patient has since developed facial swelling, symmetric prevertebral soft tissue swelling, SOB related to positioning however has reported SOB despite sitting up, new 9 mm ARIA pulmonary nodule in addition to new mediastinal mass on the R side, concern for malignancy, and on 8/15 had a GI bleed after starting heparin drip, found to have duodenal ulcer, and while the GI bleed has been managed the patient has continued to need blood transfusions with one today, 8/17. The patient since the GI bleed has not been on anticoagulation. In the last few days the patient has worsening SOB and work of breathing with lying down, has been advised to sit up or at an angle.     PHH:    Past Medical History:   Diagnosis Date     Anemia      Cancer (H)      Coronary artery disease      Dialysis complication      Dialysis patient (H)      Embolism (H)      ESRD (end stage renal disease) (H)      Hypertension      Ischemic cardiomyopathy      Renal failure          Past Surgical History:   Procedure Laterality Date     ANESTH,UPPER ARM AV FISTULA REPAIR       CREATE FISTULA ARTERIOVENOUS UPPER EXTREMITY Left 3/25/2022    Procedure: CREATION OF FIRST STAGE LEFT BRACHIOBASILIC ARTERIOVENOUS FISTULA;  Surgeon: Akash Miller MD;  Location: [facility] OR     CREATE FISTULA ARTERIOVENOUS UPPER EXTREMITY Left 6/28/2022    Procedure: LEFT BRACHIO-BASILIC ARTERIOVENOUS FISTULA WITH BIOPROSTHETIC GRAFT;  Surgeon: Akash Miller MD;  Location: [facility] OR     IR DIALYSIS FISTULOGRAM LEFT  5/31/2022     IR DIALYSIS FISTULOGRAM LEFT   7/8/2022     IR DIALYSIS FISTULOGRAM LEFT  8/11/2022     LIGATE FISTULA ARTERIOVENOUS UPPER EXTREMITY Left 6/28/2022    Procedure: Ligate fistula arteriovenous upper extremity;  Surgeon: Akash Miller MD;  Location: [facility] OR     REPAIR FISTULA ARTERIOVENOUS UPPER EXTREMITY Left 7/8/2022    Procedure: EXPLORE LEFT ARM, REPAIR OF LEFT UPPER ARM AV DIALYSIS GRAFT, EVACUATE HEMATOMA;  Surgeon: Akash Miller MD;  Location: [facility] OR     WISDOM TEETH          ALLERGIES:   No Known Allergies     MEDS:    Current Facility-Administered Medications:      - MEDICATION INSTRUCTIONS for Dialysis Patients -, , Does not apply, See Admin Instructions, Kobe Quarles MD     0.9% sodium chloride BOLUS, 250 mL, Intravenous, Once in dialysis/CRRT, Kj Taveras DO     0.9% sodium chloride BOLUS, 300 mL, Hemodialysis Machine, Once, Kj Taveras DO     0.9% sodium chloride BOLUS, 100-150 mL, Intravenous, Q15 Min PRN, Kj Taveras DO     acetaminophen (TYLENOL) tablet 650 mg, 650 mg, Oral, Q6H PRN **OR** acetaminophen (TYLENOL) Suppository 650 mg, 650 mg, Rectal, Q6H PRN, Maria Isabel Cantor PA-C     [Held by provider] amLODIPine (NORVASC) tablet 10 mg, 10 mg, Oral, QPM, Maria Isabel Cantor PA-C, 10 mg at 08/14/22 2348     ampicillin-sulbactam (UNASYN) 3 g vial to attach to  mL bag, 3 g, Intravenous, Q24H, Skip Kan MD, 3 g at 08/16/22 1536     atorvastatin (LIPITOR) tablet 20 mg, 20 mg, Oral, Daily, Maria Isabel Cantor PA-C, 20 mg at 08/17/22 0849     calcium acetate (PHOSLO) capsule 2,001 mg, 2,001 mg, Oral, TID w/meals, Maria Isabel Cantor PA-C, 2,001 mg at 08/17/22 1119     carvedilol (COREG) tablet 25 mg, 25 mg, Oral, BID w/meals, Monberg, Maria Isabel Jeanette, PA-C     [Held by provider] famotidine (PEPCID) injection 20 mg, 20 mg, Intravenous, Q48H, Skip Kan MD     [Held by provider] heparin 25,000 units in 0.45% NaCl 250 mL ANTICOAGULANT infusion, 0-5,000 Units/hr, Intravenous, Continuous, Devaughn  Maria Isabel Reid PA-C, Stopped at 08/15/22 1035     [Held by provider] hydrALAZINE (APRESOLINE) tablet 50 mg, 50 mg, Oral, TID, Maria Isabel Cantor PA-C     HYDROmorphone (PF) (DILAUDID) injection 0.5 mg, 0.5 mg, Intravenous, Q1H PRN, Ry Zhu MD, 0.5 mg at 08/16/22 1222     lidocaine (LMX4) cream, , Topical, Q1H PRN, Severiano Johnson MD     lidocaine (LMX4) cream, , Topical, Q1H PRN, Maria Isabel Cantor PA-C     lidocaine 1 % 0.1-1 mL, 0.1-1 mL, Other, Q1H PRN, Severiano Johnson MD     lidocaine 1 % 0.1-1 mL, 0.1-1 mL, Other, Q1H PRN, Maria Isabel Cantor PA-C     [Held by provider] lisinopril (ZESTRIL) tablet 20 mg, 20 mg, Oral, Daily, Maria Isabel Cantor PA-C     melatonin tablet 1 mg, 1 mg, Oral, At Bedtime PRN, Maria Isabel Cantor PA-C     multivitamin RENAL (NEPHROCAPS/TRIPHROCAPS) capsule 1 capsule, 1 capsule, Oral, Daily, Maria Isabel Cantor PA-C, 1 capsule at 08/17/22 0849     naloxone (NARCAN) injection 0.2 mg, 0.2 mg, Intravenous, Q2 Min PRN **OR** naloxone (NARCAN) injection 0.4 mg, 0.4 mg, Intravenous, Q2 Min PRN **OR** naloxone (NARCAN) injection 0.2 mg, 0.2 mg, Intramuscular, Q2 Min PRN **OR** naloxone (NARCAN) injection 0.4 mg, 0.4 mg, Intramuscular, Q2 Min PRN, Kobe Quarles MD     nitroGLYcerin (NITROSTAT) sublingual tablet 0.4 mg, 0.4 mg, Sublingual, Q5 Min PRN, Severiano Johnson MD     No heparin via hemodialysis machine, , Does not apply, Once, Kj Taveras,      ondansetron (ZOFRAN ODT) ODT tab 4 mg, 4 mg, Oral, Q6H PRN **OR** ondansetron (ZOFRAN) injection 4 mg, 4 mg, Intravenous, Q6H PRN, Maria Isabel Cantor PA-C, 4 mg at 08/16/22 1334     oxyCODONE (ROXICODONE) tablet 5 mg, 5 mg, Oral, Q4H PRN, Severiano Johnson MD, 5 mg at 08/17/22 0917     [COMPLETED] pantoprazole (PROTONIX) IV push injection 80 mg, 80 mg, Intravenous, Once, 80 mg at 08/15/22 1304 **FOLLOWED BY** pantoprazole (PROTONIX) IV push injection 40 mg, 40 mg, Intravenous, Q12H, Severiano Johnson MD, 40 mg at  "08/16/22 2205     Patient is already receiving anticoagulation with heparin, enoxaparin (LOVENOX), warfarin (COUMADIN)  or other anticoagulant medication, , Does not apply, Continuous PRN, Maria Isabel Cantor PA-C     sodium chloride (PF) 0.9% PF flush 3 mL, 3 mL, Intracatheter, Q8H, Severiano Johnson MD, 3 mL at 08/16/22 1339     sodium chloride (PF) 0.9% PF flush 3 mL, 3 mL, Intracatheter, q1 min prn, Severiano Johnson MD, 3 mL at 08/16/22 0418     sodium chloride (PF) 0.9% PF flush 3 mL, 3 mL, Intracatheter, Q8H, Maria Isabel Cantor PA-C, 3 mL at 08/16/22 1335     sodium chloride (PF) 0.9% PF flush 3 mL, 3 mL, Intracatheter, q1 min prn, Maria Isabel Cantor PA-C     SOCIAL HABITS:    Tobacco Use      Smoking status: Current Every Day Smoker        Years: 40.00        Types: Cigarettes      Smokeless tobacco: Never Used      Tobacco comment: 6 cigarettes per day.      Social History    Substance and Sexual Activity      Alcohol use: Never       History   Drug Use Unknown        FAMILY HISTORY:  History reviewed. No pertinent family history.    REVIEW OF SYSTEMS:    A 12 point ROS was reviewed and except for what is listed in the HPI above, all others are negative    PE:    Vital signs:  Temp: 98.2  F (36.8  C) Temp src: Oral BP: (!) 141/83 Pulse: 102   Resp: 21 SpO2: 96 % O2 Device: None (Room air) Oxygen Delivery: 2 LPM   Weight: 58 kg (127 lb 13.9 oz)  Estimated body mass index is 21.28 kg/m  as calculated from the following:    Height as of 7/6/22: 1.651 m (5' 5\").    Weight as of this encounter: 58 kg (127 lb 13.9 oz).       Wt Readings from Last 1 Encounters:   08/15/22 58 kg (127 lb 13.9 oz)     Body mass index is 21.28 kg/m .    DIAGNOSTIC STUDIES:     Images:  US Biopsy Liver    Result Date: 8/16/2022  EXAM: 1. PERCUTANEOUS CORE BIOPSY LIVER MASS 2. ULTRASOUND GUIDANCE DATE/TIME: 8/16/2022 1:24 PM INDICATION: Lung mass with multiple liver masses PROCEDURE: Informed consent obtained. Site marked. Prior " images reviewed. Required items made available. Patient identity confirmed verbally and with arm band. Universal protocol was followed. Time out performed. The site was prepped and draped in sterile fashion. 10 mL of 1% lidocaine was infused into the local soft tissues. Using standard technique and under direct ultrasound guidance, 3 18-gauge core biopsies obtained from a mass in the left hepatic lobe. The patient tolerated the procedure well. No complications. RADIOLOGIC SUPERVISION AND INTERPRETATION: ULTRASOUND GUIDANCE: Images demonstrate the biopsy needle in satisfactory position.     IMPRESSION: Status post ultrasound-guided core biopsy of a liver mass. MAURO KEYS MD   SYSTEM ID:  [id number]    US Ext Arterial Venous Dialys Acs Graft    Result Date: 8/11/2022  US EXTREMITY ARTERIAL VENOUS DIALYSIS ACCESS GRAFT  8/10/2022 2:18 PM HISTORY:  Patient is status post a left brachial artery to basilic vein dialysis fistula. COMPARISON: Ultrasound dated 7/7/2022, fistulogram dated 7/8/2022. FINDINGS: Color Doppler and spectral waveform analysis performed. The left brachial artery to basilic vein dialysis fistula is patent. The outflow volume is measured to be 1400 mL/min. The outflow vein is patent without definite significant stenosis. There is a primarily simple fluid collection adjacent to the fistula at the level of the antecubital fossa. This measures 2.8 x 3.4 cm. There is a primarily simple fluid collection at the proximal medial upper arm. This measures 4.3 x 5.0 cm.     IMPRESSION: Patent left upper extremity dialysis fistula without definite evidence for significant stenosis. Nonspecific simple fluid collections as above. VERA VAUGHN MD   SYSTEM ID:  [id number]    IR Dialysis Fistulogram Left    Result Date: 8/15/2022  INTERVENTIONAL RADIOLOGY DIALYSIS FISTULOGRAM LEFT August 11, 2022 10:16 AM PROCEDURE(S): 1. Ultrasound-guided fistula access. 2. Left upper extremity fistulography. 3. Dilation of left  subclavian vein, brachiocephalic vein and junction of the brachiocephalic vein and superior vena cava. MODERATE SEDATION: 2 mg Versed IV, 100 mcg fentanyl IV. During the time out, immediately prior to the administration of medications, the patient was reassessed for adequacy to receive conscious sedation. Under physician supervision, Versed and fentanyl were administered for moderate sedation. Pulse oximetry, heart rate and blood pressure were continuously monitored by an independent trained observer. The physician spent 33 minutes of face-to-face sedation time with the patient. CONTRAST: 20 mL Isovue 300 IV/IA. FLUOROSCOPY TIME: 1.7 minutes. AIR KERMA: 23.08 mGy. COMPLICATIONS: None. CLINICAL HISTORY/INDICATION: Stage renal failure requiring dialysis, now with worsening severe left upper extremity and neck swelling. PROCEDURE AND FINDINGS: Following a discussion of the risks, benefits, indications, and alternatives to treatment, appropriate informed consent was obtained. The patient was brought to the interventional radiology suite and placed supine on the table. The left upper extremity was prepped and draped in a sterile fashion. A timeout was performed per universal protocol policy to confirm the correct patient, site and procedure to be performed. Limited preprocedure ultrasound of the left upper extremity fistula shows the fistula to be patent. 1% lidocaine was used for local anesthesia. Under direct ultrasound guidance a 21-gauge micropuncture needle was advanced into the fistula directed towards the venous outflow. An image was archived. A 0.018 wire was advanced through the needle and exchange was made for a 5 [place] micropuncture sheath. A fistulogram was performed which demonstrates the fistula is patent. There is a focal stenosis of the mid left subclavian vein, mid brachiocephalic vein and at the junction of the brachiocephalic vein and superior vena cava. Superior vena cava is patent. A guidewire was  then advanced through the micropuncture dilator, and exchange was made for a 7 Polish vascular sheath. 10 x 40 mm and 12 x 40 mm balloons were advanced across the areas of stenosis and venoplasty was performed for 2 minutes. Postdilation venography was performed and reveals a significant improvement in the appearance of the vein. The fistula has a strong thrill upon palpation. All wires and sheaths were removed and manual compression was held, until hemostasis was achieved. Throughout the procedure, the patient was monitored by a radiology nurse for cardiac rhythm which remained stable. The patient tolerated the procedure well and left the interventional radiology suite in stable condition.     IMPRESSION: Fistulography demonstrating stenosis of the mid cephalic vein, mid brachiocephalic vein and junction of the brachiocephalic vein and superior vena cava, improved status post venoplasty 10 x 40 mm and 20 x 40 mm balloons. SHAHAB MORALES DO   SYSTEM ID:  [id number]    CT Chest w Contrast    Result Date: 8/15/2022  EXAM: CT CHEST W CONTRAST LOCATION: [facility] DATE/TIME: 8/15/2022 12:14 AM INDICATION: mediastinal mass COMPARISON: CT soft tissue neck obtained earlier on 8/15/2022 TECHNIQUE: CT chest with IV contrast. Multiplanar reformats were obtained. Dose reduction techniques were used. CONTRAST: 75mL Isovue 370 FINDINGS: LUNGS AND PLEURA: There is a small right-sided pleural effusion. Masslike consolidation is seen within the right upper lobe measuring 4 x 2 x 4.8 cm, there is adjacent septal thickening which is somewhat nodular in appearance and architectural distortion  seen adjacent mass. Nodular thickening is also seen along the minor fissure (image 46, series 7). A 9 x 6 mm solid nodule seen in the left upper lobe. (Image 40, series 5).. There is right apical pleural thickening. Tree-in-bud opacities are seen in the  left lower lobe (image 137 &170, series 5). A 5.5 mm nodule  solid nodule seen in the right lower lobe. (Image 242 series 5) MEDIASTINUM/AXILLAE: There is a large mediastinal mass measuring roughly 10 x 8 x 6 cm, which has mass effect upon the trachea and and narrows the SVC as well as compresses and occludes the right upper lobe pulmonary are artery right upper lobe bronchus.  Hilar and mediastinal adenopathy is noted. The esophagus is unremarkable. The heart is normal in size. As a small pericardial effusion. A left IJ central venous catheter is seen in place with the tip in the right atrium. CORONARY ARTERY CALCIFICATION: Mild. UPPER ABDOMEN: There are multiple large hypodense masses seen throughout the liver, the largest of which measures 4 cm in diameter, within the right lobe of the liver (image 60, series 3). MUSCULOSKELETAL: Contrast reflux is seen along the right chest wall and upper abdomen. No worrisome osseous lesions are noted     IMPRESSION: 1.  10 x 8 x 6 cm large mediastinal mass which has mass effect upon the trachea, narrows the SVC and occludes the right upper lobe pulmonary bronchus and artery, likely reflecting conglomerate adenopathy secondary to neoplasm. 2.  Masslike thickening in the right upper lobe with architectural distortion as well as adjacent nodular septal thickening, concerning for a combination of atelectasis and neoplastic mass, with findings indicative of lymphangitic spread of neoplasm. 3.  Tree-in-bud opacities within the left lower lobe, concerning for endobronchial spread of the neoplastic process 4.  2 solid nodules within the left lung as described above, concerning for metastatic foci 5.  Multiple large hepatic masses, concerning for hepatic metastatic disease 6.  Contrast reflux within the subcutaneous vessels of the right chest wall and upper abdomen, consistent with SVC syndrome/compression secondary to the large mediastinal mass/adenopathy 7.  Small left-sided pleural effusion 8.  Small pericardial effusion    CT Soft Tissue  Neck w Contrast    Result Date: 8/15/2022  EXAM: CT SOFT TISSUE NECK W CONTRAST LOCATION: [facility] DATE/TIME: 8/15/2022 12:14 AM INDICATION: Mediastinal mass. Soft tissue swelling. COMPARISON: None. CONTRAST: 125 mL Isovue 370. TECHNIQUE: Routine CT Soft Tissue Neck with IV contrast. Multiplanar reformats. Dose reduction techniques were used. FINDINGS: MUCOSAL SPACES/SOFT TISSUES: Multifocal dental and periodontal disease. There is a thin prevertebral collection extending from C2-C3 through C6-C7, measuring up to 4 mm in thickness. No definite associated enhancement. Diffuse subcutaneous fat stranding.  No abnormal enhancement or discrete enhancing mass along the mucosal spaces of the upper aerodigestive tract. Normal vocal cords and infraglottic trachea. Normal parapharyngeal spaces. Normal oral cavity,  spaces, and floor of mouth structures. LYMPH NODES: Abnormally enlarged and cystic/necrotic appearing cervical lymph nodes, right greater than left. The largest lymph nodes on the right are paratracheal and supraclavicular in location, with a lymph node along the right thyroid that measures 2.3 x 1.9 x 3.0 cm (series 505 image 79, series 503 image 88). There is a centrally necrotic right paratracheal isis conglomeration that is incompletely evaluated, but measures at least 2.0 x 3.6 cm in AP x transverse dimension (series 503 image 100). On the left the largest lymph nodes are in the left subclavicular region, measuring up to 1.3 x 1.9 x 1.7 cm (series 505 image 83, series 503 image 80). SALIVARY GLANDS: Normal parotid and submandibular glands. THYROID: Tiny subcentimeter nodules in the thyroid. Further follow-up is not indicated by size criteria. VESSELS: There is a filling defect in the right internal jugular vein, measuring approximately 5 cm in craniocaudal dimension (series 505 image 73), concerning for intraluminal thrombus. Mild nonflow limiting atherosclerotic plaque  at the carotid bifurcations. VISUALIZED INTRACRANIAL/ORBITS/SINUSES: Mild age-related changes of the visualized intracranial compartment. Mild mucosal thickening of the left maxillary sinus. OTHER: No mastoid or middle ear effusion. See separately dictated chest CT.     IMPRESSION: 1.  Cystic/necrotic cervical adenopathy, concerning for metastatic disease. 2.  Filling defect within the right internal jugular vein, worrisome for intraluminal thrombus. 3.  Diffuse third spacing with a nonenhancing retropharyngeal fluid collection. Findings may be related to SVC syndrome. 4.  No suspicious osseous lesions. 5.  Multifocal dental and periodontal disease. Findings were discussed with Dr. Kan at 1:44 AM on 08/15/2022.    LABS:      Sodium   Date Value Ref Range Status   08/17/2022 134 133 - 144 mmol/L Final   08/16/2022 135 133 - 144 mmol/L Final   08/15/2022 135 133 - 144 mmol/L Final     Urea Nitrogen   Date Value Ref Range Status   08/17/2022 66 (H) 7 - 30 mg/dL Final   08/16/2022 103 (H) 7 - 30 mg/dL Final   08/15/2022 72 (H) 7 - 30 mg/dL Final     Hemoglobin   Date Value Ref Range Status   08/17/2022 7.0 (L) 11.7 - 15.7 g/dL Final   08/16/2022 8.2 (L) 11.7 - 15.7 g/dL Final   08/16/2022 8.1 (L) 11.7 - 15.7 g/dL Final     Platelet Count   Date Value Ref Range Status   08/17/2022 234 150 - 450 10e3/uL Final   08/16/2022 235 150 - 450 10e3/uL Final   08/15/2022 376 150 - 450 10e3/uL Final     INR   Date Value Ref Range Status   08/15/2022 1.68 (H) 0.85 - 1.15 Final   08/11/2022 2.52 (H) 0.85 - 1.15 Final   07/13/2022 1.98 (H) 0.85 - 1.15 Final       Total time spent 30 minutes face to face with patient with more than 50% time spent in counseling and coordination of care.    Rayne Lawrence NP  [facility]ascular Surgery

## 2022-08-17 NOTE — PROGRESS NOTES
[facility]  Gastroenterology Progress Note     Gina Simpson MRN# [id number]   YOB: 1965 Age: 56 year old          Assessment and Plan:     Gina Simpson is a 56 year old female who has a past medical history of CAD, cardiomyopathy, hypertension, COPD and ESRD who underwent creation of LUE AV fistula on 6/28/22 complicated by hematoma s/p evacuation and LUE DVT found to have prevertebral soft tissue swelling potentially presenting retropharyngeal abscess as well as new pulmonary nodule, mediastinal mass, and extensive adenopathy and admitted on 8/14/22 and had hematemesis on 8/15/22.    Lymphadenopathy  Hematemesis  Acute on chronic anemia  New mediastinal mass  - CT neck shows new mediastinal mass on right 5.8 x 6.1cm as well as pulmonary nodule measuring 9mm-   -  CT of chest also notes multiple large hepatic masses concerning for metastatic disease  -  8/15 EGD noted clotted blood in the entire stomach, spurting duoeneal ulcer with visible vessel, injected, clips placed, argon plasma coag used.  -  No repeat hematemesis-Hemoglobin decreased to 7.0 and has had no obvious blood loss source. May be due to biopsy yesterday and dialysis.    -- Continue to monitor hemoglobin and transfuse for 7 or less  -- IV pantoprazole 40 mg BID  -- Daily CBC  -- Dialysis diet               Interval History:     doing well; no cp, sob, n/v/d, or abd pain.              Review of Systems:     C: NEGATIVE for fever, chills, change in weight  E/M: NEGATIVE for ear, mouth and throat problems  R: NEGATIVE for significant cough or SOB  CV: NEGATIVE for chest pain, palpitations or peripheral edema             Medications:   I have reviewed this patient's current medications    - MEDICATION INSTRUCTIONS for Dialysis Patients -   Does not apply See Admin Instructions     sodium chloride 0.9%  250 mL Intravenous Once in dialysis/CRRT     sodium chloride 0.9%  300 mL Hemodialysis Machine Once      [Held by provider] amLODIPine  10 mg Oral QPM     ampicillin-sulbactam (UNASYN) IV  3 g Intravenous Q24H     atorvastatin  20 mg Oral Daily     calcium acetate  2,001 mg Oral TID w/meals     carvedilol  25 mg Oral BID w/meals     [Held by provider] famotidine  20 mg Intravenous Q48H     [Held by provider] hydrALAZINE  50 mg Oral TID     [Held by provider] lisinopril  20 mg Oral Daily     multivitamin RENAL  1 capsule Oral Daily     - MEDICATION INSTRUCTIONS -   Does not apply Once     pantoprazole (PROTONIX) IV  40 mg Intravenous Q12H     sodium chloride (PF)  3 mL Intracatheter Q8H     sodium chloride (PF)  3 mL Intracatheter Q8H                  Physical Exam:   Vitals were reviewed  Vital Signs with Ranges  Temp:  [97.4  F (36.3  C)-97.9  F (36.6  C)] 97.8  F (36.6  C)  Pulse:  [] 101  Resp:  [8-24] 24  BP: ()/() 133/74  SpO2:  [88 %-100 %] 95 %  I/O last 3 completed shifts:  In: 480 [P.O.:480]  Out: 1 [Other:1]  Constitutional: healthy, alert, and no distress   Cardiovascular: negative, PMI normal. No lifts, heaves, or thrills. RRR. No murmurs, clicks gallops or rub  Respiratory: negative, Percussion normal. Good diaphragmatic excursion. Lungs clear  Abdomen: Abdomen soft, non-tender. BS normal. No masses, organomegaly           Data:   I reviewed the patient's new clinical lab test results.   Recent Labs   Lab Test 08/17/22  0554 08/16/22  0516 08/16/22  0021 08/15/22  1115 08/15/22  0451 08/14/22  2149 08/11/22  0913 07/13/22  0635   WBC 16.1* 15.5*  --   --  6.7   < >  --  10.0   HGB 7.0* 8.2* 8.1*   < > 7.0*   < >  --  9.3*   MCV 92 90  --   --  90   < >  --  87    235  --   --  376   < >  --  297   INR  --   --   --   --  1.68*  --  2.52* 1.98*    < > = values in this interval not displayed.     Recent Labs   Lab Test 08/17/22  0554 08/16/22  0516 08/15/22  1115   POTASSIUM 3.9 4.6 4.0   CHLORIDE 99 102 102   CO2 27 22 23   BUN 66* 103* 72*   ANIONGAP 8 11 10     Recent Labs    Lab Test 08/15/22  1115 08/15/22  0451   ALBUMIN 2.2* 2.5*   BILITOTAL 0.3 0.4   ALT 19 22   AST 43 48*       I reviewed the patient's new imaging results.    All laboratory data reviewed  All imaging studies reviewed by me.    Sussy Tenorio PA-C,  8/16/2022  [facility]  Office : [phone]  Cell: [phone] (Dr. Camacho)  Cell: [phone] (Sussy Tenorio PA-C)

## 2022-08-18 NOTE — PROGRESS NOTES
Preliminary review of liver biopsy with pathologist from 8/16/22 shows nonsmall cell lung CA likely neuroendocrine subtype - final stains pending.  I discussed with Dr. Dreyer & recommend initiating emergency XRT rx. to right upper lung SVC compressing mass.  I outlined XRT rx & potential side effects with pt. who consents to rx.  Will simulate XRT rx field this AM & treat this PM.  Plan 10 rx fractions to dose of 3000 cGy to right upper lung mass.  Consult dictated.  MARY Polanco MD.

## 2022-08-18 NOTE — PROGRESS NOTES
"[facility]    Medicine Progress Note - Hospitalist Service    Date of Admission:  8/14/2022    Assessment & Plan            Gina Simpson is a 56 year old female with PMHx of CAD, cardiomyopathy, hypertension, COPD and ESRD with hx of failed RUE fistula who underwent creation of LUE AV fistula on 6/28/22 complicated by hematoma s/p evacuation and LUE DVT who is a direct admission to [facility] from [facility] after presenting with 2+weeks of facial swelling, fatigue, dysphagia and being found to have prevertebral soft tissue swelling potentially presenting retropharyngeal abscess as well as new pulmonary nodule, mediastinal mass, and extensive adenopathy.      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   CT soft tissue neck w/o contrast in the ED shows new symmetrical prevertebral soft tissue swelling and hypodensity bilaterally from the C2-C5 levels which could represent inflammatory or infectious cellulitis or potential retropharyngeal/prevertebral abscess. Additionally new mediastinal mass causing displacement of trachea and esophagus to the left as discussed below. Unclear if this represents true infection vs inflammation. She reports foreign body R mandible behind lower molar for several weeks though no obvious infection on exam.   No stridor, respiratory distress. She is managing secretions without difficulty and denies significant throat pain. S/p clindamycin, azithromycin, dexamethasone 10mg prior to transfer  --ENT consulted- reviewed 8/16   --close monitoring for any evidence of airway compromise     New mediastinal mass  Extensive cervical, supraclavicular, right thoracic inlet and right mediastinal/hilar adenopathy  New 9mm ARIA pulmonary nodule  Metastatic poorly " differentiated non-small cell carcinoma with neuroendocrine differentiation  Patient reports significant fatigue, 20lb weight loss and poor appetite over past two months or so. No known malignancy.   CT neck shows new mediastinal mass on right 5.8 x 6.1cm as well as pulmonary nodule measuring 9mm. CT 2019 showed 3mm nodule in R lung apex. Extensive adenopathy seen as well and causing compression of brachiocephalic and mediastinal vasculature which is likely contributing to swelling.   -- Appreciate reviewed by thoracic surger y, vascular surgery, IR and oncology  -s/p biopsy:  8/16.   -Continue on IV Dilaudid every hour as needed.  ct oxycodone 5 mg p.o. every 4 hours.  If pain is still not controlled may consider PCA    -Appreciate heme-onc, IR, vascular surgery and radiation Onc: Planned for emergency XRT rx. to right upper lung SVC compressing mass    RUL opacities, CT concerning for malignancy  CT neck shows gildardo opacities RUL with moderate R pleural effusion. Reports cough for several days. Unsure of fever. WBC normal. LA normal. mediastinal mass is causing displacement of trachea. Oxygen stable on room air.     --blood cultures obtained in the ED, pending      Leukocytosis: We will change to Unasyn to Zosyn 8/17.  Patient had significant hemoptysis and could have component of aspiration    ESRD on HD  S/p left AV fistula formation 6/28/22  S/p fistulogram with dilatation of L subclavian and junction of brachiocephalic and subclavian vein complicated by hematoma requiring evacuation 7/8/22  Dialyzes MWF - Dr. Gonsales. She is still using internal jugular tunneled dialysis catheter at this time. She continues to make urine.   She underwent outpatient fistulogram 8/11/22.   Cr 5.85 in the ED, K normal at 3.6.   --Nephrology consulted,   Dialysis was stopped after 40 minutes due to RRT from upper GI bleed  -Appreciate dialysis sessions as per nephrology.     Left brachial vein DVT 7/6/22  CT neck 8/14: Filling defect  within the right internal jugular vein, worrisome for intraluminal thrombus.   Started on warfarin at the time due to ESRD. INR is subtheraputic in the ED 1.4.   --started heparin drip in place of warfarin given plan for biopsy   8/15: Heparin on hold  8/16: May consider resuming heparin once hemoglobin is stable     CAD   Hx Cardiomyopathy  Hypertension  Hx stenting in 2019. Last ECHO 2020 showed Ef 40-45%.   PTA:  hydralazine 50mg TID, carvedilol 25mg bid, lisinopril 20mg daily, amlodipine 10mg every evening  --continue PTA carvedilol pending med rec   --HOLD lisinopril, hydralazine, amlodipine due to upper GI bleed      Acute upper GI bleed: 8/15  - ensure 2 large bore IV Access  - type and screen and transfuse to ensure Hb>7 , platelet > 50 and INR <2   - NPO  - Protonix bid   -Patient was managed in RRT.  s/p 2 unit PRBC for hemoglobin of 5.6 and active bleed.  - s/p EGD 8/15: 8/15 EGD noted clotted blood in the entire stomach, spurting duoeneal ulcer with visible vessel, injected, clips placed, argon plasma coag used.  8/17: Hemoglobin again dropped to 7.  We will transfuse 1 unit of PRBC and continue to monitor hemoglobin closely  8/18: Hb dropped to 6.7, transfuse 1 U PRBC. D/W GI- will start CLD and monitor Hb. If still not adequate response, may need EGD tomorrow    chronic anemia related to ESRD  Most recent hgb on hospital discharge 7/13 is 9.3. hgb on admission 8.2.   --Cont EPO with dialysis.      Hyperphosphatemia  --Cont PhosLo with meals     Diet: Clear Liquid Diet    DVT Prophylaxis: Heparin gtt once cleared by GI  Antoine Catheter: Not present  Central Lines: PRESENT  CVC Double Lumen Left Subclavian-Site Assessment: WDL  Cardiac Monitoring: ACTIVE order. Indication: unstable  Code Status: Full Code      Disposition Plan      Expected Discharge Date: 08/21/2022      Destination: home with family  Discharge Comments: 8/16 SVC stenting and biposy pending  8/18 Medically not ready        The patient's  "care was discussed with the Bedside Nurse and Patient.    Severiano Johnson MD, MD  Hospitalist Service  [facility]  Securely message with the [employer] Web Console (learn more here)  Text page via [employer] Paging/Directory     \"This dictation was performed with voice recognition software and may contain errors,  omissions and inadvertent word substitution.\"    Clinically Significant Risk Factors Present on Admission                  # Severe Malnutrition: based on nutrition assessment _____________________________________________________________________    Interval History     Generally feels unwell. Pain controlled. Breathing stable.   Denies nausea/vomiting or any further episodes of epistaxis.      4 point ROS negative unless mentioned    Data reviewed today: I reviewed all medications, new labs and imaging results over the last 24 hours. I personally reviewed no images or EKG's today.    Physical Exam   BP (!) 148/68   Pulse 85   Temp 97.5  F (36.4  C) (Axillary)   Resp 12   Wt 54.9 kg (121 lb 1.6 oz)   SpO2 94%   BMI 20.15 kg/m    Gen- pleasant lying in bed  HEENT- ALEENA  Neck-swelling  CVS- I+II+ soft ESM  RS- CTAB  Abdo- soft, mild tenderness in epigastrium , No g/r/r   Ext- b/l arms edema Lt >Rt   CNS-oriented to person, place, time    Left AVF     Data   No results found for this or any previous visit (from the past 24 hour(s)).   BMP  Recent Labs   Lab 08/18/22  0553 08/17/22  0554 08/16/22  0516 08/15/22  1115    134 135 135   POTASSIUM 3.8 3.9 4.6 4.0   CHLORIDE 99 99 102 102   JEWELL 8.6 8.8 9.1 8.3*   CO2 29 27 22 23   BUN 63* 66* 103* 72*   CR 3.55* 4.45* 5.97* 4.73*   * 106* 107* 175*     CBC  Recent Labs   Lab 08/18/22  0553 08/17/22  2035 08/17/22  0554 08/16/22  0516 08/15/22  1115 08/15/22  0451   WBC 15.9*  --  16.1* 15.5*  --  6.7   RBC 2.19*  --  2.35* 2.76*  --  2.44*   HGB 6.6*   < > 7.0* 8.2*   < > 7.0*   HCT 19.4*  --  21.5* 24.7*  --  22.0*   MCV 89  --  " 92 90  --  90   MCH 30.1  --  29.8 29.7  --  28.7   MCHC 34.0  --  32.6 33.2  --  31.8   RDW 13.7  --  13.9 13.7  --  13.6     --  234 235  --  376    < > = values in this interval not displayed.     INR  Recent Labs   Lab 08/15/22  0451   INR 1.68*     LFTs  Recent Labs   Lab 08/15/22  1115 08/15/22  0451   ALKPHOS 26* 32*   AST 43 48*   ALT 19 22   BILITOTAL 0.3 0.4   PROTTOTAL 4.9* 5.7*   ALBUMIN 2.2* 2.5*      PANCNo lab results found in last 7 days.

## 2022-08-18 NOTE — PLAN OF CARE
Goal Outcome Evaluation:    Plan of Care Reviewed With: patient     Overall Patient Progress: improving    Outcome Evaluation: A/O x 4.  Tachycardic at times. On RA during day. Moderate pain managed with PO oxycodone. Regular diet. Episode of SOB if supine; related to SVC syndrome and is positonal; managed with head of the bed 30 degrees. Left chest tunneled catheter; dressing clean dry and intanct. Hemodialysis completed using left arm fistula. Ambulation w/ standby assist. Swelling on arms and face.

## 2022-08-18 NOTE — PLAN OF CARE
"DATE & TIME: 8/18 8240-4564    Cognitive Concerns/ Orientation : A&Ox4   BEHAVIOR & AGGRESSION TOOL COLOR: green  ABNL VS/O2: VSS x; HTN, LS clear  MOBILITY: SBA   PAIN MANAGMENT: PRN dilaudid and oxy for back and stomach  DIET: Clear liquids  BOWEL/BLADDER: Up to bathroom. Pain in stomach when using the bathroom. Black stool today, per pt it is \"hardening up\"  ABNL LAB/BG: hgb 6.6, 1 unit transfused. Recheck in AM. EGD if below 7.   DRAIN/DEVICES: Fistula in L upper arm., bruit and thrill present. L. 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 Radiation therapy today. Continue for 9 more days then pt will begin chemo tx.   OTHER IMPORTANT INFO: Video study tomorrow. Dialysis tomorrow.       "

## 2022-08-18 NOTE — PROGRESS NOTES
Patient came to radiation this morning for a CT planning scan of the chest.  Patient will be returning later this afternoon to begin radiation therapy treatments.

## 2022-08-18 NOTE — PLAN OF CARE
1900h-0700h: Pt Lethargic, oriented x4. Arouses to voice. AV fistula on R upper arm, dressing CDI. Same arm swollen, pt denies pain, radial pulses palpable. CVC on L chest, SL.  Clear bilateral breath sound except fine crackles in lower lobe.     Pt reported back pain of 7/10, managed w/ dilaudid; effective.      Tolerates Regular diet w/ no straws. Anuric, on hemodialysis. Last BM 08/16.      Kept HOB >30 degrees. Ambulates SBA. Tele. Sinus tachycardia.     XR video swalllow with SLP or OT 08/18.  For CBC & BMP am.        Critical Hgb: 6.6

## 2022-08-18 NOTE — PROGRESS NOTES
Renal Medicine Progress Note            Assessment/Plan:          Gina Simpson is a 56 year old female who was admitted on 8/14/2022.      Assessment:  1) non-small cell lung CA. Plans for XRT and eventual chemo.      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    S/p left brachiobasilic fistula 2022 with complicatons of arm swelling, s/p innominate vein venoplasty. Last procedure 6/28/22 revision but admission 7/5 with left arm swelling, s/p repeat venoplasty complicated by hematoma requiring evacuation.      Successful dialysis session 8/17 with cannulation of AVG. I talked to both Dr. Miller and Dr. Dreyer regarding dialysis access and potential future need for central access port placement. With no immeidate plans for SVC stenting, will keep in place until next week, ensure further graft cannulation no issue and arm edema no worsening.      3) ESRD on MWF HD, Dr. Gonsales, [facility] unit. I called him to have him be aware of patient and diagnosis.               Treatment Type  Hemodialysis (procedure)    Dialyzer  : AssetaRO Series: ELISIO Model: 17H RENÉE Factor: 1455    Dialysate  Potassium 2k    Dialysate  Calcium 2.5 mEq/L    Dialysate  BiCarb 35 mEq/L    Dialysate  Base Sodium 138 mEq/L    Dialysate Flow Rate  500 ML/min    Blood Flow Rate  400 ML/min    Treatment Time  195 min    Temperature  Standard    Max UF Rate  2 L/Hr    Access  CVC Catheter Chest (Left)    Access Concurrent  No    Schedule  3 Times per week    Heparin Load  1000 Units (1:1,000 concentration)    Heparin Hourly Dose  500 Units/hr (1:1,000 concentration)          4) Upper ext, neck/face edema: related to SVC compression, will attempt UF as able with HD. Doubt removing dialysis catheter will improve edema, symptoms.      Other:  Hx HTN:   amlodipine, coreg PTA  Hx ASCVD  S/p GI bleed        Plan/Recs:  1) HD tomorrow per chronic schedule.  Will again use AVG. .  2) Leave in CVC until next week, can remove then unless arm edema worsening or need CVC to be used to convert to some chemo port.     [facility] - Nephrology  Cell: [phone]  Office: [phone]              Interval History:     Pt seen after radiation oncology consult. She complained to me about the pain from laying flat with head down on the metal table, not sure she wants to proceed. NO dyspena reported, getting unit of blood during visit.           Medications and Allergies:       - MEDICATION INSTRUCTIONS for Dialysis Patients -   Does not apply See Admin Instructions     sodium chloride 0.9%  250 mL Intravenous Once in dialysis/CRRT     sodium chloride 0.9%  300 mL Hemodialysis Machine Once     allopurinol  100 mg Oral QPM     [Held by provider] amLODIPine  10 mg Oral QPM     atorvastatin  20 mg Oral Daily     calcium acetate  2,001 mg Oral TID w/meals     carvedilol  25 mg Oral BID w/meals     [Held by provider] famotidine  20 mg Intravenous Q48H     [Held by provider] hydrALAZINE  50 mg Oral TID     [Held by provider] lisinopril  20 mg Oral Daily     multivitamin RENAL  1 capsule Oral Daily     - MEDICATION INSTRUCTIONS -   Does not apply Once     pantoprazole  40 mg Oral BID AC     piperacillin-tazobactam  2.25 g Intravenous Q8H     sodium chloride (PF)  3 mL Intracatheter Q8H     sodium chloride (PF)  3 mL Intracatheter Q8H      No Known Allergies         Physical Exam:   Vitals were reviewed  /56   Pulse 86   Temp 97.4  F (36.3  C) (Axillary)   Resp 12   Wt 54.9 kg (121 lb 1.6 oz)   SpO2 94%   BMI 20.15 kg/m      Wt Readings from Last 3 Encounters:   08/18/22 54.9 kg (121 lb 1.6 oz)   07/10/22 65 kg (143 lb 4.8 oz)   06/28/22 56.2 kg (124 lb)       Intake/Output Summary (Last 24 hours) at 8/18/2022 1321  Last data filed at 8/18/2022 0427  Gross per 24 hour   Intake 456 ml   Output 0 ml   Net 456 ml       GENERAL APPEARANCE: Pt sleepy,  somnulent but easily arousable and conversant.   HEENT:  Neck swelling present  RESP: lungs cta b c good efforts, no crackles, rhonchi or wheezes  CV: RRR, nl S1/S2  ABDOMEN: o/s/nt/nd, bs present  EXTREMITIES/SKIN: no c/c/rashes/lesions; b/l arm edema, left more than right  NEURO:  Grossly non-focal           Data:     BMP  Recent Labs   Lab 08/18/22  0553 08/17/22  0554 08/16/22  0516 08/15/22  1115    134 135 135   POTASSIUM 3.8 3.9 4.6 4.0   CHLORIDE 99 99 102 102   JEWELL 8.6 8.8 9.1 8.3*   CO2 29 27 22 23   BUN 63* 66* 103* 72*   CR 3.55* 4.45* 5.97* 4.73*   * 106* 107* 175*     CBC  Recent Labs   Lab 08/18/22  0553 08/17/22 2035 08/17/22  0554 08/16/22  0516 08/15/22  1115 08/15/22  0451   WBC 15.9*  --  16.1* 15.5*  --  6.7   HGB 6.6* 7.3* 7.0* 8.2*   < > 7.0*   HCT 19.4*  --  21.5* 24.7*  --  22.0*   MCV 89  --  92 90  --  90     --  234 235  --  376    < > = values in this interval not displayed.     Lab Results   Component Value Date    AST 43 08/15/2022    ALT 19 08/15/2022    ALKPHOS 26 (L) 08/15/2022    BILITOTAL 0.3 08/15/2022     Lab Results   Component Value Date    INR 1.68 (H) 08/15/2022       Attestation:  I have reviewed today's vital signs, notes, medications, labs and imaging.    DO [facility] - Nephrology  Office: [phone]  Cell: [phone]

## 2022-08-18 NOTE — PROGRESS NOTES
Progress Note    Gina Simpson MRN# [id number]   YOB: 1965 Age: 56 year old   Date of Admission: 8/14/2022  Requesting physician: Dr. Silverio  Reason for consult: Mediastinal mass           Assessment and Plan:   1. 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 At least grade 2.   - CT with possible RUL mass, multiple hepatic masses. Cervical and supraclavicular lymphadenopathy  - She has smoked since age 11  - A/w 10 pound weight loss   - Biopsy came back with above unfortunately     2. Anemia   - In setting of CKD on dialysis  - Had episode of hematemesis 8/15  - Hg 5.6 on 8/15, down from baseline of 9 a month ago   - Was started on anticoagulation for intraluminal thrombus -> bleed, heparin has been held since 8/15  - Actively bleeding ulcer in the duodenum was clipped on 8/15  - EPO per nephro    - Hg 7 on 8/17 s/p 1 unit prbc   - Hg 6.6 this AM, receiving additional unit prbc  - Had melenous stool this morning, but may be from previous bleed?     3. Intraluminal thrombus  - Restart heparin inpatient when Hg stabilized and no further bleeding  - Will need lifelong anticoagulation, if tolerated.     4. Smoker  - Interested in quitting  - Doing well inpatient without nicotine     PLAN  - Placement of SVC stent complicated by need for vascular access for dialysis, likely future need for chemoport, recent balloon dilation of neck vein and her maturing fistula in the LUE. After multidisciplinary discussions it was determined SVC stent would not improve her symptoms and she started radiation today to the right upper lung SVC compressing mass.   - Discussed with nephrology, will not need  chemoport until radiation finishes, and expect her to get likely 10 day course of radiation. They will keep dialysis catheter in place for now, it may be used to guide future chemoport.   - I discussed the diagnosis of metastatic large cell neuroendocrine carcinoma, I explained the poor prognosis and the aggressive nature to the patient and family at bedside. I recommended to her we start her on chemotherapy once she finishes radiation. Upon hearing the prognosis, she requested a second opinion. I am going to reach out to Dr. Reyna from the [facility] and see if they would be willing to see her for a second opinion.   - Continue transfusions for Hg <7, GI following, further endoscopy per their discretion   - Treatment would be similar to SCLC, something like cisplatin, etoposide and atezolizumab to start. Would need dose reduction in cisplatin and likely would discuss this with her nephrologist as she still produces urine.   - Continue allopurinol dosed with dialysis   - Check PDL1 activity and would send for comprehensive NGS panel outpatient, but low likelihood of finding targetable mutation   - Restart heparin when we are able to and no longer requiring transfusion support, consider repeat EGD per GI recs   - Dialysis going well with her LUE fistula   - Smoking cessation upon discharge from hospital may prolong her life     Patrick Dreyer, DO  [facility]  [phone] (office); [phone] (cell)              Chief Complaint:   No chief complaint on file.           History of Present Illness:   This patient is a 56 year old female who presented to the hospital as a direct admission from [place] emergency department today for evaluation of facial swelling, weakness and fatigue with intermittent dysphagia.  The patient feels like she has a foreign body in her right lower mouth, she feels like something is sticking out of it.  No pus or change in odor.  She has been having dysphagia for the  past few weeks, food will get stuck in her throat when she swallows.  She reports that when she wakes up in the morning she has trouble breathing and her neck is engorged and she can see the veins in her neck.  She notes weight loss over the past month.  She also notes increasing fatigue with poor appetite.  She denies any fevers. She is active smoker since age 11. Interested in quitting.     22 She slept much better last night with the head of bed raised. Did not wake up this morning short of breath.     22 She notes persistent mild dyspnea. She feels fatigued. She had an episode of black stool this morning.     22 She reports mild improvement in her SOB.        Physical Exam:   Vitals were reviewed  Blood pressure (!) 141/68, pulse 87, temperature 97.7  F (36.5  C), temperature source Axillary, resp. rate 20, weight 54.9 kg (121 lb 1.6 oz), SpO2 94 %, not currently breastfeeding.  Temperatures:  Current - Temp: (!) 96  F (35.6  C); Max - Temp  Av.7  F (36.5  C)  Min: 96  F (35.6  C)  Max: 98.2  F (36.8  C)  Respiration range: Resp  Av  Min: 12  Max: 22  Pulse range: Pulse  Av.8  Min: 96  Max: 120  Blood pressure range: Systolic (24hrs), Av , Min:91 , Max:146   ; Diastolic (24hrs), Av, Min:61, Max:87    Pulse oximetry range: SpO2  Av.6 %  Min: 94 %  Max: 100 %    Intake/Output Summary (Last 24 hours) at 8/15/2022 1506  Last data filed at 8/15/2022 1442  Gross per 24 hour   Intake 300 ml   Output --   Net 300 ml       GENERAL: Mild increase in the engorgement to R neck vein.  SKIN: No rashes or jaundice.  HEENT: I cannot see anything in her R inferolateral mouth, but she does have poor dentition in this area   LYMPH: Fullness/lymph nodes in cervical and supraclavicualr   HEART: Regular rate and rhythm with no murmurs.  LUNGS: Clear bilaterally.  ABDOMEN: Soft, nontender, nondistended with no palpable hepatosplenomegaly.  EXTREMITIES: No clubbing, cyanosis, or  edema.  MENTAL: Alert and oriented to person, place, and time.  NEURO: Cranial nerves II through XII grossly intact with no focal motor or sensory deficits.              Past Medical History:   I have reviewed this patient's past medical history  Past Medical History:   Diagnosis Date     Anemia      Cancer (H)      Coronary artery disease      Dialysis complication      Dialysis patient (H)      Embolism (H)      ESRD (end stage renal disease) (H)      Hypertension      Ischemic cardiomyopathy      Renal failure              Past Surgical History:   I have reviewed this patient's past surgical history  Past Surgical History:   Procedure Laterality Date     ANESTH,UPPER ARM AV FISTULA REPAIR       CREATE FISTULA ARTERIOVENOUS UPPER EXTREMITY Left 3/25/2022    Procedure: CREATION OF FIRST STAGE LEFT BRACHIOBASILIC ARTERIOVENOUS FISTULA;  Surgeon: Akash Miller MD;  Location: [facility] OR     CREATE FISTULA ARTERIOVENOUS UPPER EXTREMITY Left 6/28/2022    Procedure: LEFT BRACHIO-BASILIC ARTERIOVENOUS FISTULA WITH BIOPROSTHETIC GRAFT;  Surgeon: Akash Miller MD;  Location: [facility] OR     IR DIALYSIS FISTULOGRAM LEFT  5/31/2022     IR DIALYSIS FISTULOGRAM LEFT  7/8/2022     IR DIALYSIS FISTULOGRAM LEFT  8/11/2022     LIGATE FISTULA ARTERIOVENOUS UPPER EXTREMITY Left 6/28/2022    Procedure: Ligate fistula arteriovenous upper extremity;  Surgeon: Akash Miller MD;  Location: [facility] OR     REPAIR FISTULA ARTERIOVENOUS UPPER EXTREMITY Left 7/8/2022    Procedure: EXPLORE LEFT ARM, REPAIR OF LEFT UPPER ARM AV DIALYSIS GRAFT, EVACUATE HEMATOMA;  Surgeon: Akash Miller MD;  Location: [facility] OR     WISDOM TEETH                 Social History:   I have reviewed this patient's social history  Social History     Tobacco Use     Smoking status: Current Every Day Smoker     Years: 40.00     Types: Cigarettes     Smokeless tobacco: Never Used     Tobacco comment: 6 cigarettes per day.    Substance Use Topics     Alcohol use: Never              Family History:   I have reviewed this patient's family history  History reviewed. No pertinent family history.          Allergies:   No Known Allergies          Medications:   I have reviewed this patient's current medications  Medications Prior to Admission   Medication Sig Dispense Refill Last Dose     amLODIPine (NORVASC) 10 MG tablet Take 10 mg by mouth every evening   8/13/2022 at Unknown time     atorvastatin (LIPITOR) 20 MG tablet Take 20 mg by mouth daily   8/13/2022 at Unknown time     B Complex-C-Folic Acid (DEEPALI CAPS) 1 MG CAPS Take 1 capsule by mouth daily   8/14/2022 at am     calcium acetate (PHOSLO) 667 MG CAPS capsule Take 2,001 mg by mouth 3 times daily (with meals)   8/8/2022     carvedilol (COREG) 25 MG tablet Take 25 mg by mouth 2 times daily (with meals)   8/14/2022 at am     hydrALAZINE (APRESOLINE) 50 MG tablet Take 50 mg by mouth 3 times daily   8/14/2022 at am     lisinopril (ZESTRIL) 20 MG tablet Take 20 mg by mouth daily   8/13/2022     warfarin ANTICOAGULANT (COUMADIN) 2.5 MG tablet Take 5 mg by mouth three times a week Mon-Wed-Fri   Unknown at Unknown time     warfarin ANTICOAGULANT (COUMADIN) 2.5 MG tablet Take 6.25 mg by mouth four times a week Sat-Sun-Tue-Thur   Unknown at Unknown time     oxyCODONE (ROXICODONE) 5 MG tablet Take 1 tablet (5 mg) by mouth every 6 hours as needed for pain (Patient not taking: Reported on 8/15/2022) 12 tablet 0 Not Taking at Unknown time     Current Facility-Administered Medications Ordered in Epic   Medication Dose Route Frequency Last Rate Last Admin     0.9% sodium chloride BOLUS  250 mL Intravenous Once in dialysis/CRRT         0.9% sodium chloride BOLUS  300 mL Hemodialysis Machine Once         0.9% sodium chloride BOLUS  100-150 mL Intravenous Q15 Min PRN         acetaminophen (TYLENOL) tablet 650 mg  650 mg Oral Q6H PRN        Or     acetaminophen (TYLENOL) Suppository 650 mg  650 mg Rectal Q6H PRN         [Held by provider] amLODIPine  (NORVASC) tablet 10 mg  10 mg Oral QPM   10 mg at 08/14/22 2348     ampicillin-sulbactam (UNASYN) 3 g vial to attach to  mL bag  3 g Intravenous Q6H   3 g at 08/15/22 0211     atorvastatin (LIPITOR) tablet 20 mg  20 mg Oral Daily         calcium acetate (PHOSLO) capsule 2,001 mg  2,001 mg Oral TID w/meals         carvedilol (COREG) tablet 25 mg  25 mg Oral BID w/meals         epoetin delma-epbx (RETACRIT) injection 10,000 Units  10,000 Units Intravenous Once in dialysis/CRRT         famotidine (PEPCID) injection 20 mg  20 mg Intravenous Q12H   20 mg at 08/15/22 0456     heparin (porcine) injection  500 Units Hemodialysis Machine OR IV Push Once in dialysis/CRRT         heparin 10,000 units/10 mL infusion (DIALYSIS USE)  500 Units/hr Hemodialysis Machine Continuous         [Held by provider] heparin 25,000 units in 0.45% NaCl 250 mL ANTICOAGULANT infusion  0-5,000 Units/hr Intravenous Continuous   Stopped at 08/15/22 1035     [Held by provider] hydrALAZINE (APRESOLINE) tablet 50 mg  50 mg Oral TID         HYDROmorphone (PF) (DILAUDID) injection 0.5 mg  0.5 mg Intravenous Q2H PRN   0.5 mg at 08/15/22 1304     lidocaine (LMX4) cream   Topical Q1H PRN         lidocaine (LMX4) cream   Topical Q1H PRN         lidocaine 1 % 0.1-1 mL  0.1-1 mL Other Q1H PRN         lidocaine 1 % 0.1-1 mL  0.1-1 mL Other Q1H PRN         [Held by provider] lisinopril (ZESTRIL) tablet 20 mg  20 mg Oral Daily         melatonin tablet 1 mg  1 mg Oral At Bedtime PRN         multivitamin RENAL (NEPHROCAPS/TRIPHROCAPS) capsule 1 capsule  1 capsule Oral Daily         naloxone (NARCAN) injection 0.2 mg  0.2 mg Intravenous Q2 Min PRN        Or     naloxone (NARCAN) injection 0.4 mg  0.4 mg Intravenous Q2 Min PRN        Or     naloxone (NARCAN) injection 0.2 mg  0.2 mg Intramuscular Q2 Min PRN        Or     naloxone (NARCAN) injection 0.4 mg  0.4 mg Intramuscular Q2 Min PRN         nitroGLYcerin (NITROSTAT) sublingual tablet 0.4 mg  0.4 mg  Sublingual Q5 Min PRN         ondansetron (ZOFRAN ODT) ODT tab 4 mg  4 mg Oral Q6H PRN        Or     ondansetron (ZOFRAN) injection 4 mg  4 mg Intravenous Q6H PRN   4 mg at 08/15/22 1058     pantoprazole (PROTONIX) IV push injection 40 mg  40 mg Intravenous Q12H         Patient is already receiving anticoagulation with heparin, enoxaparin (LOVENOX), warfarin (COUMADIN)  or other anticoagulant medication   Does not apply Continuous PRN         sodium chloride (PF) 0.9% PF flush 3 mL  3 mL Intracatheter Q8H         sodium chloride (PF) 0.9% PF flush 3 mL  3 mL Intracatheter q1 min prn         sodium chloride (PF) 0.9% PF flush 3 mL  3 mL Intracatheter Q8H   3 mL at 08/15/22 0456     sodium chloride (PF) 0.9% PF flush 3 mL  3 mL Intracatheter q1 min prn         No current Epic-ordered outpatient medications on file.             Review of Systems:     The 10 point Review of Systems is negative other than noted in the HPI.            Data:   Data   Results for orders placed or performed during the hospital encounter of 08/14/22 (from the past 24 hour(s))   Hemoglobin   Result Value Ref Range    Hemoglobin 7.3 (L) 11.7 - 15.7 g/dL   CBC with platelets   Result Value Ref Range    WBC Count 15.9 (H) 4.0 - 11.0 10e3/uL    RBC Count 2.19 (L) 3.80 - 5.20 10e6/uL    Hemoglobin 6.6 (LL) 11.7 - 15.7 g/dL    Hematocrit 19.4 (L) 35.0 - 47.0 %    MCV 89 78 - 100 fL    MCH 30.1 26.5 - 33.0 pg    MCHC 34.0 31.5 - 36.5 g/dL    RDW 13.7 10.0 - 15.0 %    Platelet Count 194 150 - 450 10e3/uL   Basic metabolic panel   Result Value Ref Range    Sodium 134 133 - 144 mmol/L    Potassium 3.8 3.4 - 5.3 mmol/L    Chloride 99 94 - 109 mmol/L    Carbon Dioxide (CO2) 29 20 - 32 mmol/L    Anion Gap 6 3 - 14 mmol/L    Urea Nitrogen 63 (H) 7 - 30 mg/dL    Creatinine 3.55 (H) 0.52 - 1.04 mg/dL    Calcium 8.6 8.5 - 10.1 mg/dL    Glucose 109 (H) 70 - 99 mg/dL    GFR Estimate 14 (L) >60 mL/min/1.73m2   ABO/Rh type and screen    Narrative    The following  orders were created for panel order ABO/Rh type and screen.  Procedure                               Abnormality         Status                     ---------                               -----------         ------                     Adult Type and Screen[[id number]]                            Final result                 Please view results for these tests on the individual orders.   Adult Type and Screen   Result Value Ref Range    ABO/RH(D) O POS     Antibody Screen Negative Negative    SPECIMEN EXPIRATION DATE [id number]    Prepare red blood cells (unit)   Result Value Ref Range    CROSSMATCH Compatible     UNIT ABO/RH O Pos     Unit Number [id number]     Unit Status Ready     Blood Component Type Red Blood Cells     Product Code H8960L15     CODING SYSTEM [id number]     UNIT TYPE ISBT 5100    Prepare red blood cells (unit)   Result Value Ref Range    CROSSMATCH Compatible     UNIT ABO/RH O Neg     Unit Number [id number]     Unit Status Issued     Blood Component Type Red Blood Cells     Product Code Y6847R72     CODING SYSTEM ZQQI938     UNIT TYPE ISBT 9500     ISSUE DATE AND TIME [id number]

## 2022-08-18 NOTE — PROGRESS NOTES
Patient came to radiation therapy for treatment #1 of 10 to her right lung using 10 MV photon energy at a daily dose of 300 cGy.  Plan for 9 more radiation therapy treatments.

## 2022-08-18 NOTE — CONSULTS
Consult Date: 08/18/2022    REFERRING PHYSICIAN:  Dr. Patrick Dreyer, [facility].      ADDITIONAL PHYSICIAN:  , Dr. Kobe Quarles, Hospitalist and also Dr. Eris Silverio.    Gina Simpson is a 56-year-old female who was admitted to [facility] with facial edema and increased shortness of breath.    Subsequent CT scan of the chest on 08/15/2022 identified a 10 x 8 x 6 cm mediastinal mass encroaching upon the trachea and narrowing the superior vena cava with right upper lobe mass with pulmonary occlusion.  Additional mediastinal adenopathy was identified.  Multiple hepatic lesions were also identified consistent with metastatic involvement.    Biopsy of a hepatic lesion on 08/16/2022 was carried out.  Preliminary pathology from this earlier today identifies neuroendocrine carcinoma non-small cell of lung origin.    The patient has had continued facial fullness and shortness of breath.  She is seen for consideration of emergency radiation treatment for her superior vena cava impingement.    The patient is a smoker and has 40+ pack year history, everyday smoker.    PAST MEDICAL HISTORY:  Significant for end-stage kidney disease, on dialysis 3 times a week.  AV fistula left upper extremity.    MEDICATIONS:    1.  Amlodipine.  2.  Atorvastatin.  3.  Carvedilol.  4.  Lisinopril.  5.  Oxycodone for pain.  6.  Lipitor.  7.  Pepcid.  8.  Heparin medication has been on hold.    ALLERGIES:  NO KNOWN DRUG ALLERGIES.    PHYSICAL EXAMINATION:    GENERAL:  Reveals an alert female, resting on gurney with head propped superiorly at 30 degrees.  EOMs are intact.  Pulse is regular at 78.  No cervical or supraclavicular lymphadenopathy.  VITAL SIGNS:  Blood pressure is 116/81, oxygenation pO2 at 96.6%.  There is engorgement of the right neck and facial area.  No definite supraclavicular lymphadenopathy.  HEENT:  Oral cavity shows no mucosal lesions.  Poor dentition.  Fullness of lymph nodes along the  right lower neck.  No thyromegaly.  LUNGS:  On auscultation reveals decreased breath sounds in the right upper lung.  Left lung clear.    CARDIAC:  Regular rate and rhythm.  ABDOMEN:  Without hepatosplenomegaly or tenderness.  EXTREMITIES:  Show fullness of the facial areas noted.  Left upper extremity AV fistula in place.    ASSESSMENT AND RECOMMENDATIONS:  Gio Simpson is a 56-year-old female with a diagnosis of metastatic neuroendocrine non-small cell carcinoma involving liver & right upper lobe of lung, mediastinal chest region with superior vena cava compression.    Based on the patient's significant SVC compression and airway impingement, I have reviewed and outlined consideration of emergency radiation treatment to the chest, mediastinal region.  I have outlined treatment utilizing external beam radiation treatment to a dose of 3000 cGy in 10 treatment fractions of 300 cGy.  I have explained the risks, side effects, complications, and beneficial goals of this treatment.  This discussion included, but was not limited to, an explanation of the risk of radiation related esophagitis and effect on pulmonary, cardiac, rib cage and soft tissue in the region.    The patient seems to understand and has consented to treatment.  We will be carrying out simulation and initiating emergency treatment to the region today.    I spent 20 minutes in face-to-face time with the patient, 18 minutes of which were spent in counseling and coordination of care.  Ten minutes were spent reviewing imaging, test results and other medical records and 5 minutes were spent in discussion with Dr. Dreyer from [facility].    Lisandro Polanco MD        D: 2022   T: 2022   MT: SPMT    Name:     GIO SIMPSON  MRN:      [id number]        Account:      [id number]   :      1965           Consult Date: 2022     Document: [id number]

## 2022-08-18 NOTE — PROGRESS NOTES
[facility]  Gastroenterology Progress Note     Gina Simpson MRN# [id number]   YOB: 1965 Age: 56 year old          Assessment and Plan:     Gina Simpson is a 56 year old female who has a past medical history of CAD, cardiomyopathy, hypertension, COPD and ESRD who underwent creation of LUE AV fistula on 6/28/22 complicated by hematoma s/p evacuation and LUE DVT found to have prevertebral soft tissue swelling potentially presenting retropharyngeal abscess as well as new pulmonary nodule, mediastinal mass, and extensive adenopathy and admitted on 8/14/22 and had hematemesis on 8/15/22.    Lymphadenopathy  Hematemesis  Acute on chronic anemia  New mediastinal mass  - CT neck shows new mediastinal mass on right 5.8 x 6.1cm as well as pulmonary nodule measuring 9mm-   -  CT of chest also notes multiple large hepatic masses concerning for metastatic disease  -  8/15 EGD noted clotted blood in the entire stomach, spurting duoeneal ulcer with visible vessel, injected, clips placed, argon plasma coag used.  - Underwent Liver biopsy on 8/16 and pending pathology  -  Has ongoing expected melena, from prior bleed. no hematemesis in 48 hours  - Hemoglobin 6.6 this a.m. and receiving a unit, received a unit yesterday as well    -- Continue to monitor hemoglobin and transfuse for 7 or less  -- oral pantoprazole 40 mg BID  -- Daily CBC  -- Dialysis diet  -- If has repeat hematemesis will consider repeat EGD               Interval History:     doing well; no cp, sob, n/v/d, or abd pain.              Review of Systems:     C: NEGATIVE for fever, chills, change in weight  E/M: NEGATIVE for ear, mouth and throat problems  R: NEGATIVE for significant cough or SOB  CV: NEGATIVE for chest pain, palpitations or peripheral edema             Medications:   I have reviewed this patient's current medications    - MEDICATION INSTRUCTIONS for Dialysis Patients -   Does not apply See Admin  Instructions     sodium chloride 0.9%  250 mL Intravenous Once in dialysis/CRRT     sodium chloride 0.9%  300 mL Hemodialysis Machine Once     allopurinol  100 mg Oral QPM     [Held by provider] amLODIPine  10 mg Oral QPM     atorvastatin  20 mg Oral Daily     calcium acetate  2,001 mg Oral TID w/meals     carvedilol  25 mg Oral BID w/meals     [Held by provider] famotidine  20 mg Intravenous Q48H     [Held by provider] hydrALAZINE  50 mg Oral TID     [Held by provider] lisinopril  20 mg Oral Daily     multivitamin RENAL  1 capsule Oral Daily     - MEDICATION INSTRUCTIONS -   Does not apply Once     pantoprazole (PROTONIX) IV  40 mg Intravenous Q12H     piperacillin-tazobactam  2.25 g Intravenous Q8H     sodium chloride (PF)  3 mL Intracatheter Q8H     sodium chloride (PF)  3 mL Intracatheter Q8H                  Physical Exam:   Vitals were reviewed  Vital Signs with Ranges  Temp:  [97.7  F (36.5  C)-98.6  F (37  C)] 98.5  F (36.9  C)  Pulse:  [] 111  Resp:  [8-30] 18  BP: ()/(59-84) 127/66  SpO2:  [91 %-99 %] 95 %  I/O last 3 completed shifts:  In: 456 [P.O.:150; I.V.:6]  Out: 0   Constitutional: healthy, alert, and no distress   Cardiovascular: negative, PMI normal. No lifts, heaves, or thrills. RRR. No murmurs, clicks gallops or rub  Respiratory: negative, Percussion normal. Good diaphragmatic excursion. Lungs clear  Abdomen: Abdomen soft, non-tender. BS normal. No masses, organomegaly           Data:   I reviewed the patient's new clinical lab test results.   Recent Labs   Lab Test 08/18/22  0553 08/17/22  2035 08/17/22  0554 08/16/22  0516 08/15/22  1115 08/15/22  0451 08/14/22  2149 08/11/22  0913 07/13/22  0635   WBC 15.9*  --  16.1* 15.5*  --  6.7   < >  --  10.0   HGB 6.6* 7.3* 7.0* 8.2*   < > 7.0*   < >  --  9.3*   MCV 89  --  92 90  --  90   < >  --  87     --  234 235  --  376   < >  --  297   INR  --   --   --   --   --  1.68*  --  2.52* 1.98*    < > = values in this interval not  displayed.     Recent Labs   Lab Test 08/18/22  0553 08/17/22  0554 08/16/22  0516   POTASSIUM 3.8 3.9 4.6   CHLORIDE 99 99 102   CO2 29 27 22   BUN 63* 66* 103*   ANIONGAP 6 8 11     Recent Labs   Lab Test 08/15/22  1115 08/15/22  0451   ALBUMIN 2.2* 2.5*   BILITOTAL 0.3 0.4   ALT 19 22   AST 43 48*       I reviewed the patient's new imaging results.    All laboratory data reviewed  All imaging studies reviewed by me.    Sussy Tenorio PA-C,  8/16/2022  [facility]  Office : [phone]  Cell: [phone] (Dr. Camacho)  Cell: [phone] (Sussy Tenorio PA-C)

## 2022-08-19 NOTE — PLAN OF CARE
1900h-0700h: Pt AOx4. AV fistula on R upper arm w/ bruit & thrill, dressing CDI. Same arm swollen, pt denies pain, radial pulses palpable. CVC on L chest, SL.  Clear bilateral breath sound except fine crackles in lower lobe.     Pt has back pain of 6-7/10, managed w/ dilaudid & oxycodone prn; effective.      Tolerates Regular diet w/ no straws. Anuric, on hemodialysis. Next hemodialysis 08/19.  Last BM 08/18.     Kept HOB >30 degrees. Ambulates SBA. Tele. Sinus tachycardia.  Shallow breaths while sleeping, O2Sat dropped to 88%. Hooked pt to 1.5 LPM via NC.     XR video swalllow with SLP 08/19  For Hgb in AM, to transfuse blood if <7 as per MD notes.     0642h: Pt reported she is having a hard time to breathe, stridor at times & she feels tightness in upper airway, O2Sat 99% on 2lpm.

## 2022-08-19 NOTE — PROGRESS NOTES
Hemoglobin stable  NO recurrent hematemesis  GI will follow along  NO plans for EGD  -- Diet as tolerated  -- Daily hemoglobin    [facility]  [phone]

## 2022-08-19 NOTE — CONSULTS
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  Medical oncology is also seen the patient and have recommended Chemotherapy plus immunotherapy.  I agree with Dr Dreyer that her prognosis is poor and generally we use platinum based therapies with etoposide renally dosed. Agree with Sending for NGS sequencing. She will also  need MRI of the brain for staging and ct abdomen.  Her course is obviously complicated by acute renal failure which nephrology is managing.  She is currently on dialysis. She wants to continue her care with Dr Dreyer.     I will sign off . Follow up care with [facility] thank you for the consult      HISTORY OF PRESENT ILLNESS: Gina is a very pleasant 56-year-old female who was admitted to the hospital with facial swelling, loss of appetite and weakness.   She was transferred from [place].  CT of the chest was obtained which showedMasslike consolidation in the right upper quadrant measuring 4 x 4.8 cm.  Large mediastinal mass measuring 10 x 8 x 6 cm that is a mass-effect upon the trachea and narrows the SVC as well as compresses and occludes the right upper lobe pulmonary artery and the right upper lobe bronchus.  Hilar and mediastinal adenopathy.  CT of the neck showed cystic and necrotic cervical adenopathy concerning for metastatic disease with filling defect within the right internal jugular vein worrisome for intraluminal  thrombus.  She was also found to have multiple liver masses biopsy of the liver showed metastatic poorly differentiated non-small cell carcinoma with neuroendocrine differentiation large cell.  Radiation oncology saw the patient patient started radiation emergently to the SVC mass.  Medical oncology also saw the patient and their note reviewed. Dr Dreyer discussed the poor prognosis with small cell lung cancer and systemic therapy.  Patient is interested in a second opinion.      Patient states that she has trouble with breathing when she is laying flat for radiation and needs something to help her relax when she is getting the radiation.  She is waiting for her daughter to come so she can eat something that her daughter is bringing.    REVIEW OF SYSTEMS:   14 point ROS was reviewed and is negative other than as noted above in HPI.       MEDICATIONS:  Current Facility-Administered Medications   Medication     - MEDICATION INSTRUCTIONS for Dialysis Patients -     0.9% sodium chloride BOLUS     0.9% sodium chloride BOLUS     0.9% sodium chloride BOLUS     0.9% sodium chloride BOLUS     acetaminophen (TYLENOL) tablet 650 mg    Or     acetaminophen (TYLENOL) Suppository 650 mg     allopurinol (ZYLOPRIM) tablet 100 mg     [Held by provider] amLODIPine (NORVASC) tablet 10 mg     atorvastatin (LIPITOR) tablet 20 mg     calcium acetate (PHOSLO) capsule 2,001 mg     carvedilol (COREG) tablet 25 mg     [Held by provider] famotidine (PEPCID) injection 20 mg     [Held by provider] heparin 25,000 units in 0.45% NaCl 250 mL ANTICOAGULANT infusion     [Held by provider] hydrALAZINE (APRESOLINE) tablet 50 mg     HYDROmorphone (PF) (DILAUDID) injection 0.5 mg     lidocaine (LMX4) cream     lidocaine (LMX4) cream     lidocaine 1 % 0.1-1 mL     lidocaine 1 % 0.1-1 mL     [Held by provider] lisinopril (ZESTRIL) tablet 20 mg     melatonin tablet 1 mg     multivitamin RENAL (NEPHROCAPS/TRIPHROCAPS) capsule 1 capsule     naloxone (NARCAN)  injection 0.2 mg    Or     naloxone (NARCAN) injection 0.4 mg    Or     naloxone (NARCAN) injection 0.2 mg    Or     naloxone (NARCAN) injection 0.4 mg     nitroGLYcerin (NITROSTAT) sublingual tablet 0.4 mg     No heparin via hemodialysis machine     No heparin via hemodialysis machine     ondansetron (ZOFRAN ODT) ODT tab 4 mg    Or     ondansetron (ZOFRAN) injection 4 mg     oxyCODONE (ROXICODONE) tablet 5 mg     pantoprazole (PROTONIX) EC tablet 40 mg     Patient is already receiving anticoagulation with heparin, enoxaparin (LOVENOX), warfarin (COUMADIN)  or other anticoagulant medication     piperacillin-tazobactam (ZOSYN) 2.25 g vial to attach to  ml bag     sodium chloride (PF) 0.9% PF flush 3 mL     sodium chloride (PF) 0.9% PF flush 3 mL     sodium chloride (PF) 0.9% PF flush 3 mL     sodium chloride (PF) 0.9% PF flush 3 mL         ALLERGIES:  No Known Allergies      PAST MEDICAL HISTORY:  Past Medical History:   Diagnosis Date     Anemia      Cancer (H)      Coronary artery disease      Dialysis complication      Dialysis patient (H)      Embolism (H)      ESRD (end stage renal disease) (H)      Hypertension      Ischemic cardiomyopathy      Renal failure          PAST SURGICAL HISTORY:  Past Surgical History:   Procedure Laterality Date     ANESTH,UPPER ARM AV FISTULA REPAIR       CREATE FISTULA ARTERIOVENOUS UPPER EXTREMITY Left 3/25/2022    Procedure: CREATION OF FIRST STAGE LEFT BRACHIOBASILIC ARTERIOVENOUS FISTULA;  Surgeon: Akash Miller MD;  Location: [facility] OR     CREATE FISTULA ARTERIOVENOUS UPPER EXTREMITY Left 6/28/2022    Procedure: LEFT BRACHIO-BASILIC ARTERIOVENOUS FISTULA WITH BIOPROSTHETIC GRAFT;  Surgeon: Akash Miller MD;  Location: [facility] OR     IR DIALYSIS FISTULOGRAM LEFT  5/31/2022     IR DIALYSIS FISTULOGRAM LEFT  7/8/2022     IR DIALYSIS FISTULOGRAM LEFT  8/11/2022     LIGATE FISTULA ARTERIOVENOUS UPPER EXTREMITY Left 6/28/2022    Procedure: [place] fistula arteriovenous  "upper extremity;  Surgeon: Akash Miller MD;  Location: [facility] OR     REPAIR FISTULA ARTERIOVENOUS UPPER EXTREMITY Left 2022    Procedure: EXPLORE LEFT ARM, REPAIR OF LEFT UPPER ARM AV DIALYSIS GRAFT, EVACUATE HEMATOMA;  Surgeon: Akash Miller MD;  Location: [facility] OR     WISDOM TEETH           SOCIAL HISTORY:  Social History     Socioeconomic History     Marital status: Single     Spouse name: Not on file     Number of children: Not on file     Years of education: Not on file     Highest education level: Not on file   Occupational History     Not on file   Tobacco Use     Smoking status: Current Every Day Smoker     Years: 40.00     Types: Cigarettes     Smokeless tobacco: Never Used     Tobacco comment: 6 cigarettes per day.    Substance and Sexual Activity     Alcohol use: Never     Drug use: Never     Sexual activity: Not on file   Other Topics Concern     Parent/sibling w/ CABG, MI or angioplasty before 65F 55M? Not Asked   Social History Narrative     Not on file     Social Determinants of Health     Financial Resource Strain: Not on file   Food Insecurity: Not on file   Transportation Needs: Not on file   Physical Activity: Not on file   Stress: Not on file   Social Connections: Not on file   Intimate Partner Violence: Not on file   Housing Stability: Not on file         FAMILY HISTORY:  History reviewed. No pertinent family history.      PHYSICAL EXAM:  Vital signs:  Temp: 97.2  F (36.2  C) Temp src: Axillary BP: 90/58 Pulse: 98   Resp: 18 SpO2: 97 % O2 Device: None (Room air) Oxygen Delivery: 1.5 LPM   Weight: 56 kg (123 lb 6.4 oz)  Estimated body mass index is 20.53 kg/m  as calculated from the following:    Height as of 22: 1.651 m (5' 5\").    Weight as of this encounter: 56 kg (123 lb 6.4 oz).    ECO  GENERAL/CONSTITUTIONAL: appears very sleepy neck engorged, EYES: Pupils are equal, round, and react to light and accommodation. Extraocular movements intact.  No scleral icterus.  ENT/MOUTH: " Neck supple. Oropharynx clear, no mucositis.  LYMPH: Left neck about 1.2 x 1.5 cm supraclavicular node.  Anterior cervical adenopathy  RESPIRATORY: Clear to auscultation bilaterally. No crackles or wheezing.   CARDIOVASCULAR: Regular rate and rhythm without murmurs, gallops, or rubs.  GASTROINTESTINAL: No hepatosplenomegaly, masses, or tenderness. The patient has normal bowel sounds. No guarding.  No distention.  MUSCULOSKELETAL: Warm and well-perfused, no cyanosis, clubbing, or edema.  NEUROLOGIC: Cranial nerves II-XII are intact. Alert, oriented, answers questions appropriately.  INTEGUMENTARY: No rashes or jaundice.      LABS:  CBC RESULTS:   Recent Labs   Lab Test 08/19/22  0559   WBC 18.3*   RBC 2.69*   HGB 8.1*  8.1*   HCT 25.1*   MCV 93   MCH 30.1   MCHC 32.3   RDW 14.2          Recent Labs   Lab Test 08/18/22  0553 08/17/22  0554    134   POTASSIUM 3.8 3.9   CHLORIDE 99 99   CO2 29 27   ANIONGAP 6 8   * 106*   BUN 63* 66*   CR 3.55* 4.45*   JEWELL 8.6 8.8         PATHOLOGY:      IMAGING:            Thank you for the opportunity to participate in this patient's care.  Please call with any questions.    Sean Herbert MD  Hematology/Oncology  [facility]

## 2022-08-19 NOTE — PROGRESS NOTES
Radiation Therapy called to notify nursing that they close at 4 pm, patient sleeping, lethargic, not able to answer if she's willing to have radiation today. Family at bedside, state they have several questions for the MD and want to be able to discuss with patient before they will approve radiation treatment. Therefore Radiation treatment canceled for today. Bedside Nurse updated.

## 2022-08-19 NOTE — PROVIDER NOTIFICATION
Potassium   Date Value Ref Range Status   08/18/2022 3.8 3.4 - 5.3 mmol/L Final     Hemoglobin   Date Value Ref Range Status   08/19/2022 8.1 (L) 11.7 - 15.7 g/dL Final   08/19/2022 8.1 (L) 11.7 - 15.7 g/dL Final     Creatinine   Date Value Ref Range Status   08/18/2022 3.55 (H) 0.52 - 1.04 mg/dL Final     Urea Nitrogen   Date Value Ref Range Status   08/18/2022 63 (H) 7 - 30 mg/dL Final     Sodium   Date Value Ref Range Status   08/18/2022 134 133 - 144 mmol/L Final     INR   Date Value Ref Range Status   08/15/2022 1.68 (H) 0.85 - 1.15 Final       DIALYSIS PROCEDURE NOTE  Hepatitis status of previous patient on machine log was checked and verified ok to use with this patients hepatitis status.  Patient dialyzed for 3.5 hrs. on a K3 bath with a net fluid removal of  2L.  A BFR of 350 ml/min was obtained via a Left CVC.   The treatment plan was discussed with Dr. Taveras during the treatment.    Total heparin received during the treatment: 0 units.   Line flushed, clamped and capped with heparin 1:1000 2.3 mL (2300 units) per lumen    Meds  given: None   Complications: None    Person educated: Patient. Knowledge base Substantial. Barriers to learning: None. Educated on Procedure via Verbal mode. patient/family verbalized understanding. Pt prefers Verbal education style.     ICEBOAT? Timeout performed pre-treatment  I: Patient was identified using 2 identifiers  C:  Consent Signed Yes  E: Equipment preventative maintenance is current and dialysis delivery system OK to use  B:    Latest Reference Range & Units 07/09/22 21:37 07/13/22 09:08   Hep B Surface Agn Nonreactive  Nonreactive    Hepatitis B Surface Antibody Instrument Value <8.00 m[IU]/mL  17.80     O: Dialysis orders present and complete prior to treatment  A: Vascular access verified and assessed prior to treatment  T: Treatment was performed at a clinically appropriate time  ?: Patient was allowed to ask questions and address concerns prior to treatment  See  Adult Hemodialysis flowsheet in [facility] for further details and post assessment.  Machine water alarm in place and functioning. Transducer pods intact and checked every 15min.   Pt returned via Bed.  Chlorine/Chloramine water system checked every 4 hours.  [facility]      Post treatment report given to Isidra WINTERS RN regarding 2L of fluid removed, last BP of 90/58.

## 2022-08-19 NOTE — PROGRESS NOTES
"    Medicine Progress Note - Hospitalist Service    Date of Admission:  8/14/2022    Assessment & Plan            Gina Simpson is a 56 year old female with PMHx of CAD, cardiomyopathy, hypertension, COPD and ESRD with hx of failed RUE fistula who underwent creation of LUE AV fistula on 6/28/22 complicated by hematoma s/p evacuation and LUE DVT who is a direct admission to [facility] from [place] ED after presenting with 2+weeks of facial swelling, fatigue, dysphagia and being found to have prevertebral soft tissue swelling potentially presenting retropharyngeal abscess as well as new pulmonary nodule, mediastinal mass, and extensive adenopathy.      Symmetric prevertebral soft tissue swelling  Patient presented with 2+ weeks of fatigue, facial swelling, feeling food/pills getting \"stuck\" in her throat.   CT soft tissue neck w/o contrast in the ED shows new symmetrical prevertebral soft tissue swelling and hypodensity bilaterally from the C2-C5 levels which could represent inflammatory or infectious cellulitis or potential retropharyngeal/prevertebral abscess. Additionally new mediastinal mass causing displacement of trachea and esophagus to the left as discussed below. Unclear if this represents true infection vs inflammation. She reports foreign body R mandible behind lower molar for several weeks though no obvious infection on exam.   S/p clindamycin, azithromycin, dexamethasone 10mg prior to transfer  --ENT consulted- reviewed 8/16   --close monitoring for any evidence of airway compromise     New mediastinal mass  Extensive cervical, supraclavicular, right thoracic inlet and right mediastinal/hilar adenopathy  New 9mm ARIA pulmonary nodule  Metastatic poorly differentiated non-small cell carcinoma with neuroendocrine differentiation  Patient reports significant fatigue, 20lb weight loss and poor appetite over past two months or so. No known malignancy.   CT neck shows new " mediastinal mass on right 5.8 x 6.1cm as well as pulmonary nodule measuring 9mm. CT 2019 showed 3mm nodule in R lung apex. Extensive adenopathy seen as well and causing compression of brachiocephalic and mediastinal vasculature which is likely contributing to swelling.   -- Appreciate reviewed by thoracic surger y, vascular surgery, IR and oncology  -s/p biopsy:  8/16.   -Continue on IV Dilaudid every hour as needed.  ct oxycodone 5 mg p.o. every 4 hours.  If pain is still not controlled may consider PCA    -Appreciate heme-onc, IR, vascular surgery and radiation Onc: Planned for emergency XRT rx. to right upper lung SVC compressing mass    8/19: Await second opinion from [facility].  Reinforced unlikely change in management and need for radiation therapy to help decrease the compression.    RUL opacities, CT concerning for malignancy  CT neck shows gildardo opacities RUL with moderate R pleural effusion. Reports cough for several days. Unsure of fever. WBC normal. LA normal. mediastinal mass is causing displacement of trachea. Oxygen stable on room air.     --blood cultures obtained in the ED, pending      Leukocytosis: Abx changed to Unasyn to Zosyn 8/17.  Patient had significant hemoptysis and could have component of aspiration  -Monitor closely    ESRD on HD  S/p left AV fistula formation 6/28/22  S/p fistulogram with dilatation of L subclavian and junction of brachiocephalic and subclavian vein complicated by hematoma requiring evacuation 7/8/22  Dialyzes MWF - Dr. Gonsales. She is still using internal jugular tunneled dialysis catheter at this time. She continues to make urine.   She underwent outpatient fistulogram 8/11/22.   Cr 5.85 in the ED, K normal at 3.6.   --Nephrology consulted,   Dialysis was stopped after 40 minutes due to RRT from upper GI bleed  -Appreciate dialysis sessions as per nephrology.     Left brachial vein DVT 7/6/22  CT neck 8/14: Filling defect within the right internal jugular vein,  worrisome for intraluminal thrombus.   Started on warfarin at the time due to ESRD. INR is subtheraputic in the ED 1.4.   --started heparin drip in place of warfarin given plan for biopsy   8/15: Heparin on hold  8/19: Denies any active clinical bleed.  Appropriate response status post 1 unit PRBC yesterday.  Will recheck hemoglobin this afternoon and if remains stable, will start her on heparin gtt.     Addendum: 14:35- recheck Hb 7.9. Will start on heparin gtt and monitor closely    CAD   Hx Cardiomyopathy  Hypertension  Hx stenting in 2019. Last ECHO 2020 showed Ef 40-45%.   PTA:  hydralazine 50mg TID, carvedilol 25mg bid, lisinopril 20mg daily, amlodipine 10mg every evening  --continue PTA carvedilol pending med rec   --HOLD lisinopril, hydralazine, amlodipine due to upper GI bleed      Acute upper GI bleed: 8/15  - ensure 2 large bore IV Access  - type and screen and transfuse to ensure Hb>7 , platelet > 50 and INR <2   - NPO  - Protonix bid   -Patient was managed in RRT.  s/p 2 unit PRBC for hemoglobin of 5.6 and active bleed.  - s/p EGD 8/15: 8/15 EGD noted clotted blood in the entire stomach, spurting duoeneal ulcer with visible vessel, injected, clips placed, argon plasma coag used.  8/17: Hemoglobin again dropped to 7.  We will transfuse 1 unit of PRBC and continue to monitor hemoglobin closely  8/18: Hb dropped to 6.7, transfuse 1 U PRBC. D/W GI- will start CLD and monitor Hb.  8/19: Denies any active clinical bleed.  Appropriate response status post 1 unit PRBC yesterday.  Will recheck hemoglobin this afternoon and if remains stable, will start her on heparin gtt.     chronic anemia related to ESRD  Most recent hgb on hospital discharge 7/13 is 9.3. hgb on admission 8.2.   --Cont EPO with dialysis.      Hyperphosphatemia  --Cont PhosLo with meals     Diet: Clear Liquid Diet    DVT Prophylaxis: Heparin gtt once cleared by GI  Antoine Catheter: Not present  Central Lines: PRESENT  CVC Double Lumen Left  "Subclavian-Site Assessment: WDL  Cardiac Monitoring: ACTIVE order. Indication: unstable  Code Status: Full Code      Disposition Plan         The patient's care was discussed with the Bedside Nurse and Patient.    Severiano Johnson MD, MD  Hospitalist Service  [facility]  Securely message with the Vocera Web Console (learn more here)  Text page via Reflex Paging/Directory     \"This dictation was performed with voice recognition software and may contain errors,  omissions and inadvertent word substitution.\"    Clinically Significant Risk Factors Present on Admission                   # Severe Malnutrition: based on nutrition assessment _____________________________________________________________________    Interval History     Feels a bit better today.  Continues to have intermittent stridor and difficulty in breathing.  Says he is having difficulty with laying flat for radiation.    Discussed with patient and her daughter: They initially wanted transfer to [place] for a second opinion but want to wait to be reviewed by [place] [facility]..now     4 point ROS negative unless mentioned    Data reviewed today: I reviewed all medications, new labs and imaging results over the last 24 hours. I personally reviewed no images or EKG's today.    Physical Exam   BP (!) 85/45   Pulse 96   Temp 98  F (36.7  C) (Oral)   Resp 18   Wt 56 kg (123 lb 6.4 oz)   SpO2 97%   BMI 20.53 kg/m    Gen- pleasant lying in bed  Neck-swelling  CVS- I+II+ soft ESM  RS- CTAB  Abdo- soft, no tenderness , No g/r/r   Ext- b/l arms edema Lt >Rt   CNS-oriented to person, place, time    Left AVF     Data   No results found for this or any previous visit (from the past 24 hour(s)).   BMP  Recent Labs   Lab 08/18/22  0553 08/17/22  0554 08/16/22  0516 08/15/22  1115    134 135 135   POTASSIUM 3.8 3.9 4.6 4.0   CHLORIDE 99 99 102 102   JEWELL 8.6 8.8 9.1 8.3*   CO2 29 27 22 23   BUN 63* 66* 103* 72*   CR 3.55* 4.45* 5.97* 4.73* "   * 106* 107* 175*     CBC  Recent Labs   Lab 08/19/22  0559 08/18/22  0553 08/17/22  2035 08/17/22  0554 08/16/22  0516   WBC 18.3* 15.9*  --  16.1* 15.5*   RBC 2.69* 2.19*  --  2.35* 2.76*   HGB 8.1*  8.1* 6.6*   < > 7.0* 8.2*   HCT 25.1* 19.4*  --  21.5* 24.7*   MCV 93 89  --  92 90   MCH 30.1 30.1  --  29.8 29.7   MCHC 32.3 34.0  --  32.6 33.2   RDW 14.2 13.7  --  13.9 13.7    194  --  234 235    < > = values in this interval not displayed.     INR  Recent Labs   Lab 08/15/22  0451   INR 1.68*     LFTs  Recent Labs   Lab 08/15/22  1115 08/15/22  0451   ALKPHOS 26* 32*   AST 43 48*   ALT 19 22   BILITOTAL 0.3 0.4   PROTTOTAL 4.9* 5.7*   ALBUMIN 2.2* 2.5*

## 2022-08-19 NOTE — PROGRESS NOTES
Renal Medicine Progress Note            Assessment/Plan:             Gina Simpson is a 56 year old female who was admitted on 8/14/2022.      Assessment:  1) non-small cell lung CA. Plans for XRT and eventual chemo.      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    S/p left brachiobasilic fistula 2022 with complicatons of arm swelling, s/p innominate vein venoplasty. Last procedure 6/28/22 revision but admission 7/5 with left arm swelling, s/p repeat venoplasty complicated by hematoma requiring evacuation.      Successful dialysis session 8/17 with cannulation of AVG but not today. Suspect arm edema is making successful cannulation variable.      3) ESRD on MWF HD, Dr. Gonsales, [facility] unit. I called him to have him be aware of patient and diagnosis.               Treatment Type  Hemodialysis (procedure)    Dialyzer  : iDentiMob Series: Trusper Model: 17H RENÉE Factor: 1455    Dialysate  Potassium 2k    Dialysate  Calcium 2.5 mEq/L    Dialysate  BiCarb 35 mEq/L    Dialysate  Base Sodium 138 mEq/L    Dialysate Flow Rate  500 ML/min    Blood Flow Rate  400 ML/min    Treatment Time  195 min    Temperature  Standard    Max UF Rate  2 L/Hr    Access  CVC Catheter Chest (Left)    Access Concurrent  No    Schedule  3 Times per week    Heparin Load  1000 Units (1:1,000 concentration)    Heparin Hourly Dose  500 Units/hr (1:1,000 concentration)          4) Upper ext, neck/face edema: related to SVC compression, will attempt UF as able with HD. Doubt removing dialysis catheter will improve edema, symptoms.      Other:  Hx HTN:   amlodipine, coreg PTA  Hx ASCVD  Anemia: S/p GI bleed, blood transfusion yesterday, today 8.1.         Plan/Recs:  1) HD today per chronic schedule.     Attempted to use graft which was not successful with two needle attempts. This was aborted and CVC used for dialysis today.   2) Leave in CVC in  place, especially with difficult needle cannulation today. Hope for improved venous return, less arm edema and better success next week. Try AVG next run 8/22.       Kj Taveras DO  [facility] - [facility]phrology  Cell: [phone]  Office: [phone]            Interval History:      Pt s/p first session XRT yesterday. Seen on dialysis, difficulty with left graft cannulation today. She was comfortable during visit, denied any dyspnea. Note of earlier report of patient with difficulty breathing (sats 99% on 2L).         Medications and Allergies:       - MEDICATION INSTRUCTIONS for Dialysis Patients -   Does not apply See Admin Instructions     sodium chloride 0.9%  250 mL Intravenous Once in dialysis/CRRT     sodium chloride 0.9%  300 mL Hemodialysis Machine Once     sodium chloride 0.9%  250 mL Intravenous Once in dialysis/CRRT     sodium chloride 0.9%  300 mL Hemodialysis Machine Once     allopurinol  100 mg Oral QPM     [Held by provider] amLODIPine  10 mg Oral QPM     atorvastatin  20 mg Oral Daily     calcium acetate  2,001 mg Oral TID w/meals     carvedilol  25 mg Oral BID w/meals     [Held by provider] famotidine  20 mg Intravenous Q48H     [Held by provider] hydrALAZINE  50 mg Oral TID     [Held by provider] lisinopril  20 mg Oral Daily     multivitamin RENAL  1 capsule Oral Daily     - MEDICATION INSTRUCTIONS -   Does not apply Once     - MEDICATION INSTRUCTIONS -   Does not apply Once     pantoprazole  40 mg Oral BID AC     piperacillin-tazobactam  2.25 g Intravenous Q8H     sodium chloride (PF)  3 mL Intracatheter Q8H     sodium chloride (PF)  3 mL Intracatheter Q8H      No Known Allergies         Physical Exam:   Vitals were reviewed  BP 96/84   Pulse 91   Temp 98  F (36.7  C) (Oral)   Resp 18   Wt 56 kg (123 lb 6.4 oz)   SpO2 97%   BMI 20.53 kg/m      Wt Readings from Last 3 Encounters:   08/19/22 56 kg (123 lb 6.4 oz)   07/10/22 65 kg (143 lb 4.8 oz)   06/28/22 56.2 kg (124 lb)        Intake/Output Summary (Last 24 hours) at 8/19/2022 1004  Last data filed at 8/19/2022 0436  Gross per 24 hour   Intake 406 ml   Output --   Net 406 ml       GENERAL APPEARANCE: Pt sleepy, somnulent but easily arousable and conversant.   HEENT:  Neck swelling present  RESP: lungs cta b c good efforts  CV: RRR, nl S1/S2  ABDOMEN: o/s/nt/nd, bs present  EXTREMITIES/SKIN: no c/c/rashes/lesions; b/l arm edema, left more than right  NEURO:  Grossly non-focal         Data:     BMP  Recent Labs   Lab 08/18/22  0553 08/17/22  0554 08/16/22  0516 08/15/22  1115    134 135 135   POTASSIUM 3.8 3.9 4.6 4.0   CHLORIDE 99 99 102 102   JEWELL 8.6 8.8 9.1 8.3*   CO2 29 27 22 23   BUN 63* 66* 103* 72*   CR 3.55* 4.45* 5.97* 4.73*   * 106* 107* 175*     CBC  Recent Labs   Lab 08/19/22  0559 08/18/22  0553 08/17/22 2035 08/17/22  0554 08/16/22  0516   WBC 18.3* 15.9*  --  16.1* 15.5*   HGB 8.1*  8.1* 6.6* 7.3* 7.0* 8.2*   HCT 25.1* 19.4*  --  21.5* 24.7*   MCV 93 89  --  92 90    194  --  234 235     Lab Results   Component Value Date    AST 43 08/15/2022    ALT 19 08/15/2022    ALKPHOS 26 (L) 08/15/2022    BILITOTAL 0.3 08/15/2022     Lab Results   Component Value Date    INR 1.68 (H) 08/15/2022       Attestation:  I have reviewed today's vital signs, notes, medications, labs and imaging.    DO [facility]  Office: [phone]  Cell: [phone]

## 2022-08-19 NOTE — PROGRESS NOTES
Progress Note    Gina Simpson MRN# [id number]   YOB: 1965 Age: 56 year old   Date of Admission: 8/14/2022  Requesting physician: Dr. Sliverio  Reason for consult: Mediastinal mass           Assessment and Plan:   New items underlined     1. 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 At least grade 2.   - CT with possible RUL mass, multiple hepatic masses. Cervical and supraclavicular lymphadenopathy  - She has smoked since age 11  - A/w 10 pound weight loss   - Biopsy came back with above unfortunately   - I have reviewed prognosis and treatment with family and patient as outlined below     2. Anemia   - In setting of CKD on dialysis  - Had episode of hematemesis 8/15  - Hg 5.6 on 8/15, down from baseline of 9 a month ago   - Was started on anticoagulation for intraluminal thrombus -> bleed, heparin has been held since 8/15  - Actively bleeding ulcer in the duodenum was clipped on 8/15  - EPO per nephro    - Hg 7 on 8/17 s/p 1 unit prbc   - Hg 6.6 this AM, receiving additional unit prbc  - Hg stable today, heparin drip restarted with close monitoring.     3. Intraluminal thrombus  - Restart heparin inpatient when Hg stabilized and no further bleeding  - Will need lifelong anticoagulation, if tolerated.     4. Smoker  - Interested in quitting  - Doing well inpatient without nicotine     PLAN  - Placement of SVC stent not recommended after many discussions    - Discussed with nephrology, will not need chemoport until radiation finishes, and expect her to get likely 10 day course of radiation. They will keep dialysis catheter in place for now, it may be used to guide future chemoport.   -  Unfortunately, patient and family did not want to go for radiation this afternoon. After discussion with family, they are obviously grieving the recent news and there may be some barriers to effective communication. I made it very clear that the most important first, second and third thing we should focus on is getting her radiation to the mass. Once we deal with the life threatening issue, we can deal with the rest of the cancer. I discussed case w/ Dr. Polanco and he is going to come in tomorrow and try again to give her radiation. I discussed the case with her son yesterday, and this afternoon with her niece in [place]. Appreciate primary assistance tomorrow helping to get her ready for radiation / reinforce importance and pain medication PRN.   - I discussed the diagnosis of metastatic large cell neuroendocrine carcinoma, I explained the poor prognosis and the aggressive nature to the patient and family at bedside. I recommended to her we start her on chemotherapy once she finishes radiation.   - Treatment would be similar to SCLC, something like cisplatin, etoposide and atezolizumab to start. I prefer cisplatin due to her age and feel it is more effective than carboplatin.   - I am going to discontinue allopurinol soon now that we know this is not small cell    - Check PDL1 activity and would send for comprehensive NGS panel outpatient, but low likelihood of finding targetable mutation   - Dialysis going so so with her LUE fistula, continue to f/u nephro recommendations with regards to long term access   - Smoking cessation upon discharge from hospital may prolong her life   - She will need brain MRI and CT abdomen to complete staging before discharge, likely will order on Monday if she remains stable. Also chemoport placement before discharge.    - I am going to try and set her up to see my partner in [place], Dr. Costa upon discharge.    Patrick Dreyer, DO  [facility]  [phone] (office);  [phone] (cell)              Chief Complaint:   No chief complaint on file.           History of Present Illness:   This patient is a 56 year old female who presented to the hospital as a direct admission from [place] emergency department today for evaluation of facial swelling, weakness and fatigue with intermittent dysphagia.  The patient feels like she has a foreign body in her right lower mouth, she feels like something is sticking out of it.  No pus or change in odor.  She has been having dysphagia for the past few weeks, food will get stuck in her throat when she swallows.  She reports that when she wakes up in the morning she has trouble breathing and her neck is engorged and she can see the veins in her neck.  She notes weight loss over the past month.  She also notes increasing fatigue with poor appetite.  She denies any fevers. She is active smoker since age 11. Interested in quitting.     22 She slept much better last night with the head of bed raised. Did not wake up this morning short of breath.     22 She notes persistent mild dyspnea. She feels fatigued. She had an episode of black stool this morning.     22 She reports mild improvement in her SOB.     22 She is somnolent, sleeping from pain medication. Discussed with family at bedside.        Physical Exam:   Vitals were reviewed  Blood pressure 103/50, pulse 98, temperature 97.2  F (36.2  C), temperature source Axillary, resp. rate 12, weight 56 kg (123 lb 6.4 oz), SpO2 91 %, not currently breastfeeding.  Temperatures:  Current - Temp: (!) 96  F (35.6  C); Max - Temp  Av.7  F (36.5  C)  Min: 96  F (35.6  C)  Max: 98.2  F (36.8  C)  Respiration range: Resp  Av  Min: 12  Max: 22  Pulse range: Pulse  Av.8  Min: 96  Max: 120  Blood pressure range: Systolic (24hrs), Av , Min:91 , Max:146   ; Diastolic (24hrs), Av, Min:61, Max:87    Pulse oximetry range: SpO2  Av.6 %  Min: 94 %  Max: 100  %    Intake/Output Summary (Last 24 hours) at 8/15/2022 1506  Last data filed at 8/15/2022 1442  Gross per 24 hour   Intake 300 ml   Output --   Net 300 ml       Sleeping             Past Medical History:   I have reviewed this patient's past medical history  Past Medical History:   Diagnosis Date     Anemia      Cancer (H)      Coronary artery disease      Dialysis complication      Dialysis patient (H)      Embolism (H)      ESRD (end stage renal disease) (H)      Hypertension      Ischemic cardiomyopathy      Renal failure              Past Surgical History:   I have reviewed this patient's past surgical history  Past Surgical History:   Procedure Laterality Date     ANESTH,UPPER ARM AV FISTULA REPAIR       CREATE FISTULA ARTERIOVENOUS UPPER EXTREMITY Left 3/25/2022    Procedure: CREATION OF FIRST STAGE LEFT BRACHIOBASILIC ARTERIOVENOUS FISTULA;  Surgeon: Akash Miller MD;  Location: [facility] OR     CREATE FISTULA ARTERIOVENOUS UPPER EXTREMITY Left 6/28/2022    Procedure: LEFT BRACHIO-BASILIC ARTERIOVENOUS FISTULA WITH BIOPROSTHETIC GRAFT;  Surgeon: Akash Miller MD;  Location: [facility] OR     IR DIALYSIS FISTULOGRAM LEFT  5/31/2022     IR DIALYSIS FISTULOGRAM LEFT  7/8/2022     IR DIALYSIS FISTULOGRAM LEFT  8/11/2022     LIGATE FISTULA ARTERIOVENOUS UPPER EXTREMITY Left 6/28/2022    Procedure: [place] fistula arteriovenous upper extremity;  Surgeon: Akash Miller MD;  Location: [facility] OR     REPAIR FISTULA ARTERIOVENOUS UPPER EXTREMITY Left 7/8/2022    Procedure: EXPLORE LEFT ARM, REPAIR OF LEFT UPPER ARM AV DIALYSIS GRAFT, EVACUATE HEMATOMA;  Surgeon: Akash Miller MD;  Location: [facility] OR     WISDOM TEETH                 Social History:   I have reviewed this patient's social history  Social History     Tobacco Use     Smoking status: Current Every Day Smoker     Years: 40.00     Types: Cigarettes     Smokeless tobacco: Never Used     Tobacco comment: 6 cigarettes per day.    Substance Use Topics      Alcohol use: Never             Family History:   I have reviewed this patient's family history  History reviewed. No pertinent family history.          Allergies:   No Known Allergies          Medications:   I have reviewed this patient's current medications  Medications Prior to Admission   Medication Sig Dispense Refill Last Dose     amLODIPine (NORVASC) 10 MG tablet Take 10 mg by mouth every evening   8/13/2022 at Unknown time     atorvastatin (LIPITOR) 20 MG tablet Take 20 mg by mouth daily   8/13/2022 at Unknown time     B Complex-C-Folic Acid (DEEPALI CAPS) 1 MG CAPS Take 1 capsule by mouth daily   8/14/2022 at am     calcium acetate (PHOSLO) 667 MG CAPS capsule Take 2,001 mg by mouth 3 times daily (with meals)   8/8/2022     carvedilol (COREG) 25 MG tablet Take 25 mg by mouth 2 times daily (with meals)   8/14/2022 at am     hydrALAZINE (APRESOLINE) 50 MG tablet Take 50 mg by mouth 3 times daily   8/14/2022 at am     lisinopril (ZESTRIL) 20 MG tablet Take 20 mg by mouth daily   8/13/2022     warfarin ANTICOAGULANT (COUMADIN) 2.5 MG tablet Take 5 mg by mouth three times a week Mon-Wed-Fri   Unknown at Unknown time     warfarin ANTICOAGULANT (COUMADIN) 2.5 MG tablet Take 6.25 mg by mouth four times a week Sat-Sun-Tue-Thur   Unknown at Unknown time     oxyCODONE (ROXICODONE) 5 MG tablet Take 1 tablet (5 mg) by mouth every 6 hours as needed for pain (Patient not taking: Reported on 8/15/2022) 12 tablet 0 Not Taking at Unknown time     Current Facility-Administered Medications Ordered in Epic   Medication Dose Route Frequency Last Rate Last Admin     0.9% sodium chloride BOLUS  250 mL Intravenous Once in dialysis/CRRT         0.9% sodium chloride BOLUS  300 mL Hemodialysis Machine Once         0.9% sodium chloride BOLUS  100-150 mL Intravenous Q15 Min PRN         acetaminophen (TYLENOL) tablet 650 mg  650 mg Oral Q6H PRN        Or     acetaminophen (TYLENOL) Suppository 650 mg  650 mg Rectal Q6H PRN         [Held by  provider] amLODIPine (NORVASC) tablet 10 mg  10 mg Oral QPM   10 mg at 08/14/22 2348     ampicillin-sulbactam (UNASYN) 3 g vial to attach to  mL bag  3 g Intravenous Q6H   3 g at 08/15/22 0211     atorvastatin (LIPITOR) tablet 20 mg  20 mg Oral Daily         calcium acetate (PHOSLO) capsule 2,001 mg  2,001 mg Oral TID w/meals         carvedilol (COREG) tablet 25 mg  25 mg Oral BID w/meals         epoetin delma-epbx (RETACRIT) injection 10,000 Units  10,000 Units Intravenous Once in dialysis/CRRT         famotidine (PEPCID) injection 20 mg  20 mg Intravenous Q12H   20 mg at 08/15/22 0456     heparin (porcine) injection  500 Units Hemodialysis Machine OR IV Push Once in dialysis/CRRT         heparin 10,000 units/10 mL infusion (DIALYSIS USE)  500 Units/hr Hemodialysis Machine Continuous         [Held by provider] heparin 25,000 units in 0.45% NaCl 250 mL ANTICOAGULANT infusion  0-5,000 Units/hr Intravenous Continuous   Stopped at 08/15/22 1035     [Held by provider] hydrALAZINE (APRESOLINE) tablet 50 mg  50 mg Oral TID         HYDROmorphone (PF) (DILAUDID) injection 0.5 mg  0.5 mg Intravenous Q2H PRN   0.5 mg at 08/15/22 1304     lidocaine (LMX4) cream   Topical Q1H PRN         lidocaine (LMX4) cream   Topical Q1H PRN         lidocaine 1 % 0.1-1 mL  0.1-1 mL Other Q1H PRN         lidocaine 1 % 0.1-1 mL  0.1-1 mL Other Q1H PRN         [Held by provider] lisinopril (ZESTRIL) tablet 20 mg  20 mg Oral Daily         melatonin tablet 1 mg  1 mg Oral At Bedtime PRN         multivitamin RENAL (NEPHROCAPS/TRIPHROCAPS) capsule 1 capsule  1 capsule Oral Daily         naloxone (NARCAN) injection 0.2 mg  0.2 mg Intravenous Q2 Min PRN        Or     naloxone (NARCAN) injection 0.4 mg  0.4 mg Intravenous Q2 Min PRN        Or     naloxone (NARCAN) injection 0.2 mg  0.2 mg Intramuscular Q2 Min PRN        Or     naloxone (NARCAN) injection 0.4 mg  0.4 mg Intramuscular Q2 Min PRN         nitroGLYcerin (NITROSTAT) sublingual tablet  0.4 mg  0.4 mg Sublingual Q5 Min PRN         ondansetron (ZOFRAN ODT) ODT tab 4 mg  4 mg Oral Q6H PRN        Or     ondansetron (ZOFRAN) injection 4 mg  4 mg Intravenous Q6H PRN   4 mg at 08/15/22 1058     pantoprazole (PROTONIX) IV push injection 40 mg  40 mg Intravenous Q12H         Patient is already receiving anticoagulation with heparin, enoxaparin (LOVENOX), warfarin (COUMADIN)  or other anticoagulant medication   Does not apply Continuous PRN         sodium chloride (PF) 0.9% PF flush 3 mL  3 mL Intracatheter Q8H         sodium chloride (PF) 0.9% PF flush 3 mL  3 mL Intracatheter q1 min prn         sodium chloride (PF) 0.9% PF flush 3 mL  3 mL Intracatheter Q8H   3 mL at 08/15/22 0456     sodium chloride (PF) 0.9% PF flush 3 mL  3 mL Intracatheter q1 min prn         No current Epic-ordered outpatient medications on file.             Review of Systems:     The 10 point Review of Systems is negative other than noted in the HPI.            Data:   Data   Results for orders placed or performed during the hospital encounter of 08/14/22 (from the past 24 hour(s))   Hemoglobin   Result Value Ref Range    Hemoglobin 8.1 (L) 11.7 - 15.7 g/dL   CBC with platelets   Result Value Ref Range    WBC Count 18.3 (H) 4.0 - 11.0 10e3/uL    RBC Count 2.69 (L) 3.80 - 5.20 10e6/uL    Hemoglobin 8.1 (L) 11.7 - 15.7 g/dL    Hematocrit 25.1 (L) 35.0 - 47.0 %    MCV 93 78 - 100 fL    MCH 30.1 26.5 - 33.0 pg    MCHC 32.3 31.5 - 36.5 g/dL    RDW 14.2 10.0 - 15.0 %    Platelet Count 220 150 - 450 10e3/uL   Hemoglobin   Result Value Ref Range    Hemoglobin 7.9 (L) 11.7 - 15.7 g/dL   Hemoglobin   Result Value Ref Range    Hemoglobin 7.7 (L) 11.7 - 15.7 g/dL

## 2022-08-20 NOTE — PROGRESS NOTES
[facility] Progress Note          Assessment and Plan:   Ms. Gina Simpson is a 56 year old woman who was admitted on 8/14/2022 with facial edema and dyspnea    1. 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 Imaging studies show evidence of metastatic disease involving cervical LNs and liver. Mediastinal mass measures 10 x 8 x 6 cm with SVC obstruction and occlusion of the RUL pulmonary artery  - treatment options were reviewed at length with her and her family by Dr. Dreyer and also by Dr. Herbert from [facility]. She and her family understand that prognosis is poor and that treatment is palliative  -the plan is for radiation treatment to help with SVC obstruction and to consider systemic therapy after completion of radiation therapy  -she had first radiation treatment today  -tumor sent for NGS testing  -plan is for additional staging studies while she is hospitlaized    2. ESRD managed with hemodialysis via a LUE fistula    3. Anemia due to CKD    4. Intraluminal thrombosis treated previously with IV heparin  - anticoagulation was briefly interrupted after development of bleeding bu thas now been restarted             Interval History:   Patient sleepy at present. No complaints of dyspnea              Medications:       - MEDICATION INSTRUCTIONS for Dialysis Patients -   Does not apply See Admin Instructions     allopurinol  100 mg Oral QPM     [Held by provider] amLODIPine  10 mg Oral QPM     atorvastatin  20 mg Oral Daily     calcium acetate  2,001 mg Oral TID w/meals     carvedilol  25 mg Oral BID w/meals     [Held by provider] famotidine  20 mg Intravenous Q48H     [Held by provider] hydrALAZINE  50 mg Oral TID     [Held by provider] lisinopril  20 mg Oral Daily     multivitamin RENAL  1 capsule Oral Daily     pantoprazole  40 mg Oral BID AC     piperacillin-tazobactam  2.25 g Intravenous Q8H     sodium  chloride (PF)  3 mL Intracatheter Q8H     sodium chloride (PF)  3 mL Intracatheter Q8H     acetaminophen **OR** acetaminophen, HYDROmorphone, lidocaine 4%, lidocaine 4%, lidocaine (buffered or not buffered), lidocaine (buffered or not buffered), melatonin, naloxone **OR** naloxone **OR** naloxone **OR** naloxone, nitroGLYcerin, ondansetron **OR** ondansetron, oxyCODONE, - MEDICATION INSTRUCTIONS -, sodium chloride (PF), sodium chloride (PF)               Physical Exam:   Blood pressure 108/72, pulse 98, temperature 97.5  F (36.4  C), temperature source Axillary, resp. rate 28, weight 56 kg (123 lb 6.4 oz), SpO2 95 %, not currently breastfeeding.  Wt Readings from Last 4 Encounters:   22 56 kg (123 lb 6.4 oz)   07/10/22 65 kg (143 lb 4.8 oz)   22 56.2 kg (124 lb)   22 56.7 kg (125 lb)         Vital Sign Ranges  Temperature Temp  Av.8  F (36.6  C)  Min: 97.2  F (36.2  C)  Max: 98.1  F (36.7  C)   Blood pressure Systolic (24hrs), Av , Min:83 , Max:126        Diastolic (24hrs), Av, Min:45, Max:87      Pulse Pulse  Av  Min: 90  Max: 108   Respirations Resp  Av.4  Min: 8  Max: 28   Pulse oximetry SpO2  Av.9 %  Min: 90 %  Max: 99 %         Intake/Output Summary (Last 24 hours) at 2022 1011  Last data filed at 2022 1000  Gross per 24 hour   Intake 1306.11 ml   Output --   Net 1306.11 ml       Constitutional: Sleepy but arousable, no apparent distress   Lungs: Clear to auscultation bilaterally although inspiratory effort limited   Cardiovascular: Regular rate and rhythm   Abdomen: Quiet bowel sounds, soft, non-distended, non-tender   Skin: No rashes   Other: Dialysis catheter present in left chest. No erythema or drainage          Data:   Laboratory:  CMP  Recent Labs   Lab 22  0553 22  0554 22  0516 08/15/22  1641 08/15/22  1115 08/15/22  0451    134 135  --  135 134   POTASSIUM 3.8 3.9 4.6  --  4.0 4.3   CHLORIDE 99 99 102  --  102 99   CO2 29  27 22  --  23 24   ANIONGAP 6 8 11  --  10 11   * 106* 107*  --  175* 127*   BUN 63* 66* 103*  --  72* 69*   CR 3.55* 4.45* 5.97*  --  4.73* 5.68*   GFRESTIMATED 14* 11* 8*  --  10* 8*   JEWELL 8.6 8.8 9.1  --  8.3* 8.9   PHOS  --   --   --  4.8*  --   --    PROTTOTAL  --   --   --   --  4.9* 5.7*   ALBUMIN  --   --   --   --  2.2* 2.5*   BILITOTAL  --   --   --   --  0.3 0.4   ALKPHOS  --   --   --   --  26* 32*   AST  --   --   --   --  43 48*   ALT  --   --   --   --  19 22     CBC  Recent Labs   Lab 08/20/22  0514 08/19/22  2151 08/19/22  1435 08/19/22  1307 08/19/22  0559 08/18/22  0553 08/17/22  2035 08/17/22  0554 08/16/22  0516   WBC  --   --   --   --  18.3* 15.9*  --  16.1* 15.5*   RBC  --   --   --   --  2.69* 2.19*  --  2.35* 2.76*   HGB 6.8* 7.4* 7.7* 7.9* 8.1*  8.1* 6.6*   < > 7.0* 8.2*   HCT  --   --   --   --  25.1* 19.4*  --  21.5* 24.7*   MCV  --   --   --   --  93 89  --  92 90   MCH  --   --   --   --  30.1 30.1  --  29.8 29.7   MCHC  --   --   --   --  32.3 34.0  --  32.6 33.2   RDW  --   --   --   --  14.2 13.7  --  13.9 13.7   PLT  --   --   --   --  220 194  --  234 235    < > = values in this interval not displayed.     INR  Recent Labs   Lab 08/15/22  0451   INR 1.68*       Imaging data:  No results found for this or any previous visit (from the past 48 hour(s)).      Ellis Costa MD

## 2022-08-20 NOTE — PROVIDER NOTIFICATION
Noticed 2nd unit of blood ordered, verified with provider if to be given considering 1st unit just finished and no recheck done yet.  Provider confirmed not necessary at this time.

## 2022-08-20 NOTE — PROGRESS NOTES
Neuro: A&O. Calm, cooperative.  CV: Prominent JVD, lymphadenopathy. 2+ Edema to BUE. HoTN, within parameters. Tele SR.  Respiratory: Coarse RLL, Ronchi LLL. Trachea slightly deviated from midline, no stridor audible. Reports breathing comfortably.   GI/: WNL.  Skin: Incisions CDI. Scattered bruising.  Activity: SBA.  Diet: Clears, as tolerated.  Drips: Heparin 1000 unit(s)/hr Recheck 0400.  Other: Continuing with Radiation therapy, pursuing chemo tx.   Plan:

## 2022-08-20 NOTE — PROGRESS NOTES
[facility]    Medicine Progress Note - Hospitalist Service    Date of Admission:  8/14/2022    Assessment & Plan              Gina Simpson is a 56 year old female with CAD, cardiomyopathy, hypertension, COPD, ESRD, failed RUE fistula, s/p creation of LUE AV fistula on 6/28/22 complicated by hematoma s/p evacuation, LUE DVT who presented as direct admission to [facility] from [place] ED after presenting with 2+weeks of facial swelling, fatigue, dysphagia. She was found to have prevertebral soft tissue swelling potentially presenting retropharyngeal abscess, lung mass with SVC syndrome and extensive adenopathy.     #.  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  SVC obstruction and SVC syndrome secondary to large neuroendocrine tumor  #.  Mediastinal mass measuring 10 x 8 x 6 cm with SVC obstruction and occlusion of right upper lobe pulmonary artery  #. Extensive cervical, supraclavicular, right thoracic inlet and right mediastinal/hilar adenopathy  #.  New 9mm ARIA pulmonary nodule  - significant fatigue, 20lb weight loss, poor appetite for two months    - CT on 8/15 showed new mediastinal mass on right measuring 10 x 8 x 6 cm with mass-effect on trachea, SVC and occlusion of right upper lobe pulmonary bronchus and artery.  Also concern for lymphangitic spread of neoplasm. 2 solid nodules in left lung concerning for metastatic foci.  Multiple large hepatic metastasis.  SVC syndrome/compression secondary to large mediastinal mass/adenopathy, small left pleural effusion, small pericardial effusion    -Underwent liver biopsy on 8/16 that showed metastatic poorly differentiated non-small cell carcinoma with neuroendocrine differentiation    -- Appreciate recommendations by thoracic surgery, vascular surgery, IR and oncology  --SVC stent was not recommended  --Started on emergent radiation therapy for SVC syndrome.   Patient declined treatment on 8/19.  Underwent first treatment on 8/20  -- [place] [facility] following  -- Continue pain control with IV Dilaudid, oxycodone  -- Per oncology, prognosis is poor and treatment is palliative.  -- Will need additional staging studies  -- Systemic therapy will start after completion of radiation treatments  -- Smoking cessation advised    Symmetric prevertebral soft tissue swelling from C2-C5 level  -Noted on CT scan  -ENT consulted.  Did not feel patient has retropharyngeal abscess.  ENT felt that edema of retropharyngeal space is related to SVC syndrome.  No specific treatment advised.  On exam, airway was widely patent, both vocal cords were mobile  -ENT noted exposed bone of right mandible with dental caries.  Patient will need dental evaluation as outpatient for this.      #.  ESRD on HD  #.  S/p left AV fistula formation 6/28/22  #.  S/p fistulogram with dilatation of L subclavian and junction of brachiocephalic and subclavian vein complicated by hematoma requiring evacuation 7/8/22  Dialyzes MWF - Dr. Gonsales.  Left AV fistula is working.  She also has tunneled dialysis catheter in place.  Use of dialysis site as per nephrology.  If AV fistula continues to work well, could consider discontinuing tunneled catheter.  Decision will need to be made by nephrology.    She underwent outpatient fistulogram 8/11/22.   Continue dialysis as per nephrology    #.  Acute upper GI bleed: 8/15  #.  Acute blood loss anemia due to upper GI bleed secondary to duodenal ulcer  #.  Bleeding duodenal ulcer with visible vessel, EGD 8/15  #.  Chronic anemia due to end-stage renal disease  -Patient developed acute GI bleed on 8/15.    -Hemoglobin dropped to 5.6.  Required 2 units of PRBC on 8/15, 1 unit on 8/17, 8/18    -Goal is to keep  Hb>7 , platelet > 50 and INR <2   -EGD 8/15 showed - clotted blood in the entire stomach, spurting duoeneal ulcer with visible vessel, injected, clips placed, argon plasma  "coag used.  -Continue to monitor hemoglobin and transfuse as needed  -Hemoglobin down to 6.8 on 8/20, was 7.9 yesterday.  We will transfuse 1 unit of PRBC  -Hold IV heparin infusion        #.  Left brachial vein DVT 7/6/22  #.  Right internal jugular thrombus, on CT neck 8/14   -Was started on warfarin in July 2022.  INR subtheraputic on admission  -Now on IV heparin infusion.  Continues to have problems with bleeding/hemoglobin drop requiring heparin to be placed on hold  -Hemoglobin yesterday was 7.9 and IV heparin infusion was resumed.  Hemoglobin today is down to 6.8.  -Hold IV heparin infusion for now until hemoglobin stabilizes  -Will need lifelong anticoagulation    #.  CAD, s/p stenting in 2019  #.  Chronic systolic CHF with EF of 40-45%, echo 2020   Hypertension  PTA:  hydralazine 50mg TID, carvedilol 25mg bid, lisinopril 20mg daily, amlodipine 10mg every evening  --continue PTA carvedilol   --HOLD lisinopril, hydralazine, amlodipine due to upper GI bleed      #.  Hyperphosphatemia  --Cont PhosLo with meals    #. Severe malnutrition due to illness  -Nutrition services following.  -Continue Ensure twice daily     Diet: Clear Liquid Diet    DVT Prophylaxis: Heparin gtt when hemoglobin stable   Antoine Catheter: Not present  Central Lines: PRESENT  CVC Double Lumen Left Subclavian-Site Assessment: WDL  Cardiac Monitoring: ACTIVE order. Indication: unstable  Code Status: Full Code      Disposition Plan         The patient's care was discussed with the Bedside Nurse and Patient.    Ana Valerio MD  Hospitalist Service  [facility] [place]  Securely message with the Vocera Web Console (learn more here)  Text page via [employer] Paging/Directory     \"This dictation was performed with voice recognition software and may contain errors,  omissions and inadvertent word substitution.\"    Clinically Significant Risk Factors Present on Admission                   # Severe Malnutrition: based on nutrition " assessment _____________________________________________________________________    Interval History     Underwent radiation therapy today.  Very somnolent.  Has difficulty opening eyes and answering questions.    Complains of pain.  Denies any shortness of breath   Hemoglobin down to 6.8.  Received 1 unit PRBC  IV heparin infusion on hold    Data reviewed today: I reviewed all medications, new labs and imaging results over the last 24 hours. I personally reviewed no images or EKG's today.    Physical Exam   /80 (BP Location: Right arm, Patient Position: Semi-Fuentes's)   Pulse 89   Temp 98.3  F (36.8  C) (Axillary)   Resp 14   Wt 56 kg (123 lb 6.4 oz)   SpO2 92%   BMI 20.53 kg/m    Gen- pleasant lying in bed  Neck-swelling  CVS- I+II+ soft ESM  RS- CTAB  Abdo- soft, no tenderness , No g/r/r   Ext- b/l arms edema Lt >Rt   CNS-oriented to person, place, time    Left AVF     Data   No results found for this or any previous visit (from the past 24 hour(s)).   BMP  Recent Labs   Lab 08/18/22  0553 08/17/22  0554 08/16/22  0516 08/15/22  1115    134 135 135   POTASSIUM 3.8 3.9 4.6 4.0   CHLORIDE 99 99 102 102   JEWELL 8.6 8.8 9.1 8.3*   CO2 29 27 22 23   BUN 63* 66* 103* 72*   CR 3.55* 4.45* 5.97* 4.73*   * 106* 107* 175*     CBC  Recent Labs   Lab 08/20/22  0514 08/19/22  1307 08/19/22  0559 08/18/22  0553 08/17/22  2035 08/17/22  0554 08/16/22  0516   WBC  --   --  18.3* 15.9*  --  16.1* 15.5*   RBC  --   --  2.69* 2.19*  --  2.35* 2.76*   HGB 6.8*   < > 8.1*  8.1* 6.6*   < > 7.0* 8.2*   HCT  --   --  25.1* 19.4*  --  21.5* 24.7*   MCV  --   --  93 89  --  92 90   MCH  --   --  30.1 30.1  --  29.8 29.7   MCHC  --   --  32.3 34.0  --  32.6 33.2   RDW  --   --  14.2 13.7  --  13.9 13.7   PLT  --   --  220 194  --  234 235    < > = values in this interval not displayed.     INR  Recent Labs   Lab 08/15/22  0451   INR 1.68*     LFTs  Recent Labs   Lab 08/15/22  1115 08/15/22  0451   ALKPHOS 26* 32*    AST 43 48*   ALT 19 22   BILITOTAL 0.3 0.4   PROTTOTAL 4.9* 5.7*   ALBUMIN 2.2* 2.5*

## 2022-08-20 NOTE — PLAN OF CARE
VSS. A/Ox4, able to express needs. Tele SR. LS coarse. Tolerating clear liquids. Plan for continued radiation therapy. Pt with critical Hgb 6.8, provider updated and awaiting call back. Will continue to monitor.     /74   Pulse 101   Temp 98  F (36.7  C)   Resp 16   Wt 56 kg (123 lb 6.4 oz)   SpO2 92%   BMI 20.53 kg/m

## 2022-08-20 NOTE — PLAN OF CARE
Goal Outcome Evaluation:    Summary  [facility] patient  A&O x4 when awake, lethargic and sleepy during most of the shift  VSS   Lungs sounds - course crackles RUL, Clear in the rest of the lungs. Deviated trach - audible stridor in neck when breathing. R/A & N/C throughout the day, 2L when on N/C due to O2 drop to 90%  Bowel Sounds - Normoactive, one BM during shift, black in color and soft  Urine Output - Inadequate due to dialysis  Incisions - ZEFERINO stitched, open to air, CDI  Ambulation - SBA   Diet - as tolerated  Pain controlled by - PRN oxy  Peripheral IV Right upper arm, CVC left subclavian, hemodialysis vascular left arm  Tele - SR/ST  Video swallow pending for Monday  Radiation administered today  One unit of blood administered  Heparin drip stopped due to blood transfusion  CT and labwork done due to pt lethargy and sleepiness

## 2022-08-20 NOTE — PLAN OF CARE
Goal Outcome Evaluation:    Summary  C patient  A&O x4   VSS   Lungs sounds - course crackles RUL, Clear in the rest of the lungs. Deviated trach - audible stridor in neck when breathing.  Bowel Sounds - Normoactive, one BM during shift.  Urine Output - Adequate, SBA to bathroom  Incisions - ZEFERINO stitched, open to air, CDI  Ambulation - SBA   Diet - as tolerated  Pain controlled by - PRN oxy  Peripheral IV Right upper arm, CVC left subclavian, hemodialysis vascular left arm  Tele - Sinus Rhythm   Video swallow pending for Monday  Refused radiation  Hemodialysis  Heparin drip started, currently running at 1000 units/hr

## 2022-08-21 NOTE — PROGRESS NOTES
Patient came to radiation therapy for treatment #3 of 10 to the right lung using 10 MV at a daily dose of 300 cGy for a total dose to date of 900 cGy.  Plan for 7 more radiation treatments.

## 2022-08-21 NOTE — PLAN OF CARE
Vital signs stable,on nasal cannula at 2 lpm. Tele was Sinus Rhythm with episodes of tachycardia. Alert and oriented with lethargic episodes.Incision sites was dry and intact.. Pain controlled with oxycodone but denies pain mostly. Up with standby assist. On Full Liquid diet.Bowel sounds are normoactive,no bowel movement.Voiding freely even on Dialysis,with one episodes of urination.Lung sounds with wheezes upon expiration and fine crackles noted.Started with heparin drip.Heparin Xa and Hgb pending.Radiation session done today at 8 am.IMC discontinued.

## 2022-08-21 NOTE — PROGRESS NOTES
08/21/22 1100   Quick Adds   Type of Visit Initial Occupational Therapy Evaluation   Living Environment   People in Home child(cornelius), adult;grandchild(cornelius)  (18 and 10 yo grandkids)   Current Living Arrangements condominium   Home Accessibility stairs to enter home   Number of Stairs, Main Entrance other (see comments)  (17 steps indoors)   Stair Railings, Main Entrance railings safe and in good condition   Transportation Anticipated car, drives self   Self-Care   Usual Activity Tolerance good   Current Activity Tolerance moderate   Regular Exercise No   Activity/Exercise/Self-Care Comment has walk in shower, walks to the pool on the grounds   Instrumental Activities of Daily Living (IADL)   Previous Responsibilities housekeeping;laundry;driving  (daughter helps manage finances and laundry/cleaning)   General Information   Onset of Illness/Injury or Date of Surgery 08/14/22   Referring Physician Kobe Quarles MD   Patient/Family Therapy Goal Statement (OT) none stated   Additional Occupational Profile Info/Pertinent History of Current Problem Ms. Gina Simpson is a 56 year old woman who was admitted on 8/14/2022 with facial edema and dyspnea     1. 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 Imaging studies show evidence of metastatic disease involving cervical LNs and liver. Mediastinal mass measures 10 x 8 x 6 cm with SVC obstruction and occlusion of the RUL pulmonary   Existing Precautions/Restrictions fall   Limitations/Impairments safety/cognitive   General Observations and Info resting in bed, on hold with kitchen to order lunch   Cognitive Status Examination   Orientation Status orientation to person, place and time   Affect/Mental Status (Cognitive) low arousal/lethargic   Follows Commands follows one-step commands   Safety Deficit problem-solving   Memory Deficit short-term memory   Cognitive Status Comments cognitive  deficits noted during eval. difficulty with lethargy, STM and problem solving. will continue to assess   Visual Perception   Visual Impairment/Limitations WNL   Sensory   Sensory Quick Adds No deficits were identified   Pain Assessment   Patient Currently in Pain No   Integumentary/Edema   Integumentary/Edema Comments B UE edema noted   Posture   Posture not impaired   Range of Motion Comprehensive   General Range of Motion no range of motion deficits identified   Strength Comprehensive (MMT)   General Manual Muscle Testing (MMT) Assessment no strength deficits identified   Muscle Tone Assessment   Muscle Tone Quick Adds No deficits were identified   Coordination   Upper Extremity Coordination No deficits were identified   Bed Mobility   Comment (Bed Mobility) SBA   Transfers   Transfer Comments CGA sit to stand   Activities of Daily Living   BADL Assessment/Intervention lower body dressing;grooming   Lower Body Dressing Assessment/Training   Position (Lower Body Dressing) edge of bed sitting   [place] Level (Lower Body Dressing) supervision   Grooming Assessment/Training   Position (Grooming) edge of bed sitting   [place] Level (Grooming) set up   Clinical Impression   Criteria for Skilled Therapeutic Interventions Met (OT) Yes, treatment indicated   OT Diagnosis decreased I with ADLs and functional mobility   OT Problem List-Impairments impacting ADL problems related to;cognition;activity tolerance impaired   Assessment of Occupational Performance 1-3 Performance Deficits   Identified Performance Deficits decreased home mgmt, social skills, ADLs, functional mobility   Planned Therapy Interventions (OT) ADL retraining;IADL retraining;balance training;bed mobility training;cognition;transfer training;progressive activity/exercise   Clinical Decision Making Complexity (OT) low complexity   Risk & Benefits of therapy have been explained evaluation/treatment results reviewed;care plan/treatment goals  reviewed;risks/benefits reviewed;current/potential barriers reviewed;participants voiced agreement with care plan;participants included;patient   OT Discharge Planning   OT Discharge Recommendation (DC Rec) Transitional Care Facility;home with assist   OT Rationale for DC Rec pt demos decreased activity tolerance and cognition. unsure what goals of care are at this time with a new dx, but at this time pt will need 24 hour supervision if she returns home and A with all IADLs. if family unable to provide assist, pt will need TCU   Total Evaluation Time (Minutes)   Total Evaluation Time (Minutes) 10   OT Goals   Therapy Frequency (OT) 5 times/wk   OT Predicted Duration/Target Date for Goal Attainment 08/28/22   OT Goals Hygiene/Grooming;Lower Body Dressing;Toilet Transfer/Toileting;Cognition;OT Goal 1   OT: Hygiene/Grooming modified independent;while standing   OT: Lower Body Dressing Modified independent;including set-up/clothing retrieval   OT: Toilet Transfer/Toileting Modified independent;toilet transfer;cleaning and garment management   OT: Cognitive Patient/caregiver will verbalize understanding of cognitive assessment results/recommendations as needed for safe discharge planning   OT: Goal 1 pt to demo I with B UE HEP to increase activity tolerance

## 2022-08-21 NOTE — PROGRESS NOTES
[facility] Progress Note          Assessment and Plan:   Ms. Gina Simpson is a 56 year old woman who was admitted on 8/14/2022 with facial edema and dyspnea     1. 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 Imaging studies show evidence of metastatic disease involving cervical LNs and liver. Mediastinal mass measures 10 x 8 x 6 cm with SVC obstruction and occlusion of the RUL pulmonary artery  - treatment options were previously reviewed at length with her and her family by Dr. Dreyer and also by Dr. Herbert from [facility]. She and her family understand that prognosis is poor and that treatment is palliative. As part of the visit today, I again reviewed this topic. She asked about expected survival I this setting and I explained that avergae survival is typically measured in months rather than years. I dd emphasize how results of mutation studies may offer other treatment options which could improve the prognosos   -she and her family asked about receiving radiation closer to home and I encouraged then to discuss this possibility with . She lives in [place] and could potentially receive treatment in [place]  -the plan is for radiation treatment to help with SVC obstruction and to consider systemic therapy after completion of radiation therapy  -she had first radiation treatment on 8/20  -tumor sent for NGS testing     2. ESRD managed with hemodialysis via a LUE fistula. We discussed the potential for increased fatigue developing while she is being dialyzed 3 times weekly and also receiving daily radiation.     3. Anemia due to CKD     4. Intraluminal thrombosis treated previously with IV heparin  - anticoagulation was briefly interrupted after development of bleeding bu thas now been restarted             Interval History:   Patient more awake today. No complaints of nausea or vomiting. No reported  bleeding.              Medications:       - MEDICATION INSTRUCTIONS for Dialysis Patients -   Does not apply See Admin Instructions     allopurinol  100 mg Oral QPM     [Held by provider] amLODIPine  10 mg Oral QPM     atorvastatin  20 mg Oral Daily     calcium acetate  2,001 mg Oral TID w/meals     carvedilol  25 mg Oral BID w/meals     [Held by provider] hydrALAZINE  50 mg Oral TID     [Held by provider] lisinopril  20 mg Oral Daily     multivitamin RENAL  1 capsule Oral Daily     pantoprazole  40 mg Oral BID AC     piperacillin-tazobactam  2.25 g Intravenous Q8H     sodium chloride (PF)  3 mL Intracatheter Q8H     sodium chloride 0.9%, acetaminophen **OR** acetaminophen, HYDROmorphone, lidocaine 4%, lidocaine 4%, lidocaine (buffered or not buffered), lidocaine (buffered or not buffered), LORazepam, melatonin, naloxone **OR** naloxone **OR** naloxone **OR** naloxone, nitroGLYcerin, ondansetron **OR** ondansetron, oxyCODONE, - MEDICATION INSTRUCTIONS -, sodium chloride (PF), sodium chloride (PF)               Physical Exam:   Blood pressure 94/57, pulse 90, temperature 97.8  F (36.6  C), temperature source Axillary, resp. rate 20, weight 57.9 kg (127 lb 10.3 oz), SpO2 100 %, not currently breastfeeding.  Wt Readings from Last 4 Encounters:   22 57.9 kg (127 lb 10.3 oz)   07/10/22 65 kg (143 lb 4.8 oz)   22 56.2 kg (124 lb)   22 56.7 kg (125 lb)         Vital Sign Ranges  Temperature Temp  Av.8  F (36.6  C)  Min: 97.8  F (36.6  C)  Max: 97.9  F (36.6  C)   Blood pressure Systolic (24hrs), Av , Min:94 , Max:129        Diastolic (24hrs), Av, Min:57, Max:80      Pulse Pulse  Av.8  Min: 89  Max: 106   Respirations Resp  Av  Min: 11  Max: 26   Pulse oximetry SpO2  Av.4 %  Min: 90 %  Max: 100 %         Intake/Output Summary (Last 24 hours) at 2022 1316  Last data filed at 2022 1000  Gross per 24 hour   Intake 680 ml   Output --   Net 680 ml       Constitutional:  Awake, alert, cooperative, no apparent distress but appears sleepy   Lungs: Bilateral rhonchi   Cardiovascular: Regular rate and rhythm, normal S1 and S2, and no murmur noted   Abdomen: Normal bowel sounds, soft, non-distended, non-tender   Skin: No rashes, no cyanosis, upper extremity edema    Other:           Data:   Laboratory:  CMP  Recent Labs   Lab 08/21/22  0530 08/20/22  1723 08/18/22  0553 08/17/22  0554 08/16/22  0516 08/15/22  1641 08/15/22  1115 08/15/22  0451    135 134 134   < >  --  135 134   POTASSIUM 3.7 3.9 3.8 3.9   < >  --  4.0 4.3   CHLORIDE 98 98 99 99   < >  --  102 99   CO2 29 28 29 27   < >  --  23 24   ANIONGAP 6 9 6 8   < >  --  10 11   * 112* 109* 106*   < >  --  175* 127*   BUN 62* 59* 63* 66*   < >  --  72* 69*   CR 4.91* 4.30* 3.55* 4.45*   < >  --  4.73* 5.68*   GFRESTIMATED 10* 11* 14* 11*   < >  --  10* 8*   JEWELL 9.5 9.9 8.6 8.8   < >  --  8.3* 8.9   PHOS  --   --   --   --   --  4.8*  --   --    PROTTOTAL 5.2*  --   --   --   --   --  4.9* 5.7*   ALBUMIN 1.8*  --   --   --   --   --  2.2* 2.5*   BILITOTAL 0.4  --   --   --   --   --  0.3 0.4   ALKPHOS 34*  --   --   --   --   --  26* 32*   AST 96*  --   --   --   --   --  43 48*   ALT 20  --   --   --   --   --  19 22    < > = values in this interval not displayed.     CBC  Recent Labs   Lab 08/21/22  0530 08/20/22 2229 08/20/22  1723 08/20/22  0514 08/19/22  1307 08/19/22  0559 08/18/22  0553 08/17/22 2035 08/17/22 0554   WBC 14.1*  --   --   --   --  18.3* 15.9*  --  16.1*   RBC 2.55*  --   --   --   --  2.69* 2.19*  --  2.35*   HGB 7.9* 8.2* 8.3* 6.8*   < > 8.1*  8.1* 6.6*   < > 7.0*   HCT 23.9*  --   --   --   --  25.1* 19.4*  --  21.5*   MCV 94  --   --   --   --  93 89  --  92   MCH 31.0  --   --   --   --  30.1 30.1  --  29.8   MCHC 33.1  --   --   --   --  32.3 34.0  --  32.6   RDW 14.5  --   --   --   --  14.2 13.7  --  13.9     --   --   --   --  220 194  --  234    < > = values in this interval not  displayed.     INR  Recent Labs   Lab 08/15/22  0451   INR 1.68*       Imaging data:  Recent Results (from the past 48 hour(s))   CT Head w/o Contrast    Narrative    EXAM: CT HEAD W/O CONTRAST  LOCATION: [facility]  DATE/TIME: 8/20/2022 5:02 PM    INDICATION: On IV heparin, evaluate for head bleed  COMPARISON: None.  TECHNIQUE: Routine CT Head without IV contrast. Multiplanar reformats. Dose reduction techniques were used.    FINDINGS:  INTRACRANIAL CONTENTS: No intracranial hemorrhage, extraaxial collection, or mass effect.  No CT evidence of acute infarct. Normal parenchymal attenuation. Normal ventricles and sulci.     VISUALIZED ORBITS/SINUSES/MASTOIDS: No intraorbital abnormality. No significant paranasal sinus mucosal disease. No middle ear or mastoid effusion.    BONES/SOFT TISSUES: No acute abnormality.      Impression    IMPRESSION:  1.  No acute intracranial process; specifically no acute intracranial hemorrhage.         Ellis Costa MD

## 2022-08-21 NOTE — PLAN OF CARE
Oriented x4, lethargic most of shift. Does awaken to voice. VSS ex tachycardic at times on 2lpm NC. Tele SR. LS coarse in bilateral upper lobes. Diminished throughout. +bs, +flatus. Pt on HD. Did not void on shift. Denied pain during shift. Tolerating clears. Did complain of feeling like her pills were getting stuck when swallowing. LUE +4 pitting edema. RUE near elbow +2-3 edema. Fistula to RUE has +thrill, +bruit. CVC to left upper chest dressing is CDI. Up with assist x1. On clear liquid diet.

## 2022-08-21 NOTE — PROGRESS NOTES
[facility]    Medicine Progress Note - Hospitalist Service    Date of Admission:  8/14/2022    Assessment & Plan          Gina Simpson is a pleasant 56 year old female with CAD, cardiomyopathy, hypertension, COPD, ESRD, failed RUE fistula, s/p creation of LUE AV fistula on 6/28/22 complicated by hematoma s/p evacuation, LUE DVT who presented as direct admission to [facility] from [place] ED after presenting with 2+weeks of facial swelling, fatigue, dysphagia. She was found to have prevertebral soft tissue swelling, lung mass with SVC syndrome and extensive adenopathy.     #.  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  SVC obstruction and SVC syndrome secondary to large neuroendocrine tumor  #.  Mediastinal mass measuring 10 x 8 x 6 cm with SVC obstruction and occlusion of right upper lobe pulmonary artery  #. Extensive cervical, supraclavicular, right thoracic inlet and right mediastinal/hilar adenopathy  #.  New 9mm ARIA pulmonary nodule  - reported significant fatigue, 20lb weight loss, poor appetite for two months    - CT on 8/15 showed new mediastinal mass on right measuring 10 x 8 x 6 cm with mass-effect on trachea, SVC and occlusion of right upper lobe pulmonary bronchus and artery.  Also concern for lymphangitic spread of neoplasm. 2 solid nodules in left lung concerning for metastatic foci.  Multiple large hepatic metastasis.  SVC syndrome/compression secondary to large mediastinal mass/adenopathy, small left pleural effusion, small pericardial effusion    -Underwent liver biopsy on 8/16 that showed metastatic poorly differentiated non-small cell carcinoma with neuroendocrine differentiation    -- Appreciate recommendations by thoracic surgery, vascular surgery, IR and oncology  -- SVC stent was not recommended  -- Started on emergent radiation therapy for SVC syndrome.  Has received 3 of 10  treatments.  Facial swelling appears to be improving  -- [place] oncology following  -- Continue pain control with IV Dilaudid, oxycodone  -- Per oncology, prognosis is poor and treatment is palliative.  -- Will need additional staging studies  -- Systemic therapy will start after completion of radiation treatments  -- Smoking cessation advised    #.  Symmetric prevertebral soft tissue swelling from C2-C5 level  -Noted on CT scan  -ENT consulted.  Did not feel patient has retropharyngeal abscess.  ENT felt that edema of retropharyngeal space is related to SVC syndrome.  No specific treatment advised.  On exam, airway was widely patent, both vocal cords were mobile  - ENT noted exposed bone of right mandible with dental caries.  Patient will need dental evaluation as outpatient for this.    #.  Dental caries  ENT noted exposed bone of right mandible with dental caries.    - Patient will need dental evaluation as outpatient for this.      #.  ESRD on HD  #.  S/p left AV fistula formation 6/28/22  #.  S/p fistulogram with dilatation of L subclavian and junction of brachiocephalic and subclavian vein complicated by hematoma requiring evacuation 7/8/22  Dialyzes MWF - Dr. Gonsales.  Left AV fistula is working.  She also has tunneled dialysis catheter in place.  Use of dialysis site as per nephrology.  If AV fistula continues to work well, could consider discontinuing tunneled catheter.  Decision will need to be made by nephrology.    She underwent outpatient fistulogram 8/11/22.   Continue dialysis as per nephrology    #.  Acute upper GI bleed due to duodenal ulcer, 8/15  #.  Acute blood loss anemia due to upper GI bleed secondary to duodenal ulcer  #.  Bleeding duodenal ulcer with visible vessel, EGD 8/15  #.  Chronic anemia due to end-stage renal disease  #.  S/p 5 units PRBC transfusion this admission  - Patient developed acute GI bleed on 8/15.    - Hemoglobin dropped to 5.6.  Required 2 units of PRBC on 8/15, 1 unit  on 8/17, 8/18, 8/20  - Goal is to keep  Hb>7 , platelet > 50 and INR <2   - EGD 8/15 showed - clotted blood in the entire stomach, spurting duoeneal ulcer with visible vessel, injected, clips placed, argon plasma coag used.  - Continue to monitor hemoglobin and transfuse as needed  - Hemoglobin 7.9 today after 1 unit of PRBC on 8/20 and with holding IV heparin infusion   - Resume IV heparin infusion        #.  Left brachial vein DVT 7/6/22  #.  Right internal jugular thrombus, on CT neck 8/14   - Was started on warfarin in July 2022.  INR subtheraputic on admission  - Now on IV heparin infusion.  Continues to have problems with bleeding/hemoglobin drop requiring heparin to be placed on hold  - Hemoglobin on 8/20 was down to 6.8 from 7.9. IV heparin infusion was discontinued  - Hemoglobin now improved to 7.9 after 1 unit of PRBC on 8/20 and holding IV heparin   - Resume IV heparin infusion  - Will need lifelong anticoagulation.  Will transition to warfarin once hemoglobin stabilizes    #.  CAD, s/p stenting in 2019  #.  Chronic systolic CHF with EF of 40-45%, echo 2020   #.  Hypertension  PTA:  hydralazine 50mg TID, carvedilol 25mg bid, lisinopril 20mg daily, amlodipine 10mg every evening  --continue PTA carvedilol   --HOLD lisinopril, hydralazine, amlodipine due normal blood pressure       #.  Hyperphosphatemia  --Cont PhosLo with meals    #. Severe malnutrition due to illness  -Nutrition services following.  -Continue Ensure twice daily     Diet: Full Liquid Diet    DVT Prophylaxis: Heparin gtt    Antoine Catheter: Not present  Central Lines: PRESENT  CVC Double Lumen Left Subclavian-Site Assessment: WDL  Cardiac Monitoring: ACTIVE order. Indication: unstable  Code Status: Full Code      Disposition Plan         The patient's care was discussed with the Bedside Nurse and Patient.    Ana Valerio MD  Hospitalist Service  [facility]  Securely message with the Vocera Web Console (learn more  "here)  Text page via [facility] Paging/Directory     \"This dictation was performed with voice recognition software and may contain errors,  omissions and inadvertent word substitution.\"    Clinically Significant Risk Factors Present on Admission                   # Severe Malnutrition: based on nutrition assessment _____________________________________________________________________    Interval History     More awake and alert today   Feels that the swelling is improving   Advance to full liquid diet   Hemoglobin stable since transfusion yesterday   IV heparin infusion resumed   Pain is controlled   No nausea or vomiting       Data reviewed today: I reviewed all medications, new labs and imaging results over the last 24 hours. I personally reviewed no images or EKG's today.    Physical Exam   BP 94/57 (BP Location: Right arm)   Pulse 90   Temp 97.8  F (36.6  C) (Axillary)   Resp 20   Wt 57.9 kg (127 lb 10.3 oz)   SpO2 100%   BMI 21.24 kg/m    Gen- pleasant lying in bed  Neck-swelling  CVS- I+II+ soft ESM  RS- CTAB  Abdo- soft, no tenderness , No g/r/r   Ext- b/l arms edema Lt >Rt   CNS-oriented to person, place, time    Left AVF     Data   Recent Results (from the past 24 hour(s))   CT Head w/o Contrast    Narrative    EXAM: CT HEAD W/O CONTRAST  LOCATION: [facility]  DATE/TIME: 8/20/2022 5:02 PM    INDICATION: On IV heparin, evaluate for head bleed  COMPARISON: None.  TECHNIQUE: Routine CT Head without IV contrast. Multiplanar reformats. Dose reduction techniques were used.    FINDINGS:  INTRACRANIAL CONTENTS: No intracranial hemorrhage, extraaxial collection, or mass effect.  No CT evidence of acute infarct. Normal parenchymal attenuation. Normal ventricles and sulci.     VISUALIZED ORBITS/SINUSES/MASTOIDS: No intraorbital abnormality. No significant paranasal sinus mucosal disease. No middle ear or mastoid effusion.    BONES/SOFT TISSUES: No acute abnormality.      Impression    " IMPRESSION:  1.  No acute intracranial process; specifically no acute intracranial hemorrhage.      BMP  Recent Labs   Lab 08/21/22  0530 08/20/22  1723 08/18/22  0553 08/17/22  0554    135 134 134   POTASSIUM 3.7 3.9 3.8 3.9   CHLORIDE 98 98 99 99   JEWELL 9.5 9.9 8.6 8.8   CO2 29 28 29 27   BUN 62* 59* 63* 66*   CR 4.91* 4.30* 3.55* 4.45*   * 112* 109* 106*     CBC  Recent Labs   Lab 08/21/22  0530 08/19/22  1307 08/19/22  0559 08/18/22  0553 08/17/22  2035 08/17/22  0554   WBC 14.1*  --  18.3* 15.9*  --  16.1*   RBC 2.55*  --  2.69* 2.19*  --  2.35*   HGB 7.9*   < > 8.1*  8.1* 6.6*   < > 7.0*   HCT 23.9*  --  25.1* 19.4*  --  21.5*   MCV 94  --  93 89  --  92   MCH 31.0  --  30.1 30.1  --  29.8   MCHC 33.1  --  32.3 34.0  --  32.6   RDW 14.5  --  14.2 13.7  --  13.9     --  220 194  --  234    < > = values in this interval not displayed.     INR  Recent Labs   Lab 08/15/22  0451   INR 1.68*     LFTs  Recent Labs   Lab 08/21/22  0530 08/15/22  1115 08/15/22  0451   ALKPHOS 34* 26* 32*   AST 96* 43 48*   ALT 20 19 22   BILITOTAL 0.4 0.3 0.4   PROTTOTAL 5.2* 4.9* 5.7*   ALBUMIN 1.8* 2.2* 2.5*

## 2022-08-21 NOTE — PROGRESS NOTES
Chart reviewed.   Plan for HD tomorrow, 2 L UF. Will try to use AVG and see if possible. Otherwise use CVC     Guero Foy MD  [facility] - Nephrology   [phone]

## 2022-08-22 NOTE — PROGRESS NOTES
Potassium   Date Value Ref Range Status   08/21/2022 3.7 3.4 - 5.3 mmol/L Final     Hemoglobin   Date Value Ref Range Status   08/22/2022 5.6 (LL) 11.7 - 15.7 g/dL Final     Creatinine   Date Value Ref Range Status   08/21/2022 4.91 (H) 0.52 - 1.04 mg/dL Final     Urea Nitrogen   Date Value Ref Range Status   08/21/2022 62 (H) 7 - 30 mg/dL Final     Sodium   Date Value Ref Range Status   08/21/2022 133 133 - 144 mmol/L Final     INR   Date Value Ref Range Status   08/15/2022 1.68 (H) 0.85 - 1.15 Final       DIALYSIS PROCEDURE NOTE  Hepatitis status of previous patient on machine log was checked and verified ok to use with this patients hepatitis status.  Patient dialyzed for 3 hrs. on a K3   bath with a net fluid removal of  0L.  A BFR of 250 ml/min was obtained via a LUAF using 16 gauge needles. Dr Louise notified of swollen Lt arm and unable to achieve BFR of 300ml/min today due to high pressures and needle placement.    The treatment plan was discussed with Dr. Louise during the treatment.    Total heparin received during the treatment: 0 units.   Needle cannulation sites held x 20 min.   Line flushed, clamped and capped with heparin 1:1000 2.3 mL (2300 units) per lumen was flushed jyoti and dressing changed but used fistula today.    Meds  given: ONE Unit PRBC's given on dialysis today.   Complications: LT arm swollen and unable to feel bruit above previous needle sites, writter needed to cannulate closer to the elbow in a small section of the fistula to work today. BP in the 80's supported with 25%Albumin 50 ML IV and Blood, Dr Louise aware and ordered for no weight removal today. Also 's during the treatment, RN and MD aware.      Person educated: pt. Knowledge base minimal. Barriers to learning: none. Educated on access via verbal mode. Patient verbalized understanding. Pt prefers verbal education style.     ICEBOAT? Timeout performed pre-treatment  I: Patient was identified using 2 identifiers  C:   Consent Signed Yes  E: Equipment preventative maintenance is current and dialysis delivery system OK to use  B: Hepatitis B Surface Antigen: neg; Draw Date: 8/18/22      Hepatitis B Surface Antibody: Immune; Draw Date: 8/18/2022  O: Dialysis orders present and complete prior to treatment  A: Vascular access verified and assessed prior to treatment  T: Treatment was performed at a clinically appropriate time  ?: Patient was allowed to ask questions and address concerns prior to treatment  See Adult Hemodialysis flowsheet in [facility] for further details and post assessment.  Machine water alarm in place and functioning. Transducer pods intact and checked every 15min.   Pt returned via bed.  Chlorine/Chloramine water system checked every 4 hours.  Outpatient Dialysis at [facility]      Post treatment report given to BROWN Schmidt RN regarding 0L of fluid removed, last BP of 102/75, and patient pain rating of 0/10.     Please remove patient dressing on AVF and AVG needle sites 24 hours after dialysis. If leaking occurs please apply a Band-Aid.

## 2022-08-22 NOTE — PLAN OF CARE
A/Ox4. VSS on 2L NC. SBA. Oxycodone given PRN for pain. Full liquid diet. Continent BB. Pt on heparin gtt, hep Xa came back 0.26, increased dose by 150u/hr; pt currently receiving 1200u/hr. Hgb is low and continuing to drop. PIV x1 R hand, infusing heparin; L CVC; hemodialysis vascular access in L arm. Pt has +3 to +4 pitting edema in upper extremities; +2 edema in L leg. Hemodialysis and radiation therapy scheduled for 8/22.

## 2022-08-22 NOTE — PLAN OF CARE
[id number]. A/Ox4. VSS. On 2lpm NC. LS diminished. +bs, +flatus. On HD, will have dialysis tomorrow. Will also have radiation tomorrow. Denied pain. RUE fistula with +thrill, +bruit. RUE with +4 edema. LUE +3 edema. Lab was unable to draw hep 10a two times. Another  will be coming up to try and draw her again. Hep gtt is currently infusing at 10.5ml/hr. Tolerating diet. Up with sba.

## 2022-08-22 NOTE — PROVIDER NOTIFICATION
MD Notification    Notified Person: MD    Notified Person Name: Teodoro     Notification Date/Time: 8/22 at 1319     Notification Interaction: vocRedVision System messaging     Purpose of Notification: Critical HgB 5.6, will transfuse ASAP. recheck scheduled for 8pm, would you like a HgB recheck sooner then that after getting the blood?   What would you like me to do with her heparin drip? thanks!     Orders Received:  Discontinue IV heparin   Recheck 1 hour after administration

## 2022-08-22 NOTE — PROGRESS NOTES
Radiation treatment # 4 to Chest.  1200 cGy total dose to date delivered with 10 MV Photons.  Patient has 6 treatments remaining.  SE  RTT

## 2022-08-22 NOTE — PLAN OF CARE
Goal Outcome Evaluation:    Plan of Care Reviewed With: patient     Overall Patient Progress: no change    Outcome Evaluation: On liquid diet x5 day. Reports taking Ensure BID and solid food some evenings when daughter visits. Consider advancing diet to regular today.

## 2022-08-22 NOTE — PROGRESS NOTES
[facility]    Medicine Progress Note - Hospitalist Service    Date of Admission:  8/14/2022    Assessment & Plan          Gina Simpson is a pleasant 56 year old female with CAD, cardiomyopathy, hypertension, COPD, ESRD, failed RUE fistula, s/p creation of LUE AV fistula on 6/28/22 complicated by hematoma s/p evacuation, LUE DVT who presented as direct admission to [facility] from [place] ED after presenting with 2+weeks of facial swelling, fatigue, dysphagia. She was found to have prevertebral soft tissue swelling, lung mass with SVC syndrome and extensive adenopathy.     #.  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  SVC obstruction and SVC syndrome secondary to large neuroendocrine tumor  #.  Mediastinal mass measuring 10 x 8 x 6 cm with SVC obstruction and occlusion of right upper lobe pulmonary artery  #.  Extensive cervical, supraclavicular, right thoracic inlet and right mediastinal/hilar adenopathy  #.  New 9mm ARIA pulmonary nodule  - reported significant fatigue, 20lb weight loss, poor appetite for two months    - CT on 8/15 showed new mediastinal mass on right measuring 10 x 8 x 6 cm with mass-effect on trachea, SVC and occlusion of right upper lobe pulmonary bronchus and artery.  Also concern for lymphangitic spread of neoplasm. 2 solid nodules in left lung concerning for metastatic foci.  Multiple large hepatic metastasis.  SVC syndrome/compression secondary to large mediastinal mass/adenopathy, small left pleural effusion, small pericardial effusion    -Underwent liver biopsy on 8/16 that showed metastatic poorly differentiated non-small cell carcinoma with neuroendocrine differentiation    -- Appreciate recommendations by thoracic surgery, vascular surgery, IR and oncology  -- SVC stent was not recommended  -- Started on emergent radiation therapy for SVC syndrome.  Has received 4 of 10  treatments.  Facial swelling appears to be improving  -- [place] oncology following  -- Continue pain control with IV Dilaudid, oxycodone  -- Per oncology, prognosis is poor and treatment is palliative.  -- Will need additional staging studies -MRI brain and CT abdomen/pelvis.  Timing of imaging studies to be decided by oncology.  -- Port placement to be decided by oncology  -- Systemic therapy will start after completion of radiation treatments  -- Smoking cessation advised    #.  Symmetric prevertebral soft tissue swelling from C2-C5 level  -Noted on CT scan  -ENT consulted.  Did not feel patient has retropharyngeal abscess.  ENT felt that edema of retropharyngeal space is related to SVC syndrome.  No specific treatment advised.  On exam, airway was widely patent, both vocal cords were mobile  - ENT noted exposed bone of right mandible with dental caries.  Patient will need dental evaluation as outpatient for this.    #.  Dental caries  ENT noted exposed bone of right mandible with dental caries.    - Patient will need dental evaluation as outpatient for this.      #.  ESRD on HD q. M W F  #.  S/p left AV fistula formation 6/28/22  #.  S/p fistulogram with dilatation of L subclavian and junction of brachiocephalic and subclavian vein complicated by hematoma requiring evacuation 7/8/22  - Dialyzes [facility] - Dr. Gonsales.  Left AV fistula is working.  She also has tunneled dialysis catheter in place.  Use of dialysis site as per nephrology.  If AV fistula continues to work well, could consider discontinuing tunneled catheter.  Decision as per nephrology.    - She underwent outpatient fistulogram 8/11/22.   - Continue dialysis as per nephrology    #.  Acute upper GI bleed due to duodenal ulcer, 8/15  #.  Acute blood loss anemia due to upper GI bleed secondary to duodenal ulcer  #.  Bleeding duodenal ulcer with visible vessel, EGD 8/15  #.  Chronic anemia due to end-stage renal disease  #.  S/p 5 units PRBC transfusion this  admission  - Patient developed acute GI bleed on 8/15.    - Hemoglobin dropped to 5.6.  Required 2 units of PRBC on 8/15, 1 unit on 8/17, 8/18, 8/20  - Goal is to keep  Hb>7 , platelet > 50 and INR <2   - EGD 8/15 showed - clotted blood in the entire stomach, spurting duoeneal ulcer with visible vessel, injected, clips placed, argon plasma coag used.  - Hemoglobin 7.9 on 8/21, now drifting down to 7.3 on 8/22    - Continue to monitor hemoglobin and transfuse as needed  - Continue IV heparin infusion  - Advance diet as tolerated        #.  Left brachial vein DVT 7/6/22  #.  Right internal jugular thrombus, on CT neck 8/14   - Was started on warfarin in July 2022.  INR subtheraputic on admission  - Now on IV heparin infusion.  Continues to have problems with bleeding/hemoglobin drop requiring heparin to be placed on hold  - Hemoglobin on 8/20 was down to 6.8 from 7.9. IV heparin infusion was discontinued and she was transfused another unit of PRBC  - Heparin resumed 8/21.  We will continue and monitor hemoglobin  - Will need lifelong anticoagulation.  Will transition to warfarin once hemoglobin stabilizes    #.  CAD, s/p stenting in 2019  #.  Chronic systolic CHF with EF of 40-45%, echo 2020   #.  Hypertension  PTA:  hydralazine 50mg TID, carvedilol 25mg bid, lisinopril 20mg daily, amlodipine 10mg every evening  --continue PTA carvedilol   --HOLD lisinopril, hydralazine, amlodipine due normal blood pressure       #.  Hyperphosphatemia  --Cont PhosLo with meals    #. Severe malnutrition due to illness  -Nutrition services following.  -Continue Ensure twice daily    #.  Generalized weakness  #.  Physical deconditioning  -PT/OT consulted.  OT has recommended TCU versus home with home care.    Disposition-PT and OT consulted.  Recommendation is for TCU versus home with home care.  Is not interested in TCU.  She feels she has adequate support at home.  - to see regarding discharge planning       Diet:  "Snacks/Supplements Adult: Ensure Enlive; Between Meals  Advance Diet as Tolerated: Full Liquid Diet    DVT Prophylaxis: Heparin gtt    Antoine Catheter: Not present  Central Lines: PRESENT  CVC Double Lumen Left Subclavian-Site Assessment: WDL  Cardiac Monitoring: None  Code Status: Full Code      Disposition Plan      Expected Discharge Date: 08/30/2022    Discharge Delays: Placement - TCU  Destination: home with family  Discharge Comments: 8/16 SVC stenting and biposy pending  8/18 Medically not ready  8/21 receiving radiation and needing TCU dialysis planned for 8/22 8/22 Dialysis and radiation today (will be here till 8/30)      The patient's care was discussed with the Patient.    Ana Valerio MD  Hospitalist Service  [facility]  Securely message with the Vocera Web Console (learn more here)  Text page via [facility] Paging/Directory     \"This dictation was performed with voice recognition software and may contain errors,  omissions and inadvertent word substitution.\"    Clinically Significant Risk Factors Present on Admission                   # Severe Malnutrition: based on nutrition assessment _____________________________________________________________________    Interval History     Patient reports she is not doing too well.  She remains weak, has back pain, mild shortness of breath, poor appetite   Overall has remained stable   Continues to have melena   Hemoglobin more or less stable   No nausea or vomiting       Data reviewed today: I reviewed all medications, new labs and imaging results over the last 24 hours. I personally reviewed no images or EKG's today.    Physical Exam   BP 99/53 (BP Location: Right leg)   Pulse 107   Temp 97.8  F (36.6  C) (Oral)   Resp 20   Wt 56.9 kg (125 lb 7.1 oz)   SpO2 100%   BMI 20.87 kg/m    Gen- pleasant lying in bed  Neck-swelling  CVS- I+II+ soft ESM  RS- CTAB  Abdo- soft, no tenderness , No g/r/r   Ext- b/l arms edema Lt >Rt "   CNS-oriented to person, place, time    Left AVF     Data   No results found for this or any previous visit (from the past 24 hour(s)).   BMP  Recent Labs   Lab 08/21/22  0530 08/20/22  1723 08/18/22  0553 08/17/22  0554    135 134 134   POTASSIUM 3.7 3.9 3.8 3.9   CHLORIDE 98 98 99 99   JEWELL 9.5 9.9 8.6 8.8   CO2 29 28 29 27   BUN 62* 59* 63* 66*   CR 4.91* 4.30* 3.55* 4.45*   * 112* 109* 106*     CBC  Recent Labs   Lab 08/21/22  2253 08/21/22  2119 08/21/22  0530 08/19/22  1307 08/19/22  0559 08/18/22  0553 08/17/22 2035 08/17/22  0554   WBC  --   --  14.1*  --  18.3* 15.9*  --  16.1*   RBC  --   --  2.55*  --  2.69* 2.19*  --  2.35*   HGB 7.3*   < > 7.9*   < > 8.1*  8.1* 6.6*   < > 7.0*   HCT  --   --  23.9*  --  25.1* 19.4*  --  21.5*   MCV  --   --  94  --  93 89  --  92   MCH  --   --  31.0  --  30.1 30.1  --  29.8   MCHC  --   --  33.1  --  32.3 34.0  --  32.6   RDW  --   --  14.5  --  14.2 13.7  --  13.9   PLT  --   --  314  --  220 194  --  234    < > = values in this interval not displayed.     INR  No lab results found in last 7 days.  LFTs  Recent Labs   Lab 08/21/22  0530   ALKPHOS 34*   AST 96*   ALT 20   BILITOTAL 0.4   PROTTOTAL 5.2*   ALBUMIN 1.8*

## 2022-08-22 NOTE — PROGRESS NOTES
CLINICAL NUTRITION SERVICES - REASSESSMENT NOTE      RECOMMENDATIONS FOR MD/PROVIDER TO ORDER:   ADAT to regular - patient reports to be eating hot dogs and french fries in the evenings when daughter visits (patient currently on FL diet)      Recommendations Ordered by Registered Dietitian (RD):   Continue Ensure BID      Malnutrition: (8/17)   % Weight Loss:  Up to 5% in 1 month (moderate malnutrition)  % Intake:  </= 50% for >/= 5 days (severe malnutrition)  Subcutaneous Fat Loss:  Orbital region mild depletion and Upper arm region moderate depletion  Muscle Loss:  Temporal region mild depletion, Clavicle bone region mild depletion, Patellar region moderate depletion and Posterior calf region moderate-severe depletion  Fluid Retention:  Mild as above      Malnutrition Diagnosis: Severe malnutrition  In Context of:  Acute illness or injury       EVALUATION OF PROGRESS TOWARD GOALS   Diet:  Full liquid   Ensure BID     Intake/Tolerance:    Patient reports limited hunger  She is drinking Ensure BID and states that she is eating things like hot dogs and french fries brought in by her daughter in the evenings   Asking about diet advancement and unsure if plans to have another video swallow     Pt had only received solid food x1 meal each day on 8/16 and 8/17 - otherwise has been between NPO, CL and FL x 5 days this admission     Weight: 56.9 kg  Vitals:    08/15/22 0600 08/18/22 0612 08/19/22 0700 08/21/22 0506   Weight: 58 kg (127 lb 13.9 oz) 54.9 kg (121 lb 1.6 oz) 56 kg (123 lb 6.4 oz) 57.9 kg (127 lb 10.3 oz)    08/22/22 0509   Weight: 56.9 kg (125 lb 7.1 oz)         ASSESSED NUTRITION NEEDS:  Dosing Weight 58 kg   Estimated Energy Needs: 5090-9960 kcals (25-30 Kcal/Kg)  Justification: maintenance  Estimated Protein Needs: 70-90 grams protein (1.2-1.5 g pro/Kg)  Justification: hypercatabolism with acute illness and dialysis  Estimated Fluid Needs: 8655-2278 mL (1 mL/Kcal)  Justification: maintenance      NEW  FINDINGS:   Chart reviewed  Plan for HD and radiation today     GI note on 8/19 = No plans for EGD, diet as tolerated     Last SLP eval 8/17 = Recommend video swallow study to further assess oropharyngeal swallow function given inconsistent aspiration signs at bedside and CT soft neck tissue findings. Patient may continue regular diet with thin liquids given swallow strategies for now    Previous Goals:   Patient will increase intake to 50-75% of meals   Evaluation: Not met    Previous Nutrition Diagnosis:   Inadequate oral intake related to poor appetite as evidenced by consuming < 50% of meals, need for an oral nutritional supplement   Evaluation: No change      CURRENT NUTRITION DIAGNOSIS  Inadequate oral intake related to decreased appetite and restricted diet order as evidenced by consuming </=full liquids only for the past 5 days     INTERVENTIONS  Recommendations / Nutrition Prescription  ADAT to regular   Continue Ensure BID    Implementation  None new today     Goals  Diet adv >FL within 24 hrs       MONITORING AND EVALUATION:  Progress towards goals will be monitored and evaluated per protocol and Practice Guidelines      Alyce Rios RD, LD  Clinical Dietitian

## 2022-08-23 NOTE — PROVIDER NOTIFICATION
MD Notification    Notified Person: MD    Notified Person Name: Navneet     Notification Date/Time: 8/22 at 1901     Notification Interaction: amcom paging     Purpose of Notification: [address] Pt having large amounts of blood coming from her rectum. HgB check will be done ASAP. next steps, please advise? thanks *[id number]     Orders Received: see ADONIS Toth note   IMC, labs, need 2nd IV access

## 2022-08-23 NOTE — PROVIDER NOTIFICATION
"Provider notification to Dr. Valerio at 1326.    \"Hello, pt back from endoscopy, she does not have a diet order, please address. Thanks! Can also reach out to GI if you want.\"    Response:  Provider placed order for clear liquid diet.  "

## 2022-08-23 NOTE — PROGRESS NOTES
[facility]    Night cross coverage note:   Mrs. 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  On/off IV Heparin.     RN called at 1900 to report pt just got back from dialysis, was light headed, went to toilet and had 150 ml's of maroon, liquid blood output, no clots, no stool.  Only other symptom was ongoing back/stomach pain that is ongoing/unchanged.    VS: 119/73, .  Labs: Hgb was 5.6 this AM.  Per EMR already received 5 units of RBC's this admission.     Plan:   -RN verified IV Heparin was stopped today at 1300 hrs, will leave off.   -Ordered stat lab's, Hgb, Fibrinogen, Hep Xa, INR, PTT, Platelet.   -Resumed IMC and telemetry orders.   -Continue BID PPI.   --Spoke to House NP whom will visit patient and update GI for more rec's.    -Pt only has one IV, fistula's bilaterally and recent R internal jugular thrombus and fistulogram with dilatation of L subclavian and junction of brachiocephalic and subclavian vein complicated by hematoma requiring evacuation 7/8/22.   --Did ask RN to attempt to place 2nd IV for safety measures.     **ADDENDUM** (2010 hrs)   -Per Charge RN, no lab's/new access yet, they are trying to get ASAP.  Due to we anticipate a couple hours before we get new lab's, more than 1 questionable IV and ongoing tachycardia/light headedness; we will empirically give 1 more unit of RBC's now.     -Spoke to my Colleague KWESI Denise whom reports she spoke with GI this evening and they report they already have her on the schedule for an EGD tomorrow; despite we do not see one ordered.   -Planning on GI repeating EGD tomorrow.    -Made NPO now for medical reasons and procedure.     -As of 2300 lab's not drawn yet (RN reports they will be there in 15 min), p RBC's unit just finished, no further bloody stools per report.  Still hemodynamically stable and doing well per RN.  Per RN they did get a 2nd IV in her brachial for now.      Signed:   Clinically Significant Risk Factors Present on Admission     ERIC Mercer CNP  Hospitalist Service  [facility]  Securely message with the Vocera Web Console (learn more here)  Text page via [facility] Paging/Directory       --Patient not seen, note for documentation only.--

## 2022-08-23 NOTE — PROGRESS NOTES
Oncology Quick Note    Went to see patient, she was out of room, presumably at dialysis.   Continue same plan    Patrick Dreyer, DO   [facility]

## 2022-08-23 NOTE — CONSULTS
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 She was initially admitted on 8/14/22 and had hematemesis on 8/15/22 underwent EGD had stable hemoglobin, unfortunately drop in hemoglobin associated with melena and GI was reconsulted to repeat EGD on 8/22/22    Duodenal ulcer  Melena  Acute on chronic anemia  - CT neck shows new mediastinal mass on right 5.8 x 6.1cm as well as pulmonary nodule measuring 9mm-   -  CT of chest also notes multiple large hepatic masses concerning for metastatic disease  -  8/15 EGD noted clotted blood in the entire stomach, spurting duoeneal ulcer with visible vessel, injected, clips placed, argon plasma coag used.  - Hemoglobin  5.6 yesterday afternoon received a unit,  Improved to 8.5  -- concern for recurrent bleed from duodenal ulcer    -- Continue to monitor hemoglobin and transfuse for 7 or less  --  pantoprazole 40 mg BID  -- Repeat EGD today  -- Hold heparin  -- NPO             Interval History:     doing well; no cp, sob, n/v/d, or abd pain.              Review of Systems:     C: NEGATIVE for fever, chills, change in weight  E/M: NEGATIVE for ear, mouth and throat problems  R: NEGATIVE for significant cough or SOB  CV: NEGATIVE for chest pain, palpitations or peripheral edema             Medications:   I have reviewed this  patient's current medications    - MEDICATION INSTRUCTIONS for Dialysis Patients -   Does not apply See Admin Instructions     allopurinol  100 mg Oral QPM     atorvastatin  20 mg Oral Daily     calcium acetate  2,001 mg Oral TID w/meals     [Held by provider] carvedilol  25 mg Oral BID w/meals     multivitamin RENAL  1 capsule Oral Daily     pantoprazole (PROTONIX) IV  40 mg Intravenous Q12H     sodium chloride (PF)  3 mL Intracatheter Q8H     sodium chloride (PF)  3 mL Intracatheter Q8H                  Physical Exam:   Vitals were reviewed  Vital Signs with Ranges  Temp:  [97.1  F (36.2  C)-98.4  F (36.9  C)] 98  F (36.7  C)  Pulse:  [100-124] 123  Resp:  [11-20] 13  BP: ()/(45-92) 128/89  SpO2:  [92 %-100 %] 92 %  I/O last 3 completed shifts:  In: 810 [P.O.:160]  Out: 120 [Stool:120]  Constitutional: healthy, alert, and no distress   Cardiovascular: negative, PMI normal. No lifts, heaves, or thrills. RRR. No murmurs, clicks gallops or rub  Respiratory: negative, Percussion normal. Good diaphragmatic excursion. Lungs clear  Abdomen: Abdomen soft, non-tender. BS normal. No masses, organomegaly           Data:   I reviewed the patient's new clinical lab test results.   Recent Labs   Lab Test 08/23/22  0546 08/23/22  0011 08/22/22  1244 08/21/22  2119 08/21/22  0530 08/19/22  1307 08/19/22  0559 08/18/22  0553 08/15/22  1115 08/15/22  0451 08/14/22  2149 08/11/22  0913   WBC  --   --   --   --  14.1*  --  18.3* 15.9*   < > 6.7   < >  --    HGB 8.0* 8.5* 5.6*   < > 7.9*   < > 8.1*  8.1* 6.6*   < > 7.0*   < >  --    MCV  --   --   --   --  94  --  93 89   < > 90   < >  --    PLT  --  264  --   --  314  --  220 194   < > 376   < >  --    INR  --  1.45*  --   --   --   --   --   --   --  1.68*  --  2.52*    < > = values in this interval not displayed.     Recent Labs   Lab Test 08/21/22  0530 08/20/22  1723 08/18/22  0553   POTASSIUM 3.7 3.9 3.8   CHLORIDE 98 98 99   CO2 29 28 29   BUN 62* 59* 63*   ANIONGAP 6 9  6     Recent Labs   Lab Test 08/21/22  0530 08/15/22  1115 08/15/22  0451   ALBUMIN 1.8* 2.2* 2.5*   BILITOTAL 0.4 0.3 0.4   ALT 20 19 22   AST 96* 43 48*       I reviewed the patient's new imaging results.    All laboratory data reviewed  All imaging studies reviewed by me.    Sussy Tenorio PA-C,  8/16/2022  [facility]  Office : [phone]  Cell: [phone] (Dr. Camacho)  Cell: [phone] (Sussy Tenorio PA-C)

## 2022-08-23 NOTE — CODE/RAPID RESPONSE
"[facility]  House AJAY Progress Note  8/22/2022   Code status: Full Code    Assessment & Plan   I was asked by Alfonso Toth NP provoding cross coverage to evaluate Ms. Gina Simpson at the bedside due to recurrent bloody stool. Ms. Gina Simpson is a 56 year old female with PMH significant for CAD, cardiomyopathy, hypertension, COPD, ESRD, failed RUE fistula, s/p creation of LUE AV fistula on 6/28/22 complicated by hematoma s/p evacuation, LUE DVT who presented as direct admission to [facility] from [facility] after presenting with 2+weeks of facial swelling, fatigue, dysphagia. She was found to have prevertebral soft tissue swelling, lung mass with SVC syndrome and extensive adenopathy. 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 Her hospital course was further complicated by acute upper GI bleed d/t duodenal ulcer, requiring clips and injection on 8/15, in addition to transfusion of PRBC's. She has received a total of 6 units of PRBC during this hospital stay; 8/15 (2 units), 8/17, 8/18, 8/20, and 8/22. She is currently on a heparin infusion for left brachial vein DVT noted on 7/6/2022 (on warfarin PTA) and RIJ thrombus noted on CT neck on 8/14. She has ERSD and dialyzes MWF. She continues to have recurrent episodes of bloody stool. Per patient, she has had bloody stools since the 15th. Today, the stools increased in frequency. Per nursing, she had \"large amounts of blood coming from her rectum.\" The patient feels dizzy and SOB. She is hemodynamically stable with BP's in the 100's to 1-teens over 70's, tachy with heart rates in the 1-teens to 120's. She is alert and oriented. Family at the bedside. Her Hgb today was 5.6 and she received one unit of PRBC in dialysis. The unit was fully transfused around 1730. STAT labs are pending.     Acute GI Bleed  Acute Blood Loss Anemia 2/2 " GI Bleed  Bleeding duodenal ulcer with visible vessel on EGD 8/15  Chronic Anemia d/t ESRD  -Developed acute GI bleed on 8/15   -Hgb dropped to 5.6 at that time   -Hgb continues to slowly drift with recurrence of bloody stools since EGD   -PRBC:   -8/15: 2 units   -8/17: 1 unit   -8/18: 1 unit   -8/20: 1 unit   -8/22: 2 units with Hgb 5.6, repeat pending. Multiple loose, bloody stools today  -IV heparin now held d/t  concerns for recurrent duodenal bleeding with multiple loose stools reported by patient today  -Symptomatic: dizzy and SOB  -Hemodynamically stable with BP's in the 100's to 1-teens, tachy in the 1-teens to 120's, alert and mentating  -Page sent to Dr. Camacho; GI re-consulted d/t need for likely repeat EGD  -IV access being obtained; attempts at placing midline   -Coags pending; difficult stick  -Transfusion goal:   -Hgb >7   -PLT >50   -INR <2  -Continue PPI BID  -Serial Hgb ordered  -Defer to daytime attending to determine when to restart anticoagulation; with recurrence of bleeding likely to withhold further   -May need ICU transfer if becomes hemodynamically unstable or if bleeding worsens, requiring urgent intervention  -Plan of care discussed with nursing, patient and family at bedside  -Addendum:   Patient discussed with Dr. Camacho. EGD had been planned for 8/23. Will plan for EGD sooner, if the patient becomes hemodynamically unstable with further bleeding   -Repeat Hgb 8.5 after 2 units RBC's on 8/22   -      Interval History       Her history is significant for:  Past Medical History:   Diagnosis Date     Anemia      Cancer (H)      Coronary artery disease      Dialysis complication      Dialysis patient (H)      Embolism (H)      ESRD (end stage renal disease) (H)      Hypertension      Ischemic cardiomyopathy      Renal failure      Past Surgical History:   Procedure Laterality Date     ANESTH,UPPER ARM AV FISTULA REPAIR       CREATE FISTULA ARTERIOVENOUS UPPER EXTREMITY Left  3/25/2022    Procedure: CREATION OF FIRST STAGE LEFT BRACHIOBASILIC ARTERIOVENOUS FISTULA;  Surgeon: Akash Miller MD;  Location: [facility] OR     CREATE FISTULA ARTERIOVENOUS UPPER EXTREMITY Left 6/28/2022    Procedure: LEFT BRACHIO-BASILIC ARTERIOVENOUS FISTULA WITH BIOPROSTHETIC GRAFT;  Surgeon: Akash Miller MD;  Location: [facility] OR     IR DIALYSIS FISTULOGRAM LEFT  5/31/2022     IR DIALYSIS FISTULOGRAM LEFT  7/8/2022     IR DIALYSIS FISTULOGRAM LEFT  8/11/2022     LIGATE FISTULA ARTERIOVENOUS UPPER EXTREMITY Left 6/28/2022    Procedure: Ligate fistula arteriovenous upper extremity;  Surgeon: Akash Miller MD;  Location: [facility] OR     REPAIR FISTULA ARTERIOVENOUS UPPER EXTREMITY Left 7/8/2022    Procedure: EXPLORE LEFT ARM, REPAIR OF LEFT UPPER ARM AV DIALYSIS GRAFT, EVACUATE HEMATOMA;  Surgeon: Akash Miller MD;  Location: [facility] OR     WISDOM TEETH         Review of Systems   The 10 point Review of Systems is negative other than noted in the HPI or here.     Allergies   No Known Allergies    Physical Exam   Physical Exam  EXAM:   Nursing Notes Reviewed.  /73 (BP Location: Right leg, Patient Position: Supine)   Pulse (!) 124   Temp 97.8  F (36.6  C) (Oral)   Resp 18   Wt 56.9 kg (125 lb 7.1 oz)   SpO2 100%   BMI 20.87 kg/m     General:  Appears stated age, no acute distress. A&O x 3.  Skin:  Warm, dry. No rashes or lesions on exposed skin.  HEENT:  Normocephalic, atraumatic; EOMs grossly intact.  Neck:  Swelling noted; able to maintain airway  Chest:  Scattered rhonchi. Non-labored respiratory rate and effort on room air. No wheezing.   Cardiovascular:  RRR--Tachycardia, no rub or murmur. Bilateral upper extremity edema with L>R.   Abdomen:  Soft, non-tender, non-distended.  Musculoskeletal:  Moves all four extremities. No muscle atrophy.  Extremities: LUE AV fistula with bruit and thrill   Neurological:  CN 2-12 grossly intact. Alert and oriented. Sensation and motor intact.         Vital  Signs with Ranges:  Temp:  [97.1  F (36.2  C)-98.4  F (36.9  C)] 97.8  F (36.6  C)  Pulse:  [100-124] 124  Resp:  [16-20] 18  BP: ()/(45-92) 119/73  SpO2:  [95 %-100 %] 100 %  I/O last 3 completed shifts:  In: 660 [P.O.:660]  Out: -     Data   Results for orders placed or performed during the hospital encounter of 08/14/22 (from the past 24 hour(s))   Hemoglobin   Result Value Ref Range    Hemoglobin 7.5 (L) 11.7 - 15.7 g/dL   Heparin Unfractionated Anti Xa Level   Result Value Ref Range    Anti Xa Unfractionated Heparin 0.26 For Reference Range, See Comment IU/mL    Narrative    Therapeutic Range: UFH: 0.25-0.50 IU/mL for low intensity dosing,  0.30-0.70 IU/mL for high intensity dosing DVT and PE.  This test is not validated for other direct factor X inhibitors (e.g. rivaroxaban, apixaban, edoxaban, betrixaban, fondaparinux) and should not be used for monitoring of other medications.   Hemoglobin   Result Value Ref Range    Hemoglobin 7.3 (L) 11.7 - 15.7 g/dL   Hemoglobin   Result Value Ref Range    Hemoglobin 5.6 (LL) 11.7 - 15.7 g/dL   Asymptomatic COVID-19 Virus (Coronavirus) by PCR Nasopharyngeal    Specimen: Nasopharyngeal; Swab   Result Value Ref Range    SARS CoV2 PCR Negative Negative    Narrative    Testing was performed using the Xpert Xpress SARS-CoV-2 Assay on the   Cepheid Gene-Xpert Instrument Systems. Additional information about   this Emergency Use Authorization (EUA) assay can be found via the Lab   Guide. This test should be ordered for the detection of SARS-CoV-2 in   individuals who meet SARS-CoV-2 clinical and/or epidemiological   criteria. Test performance is unknown in asymptomatic patients. This   test is for in vitro diagnostic use under the FDA EUA for   laboratories certified under CLIA to perform high complexity testing.   This test has not been FDA cleared or approved. A negative result   does not rule out the presence of PCR inhibitors in the specimen or   target RNA in  concentration below the limit of detection for the   assay. The possibility of a false negative should be considered if   the patient's recent exposure or clinical presentation suggests   COVID-19. This test was validated by the [facility]. This laboratory is certified under the Clinical Laboratory Improvement Amendments of 1988 (CLIA-88) as qualified to perform high complexity laboratory testing.     Prepare red blood cells (unit)   Result Value Ref Range    Blood Component Type Red Blood Cells     Product Code B9558A43     Unit Status Transfused     Unit Number [id number]     CROSSMATCH Compatible     CODING SYSTEM [id number]     ISSUE DATE AND TIME [id number]     UNIT ABO/RH O+     UNIT TYPE ISBT 5100    Heparin Unfractionated Anti Xa Level   Result Value Ref Range    Anti Xa Unfractionated Heparin 0.54 For Reference Range, See Comment IU/mL    Narrative    Therapeutic Range: UFH: 0.25-0.50 IU/mL for low intensity dosing,  0.30-0.70 IU/mL for high intensity dosing DVT and PE.  This test is not validated for other direct factor X inhibitors (e.g. rivaroxaban, apixaban, edoxaban, betrixaban, fondaparinux) and should not be used for monitoring of other medications.   ABO/Rh type and screen    Narrative    The following orders were created for panel order ABO/Rh type and screen.  Procedure                               Abnormality         Status                     ---------                               -----------         ------                     Adult Type and Screen[[id number]]                            Edited Result - FINAL        Please view results for these tests on the individual orders.   Adult Type and Screen   Result Value Ref Range    ABO/RH(D) O POS     Antibody Screen Negative Negative    SPECIMEN EXPIRATION DATE [id number]      COVID-19 PCR Results    COVID-19 PCR Results 10/7/20 5/27/22 7/7/22 8/22/22   COVID-19 Virus by PCR (External Result) Not  Detected      SARS CoV2 PCR  Negative Negative Negative      Comments are available for some flowsheets but are not being displayed.         COVID-19 Antibody Results, Testing for Immunity    COVID-19 Antibody Results, Testing for Immunity   No data to display.                 Glenna Denise NP  [facility]  Securely message with the Vocera Web Console (learn more here)  Text page via [employer] Paging/Directory

## 2022-08-23 NOTE — PLAN OF CARE
Vital signs stable on 2 L overnight. Alert and oriented. Lung sounds diminished with some crackles. Bowel sounds active, flatus present.. Pain controlled with prn oxycodone. Up assist of one to commode. Tolerating npo at midnight. CMS/neuros intact. Left arm fistula within defined limits. 1 unit PRBC given recheck 8.5. One episode of watery coffee colored stool.Tele sinus tachy.

## 2022-08-23 NOTE — PROGRESS NOTES
Radiation treatment # 5 to Chest.    1500 cGy total dose to date delivered with 10 MV Photons.  Patient has 5 treatments remaining.  SE  RTT

## 2022-08-23 NOTE — PROGRESS NOTES
Care Management Follow Up    Length of Stay (days): 9    Expected Discharge Date: 08/30/2022     Concerns to be Addressed: discharge planning     Patient plan of care discussed at interdisciplinary rounds: Yes    Anticipated Discharge Disposition: Home     Anticipated Discharge Services:    Anticipated Discharge DME:      Patient/family educated on Medicare website which has current facility and service quality ratings:    Education Provided on the Discharge Plan:    Patient/Family in Agreement with the Plan:      Referrals Placed by CM/SW:    Private pay costs discussed: Not applicable    Additional Information:    Family requested to speak with Chung regarding insurance, discharge planning/placement, POA/HCD, and retrieving pt's medical records.  Chung spoke with pt and daughter, Monica (ph:  [phone]).  Dtr inquired into retrieving medical records, care team provided pt with release of info form and pt will fill this out for team to submit to records.  Chung inquired into pt's insurance as it's not listed on FS.  Per pt, she has [employer] and [facility] helped her either reactivate or initiate her insurance policy; Sw to f/u with registration/[facility] (sent referral to [facility] to begin).  Pt inquired into POA and HCD; Chung explained the difference between POA and HCD and stated that  at [facility] can help assist family to fill out this document.  Family/pt are interested in completed HCD while at [facility].  Chung offered to provide resources on how to begin POA documentation and will provide tomorrow, 8/24.  Chung also confirmed with family that plan is to remain in hospital during radiation treatment until 8/30/22.  As date approaches, Chung can assess therapy recommendations to determine if TCU is still appropriate vs home with HC services.  Pt stated that she would want to go to TCU if she continues to have therapy needs.  Chung explained that insurance benefits will first need to be verified prior to sending out referrals.  Chung  to f/u with pt/family tomorrow regarding POA resources and assisting them with connecting with  for HCD services.    Update:  Upon further review, [facility] has sent in [place] paper application to [facility] for insurance.  Pt's THOMAS had termed.  Sw to connect with Glen in [facility] for update on application status.      Deya Christianson, [facility] HospitalSW

## 2022-08-23 NOTE — PROGRESS NOTES
[facility]    Medicine Progress Note - Hospitalist Service    Date of Admission:  8/14/2022    Assessment & Plan          Gina Simpson is a pleasant 56 year old female with CAD, cardiomyopathy, hypertension, COPD, ESRD, failed RUE fistula, s/p creation of LUE AV fistula on 6/28/22 complicated by hematoma s/p evacuation, LUE DVT who presented as direct admission to [facility] from [place] ED after presenting with 2+weeks of facial swelling, fatigue, dysphagia. She was found to have prevertebral soft tissue swelling, lung mass with SVC syndrome and extensive adenopathy.     #.  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  SVC obstruction and SVC syndrome secondary to large neuroendocrine tumor  #.  Mediastinal mass measuring 10 x 8 x 6 cm with SVC obstruction and occlusion of right upper lobe pulmonary artery  #.  Extensive cervical, supraclavicular, right thoracic inlet and right mediastinal/hilar adenopathy  #.  New 9mm ARIA pulmonary nodule  - reported significant fatigue, 20lb weight loss, poor appetite for two months    - CT on 8/15 showed new mediastinal mass on right measuring 10 x 8 x 6 cm with mass-effect on trachea, SVC and occlusion of right upper lobe pulmonary bronchus and artery.  Also concern for lymphangitic spread of neoplasm. 2 solid nodules in left lung concerning for metastatic foci.  Multiple large hepatic metastasis.  SVC syndrome/compression secondary to large mediastinal mass/adenopathy, small left pleural effusion, small pericardial effusion    -Underwent liver biopsy on 8/16 that showed metastatic poorly differentiated non-small cell carcinoma with neuroendocrine differentiation    -- Appreciate recommendations by thoracic surgery, vascular surgery, IR and oncology  -- SVC stent was not recommended  -- Started on emergent radiation therapy for SVC syndrome.  Has received 4 of 10  treatments.  Facial swelling appears to be improving  -- [place] oncology following  -- Continue pain control with IV Dilaudid, oxycodone  -- Per oncology, prognosis is poor and treatment is palliative.  -- Will need additional staging studies -MRI brain and CT abdomen/pelvis.  Timing of imaging studies to be decided by oncology.  -- Port placement to be decided by oncology  -- Systemic therapy will start after completion of radiation treatments  -- Smoking cessation advised    #.  Symmetric prevertebral soft tissue swelling from C2-C5 level  -Noted on CT scan  -ENT consulted.  Did not feel patient has retropharyngeal abscess.  ENT felt that edema of retropharyngeal space is related to SVC syndrome.  No specific treatment advised.  On exam, airway was widely patent, both vocal cords were mobile  - ENT noted exposed bone of right mandible with dental caries.  Patient will need dental evaluation as outpatient for this.    #.  Dental caries  ENT noted exposed bone of right mandible with dental caries.    - Patient will need dental evaluation as outpatient for this.      #.  ESRD on HD q. M W F  #.  S/p left AV fistula formation 6/28/22  #.  S/p fistulogram with dilatation of L subclavian and junction of brachiocephalic and subclavian vein complicated by hematoma requiring evacuation 7/8/22  - Dialyzes MW - Dr. Gonsales.  Left AV fistula is working.  She also has tunneled dialysis catheter in place.  Use of dialysis site as per nephrology.  If AV fistula continues to work well, could consider discontinuing tunneled catheter.  Decision as per nephrology.    - She underwent outpatient fistulogram 8/11/22.   - Continue dialysis as per nephrology    #.  Acute upper GI bleed due to duodenal ulcer, 8/15. Recurrent bleeding 8/22  #.  Acute blood loss anemia due to upper GI bleed secondary to duodenal ulcer  #.  Bleeding duodenal ulcer with visible vessel, EGD 8/15  #.  Chronic anemia due to end-stage renal disease  #.  S/p 7  units PRBC transfusion this admission  - Patient developed acute GI bleed on 8/15.    - Hemoglobin dropped to 5.6.  Required 2 units of PRBC on 8/15, 1 unit on 8/17, 8/18, 8/20, 2 units on 8/22  - Goal is to keep  Hb>7 , platelet > 50 and INR <2   - EGD 8/15 showed - clotted blood in the entire stomach, spurting duoeneal ulcer with visible vessel, injected, clips placed, argon plasma coag used.  - Hemoglobin has continued to trend down due to anticoagulation with IV heparin. Has been requiring frequent PRBC transfusion.  - acutely worsened on 8/22 - had melena, hypotension and Hb drop to 5.6  - IV heparin stopped in 8/22 and she was transfused 2 units of PRBC.  - Discussed with GI on 8/22/ Underwent repeat EGD 8/23 that showed blood and clots in gastric body, non bleeding duodenal ulcer with pigmented material. Injected and clipped.   - will continue to hold IV heparin for at least 2-3 days to achieve hemostasis and prevent recurrent bleeding  - continue IV protonix BID  - start on sucralfate for short term  - Continue to monitor hemoglobin and transfuse as needed  - clear liquid diet         #.  Left brachial vein DVT 7/6/22  #.  Right internal jugular thrombus, on CT neck 8/14   - Was started on warfarin in July 2022.  INR subtheraputic on admission  - Now on IV heparin infusion.  Continues to have problems with bleeding/hemoglobin drop requiring heparin to be placed on hold  - continue to hold IV heparin due to recurrent upper GI bleed  - Will need lifelong anticoagulation.  Will transition to warfarin once hemoglobin stabilizes      #.  CAD, s/p stenting in 2019  #.  Chronic systolic CHF with EF of 40-45%, echo 2020   #.  Hypertension  PTA:  hydralazine 50mg TID, carvedilol 25mg bid, lisinopril 20mg daily, amlodipine 10mg every evening  --HOLD carvedilol, lisinopril, hydralazine, amlodipine due borderline BP       #.  Hyperphosphatemia  --Cont PhosLo with meals    #. Severe malnutrition due to  "illness  -Nutrition services following.  -Continue Ensure twice daily    #.  Generalized weakness  #.  Physical deconditioning  -PT/OT consulted.  OT has recommended TCU versus home with home care.    Disposition-PT and OT consulted.  Recommendation is for TCU versus home with home care.  Is not interested in TCU.  She feels she has adequate support at home.  - to see regarding discharge planning       Diet: Clear Liquid Diet    DVT Prophylaxis: pneumoboots    Antoine Catheter: Not present  Central Lines: PRESENT  CVC Double Lumen Left Subclavian-Site Assessment: WDL  Cardiac Monitoring: ACTIVE order. Indication: GIB with tachycardia and low Hgb.  Code Status: Full Code      Disposition Plan      Expected Discharge Date: 08/30/2022    Discharge Delays: Placement - TCU  Destination: home with family  Discharge Comments: 8/16 SVC stenting and biposy pending  8/18 Medically not ready  8/21 receiving radiation and needing TCU dialysis planned for 8/22 8/22 Dialysis and radiation today (will be here till 8/30)      The patient's care was discussed with the Patient.    Ana Valerio MD  Hospitalist Service  [facility]  Securely message with the Vocera Web Console (learn more here)  Text page via [employer] Paging/Directory     \"This dictation was performed with voice recognition software and may contain errors,  omissions and inadvertent word substitution.\"    Clinically Significant Risk Factors Present on Admission                   # Severe Malnutrition: based on nutrition assessment _____________________________________________________________________    Interval History     Events of last 24 hours reviewed  Case discussed with GI  Hb now stable  Patient feels ok  No nausea or vomiting  VS stable    Data reviewed today: I reviewed all medications, new labs and imaging results over the last 24 hours. I personally reviewed no images or EKG's today.    Physical Exam   /58 (BP " Location: Right leg, Patient Position: Semi-Fuentes's)   Pulse 111   Temp 98  F (36.7  C) (Oral)   Resp 9   Wt 56.9 kg (125 lb 7.1 oz)   SpO2 98%   BMI 20.87 kg/m    Gen- pleasant lying in bed  Neck-swelling  CVS- I+II+ soft ESM  RS- CTAB  Abdo- soft, no tenderness , No g/r/r   Ext- b/l arms edema Lt >Rt   CNS-oriented to person, place, time    Left AVF     Data   No results found for this or any previous visit (from the past 24 hour(s)).   BMP  Recent Labs   Lab 08/21/22  0530 08/20/22  1723 08/18/22  0553 08/17/22  0554    135 134 134   POTASSIUM 3.7 3.9 3.8 3.9   CHLORIDE 98 98 99 99   JEWELL 9.5 9.9 8.6 8.8   CO2 29 28 29 27   BUN 62* 59* 63* 66*   CR 4.91* 4.30* 3.55* 4.45*   * 112* 109* 106*     CBC  Recent Labs   Lab 08/23/22  0546 08/23/22  0011 08/21/22  2119 08/21/22  0530 08/19/22  1307 08/19/22  0559 08/18/22  0553 08/17/22  2035 08/17/22  0554   WBC  --   --   --  14.1*  --  18.3* 15.9*  --  16.1*   RBC  --   --   --  2.55*  --  2.69* 2.19*  --  2.35*   HGB 8.0* 8.5*   < > 7.9*   < > 8.1*  8.1* 6.6*   < > 7.0*   HCT  --   --   --  23.9*  --  25.1* 19.4*  --  21.5*   MCV  --   --   --  94  --  93 89  --  92   MCH  --   --   --  31.0  --  30.1 30.1  --  29.8   MCHC  --   --   --  33.1  --  32.3 34.0  --  32.6   RDW  --   --   --  14.5  --  14.2 13.7  --  13.9   PLT  --  264  --  314  --  220 194  --  234    < > = values in this interval not displayed.     INR  Recent Labs   Lab 08/23/22  0011   INR 1.45*     LFTs  Recent Labs   Lab 08/21/22  0530   ALKPHOS 34*   AST 96*   ALT 20   BILITOTAL 0.4   PROTTOTAL 5.2*   ALBUMIN 1.8*

## 2022-08-23 NOTE — PLAN OF CARE
Pt A&Ox4. Pt tachycardic (rates in the 120s) with SBP in the 90's. SBP in the 100's following dialysis where she recieved x1 dose IV Albumin. On 4-5L NC sating >90%. Pt feeling dizzy. Tele: ST. LS diminished with crackles in bases. BS +, following dialysis pt had x1 large maroon watery output from her rectum. On call MD paged, see progress notes. Pt reports back and abd pain, managing with tylenol and oxycodone. Pt had minimal oral intake this shift. SBA. LUE fistula site. R hand PIV in place. HgB 5.6, IV Heparin discontinued and 1 unit PRBC transfused. Pending Hgb recheck. Continue plan of care.     Goal Outcome Evaluation:  Plan of Care Reviewed With: patient, family  Overall Patient Progress: declining  Outcome Evaluation: Pt did not tolerate dialysis today, received x 1 albumin. Hgb 5.6, received x 1 unit prbc, next HgB pending. Pt had x1 150 ml liquid maroon output from rectum. Pt feeling dizzy. On call MD paged, see progress notes. Pt having abd and back pain intermittently during day, controlled with PRN Oxycodone and Tylenol

## 2022-08-24 NOTE — PROGRESS NOTES
[facility]    Medicine Progress Note - Hospitalist Service    Date of Admission:  8/14/2022    Assessment & Plan          Gina Simpson is a pleasant 56 year old female with CAD, cardiomyopathy, hypertension, COPD, ESRD, failed RUE fistula, s/p creation of LUE AV fistula on 6/28/22 complicated by hematoma s/p evacuation, LUE DVT who presented as direct admission to [facility] from [place] ED after presenting with 2+weeks of facial swelling, fatigue, dysphagia. She was found to have prevertebral soft tissue swelling, lung mass with SVC syndrome and extensive adenopathy.     #.  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  SVC obstruction and SVC syndrome secondary to large neuroendocrine tumor  #.  Mediastinal mass measuring 10 x 8 x 6 cm with SVC obstruction and occlusion of right upper lobe pulmonary artery  #.  Extensive cervical, supraclavicular, right thoracic inlet and right mediastinal/hilar adenopathy  #.  New 9mm ARIA pulmonary nodule  - reported significant fatigue, 20lb weight loss, poor appetite for two months    - CT on 8/15 showed new mediastinal mass on right measuring 10 x 8 x 6 cm with mass-effect on trachea, SVC and occlusion of right upper lobe pulmonary bronchus and artery.  Also concern for lymphangitic spread of neoplasm. 2 solid nodules in left lung concerning for metastatic foci.  Multiple large hepatic metastasis.  SVC syndrome/compression secondary to large mediastinal mass/adenopathy, small left pleural effusion, small pericardial effusion    -Underwent liver biopsy on 8/16 that showed metastatic poorly differentiated non-small cell carcinoma with neuroendocrine differentiation    -- Appreciate recommendations by thoracic surgery, vascular surgery, IR and oncology  -- SVC stent was not recommended  -- Started on emergent radiation therapy for SVC syndrome.  Has received 6 of 10  treatments.  Facial swelling appears to be improving  -- [place] [facility] following  -- Continue pain control with IV Dilaudid, oxycodone  -- Per oncology, prognosis is poor and treatment is palliative.  -- Will need additional staging studies -MRI brain and CT abdomen/pelvis.  Timing of imaging studies to be decided by oncology.  -- Port placement to be decided by oncology  -- Systemic therapy will start after completion of radiation treatments  -- Smoking cessation advised    #.  Symmetric prevertebral soft tissue swelling from C2-C5 level  -Noted on CT scan  -ENT consulted.  Did not feel patient has retropharyngeal abscess.  ENT felt that edema of retropharyngeal space is related to SVC syndrome.  No specific treatment advised.  On exam, airway was widely patent, both vocal cords were mobile  - ENT noted exposed bone of right mandible with dental caries.  Patient will need dental evaluation as outpatient for this.    #.  Dental caries  ENT noted exposed bone of right mandible with dental caries.    - Patient will need dental evaluation as outpatient for this.      #.  ESRD on HD q. M W F  #.  S/p left AV fistula formation 6/28/22  #.  S/p fistulogram with dilatation of L subclavian and junction of brachiocephalic and subclavian vein complicated by hematoma requiring evacuation 7/8/22  - Dialyzes MW - Dr. Gonsales.  Left AV fistula is working.  She also has tunneled dialysis catheter in place.  Use of dialysis site as per nephrology.  If AV fistula continues to work well, could consider discontinuing tunneled catheter.  Decision as per nephrology.    - She underwent outpatient fistulogram 8/11/22.   - Continue dialysis as per nephrology    #.  Acute upper GI bleed due to duodenal ulcer, 8/15. Recurrent bleeding 8/22  #.  Acute blood loss anemia due to upper GI bleed secondary to duodenal ulcer  #.  Bleeding duodenal ulcer with visible vessel, EGD 8/15  #.  Chronic anemia due to end-stage renal disease  #.  S/p 8  units PRBC transfusion this admission  - Patient developed acute GI bleed on 8/15.    - Hemoglobin dropped to 5.6.  Required 2 units of PRBC on 8/15, 1 unit on 8/17, 8/18, 8/20, 2 units on 8/22, 1 unit 8/24  - Goal is to keep  Hb>7 , platelet > 50 and INR <2     - EGD 8/15 showed - clotted blood in the entire stomach, spurting duoeneal ulcer with visible vessel, injected, clips placed, argon plasma coag used.    - Hemoglobin has continued to trend down due to anticoagulation with IV heparin. Has been requiring frequent PRBC transfusion.  - acutely worsened on 8/22 - had melena, hypotension and Hb drop to 5.6  - IV heparin stopped in 8/22 and she was transfused 2 units of PRBC.    - Discussed with GI on 8/22/ Underwent repeat EGD 8/23 that showed blood and clots in gastric body, non bleeding duodenal ulcer with pigmented material. Injected and clipped.     -Hemoglobin down to 6.9 on 8/24.  Was 7.9 on 8/23  -Transfused 1 unit PRBC on 8/24    - continue to hold IV heparin for at least 2-3 days to achieve hemostasis and prevent recurrent bleeding  - continue IV protonix BID  - sucralfate for short term  - Continue to monitor hemoglobin and transfuse as needed  - full liquid diet         #.  Left brachial vein DVT 7/6/22  #.  Right internal jugular thrombus, on CT neck 8/14   - Was started on warfarin in July 2022.  INR subtheraputic on admission  - Now on IV heparin infusion.  Continues to have problems with bleeding/hemoglobin drop requiring heparin to be placed on hold  - continue to hold IV heparin due to recurrent upper GI bleed  - Will need lifelong anticoagulation.  Will transition to warfarin once hemoglobin stabilizes      #.  CAD, s/p stenting in 2019  #.  Chronic systolic CHF with EF of 40-45%, echo 2020   #.  Hypertension  PTA:  hydralazine 50mg TID, carvedilol 25mg bid, lisinopril 20mg daily, amlodipine 10mg every evening  --HOLD carvedilol, lisinopril, hydralazine, amlodipine due borderline BP       #.   "Hyperphosphatemia  --Cont PhosLo with meals    #. Severe malnutrition due to illness  -Nutrition services following.  -Continue Ensure twice daily    #.  Generalized weakness  #.  Physical deconditioning  -PT/OT consulted.  OT has recommended TCU versus home with home care.    Disposition-PT and OT consulted.  Recommendation is for TCU versus home with home care.  following for discharge planning       Diet: Full Liquid Diet    DVT Prophylaxis: pneumoboots    Antoine Catheter: Not present  Central Lines: PRESENT  CVC Double Lumen Left Subclavian-Site Assessment: WDL  Cardiac Monitoring: ACTIVE order. Indication: GIB with tachycardia and low Hgb.  Code Status: Full Code      Disposition Plan      Expected Discharge Date: 08/30/2022    Discharge Delays: Placement - TCU  Destination: home with family  Discharge Comments: 8/16 SVC stenting and biposy pending  8/18 Medically not ready  8/21 receiving radiation and needing TCU dialysis planned for 8/22 8/22 Dialysis and radiation today (will be here till 8/30)      The patient's care was discussed with the Patient.    Ana Valerio MD  Hospitalist Service  [facility]  Securely message with the Vocera Web Console (learn more here)  Text page via [employer] Paging/Directory     \"This dictation was performed with voice recognition software and may contain errors,  omissions and inadvertent word substitution.\"    Clinically Significant Risk Factors Present on Admission                   # Severe Malnutrition: based on nutrition assessment _____________________________________________________________________    Interval History     Events of last 24 hours reviewed  Hemoglobin down to 6.9 this morning.  Transfuse 1 unit of PRBC  Underwent dialysis today  Also had radiation treatment  Vital signs are stable  Remains tachycardic  No nausea or vomiting  Complains of mild shortness of breath      Data reviewed today: I reviewed all medications, " new labs and imaging results over the last 24 hours. I personally reviewed no images or EKG's today.    Physical Exam   /78   Pulse (!) 123   Temp 98.1  F (36.7  C) (Oral)   Resp 21   Wt 56.9 kg (125 lb 7.1 oz)   SpO2 100%   BMI 20.87 kg/m    Gen- pleasant lying in bed  Neck-swelling  CVS- I+II+ soft ESM  RS- CTAB  Abdo- soft, no tenderness , No g/r/r   Ext- b/l arms edema Lt >Rt   CNS-oriented to person, place, time  Bilateral upper extremity edema    Left AVF     Data   No results found for this or any previous visit (from the past 24 hour(s)).   BMP  Recent Labs   Lab 08/21/22  0530 08/20/22  1723 08/18/22  0553    135 134   POTASSIUM 3.7 3.9 3.8   CHLORIDE 98 98 99   JEWELL 9.5 9.9 8.6   CO2 29 28 29   BUN 62* 59* 63*   CR 4.91* 4.30* 3.55*   * 112* 109*     CBC  Recent Labs   Lab 08/24/22  0600 08/23/22  0546 08/23/22  0011 08/21/22  2119 08/21/22  0530 08/19/22  1307 08/19/22  0559 08/18/22  0553   WBC  --   --   --   --  14.1*  --  18.3* 15.9*   RBC  --   --   --   --  2.55*  --  2.69* 2.19*   HGB 6.9*   < > 8.5*   < > 7.9*   < > 8.1*  8.1* 6.6*   HCT  --   --   --   --  23.9*  --  25.1* 19.4*   MCV  --   --   --   --  94  --  93 89   MCH  --   --   --   --  31.0  --  30.1 30.1   MCHC  --   --   --   --  33.1  --  32.3 34.0   RDW  --   --   --   --  14.5  --  14.2 13.7   PLT  --   --  264  --  314  --  220 194    < > = values in this interval not displayed.     INR  Recent Labs   Lab 08/23/22  0011   INR 1.45*     LFTs  Recent Labs   Lab 08/21/22  0530   ALKPHOS 34*   AST 96*   ALT 20   BILITOTAL 0.4   PROTTOTAL 5.2*   ALBUMIN 1.8*

## 2022-08-24 NOTE — PROGRESS NOTES
Potassium   Date Value Ref Range Status   08/21/2022 3.7 3.4 - 5.3 mmol/L Final     Hemoglobin   Date Value Ref Range Status   08/24/2022 6.9 (LL) 11.7 - 15.7 g/dL Final     Creatinine   Date Value Ref Range Status   08/21/2022 4.91 (H) 0.52 - 1.04 mg/dL Final     Urea Nitrogen   Date Value Ref Range Status   08/21/2022 62 (H) 7 - 30 mg/dL Final     Sodium   Date Value Ref Range Status   08/21/2022 133 133 - 144 mmol/L Final     INR   Date Value Ref Range Status   08/23/2022 1.45 (H) 0.85 - 1.15 Final       DIALYSIS PROCEDURE NOTE  Hepatitis status of previous patient on machine log was checked and verified ok to use with this patients hepatitis status.  Patient dialyzed for 3 hrs. on a K3 bath with a net fluid removal of  1L.  A BFR of 350-400 ml/min was obtained via a Right CVC     The treatment plan was discussed with Dr. Louise during the treatment.    Total heparin received during the treatment: 0 units.    Line flushed, clamped and capped with heparin 1:1000 2.3 mL (2300 units) per lumen    Meds  given: EPO 4,000units/1mL   Complications: None      Person educated: Patient. Knowledge base Substantial. Barriers to learning: None. Educated on Procedure via Verbal mode. Patient verbalized understanding. Pt prefers Verbal education style.     ICEBOAT? Timeout performed pre-treatment  I: Patient was identified using 2 identifiers  C:  Consent Signed Yes  E: Equipment preventative maintenance is current and dialysis delivery system OK to use  B:   Latest Reference Range & Units 08/18/22 15:50   Hep B Surface Agn Nonreactive  Nonreactive   Hepatitis B Surface Antibody Instrument Value <8.00 m[IU]/mL 103.04     O: Dialysis orders present and complete prior to treatment  A: Vascular access verified and assessed prior to treatment  T: Treatment was performed at a clinically appropriate time  ?: Patient was allowed to ask questions and address concerns prior to treatment  See Adult Hemodialysis flowsheet in EPIC for  further details and post assessment.  Machine water alarm in place and functioning. Transducer pods intact and checked every 15min.   Pt returned via Bed.  Chlorine/Chloramine water system checked every 4 hours.  Outpatient Dialysis at [facility]      Post treatment report given to Dang TOUSSAINT RN regarding 1L of fluid removed, last BP of 119/86.

## 2022-08-24 NOTE — PROGRESS NOTES
Assessment and Plan:   ESRD: seen on dialysis. We are using her LUAF but she has a L CVC as back up. She is getting 1 U PRBC on dialysis. 3h, LAF with 350 BFR, 1 L UF. 3K 33 HCO3 138 Na. EPO during dialysis. No heparin.   On phoslo .    S/P left AV fistula formation 6/28/22            Interval History:   SVC syndrome: getting XRT. Metastatic lung Ca.       Severe anemia:   Hypotension: CHF, ASCVD.      GI bleed: DU diagnosed. No heparin on dialysis. Currently NPO.            Review of Systems:   No sx on dialysis.           Medications:       - MEDICATION INSTRUCTIONS for Dialysis Patients -   Does not apply See Admin Instructions     sodium chloride 0.9%  250 mL Intravenous Once in dialysis/CRRT     sodium chloride 0.9%  300 mL Hemodialysis Machine Once     allopurinol  100 mg Oral QPM     atorvastatin  20 mg Oral Daily     calcium acetate  2,001 mg Oral TID w/meals     [Held by provider] carvedilol  25 mg Oral BID w/meals     epoetin delma-epbx (RETACRIT) inj ESRD  4,000 Units Intravenous Once in dialysis/CRRT     multivitamin RENAL  1 capsule Oral Daily     - MEDICATION INSTRUCTIONS -   Does not apply Once     pantoprazole (PROTONIX) IV  40 mg Intravenous Q12H     sodium chloride (PF)  3 mL Intracatheter Q8H     sodium chloride (PF)  3 mL Intracatheter Q8H     sucralfate  1 g Oral 4x Daily AC & HS       - MEDICATION INSTRUCTIONS -       - MEDICATION INSTRUCTIONS -       Current active medications and PTA medications reviewed, see medication list for details.            Physical Exam:   Vitals were reviewed  Patient Vitals for the past 24 hrs:   BP Temp Temp src Pulse Resp SpO2   08/24/22 0806 125/83 97.9  F (36.6  C) Oral 116 14 100 %   08/24/22 0745 (!) 140/78 98  F (36.7  C) Oral 117 14 97 %   08/24/22 0700 -- -- -- 117 -- 98 %   08/24/22 0600 120/80 -- -- 119 -- 94 %   08/24/22 0500 -- -- -- 114 -- 90 %   08/24/22 0400 114/72 -- -- 114 -- 92 %   08/24/22 0300 -- -- -- 110 -- 99 %   08/24/22 0200 111/73  -- -- 114 -- 100 %   08/24/22 0100 -- -- -- 110 10 100 %   08/24/22 0000 97/73 -- -- 110 10 99 %   08/23/22 2300 -- -- -- 114 24 (!) 89 %   08/23/22 2200 104/68 -- -- 113 12 (!) 89 %   08/23/22 2100 -- -- -- 112 17 99 %   08/23/22 2000 111/71 -- -- 113 16 99 %   08/23/22 1915 -- -- -- 112 14 98 %   08/23/22 1900 -- -- -- 111 12 98 %   08/23/22 1849 -- -- -- 113 20 98 %   08/23/22 1848 126/71 -- -- 113 21 97 %   08/23/22 1847 -- -- -- 114 19 96 %   08/23/22 1555 (!) 140/84 98.2  F (36.8  C) Oral 120 16 --   08/23/22 1524 (!) 140/84 -- -- 117 16 100 %   08/23/22 1430 -- -- -- 64 18 92 %   08/23/22 1415 -- -- -- 113 12 (!) 88 %   08/23/22 1400 -- -- -- 118 24 90 %   08/23/22 1345 -- -- -- 115 10 (!) 87 %   08/23/22 1330 -- -- -- 116 16 92 %   08/23/22 1315 -- -- -- 116 16 99 %   08/23/22 1300 -- -- -- 113 (!) 39 100 %   08/23/22 1253 105/58 -- -- 111 -- --   08/23/22 1241 93/60 -- -- 110 9 --   08/23/22 1223 -- -- -- 111 8 --   08/23/22 1202 105/61 -- -- 113 -- --   08/23/22 1146 95/62 98  F (36.7  C) Oral 112 9 98 %   08/23/22 1103 -- -- -- 116 8 99 %   08/23/22 1102 -- -- -- 116 8 100 %   08/23/22 1101 -- -- -- 117 8 99 %   08/23/22 1100 104/61 -- -- 117 9 100 %   08/23/22 1059 -- -- -- 118 8 100 %   08/23/22 1058 -- -- -- 118 8 100 %   08/23/22 1057 -- -- -- 118 8 99 %   08/23/22 1056 -- -- -- 118 8 99 %   08/23/22 1055 -- -- -- 119 9 99 %   08/23/22 1054 -- -- -- 120 9 99 %   08/23/22 1053 -- -- -- 120 10 100 %   08/23/22 1052 -- -- -- 120 9 99 %   08/23/22 1051 -- -- -- (!) 121 11 99 %   08/23/22 1050 98/68 -- -- (!) 121 14 99 %   08/23/22 1049 -- -- -- (!) 122 10 99 %   08/23/22 1048 -- -- -- (!) 122 8 99 %   08/23/22 1047 -- -- -- (!) 123 10 99 %   08/23/22 1046 -- -- -- (!) 124 9 99 %   08/23/22 1045 -- -- -- 117 9 100 %   08/23/22 1043 -- -- -- (!) 126 10 99 %   08/23/22 1042 -- -- -- (!) 127 (!) 7 99 %   08/23/22 1041 -- -- -- (!) 128 9 99 %   08/23/22 1040 104/66 -- -- (!) 130 8 99 %   08/23/22 1039 -- -- --  (!) 132 9 98 %   08/23/22 1038 -- -- -- (!) 133 9 98 %   08/23/22 1037 -- -- -- (!) 134 9 97 %   08/23/22 1036 98/72 -- -- (!) 134 -- --   08/23/22 1034 -- -- -- -- -- 95 %   08/23/22 1033 -- -- -- -- -- 96 %   08/23/22 1032 -- -- -- (!) 145 9 98 %   08/23/22 1031 -- -- -- (!) 146 9 100 %   08/23/22 1030 126/77 -- -- (!) 149 14 100 %   08/23/22 1029 -- -- -- (!) 151 9 99 %   08/23/22 1028 -- -- -- (!) 149 10 99 %   08/23/22 1026 -- -- -- (!) 142 11 95 %   08/23/22 1025 (!) 165/89 -- -- (!) 137 17 98 %   08/23/22 1024 -- -- -- (!) 138 13 100 %   08/23/22 1023 -- -- -- (!) 142 10 100 %   08/23/22 1021 -- -- -- (!) 144 12 100 %   08/23/22 1020 (!) 144/86 -- -- (!) 143 19 100 %   08/23/22 1019 -- -- -- -- -- (!) 87 %   08/23/22 1018 -- -- -- (!) 137 25 93 %   08/23/22 1017 -- -- -- (!) 135 11 98 %   08/23/22 1016 -- -- -- (!) 138 22 98 %   08/23/22 1015 131/87 -- -- (!) 138 -- --   08/23/22 1014 -- -- -- -- -- 97 %   08/23/22 1013 -- -- -- (!) 132 -- 98 %   08/23/22 1012 -- -- -- (!) 131 28 100 %   08/23/22 1011 -- -- -- (!) 133 16 100 %   08/23/22 1010 (!) 141/85 -- -- (!) 133 -- --   08/23/22 1009 -- -- -- (!) 131 (!) 6 99 %   08/23/22 1008 -- -- -- (!) 129 8 97 %   08/23/22 1007 -- -- -- (!) 126 12 97 %   08/23/22 1006 -- -- -- (!) 128 20 93 %   08/23/22 1005 (!) 142/114 -- -- (!) 125 -- --   08/23/22 1004 -- -- -- 119 -- 98 %   08/23/22 1003 -- -- -- (!) 121 29 98 %   08/23/22 1002 -- -- -- 118 12 99 %   08/23/22 1001 -- -- -- 116 20 100 %   08/23/22 1000 (!) 127/109 -- -- 118 -- --   08/23/22 0959 -- -- -- 114 9 100 %   08/23/22 0958 -- -- -- 115 10 100 %   08/23/22 0957 -- -- -- 116 (!) 7 100 %   08/23/22 0956 -- -- -- 118 (!) 0 100 %   08/23/22 0955 109/66 -- -- 119 11 100 %   08/23/22 0953 120/85 -- -- 118 12 98 %   08/23/22 0952 -- -- -- 118 27 99 %   08/23/22 0951 -- -- -- 117 22 100 %   08/23/22 0950 -- -- -- 117 12 100 %   08/23/22 0949 -- -- -- 118 (!) 0 100 %   08/23/22 0948 -- -- -- 118 (!) 7 100 %        Temp:  [97.9  F (36.6  C)-98.2  F (36.8  C)] 97.9  F (36.6  C)  Pulse:  [] 116  Resp:  [0-39] 14  BP: ()/() 125/83  SpO2:  [87 %-100 %] 100 %    Temperatures:  Current - Temp: 97.9  F (36.6  C); Max - Temp  Av  F (36.7  C)  Min: 97.9  F (36.6  C)  Max: 98.2  F (36.8  C)  Respiration range: Resp  Av.8  Min: 0  Max: 39  Pulse range: Pulse  Av  Min: 64  Max: 151  Blood pressure range: Systolic (24hrs), Av , Min:93 , Max:165   ; Diastolic (24hrs), Av, Min:58, Max:114    Pulse oximetry range: SpO2  Av.7 %  Min: 87 %  Max: 100 %    I/O last 3 completed shifts:  In: 1120 [P.O.:1120]  Out: -       Intake/Output Summary (Last 24 hours) at 2022 0902  Last data filed at 2022 0600  Gross per 24 hour   Intake 1100 ml   Output --   Net 1100 ml       Alert and responsive  Bilateral UE edema  L CVC with no redness or tenderness  LAF with good thrill       Wt Readings from Last 4 Encounters:   22 56.9 kg (125 lb 7.1 oz)   07/10/22 65 kg (143 lb 4.8 oz)   22 56.2 kg (124 lb)   22 56.7 kg (125 lb)          Data:          Lab Results   Component Value Date     2022     2022     2022    Lab Results   Component Value Date    CHLORIDE 98 2022    CHLORIDE 98 2022    CHLORIDE 99 2022    Lab Results   Component Value Date    BUN 62 2022    BUN 59 2022    BUN 63 2022      Lab Results   Component Value Date    POTASSIUM 3.7 2022    POTASSIUM 3.9 2022    POTASSIUM 3.8 2022    Lab Results   Component Value Date    CO2 29 2022    CO2 28 2022    CO2 29 2022    Lab Results   Component Value Date    CR 4.91 2022    CR 4.30 2022    CR 3.55 2022        Recent Labs   Lab Test 22  0600 22  2256 22  1609 22  0546 22  0011 22  2119 22  0530 22  1307 22  0559 22  0553   WBC  --   --   --   --    --   --  14.1*  --  18.3* 15.9*   HGB 6.9* 7.6* 7.9*   < > 8.5*   < > 7.9*   < > 8.1*  8.1* 6.6*   HCT  --   --   --   --   --   --  23.9*  --  25.1* 19.4*   MCV  --   --   --   --   --   --  94  --  93 89   PLT  --   --   --   --  264  --  314  --  220 194    < > = values in this interval not displayed.     Recent Labs   Lab Test 08/21/22  0530 08/20/22  1723 08/15/22  1115 08/15/22  0451   AST 96*  --  43 48*   ALT 20  --  19 22   ALKPHOS 34*  --  26* 32*   BILITOTAL 0.4  --  0.3 0.4   AMMON  --  21  --   --        Recent Labs   Lab Test 07/11/22  0750   MAG 2.3     Recent Labs   Lab Test 08/15/22  1641 07/12/22  0659 07/11/22  0750   PHOS 4.8* 2.2* 2.4*     Recent Labs   Lab Test 08/21/22  0530 08/20/22  1723 08/18/22  0553   JEWELL 9.5 9.9 8.6       Lab Results   Component Value Date    JEWELL 9.5 08/21/2022     Lab Results   Component Value Date    WBC 14.1 (H) 08/21/2022    HGB 6.9 (LL) 08/24/2022    HCT 23.9 (L) 08/21/2022    MCV 94 08/21/2022     08/23/2022     Lab Results   Component Value Date     08/21/2022    POTASSIUM 3.7 08/21/2022    CHLORIDE 98 08/21/2022    CO2 29 08/21/2022     (H) 08/21/2022     Lab Results   Component Value Date    BUN 62 (H) 08/21/2022    CR 4.91 (H) 08/21/2022     Lab Results   Component Value Date    MAG 2.3 07/11/2022     Lab Results   Component Value Date    PHOS 4.8 (H) 08/15/2022       Creatinine   Date Value Ref Range Status   08/21/2022 4.91 (H) 0.52 - 1.04 mg/dL Final   08/20/2022 4.30 (H) 0.52 - 1.04 mg/dL Final   08/18/2022 3.55 (H) 0.52 - 1.04 mg/dL Final   08/17/2022 4.45 (H) 0.52 - 1.04 mg/dL Final   08/16/2022 5.97 (H) 0.52 - 1.04 mg/dL Final   08/15/2022 4.73 (H) 0.52 - 1.04 mg/dL Final       Attestation:  I have reviewed today's vital signs, notes, medications, labs and imaging.     Hari Louise MD

## 2022-08-24 NOTE — PROGRESS NOTES
[facility] Progress Note          Assessment and Plan:   Ms. Gina Simpson is a 56 year old woman who was admitted on 8/14/2022 with facial edema and dyspnea     1. 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 Imaging studies show evidence of metastatic disease involving cervical LNs and liver. Mediastinal mass measures 10 x 8 x 6 cm with SVC obstruction and occlusion of the RUL pulmonary artery  - The plan is for radiation treatment to help with SVC obstruction and to consider systemic therapy after completion of radiation therapy  -she had first radiation treatment on 8/20, plan for 10 fractions   -tumor sent for NGS testing     2. ESRD managed with hemodialysis via a LUE fistula.      3. Anemia due to CKD and bleeding upper GI ulcers  - s/p repeat endoscopy 8/23 with evidence of recently bleeding duodenal ulcer s/p epi injection and clip      4. Intraluminal thrombosis treated previously with IV heparin  - anticoagulation on hold now for at least 2 days     PLAN  - Continue radiation, plan for 10 fractions   - Before discharge, will request chemoport placement   - Plan will be for outpatient chemotherapy + immunotherapy  - Brain MRI and CT a/p before discharge to complete staging   - Continue supportive transfusions, PPI, holding heparin until hemostasis achieved              Interval History:   Patient more awake today. No complaints of nausea or vomiting. No reported bleeding.              Medications:       - MEDICATION INSTRUCTIONS for Dialysis Patients -   Does not apply See Admin Instructions     sodium chloride 0.9%  250 mL Intravenous Once in dialysis/CRRT     sodium chloride 0.9%  300 mL Hemodialysis Machine Once     allopurinol  100 mg Oral QPM     atorvastatin  20 mg Oral Daily     calcium acetate  2,001 mg Oral TID w/meals     [Held by provider] carvedilol  25 mg Oral BID w/meals     epoetin  delma-epbx (RETACRIT) inj ESRD  4,000 Units Intravenous Once in dialysis/CRRT     multivitamin RENAL  1 capsule Oral Daily     - MEDICATION INSTRUCTIONS -   Does not apply Once     pantoprazole (PROTONIX) IV  40 mg Intravenous Q12H     sodium chloride (PF)  3 mL Intracatheter Q8H     sodium chloride (PF)  3 mL Intracatheter Q8H     sucralfate  1 g Oral 4x Daily AC & HS     sodium chloride 0.9%, sodium chloride 0.9%, sodium chloride 0.9%, acetaminophen **OR** acetaminophen, albumin human, HYDROmorphone, lidocaine 4%, lidocaine (buffered or not buffered), lidocaine (buffered or not buffered), lidocaine (buffered or not buffered), LORazepam, melatonin, naloxone **OR** naloxone **OR** naloxone **OR** naloxone, nitroGLYcerin, ondansetron **OR** ondansetron, oxyCODONE, - MEDICATION INSTRUCTIONS -, sodium chloride (PF), sodium chloride (PF), - MEDICATION INSTRUCTIONS -               Physical Exam:   Blood pressure 125/83, pulse 116, temperature 97.9  F (36.6  C), temperature source Oral, resp. rate 14, weight 56.9 kg (125 lb 7.1 oz), SpO2 100 %, not currently breastfeeding.  Wt Readings from Last 4 Encounters:   22 56.9 kg (125 lb 7.1 oz)   07/10/22 65 kg (143 lb 4.8 oz)   22 56.2 kg (124 lb)   22 56.7 kg (125 lb)         Vital Sign Ranges  Temperature Temp  Av.8  F (36.6  C)  Min: 97.8  F (36.6  C)  Max: 97.9  F (36.6  C)   Blood pressure Systolic (24hrs), Av , Min:94 , Max:129        Diastolic (24hrs), Av, Min:57, Max:80      Pulse Pulse  Av.8  Min: 89  Max: 106   Respirations Resp  Av  Min: 11  Max: 26   Pulse oximetry SpO2  Av.4 %  Min: 90 %  Max: 100 %         Intake/Output Summary (Last 24 hours) at 2022 1316  Last data filed at 2022 1000  Gross per 24 hour   Intake 680 ml   Output --   Net 680 ml       Constitutional: Awake, alert, cooperative, no apparent distress but appears sleepy   Lungs: Bilateral rhonchi   Cardiovascular: Regular rate and rhythm, normal  S1 and S2, and no murmur noted   Abdomen: Normal bowel sounds, soft, non-distended, non-tender   Skin: No rashes, no cyanosis, upper extremity edema    Other:           Data:   Laboratory:  CMP  Recent Labs   Lab 08/21/22  0530 08/20/22  1723 08/18/22  0553    135 134   POTASSIUM 3.7 3.9 3.8   CHLORIDE 98 98 99   CO2 29 28 29   ANIONGAP 6 9 6   * 112* 109*   BUN 62* 59* 63*   CR 4.91* 4.30* 3.55*   GFRESTIMATED 10* 11* 14*   JEWELL 9.5 9.9 8.6   PROTTOTAL 5.2*  --   --    ALBUMIN 1.8*  --   --    BILITOTAL 0.4  --   --    ALKPHOS 34*  --   --    AST 96*  --   --    ALT 20  --   --      CBC  Recent Labs   Lab 08/24/22  0600 08/23/22  2256 08/23/22  1609 08/23/22  0546 08/23/22  0011 08/21/22  2119 08/21/22  0530 08/19/22  1307 08/19/22  0559 08/18/22  0553   WBC  --   --   --   --   --   --  14.1*  --  18.3* 15.9*   RBC  --   --   --   --   --   --  2.55*  --  2.69* 2.19*   HGB 6.9* 7.6* 7.9* 8.0* 8.5*   < > 7.9*   < > 8.1*  8.1* 6.6*   HCT  --   --   --   --   --   --  23.9*  --  25.1* 19.4*   MCV  --   --   --   --   --   --  94  --  93 89   MCH  --   --   --   --   --   --  31.0  --  30.1 30.1   MCHC  --   --   --   --   --   --  33.1  --  32.3 34.0   RDW  --   --   --   --   --   --  14.5  --  14.2 13.7   PLT  --   --   --   --  264  --  314  --  220 194    < > = values in this interval not displayed.     INR  Recent Labs   Lab 08/23/22  0011   INR 1.45*       Imaging data:  Recent Results (from the past 48 hour(s))   CT Head w/o Contrast    Narrative    EXAM: CT HEAD W/O CONTRAST  LOCATION: [facility]  DATE/TIME: 8/20/2022 5:02 PM    INDICATION: On IV heparin, evaluate for head bleed  COMPARISON: None.  TECHNIQUE: Routine CT Head without IV contrast. Multiplanar reformats. Dose reduction techniques were used.    FINDINGS:  INTRACRANIAL CONTENTS: No intracranial hemorrhage, extraaxial collection, or mass effect.  No CT evidence of acute infarct. Normal parenchymal attenuation.  Normal ventricles and sulci.     VISUALIZED ORBITS/SINUSES/MASTOIDS: No intraorbital abnormality. No significant paranasal sinus mucosal disease. No middle ear or mastoid effusion.    BONES/SOFT TISSUES: No acute abnormality.      Impression    IMPRESSION:  1.  No acute intracranial process; specifically no acute intracranial hemorrhage.         Patrick Dreyer, DO

## 2022-08-24 NOTE — PROGRESS NOTES
"SPIRITUAL HEALTH SERVICES Progress Note  [facility]    Saw pt Gina Simpson and her daughter, Monica, per Consult for health care directive.    Responded to a consult order for Health Care Directive. Offered education with daughter regarding purpose and process for completing the document. Materials left with patient for consideration and completion. Patient was have a scan done during the visit so will follow up tomorrow to discuss the purpose and process with Gina for possible completion and notarization.      Illness Narrative - kidney failure, recent diagnosis of cancer      Distress -     Daughter, Monica, shared her frustration and fear regarding her mother's recent cancer diagnosis in light of her medical care and dialysis \"how could they not have seen the cancer sooner?\"     Monica named her sense of gratitude and the responsibility she feels for supporting her mother and hoping to get the health care directive in place for medical decision making when the time comes. \"She's taken care of me, I want to do the same for her.\"       Coping - Monica shared that she plans to stay home on Thursday to as a form of self-care. Monica was tearful as she named her [employer] clarissa as a source of comfort and support during this time.      Meaning-Making - not named    I offered spiritual and emotional support through reflective listening that affirmed emotions, experience, and meaning.       Plan - Will follow up tomorrow. [facility] remains available for support.    Skyla Membreno [facility]   [phone] (pager)    "

## 2022-08-24 NOTE — PLAN OF CARE
"[facility] Status:    Pt went to radiation and endoscopy today. VSS on RA to 2 L O2 NC. Tele-sinus tachycardia. Alert and oriented x 4, drowsy after endoscopy, resolved. Bowel sounds active, passing flatus, one \"coffee colored\" loose BM. Up assist of one to commode. Abdominal cramping and pain-PRN Acetaminophen and oxycodone given per MAR. Advanced to clear liquids, tolerated well. L arm fistula intact, bruit heard. HD line clean, dry, and intact (not being used). CMS/neuro intact. Edema on bilateral arms and jugular venous distension noted. Hemoglobin recheck of 7.9. Daughters at bedside.   "

## 2022-08-24 NOTE — CARE PLAN
PT: Chart reviewed and discussed with OT services. Pt currently with limited activity tolerance and has dialysis. Most likely would not tolerate 2 therapy disciplines at this time. Will defer mobility to OT currently. Please re-consult when mobility tolerance improves.

## 2022-08-24 NOTE — CONSULTS
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 She was initially admitted on 8/14/22 and had hematemesis on 8/15/22 underwent EGD had stable hemoglobin, unfortunately drop in hemoglobin associated with melena and GI was reconsulted to repeat EGD on 8/22/22    Duodenal ulcer  Melena  Acute on chronic anemia  - CT neck shows new mediastinal mass on right 5.8 x 6.1cm as well as pulmonary nodule measuring 9mm-   -  CT of chest also notes multiple large hepatic masses concerning for metastatic disease  -  8/15 EGD noted clotted blood in the entire stomach, spurting duoeneal ulcer with visible vessel, injected, clips placed, argon plasma coag used.  - Hemoglobin today continues to drift down after transfusion. LIkely from prior blood loss. Will continue to monitor  8/23 EGD noted hematin and clotted blood in stomach with non bleeidng duodenal ulcer with pigmented material. Injected and clips placed. Duodenal ulcer thought to be exacerbated by heparin drip.     -- Continue to monitor hemoglobin and transfuse for 7 or less  --  pantoprazole 40 mg BID  -- Hold heparin  -- clear liquid diet and will advance slowly once hemoglobin is stabilized             Interval History:     doing well; no cp, sob, n/v/d, or abd pain.              Review of Systems:     C: NEGATIVE for  fever, chills, change in weight  E/M: NEGATIVE for ear, mouth and throat problems  R: NEGATIVE for significant cough or SOB  CV: NEGATIVE for chest pain, palpitations or peripheral edema             Medications:   I have reviewed this patient's current medications    - MEDICATION INSTRUCTIONS for Dialysis Patients -   Does not apply See Admin Instructions     allopurinol  100 mg Oral QPM     atorvastatin  20 mg Oral Daily     calcium acetate  2,001 mg Oral TID w/meals     [Held by provider] carvedilol  25 mg Oral BID w/meals     multivitamin RENAL  1 capsule Oral Daily     - MEDICATION INSTRUCTIONS -   Does not apply Once     pantoprazole (PROTONIX) IV  40 mg Intravenous Q12H     sodium chloride (PF)  3 mL Intracatheter Q8H     sodium chloride (PF)  3 mL Intracatheter Q8H     sucralfate  1 g Oral 4x Daily AC & HS                  Physical Exam:   Vitals were reviewed  Vital Signs with Ranges  Temp:  [97.8  F (36.6  C)-98.2  F (36.8  C)] 97.8  F (36.6  C)  Pulse:  [] 116  Resp:  [8-39] 20  BP: ()/(58-95) 147/93  SpO2:  [87 %-100 %] 100 %  I/O last 3 completed shifts:  In: 1120 [P.O.:1120]  Out: -   Constitutional: healthy, alert, and no distress   Cardiovascular: negative, PMI normal. No lifts, heaves, or thrills. RRR. No murmurs, clicks gallops or rub  Respiratory: negative, Percussion normal. Good diaphragmatic excursion. Lungs clear  Abdomen: Abdomen soft, non-tender. BS normal. No masses, organomegaly           Data:   I reviewed the patient's new clinical lab test results.   Recent Labs   Lab Test 08/24/22  0600 08/23/22  2256 08/23/22  1609 08/23/22  0546 08/23/22  0011 08/21/22  2119 08/21/22  0530 08/19/22  1307 08/19/22  0559 08/18/22  0553 08/15/22  1115 08/15/22  0451 08/14/22  2149 08/11/22  0913   WBC  --   --   --   --   --   --  14.1*  --  18.3* 15.9*   < > 6.7   < >  --    HGB 6.9* 7.6* 7.9*   < > 8.5*   < > 7.9*   < > 8.1*  8.1* 6.6*   < > 7.0*   < >  --    MCV  --   --   --   --   --    --  94  --  93 89   < > 90   < >  --    PLT  --   --   --   --  264  --  314  --  220 194   < > 376   < >  --    INR  --   --   --   --  1.45*  --   --   --   --   --   --  1.68*  --  2.52*    < > = values in this interval not displayed.     Recent Labs   Lab Test 08/21/22  0530 08/20/22  1723 08/18/22  0553   POTASSIUM 3.7 3.9 3.8   CHLORIDE 98 98 99   CO2 29 28 29   BUN 62* 59* 63*   ANIONGAP 6 9 6     Recent Labs   Lab Test 08/21/22  0530 08/15/22  1115 08/15/22  0451   ALBUMIN 1.8* 2.2* 2.5*   BILITOTAL 0.4 0.3 0.4   ALT 20 19 22   AST 96* 43 48*       I reviewed the patient's new imaging results.    All laboratory data reviewed  All imaging studies reviewed by me.    Sussy Tenorio PA-C,  8/16/2022  [facility]  Office : [phone]  Cell: [phone] (Dr. Camacho)  Cell: [phone] (Sussy Tenorio PA-C)

## 2022-08-24 NOTE — PROVIDER NOTIFICATION
"\"A couple things about this pt. She is about to go down to radiation but should be back up by 3 pm. 1) Can we advance diet? Has been tolerating clear liquids. 2) Her swelling in her arms is getting worse and is more uncomfortable to her. Would you be able to come assess when she is back? 3) Would you be able to give a range of 5-10 mg oxycodone? Needed IV dilaudid this AM.\"    Above page sent to Dr. Valerio at 1352.    Response: New orders placed.    RN paged Dr. Valerio again at 1412.  \"Thank you, also since last message, pt had large coffee brown stool, do you want a hemoglobin recheck?\" Pt had a unit of blood this AM.\"    Response: Scheduled lab at 1400 for hbg already in place.     RN messaged provider back.   \"awesome, will call lab once she is back from radiation. She is also endorsing some shortness of breath with activities, between RA and 2 L. Thanks\"     Provider assessed pt at bedside.  "

## 2022-08-25 NOTE — PROGRESS NOTES
[facility]    Medicine Progress Note - Hospitalist Service    Date of Admission:  8/14/2022    Assessment & Plan          Gina Simpson is a pleasant 56 year old female with CAD, cardiomyopathy, hypertension, COPD, ESRD, failed RUE fistula, s/p creation of LUE AV fistula on 6/28/22 complicated by hematoma s/p evacuation, LUE DVT who presented as direct admission to [facility] from [place] ED after presenting with 2+weeks of facial swelling, fatigue, dysphagia. She was found to have prevertebral soft tissue swelling, lung mass with SVC syndrome and extensive adenopathy.     #.  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  SVC obstruction and SVC syndrome secondary to large neuroendocrine tumor  #.  Mediastinal mass measuring 10 x 8 x 6 cm with SVC obstruction and occlusion of right upper lobe pulmonary artery  #.  Extensive cervical, supraclavicular, right thoracic inlet and right mediastinal/hilar adenopathy  #.  New 9mm ARIA pulmonary nodule  - reported significant fatigue, 20lb weight loss, poor appetite for two months    - CT on 8/15 showed new mediastinal mass on right measuring 10 x 8 x 6 cm with mass-effect on trachea, SVC and occlusion of right upper lobe pulmonary bronchus and artery.  Also concern for lymphangitic spread of neoplasm. 2 solid nodules in left lung concerning for metastatic foci.  Multiple large hepatic metastasis.  SVC syndrome/compression secondary to large mediastinal mass/adenopathy, small left pleural effusion, small pericardial effusion    -Underwent liver biopsy on 8/16 that showed metastatic poorly differentiated non-small cell carcinoma with neuroendocrine differentiation    -- Appreciate recommendations by thoracic surgery, vascular surgery, IR and oncology  -- SVC stent was not recommended  -- Started on emergent radiation therapy for SVC syndrome.  Has received 7 of 10  treatments.  Facial swelling appears to be improving  -- [place] oncology following  -- Continue pain control with IV Dilaudid, oxycodone  -- Per oncology, prognosis is poor and treatment is palliative.  -- Will need additional staging studies -MRI brain and CT abdomen/pelvis.  Timing of imaging studies to be decided by oncology.  -- Port placement to be decided by oncology  -- Systemic therapy will start after completion of radiation treatments  -- Smoking cessation advised    #.  Symmetric prevertebral soft tissue swelling from C2-C5 level  -Noted on CT scan  -ENT consulted.  Did not feel patient has retropharyngeal abscess.  ENT felt that edema of retropharyngeal space is related to SVC syndrome.  No specific treatment advised.  On exam, airway was widely patent, both vocal cords were mobile  - ENT noted exposed bone of right mandible with dental caries.  Patient will need dental evaluation as outpatient for this.    #.  Dental caries  ENT noted exposed bone of right mandible with dental caries.    - Patient will need dental evaluation as outpatient for this.      #.  ESRD on HD q. M W F  #.  S/p left AV fistula formation 6/28/22  #.  S/p fistulogram with dilatation of L subclavian and junction of brachiocephalic and subclavian vein complicated by hematoma requiring evacuation 7/8/22  - Dialyzes MW - Dr. Gonsales.  Left AV fistula is working.  She also has tunneled dialysis catheter in place.  Use of dialysis site as per nephrology.  If AV fistula continues to work well, could consider discontinuing tunneled catheter.  Decision as per nephrology.    - She underwent outpatient fistulogram 8/11/22.   - Continue dialysis as per nephrology    #.  Acute upper GI bleed due to duodenal ulcer, 8/15. Recurrent bleeding 8/22  #.  Acute blood loss anemia due to upper GI bleed secondary to duodenal ulcer  #.  Bleeding duodenal ulcer with visible vessel, EGD 8/15  #.  Chronic anemia due to end-stage renal disease  #.  S/p 8  units PRBC transfusion this admission  - Patient developed acute GI bleed on 8/15.    - Hemoglobin dropped to 5.6.  Required 2 units of PRBC on 8/15, 1 unit on 8/17, 8/18, 8/20, 2 units on 8/22, 1 unit 8/24  - Goal is to keep  Hb>7 , platelet > 50 and INR <2     - EGD 8/15 showed - clotted blood in the entire stomach, spurting duoeneal ulcer with visible vessel, injected, clips placed, argon plasma coag used.    - Hemoglobin has continued to trend down due to anticoagulation with IV heparin. Has been requiring frequent PRBC transfusion.  - acutely worsened on 8/22 - had melena, hypotension and Hb drop to 5.6  - IV heparin stopped in 8/22 and she was transfused 2 units of PRBC.    - Discussed with GI on 8/22/ Underwent repeat EGD 8/23 that showed blood and clots in gastric body, non bleeding duodenal ulcer with pigmented material. Injected and clipped.     -Hemoglobin down to 6.9 on 8/24.  Was 7.9 on 8/23  -Transfused 1 unit PRBC on 8/24  -Hb now stable at 9    - continue to hold IV heparin for at least 2-3 days to achieve hemostasis and prevent recurrent bleeding  - continue IV protonix BID  - sucralfate for short term  - Continue to monitor hemoglobin and transfuse as needed  - full liquid diet, advance as tolerated        #.  Left brachial vein DVT 7/6/22  #.  Right internal jugular thrombus, on CT neck 8/14   - Was started on warfarin in July 2022.  INR subtheraputic on admission  - Now on IV heparin infusion.  Continues to have problems with bleeding/hemoglobin drop requiring heparin to be placed on hold  - continue to hold IV heparin due to recurrent upper GI bleed  - Will need lifelong anticoagulation.  Will transition to warfarin once hemoglobin stabilizes      #.  CAD, s/p stenting in 2019  #.  Chronic systolic CHF with EF of 40-45%, echo 2020   #.  Hypertension  PTA:  hydralazine 50mg TID, carvedilol 25mg bid, lisinopril 20mg daily, amlodipine 10mg every evening  --HOLD carvedilol, lisinopril, hydralazine,  "amlodipine due borderline BP       #.  Hyperphosphatemia  --Cont PhosLo with meals    #. Severe malnutrition due to illness  -Nutrition services following.  -Continue Ensure twice daily    #.  Generalized weakness  #.  Physical deconditioning  -PT/OT consulted.  OT has recommended TCU versus home with home care.    Disposition-PT and OT consulted.  Recommendation is for TCU versus home with home care.  following for discharge planning       Diet: Mechanical/Dental Soft Diet    DVT Prophylaxis: pneumoboots    Antoine Catheter: Not present  Central Lines: PRESENT  CVC Double Lumen Left Subclavian-Site Assessment: WDL  Cardiac Monitoring: ACTIVE order. Indication: GIB with tachycardia and low Hgb.  Code Status: Full Code      Disposition Plan      Expected Discharge Date: 08/30/2022    Discharge Delays: Placement - TCU  Destination: home with family  Discharge Comments: 8/16 SVC stenting and biposy pending  8/18 Medically not ready  8/21 receiving radiation and needing TCU dialysis planned for 8/22 8/22 Dialysis and radiation today (will be here till 8/30)      The patient's care was discussed with the Patient.    Ana Valerio MD  Hospitalist Service  [facility]  Securely message with the Vocera Web Console (learn more here)  Text page via [employer] Paging/Directory     \"This dictation was performed with voice recognition software and may contain errors,  omissions and inadvertent word substitution.\"    Clinically Significant Risk Factors Present on Admission                   # Severe Malnutrition: based on nutrition assessment _____________________________________________________________________    Interval History     Hemoglobin is now stable at 9.    Patient reports that she feels okay.    She looks better than she has been for the last several days.    Pain is controlled.    Vital signs are stable   Remains mildly tachycardic      Data reviewed today: I reviewed all " medications, new labs and imaging results over the last 24 hours. I personally reviewed no images or EKG's today.    Physical Exam   /85 (BP Location: Left leg)   Pulse 115   Temp 98.2  F (36.8  C) (Oral)   Resp 26   Wt 56.9 kg (125 lb 7.1 oz)   SpO2 93%   BMI 20.87 kg/m    Gen- pleasant lying in bed  Neck-swelling  CVS- I+II+ soft ESM  RS- CTAB  Abdo- soft, no tenderness , No g/r/r   Ext- b/l arms edema Lt >Rt   CNS-oriented to person, place, time  Bilateral upper extremity edema    Left AVF     Data   Recent Results (from the past 24 hour(s))   Echocardiogram Complete   Result Value    LVEF  75-80%    Narrative    [id number]  [id number]  [id number]^REYES^CURTIS     [facility]  Echocardiography Laboratory  [address] [place] [address]     Name: GIO HAMMOND  MRN: [id number]  : 1965  Study Date: 2022 04:04 PM  Age: 56 yrs  Gender: Female  Patient Location: [facility]  Reason For Study: CHF  Ordering Physician: CURTIS CHAMBERS  Referring Physician: Clari Garza  Performed By: Brian Turner RDCS     BSA: 1.6 m2  Height: 65 in  Weight: 124 lb  HR: 120  BP: 136/79 mmHg  ______________________________________________________________________________  Procedure  Complete Portable Echo Adult. Optison (NDC #[id number]) given intravenously.  ______________________________________________________________________________  Interpretation Summary     The visual ejection fraction is estimated at 75-80%.  Hyperdynamic left ventricular function  No systolic anterior motion of the mitral valve.  Right ventricular systolic pressure is elevated, consistent with moderate  pulmonary hypertension.  The rhythm was sinus tachycardia.  The study was technically difficult.  ______________________________________________________________________________  Left Ventricle  The left ventricle is normal in size. There is borderline concentric left  ventricular hypertrophy.  Hyperdynamic left ventricular function. Diastolic  Doppler findings (E/E' ratio and/or other parameters) suggest left ventricular  filling pressures are indeterminate. The visual ejection fraction is estimated  at 75-80%. Septal motion is consistent with conduction abnormality.     Right Ventricle  The right ventricle is normal in size and function.     Atria  Normal left atrial size. Right atrial size is normal. There is no color  Doppler evidence of an atrial shunt.     Mitral Valve  The mitral valve leaflets are mildly thickened. No systolic anterior motion of  the mitral valve. There is trace mitral regurgitation.     Tricuspid Valve  The right ventricular systolic pressure is approximated at 44.8 mmHg plus the  right atrial pressure. There is mild to moderate (1-2+) tricuspid  regurgitation. IVC diameter <2.1 cm collapsing >50% with sniff suggests a  normal RA pressure of 3 mmHg. Right ventricular systolic pressure is elevated,  consistent with moderate pulmonary hypertension.     Aortic Valve  The aortic valve is not well visualized. No aortic regurgitation is present.  No hemodynamically significant valvular aortic stenosis.     Pulmonic Valve  The pulmonic valve is not well visualized. There is no pulmonic valvular  regurgitation. Normal pulmonic valve velocity.     Vessels  The aortic root is normal size. Normal size ascending aorta. IVC diameter <2.1  cm collapsing >50% with sniff suggests a normal RA pressure of 3 mmHg. The  inferior vena cava was normal in size with preserved respiratory variability.     Pericardium  There is no pericardial effusion.     Rhythm  The rhythm was sinus tachycardia.  ______________________________________________________________________________  MMode/2D Measurements & Calculations  IVSd: 1.1 cm     LVIDd: 3.7 cm  LVIDs: 2.2 cm  LVPWd: 1.2 cm  FS: 40.6 %  LV mass(C)d: 135.8 grams  LV mass(C)dI: 84.1 grams/m2  Ao root diam: 2.8 cm  LA dimension: 2.3 cm  asc Aorta Diam: 3.3  cm  LA/Ao: 0.82  LA Volume (BP): 39.9 ml  LA Volume Index (BP): 24.8 ml/m2  RWT: 0.67     Doppler Measurements & Calculations  MV E max rafael: 95.1 cm/sec  MV A max rafael: 139.2 cm/sec  MV E/A: 0.68  MV max P.8 mmHg  MV mean P.8 mmHg  MV V2 VTI: 21.4 cm  PA acc time: 0.09 sec  TR max rafael: 334.5 cm/sec  TR max P.8 mmHg  E/E' av.0  Lateral E/e': 8.8  Medial E/e': 13.2     ______________________________________________________________________________  Report approved by: Ally Wilkinson 2022 04:50 PM            BMP  Recent Labs   Lab 22  1453 22  0530 22  1723    133 135   POTASSIUM 3.7 3.7 3.9   CHLORIDE 103 98 98   JEWELL 8.7 9.5 9.9   CO2 26 29 28   BUN 32* 62* 59*   CR 3.01* 4.91* 4.30*   GLC 82 128* 112*     CBC  Recent Labs   Lab 22  1434 22  2232 22  1453 22  0546 22  0011 22  2119 22  0530 22  1307 22  0559   WBC  --   --  12.5*  --   --   --  14.1*  --  18.3*   RBC  --   --  3.24*  --   --   --  2.55*  --  2.69*   HGB 8.9*   < > 9.6*   < > 8.5*   < > 7.9*   < > 8.1*  8.1*   HCT  --   --  28.5*  --   --   --  23.9*  --  25.1*   MCV  --   --  88  --   --   --  94  --  93   MCH  --   --  29.6  --   --   --  31.0  --  30.1   MCHC  --   --  33.7  --   --   --  33.1  --  32.3   RDW  --   --  15.6*  --   --   --  14.5  --  14.2   PLT  --   --  304  --  264  --  314  --  220    < > = values in this interval not displayed.     INR  Recent Labs   Lab 22  0011   INR 1.45*     LFTs  Recent Labs   Lab 22  0530   ALKPHOS 34*   AST 96*   ALT 20   BILITOTAL 0.4   PROTTOTAL 5.2*   ALBUMIN 1.8*

## 2022-08-25 NOTE — PROGRESS NOTES
"SPIRITUAL HEALTH SERVICES  SPIRITUAL ASSESSMENT Progress Note  [facility]     REFERRAL SOURCE: Follow up regarding health care directive    Offered education regarding purpose and process for completing the document. Materials left with patient for consideration and completion. Gina shared that she wishes to complete the health care directive but would like to discuss it with her daughter first. \"I want to get it done and have something in place.\"  She shared briefly regarding the shock of her cancer diagnosis and \"trying to have conversations\" with her children, \"but they don't want to talk about it.\"    I offered spiritual and emotional support through reflective listening that affirmed emotions, experience, and meaning.     PLAN: Will follow up tomorrow for support and possible notarization of health care directive.    Skyla Membreno  Associate   Pager [phone]  Phone [phone]    "

## 2022-08-25 NOTE — PROGRESS NOTES
CLINICAL NUTRITION SERVICES - REASSESSMENT NOTE      Future/Additional Recommendations:   Ensure BID - use stock in room & can resume sending from department once this is used up   Encouraged to mix Ensure with some ice cream for additional calories and protein  Continue to advance diet as tolerated/medically appropriate   Patient requesting diet education on tips for increasing protein & general/healthful nutrition prior to discharge when daughter is present on Monday (8/29)   Malnutrition: (8/17)   % Weight Loss:  Up to 5% in 1 month (moderate malnutrition)  % Intake:  </= 50% for >/= 5 days (severe malnutrition)  Subcutaneous Fat Loss:  Orbital region mild depletion and Upper arm region moderate depletion  Muscle Loss:  Temporal region mild depletion, Clavicle bone region mild depletion, Patellar region moderate depletion and Posterior calf region moderate-severe depletion  Fluid Retention:  Mild as above      Malnutrition Diagnosis: Severe malnutrition  In Context of:  Acute illness or injury        EVALUATION OF PROGRESS TOWARD GOALS   Diet:  Mechanical/Dental Soft -- advanced from Full Liquid diet this morning per GI  8/23: NPO for EGD --> CL diet  8/24: Full Liquid Diet   Has been on NPO, Clear liquid or Full liquid diets for the past week of admission     Supplements: Ensure supplements were discontinued on 8/22 per provider     Intake/Tolerance:    Visited patient at bedside this morning - she states she has been drinking 2 Ensure bottles/day. Her daughter has been bringing these in supposedly - has several stocked at bedside. States she is very tired of the full liquid diet, only doing some broth other than the Ensure as she doesn't like anything else on the liquid menu. Encouraged her to mix some ice cream into the Ensure shakes for additional calories and protein.       ASSESSED NUTRITION NEEDS:  Dosing Weight 58 kg   Estimated Energy Needs: 9684-8427 kcals (25-30  Kcal/Kg)  Justification: maintenance  Estimated Protein Needs: 70-90 grams protein (1.2-1.5 g pro/Kg)  Justification: hypercatabolism with acute illness and dialysis  Estimated Fluid Needs: 2723-0558 mL (1 mL/Kcal)  Justification: maintenance      NEW FINDINGS:   Chart reviewed -   8/23: EGD --> Normal esophagus. Red blood in gastric body. Non-bleeding duodenal ulcer. Normal second portion of duodenum. Clips x 3 placed with duodenal ulcer and evidence of bleeding in gastric body.     Dialysis and radiation treatments yesterday   Labs and meds reviewed. Receiving renal multivitamin daily for micronutrient needs with dialysis.   Stooling - BM x 1 most days, WDL    Weight - overall down ~1 kg in the past week (2% weight loss) though suspect fluctuations 2/2 fluid status.   Date/Time Weight Weight Method   08/22/22 0509 56.9 kg (125 lb 7.1 oz) Bed scale   08/21/22 0506 57.9 kg (127 lb 10.3 oz) Bed scale   08/19/22 0700 56 kg (123 lb 6.4 oz) Standing scale   08/18/22 0612 54.9 kg (121 lb 1.6 oz) Standing scale   08/15/22 0600 58 kg (127 lb 13.9 oz) Bed scale       Previous Goals (8/22)   Diet adv >FL within 24 hrs   Evaluation: Not met    Previous Nutrition Diagnosis:   Inadequate oral intake related to decreased appetite and restricted diet order as evidenced by consuming </=full liquids only for the past 5 days   Evaluation: Improving, but ongoing       CURRENT NUTRITION DIAGNOSIS  Inadequate oral intake related to decreased appetite, restricted diet order over the past week with recent advancement to solids this morning as evidenced by consuming 2 Ensure/day over past several days, meeting ~50% nutrition needs     INTERVENTIONS  Recommendations / Nutrition Prescription  Ensure BID - use stock in room & can resume sending from department once this is used up   Encouraged to mix Ensure with some ice cream for additional calories and protein  Continue to advance diet as tolerated/medically appropriate   Patient  requesting diet education on tips for increasing protein & general/healthful nutrition prior to discharge when daughter is present on Monday (8/29)    Implementation  General/healthful diet, Medical Food Supplement and Multivitamin/Mineral - ongoing    Goals  Patient will consume ~75% of meals + supplements BID at minimum       MONITORING AND EVALUATION:  Progress towards goals will be monitored and evaluated per protocol and Practice Guidelines      Eliane Murphy RD, LD

## 2022-08-25 NOTE — PLAN OF CARE
Goal Outcome Evaluation:    Plan of Care Reviewed With: patient     Overall Patient Progress: improving    Outcome Evaluation: Patient has been drinking 2 Ensure/day on liquid diet with limited other intake - meeting ~50% nutrition needs. Advanced to mechanical soft diet this morning per GI. Recommended mixing some ice cream with Ensure to increase calories and protein. Will provide diet education per patient request prior to discharge when daughter is present.    Eliane Murphy RD, LD

## 2022-08-25 NOTE — PLAN OF CARE
Goal Outcome Evaluation:                  Admitted on 8/14 after arriving at the ED after 2 weeks of facial swelling, fatigue, dysphagia  Found to have LUE DVT, GI bleed, lung mass, SVC syndrome, R internal jugular thrombus, hepatic mets   Neuro: Intact ex generalized weakness  CV/Rhythm: sinus tachy w/ occasional PVCs  Resp/02: on 2 lpm via NC to keep sats in the low-mid 90s  GI/Diet: denies nausea/ stomach pain, tolerating full liquid diet   : aneuric, ESRD, Dialysis MWF  Skin/Incisions/Sites: Abd incision w/ CDI dressing, LUE incision w/ CDI dressing   Pulses/CMS: palpable in all extremities   Edema: +3 BUE edema in addition to +1 generalized edema   Activity/Falls Risk: SBA w/ GB   Lines/Drains/IVs: L subclavian CVC port, L dialysis hemo access, 2 PIV RUE  Labs/BGM: 0600 hgb check   Test/ Procedures:   VS/Pain: VSS ex tachycardia  DC Plan: plan still pending, per PT the patient will need TCU placement, per MD note discharge will not be possible until on or after 8/30  Other: pt state this shift, no changes in condition were noted

## 2022-08-25 NOTE — PROGRESS NOTES
Patient received treatment to her Right lung field today. Dose given was 300 cGy with 10 mV photons for a total dose of 2100 cGy to date.  3 treatments remain.  Mninie Henriquez RTT   No

## 2022-08-25 NOTE — CONSULTS
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  -  8/23 EGD noted hematin and clotted blood in stomach with non bleeidng duodenal ulcer with pigmented material. Injected and clips placed. Duodenal ulcer thought to be exacerbated by heparin drip.   - Hemoglobin stable today      -- Continue to monitor hemoglobin and transfuse for 7 or less  --  pantoprazole 40 mg BID  -- Hold heparin for another few days likely restart on 8/28 or sooner if necessary  -- advance to soft diet             Interval History:     doing well; no cp, sob, n/v/d, or abd pain.              Review of Systems:     C: NEGATIVE for fever, chills, change in weight  E/M: NEGATIVE for ear, mouth and throat problems  R: NEGATIVE for significant cough or SOB  CV: NEGATIVE for chest pain, palpitations or peripheral edema             Medications:   I have  reviewed this patient's current medications    - MEDICATION INSTRUCTIONS for Dialysis Patients -   Does not apply See Admin Instructions     allopurinol  100 mg Oral QPM     atorvastatin  20 mg Oral Daily     calcium acetate  2,001 mg Oral TID w/meals     carvedilol  3.125 mg Oral BID w/meals     multivitamin RENAL  1 capsule Oral Daily     pantoprazole (PROTONIX) IV  40 mg Intravenous Q12H     sodium chloride (PF)  3 mL Intracatheter Q8H     sodium chloride (PF)  3 mL Intracatheter Q8H     sucralfate  1 g Oral 4x Daily AC & HS                  Physical Exam:   Vitals were reviewed  Vital Signs with Ranges  Temp:  [97.6  F (36.4  C)-98.5  F (36.9  C)] 98  F (36.7  C)  Pulse:  [112-125] 120  Resp:  [7-26] 17  BP: (112-139)/(69-91) 136/82  SpO2:  [89 %-100 %] 97 %  I/O last 3 completed shifts:  In: 831.25 [P.O.:400]  Out: -   Constitutional: healthy, alert, and no distress   Cardiovascular: negative, PMI normal. No lifts, heaves, or thrills. RRR. No murmurs, clicks gallops or rub  Respiratory: negative, Percussion normal. Good diaphragmatic excursion. Lungs clear  Abdomen: Abdomen soft, non-tender. BS normal. No masses, organomegaly           Data:   I reviewed the patient's new clinical lab test results.   Recent Labs   Lab Test 08/25/22  0545 08/24/22  2232 08/24/22  1453 08/23/22  0546 08/23/22  0011 08/21/22  2119 08/21/22  0530 08/19/22  1307 08/19/22  0559 08/15/22  1115 08/15/22  0451 08/14/22  2149 08/11/22  0913   WBC  --   --  12.5*  --   --   --  14.1*  --  18.3*   < > 6.7   < >  --    HGB 9.2* 9.3* 9.6*   < > 8.5*   < > 7.9*   < > 8.1*  8.1*   < > 7.0*   < >  --    MCV  --   --  88  --   --   --  94  --  93   < > 90   < >  --    PLT  --   --  304  --  264  --  314  --  220   < > 376   < >  --    INR  --   --   --   --  1.45*  --   --   --   --   --  1.68*  --  2.52*    < > = values in this interval not displayed.     Recent Labs   Lab Test 08/24/22  1453 08/21/22  0530 08/20/22  1723   POTASSIUM 3.7 3.7  3.9   CHLORIDE 103 98 98   CO2 26 29 28   BUN 32* 62* 59*   ANIONGAP 6 6 9     Recent Labs   Lab Test 08/21/22  0530 08/15/22  1115 08/15/22  0451   ALBUMIN 1.8* 2.2* 2.5*   BILITOTAL 0.4 0.3 0.4   ALT 20 19 22   AST 96* 43 48*       I reviewed the patient's new imaging results.    All laboratory data reviewed  All imaging studies reviewed by me.    Sussy Tenorio PA-C,  8/16/2022  [facility]  Office : [phone]  Cell: [phone] (Dr. Camacho)  Cell: [phone] (Sussy Tenorio PA-C)

## 2022-08-26 NOTE — PLAN OF CARE
Orientations: AOx4  Vitals/Pain: back pain 8/10   Tele: sinus tachycardia   Lines/Drains: RUE PIVx2, LUE fistula   Skin/Wounds: LUE fistula  GI/: Pt on hemodialysis, BM 8/25/22, void 8/25/22   Labs: Abnormal/Trends, Electrolyte Replacement- WDL  Ambulation/Assist: SBA  Sleep Quality:   Plan: TBD

## 2022-08-26 NOTE — PROGRESS NOTES
[facility]    Medicine Progress Note - Hospitalist Service    Date of Admission:  8/14/2022    Assessment & Plan          Gina Simpson is a pleasant 56 year old female with CAD, cardiomyopathy, hypertension, COPD, ESRD, failed RUE fistula, s/p creation of LUE AV fistula on 6/28/22 complicated by hematoma s/p evacuation, LUE DVT who presented as direct admission to [facility] from [place] ED after presenting with 2+weeks of facial swelling, fatigue, dysphagia. She was found to have prevertebral soft tissue swelling, lung mass with SVC syndrome and extensive adenopathy.     #.  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  SVC obstruction and SVC syndrome secondary to large neuroendocrine tumor  #.  Mediastinal mass measuring 10 x 8 x 6 cm with SVC obstruction and occlusion of right upper lobe pulmonary artery  #.  Extensive cervical, supraclavicular, right thoracic inlet and right mediastinal/hilar adenopathy  #.  New 9mm ARIA pulmonary nodule  - reported significant fatigue, 20lb weight loss, poor appetite for two months    - CT on 8/15 showed new mediastinal mass on right measuring 10 x 8 x 6 cm with mass-effect on trachea, SVC and occlusion of right upper lobe pulmonary bronchus and artery.  Also concern for lymphangitic spread of neoplasm. 2 solid nodules in left lung concerning for metastatic foci.  Multiple large hepatic metastasis.  SVC syndrome/compression secondary to large mediastinal mass/adenopathy, small left pleural effusion, small pericardial effusion    -Underwent liver biopsy on 8/16 that showed metastatic poorly differentiated non-small cell carcinoma with neuroendocrine differentiation    -- Appreciate recommendations by thoracic surgery, vascular surgery, IR and oncology  -- SVC stent was not recommended  -- Started on emergent radiation therapy for SVC syndrome.  Has received 7 of 10  treatments.  Facial swelling appears to be improving. Upper extremity edema persists  -- [place] oncology following  -- Continue pain control with IV Dilaudid, oxycodone  -- Per oncology, prognosis is poor and treatment is palliative.  -- Will need additional staging studies -MRI brain and CT abdomen/pelvis.  Timing of imaging studies to be decided by oncology.  -- Port placement to be decided by oncology  -- Systemic therapy will start after completion of radiation treatments  -- Smoking cessation advised    #.  Symmetric prevertebral soft tissue swelling from C2-C5 level  -Noted on CT scan  -ENT consulted.  Did not feel patient has retropharyngeal abscess.  ENT felt that edema of retropharyngeal space is related to SVC syndrome.  No specific treatment advised.  On exam, airway was widely patent, both vocal cords were mobile  - ENT noted exposed bone of right mandible with dental caries.  Patient will need dental evaluation as outpatient for this.    #.  Dental caries  ENT noted exposed bone of right mandible with dental caries.    - Patient will need dental evaluation as outpatient for this.      #.  ESRD on HD q. M W F  #.  S/p left AV fistula formation 6/28/22  #.  S/p fistulogram with dilatation of L subclavian and junction of brachiocephalic and subclavian vein complicated by hematoma requiring evacuation 7/8/22  - Dialyzes MW - Dr. Gonsales.  Left AV fistula is working.  She also has tunneled dialysis catheter in place.  Use of dialysis site as per nephrology.  If AV fistula continues to work well, could consider discontinuing tunneled catheter.  Decision as per nephrology.    - She underwent outpatient fistulogram 8/11/22.   - Continue dialysis as per nephrology    #.  Acute upper GI bleed due to duodenal ulcer, 8/15. Recurrent bleeding 8/22  #.  Acute blood loss anemia due to upper GI bleed secondary to duodenal ulcer  #.  Bleeding duodenal ulcer with visible vessel, EGD 8/15  #.  Chronic anemia due to  end-stage renal disease  #.  S/p 8 units PRBC transfusion this admission  - Patient developed acute GI bleed on 8/15.    - Hemoglobin dropped to 5.6.  Required 2 units of PRBC on 8/15, 1 unit on 8/17, 8/18, 8/20, 2 units on 8/22, 1 unit 8/24  - Goal is to keep  Hb>7 , platelet > 50 and INR <2     - EGD 8/15 showed - clotted blood in the entire stomach, spurting duoeneal ulcer with visible vessel, injected, clips placed, argon plasma coag used.    - Hemoglobin has continued to trend down due to anticoagulation with IV heparin. Has been requiring frequent PRBC transfusion.  - acutely worsened on 8/22 - had melena, hypotension and Hb drop to 5.6  - IV heparin stopped in 8/22 and she was transfused 2 units of PRBC.    - Discussed with GI on 8/22. Underwent repeat EGD 8/23 that showed blood and clots in gastric body, non bleeding duodenal ulcer with pigmented material. Injected and clipped.     -Hemoglobin down to 6.9 on 8/24.  Was 7.9 on 8/23  -Transfused 1 unit PRBC on 8/24  -Hb now stable at 9    - continue to hold IV heparin for at least 1more days to achieve hemostasis and prevent recurrent bleeding. Likely resume IV heparin tomorrow   - continue protonix BID, switch to po  - sucralfate for short term  - Continue to monitor hemoglobin and transfuse as needed        #.  Left brachial vein DVT 7/6/22  #.  Right internal jugular thrombus, on CT neck 8/14   - Was started on warfarin in July 2022.  INR subtheraputic on admission  - started on IV heparin infusion but had to be discontinued due to continued severe duodenal ulcer hemorrhage.   - continue to hold IV heparin due to recurrent upper GI bleed  - Will need lifelong anticoagulation.  Will transition to warfarin once hemoglobin stabilizes  - likely resume IV heparin tomorrow       #.  CAD, s/p stenting in 2019  #.  Chronic systolic CHF with EF of 40-45%, echo 2020   #.  Hypertension  PTA:  hydralazine 50mg TID, carvedilol 25mg bid, lisinopril 20mg daily, amlodipine  "10mg every evening  --HOLD lisinopril, hydralazine, amlodipine due borderline BP   - coreg dose was decreased due to low BP, will start increasing the dose       #.  Hyperphosphatemia  --Cont PhosLo with meals    #. Severe malnutrition due to illness  -Nutrition services following.  -Continue Ensure twice daily    #.  Generalized weakness  #.  Physical deconditioning  -PT/OT consulted.  OT has recommended TCU versus home with home care.    Disposition-PT and OT consulted.  Recommendation is for TCU versus home with home care.  following for discharge planning       Diet: Mechanical/Dental Soft Diet    DVT Prophylaxis: pneumoboots    Antoine Catheter: Not present  Central Lines: PRESENT  CVC Double Lumen Left Subclavian-Site Assessment: WDL  Cardiac Monitoring: ACTIVE order. Indication: GIB with tachycardia and low Hgb.  Code Status: Full Code      Disposition Plan      Expected Discharge Date: 08/31/2022    Discharge Delays: Placement - TCU  Destination: home with family  Discharge Comments: 8/16 SVC stenting and biposy pending  8/18 Medically not ready  8/21 receiving radiation and needing TCU dialysis planned for 8/22 8/22 Dialysis and radiation today (will be here till 8/30)      The patient's care was discussed with the Patient.    Ana Valerio MD  Hospitalist Service  [facility]  Securely message with the Vocera Web Console (learn more here)  Text page via [employer] Paging/Directory     \"This dictation was performed with voice recognition software and may contain errors,  omissions and inadvertent word substitution.\"    Clinically Significant Risk Factors Present on Admission                   # Severe Malnutrition: based on nutrition assessment _____________________________________________________________________    Interval History     Hemoglobin is now stable at 9.    Patient reports that she feels tired today  Underwent dialysis this am     Pain is controlled.    Vital " signs are stable   Remains mildly tachycardic      Data reviewed today: I reviewed all medications, new labs and imaging results over the last 24 hours. I personally reviewed no images or EKG's today.    Physical Exam   /72   Pulse (!) 129   Temp 97.8  F (36.6  C)   Resp 18   Wt 56.3 kg (124 lb 1.6 oz)   SpO2 95%   BMI 20.65 kg/m    Gen- pleasant lying in bed  Neck-swelling  CVS- I+II+ soft ESM  RS- CTAB  Abdo- soft, no tenderness , No g/r/r   Ext- b/l arms edema Lt >Rt   CNS-oriented to person, place, time  Bilateral upper extremity edema    Left AVF     Data   Recent Results (from the past 24 hour(s))   CT Abdomen Pelvis w/o Contrast    Narrative    EXAM: CT ABDOMEN PELVIS W/O CONTRAST  LOCATION: [facility]  DATE/TIME: 8/25/2022 7:30 PM    INDICATION: Lung cancer staging.  COMPARISON: None.  TECHNIQUE: CT scan of the abdomen and pelvis was performed without IV contrast. Multiplanar reformats were obtained. Dose reduction techniques were used.  CONTRAST: None.    FINDINGS:   LOWER CHEST: Moderate right pleural effusion, increased since 08/15/2022, with adjacent atelectasis.    Evaluation of the abdominal viscera is limited without intravenous contrast material.    HEPATOBILIARY: Multiple (more than 15) masses throughout both hepatic lobes, measuring up to 4.1 cm in segment 4. Gallbladder contains sludge or vicariously excreted contrast from the prior CT. No appreciable biliary ductal dilation.    PANCREAS: Normal.    SPLEEN: Normal.    ADRENAL GLANDS: Normal.    KIDNEYS/BLADDER: Normal.    BOWEL: Hemostatic clips within the gastric antrum. No bowel obstruction or signs of bowel inflammation. Small clip at the cecal base.    LYMPH NODES: No enlarged lymph nodes.    VASCULATURE: Moderate atherosclerosis throughout the abdominal aorta. No abdominal aortic aneurysm.    PELVIC ORGANS: Normal. Trace free pelvic fluid.    MUSCULOSKELETAL: 6 mm lucent lesion along the left superior  endplate of the L5 vertebral body (4/#47). No other osseous lesions. Body wall edema.      Impression    IMPRESSION:    1.  Multiple bilobar hepatic metastases measuring up to 4.1 cm.    2.  Subcentimeter lucent lesion within the L5 vertebral body, indeterminate for a small osseous metastasis. Attention at follow-up.    3.  No other evidence of metastatic disease within the abdomen or pelvis, within the limitations of an exam without intravenous contrast.    4.  Moderate right pleural effusion, increased in volume since 08/15/2022.      BMP  Recent Labs   Lab 08/26/22  0541 08/24/22  1453 08/21/22  0530 08/20/22  1723   * 135 133 135   POTASSIUM 4.6 3.7 3.7 3.9   CHLORIDE 99 103 98 98   JEWELL 9.4 8.7 9.5 9.9   CO2 25 26 29 28   BUN 58* 32* 62* 59*   CR 4.85* 3.01* 4.91* 4.30*   GLC 84 82 128* 112*     CBC  Recent Labs   Lab 08/26/22  0541 08/24/22  2232 08/24/22  1453 08/23/22  0546 08/23/22  0011 08/21/22  2119 08/21/22  0530   WBC 9.0  --  12.5*  --   --   --  14.1*   RBC 2.92*  --  3.24*  --   --   --  2.55*   HGB 8.7*  8.7*   < > 9.6*   < > 8.5*   < > 7.9*   HCT 26.8*  --  28.5*  --   --   --  23.9*   MCV 92  --  88  --   --   --  94   MCH 29.8  --  29.6  --   --   --  31.0   MCHC 32.5  --  33.7  --   --   --  33.1   RDW 15.9*  --  15.6*  --   --   --  14.5     --  304  --  264  --  314    < > = values in this interval not displayed.     INR  Recent Labs   Lab 08/23/22  0011   INR 1.45*     LFTs  Recent Labs   Lab 08/21/22  0530   ALKPHOS 34*   AST 96*   ALT 20   BILITOTAL 0.4   PROTTOTAL 5.2*   ALBUMIN 1.8*

## 2022-08-26 NOTE — PROGRESS NOTES
Assessment and Plan:   ESRD: seen on dialysis. Using R CVC today. Has LAF but both UE are very edematous so difficult cannulation.   3h, R  BFR 1 L UF, 3K 35 HCO3 138 Na. EPO and hectorol on the run. No heparin.     On phoslo.             Interval History:   SVC syndrome: getting XRT. Metastatic lung Ca.       Severe anemia:   Hypotension: CHF, ASCVD. On Coreg.      GI bleed: DU diagnosed. No heparin on dialysis                 Review of Systems:   No sx on dialysis.           Medications:       - MEDICATION INSTRUCTIONS for Dialysis Patients -   Does not apply See Admin Instructions     allopurinol  100 mg Oral QPM     atorvastatin  20 mg Oral Daily     calcium acetate  2,001 mg Oral TID w/meals     carvedilol  3.125 mg Oral BID w/meals     multivitamin RENAL  1 capsule Oral Daily     - MEDICATION INSTRUCTIONS -   Does not apply Once     pantoprazole (PROTONIX) IV  40 mg Intravenous Q12H     sodium chloride (PF)  3 mL Intracatheter Q8H     sodium chloride (PF)  3 mL Intracatheter Q8H     sucralfate  1 g Oral 4x Daily AC & HS       - MEDICATION INSTRUCTIONS -       - MEDICATION INSTRUCTIONS -       Current active medications and PTA medications reviewed, see medication list for details.            Physical Exam:   Vitals were reviewed  Patient Vitals for the past 24 hrs:   BP Temp Temp src Pulse Resp SpO2 Weight   08/26/22 1030 121/84 -- -- (!) 121 -- -- --   08/26/22 1015 113/83 -- -- (!) 122 -- -- --   08/26/22 1000 (!) 122/91 -- -- 119 -- -- --   08/26/22 0945 114/87 -- -- 115 -- -- --   08/26/22 0930 (!) 141/68 -- -- (!) 129 -- -- --   08/26/22 0915 129/83 -- -- 112 -- -- --   08/26/22 0900 110/80 -- -- 112 -- -- --   08/26/22 0845 (!) 140/90 -- -- 118 -- -- --   08/26/22 0833 (!) 133/90 -- -- 116 -- -- --   08/26/22 0825 -- -- -- -- -- 95 % --   08/26/22 0820 -- -- -- -- -- (!) 86 % --   08/26/22 0815 122/86 97.6  F (36.4  C) Oral 120 -- -- --   08/26/22 0800 128/88 -- -- 119 14 98 % --    22 0700 -- -- -- 117 18 98 % --   22 0631 -- -- -- -- -- -- 56.3 kg (124 lb 1.6 oz)   22 0600 119/82 -- -- 113 10 97 % --   22 0400 111/75 98.2  F (36.8  C) Oral 114 12 97 % --   22 0200 113/80 -- -- 112 9 99 % --   22 0000 114/73 98.2  F (36.8  C) Axillary 110 9 98 % --   22 2200 122/79 -- -- 116 8 95 % --   22 2100 -- 98.1  F (36.7  C) Oral -- -- -- --   22 136/82 -- -- 113 14 99 % --   22 1820 -- -- -- -- -- 91 % --   22 1815 (!) 154/95 -- -- (!) 125 17 (!) 88 % --   22 1735 -- -- -- -- -- 94 % --   22 1705 -- -- -- -- -- 93 % --   22 1700 -- -- -- -- -- (!) 88 % --   22 1633 -- -- -- -- -- 93 % --   22 1631 -- 98.1  F (36.7  C) Oral -- -- -- --   22 1600 (!) 148/98 -- -- 118 13 93 % --   22 1400 (!) 133/95 -- -- 116 14 91 % --   22 1200 119/85 -- -- 115 26 93 % --   22 1122 -- 98.2  F (36.8  C) Oral -- -- -- --       Temp:  [97.6  F (36.4  C)-98.2  F (36.8  C)] 97.6  F (36.4  C)  Pulse:  [110-129] 121  Resp:  [8-26] 14  BP: (110-154)/(68-98) 121/84  SpO2:  [86 %-99 %] 95 %    Temperatures:  Current - Temp: 97.6  F (36.4  C); Max - Temp  Av.1  F (36.7  C)  Min: 97.6  F (36.4  C)  Max: 98.2  F (36.8  C)  Respiration range: Resp  Av.7  Min: 8  Max: 26  Pulse range: Pulse  Av.9  Min: 110  Max: 129  Blood pressure range: Systolic (24hrs), Av , Min:110 , Max:154   ; Diastolic (24hrs), Av, Min:68, Max:98    Pulse oximetry range: SpO2  Av %  Min: 86 %  Max: 99 %    I/O last 3 completed shifts:  In: 240 [P.O.:240]  Out: -       Intake/Output Summary (Last 24 hours) at 2022 1035  Last data filed at 2022 2200  Gross per 24 hour   Intake 240 ml   Output --   Net 240 ml       Resting in bed  R CVC with no redness or tenderness  UE bilat edema       Wt Readings from Last 4 Encounters:   22 56.3 kg (124 lb 1.6 oz)   07/10/22 65 kg (143 lb 4.8 oz)   22  56.2 kg (124 lb)   05/31/22 56.7 kg (125 lb)          Data:          Lab Results   Component Value Date     08/26/2022     08/24/2022     08/21/2022    Lab Results   Component Value Date    CHLORIDE 99 08/26/2022    CHLORIDE 103 08/24/2022    CHLORIDE 98 08/21/2022    Lab Results   Component Value Date    BUN 58 08/26/2022    BUN 32 08/24/2022    BUN 62 08/21/2022      Lab Results   Component Value Date    POTASSIUM 4.6 08/26/2022    POTASSIUM 3.7 08/24/2022    POTASSIUM 3.7 08/21/2022    Lab Results   Component Value Date    CO2 25 08/26/2022    CO2 26 08/24/2022    CO2 29 08/21/2022    Lab Results   Component Value Date    CR 4.85 08/26/2022    CR 3.01 08/24/2022    CR 4.91 08/21/2022        Recent Labs   Lab Test 08/26/22  0541 08/25/22  2231 08/25/22  1434 08/24/22  2232 08/24/22  1453 08/23/22  0546 08/23/22  0011 08/21/22  2119 08/21/22  0530   WBC 9.0  --   --   --  12.5*  --   --   --  14.1*   HGB 8.7*  8.7* 8.7* 8.9*   < > 9.6*   < > 8.5*   < > 7.9*   HCT 26.8*  --   --   --  28.5*  --   --   --  23.9*   MCV 92  --   --   --  88  --   --   --  94     --   --   --  304  --  264  --  314    < > = values in this interval not displayed.     Recent Labs   Lab Test 08/21/22  0530 08/20/22  1723 08/15/22  1115 08/15/22  0451   AST 96*  --  43 48*   ALT 20  --  19 22   ALKPHOS 34*  --  26* 32*   BILITOTAL 0.4  --  0.3 0.4   CALDERON  --  21  --   --        Recent Labs   Lab Test 07/11/22  0750   MAG 2.3     Recent Labs   Lab Test 08/15/22  1641 07/12/22  0659 07/11/22  0750   PHOS 4.8* 2.2* 2.4*     Recent Labs   Lab Test 08/26/22  0541 08/24/22  1453 08/21/22  0530   JEWELL 9.4 8.7 9.5       Lab Results   Component Value Date    JEWELL 9.4 08/26/2022     Lab Results   Component Value Date    WBC 9.0 08/26/2022    HGB 8.7 (L) 08/26/2022    HGB 8.7 (L) 08/26/2022    HCT 26.8 (L) 08/26/2022    MCV 92 08/26/2022     08/26/2022     Lab Results   Component Value Date     (L) 08/26/2022     POTASSIUM 4.6 08/26/2022    CHLORIDE 99 08/26/2022    CO2 25 08/26/2022    GLC 84 08/26/2022     Lab Results   Component Value Date    BUN 58 (H) 08/26/2022    CR 4.85 (H) 08/26/2022     Lab Results   Component Value Date    MAG 2.3 07/11/2022     Lab Results   Component Value Date    PHOS 4.8 (H) 08/15/2022       Creatinine   Date Value Ref Range Status   08/26/2022 4.85 (H) 0.52 - 1.04 mg/dL Final   08/24/2022 3.01 (H) 0.52 - 1.04 mg/dL Final   08/21/2022 4.91 (H) 0.52 - 1.04 mg/dL Final   08/20/2022 4.30 (H) 0.52 - 1.04 mg/dL Final   08/18/2022 3.55 (H) 0.52 - 1.04 mg/dL Final   08/17/2022 4.45 (H) 0.52 - 1.04 mg/dL Final       Attestation:  I have reviewed today's vital signs, notes, medications, labs and imaging.  Seen on dialysis.      Hari Louise MD

## 2022-08-26 NOTE — PROGRESS NOTES
[facility] Progress Note          Assessment and Plan:   Ms. Gina Simpson is a 56 year old woman who was admitted on 8/14/2022 with facial edema and dyspnea     1. 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 Imaging studies show evidence of metastatic disease involving cervical LNs and liver. Mediastinal mass measures 10 x 8 x 6 cm with SVC obstruction and occlusion of the RUL pulmonary artery  - The plan is for radiation treatment to help with SVC obstruction and to consider systemic therapy after completion of radiation therapy  -she had first radiation treatment on 8/20, plan for 10 fractions   -tumor sent for NGS testing     2. ESRD managed with hemodialysis via a LUE fistula.  -  Dialyzed through L CVC due to arm edema today      3. Anemia due to CKD and bleeding upper GI ulcers  - s/p repeat endoscopy 8/23 with evidence of recently bleeding duodenal ulcer s/p epi injection and clip   - Hg 8.7 most recently      4. Intraluminal thrombosis treated previously with IV heparin  - anticoagulation on hold now for at least 2 days     PLAN  - Continue radiation, plan for 10 fractions   - Before discharge, will request chemoport placement. Discussed with primary and will ask vascular about how we can achieve this.   - Plan will be for outpatient chemotherapy + immunotherapy  - Brain MRI pending for staging   - Continue supportive transfusions, PPI, holding heparin until hemostasis achieved   - Will arrange for her to see Dr. Costa in outpatient follow up, closer to her home     Patrick Dreyer, DO              Interval History:   Doing well. SOB improved. Fatigue improved. Feels well overall.        Medications:       - MEDICATION INSTRUCTIONS for Dialysis Patients -   Does not apply See Admin Instructions     allopurinol  100 mg Oral QPM     atorvastatin  20 mg Oral Daily     calcium acetate  2,001 mg Oral TID  w/meals     carvedilol  12.5 mg Oral BID w/meals     multivitamin RENAL  1 capsule Oral Daily     pantoprazole (PROTONIX) IV  40 mg Intravenous Q12H     sodium chloride (PF)  3 mL Intracatheter Q8H     sodium chloride (PF)  3 mL Intracatheter Q8H     sucralfate  1 g Oral 4x Daily AC & HS     sodium chloride 0.9%, sodium chloride 0.9%, acetaminophen **OR** acetaminophen, HYDROmorphone, lidocaine 4%, lidocaine (buffered or not buffered), LORazepam, melatonin, naloxone **OR** naloxone **OR** naloxone **OR** naloxone, nitroGLYcerin, ondansetron **OR** ondansetron, oxyCODONE, - MEDICATION INSTRUCTIONS -, sodium chloride (PF), sodium chloride (PF)               Physical Exam:   Blood pressure 129/79, pulse (!) 125, temperature 97.8  F (36.6  C), temperature source Oral, resp. rate 18, weight 56.3 kg (124 lb 1.6 oz), SpO2 90 %, not currently breastfeeding.  Wt Readings from Last 4 Encounters:   22 56.3 kg (124 lb 1.6 oz)   07/10/22 65 kg (143 lb 4.8 oz)   22 56.2 kg (124 lb)   22 56.7 kg (125 lb)         Vital Sign Ranges  Temperature Temp  Av.8  F (36.6  C)  Min: 97.8  F (36.6  C)  Max: 97.9  F (36.6  C)   Blood pressure Systolic (24hrs), Av , Min:94 , Max:129        Diastolic (24hrs), Av, Min:57, Max:80      Pulse Pulse  Av.8  Min: 89  Max: 106   Respirations Resp  Av  Min: 11  Max: 26   Pulse oximetry SpO2  Av.4 %  Min: 90 %  Max: 100 %         Intake/Output Summary (Last 24 hours) at 2022 1316  Last data filed at 2022 1000  Gross per 24 hour   Intake 680 ml   Output --   Net 680 ml       Constitutional: Awake, alert, cooperative, no apparent distress but appears sleepy   Lungs: Bilateral rhonchi   Cardiovascular: Regular rate and rhythm, normal S1 and S2, and no murmur noted   Abdomen: Normal bowel sounds, soft, non-distended, non-tender   Skin: No rashes, no cyanosis, upper extremity edema    Other:           Data:   Laboratory:  CMP  Recent Labs   Lab  08/26/22  0541 08/24/22  1453 08/21/22  0530 08/20/22  1723   * 135 133 135   POTASSIUM 4.6 3.7 3.7 3.9   CHLORIDE 99 103 98 98   CO2 25 26 29 28   ANIONGAP 8 6 6 9   GLC 84 82 128* 112*   BUN 58* 32* 62* 59*   CR 4.85* 3.01* 4.91* 4.30*   GFRESTIMATED 10* 17* 10* 11*   JEWELL 9.4 8.7 9.5 9.9   PROTTOTAL  --   --  5.2*  --    ALBUMIN  --   --  1.8*  --    BILITOTAL  --   --  0.4  --    ALKPHOS  --   --  34*  --    AST  --   --  96*  --    ALT  --   --  20  --      CBC  Recent Labs   Lab 08/26/22  0541 08/25/22 2231 08/25/22  1434 08/25/22  0545 08/24/22 2232 08/24/22  1453 08/23/22  0546 08/23/22  0011 08/21/22  2119 08/21/22  0530   WBC 9.0  --   --   --   --  12.5*  --   --   --  14.1*   RBC 2.92*  --   --   --   --  3.24*  --   --   --  2.55*   HGB 8.7*  8.7* 8.7* 8.9* 9.2*   < > 9.6*   < > 8.5*   < > 7.9*   HCT 26.8*  --   --   --   --  28.5*  --   --   --  23.9*   MCV 92  --   --   --   --  88  --   --   --  94   MCH 29.8  --   --   --   --  29.6  --   --   --  31.0   MCHC 32.5  --   --   --   --  33.7  --   --   --  33.1   RDW 15.9*  --   --   --   --  15.6*  --   --   --  14.5     --   --   --   --  304  --  264  --  314    < > = values in this interval not displayed.     INR  Recent Labs   Lab 08/23/22  0011   INR 1.45*       Imaging data:  Recent Results (from the past 48 hour(s))   CT Head w/o Contrast    Narrative    EXAM: CT HEAD W/O CONTRAST  LOCATION: [facility]  DATE/TIME: 8/20/2022 5:02 PM    INDICATION: On IV heparin, evaluate for head bleed  COMPARISON: None.  TECHNIQUE: Routine CT Head without IV contrast. Multiplanar reformats. Dose reduction techniques were used.    FINDINGS:  INTRACRANIAL CONTENTS: No intracranial hemorrhage, extraaxial collection, or mass effect.  No CT evidence of acute infarct. Normal parenchymal attenuation. Normal ventricles and sulci.     VISUALIZED ORBITS/SINUSES/MASTOIDS: No intraorbital abnormality. No significant paranasal sinus  mucosal disease. No middle ear or mastoid effusion.    BONES/SOFT TISSUES: No acute abnormality.      Impression    IMPRESSION:  1.  No acute intracranial process; specifically no acute intracranial hemorrhage.         Patrick Dreyer, DO

## 2022-08-26 NOTE — PROGRESS NOTES
SPIRITUAL HEALTH SERVICES  SPIRITUAL ASSESSMENT Progress Note  [facility] [place]     REFERRAL SOURCE: Follow up     Gina was not been able to complete the health care directive at this time, but expressed her desire to do so over the weekend.     PLAN: Will follow up on Monday or Tuesday for health care directive. [facility] remains available for support.    Skyla Membreno  Associate   Pager [phone]  Phone [phone]

## 2022-08-26 NOTE — PLAN OF CARE
Goal Outcome Evaluation:    Plan of Care Reviewed With: patient     Overall Patient Progress: improving    Outcome Evaluation: Pt A&Ox4. VSS ex tachycardia., on 1L NC. Tele: ST. C/o back pain, controlled w/PRN oxy. Diet advanced from full liquid to mechanical soft diet; tolerating well. Ambulated with SBA/ w GB. PT has Abd incision w/ CDI dressing, LUE incision RA & WNL. Edematous on R arm. Has L subclavian CVC port, L dialysis hemo access, 2 PIV RUE. Pulses is palpable in all extremities. Abd/pelv CT & brain MRI ordered. Plan still pending, per PT the patient will need TCU placement, per MD note discharge will not be possible until on or after 8/30.

## 2022-08-26 NOTE — PROGRESS NOTES
Radiation treatment # 8 to Right Lung.  2400 cGy total dose to date delivered with 10 MV Photons.  Patient has 2 treatments remaining.  SE  RTT

## 2022-08-26 NOTE — PLAN OF CARE
Orientations:Patient was alert and oriented x4  Vitals/Pain: Vital signs stable,afebrile,On NC at 2 lpm.Pain controlled with PRN Oxycodone  Tele: Sinus Tachycardia  Lines/Drains:  LUE incision,clean and dry,Left subclavian CVC port,dressing CDI,Left Upper Fistula for HD access, 2 PIV Right arm   Skin/Wounds:Skin dry,more edematous on left arm  Diet:On Soft mechanical diet  GI/:With BM on this shift,still dark color.On HD but able to void freely,with 1 episodes  Labs: Hgb monitoring,latest 8.  Ambulation/Assist:On standby assist  Sleep Quality:Sleep intermittently  Plan: Plan to do MRI and HD today morning and plan to discharge  on  or after Aug 30,2022,pending TCU placement.

## 2022-08-26 NOTE — PROGRESS NOTES
Potassium   Date Value Ref Range Status   08/26/2022 4.6 3.4 - 5.3 mmol/L Final     Hemoglobin   Date Value Ref Range Status   08/26/2022 8.7 (L) 11.7 - 15.7 g/dL Final   08/26/2022 8.7 (L) 11.7 - 15.7 g/dL Final     Creatinine   Date Value Ref Range Status   08/26/2022 4.85 (H) 0.52 - 1.04 mg/dL Final     Urea Nitrogen   Date Value Ref Range Status   08/26/2022 58 (H) 7 - 30 mg/dL Final     Sodium   Date Value Ref Range Status   08/26/2022 132 (L) 133 - 144 mmol/L Final     INR   Date Value Ref Range Status   08/23/2022 1.45 (H) 0.85 - 1.15 Final       DIALYSIS PROCEDURE NOTE  Hepatitis status of previous patient on machine log was checked and verified ok to use with this patients hepatitis status.  Patient dialyzed for 3 hrs. on a K2 bath with a net fluid removal of  1L.  A BFR of 350 ml/min was obtained via a Left CVC.    The treatment plan was discussed with Dr. Younger during the treatment.    Total heparin received during the treatment: 0 units.   Line flushed, clamped and capped with heparin 1:1000 2.3 mL (2300 units) per lumen    Meds  given: EPO 4,000units/1mL  Hectorol 4mcg/2mL  Complications: None      Person educated: Patient. Knowledge base Substantial. Barriers to learning: None. Educated on Procedure via Verbal mode. Patient verbalized understanding. Pt prefers Verbal education style.     ICEBOAT? Timeout performed pre-treatment  I: Patient was identified using 2 identifiers  C:  Consent Signed Yes  E: Equipment preventative maintenance is current and dialysis delivery system OK to use  B:    Latest Reference Range & Units 08/18/22 15:50   Hep B Surface Agn Nonreactive  Nonreactive   Hepatitis B Surface Antibody Instrument Value <8.00 m[IU]/mL 103.04     O: Dialysis orders present and complete prior to treatment  A: Vascular access verified and assessed prior to treatment  T: Treatment was performed at a clinically appropriate time  ?: Patient was allowed to ask questions and address concerns prior to  treatment  See Adult Hemodialysis flowsheet in [facility] for further details and post assessment.  Machine water alarm in place and functioning. Transducer pods intact and checked every 15min.   Pt returned via Bed.  Chlorine/Chloramine water system checked every 4 hours.  Outpatient Dialysis at [facility]      Post treatment report given to PAOLA Saenz regarding 1L of fluid removed, last BP of 116/89.

## 2022-08-27 NOTE — PROGRESS NOTES
[facility] Progress Note          Assessment and Plan:   Ms. Gina Simpson is a 56 year old woman who was admitted on 8/14/2022 with facial edema and dyspnea     1. 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 Imaging studies show evidence of metastatic disease involving cervical LNs and liver. Mediastinal mass measures 10 x 8 x 6 cm with SVC obstruction and occlusion of the RUL pulmonary artery  - The plan is for radiation treatment to help with SVC obstruction and to consider systemic therapy after completion of radiation therapy  -she had first radiation treatment on 8/20, plan for 10 fractions   -tumor sent for NGS testing     2. ESRD managed with hemodialysis via a LUE fistula.  -  HD M/W/F per nephrology team       3. Anemia due to CKD and bleeding upper GI ulcers  - s/p repeat endoscopy 8/23 with evidence of recently bleeding duodenal ulcer s/p epi injection and clip   - Hg 8.7 on 8/26 and 7.8g/dl today    - continue to monitor H&H closely   - transfuse as needed for Hb<7g/dl or platelets<10,000      4. Left brachial vein DVT (7/6/2022)  RIJ DVT (Ct neck 8/14/2022)    -anticoagulation on hold given recurrent GI bleed from bleeding duodenal ulcer necessitating total of 8 units of PRBC during this hospitalization     -continue to hold anticoagulation at this time     PLAN  - Continue radiation, plan for 10 fractions   - Before discharge, will request chemoport placement. Dr Dreyer discussed with primary and will ask vascular about how we can achieve this.   - Plan will be for outpatient chemotherapy + immunotherapy  - Continue supportive transfusions, PPI, holding heparin until hemostasis achieved   - Will arrange for her to see Dr. Costa in outpatient follow up, closer to her home     Edmar Wong MD             Interval History:   Denied roshni.  Reports facial puffiness is somewhat better.  Left arm  swelling persisting and not worse.  Denied fevers.         Medications:       - MEDICATION INSTRUCTIONS for Dialysis Patients -   Does not apply See Admin Instructions     allopurinol  100 mg Oral QPM     atorvastatin  20 mg Oral Daily     calcium acetate  2,001 mg Oral TID w/meals     carvedilol  12.5 mg Oral BID w/meals     multivitamin RENAL  1 capsule Oral Daily     pantoprazole  40 mg Oral BID AC     sodium chloride (PF)  3 mL Intracatheter Q8H     sucralfate  1 g Oral 4x Daily AC & HS     sodium chloride 0.9%, sodium chloride 0.9%, acetaminophen **OR** acetaminophen, HYDROmorphone, lidocaine 4%, lidocaine (buffered or not buffered), LORazepam, melatonin, naloxone **OR** naloxone **OR** naloxone **OR** naloxone, nitroGLYcerin, ondansetron **OR** ondansetron, oxyCODONE, - MEDICATION INSTRUCTIONS -, sodium chloride (PF), sodium chloride (PF)               Physical Exam:   Blood pressure 107/62, pulse 109, temperature 98.3  F (36.8  C), temperature source Axillary, resp. rate 19, weight 56.3 kg (124 lb 1.6 oz), SpO2 99 %, not currently breastfeeding.  Wt Readings from Last 4 Encounters:   22 56.3 kg (124 lb 1.6 oz)   07/10/22 65 kg (143 lb 4.8 oz)   22 56.2 kg (124 lb)   22 56.7 kg (125 lb)         Vital Sign Ranges  Temperature Temp  Av.8  F (36.6  C)  Min: 97.8  F (36.6  C)  Max: 97.9  F (36.6  C)   Blood pressure Systolic (24hrs), Av , Min:94 , Max:129        Diastolic (24hrs), Av, Min:57, Max:80      Pulse Pulse  Av.8  Min: 89  Max: 106   Respirations Resp  Av  Min: 11  Max: 26   Pulse oximetry SpO2  Av.4 %  Min: 90 %  Max: 100 %         Intake/Output Summary (Last 24 hours) at 2022 1316  Last data filed at 2022 1000  Gross per 24 hour   Intake 680 ml   Output --   Net 680 ml       Constitutional: Awake, alert, cooperative, no apparent distress but appears sleepy   Lungs: Bilateral rhonchi   Cardiovascular: Regular rate and rhythm, normal S1 and S2, and  no murmur noted   Abdomen: Normal bowel sounds, soft, non-distended, non-tender   Skin: No rashes, no cyanosis, upper extremity edema    Other: Left arm swelling with sutures related to AV fistula; left subclavian central line          Data:   Laboratory:  CMP  Recent Labs   Lab 08/26/22  0541 08/24/22  1453 08/21/22  0530 08/20/22  1723   * 135 133 135   POTASSIUM 4.6 3.7 3.7 3.9   CHLORIDE 99 103 98 98   CO2 25 26 29 28   ANIONGAP 8 6 6 9   GLC 84 82 128* 112*   BUN 58* 32* 62* 59*   CR 4.85* 3.01* 4.91* 4.30*   GFRESTIMATED 10* 17* 10* 11*   JEWELL 9.4 8.7 9.5 9.9   PROTTOTAL  --   --  5.2*  --    ALBUMIN  --   --  1.8*  --    BILITOTAL  --   --  0.4  --    ALKPHOS  --   --  34*  --    AST  --   --  96*  --    ALT  --   --  20  --      CBC  Recent Labs   Lab 08/27/22  0543 08/26/22  1910 08/26/22  0541 08/25/22  2231 08/24/22  2232 08/24/22  1453 08/23/22  0546 08/23/22  0011 08/21/22  2119 08/21/22  0530   WBC  --   --  9.0  --   --  12.5*  --   --   --  14.1*   RBC  --   --  2.92*  --   --  3.24*  --   --   --  2.55*   HGB 7.8* 7.6* 8.7*  8.7* 8.7*   < > 9.6*   < > 8.5*   < > 7.9*   HCT  --   --  26.8*  --   --  28.5*  --   --   --  23.9*   MCV  --   --  92  --   --  88  --   --   --  94   MCH  --   --  29.8  --   --  29.6  --   --   --  31.0   MCHC  --   --  32.5  --   --  33.7  --   --   --  33.1   RDW  --   --  15.9*  --   --  15.6*  --   --   --  14.5   PLT  --   --  307  --   --  304  --  264  --  314    < > = values in this interval not displayed.     INR  Recent Labs   Lab 08/23/22  0011   INR 1.45*       Imaging data:  Recent Results (from the past 48 hour(s))   CT Head w/o Contrast    Narrative    EXAM: CT HEAD W/O CONTRAST  LOCATION: [facility]  DATE/TIME: 8/20/2022 5:02 PM    INDICATION: On IV heparin, evaluate for head bleed  COMPARISON: None.  TECHNIQUE: Routine CT Head without IV contrast. Multiplanar reformats. Dose reduction techniques were  used.    FINDINGS:  INTRACRANIAL CONTENTS: No intracranial hemorrhage, extraaxial collection, or mass effect.  No CT evidence of acute infarct. Normal parenchymal attenuation. Normal ventricles and sulci.     VISUALIZED ORBITS/SINUSES/MASTOIDS: No intraorbital abnormality. No significant paranasal sinus mucosal disease. No middle ear or mastoid effusion.    BONES/SOFT TISSUES: No acute abnormality.      Impression    IMPRESSION:  1.  No acute intracranial process; specifically no acute intracranial hemorrhage.         Edmar Wong MD

## 2022-08-27 NOTE — PROVIDER NOTIFICATION
MD Notification    Notified Person: MD    Notified Person Name: Jammie guerrero     Notification Date/Time: 8/27/2022 0615     Notification Interaction: paged    Purpose of Notification:  can we change IV protonix to PO, pt has diet orders, loss of IV access    Orders Received: med changed to PO     Comments:

## 2022-08-27 NOTE — PROGRESS NOTES
[facility]    Medicine Progress Note - Hospitalist Service    Date of Admission:  8/14/2022    Assessment & Plan          Gina Simpson is a pleasant 56 year old female with CAD, cardiomyopathy, hypertension, COPD, ESRD, failed RUE fistula, s/p creation of LUE AV fistula on 6/28/22 complicated by hematoma s/p evacuation, LUE DVT who presented as direct admission to [facility] from [place] ED after presenting with 2+weeks of facial swelling, fatigue, dysphagia. She was found to have prevertebral soft tissue swelling, lung mass with SVC syndrome and extensive adenopathy.     #.  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  SVC obstruction and SVC syndrome secondary to large neuroendocrine tumor  #.  Mediastinal mass measuring 10 x 8 x 6 cm with SVC obstruction and occlusion of right upper lobe pulmonary artery  #.  Extensive cervical, supraclavicular, right thoracic inlet and right mediastinal/hilar adenopathy  #.  New 9mm ARIA pulmonary nodule    - reported significant fatigue, 20lb weight loss, poor appetite for two months    - CT on 8/15 showed new mediastinal mass on right measuring 10 x 8 x 6 cm with mass-effect on trachea, SVC and occlusion of right upper lobe pulmonary bronchus and artery.  Also concern for lymphangitic spread of neoplasm. 2 solid nodules in left lung concerning for metastatic foci.  Multiple large hepatic metastasis.  SVC syndrome/compression secondary to large mediastinal mass/adenopathy, small left pleural effusion, small pericardial effusion    -Underwent liver biopsy on 8/16 that showed metastatic poorly differentiated non-small cell carcinoma with neuroendocrine differentiation    CT abdomen/pelvis w/o CT 8/25 -   1.  Multiple bilobar hepatic metastases measuring up to 4.1 cm.   2.  Subcentimeter lucent lesion within the L5 vertebral body, indeterminate for a small  osseous metastasis.    3.  Moderate right pleural effusion, increased in volume since 08/15/2022.    MRI brain w/wo 8/26/2022 did not show any evidence of metastatic disease    -- Appreciate recommendations by thoracic surgery, vascular surgery, IR and oncology  -- SVC stent was not recommended  -- Started on emergent radiation therapy for SVC syndrome.  Has received 8 of 10 treatments.  Facial swelling appears to be improving. Upper extremity edema persists  -- [place] [facility] following -staging scans completed  -- Continue pain control with IV Dilaudid, oxycodone  -- Per oncology, prognosis is poor and treatment is palliative.  --IR consult ordered for chemotherapy port placement.  Expected to happen on 8/29, 8/30   -- Systemic therapy will start after completion of radiation treatments  -- Smoking cessation advised    #.  Symmetric prevertebral soft tissue swelling from C2-C5 level  -Noted on CT scan  -ENT consulted.  Did not feel patient has retropharyngeal abscess.  ENT felt that edema of retropharyngeal space is related to SVC syndrome.  No specific treatment advised.  On exam, airway was widely patent, both vocal cords were mobile  - ENT noted exposed bone of right mandible with dental caries.  Patient will need dental evaluation as outpatient for this.    #.  Dental caries  ENT noted exposed bone of right mandible with dental caries.    - Patient will need dental evaluation as outpatient for this.      #.  ESRD on HD q. M W F  #.  S/p left AV fistula formation 6/28/22  #.  S/p fistulogram with dilatation of L subclavian and junction of brachiocephalic and subclavian vein complicated by hematoma requiring evacuation 7/8/22  - Dialyzes MWF - Dr. Gonsales.  Left AV fistula is working.  She also has tunneled dialysis catheter in place.  Use of dialysis site as per nephrology.  If AV fistula continues to work well, could consider discontinuing tunneled catheter.  Decision as per nephrology.    - She underwent  outpatient fistulogram 8/11/22.   - Continue dialysis as per nephrology    #.  Acute upper GI bleed due to duodenal ulcer, 8/15. Recurrent bleeding 8/22  #.  Acute blood loss anemia due to upper GI bleed secondary to duodenal ulcer  #.  Bleeding duodenal ulcer with visible vessel, EGD 8/15  #.  Chronic anemia due to end-stage renal disease  #.  S/p 8 units PRBC transfusion this admission  - Patient developed acute GI bleed on 8/15.    - Hemoglobin dropped to 5.6.  Required 2 units of PRBC on 8/15, 1 unit on 8/17, 8/18, 8/20, 2 units on 8/22, 1 unit 8/24  - Goal is to keep  Hb>7 , platelet > 50 and INR <2     - EGD 8/15 showed - clotted blood in the entire stomach, spurting duoeneal ulcer with visible vessel, injected, clips placed, argon plasma coag used.    -IV heparin was resumed after EGD.  Hemoglobin continued to trend down due to anticoagulation with IV heparin.  Required frequent frequent PRBC transfusions.  - acutely worsened on 8/22 - had melena, hypotension and Hb drop to 5.6  - IV heparin stopped in 8/22 and she was transfused 2 units of PRBC.    - Underwent repeat EGD 8/23 that showed blood and clots in gastric body, non bleeding duodenal ulcer with pigmented material. Injected and clipped.     -Received 8 unit of transfusion on 8/24.  Hemoglobin improved to 9 after transfusion.  This has again slightly declined to 7.8 on 8/27.  IV heparin infusion continues to be on hold since 8/22.    -Was planning to restart IV heparin on 8/27 but now with decline in hemoglobin from 9 to 7.8, will continue to hold and monitor hemoglobin   -Need to achieve hemostasis before resuming anticoagulation as patient has had significant bleeding from duodenal ulcer during this hospital stay and has so far required 8 units of PRBC transfusion.    - continue protonix BID, switch to po  - sucralfate for short term  - Continue to monitor hemoglobin and transfuse as needed        #.  Left brachial vein DVT 7/6/22  #.  Right internal  jugular thrombus, on CT neck 8/14   - Was started on warfarin in July 2022.  INR subtheraputic on admission  - started on IV heparin infusion but had to be discontinued on 8/22 due to continued severe hemorrhage from duodenal ulcer  - continue to hold IV heparin due to recurrent upper GI bleed  - Will need lifelong anticoagulation.  Will transition to warfarin once hemoglobin stabilizes  - Resume IV heparin once hemostasis is achieved and hemoglobin is stable      #.  CAD, s/p stenting in 2019  #.  Chronic systolic CHF with EF of 40-45%, echo 2020   #.  Hypertension  PTA:  hydralazine 50mg TID, carvedilol 25mg bid, lisinopril 20mg daily, amlodipine 10mg every evening  --HOLD lisinopril, hydralazine, amlodipine due borderline BP   - coreg dose was decreased due to low BP, no increased to 12.5 mg twice daily.  At baseline she is on 25 mg twice daily        #.  Hyperphosphatemia  --Cont PhosLo with meals    #. Severe malnutrition due to illness  -Nutrition services following.  -Continue Ensure twice daily    #.  Generalized weakness  #.  Physical deconditioning  -PT/OT consulted.  OT has recommended TCU versus home with home care.    Disposition-PT and OT consulted.  Recommendation is for TCU versus home with home care.  following for discharge planning       Diet: Mechanical/Dental Soft Diet    DVT Prophylaxis: pneumoboots    Antoine Catheter: Not present  Central Lines: PRESENT  CVC Double Lumen Left Subclavian-Site Assessment: WDL  Cardiac Monitoring: ACTIVE order. Indication: GIB with tachycardia and low Hgb.  Code Status: Full Code      Disposition Plan      Expected Discharge Date: 08/31/2022    Discharge Delays: Placement - TCU  Destination: home with family  Discharge Comments: 8/16 SVC stenting and biposy pending  8/18 Medically not ready  8/21 receiving radiation and needing TCU dialysis planned for 8/22 8/22 Dialysis and radiation today (will be here till 8/30)      The patient's care was  "discussed with the Patient.    Ana Valerio MD  Hospitalist Service  [facility]  Securely message with the [employer] Console (learn more here)  Text page via [employer] Paging/Directory     \"This dictation was performed with voice recognition software and may contain errors,  omissions and inadvertent word substitution.\"    Clinically Significant Risk Factors Present on Admission                   # Severe Malnutrition: based on nutrition assessment _____________________________________________________________________    Interval History     Hemoglobin has now declined to 7.8   Feels okay  No complaints  Tolerating diet  Tachycardia somewhat improved with increasing dose of Coreg  Blood pressure okay      Data reviewed today: I reviewed all medications, new labs and imaging results over the last 24 hours. I personally reviewed no images or EKG's today.    Physical Exam   /65 (BP Location: Right arm)   Pulse 110   Temp 97.9  F (36.6  C) (Oral)   Resp 19   Wt 56.3 kg (124 lb 1.6 oz)   SpO2 100%   BMI 20.65 kg/m    Gen- pleasant lying in bed  Neck-swelling  CVS- I+II+ soft ESM  RS- CTAB  Abdo- soft, no tenderness , No g/r/r   Ext- b/l arms edema Lt >Rt   CNS-oriented to person, place, time  Bilateral upper extremity edema    Left AVF     Data   Recent Results (from the past 24 hour(s))   MR Brain w/o & w Contrast    Narrative    EXAM: MR BRAIN W/O and W CONTRAST  LOCATION: [facility]  DATE/TIME: 8/26/2022 5:48 PM    INDICATION: NSCLC staging  COMPARISON: None.  CONTRAST: 6 mL Gadavist  TECHNIQUE: Routine multiplanar multisequence head MRI without and with intravenous contrast.    FINDINGS:  INTRACRANIAL CONTENTS: No acute or subacute infarct. No mass, acute hemorrhage, or extra-axial fluid collections. Scattered nonspecific T2/FLAIR hyperintensities within the cerebral white matter most consistent with mild chronic microvascular ischemic   change. Mild " generalized cerebral atrophy. No hydrocephalus. Normal position of the cerebellar tonsils. No pathologic contrast enhancement.    SELLA: No abnormality accounting for technique.    OSSEOUS STRUCTURES/SOFT TISSUES: Normal marrow signal. The major intracranial vascular flow voids are maintained.     ORBITS: No abnormality accounting for technique.     SINUSES/MASTOIDS: Mucous retention cyst left maxillary sinus. No middle ear or mastoid effusion.       Impression    IMPRESSION:  1.  No evidence of metastatic disease.      BMP  Recent Labs   Lab 08/26/22  0541 08/24/22  1453 08/21/22  0530 08/20/22  1723   * 135 133 135   POTASSIUM 4.6 3.7 3.7 3.9   CHLORIDE 99 103 98 98   JEWELL 9.4 8.7 9.5 9.9   CO2 25 26 29 28   BUN 58* 32* 62* 59*   CR 4.85* 3.01* 4.91* 4.30*   GLC 84 82 128* 112*     CBC  Recent Labs   Lab 08/27/22  0543 08/26/22  1910 08/26/22  0541 08/24/22  2232 08/24/22  1453 08/23/22  0546 08/23/22  0011 08/21/22  2119 08/21/22  0530   WBC  --   --  9.0  --  12.5*  --   --   --  14.1*   RBC  --   --  2.92*  --  3.24*  --   --   --  2.55*   HGB 7.8*   < > 8.7*  8.7*   < > 9.6*   < > 8.5*   < > 7.9*   HCT  --   --  26.8*  --  28.5*  --   --   --  23.9*   MCV  --   --  92  --  88  --   --   --  94   MCH  --   --  29.8  --  29.6  --   --   --  31.0   MCHC  --   --  32.5  --  33.7  --   --   --  33.1   RDW  --   --  15.9*  --  15.6*  --   --   --  14.5   PLT  --   --  307  --  304  --  264  --  314    < > = values in this interval not displayed.     INR  Recent Labs   Lab 08/23/22  0011   INR 1.45*     LFTs  Recent Labs   Lab 08/21/22  0530   ALKPHOS 34*   AST 96*   ALT 20   BILITOTAL 0.4   PROTTOTAL 5.2*   ALBUMIN 1.8*

## 2022-08-27 NOTE — PLAN OF CARE
Goal Outcome Evaluation:    A/O x 4. Tele: Sinus Tach. HR in 110's. Pt. De-sats while sleeping to mid 80's, placed on 2L NC. Moderate pain managed w/PO oxycodone. BUE edema L > R. Up SBA. On hemodialysis. Dental soft diet. HOB 30-45 degrees.

## 2022-08-27 NOTE — PLAN OF CARE
Goal Outcome Evaluation:    Vitally stable. Lethargic this AM, alertness fluctuates throughout the day, alert more this evening. Renal diet, poor appetite. Up SBA. IV SL. Dialysis cath via chest. Oxy given x2 . Edema to bilateral upper extremities, left worse than right. Anuric.  Planning for port placement before discharge. CTM        Problem: Plan of Care - These are the overarching goals to be used throughout the patient stay.    Goal: Plan of Care Review/Shift Note  Description: The Plan of Care Review/Shift note should be completed every shift.  The Outcome Evaluation is a brief statement about your assessment that the patient is improving, declining, or no change.  This information will be displayed automatically on your shift note.  8/27/2022 1810 by Mercedes Farfan RN  Outcome: Ongoing, Progressing  Flowsheets (Taken 8/27/2022 1810)  Plan of Care Reviewed With: patient  8/27/2022 1810 by Mercedes Farfan RN  Outcome: Ongoing, Progressing  Flowsheets (Taken 8/27/2022 1810)  Plan of Care Reviewed With: patient  Goal: Absence of Hospital-Acquired Illness or Injury  Intervention: Identify and Manage Fall Risk  Recent Flowsheet Documentation  Taken 8/27/2022 0900 by Mercedes Farfan RN  Safety Promotion/Fall Prevention: activity supervised     Problem: Pain Acute  Goal: Acceptable Pain Control and Functional Ability  Intervention: Prevent or Manage Pain  Recent Flowsheet Documentation  Taken 8/27/2022 0900 by Mercedes Farfan RN  Medication Review/Management: medications reviewed

## 2022-08-28 NOTE — PROGRESS NOTES
"SPIRITUAL HEALTH SERVICES  SPIRITUAL ASSESSMENT Progress Note  [facility]     REFERRAL SOURCE: follow up    Gina was laying in bed upon arrival. She shared that she's not feeling well \"with the swelling in my hands\" and \"wanting to get some sleep\" declining any needs at this time.    PLAN: Will follow up in 1-2 days    Skyla Membreno  Associate   Pager [phone]  Phone [phone]    "

## 2022-08-28 NOTE — PLAN OF CARE
Goal Outcome Evaluation:    A/O x 4. Lethargic, arouses to voice. Tele: Sinus Tach. HR in 110's. On RA during the day, 2L NC at HS, LS clear, infrequent non-productive cough. BUE edema Left > Right, weaping right forearm. Loss of IV access. Left chest dialysis catheter. Pain managed w/PRN oxycodone. Mechanical Dental Soft Diet. Up SBA.

## 2022-08-28 NOTE — PLAN OF CARE
[id number] A&Ox4.  AVSS, RA.  Tele ST.  Pain managed with oxycodone.  LS fine crackles.  BS active, tolerating regular diet, appetite fair.  HD MWF.  Plan for port placement tomorrow per notes.

## 2022-08-28 NOTE — PROGRESS NOTES
Renal Medicine Chart Check      Planning scheduled HD 08/29/22  Dialysis orders entered    Labs below reviewed         Recent Labs   Lab 08/28/22  0610   *   POTASSIUM 4.1   CHLORIDE 96   CO2 27   ANIONGAP 7   GLC 99   BUN 41*   CR 5.08*   GFRESTIMATED 9*   JEWELL 9.5           ELTON Younger    [facility]  [phone]

## 2022-08-28 NOTE — PROGRESS NOTES
[place] [facility] Progress Note          Assessment and Plan:   Ms. Gina Simpson is a 56 year old woman who was admitted on 8/14/2022 with facial edema and dyspnea     1. 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 Imaging studies show evidence of metastatic disease involving cervical LNs and liver. Mediastinal mass measures 10 x 8 x 6 cm with SVC obstruction and occlusion of the RUL pulmonary artery  - The plan is for radiation treatment to help with SVC obstruction and to consider systemic therapy after completion of radiation therapy  -she had first radiation treatment on 8/20, plan for 10 fractions   -tumor sent for NGS testing  -plan for port placement tomorrow      2. ESRD managed with hemodialysis via a LUE fistula.  -  HD M/W/F per nephrology team       3. Anemia due to CKD and bleeding upper GI ulcers  - s/p repeat endoscopy 8/23 with evidence of recently bleeding duodenal ulcer s/p epi injection and clip   - Hg 8.7 on 8/26 and 7.8g/dl today    - continue to monitor H&H closely   - transfuse as needed for Hb<7g/dl or platelets<10,000      4. Left brachial vein DVT (7/6/2022)  RIJ DVT (Ct neck 8/14/2022)    -anticoagulation on hold given recurrent GI bleed from bleeding duodenal ulcer necessitating total of 8 units of PRBC during this hospitalization     -continue to hold anticoagulation at this time     PLAN  - Continue radiation, plan for 10 fractions   - Plan will be for outpatient chemotherapy + immunotherapy  - Continue supportive transfusions, PPI, holding heparin until hemostasis achieved   - Will arrange for her to see Dr. Costa in outpatient follow up, closer to her home     Edmar Wong MD             Interval History:   Denied roshni.  Reports facial puffiness is somewhat better.  Left arm swelling persisting and not worse.  Denied fevers.  Needs IV access.         Medications:       -  MEDICATION INSTRUCTIONS for Dialysis Patients -   Does not apply See Admin Instructions     allopurinol  100 mg Oral QPM     atorvastatin  20 mg Oral Daily     calcium acetate  2,001 mg Oral TID w/meals     carvedilol  12.5 mg Oral BID w/meals     multivitamin RENAL  1 capsule Oral Daily     pantoprazole  40 mg Oral BID AC     sodium chloride (PF)  3 mL Intracatheter Q8H     sucralfate  1 g Oral 4x Daily AC & HS     sodium chloride 0.9%, sodium chloride 0.9%, acetaminophen **OR** acetaminophen, HYDROmorphone, lidocaine 4%, lidocaine (buffered or not buffered), LORazepam, melatonin, naloxone **OR** naloxone **OR** naloxone **OR** naloxone, nitroGLYcerin, ondansetron **OR** ondansetron, oxyCODONE, - MEDICATION INSTRUCTIONS -, sodium chloride (PF), sodium chloride (PF)               Physical Exam:   Blood pressure 111/70, pulse 112, temperature 98.4  F (36.9  C), temperature source Oral, resp. rate 18, weight 56.3 kg (124 lb 1.6 oz), SpO2 90 %, not currently breastfeeding.  Wt Readings from Last 4 Encounters:   22 56.3 kg (124 lb 1.6 oz)   07/10/22 65 kg (143 lb 4.8 oz)   22 56.2 kg (124 lb)   22 56.7 kg (125 lb)         Vital Sign Ranges  Temperature Temp  Av.8  F (36.6  C)  Min: 97.8  F (36.6  C)  Max: 97.9  F (36.6  C)   Blood pressure Systolic (24hrs), Av , Min:94 , Max:129        Diastolic (24hrs), Av, Min:57, Max:80      Pulse Pulse  Av.8  Min: 89  Max: 106   Respirations Resp  Av  Min: 11  Max: 26   Pulse oximetry SpO2  Av.4 %  Min: 90 %  Max: 100 %         Intake/Output Summary (Last 24 hours) at 2022 1316  Last data filed at 2022 1000  Gross per 24 hour   Intake 680 ml   Output --   Net 680 ml       Constitutional: Awake, alert, cooperative, no apparent distress but appears sleepy   Lungs: Bilateral rhonchi   Cardiovascular: Regular rate and rhythm, normal S1 and S2, and no murmur noted   Abdomen: Normal bowel sounds, soft, non-distended, non-tender    Skin: No rashes, no cyanosis, upper extremity edema    Other: Left arm swelling with sutures related to AV fistula; left subclavian central line          Data:   Laboratory:  CMP  Recent Labs   Lab 08/28/22  0610 08/26/22  0541 08/24/22  1453   * 132* 135   POTASSIUM 4.1 4.6 3.7   CHLORIDE 96 99 103   CO2 27 25 26   ANIONGAP 7 8 6   GLC 99 84 82   BUN 41* 58* 32*   CR 5.08* 4.85* 3.01*   GFRESTIMATED 9* 10* 17*   JEWELL 9.5 9.4 8.7     CBC  Recent Labs   Lab 08/28/22  0610 08/27/22  1815 08/27/22  0543 08/26/22  1910 08/26/22  0541 08/24/22  2232 08/24/22  1453 08/23/22  0546 08/23/22  0011   WBC 9.6  --   --   --  9.0  --  12.5*  --   --    RBC 2.59*  --   --   --  2.92*  --  3.24*  --   --    HGB 7.8*  7.8* 7.5* 7.8* 7.6* 8.7*  8.7*   < > 9.6*   < > 8.5*   HCT 23.7*  --   --   --  26.8*  --  28.5*  --   --    MCV 92  --   --   --  92  --  88  --   --    MCH 30.1  --   --   --  29.8  --  29.6  --   --    MCHC 32.9  --   --   --  32.5  --  33.7  --   --    RDW 16.0*  --   --   --  15.9*  --  15.6*  --   --      --   --   --  307  --  304  --  264    < > = values in this interval not displayed.     INR  Recent Labs   Lab 08/23/22  0011   INR 1.45*       Imaging data:  Recent Results (from the past 48 hour(s))   CT Head w/o Contrast    Narrative    EXAM: CT HEAD W/O CONTRAST  LOCATION: [facility]  DATE/TIME: 8/20/2022 5:02 PM    INDICATION: On IV heparin, evaluate for head bleed  COMPARISON: None.  TECHNIQUE: Routine CT Head without IV contrast. Multiplanar reformats. Dose reduction techniques were used.    FINDINGS:  INTRACRANIAL CONTENTS: No intracranial hemorrhage, extraaxial collection, or mass effect.  No CT evidence of acute infarct. Normal parenchymal attenuation. Normal ventricles and sulci.     VISUALIZED ORBITS/SINUSES/MASTOIDS: No intraorbital abnormality. No significant paranasal sinus mucosal disease. No middle ear or mastoid effusion.    BONES/SOFT TISSUES: No  acute abnormality.      Impression    IMPRESSION:  1.  No acute intracranial process; specifically no acute intracranial hemorrhage.         Edmar Wong MD

## 2022-08-28 NOTE — PROGRESS NOTES
[facility]    Medicine Progress Note - Hospitalist Service    Date of Admission:  8/14/2022    Assessment & Plan          Gina Simpson is a pleasant 56 year old female with CAD, cardiomyopathy, hypertension, COPD, ESRD, failed RUE fistula, s/p creation of LUE AV fistula on 6/28/22 complicated by hematoma s/p evacuation, LUE DVT who presented as direct admission to [facility] from [place] ED after presenting with 2+weeks of facial swelling, fatigue, dysphagia. She was found to have prevertebral soft tissue swelling, lung mass with SVC syndrome and extensive adenopathy.     #.  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  SVC obstruction and SVC syndrome - secondary to large neuroendocrine tumor    #.  Mediastinal mass measuring 10 x 8 x 6 cm with SVC obstruction and occlusion of right upper lobe pulmonary artery    #.  Extensive cervical, supraclavicular, right thoracic inlet and right mediastinal/hilar adenopathy    #.  New 9mm ARIA pulmonary nodule    - reported significant fatigue, 20lb weight loss, poor appetite for two months    - CT on 8/15 showed new mediastinal mass on right measuring 10 x 8 x 6 cm with mass-effect on trachea, SVC and occlusion of right upper lobe pulmonary bronchus and artery.  Also concern for lymphangitic spread of neoplasm. 2 solid nodules in left lung concerning for metastatic foci.  Multiple large hepatic metastasis.  SVC syndrome/compression secondary to large mediastinal mass/adenopathy, small left pleural effusion, small pericardial effusion    -Underwent liver biopsy on 8/16 that showed metastatic poorly differentiated non-small cell carcinoma with neuroendocrine differentiation    CT abdomen/pelvis w/o CT 8/25 -   1.  Multiple bilobar hepatic metastases measuring up to 4.1 cm.   2.  Subcentimeter lucent lesion within the L5 vertebral body, indeterminate for a  small osseous metastasis.    3.  Moderate right pleural effusion, increased in volume since 08/15/2022.    MRI brain w/wo 8/26/2022 did not show any evidence of metastatic disease    -- Appreciate recommendations by thoracic surgery, vascular surgery, IR and oncology  -- SVC stent was not recommended  -- Started on emergent radiation therapy for SVC syndrome.  Has received 8 of 10 treatments.  Facial swelling appears to be improving. Upper extremity edema persists  -- [place] oncology following -staging scans completed  -- Continue pain control with IV Dilaudid, oxycodone  -- Per oncology, prognosis is poor and treatment is palliative.  --IR consult ordered for chemotherapy port placement.  Expected to happen on 8/29   -- Systemic chemotherapy will start after completion of radiation treatments  -- Smoking cessation advised        #.  Acute upper GI bleed due to duodenal ulcer, 8/15. Recurrent bleeding 8/22  #.  Acute blood loss anemia due to upper GI bleed secondary to duodenal ulcer  #.  Bleeding duodenal ulcer with visible vessel, EGD 8/15  #.  Chronic anemia due to end-stage renal disease  #.  S/p 8 units PRBC transfusion this admission  - Patient developed acute GI bleed on 8/15.    - Hemoglobin dropped to 5.6.  Required 2 units of PRBC on 8/15, 1 unit on 8/17, 8/18, 8/20, 2 units on 8/22, 1 unit 8/24    - EGD 8/15 showed - clotted blood in the entire stomach, spurting duoeneal ulcer with visible vessel, injected, clips placed, argon plasma coag used.    - IV heparin was resumed after EGD.  Hemoglobin continued to trend down due to anticoagulation with IV heparin.  Required frequent frequent PRBC transfusions.  - acutely worsened on 8/22 - had melena, hypotension and Hb drop to 5.6  - IV heparin stopped in 8/22 and she was transfused 2 units of PRBC.    - Underwent repeat EGD 8/23 that showed blood and clots in gastric body, non bleeding duodenal ulcer with pigmented material. Injected and clipped.     -  Received 8th unit of transfusion on 8/24.  Hemoglobin improved to 9 after transfusion.  This has again declined to 7.5 - 7.8. IV heparin infusion continues to be on hold since 8/22.    -Was planning to restart IV heparin on 8/27 but did not due to decline in hemoglobin from 9 to 7.8. Hb stable in past 24 hours   -Need to achieve hemostasis before resuming anticoagulation as patient has had significant bleeding from duodenal ulcer during this hospital stay and has so far required 8 units of PRBC transfusion.    - continue protonix BID, switch to po  - sucralfate for short term  - Continue to monitor hemoglobin and transfuse as needed  - Goal is to keep  Hb>7 , platelet > 50 and INR <2         #.  Left brachial vein DVT 7/6/22  #.  Right internal jugular thrombus, on CT neck 8/14   - Was started on warfarin in July 2022.  INR subtheraputic on admission  - started on IV heparin infusion but had to be discontinued on 8/22 due to continued severe hemorrhage from duodenal ulcer  - continue to hold IV heparin due to recurrent upper GI bleed  - Will need lifelong anticoagulation.  Will transition to warfarin once hemoglobin stabilizes  - Resume IV heparin once hemostasis is achieved and hemoglobin is stable  - 8/28 - has lost IV line and due to severe bilateral upper extremity edema, unable to place IV per RN. Port placement is planned for tomorrow and can be utilized for infusions. Attempting to place IV in leg if possible  - may have to hold IV heparin for now until port placement by IR on 8/29      #. Severe bilateral upper extremity edema -   - due to SVC syndrome and right internal jugular DVT      #.  Symmetric prevertebral soft tissue swelling from C2-C5 level  -Noted on CT scan  -ENT consulted.  Did not feel patient has retropharyngeal abscess.  ENT felt that edema of retropharyngeal space is related to SVC syndrome.  No specific treatment advised.  On exam, airway was widely patent, both vocal cords were mobile  -  ENT noted exposed bone of right mandible with dental caries.  Patient will need dental evaluation as outpatient for this.      #.  Dental caries  ENT noted exposed bone of right mandible with dental caries.    - Patient will need dental evaluation as outpatient for this.      #.  ESRD on HD q. M W F  #.  S/p left AV fistula formation 6/28/22  #.  S/p fistulogram with dilatation of L subclavian and junction of brachiocephalic and subclavian vein complicated by hematoma requiring evacuation 7/8/22  - Dialyzes MWF - Dr. Gonsales.  Left AV fistula is working.  She also has tunneled dialysis catheter in place.  Use of dialysis site as per nephrology.     - She underwent outpatient fistulogram 8/11/22.   - Continue dialysis as per nephrology        #.  CAD, s/p stenting in 2019  #.  Chronic systolic CHF with EF of 40-45%, echo 2020   #.  Hypertension  PTA:  hydralazine 50mg TID, carvedilol 25mg bid, lisinopril 20mg daily, amlodipine 10mg every evening  --HOLD lisinopril, hydralazine, amlodipine due borderline BP   - coreg dose was decreased due to low BP, now increased to 12.5 mg twice daily.  At baseline she is on 25 mg twice daily          #.  Hyperphosphatemia  --Cont PhosLo with meals      #. Severe malnutrition due to illness  -Nutrition services following.  -Continue Ensure twice daily      #.  Generalized weakness  #.  Physical deconditioning  -PT/OT consulted.  OT has recommended TCU versus home with home care.    Disposition-PT and OT consulted.  Recommendation is for TCU versus home with home care.  following for discharge planning       Diet: Mechanical/Dental Soft Diet    DVT Prophylaxis: pneumoboots    Antoine Catheter: Not present  Central Lines: PRESENT       Cardiac Monitoring: ACTIVE order. Indication: GIB with tachycardia and low Hgb.  Code Status: Full Code      Disposition Plan      Expected Discharge Date: 08/31/2022    Discharge Delays: Placement - TCU  Destination: home with  "family  Discharge Comments: 8/16 SVC stenting and biposy pending  8/21 receiving radiation and needing TCU dialysis planned for 8/22 8/22 Dialysis and radiation today (will be here till 8/30)  8/28 IR for chemo port on 8/29 or 8/30, SVC stent not recommended      The patient's care was discussed with the Patient.    Ana Valerio MD  Hospitalist Service  [facility]  Securely message with the Vocera Web Console (learn more here)  Text page via [employer] Paging/Directory     \"This dictation was performed with voice recognition software and may contain errors,  omissions and inadvertent word substitution.\"    Clinically Significant Risk Factors Present on Admission                   # Severe Malnutrition: based on nutrition assessment _____________________________________________________________________    Interval History     Hemoglobin stable in past 24 hours    Feels okay  No complaints  Tolerating diet  Blood pressure okay  Has lost IV line and RN unable to replace it due to severe bilateral upper extremity edema       Data reviewed today: I reviewed all medications, new labs and imaging results over the last 24 hours. I personally reviewed no images or EKG's today.      Physical Exam   /70 (BP Location: Right leg)   Pulse 112   Temp 98.4  F (36.9  C) (Oral)   Resp 18   Wt 56.3 kg (124 lb 1.6 oz)   SpO2 90%   BMI 20.65 kg/m    Gen- pleasant lying in bed  Neck-swelling  CVS- I+II+ soft ESM  RS- CTAB  Abdo- soft, no tenderness , No g/r/r   Ext- b/l arms edema Lt >Rt   CNS-oriented to person, place, time  Bilateral upper extremity edema    Left AVF     Data   No results found for this or any previous visit (from the past 24 hour(s)).   BMP  Recent Labs   Lab 08/28/22  0610 08/26/22  0541 08/24/22  1453   * 132* 135   POTASSIUM 4.1 4.6 3.7   CHLORIDE 96 99 103   JEWELL 9.5 9.4 8.7   CO2 27 25 26   BUN 41* 58* 32*   CR 5.08* 4.85* 3.01*   GLC 99 84 82     CBC  Recent Labs   Lab " 08/28/22  0610 08/26/22  1910 08/26/22  0541 08/24/22  2232 08/24/22  1453 08/23/22  0546 08/23/22  0011   WBC 9.6  --  9.0  --  12.5*  --   --    RBC 2.59*  --  2.92*  --  3.24*  --   --    HGB 7.8*  7.8*   < > 8.7*  8.7*   < > 9.6*   < > 8.5*   HCT 23.7*  --  26.8*  --  28.5*  --   --    MCV 92  --  92  --  88  --   --    MCH 30.1  --  29.8  --  29.6  --   --    MCHC 32.9  --  32.5  --  33.7  --   --    RDW 16.0*  --  15.9*  --  15.6*  --   --      --  307  --  304  --  264    < > = values in this interval not displayed.     INR  Recent Labs   Lab 08/23/22  0011   INR 1.45*     LFTs  No lab results found in last 7 days.

## 2022-08-29 NOTE — PROVIDER NOTIFICATION
MD Notification    Notified Person: MD    Notified Person Name: Jammie De Leon     Notification Date/Time:  8/29/2022 0021    Notification Interaction: paged    Purpose of Notification:  hard stick, icu flyer attempt , unable to obtain IV access, on tele/full code, plan for port placement tomorrow, is it ok that she does not have iv access overnight     Orders Received: Ok    Comments:

## 2022-08-29 NOTE — PROGRESS NOTES
IR consult request for a port a catheter placement on this complicated patient. Reviewed with IR Dr Del Rio who recommends a CT chest with IV contrast to get updated imaging post some of the radiation treatments.     Discussed also with Dr Gallagher who will place the order.     Thanks, Marisol [facility] Interventional Radiology CNP ([phone]) (phone [phone])

## 2022-08-29 NOTE — PROGRESS NOTES
[facility]    HOSPITALIST PROGRESS NOTE :   --------------------------------------------------    Date of Admission:  8/14/2022    Cumulative Summary:   Gina Simpson is a pleasant 56 year old female with CAD, cardiomyopathy, hypertension, COPD, ESRD, failed RUE fistula, s/p creation of LUE AV fistula on 6/28/22 complicated by hematoma s/p evacuation, LUE DVT who presented as direct admission to [facility] from [place] ED after presenting with 2+weeks of facial swelling, fatigue, dysphagia. She was found to have prevertebral soft tissue swelling, lung mass with SVC syndrome and extensive adenopathy.     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, hepatic metastasis and  lymphadenopathy- new diagnosis: reported significant fatigue, 20lb weight loss, poor appetite for two months     SVC obstruction and SVC syndrome - secondary to large neuroendocrine tumor     Mediastinal mass measuring 10 x 8 x 6 cm with SVC obstruction and occlusion of right upper lobe pulmonary artery     Extensive cervical, supraclavicular, right thoracic inlet and right mediastinal/hilar adenopathy     New 9mm ARIA pulmonary nodule     * CT on 8/15 showed new mediastinal mass on right measuring 10 x 8 x 6 cm with mass-effect on trachea, SVC and occlusion of right upper lobe pulmonary bronchus and artery.  Also concern for lymphangitic spread of neoplasm. 2 solid nodules in left lung concerning for metastatic foci.  Multiple large hepatic metastasis.  SVC syndrome/compression secondary to large mediastinal mass/adenopathy, small left pleural effusion, small pericardial effusion      * Underwent liver biopsy on 8/16 that showed metastatic poorly differentiated non-small cell carcinoma with neuroendocrine differentiation     * CT abdomen/pelvis w/o CT 8/25 :   1. Multiple bilobar hepatic metastases measuring up to 4.1 cm.   2. Subcentimeter lucent lesion within the L5  vertebral body, indeterminate for a small osseous metastasis.    3. Moderate right pleural effusion, increased in volume since 08/15/2022.     * MRI brain w/wo 8/26/2022 did not show any evidence of metastatic disease     -- Appreciate recommendations by thoracic surgery, vascular surgery, IR and oncology  -- SVC stent was not recommended  -- Started on emergent radiation therapy for SVC syndrome.  Has received 9 of 10 treatments.  Facial swelling appears to be improving. Upper extremity edema persists  -- [place] oncology following -staging scans completed  -- Continue pain control with IV Dilaudid, oxycodone  -- Per oncology, prognosis is poor and treatment is palliative.  -- IR consult ordered for chemotherapy port placement.  Expected to happen on 8/29   -- Discussed the case with amanda from interventional radiology, recommending to repeat CT scan of the chest with contrast this morning to follow-up on tumor burden to assess if patient is still need port placement  -- CT chest ordered, will follow up on the results, appreciate IR help  -- Patient is asking if she has malignancy , discussed with patient regarding the biopsy results , encouraged her to have further questions with oncology team  -- Systemic chemotherapy will start after completion of radiation treatments  -- Smoking cessation advised     Acute upper GI bleed due to duodenal ulcer, 8/15. Recurrent bleeding 8/22  Acute blood loss anemia due to upper GI bleed secondary to duodenal ulcer  Bleeding duodenal ulcer with visible vessel, EGD 8/15  Chronic anemia due to end-stage renal disease  S/p 8 units PRBC transfusion this admission    - Patient developed acute GI bleed on 8/15.    - Hemoglobin dropped to 5.6.  Required 2 units of PRBC on 8/15, 1 unit on 8/17, 8/18, 8/20, 2 units on 8/22, 1 unit 8/24   - EGD 8/15 showed - clotted blood in the entire stomach, spurting duoeneal ulcer with visible vessel, injected, clips placed, argon plasma coag  used.   - IV heparin was resumed after EGD.  Hemoglobin continued to trend down due to anticoagulation with IV heparin.  Required frequent frequent PRBC transfusions.  - acutely worsened on 8/22 - had melena, hypotension and Hb drop to 5.6  - IV heparin stopped in 8/22 and she was transfused 2 units of PRBC.     - Underwent repeat EGD 8/23 that showed blood and clots in gastric body, non bleeding duodenal ulcer with pigmented material. Injected and clipped.      - Received 8th unit of transfusion on 8/24.  Hemoglobin improved to 9 after transfusion.  This has again declined to 7.5 - 7.8. IV heparin infusion continues to be on hold since 8/22.     -Was planning to restart IV heparin on 8/27 but did not due to decline in hemoglobin from 9 to 7.8. Hb stable in past 24 hours   --Need to achieve hemostasis before resuming anticoagulation as patient has had significant bleeding from duodenal ulcer during this hospital stay and has so far required 8 units of PRBC transfusion.    - continue protonix BID, switch to po  - sucralfate for short term  - Continue to monitor hemoglobin and transfuse as needed  - Goal is to keep  Hb>7 , platelet > 50 and INR <2   - Continue to hold Heparin infusion at this time       Left brachial vein DVT 7/6/22  Right internal jugular thrombus, on CT neck 8/14     - Was started on warfarin in July 2022.  INR subtheraputic on admission  - started on IV heparin infusion but had to be discontinued on 8/22 due to continued severe hemorrhage from duodenal ulcer  - continue to hold IV heparin due to recurrent upper GI bleed  - Will need lifelong anticoagulation.  Will transition to warfarin once hemoglobin stabilizes  - Resume IV heparin once hemostasis is achieved and hemoglobin is stable  - 8/28 - has lost IV line and due to severe bilateral upper extremity edema, unable to place IV per RN. Port placement is planned for tomorrow and can be utilized for infusions. Attempting to place IV in leg if  possible  - may have to hold IV heparin for now until port placement by IR on 8/29  - Complicated situation if patient would be able to tolerate the anticoagulation      Severe bilateral upper extremity edema -   - due to SVC syndrome and right internal jugular DVT      Symmetric prevertebral soft tissue swelling from C2-C5 level  -Noted on CT scan  -ENT consulted.  Did not feel patient has retropharyngeal abscess.  ENT felt that edema of retropharyngeal space is related to SVC syndrome.  No specific treatment advised.  On exam, airway was widely patent, both vocal cords were mobile  - ENT noted exposed bone of right mandible with dental caries.  Patient will need dental evaluation as outpatient for this.     Dental caries  ENT noted exposed bone of right mandible with dental caries.    - Patient will need dental evaluation as outpatient for this.      ESRD on HD q. M W F  S/p left AV fistula formation 6/28/22  S/p fistulogram with dilatation of L subclavian and junction of brachiocephalic and subclavian vein complicated by hematoma requiring evacuation 7/8/22  - Dialyzes MWF - Dr. Gonsales.  Left AV fistula is working.  She also has tunneled dialysis catheter in place.  Use of dialysis site as per nephrology.     - She underwent outpatient fistulogram 8/11/22.   - Continue dialysis as per nephrology     CAD, s/p stenting in 2019  Chronic systolic CHF with EF of 40-45%, echo 2020   Hypertension  PTA:  hydralazine 50mg TID, carvedilol 25mg bid, lisinopril 20mg daily, amlodipine 10mg every evening  --HOLD lisinopril, hydralazine, amlodipine due borderline BP   - coreg dose was decreased due to low BP, now increased to 12.5 mg twice daily.  At baseline she is on 25 mg twice daily         Hyperphosphatemia  --Cont PhosLo with meals      Severe malnutrition due to illness  -Nutrition services following.  -Continue Ensure twice daily      Generalized weakness  Physical deconditioning  -PT/OT consulted.  OT has recommended TCU  versus home with home care.     Clinically Significant Risk Factors Present on Admission                      Diet: Mechanical/Dental Soft Diet    Antoine Catheter: Not present  DVT Prophylaxis: Pneumatic Compression Devices  Code Status: Full Code    The patient's care was discussed with the Patient.  35 min were spent in direct patient care today including the floor time , more than 50% of the time was spent in patient care coordination and counseling.    Disposition Plan      Expected Discharge Date: 08/31/2022    Discharge Delays: Placement - TCU  Destination: home with family  Discharge Comments: 8/16 SVC stenting and biposy pending  8/21 receiving radiation and needing TCU dialysis planned for 8/22 8/22 Dialysis and radiation today (will be here till 8/30)  8/28 IR for chemo port on 8/29 or 8/30, SVC stent not recommended     Entered: Kemi Gallagher MD 08/29/2022, 9:00 AM       Kemi Gallagher MD, MD, FACP  Text Page (7am - 6pm)    ----------------------------------------------------------------------------------------------------------------------    Interval History   Patient care was assumed this morning, patient was seen and examined, sitting in bed, just came up from getting the CT scan of the chest, discussed with patient regarding IR recommendation to repeat the CT scan before Port-A-Cath placement.  Patient asked me regarding her diagnosis, what is going on and her clinical presentation.  She also wanted to know if there is a confirmed diagnosis of her malignancy.  I did review this so far work-up done with patient.  We discussed about also updating her daughter tomorrow and continue to have further discussion regarding her ongoing work-up and plan.  Patient is otherwise denying any chest pain, palpitations she thinks that her shortness of breath is improving and she is able to get some better sleep as compared to the last 2 weeks.    -Data reviewed today: I reviewed all new labs and imaging results over the  last 24 hours.    I personally reviewed no images or EKG's today.    Physical Exam   Temp: 98.5  F (36.9  C) Temp src: Oral BP: 130/80 Pulse: 105   Resp: 16 SpO2: 97 % O2 Device: Nasal cannula Oxygen Delivery: 2 LPM  Vitals:    08/21/22 0506 08/22/22 0509 08/26/22 0631   Weight: 57.9 kg (127 lb 10.3 oz) 56.9 kg (125 lb 7.1 oz) 56.3 kg (124 lb 1.6 oz)     Vital Signs with Ranges  Temp:  [98.2  F (36.8  C)-98.6  F (37  C)] 98.5  F (36.9  C)  Pulse:  [101-113] 105  Resp:  [16-18] 16  BP: (112-130)/(65-80) 130/80  SpO2:  [90 %-97 %] 97 %  I/O last 3 completed shifts:  In: 600 [P.O.:600]  Out: -     GENERAL: Alert , awake and oriented. NAD. Conversational, appropriate.   HEENT: Normocephalic. EOMI. No icterus or injection. Nares normal.   LUNGS: Clear to auscultation. No dyspnea at rest.   HEART: Regular rate. Extremities perfused.   ABDOMEN: Soft, nontender, and nondistended. Positive bowel sounds.   EXTREMITIES: Left upper extremity swelling, in Ace wraps  NEUROLOGIC: Moves extremities x4 on command. No acute focal neurologic abnormalities noted.     Medications     - MEDICATION INSTRUCTIONS -         - MEDICATION INSTRUCTIONS for Dialysis Patients -   Does not apply See Admin Instructions     sodium chloride 0.9%  250 mL Intravenous Once in dialysis/CRRT     sodium chloride 0.9%  300 mL Hemodialysis Machine Once     allopurinol  100 mg Oral QPM     atorvastatin  20 mg Oral Daily     calcium acetate  2,001 mg Oral TID w/meals     carvedilol  12.5 mg Oral BID w/meals     doxercalciferol  4 mcg Intravenous Once in dialysis/CRRT     epoetin delma-epbx (RETACRIT) inj ESRD  6,000 Units Intravenous Once in dialysis/CRRT     multivitamin RENAL  1 capsule Oral Daily     - MEDICATION INSTRUCTIONS -   Does not apply Once     pantoprazole  40 mg Oral BID AC     sodium chloride (PF)  3 mL Intracatheter Q8H     sucralfate  1 g Oral 4x Daily AC & HS       Data   Recent Labs   Lab 08/29/22  0536 08/28/22  1759 08/28/22  0610  08/26/22  1910 08/26/22  0541 08/24/22  2232 08/24/22  1453 08/23/22  0546 08/23/22  0011   WBC  --   --  9.6  --  9.0  --  12.5*  --   --    HGB 7.5* 7.9* 7.8*  7.8*   < > 8.7*  8.7*   < > 9.6*   < > 8.5*   MCV  --   --  92  --  92  --  88  --   --    PLT  --   --  371  --  307  --  304  --  264   INR  --   --   --   --   --   --   --   --  1.45*   NA  --   --  130*  --  132*  --  135  --   --    POTASSIUM  --   --  4.1  --  4.6  --  3.7  --   --    CHLORIDE  --   --  96  --  99  --  103  --   --    CO2  --   --  27  --  25  --  26  --   --    BUN  --   --  41*  --  58*  --  32*  --   --    CR  --   --  5.08*  --  4.85*  --  3.01*  --   --    ANIONGAP  --   --  7  --  8  --  6  --   --    JEWELL  --   --  9.5  --  9.4  --  8.7  --   --    GLC  --   --  99  --  84  --  82  --   --     < > = values in this interval not displayed.       Imaging:   No results found for this or any previous visit (from the past 24 hour(s)).

## 2022-08-29 NOTE — CONSULTS
Patient receiving port today, 8/29.     No vascular surgery intervention at this time, will sign off.

## 2022-08-29 NOTE — PROGRESS NOTES
Potassium   Date Value Ref Range Status   08/28/2022 4.1 3.4 - 5.3 mmol/L Final     Hemoglobin   Date Value Ref Range Status   08/29/2022 7.5 (L) 11.7 - 15.7 g/dL Final     Creatinine   Date Value Ref Range Status   08/28/2022 5.08 (H) 0.52 - 1.04 mg/dL Final     Urea Nitrogen   Date Value Ref Range Status   08/28/2022 41 (H) 7 - 30 mg/dL Final     Sodium   Date Value Ref Range Status   08/28/2022 130 (L) 133 - 144 mmol/L Final     INR   Date Value Ref Range Status   08/23/2022 1.45 (H) 0.85 - 1.15 Final      Latest Reference Range & Units 08/18/22 15:50   Hep B Surface Agn Nonreactive  Nonreactive   Hepatitis B Surface Antibody Instrument Value <8.00 m[IU]/mL 103.04     DIALYSIS PROCEDURE NOTE  Hepatitis status of previous patient on machine log was checked and verified ok to use with this patients hepatitis status.  Patient dialyzed for 3 hrs. on a K3 bath with a net fluid removal of  2L verbal order from MD to increase UF goal from 1.5L to 2L.  A BFR of 350-400 ml/min was obtained via a Left CVC.      The treatment plan was discussed with Dr. Louise during the treatment.    Total heparin received during the treatment: 0 units.   Line flushed, clamped and capped with heparin 1:1000 2.2 mL and 2.3 (2200 units and 2300) per lumen    Meds  given: Retacrit and Hectorol   Complications: none        Person educated: Patient. Knowledge base substancial. Barriers to learning: none. Educated on procedure via verbal mode. Patient verbalized understanding. Pt prefers verbal education style.     ICEBOAT? Timeout performed pre-treatment  I: Patient was identified using 2 identifiers  C:  Consent Signed Yes  E: Equipment preventative maintenance is current and dialysis delivery system OK to use  B: See note above  O: Dialysis orders present and complete prior to treatment  A: Vascular access verified and assessed prior to treatment  T: Treatment was performed at a clinically appropriate time  ?: Patient was allowed to ask  questions and address concerns prior to treatment  See Adult Hemodialysis flowsheet in [facility] for further details and post assessment.  Machine water alarm in place and functioning. Transducer pods intact and checked every 15min.   Pt returned via bed.  Chlorine/Chloramine water system checked every 4 hours.  Outpatient Dialysis at [facility]      Post treatment report given to DAVID Lindsey RN regarding 2L of fluid removed, last BP of 100/71, and patient pain rating of 0/10.

## 2022-08-29 NOTE — PROGRESS NOTES
[facility] Hematology Progress Note     Primary Oncologist/Hematologist:            Assessment and Plan:     Seen in dialysis:     Ms. Gina Simpson is a 56 year old woman who was admitted on 8/14/2022 with facial edema and dyspnea     1. 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 Imaging studies show evidence of metastatic disease involving cervical LNs and liver. Mediastinal mass measures 10 x 8 x 6 cm with SVC obstruction and occlusion of the RUL pulmonary artery  - The plan is for radiation treatment to help with SVC obstruction and to consider systemic therapy after completion of radiation therapy  -she had first radiation treatment on 8/20, plan for 10 fractions. Due to complete radiation tomorrow.   -tumor sent for NGS testing  -port placement planned.  Note plans for CT chest today to re-assess mass in context of  portacath placement.     2. ESRD managed with hemodialysis via a LUE fistula.  -  HD M/W/F per nephrology team       3. Anemia due to CKD and bleeding upper GI ulcers  - s/p repeat endoscopy 8/23 with evidence of recently bleeding duodenal ulcer s/p epi injection and clip   - Hg 8.7 on 8/26 and 7.5g/dl today    - continue to monitor H&H closely   - transfuse as needed for Hb<7g/dl or platelets<10,000      4. Left brachial vein DVT (7/6/2022)  RIJ DVT (Ct neck 8/14/2022)    -anticoagulation on hold given recurrent GI bleed from bleeding duodenal ulcer necessitating total of 8 units of PRBC during this hospitalization     -continue to hold anticoagulation at this time      PLAN  - Continue radiation, plan for 10 fractions. Due to complete tomorrow.   - Plan will be for outpatient chemotherapy + immunotherapy  - Continue supportive transfusions, PPI, holding heparin until hemostasis achieved   - Will arrange for her to see Dr. Costa in outpatient follow up, closer to her home        Kevin Berger,  BARBARA REYES  Nurse Practitioner  [facility]  [phone]          Interval History:     Seen in dialysis. Feels facial edema and breathing comfort are both improved.                Review of Systems:     The 5 point Review of Systems is negative other than noted in the HPI                Medications:   Scheduled Medications    - MEDICATION INSTRUCTIONS for Dialysis Patients -   Does not apply See Admin Instructions     sodium chloride 0.9%  250 mL Intravenous Once in dialysis/CRRT     sodium chloride 0.9%  300 mL Hemodialysis Machine Once     allopurinol  100 mg Oral QPM     atorvastatin  20 mg Oral Daily     calcium acetate  2,001 mg Oral TID w/meals     carvedilol  12.5 mg Oral BID w/meals     doxercalciferol  4 mcg Intravenous Once in dialysis/CRRT     epoetin delma-epbx (RETACRIT) inj ESRD  6,000 Units Intravenous Once in dialysis/CRRT     multivitamin RENAL  1 capsule Oral Daily     - MEDICATION INSTRUCTIONS -   Does not apply Once     pantoprazole  40 mg Oral BID AC     sodium chloride (PF)  3 mL Intracatheter Q8H     sucralfate  1 g Oral 4x Daily AC & HS     PRN Medications  sodium chloride 0.9%, sodium chloride 0.9%, sodium chloride 0.9%, acetaminophen **OR** acetaminophen, HYDROmorphone, lidocaine 4%, lidocaine (buffered or not buffered), LORazepam, melatonin, naloxone **OR** naloxone **OR** naloxone **OR** naloxone, nitroGLYcerin, ondansetron **OR** ondansetron, oxyCODONE, - MEDICATION INSTRUCTIONS -, sodium chloride (PF), sodium chloride (PF)               Physical Exam:   Vitals were reviewed  Blood pressure 94/77, pulse 111, temperature 98.2  F (36.8  C), temperature source Oral, resp. rate 20, weight 56.3 kg (124 lb 1.6 oz), SpO2 97 %, not currently breastfeeding.  Wt Readings from Last 4 Encounters:   08/26/22 56.3 kg (124 lb 1.6 oz)   07/10/22 65 kg (143 lb 4.8 oz)   06/28/22 56.2 kg (124 lb)   05/31/22 56.7 kg (125 lb)       I/O last 3 completed shifts:  In: 600 [P.O.:600]  Out: -                   Data:   All laboratory data and imaging studies reviewed.    CMP  Recent Labs   Lab 08/28/22  0610 08/26/22  0541 08/24/22  1453   * 132* 135   POTASSIUM 4.1 4.6 3.7   CHLORIDE 96 99 103   CO2 27 25 26   ANIONGAP 7 8 6   GLC 99 84 82   BUN 41* 58* 32*   CR 5.08* 4.85* 3.01*   GFRESTIMATED 9* 10* 17*   JEWELL 9.5 9.4 8.7     CBC  Recent Labs   Lab 08/29/22  0536 08/28/22  1759 08/28/22  0610 08/27/22  1815 08/26/22  1910 08/26/22  0541 08/24/22  2232 08/24/22  1453 08/23/22  0546 08/23/22  0011   WBC  --   --  9.6  --   --  9.0  --  12.5*  --   --    RBC  --   --  2.59*  --   --  2.92*  --  3.24*  --   --    HGB 7.5* 7.9* 7.8*  7.8* 7.5*   < > 8.7*  8.7*   < > 9.6*   < > 8.5*   HCT  --   --  23.7*  --   --  26.8*  --  28.5*  --   --    MCV  --   --  92  --   --  92  --  88  --   --    MCH  --   --  30.1  --   --  29.8  --  29.6  --   --    MCHC  --   --  32.9  --   --  32.5  --  33.7  --   --    RDW  --   --  16.0*  --   --  15.9*  --  15.6*  --   --    PLT  --   --  371  --   --  307  --  304  --  264    < > = values in this interval not displayed.     INR  Recent Labs   Lab 08/23/22  0011   INR 1.45*           BARBARA Stephens  Nurse Practitioner  [facility]  [phone]

## 2022-08-29 NOTE — PROGRESS NOTES
CLINICAL NUTRITION SERVICES - REASSESSMENT NOTE    RECOMMENDATIONS FOR MD/PROVIDER TO ORDER:   - consider nutrition support with >50% intake x15 days if PO intake does not start to improve within next 3-5 days, if within GOC    Recommendations Ordered by Registered Dietitian (RD):   - nutrition education: encouraged PO intake with good sources of protein, discussed available protein supplements  - ordered ensure enlive strawberry TID between meals   Future/Additional Recommendations:   Patient requesting diet education on tips for increasing protein & general/healthful nutrition prior to discharge when daughter is present   Malnutrition:   % Weight Loss:  Up to 5% in 1 month (moderate malnutrition) - per 8/17 RD note  % Intake:   </= 50% for >/= 5 days (severe malnutrition) - continued from 8/17 RD note  Subcutaneous Fat Loss:  Orbital region mild depletion and Upper arm region moderate depletion  Muscle Loss:  Temporal region mild depletion, Clavicle bone region mild depletion, Patellar region moderate depletion and Posterior calf region moderate-severe depletion  Fluid Retention:  Trace to severe generalized edema - not nutrition related    Malnutrition Diagnosis: Severe malnutrition  In Context of:  Acute illness or injury       EVALUATION OF PROGRESS TOWARD GOALS   Diet: mechanical/dental soft     - 8/24: FLD  - 8/22: NPO --> CLD  - 8/21: FLD  - 8/18: CLD  - 8/16: regular  - 8/15: NPO    Intake/Tolerance:   - per discussion with pt, pt reported her appetite continues to be decreased. She has been receiving some nutrition shakes from her daughter. She would like to receive ensure strawberry between meals. Pt reported she has been having 2 meals/day --> encouraged pt to continue with atleast 2 meals/day with good sources of protein.   - per nursing flow sheet, multiple 0% intakes documented with occasional % intakes  - per health touch, 6 meals received throughout admit x15 days  - per chart review, fair  appetite    Barriers: decreased appetite    ASSESSED NUTRITION NEEDS:  Dosing Weight 58 kg   Estimated Energy Needs: 7494-8721 kcals (25-30 Kcal/Kg)  Justification: maintenance  Estimated Protein Needs: 70-90 grams protein (1.2-1.5 g pro/Kg)  Justification: hypercatabolism with acute illness and dialysis  Estimated Fluid Needs: 0453-8667 mL (1 mL/Kcal)  Justification: maintenance         NEW FINDINGS:   General:   - plans for HD today  - port placement for chemotherapy planned for today  - possible discharge after 8/30, pending TCU placement    Weight: generally stable wt over admit (54.9-59 kg) - suspect variability d/t dialysis and edema, possible true wt loss with poor PO intake    Labs: (8/28) Na 130 (L), BUN 41 (H), creatinine 5.08 (H), BGM     Medications: phoslo TID with meals, nephrocaps multivitamin, protonix    GI: last BM x4 on 8/25    Skin:  - per chart review, pt has Severe bilateral upper extremity edema due to SVC syndrome and right internal jugular DVT    Resp.: 2L NC      Previous Goals:   Patient will consume ~75% of meals + supplements BID at minimum   Evaluation: Not met    Previous Nutrition Diagnosis:   Inadequate oral intake related to decreased appetite, restricted diet order over the past week with recent advancement to solids this morning as evidenced by consuming 2 Ensure/day over past several days, meeting ~50% nutrition needs   Evaluation: Improving      MALNUTRITION  % Weight Loss:  Up to 5% in 1 month (moderate malnutrition) - per 8/17 RD note  % Intake:   </= 50% for >/= 5 days (severe malnutrition) - continued from 8/17 RD note  Subcutaneous Fat Loss:  Orbital region mild depletion and Upper arm region moderate depletion  Muscle Loss:  Temporal region mild depletion, Clavicle bone region mild depletion, Patellar region moderate depletion and Posterior calf region moderate-severe depletion  Fluid Retention:  Trace to severe generalized edema - not nutrition  related    Malnutrition Diagnosis: Severe malnutrition  In Context of:  Acute illness or injury    CURRENT NUTRITION DIAGNOSIS  Inadequate oral intake related to decreased appetite, increased protein needs secondary to dialysis as evidenced by variable intakes documented over admit x15 days including multiple 0% intakes, continued need for oral nutrition supplements    INTERVENTIONS  Recommendations / Nutrition Prescription  - nutrition education: encouraged PO intake with good sources of protein, discussed available protein supplements  - ordered ensure enlive strawberry TID between meals    Implementation  Medical Food Supplement    Goals  Patient to consume >50% of nutritionally adequate meals BID with supplements TID      MONITORING AND EVALUATION:  Progress towards goals will be monitored and evaluated per protocol and Practice Guidelines      Elo Hurst RD, LD

## 2022-08-29 NOTE — PLAN OF CARE
Goal Outcome Evaluation:    Plan of Care Reviewed With: patient     Overall Patient Progress: no change    Outcome Evaluation: mechanical/soft diet, poor PO intake since admit meeting <50% of needs. continued poor appetite. will send ensure TID per pt request. pt received HD today, encouraged protein intake with pt today    Elo Hurst RD, LD

## 2022-08-29 NOTE — PLAN OF CARE
Goal Outcome Evaluation:    Plan of Care Reviewed With: patient     Overall Patient Progress: declining      A/O x 4. Tele: Sinus Tach, HR in 110's. On 2L NC at HS, LS w/crackles, HUNT. +3/+4 BUE edema, pitting, Left > Right. Hard IV stick, Flyer attempted to obtain IV access, Pt refused LE IV access. 1 episode of coughing up clear phlegm, declined PRN intervention. Denies pain. Dialysis catheter to L chest. NPO at 0000, plan for port placement today. Up SBA.

## 2022-08-29 NOTE — PROGRESS NOTES
Assessment and Plan:   ESRD: HD for UF and Clearance. Plan 1.5 L UF, L CVC, 400. 3 h, K protocol, 32 HCO3, 138 Na. Hectorol and EPO on the run. Plan to run MWF.             Interval History:   Lung Cancer: SVC syndrome, XRT currently  Anemia: EPO on dialysis.                  Review of Systems:   Complains of edematous arms, No chest pain , mild HUNT.           Medications:       - MEDICATION INSTRUCTIONS for Dialysis Patients -   Does not apply See Admin Instructions     sodium chloride 0.9%  250 mL Intravenous Once in dialysis/CRRT     sodium chloride 0.9%  300 mL Hemodialysis Machine Once     allopurinol  100 mg Oral QPM     atorvastatin  20 mg Oral Daily     calcium acetate  2,001 mg Oral TID w/meals     carvedilol  12.5 mg Oral BID w/meals     doxercalciferol  4 mcg Intravenous Once in dialysis/CRRT     epoetin delma-epbx (RETACRIT) inj ESRD  6,000 Units Intravenous Once in dialysis/CRRT     multivitamin RENAL  1 capsule Oral Daily     - MEDICATION INSTRUCTIONS -   Does not apply Once     pantoprazole  40 mg Oral BID AC     sodium chloride (PF)  3 mL Intracatheter Q8H     sucralfate  1 g Oral 4x Daily AC & HS       - MEDICATION INSTRUCTIONS -       Current active medications and PTA medications reviewed, see medication list for details.            Physical Exam:   Vitals were reviewed  Patient Vitals for the past 24 hrs:   BP Temp Temp src Pulse Resp SpO2   22 0700 130/80 98.5  F (36.9  C) Oral 105 16 97 %   22 0530 -- -- -- -- -- 93 %   22 2134 115/69 98.2  F (36.8  C) Oral 101 18 97 %   22 1527 112/65 98.6  F (37  C) Oral 113 18 90 %       Temp:  [98.2  F (36.8  C)-98.6  F (37  C)] 98.5  F (36.9  C)  Pulse:  [101-113] 105  Resp:  [16-18] 16  BP: (112-130)/(65-80) 130/80  SpO2:  [90 %-97 %] 97 %    Temperatures:  Current - Temp: 98.5  F (36.9  C); Max - Temp  Av.4  F (36.9  C)  Min: 98.2  F (36.8  C)  Max: 98.6  F (37  C)  Respiration range: Resp  Av.3  Min: 16  Max:  18  Pulse range: Pulse  Av.3  Min: 101  Max: 113  Blood pressure range: Systolic (24hrs), Av , Min:112 , Max:130   ; Diastolic (24hrs), Av, Min:65, Max:80    Pulse oximetry range: SpO2  Av.3 %  Min: 90 %  Max: 97 %    I/O last 3 completed shifts:  In: 600 [P.O.:600]  Out: -       Intake/Output Summary (Last 24 hours) at 2022 0850  Last data filed at 2022 2200  Gross per 24 hour   Intake 600 ml   Output --   Net 600 ml       Alert and responsive, NC O2  UE markedly edematous, LUAF with + bruit  L CVC with no redness or tenderness.   LE 1+ edema  Lungs L clear, R diminished BS  Cor RRR nl S1 S2 no rub       Wt Readings from Last 4 Encounters:   22 56.3 kg (124 lb 1.6 oz)   07/10/22 65 kg (143 lb 4.8 oz)   22 56.2 kg (124 lb)   22 56.7 kg (125 lb)          Data:          Lab Results   Component Value Date     2022     2022     2022    Lab Results   Component Value Date    CHLORIDE 96 2022    CHLORIDE 99 2022    CHLORIDE 103 2022    Lab Results   Component Value Date    BUN 41 2022    BUN 58 2022    BUN 32 2022      Lab Results   Component Value Date    POTASSIUM 4.1 2022    POTASSIUM 4.6 2022    POTASSIUM 3.7 2022    Lab Results   Component Value Date    CO2 27 2022    CO2 25 2022    CO2 26 2022    Lab Results   Component Value Date    CR 5.08 2022    CR 4.85 2022    CR 3.01 2022        Recent Labs   Lab Test 22  0536 22  1759 22  0610 22  1910 22  0541 22  2232 22  1453   WBC  --   --  9.6  --  9.0  --  12.5*   HGB 7.5* 7.9* 7.8*  7.8*   < > 8.7*  8.7*   < > 9.6*   HCT  --   --  23.7*  --  26.8*  --  28.5*   MCV  --   --  92  --  92  --  88   PLT  --   --  371  --  307  --  304    < > = values in this interval not displayed.     Recent Labs   Lab Test 22  0530 22  1723 08/15/22  1115  08/15/22  0451   AST 96*  --  43 48*   ALT 20  --  19 22   ALKPHOS 34*  --  26* 32*   BILITOTAL 0.4  --  0.3 0.4   CALDERON  --  21  --   --        Recent Labs   Lab Test 07/11/22  0750   MAG 2.3     Recent Labs   Lab Test 08/15/22  1641 07/12/22  0659 07/11/22  0750   PHOS 4.8* 2.2* 2.4*     Recent Labs   Lab Test 08/28/22  0610 08/26/22  0541 08/24/22  1453   JEWELL 9.5 9.4 8.7       Lab Results   Component Value Date    JEWELL 9.5 08/28/2022     Lab Results   Component Value Date    WBC 9.6 08/28/2022    HGB 7.5 (L) 08/29/2022    HCT 23.7 (L) 08/28/2022    MCV 92 08/28/2022     08/28/2022     Lab Results   Component Value Date     (L) 08/28/2022    POTASSIUM 4.1 08/28/2022    CHLORIDE 96 08/28/2022    CO2 27 08/28/2022    GLC 99 08/28/2022     Lab Results   Component Value Date    BUN 41 (H) 08/28/2022    CR 5.08 (H) 08/28/2022     Lab Results   Component Value Date    MAG 2.3 07/11/2022     Lab Results   Component Value Date    PHOS 4.8 (H) 08/15/2022       Creatinine   Date Value Ref Range Status   08/28/2022 5.08 (H) 0.52 - 1.04 mg/dL Final   08/26/2022 4.85 (H) 0.52 - 1.04 mg/dL Final   08/24/2022 3.01 (H) 0.52 - 1.04 mg/dL Final   08/21/2022 4.91 (H) 0.52 - 1.04 mg/dL Final   08/20/2022 4.30 (H) 0.52 - 1.04 mg/dL Final   08/18/2022 3.55 (H) 0.52 - 1.04 mg/dL Final       Attestation:  I have reviewed today's vital signs, notes, medications, labs and imaging.  Seen on dialysis.      Hari Louise MD

## 2022-08-30 NOTE — PROGRESS NOTES
[facility]    HOSPITALIST PROGRESS NOTE :   --------------------------------------------------    Date of Admission:  8/14/2022    Cumulative Summary:   Gina Simpson is a pleasant 56 year old female with CAD, cardiomyopathy, hypertension, COPD, ESRD, failed RUE fistula, s/p creation of LUE AV fistula on 6/28/22 complicated by hematoma s/p evacuation, LUE DVT who presented as direct admission to [facility] from [place] ED after presenting with 2+weeks of facial swelling, fatigue, dysphagia. She was found to have prevertebral soft tissue swelling, lung mass with SVC syndrome and extensive adenopathy.     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, hepatic metastasis and  lymphadenopathy- new diagnosis: reported significant fatigue, 20lb weight loss, poor appetite for two months     SVC obstruction and SVC syndrome - secondary to large neuroendocrine tumor     Mediastinal mass measuring 10 x 8 x 6 cm with SVC obstruction and occlusion of right upper lobe pulmonary artery     Extensive cervical, supraclavicular, right thoracic inlet and right mediastinal/hilar adenopathy     New 9mm ARIA pulmonary nodule     * CT on 8/15 showed new mediastinal mass on right measuring 10 x 8 x 6 cm with mass-effect on trachea, SVC and occlusion of right upper lobe pulmonary bronchus and artery.  Also concern for lymphangitic spread of neoplasm. 2 solid nodules in left lung concerning for metastatic foci.  Multiple large hepatic metastasis.  SVC syndrome/compression secondary to large mediastinal mass/adenopathy, small left pleural effusion, small pericardial effusion      * Underwent liver biopsy on 8/16 that showed metastatic poorly differentiated non-small cell carcinoma with neuroendocrine differentiation     * CT abdomen/pelvis w/o CT 8/25 :   1. Multiple bilobar hepatic metastases measuring up to 4.1 cm.   2. Subcentimeter lucent lesion within the L5  vertebral body, indeterminate for a small osseous metastasis.    3. Moderate right pleural effusion, increased in volume since 08/15/2022.     * MRI brain w/wo 8/26/2022 did not show any evidence of metastatic disease    -- Appreciate recommendations by thoracic surgery, vascular surgery, IR and oncology  -- SVC stent was not recommended  -- Started on emergent radiation therapy for SVC syndrome.  Has received 9 of 10 treatments.  Facial swelling appears to be improving. Upper extremity edema persists  -- [place] oncology following -staging scans completed  -- Continue pain control with IV Dilaudid, oxycodone  -- Per oncology, prognosis is poor and treatment is palliative.  -- IR consult ordered for chemotherapy port placement.  -- IR recommended to repeat CT scan of the chest with contrast to follow-up on tumor burden and how and when to proceed with port placement  -- Repeat CT chest from August 29 showing progressive atelectasis in right lung most likely due to central obstruction, large effusion on the right increased from previous, similar left-sided nodule since the comparison study and decreased size of the mediastinal masses since comparison.  -- Patient is asking if she has malignancy , discussed with patient and daughter regarding the biopsy results , encouraged her to have further questions with oncology team  -- Systemic chemotherapy will start after completion of radiation treatments  -- Smoking cessation advised  -- Discussed with bedside nursing to call interventional radiology to find out if they are planning to place Port-A-Cath today, if they are planning to have any procedure later this afternoon then will start heparin after the procedure, otherwise if no plan for procedure today then start the heparin now  -- I also updated Daughter on phone regarding the plan of care      Acute upper GI bleed due to duodenal ulcer, 8/15. Recurrent bleeding 8/22  Acute blood loss anemia due to upper GI bleed  secondary to duodenal ulcer  Bleeding duodenal ulcer with visible vessel, EGD 8/15  Chronic anemia due to end-stage renal disease  S/p 8 units PRBC transfusion this admission    - Patient developed acute GI bleed on 8/15.    - Hemoglobin dropped to 5.6.  Required 2 units of PRBC on 8/15, 1 unit on 8/17, 8/18, 8/20, 2 units on 8/22, 1 unit 8/24   - EGD 8/15 showed - clotted blood in the entire stomach, spurting duoeneal ulcer with visible vessel, injected, clips placed, argon plasma coag used.   - IV heparin was resumed after EGD.  Hemoglobin continued to trend down due to anticoagulation with IV heparin.  Required frequent frequent PRBC transfusions.  - acutely worsened on 8/22 - had melena, hypotension and Hb drop to 5.6  - IV heparin stopped in 8/22 and she was transfused 2 units of PRBC.   - Underwent repeat EGD 8/23 that showed blood and clots in gastric body, non bleeding duodenal ulcer with pigmented material. Injected and clipped.    - Received 8th unit of transfusion on 8/24.  Hemoglobin improved to 9 after transfusion.  This has again declined to 7.5 - 7.8. IV heparin infusion continues to be on hold since 8/22.   -Was planning to restart IV heparin on 8/27 but did not due to decline in hemoglobin from 9 to 7.8. Hb stable in past 72 hours     -- Patient was seen and examined, hemoglobin is stable around 7.7 this morning  -- Hemoglobin of 6.1 was error  -- Discussed with gastroenterology regarding starting patient back on anticoagulation GI is okay with starting patient back on heparin infusion  -- We will also discuss with oncology team if okay to start patient on heparin and later if she should be switched to Lovenox considering underlying malignancy, warfarin or one of the DOAC agents   -- continue protonix BID, switch to po  -- sucralfate for short term  -- Continue to monitor hemoglobin and transfuse as needed  -- Goal is to keep  Hb>7 , platelet > 50 and INR <2      Left brachial vein DVT 7/6/22  Right  internal jugular thrombus, on CT neck 8/14     -- Was started on warfarin in July 2022.  INR subtheraputic on admission  -- started on IV heparin infusion but had to be discontinued on 8/22 due to continued severe hemorrhage from duodenal ulcer  -- Will need lifelong anticoagulation.   -- Will transition to warfarin once hemoglobin stabilizes as Lovenox and DOAC agents ar not good choice due to patient being on HD  -- As above , awaiting port placement    -- Complicated situation if patient would be able to tolerate the anticoagulation , will monitor closely      Severe bilateral upper extremity edema   -- Due to SVC syndrome and right internal jugular DVT      Symmetric prevertebral soft tissue swelling from C2-C5 level  --Noted on CT scan  --ENT consulted.  Did not feel patient has retropharyngeal abscess.  ENT felt that edema of retropharyngeal space is related to SVC syndrome.  No specific treatment advised.  On exam, airway was widely patent, both vocal cords were mobile  -- ENT noted exposed bone of right mandible with dental caries.  Patient will need dental evaluation as outpatient for this.     Dental caries  ENT noted exposed bone of right mandible with dental caries.    -- Patient will need dental evaluation as outpatient for this.      ESRD on HD q. M W F  S/p left AV fistula formation 6/28/22  S/p fistulogram with dilatation of L subclavian and junction of brachiocephalic and subclavian vein complicated by hematoma requiring evacuation 7/8/22  -- Dialyzes [facility] - Dr. Gonsales.  Left AV fistula is working.  She also has tunneled dialysis catheter in place.  Use of dialysis site as per nephrology.     -- She underwent outpatient fistulogram 8/11/22.   -- Continue dialysis as per nephrology     CAD, s/p stenting in 2019  Chronic systolic CHF with EF of 40-45%, echo 2020   Hypertension  PTA:  hydralazine 50mg TID, carvedilol 25mg bid, lisinopril 20mg daily, amlodipine 10mg every evening  --HOLD lisinopril,  hydralazine, amlodipine due borderline BP   -- Coreg dose was decreased due to low BP, now increased to 12.5 mg twice daily.  At baseline she is on 25 mg twice daily         Hyperphosphatemia  --Cont PhosLo with meals      Severe malnutrition due to illness  --Nutrition services following.  -- Continue Ensure twice daily      Generalized weakness  Physical deconditioning  --PT/OT consulted.  OT has recommended TCU versus home with home care.Discussed with daughter to have further discussion with oncology when would they like to start patient on chemotherapy as patient would not be able to get chemo while she is in rehab      Clinically Significant Risk Factors Present on Admission       Diet: Mechanical/Dental Soft Diet    Antoine Catheter: Not present  DVT Prophylaxis: Pneumatic Compression Devices  Code Status: Full Code    The patient's care was discussed with the Patient. 35 min were spent in direct patient care today including the floor time , more than 50% of the time was spent in patient care coordination and counseling.    Disposition Plan    Tentative discharge in 1 to 2 days once patient is able to tolerate anticoagulation and has been switched to warfarin, tentative discharge to TCU versus home with home health care, daughter will have further discussion with oncology team regarding plans for restarting the chemotherapy.     Kemi Gallagher MD, MD, FACP  Text Page (7am - 6pm)    ----------------------------------------------------------------------------------------------------------------------    Interval History    Patient was seen and examined this morning, sitting in bed.  Bedside nursing also present.  Patient has not received Port-A-Cath yet, CT scan results were reviewed with patient.  Discussed with patient regarding plan for restarting her back on heparin infusion and watching her closely for signs of bleeding.  I also reviewed the plan of care with gastroenterology and oncology while in agreement to  start patient back on heparin.  Discussed with bedside nursing to page interventional radiology regarding placement for Port-A-Cath.  I also called her daughter over the phone and updated her regarding the clinical situation, daughter is hoping that maybe they will consider taking patient home with home health care if that would result in earlier start of chemotherapy, encouraged her to have further discussion with oncology team and care coordinator and  before making any decisions.    -Data reviewed today: I reviewed all new labs and imaging results over the last 24 hours.    I personally reviewed no images or EKG's today.    Physical Exam   Temp: 98.5  F (36.9  C) Temp src: Oral BP: 121/72 Pulse: 101   Resp: 18 SpO2: 91 % O2 Device: Nasal cannula Oxygen Delivery: 2 LPM  Vitals:    08/21/22 0506 08/22/22 0509 08/26/22 0631   Weight: 57.9 kg (127 lb 10.3 oz) 56.9 kg (125 lb 7.1 oz) 56.3 kg (124 lb 1.6 oz)     Vital Signs with Ranges  Temp:  [97.6  F (36.4  C)-98.5  F (36.9  C)] 98.5  F (36.9  C)  Pulse:  [101-132] 101  Resp:  [18-20] 18  BP: ()/(63-89) 121/72  SpO2:  [91 %-100 %] 91 %  I/O last 3 completed shifts:  In: 363 [P.O.:360; I.V.:3]  Out: 2000 [Other:2000]    GENERAL: Alert , awake and oriented. NAD. Conversational, appropriate.   HEENT: Normocephalic. EOMI. No icterus or injection. Nares normal.   LUNGS: Clear to auscultation. No dyspnea at rest.   HEART: Regular rate. Extremities perfused.   ABDOMEN: Soft, nontender, and nondistended. Positive bowel sounds.   EXTREMITIES: Left upper extremity swelling, in Ace wraps  NEUROLOGIC: Moves extremities x4 on command. No acute focal neurologic abnormalities noted.     Medications     - MEDICATION INSTRUCTIONS -         - MEDICATION INSTRUCTIONS for Dialysis Patients -   Does not apply See Admin Instructions     allopurinol  100 mg Oral QPM     atorvastatin  20 mg Oral Daily     calcium acetate  2,001 mg Oral TID w/meals     carvedilol  12.5  mg Oral BID w/meals     multivitamin RENAL  1 capsule Oral Daily     pantoprazole  40 mg Oral BID AC     sodium chloride (PF)  3 mL Intracatheter Q8H     sucralfate  1 g Oral 4x Daily AC & HS       Data   Recent Labs   Lab 08/30/22  0226 08/29/22  2312 08/29/22  1227 08/29/22  0536 08/28/22  1759 08/28/22  0610 08/26/22  1910 08/26/22  0541 08/24/22  2232 08/24/22  1453   WBC  --   --   --   --   --  9.6  --  9.0  --  12.5*   HGB 7.7* 6.1*  --  7.5*   < > 7.8*  7.8*   < > 8.7*  8.7*   < > 9.6*   MCV  --   --   --   --   --  92  --  92  --  88   PLT  --   --   --   --   --  371  --  307  --  304     --   --   --   --  130*  --  132*  --  135   POTASSIUM 4.2  --   --   --   --  4.1  --  4.6  --  3.7   CHLORIDE 99  --   --   --   --  96  --  99  --  103   CO2 25  --   --   --   --  27  --  25  --  26   BUN 29  --   --   --   --  41*  --  58*  --  32*   CR 4.23*  --   --   --   --  5.08*  --  4.85*  --  3.01*   ANIONGAP 9  --   --   --   --  7  --  8  --  6   JEWELL 8.7  --   --   --   --  9.5  --  9.4  --  8.7   GLC 94  --  87  --   --  99  --  84  --  82   ALBUMIN 2.1*  --   --   --   --   --   --   --   --   --     < > = values in this interval not displayed.       Imaging:   Recent Results (from the past 24 hour(s))   CT Chest w Contrast    Narrative    CT CHEST WITH CONTRAST 8/29/2022 3:08 PM     HISTORY: Follow up on tumor burden, to assess for port placement.    COMPARISON: 8/15/2022    TECHNIQUE: Volumetric helical acquisition of CT images of the chest  from the clavicles to the kidneys were acquired after the  administration of 62  mL isovue 370 IV contrast. Radiation dose for  this scan was reduced using automated exposure control, adjustment of  the mA and/or kV according to patient size, or iterative  reconstruction technique.    FINDINGS:     LUNGS AND PLEURA: Large right effusion increased from previous. Lumbar  atelectasis of the right upper lobe. Probably only the right middle  lobe remains aerated.  Trace pleural fluid on the left. 7 mm nodule at  the left apex decreased in size measuring 9 mm previously. Fissural  nodule on the left image 176 measures 8 mm, previously 7 mm.  Essentially occluded upper lobe bronchus and probably occluded right  lower lobe bronchus.    MEDIASTINUM/AXILLAE: Mediastinal masses are less well-defined today,  the upper mediastinal mass measures 5.0 x 3.8 cm, previously 6.2 x 4.7  cm. Mass lesion in the mid mediastinum and right hilum very likely  similarly decreased in size, but its borders are obscured by the  atelectatic right upper lobe.    CORONARY ARTERY CALCIFICATIONS: Moderate.    UPPER ABDOMEN: No acute findings. Liver lesions better seen on recent  abdomen and pelvis CT.    MUSCULOSKELETAL: No frankly destructive bony lesions.      Impression    IMPRESSION:    1. Progressive atelectasis in the right lung compared to previous  likely due to central obstruction.  2. Large effusion on the right increased from previous.  3. Similar left-sided nodule since the comparison study.  4. Decreased size of the mediastinal masses since comparison.    LUCILA WAYNE MD         SYSTEM ID:  [id number]

## 2022-08-30 NOTE — PLAN OF CARE
Goal Outcome Evaluation:        Alert and oriented x4. Vital signs stable on RA-2L NC. SBA. Tolerating mechanical soft diet. Lung sounds dim crackles. BM -. +3-4 edema BUE. Ace wrap to LUE intact. Occasional weeping from R PIV. Pain managed with oxycodone. Denies nausea. Tele ST..

## 2022-08-30 NOTE — PROVIDER NOTIFICATION
MD Notification    Notified Person: MD    Notified Person Name: Dwayne Tompkins    Notification Date/Time: 8/30/22 0047    Notification Interaction: text page    Purpose of Notification: Critical Hgb 6.1. Conditional transfuse order is available but no prepare order for lab. Can this be ordered so 1 unit can be given?  Orders Received: prepare order placed    Comments:

## 2022-08-30 NOTE — PROGRESS NOTES
Care Management Follow Up    Length of Stay (days): 16    Expected Discharge Date:       Concerns to be Addressed: discharge planning     Patient plan of care discussed at interdisciplinary rounds: Yes    Anticipated Discharge Disposition: Home     Anticipated Discharge Services:    Anticipated Discharge DME:      Patient/family educated on Medicare website which has current facility and service quality ratings:    Education Provided on the Discharge Plan:    Patient/Family in Agreement with the Plan:      Referrals Placed by CM/SW:    Private pay costs discussed: Not applicable    Additional Information:    Chung called Erica with [employer] for update on insurance application.  Erica explains that pt is only signed up for Medicare Part A and [employer] (a qualified health plan through pt's place of employment) had termed due to non-payment in July and now August.  Claudia is working on helping pt reactivate this policy.  Erica is working on filling out certain pops callie (medical assistance) for pt's that are on Medicare and not yet 65 years old.  Sw to continue to follow account notes for update on pt's insurance.    Chung called Monica caballero, to touch base from last weeks conversation.  Monica states that nobody followed up.  Chung stated that Chung will request  consult again for HCD form to be completed along with determining when  will be available to coordinate with dtr to ensure she is at hospital at same time.  Dtr is still interested in looking into completing POA documentation and would like information on how to apply for social security benefits; Sw to look into this and leave information in pt's room.  Dtr is involved and expressed gratitude in having assistance as this is new territory for her/family.  Sw to continue to assist family with discharge planning and benefits.    Update:   consult has been ordered.  Chung called dtrMonica, to state that  will meet with mom and from there  will likely reach out to dtr to discuss mtg time to complete HCD paperwork.  Sw then asked to send dtr an email with resources for both POA and Social security/disability benefits.  Monica provided Sw with email address:  [email or web address]; Sw provided [employer] website/phone number for POA and sent social security website with step by step instructions on how to apply for disability benefits.  Sw to continue to follow for discharge planning needs.      Deya Christianson, SOPHIASW

## 2022-08-30 NOTE — PROGRESS NOTES
[facility]ology Progress Note     Primary Oncologist/Hematologist:  Dr. Dreyer          Assessment and Plan:       Ms. Gina Simpson is a 56 year old woman who was admitted on 8/14/2022 with facial edema and dyspnea     1. 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 Imaging studies show evidence of metastatic disease involving cervical LNs and liver. Mediastinal mass measures 10 x 8 x 6 cm with SVC obstruction and occlusion of the RUL pulmonary artery  - The plan is for radiation treatment to help with SVC obstruction and to consider systemic therapy after completion of radiation therapy  -she had first radiation treatment on 8/20, plan for 10 fractions. Due to complete radiation today.   -tumor sent for NGS testing  -port placement planned.  CT scan  to re-assess mass prior to portacath placement shows a decrease size in mediastinal mass measuring 5 x 3.8 cm compared to 6.2 x 4.7 cm.  There is progressive atelectasis in the right upper lung likely due to central obstruction.  There is a large effusion on the right increased from previous imaging.   - Upon completion of radiation, pt will be considered for chemo/immunotherapy.  This could be complicated by discharge to TCU if that is required. In addition, dialysis schedule would need to be considered.     2. ESRD managed with hemodialysis via a LUE fistula.  -  HD M/W/F per nephrology team       3. Anemia due to CKD and bleeding upper GI ulcers  - Patient developed acute GI bleed on 8/15.    - Hemoglobin dropped to 5.6.  Required 2 units of PRBC on 8/15, 1 unit on 8/17, 8/18, 8/20, 2 units on 8/22, 1 unit 8/24   - EGD 8/15 showed - clotted blood in the entire stomach, spurting duoeneal ulcer with visible vessel, injected, clips placed, argon plasma coag used.   - IV heparin was resumed after EGD.  Hemoglobin continued to trend down due to anticoagulation with IV  heparin.  Required frequent frequent PRBC transfusions.  - acutely worsened on 8/22 - had melena, hypotension and Hb drop to 5.6  - IV heparin stopped in 8/22 and she was transfused 2 units of PRBC.   - Underwent repeat EGD 8/23 that showed blood and clots in gastric body, non bleeding duodenal ulcer with pigmented material. Injected and clipped.    - Received 8th unit of transfusion on 8/24.  Hemoglobin improved to 9 after transfusion.  This has again declined to 7.5 - 7.8. IV heparin infusion continues to be on hold since 8/22.  - hgb 6.1 on 8/29 is felt to be an error   -GI is ok with resumption of anticoagulation. Resume heparin drip on 8/30 after port  - if no bleeding, will transition to DOAC vs lovenox vs warfarin vs sub Q heparin. Discussed with Dr. Macias.  Lovenox is not recommended in dialysis-dependent renal failure.  Pradaxa does not have dosing recommendations for renal failure. Could consider apixaban whose dosing is not impacted by dialysis and does have a reversal agent (Andexxa) if GI bleeding were to recur.  Alternative would be subcutaneous heparin.  Warfarin would not be preferred in hypercoagulability of malignancy.   Will monitor for recurrent bleeding has IV heparin is resumed.      4. Left brachial vein DVT (7/6/2022)  RIJ DVT (Ct neck 8/14/2022)    -anticoagulation on hold given recurrent GI bleed from bleeding duodenal ulcer necessitating total of 8 units of PRBC during this hospitalization     -Plan to resume heparin 8/30/22      PLAN  - Continue radiation, plan for 10 fractions. Due to complete today  - Plan will be for outpatient chemotherapy + immunotherapy, although outpatient treatment may be challenging if she is discharged to TCU.  Next Gen sequencing is pending   - She is hoping to ultimately be seen in our [place] office upon discharge, which is much closer to her home.   - Still awaiting portacath placement. IR is reviewing imaging studies.   - heparin has resumed. Monitor  hgb      ERIC Mcgovern, AOCNP  Nurse Practitioner  [facility]  [phone]          Interval History:     Feeling better.  No breathing distress.               Review of Systems:     The 5 point Review of Systems is negative other than noted in the HPI                Medications:   Scheduled Medications    - MEDICATION INSTRUCTIONS for Dialysis Patients -   Does not apply See Admin Instructions     allopurinol  100 mg Oral QPM     atorvastatin  20 mg Oral Daily     calcium acetate  2,001 mg Oral TID w/meals     carvedilol  12.5 mg Oral BID w/meals     multivitamin RENAL  1 capsule Oral Daily     pantoprazole  40 mg Oral BID AC     sodium chloride (PF)  3 mL Intracatheter Q8H     sucralfate  1 g Oral 4x Daily AC & HS     PRN Medications  sodium chloride 0.9%, sodium chloride 0.9%, acetaminophen **OR** acetaminophen, HYDROmorphone, lidocaine 4%, lidocaine (buffered or not buffered), LORazepam, melatonin, naloxone **OR** naloxone **OR** naloxone **OR** naloxone, nitroGLYcerin, ondansetron **OR** ondansetron, oxyCODONE, - MEDICATION INSTRUCTIONS -, sodium chloride (PF), sodium chloride (PF)               Physical Exam:   Vitals were reviewed  Blood pressure 111/66, pulse 107, temperature 98.5  F (36.9  C), temperature source Oral, resp. rate 18, weight 56.3 kg (124 lb 1.6 oz), SpO2 91 %, not currently breastfeeding.  Wt Readings from Last 4 Encounters:   08/26/22 56.3 kg (124 lb 1.6 oz)   07/10/22 65 kg (143 lb 4.8 oz)   06/28/22 56.2 kg (124 lb)   05/31/22 56.7 kg (125 lb)       I/O last 3 completed shifts:  In: 363 [P.O.:360; I.V.:3]  Out: 2000 [Other:2000]                 Data:   All laboratory data and imaging studies reviewed.    CMP  Recent Labs   Lab 08/30/22  0226 08/29/22  1227 08/28/22  0610 08/26/22  0541 08/24/22  1453     --  130* 132* 135   POTASSIUM 4.2  --  4.1 4.6 3.7   CHLORIDE 99  --  96 99 103   CO2 25  --  27 25 26   ANIONGAP 9  --  7 8 6   GLC 94 87 99 84 82   BUN 29  --   41* 58* 32*   CR 4.23*  --  5.08* 4.85* 3.01*   GFRESTIMATED 12*  --  9* 10* 17*   JEWELL 8.7  --  9.5 9.4 8.7   PHOS 3.4  --   --   --   --    ALBUMIN 2.1*  --   --   --   --      CBC  Recent Labs   Lab 08/30/22  0226 08/29/22  2312 08/29/22  0536 08/28/22  1759 08/28/22  0610 08/26/22  1910 08/26/22  0541 08/24/22  2232 08/24/22  1453   WBC  --   --   --   --  9.6  --  9.0  --  12.5*   RBC  --   --   --   --  2.59*  --  2.92*  --  3.24*   HGB 7.7* 6.1* 7.5* 7.9* 7.8*  7.8*   < > 8.7*  8.7*   < > 9.6*   HCT  --   --   --   --  23.7*  --  26.8*  --  28.5*   MCV  --   --   --   --  92  --  92  --  88   MCH  --   --   --   --  30.1  --  29.8  --  29.6   MCHC  --   --   --   --  32.9  --  32.5  --  33.7   RDW  --   --   --   --  16.0*  --  15.9*  --  15.6*   PLT  --   --   --   --  371  --  307  --  304    < > = values in this interval not displayed.     INRNo lab results found in last 7 days.        Kevin REYES, BARBARA  Nurse Practitioner  [facility]  [phone]

## 2022-08-30 NOTE — PROVIDER NOTIFICATION
MD Notification    Notified Person: MD    Notified Person Name: Dwayne Tompkins    Notification Date/Time: 8/30/22 256    Notification Interaction: text page    Purpose of Notification:FYI, Hgb rechecked when type and screen drawn. Hgb now 7.7, will not plan to release the transfuse order anymore. If you'd still like me to give it let me know.     Orders Received:    Comments: no new orders

## 2022-08-30 NOTE — PROGRESS NOTES
Radiation therapy treatment #10 of 10. Pt received a daily dose of 300 cGy with 10 MV radiation to the right lung for a total dose of 3000 cGy. Pt has completed prescribed course of treatment. No more treatments remain.

## 2022-08-30 NOTE — PROGRESS NOTES
[facility]  Gastroenterology Progress Note     Gina Simpson MRN# [id number]   YOB: 1965 Age: 56 year old          Assessment and Plan:   Gina Simpson is a 56 year old female who has a past medical history of CAD, cardiomyopathy, hypertension, COPD and ESRD  and diagnosed with stage IV large cell neuroendocrine carcinoma with SVC syndrome with hepatic metastasis and lymphadenopathy. She was initially admitted on 8/14/22 and had hematemesis on 8/15/22 underwent EGD had stable hemoglobin, unfortunately drop in hemoglobin associated with melena and GI was reconsulted to repeat EGD on 8/22/22     Duodenal ulcer  Melena  Acute on chronic anemia  -  8/15 EGD noted clotted blood in the entire stomach, spurting duoeneal ulcer with visible vessel, injected, clips placed, argon plasma coag used.  -  8/23 EGD noted hematin and clotted blood in stomach with non bleeidng duodenal ulcer with pigmented material. Injected and clips placed. Duodenal ulcer thought to be exacerbated by heparin drip.   - Hemoglobin has been stable over last 3 days in mid 7 range. Had a presumed lab error when hemoglobin was 6.1 and improve to 7.7 with no intervention this a.m.  No bloody stools or other source of blood loss noted     -- Continue to monitor hemoglobin and transfuse for 7 or less  --  pantoprazole 40 mg BID  -- Ok to restrart heparin and if tolerates with no significant drop in hemoglobin can start Lovenox or coumadin or other anticoagulant           Interval History:   no new complaints, doing well, denies chest pain, denies shortness of breath, denies abdominal pain, pain is controlled and doing well; no cp, sob, n/v/d, or abd pain.              Review of Systems:   C: NEGATIVE for fever, chills, change in weight  E/M: NEGATIVE for ear, mouth and throat problems  R: NEGATIVE for significant cough or SOB  CV: NEGATIVE for chest pain, palpitations or peripheral edema              Medications:   I have reviewed this patient's current medications    - MEDICATION INSTRUCTIONS for Dialysis Patients -   Does not apply See Admin Instructions     allopurinol  100 mg Oral QPM     atorvastatin  20 mg Oral Daily     calcium acetate  2,001 mg Oral TID w/meals     carvedilol  12.5 mg Oral BID w/meals     multivitamin RENAL  1 capsule Oral Daily     pantoprazole  40 mg Oral BID AC     sodium chloride (PF)  3 mL Intracatheter Q8H     sucralfate  1 g Oral 4x Daily AC & HS                  Physical Exam:   Vitals were reviewed  Vital Signs with Ranges  Temp:  [97.6  F (36.4  C)-98.5  F (36.9  C)] 98.5  F (36.9  C)  Pulse:  [101-132] 107  Resp:  [18-20] 18  BP: ()/(63-82) 111/66  SpO2:  [91 %-100 %] 91 %  I/O last 3 completed shifts:  In: 363 [P.O.:360; I.V.:3]  Out: 2000 [Other:2000]  Constitutional: healthy, alert and no distress   Cardiovascular: negative, PMI normal. No lifts, heaves, or thrills. RRR. No murmurs, clicks gallops or rub  Respiratory: negative, Percussion normal. Good diaphragmatic excursion. Lungs clear  Abdomen: Abdomen soft, non-tender. BS normal. No masses, organomegaly           Data:   I reviewed the patient's new clinical lab test results.   Recent Labs   Lab Test 08/30/22  0226 08/29/22  2312 08/29/22  0536 08/28/22  1759 08/28/22  0610 08/26/22  1910 08/26/22  0541 08/24/22  2232 08/24/22  1453 08/23/22  0546 08/23/22  0011 08/15/22  1115 08/15/22  0451 08/14/22  2149 08/11/22  0913   WBC  --   --   --   --  9.6  --  9.0  --  12.5*  --   --    < > 6.7   < >  --    HGB 7.7* 6.1* 7.5*   < > 7.8*  7.8*   < > 8.7*  8.7*   < > 9.6*   < > 8.5*   < > 7.0*   < >  --    MCV  --   --   --   --  92  --  92  --  88  --   --    < > 90   < >  --    PLT  --   --   --   --  371  --  307  --  304  --  264   < > 376   < >  --    INR  --   --   --   --   --   --   --   --   --   --  1.45*  --  1.68*  --  2.52*    < > = values in this interval not displayed.     Recent Labs   Lab Test  08/30/22  0226 08/28/22  0610 08/26/22  0541   POTASSIUM 4.2 4.1 4.6   CHLORIDE 99 96 99   CO2 25 27 25   BUN 29 41* 58*   ANIONGAP 9 7 8     Recent Labs   Lab Test 08/30/22 0226 08/21/22  0530 08/15/22  1115 08/15/22  0451   ALBUMIN 2.1* 1.8* 2.2* 2.5*   BILITOTAL  --  0.4 0.3 0.4   ALT  --  20 19 22   AST  --  96* 43 48*       I reviewed the patient's new imaging results.    All laboratory data reviewed  All imaging studies reviewed by me.    Sussy Tenorio PA-C,  8/30/2022  [facility]  Office : [phone]  Cell: [phone] (Dr. Camacho)  Cell: [phone] (Sussy Tenorio PA-C)

## 2022-08-30 NOTE — PLAN OF CARE
Orientations: 4  Vitals/Pain: oxy, ST  Tele: ST  Lines/Drains: PIV in R forearm, CVC for dialysis in L chest  Skin/Wounds: +3 edema to arms, stitches to LUE  GI/: dialysis  Labs: Abnormal/Trends, Hgb 7.5  Ambulation/Assist: x1 w gait belt and walker  Plan: possible port placement tomorrow. Contacted IR today and they said this is a complex pt so they will not rush into it and pt can have dinner.

## 2022-08-30 NOTE — PROVIDER NOTIFICATION
MD Notification    Notified Person: MD    Notified Person Name: Dwayne Tompkins    Notification Date/Time: 8/30/22 0100    Notification Interaction: text page    Purpose of Notification:Can a type and screen be collected?     Orders Received: type and screen ordered    Comments:

## 2022-08-30 NOTE — PLAN OF CARE
Orientations: 4  Vitals/Pain: ST, back pain - oxy and hot packs  Tele: ST  Lines/Drains: CVC, PIV in R forearm  Skin/Wounds: Ace wrap to L forearm for edema, stitches to LUE  GI/: occasional nausea  Labs: Abnormal/Trends, Electrolyte Replacement- Hgb 7.7  Ambulation/Assist: Independent/standby for IV pump  Plan: last radiation was done today. Dialysis tomorrow. Heparin infusing at 10ml/hr. Potential port placement soon?

## 2022-08-30 NOTE — PLAN OF CARE
Speech Language Therapy Discharge Summary    Reason for therapy discharge:    All goals and outcomes met, no further needs identified. - for swallow Tx    Progress towards therapy goal(s). See goals on Care Plan in Epic electronic health record for goal details.  Goals met - for swallow Tx    Therapy recommendation(s):    No further therapy is recommended. Swallow Tx goal met.  No overt coughing with po intake.  Rec: continue a mechanical dental soft diet and thin liquids with precautions/strategies. Encourage increased po, use supplements as able; Will defer to OT for cognitive follow up given reported cognitive deficits.    Please re-consult if increased swallowing difficulty with aspiration signs observed.

## 2022-08-31 NOTE — PROCEDURES
[facility]    Procedure: Port placement.     Date/Time: 8/31/2022 4:48 PM  Performed by: Dee Dee Magana DO  Authorized by: Dee Dee Magana DO       UNIVERSAL PROTOCOL   Site Marked: Yes  Prior Images Obtained and Reviewed:  Yes  Required items: Required blood products, implants, devices and special equipment available    Patient identity confirmed:  Verbally with patient, arm band, provided demographic data and hospital-assigned identification number  Patient was reevaluated immediately before administering moderate or deep sedation or anesthesia  Confirmation Checklist:  Patient's identity using two indicators, relevant allergies, procedure was appropriate and matched the consent or emergent situation and correct equipment/implants were available  Time out: Immediately prior to the procedure a time out was called    Universal Protocol: the Joint Commission Universal Protocol was followed    Preparation: Patient was prepped and draped in usual sterile fashion       ANESTHESIA    Anesthesia: Local infiltration  Local Anesthetic:  Lidocaine 1% without epinephrine      SEDATION  Patient Sedated: Yes    Sedation Type:  Moderate (conscious) sedation  Vital signs: Vital signs monitored during sedation    See dictated procedure note for full details.  Findings: Right common femoral vein port placement.     Specimens: none    Complications: None    Condition: Stable    Plan: Port left accessed, ok to use.       PROCEDURE    Patient Tolerance:  Patient tolerated the procedure well with no immediate complications  Length of time physician/provider present for 1:1 monitoring during sedation: 30

## 2022-08-31 NOTE — PROGRESS NOTES
[facility]ology Progress Note     Primary Oncologist/Hematologist:  Dr. Dreyer          Assessment and Plan:       Ms. Gina Simpson is a 56 year old woman who was admitted on 8/14/2022 with facial edema and dyspnea     1. 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 Imaging studies show evidence of metastatic disease involving cervical LNs and liver. Mediastinal mass measures 10 x 8 x 6 cm with SVC obstruction and occlusion of the RUL pulmonary artery  - The plan is for radiation treatment to help with SVC obstruction and to consider systemic therapy after completion of radiation therapy  -she had first radiation treatment on 8/20, plan for 10 fractions. Due to complete radiation today.   -tumor sent for NGS testing and PDL-1 testing on 8/31/22  -port placement planned.  CT scan  to re-assess mass prior to portacath placement shows a decrease size in mediastinal mass measuring 5 x 3.8 cm compared to 6.2 x 4.7 cm.  There is progressive atelectasis in the right upper lung likely due to central obstruction.  There is a large effusion on the right increased from previous imaging.   - Upon completion of radiation, pt will be considered for chemo/immunotherapy.    - Discussed today with Dr. Wong who anticipates we would initiate systemic therapy about two weeks from now, allowing for recovery from radiation and to allow for completion of mutation studies.    - Anticipate discharge to TCU with outpatient follow up in our [place] or [place] office in about two weeks.     2. ESRD managed with hemodialysis via a LUE fistula.  -  HD M/W/F per nephrology team       3. Anemia due to CKD and bleeding upper GI ulcers  - Patient developed acute GI bleed on 8/15.    - Hemoglobin dropped to 5.6.  Required 2 units of PRBC on 8/15, 1 unit on 8/17, 8/18, 8/20, 2 units on 8/22, 1 unit 8/24   - EGD 8/15 showed - clotted blood in the  entire stomach, spurting duoeneal ulcer with visible vessel, injected, clips placed, argon plasma coag used.   - IV heparin was resumed after EGD.  Hemoglobin continued to trend down due to anticoagulation with IV heparin.  Required frequent frequent PRBC transfusions.  - acutely worsened on 8/22 - had melena, hypotension and Hb drop to 5.6  - IV heparin stopped in 8/22 and she was transfused 2 units of PRBC.   - Underwent repeat EGD 8/23 that showed blood and clots in gastric body, non bleeding duodenal ulcer with pigmented material. Injected and clipped.    - Received 8th unit of transfusion on 8/24.  Hemoglobin improved to 9 after transfusion.  This has again declined to 7.5 - 7.8. IV heparin infusion continues to be on hold since 8/22.  - hgb 6.1 on 8/29 is felt to be an error   -GI is ok with resumption of anticoagulation. Resumed heparin drip on 8/30  - if no bleeding, will transition to DOAC vs lovenox vs warfarin vs sub Q heparin. Discussed with Dr. Macias.  Lovenox is not recommended in dialysis-dependent renal failure.  Pradaxa does not have dosing recommendations for renal failure. Could consider apixaban whose dosing is not impacted by dialysis and does have a reversal agent (Andexxa) if GI bleeding were to recur.  Alternative would be subcutaneous heparin.  Warfarin would not be preferred in hypercoagulability of malignancy.   Will monitor for recurrent bleeding has IV heparin is resumed.      4. Left brachial vein DVT (7/6/2022)  RIJ DVT (Ct neck 8/14/2022)    -anticoagulation on hold given recurrent GI bleed from bleeding duodenal ulcer necessitating total of 8 units of PRBC during this hospitalization     -Plan to resume heparin 8/30/22      PLAN  - Continue radiation, plan for 10 fractions. Completed 8/30/22  - Plan will be for outpatient chemotherapy + immunotherapy, although outpatient treatment may be challenging if she is discharged to TCU.  Next Gen sequencing is pending (ordered 8/31/22)  -  She is hoping to ultimately be seen in our [place] office upon discharge, which is much closer to her home.   - Still awaiting portacath placement. IR is reviewing imaging studies.   - IV heparin has resumed (but is now on hold for portacath placement). Monitor hgb.  If remains stable, anticipate transition to apixaban or subcutaneous heparin . Dr. Dreyer will follow up tomorrow.         Kevin Berger, APRN, AOCNP  Nurse Practitioner  [facility]  [phone]          Interval History:     No change in symptoms.               Review of Systems:     The 5 point Review of Systems is negative other than noted in the HPI                Medications:   Scheduled Medications    - MEDICATION INSTRUCTIONS for Dialysis Patients -   Does not apply See Admin Instructions     sodium chloride 0.9%  250 mL Intravenous Once in dialysis/CRRT     sodium chloride 0.9%  300 mL Hemodialysis Machine Once     allopurinol  100 mg Oral QPM     atorvastatin  20 mg Oral Daily     calcium acetate  2,001 mg Oral TID w/meals     carvedilol  12.5 mg Oral BID w/meals     doxercalciferol  4 mcg Intravenous Once in dialysis/CRRT     epoetin delma-epbx  4,000 Units Intravenous Once in dialysis/CRRT     sodium chloride (PF) 0.9%  10 mL Intracatheter Once in dialysis/CRRT    Followed by     heparin  1.3-2.6 mL Intracatheter Once in dialysis/CRRT     sodium chloride (PF) 0.9%  10 mL Intracatheter Once in dialysis/CRRT    Followed by     heparin  1.3-2.6 mL Intracatheter Once in dialysis/CRRT     multivitamin RENAL  1 capsule Oral Daily     - MEDICATION INSTRUCTIONS -   Does not apply Once     pantoprazole  40 mg Oral BID AC     sodium chloride (PF)  3 mL Intracatheter Q8H     sodium chloride (PF)  9 mL Intracatheter During Dialysis/CRRT (from stock)     sodium chloride (PF)  9 mL Intracatheter During Dialysis/CRRT (from stock)     sucralfate  1 g Oral 4x Daily AC & HS     PRN Medications  sodium chloride 0.9%, sodium chloride 0.9%, sodium  chloride 0.9%, acetaminophen **OR** acetaminophen, albumin human, alteplase, alteplase, HYDROmorphone, lidocaine 4%, lidocaine (buffered or not buffered), LORazepam, melatonin, naloxone **OR** naloxone **OR** naloxone **OR** naloxone, nitroGLYcerin, ondansetron **OR** ondansetron, oxyCODONE, - MEDICATION INSTRUCTIONS -, sodium chloride (PF), sodium chloride (PF), sodium chloride (PF), sodium chloride (PF)               Physical Exam:   Vitals were reviewed  Blood pressure 120/73, pulse 106, temperature 98.1  F (36.7  C), temperature source Oral, resp. rate 18, weight 56.3 kg (124 lb 1.6 oz), SpO2 90 %, not currently breastfeeding.  Wt Readings from Last 4 Encounters:   08/26/22 56.3 kg (124 lb 1.6 oz)   07/10/22 65 kg (143 lb 4.8 oz)   06/28/22 56.2 kg (124 lb)   05/31/22 56.7 kg (125 lb)       I/O last 3 completed shifts:  In: 180 [P.O.:180]  Out: -                  Data:   All laboratory data and imaging studies reviewed.    CMP  Recent Labs   Lab 08/30/22  0226 08/29/22  1227 08/28/22  0610 08/26/22  0541 08/24/22  1453     --  130* 132* 135   POTASSIUM 4.2  --  4.1 4.6 3.7   CHLORIDE 99  --  96 99 103   CO2 25  --  27 25 26   ANIONGAP 9  --  7 8 6   GLC 94 87 99 84 82   BUN 29  --  41* 58* 32*   CR 4.23*  --  5.08* 4.85* 3.01*   GFRESTIMATED 12*  --  9* 10* 17*   JEWELL 8.7  --  9.5 9.4 8.7   PHOS 3.4  --   --   --   --    ALBUMIN 2.1*  --   --   --   --      CBC  Recent Labs   Lab 08/31/22  0422 08/30/22  1931 08/30/22  1136 08/30/22  0226 08/28/22  1759 08/28/22  0610 08/26/22  1910 08/26/22  0541 08/24/22  2232 08/24/22  1453   WBC  --   --  10.0  --   --  9.6  --  9.0  --  12.5*   RBC  --   --  2.82*  --   --  2.59*  --  2.92*  --  3.24*   HGB 8.3* 8.7* 8.5* 7.7*   < > 7.8*  7.8*   < > 8.7*  8.7*   < > 9.6*   HCT  --   --  26.4*  --   --  23.7*  --  26.8*  --  28.5*   MCV  --   --  94  --   --  92  --  92  --  88   MCH  --   --  30.1  --   --  30.1  --  29.8  --  29.6   MCHC  --   --  32.2  --   --   32.9  --  32.5  --  33.7   RDW  --   --  17.2*  --   --  16.0*  --  15.9*  --  15.6*   PLT  --   --   --   --   --  371  --  307  --  304    < > = values in this interval not displayed.     INRNo lab results found in last 7 days.        BARBARA Stephens  Nurse Practitioner  [facility]  [phone]

## 2022-08-31 NOTE — PROVIDER NOTIFICATION
"Text page to hospitalist Dr. Gallagher:    \"Port placement complete. IR placed clear liquid diet. Can she be advanced back to mechanical soft diet once alert and tolerating clears? \"  "

## 2022-08-31 NOTE — IR NOTE
Interventional Radiology Intra-procedural Nursing Note    Patient Name: Gina Simpson  Medical Record Number: [id number]  Today's Date: August 31, 2022    Start Time: 1604  End of procedure time: 1632  Procedure: Port placement with moderate sedation  Report given to: IMC RN  Time pt departs:  1650    Other Notes: Pt into IR suite 1 via cart. Pt awake and alert. To table in supine position. Monitoring equipment applied. VSS. Tele ST. Dr. Magana in room. Time out and procedure started. Pt tolerated procedure well. Debrief with Dr. Magana. Port placed, port accessed  and pressure held until hemostasis achieved. Dressing CDI. No complications. Pt transferred back to [facility] [place].    Medications:    Versed 0.5 mg  Fentanyl 25 mcg  Lidocaine 1% 10 ml  Lidocaine 1% with EPI 10 ml    Curtis Souza RN

## 2022-08-31 NOTE — PROGRESS NOTES
Pt transferred to dialysis on room air.  Oxycodone 5mg given for abdominal and LUE pain.  Heparin gtt infusing at 900un/ hr

## 2022-08-31 NOTE — PROGRESS NOTES
Potassium   Date Value Ref Range Status   08/30/2022 4.2 3.4 - 5.3 mmol/L Final     Hemoglobin   Date Value Ref Range Status   08/31/2022 8.3 (L) 11.7 - 15.7 g/dL Final     Creatinine   Date Value Ref Range Status   08/30/2022 4.23 (H) 0.52 - 1.04 mg/dL Final     Urea Nitrogen   Date Value Ref Range Status   08/30/2022 29 7 - 30 mg/dL Final     Sodium   Date Value Ref Range Status   08/30/2022 133 133 - 144 mmol/L Final     INR   Date Value Ref Range Status   08/23/2022 1.45 (H) 0.85 - 1.15 Final       DIALYSIS PROCEDURE NOTE  Hepatitis status of previous patient on machine log was checked and verified ok to use with this patients hepatitis status.  Patient dialyzed for 3 hrs. on a K3 bath with a net fluid removal of  2L.  A BFR of 400 ml/min was obtained via a Left CVC. The treatment plan was discussed with Dr. Louise during the treatment.    Total heparin received during the treatment: 0 units.   Line flushed, clamped and capped with heparin 1:1000 2.3 mL (2300 units) per lumen    Meds  given: EPO   Complications: None      Person educated: Patient. Knowledge base Substantial. Barriers to learning: None. Educated on Procedure via Verbl mode. Patient verbalized understanding. Pt prefers Verbal education style.     ICEBOAT? Timeout performed pre-treatment  I: Patient was identified using 2 identifiers  C:  Consent Signed Yes  E: Equipment preventative maintenance is current and dialysis delivery system OK to use  B:   Latest Reference Range & Units 08/18/22 15:50   Hep B Surface Agn Nonreactive  Nonreactive   Hepatitis B Surface Antibody Instrument Value <8.00 m[IU]/mL 103.04   Hepatitis B Surface Antibody  Reactive     O: Dialysis orders present and complete prior to treatment  A: Vascular access verified and assessed prior to treatment  T: Treatment was performed at a clinically appropriate time  ?: Patient was allowed to ask questions and address concerns prior to treatment  See Adult Hemodialysis flowsheet in  Pt to CT by jackelyn.    EPIC for further details and post assessment.  Machine water alarm in place and functioning. Transducer pods intact and checked every 15min.   Pt returned via Bed.  Chlorine/Chloramine water system checked every 4 hours.  Outpatient Dialysis at [facility]      Post treatment report given to Marina ROACH RN regarding 2L of fluid removed, last BP of 103/78.

## 2022-08-31 NOTE — CONSULTS
Patient is on IR schedule most likely for today 8/31/22 for a Port a catheter placement. If unable to fit in the port placement in IR today will do it tomorrow 9/1.     Heparin IV drip stopped per RN    -Labs WNL for procedure.    -Orders for NPO and antibiotics have been entered.   -Consent will be done prior to procedure.     Please contact the IR department at [phone] for procedural related questions.     Discussed with IR Dr Magana and PAOLA Merritt today.    Thanks, Marisol [facility] Interventional Radiology CNP ([phone]) (phone [phone])

## 2022-08-31 NOTE — PROGRESS NOTES
Pt still in dialysis, stopped heparin gtt at 1101 per IR provider Marisol REYES CNP    Informed pt of goal for procedure this afternoon and to remain NPO  Ex ice chips and meds.    Pt reports pain level went down a little after oxycodone given (right before transfer to dialysis).

## 2022-08-31 NOTE — PROGRESS NOTES
[facility]    HOSPITALIST PROGRESS NOTE :   --------------------------------------------------    Date of Admission:  8/14/2022    Cumulative Summary:   Gina Simpson is a pleasant 56 year old female with CAD, cardiomyopathy, hypertension, COPD, ESRD, failed RUE fistula, s/p creation of LUE AV fistula on 6/28/22 complicated by hematoma s/p evacuation, LUE DVT who presented as direct admission to [facility] from [facility] after presenting with 2+weeks of facial swelling, fatigue, dysphagia. She was found to have prevertebral soft tissue swelling, lung mass with SVC syndrome and extensive adenopathy.     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, hepatic metastasis and  lymphadenopathy- new diagnosis: reported significant fatigue, 20lb weight loss, poor appetite for two months     SVC obstruction and SVC syndrome - secondary to large neuroendocrine tumor     Mediastinal mass measuring 10 x 8 x 6 cm with SVC obstruction and occlusion of right upper lobe pulmonary artery     Extensive cervical, supraclavicular, right thoracic inlet and right mediastinal/hilar adenopathy     New 9mm ARIA pulmonary nodule     * CT on 8/15 showed new mediastinal mass on right measuring 10 x 8 x 6 cm with mass-effect on trachea, SVC and occlusion of right upper lobe pulmonary bronchus and artery.  Also concern for lymphangitic spread of neoplasm. 2 solid nodules in left lung concerning for metastatic foci.  Multiple large hepatic metastasis.  SVC syndrome/compression secondary to large mediastinal mass/adenopathy, small left pleural effusion, small pericardial effusion      * Underwent liver biopsy on 8/16 that showed metastatic poorly differentiated non-small cell carcinoma with neuroendocrine differentiation     * CT abdomen/pelvis w/o CT 8/25 :   1. Multiple bilobar hepatic metastases measuring up to 4.1 cm.   2. Subcentimeter lucent lesion within the L5  vertebral body, indeterminate for a small osseous metastasis.    3. Moderate right pleural effusion, increased in volume since 08/15/2022.     * MRI brain w/wo 8/26/2022 did not show any evidence of metastatic disease    -- Appreciate recommendations by thoracic surgery, vascular surgery, IR and oncology  -- SVC stent was not recommended  -- Started on emergent radiation therapy for SVC syndrome.  Has received 9 of 10 treatments.  Facial swelling appears to be improving. Upper extremity edema persists  -- [place] onc[facility] following -staging scans completed  -- Continue pain control with IV Dilaudid, oxycodone  -- Per oncology, prognosis is poor and treatment is palliative.  -- IR consult ordered for chemotherapy port placement.  -- IR recommended to repeat CT scan of the chest with contrast to follow-up on tumor burden and how and when to proceed with port placement  -- Repeat CT chest from August 29 showing progressive atelectasis in right lung most likely due to central obstruction, large effusion on the right increased from previous, similar left-sided nodule since the comparison study and decreased size of the mediastinal masses since comparison.  -- Patient is asking if she has malignancy , discussed with patient and daughter regarding the biopsy results , encouraged her to have further questions with oncology team  -- Systemic chemotherapy will start after completion of radiation treatments  -- Smoking cessation advised  --Heparin infusion is started on August 30, hemoglobin is a stable around 8.5  -- Interventional radiology is planning to place Port-A-Cath later in the afternoon, will hold heparin infusion this morning while patient is getting hemodialysis  -- I also updated Daughter on phone regarding the plan of care on August 30     Acute upper GI bleed due to duodenal ulcer, 8/15. Recurrent bleeding 8/22  Acute blood loss anemia due to upper GI bleed secondary to duodenal ulcer  Bleeding duodenal ulcer  with visible vessel, EGD 8/15  Chronic anemia due to end-stage renal disease  S/p 8 units PRBC transfusion this admission    - Patient developed acute GI bleed on 8/15.    - Hemoglobin dropped to 5.6.  Required 2 units of PRBC on 8/15, 1 unit on 8/17, 8/18, 8/20, 2 units on 8/22, 1 unit 8/24   - EGD 8/15 showed - clotted blood in the entire stomach, spurting duoeneal ulcer with visible vessel, injected, clips placed, argon plasma coag used.   - IV heparin was resumed after EGD.  Hemoglobin continued to trend down due to anticoagulation with IV heparin.  Required frequent frequent PRBC transfusions.  - acutely worsened on 8/22 - had melena, hypotension and Hb drop to 5.6  - IV heparin stopped in 8/22 and she was transfused 2 units of PRBC.   - Underwent repeat EGD 8/23 that showed blood and clots in gastric body, non bleeding duodenal ulcer with pigmented material. Injected and clipped.    - Received 8th unit of transfusion on 8/24.  Hemoglobin improved to 9 after transfusion.  This has again declined to 7.5 - 7.8. IV heparin infusion continues to be on hold since 8/22.   -Was planning to restart IV heparin on 8/27 but did not due to decline in hemoglobin from 9 to 7.8. Hb stable in past 72 hours     -- Continue protonix BID, switch to po  -- Sucralfate for short term  -- Continue to monitor hemoglobin and transfuse as needed  -- Goal is to keep  Hb>7 , platelet > 50 and INR <2      Left brachial vein DVT 7/6/22  Right internal jugular thrombus, on CT neck 8/14     -- Was started on warfarin in July 2022.  INR subtheraputic on admission  -- started on IV heparin infusion but had to be discontinued on 8/22 due to continued severe hemorrhage from duodenal ulcer  -- Will need lifelong anticoagulation.   --Appreciate oncology input regarding anticoagulation, warfarin would not be a good choice for anticoagulation in the presence of hypercoagulability with malignancy.  Lovenox and Pradaxa is not recommended and renal  failure  -- Most likely patient might need to be started on apixaban, does have a reversal agent of Andexxa if GI bleeding were to recur.  Other option would be subcutaneous heparin  -- As above , awaiting port placement       Severe bilateral upper extremity edema   -- Due to SVC syndrome and right internal jugular DVT      Symmetric prevertebral soft tissue swelling from C2-C5 level  --Noted on CT scan  --ENT consulted.  Did not feel patient has retropharyngeal abscess.  ENT felt that edema of retropharyngeal space is related to SVC syndrome.  No specific treatment advised.  On exam, airway was widely patent, both vocal cords were mobile  -- ENT noted exposed bone of right mandible with dental caries.  Patient will need dental evaluation as outpatient for this.     Dental caries  ENT noted exposed bone of right mandible with dental caries.    -- Patient will need dental evaluation as outpatient for this.      ESRD on HD q. M W F  S/p left AV fistula formation 6/28/22  S/p fistulogram with dilatation of L subclavian and junction of brachiocephalic and subclavian vein complicated by hematoma requiring evacuation 7/8/22  -- Dialyzes MW - Dr. Gonsales.  Left AV fistula is working.  She also has tunneled dialysis catheter in place.  Use of dialysis site as per nephrology.     -- She underwent outpatient fistulogram 8/11/22.   -- Continue dialysis as per nephrology  --Getting hemodialysis this morning     CAD, s/p stenting in 2019  Chronic systolic CHF with EF of 40-45%, echo 2020   Hypertension  PTA:  hydralazine 50mg TID, carvedilol 25mg bid, lisinopril 20mg daily, amlodipine 10mg every evening  --HOLD lisinopril, hydralazine, amlodipine due borderline BP   -- Coreg dose was decreased due to low BP, now increased to 12.5 mg twice daily.  At baseline she is on 25 mg twice daily         Hyperphosphatemia  --Cont PhosLo with meals      Severe malnutrition due to illness  --Nutrition services following.  -- Continue Ensure  twice daily      Generalized weakness  Physical deconditioning  --PT/OT consulted.  OT has recommended TCU versus home with home care.Discussed with daughter to have further discussion with oncology when would they like to start patient on chemotherapy as patient would not be able to get chemo while she is in rehab      Clinically Significant Risk Factors Present on Admission       Diet: Mechanical/Dental Soft Diet    Antoine Catheter: Not present  DVT Prophylaxis: Pneumatic Compression Devices  Code Status: Full Code    The patient's care was discussed with the Patient.    Disposition Plan    Tentative discharge in 1 to 2 days once patient is able to tolerate anticoagulation and has been switched to warfarin, tentative discharge to TCU  As patient is now planned to be started on chemotherapy for 2 weeks, probably going to TCU would be better choice so patient can get some strength before restarting chemotherapy after finishing radiation course. 35 min were spent in direct patient care today including the floor time , more than 50% of the time was spent in patient care coordination and counseling.      Kemi Gallagher MD, MD, FACP  Text Page (7am - 6pm)    ----------------------------------------------------------------------------------------------------------------------    Interval History    Patient was seen and examined, lying in bed , getting hemodialysis.  Discussed the plan with bedside nursing, they have call interventional radiology, plan for patient to get Port-A-Cath placed later this afternoon.  Patient is so far tolerating heparin infusion, denying any black tarry stool, hemoglobin is a stable.  Discussed with patient regarding tentative discharge to home versus TCU depending upon when chemotherapy would be started.  We are also going to figure out the plan for switching her from IV heparin infusion to either subcu heparin versus apixaban we will continue to have further discussion with oncology.  Patient  is otherwise denying any chest pain, palpitation or shortness of    -Data reviewed today: I reviewed all new labs and imaging results over the last 24 hours.    I personally reviewed no images or EKG's today.    Physical Exam   Temp: 98.5  F (36.9  C) Temp src: Oral BP: 134/87 Pulse: 111   Resp: 18 SpO2: 90 % O2 Device: None (Room air) Oxygen Delivery: 1 LPM  Vitals:    08/21/22 0506 08/22/22 0509 08/26/22 0631   Weight: 57.9 kg (127 lb 10.3 oz) 56.9 kg (125 lb 7.1 oz) 56.3 kg (124 lb 1.6 oz)     Vital Signs with Ranges  Temp:  [97.6  F (36.4  C)-98.5  F (36.9  C)] 98.5  F (36.9  C)  Pulse:  [] 111  Resp:  [16-18] 18  BP: (108-134)/(62-87) 134/87  SpO2:  [90 %-96 %] 90 %  I/O last 3 completed shifts:  In: 180 [P.O.:180]  Out: -     GENERAL: Alert , awake and oriented. NAD. Conversational, appropriate.   HEENT: Normocephalic. EOMI. No icterus or injection. Nares normal.   LUNGS: Clear to auscultation. No dyspnea at rest.   HEART: Regular rate. Extremities perfused.   ABDOMEN: Soft, nontender, and nondistended. Positive bowel sounds.   EXTREMITIES: Left upper extremity swelling, in Ace wraps  NEUROLOGIC: Moves extremities x4 on command. No acute focal neurologic abnormalities noted.     Medications     heparin 900 Units/hr (08/30/22 2138)     - MEDICATION INSTRUCTIONS -         - MEDICATION INSTRUCTIONS for Dialysis Patients -   Does not apply See Admin Instructions     sodium chloride 0.9%  250 mL Intravenous Once in dialysis/CRRT     sodium chloride 0.9%  300 mL Hemodialysis Machine Once     allopurinol  100 mg Oral QPM     atorvastatin  20 mg Oral Daily     calcium acetate  2,001 mg Oral TID w/meals     carvedilol  12.5 mg Oral BID w/meals     doxercalciferol  4 mcg Intravenous Once in dialysis/CRRT     epoetin delma-epbx  4,000 Units Intravenous Once in dialysis/CRRT     sodium chloride (PF) 0.9%  10 mL Intracatheter Once in dialysis/CRRT    Followed by     heparin  1.3-2.6 mL Intracatheter Once in  dialysis/CRRT     sodium chloride (PF) 0.9%  10 mL Intracatheter Once in dialysis/CRRT    Followed by     heparin  1.3-2.6 mL Intracatheter Once in dialysis/CRRT     multivitamin RENAL  1 capsule Oral Daily     - MEDICATION INSTRUCTIONS -   Does not apply Once     pantoprazole  40 mg Oral BID AC     sodium chloride (PF)  3 mL Intracatheter Q8H     sodium chloride (PF)  9 mL Intracatheter During Dialysis/CRRT (from stock)     sodium chloride (PF)  9 mL Intracatheter During Dialysis/CRRT (from stock)     sucralfate  1 g Oral 4x Daily AC & HS       Data   Recent Labs   Lab 08/31/22  0422 08/30/22  1931 08/30/22  1136 08/30/22  0226 08/29/22  2312 08/29/22  1227 08/28/22  1759 08/28/22  0610 08/26/22  1910 08/26/22  0541 08/24/22  2232 08/24/22  1453   WBC  --   --  10.0  --   --   --   --  9.6  --  9.0  --  12.5*   HGB 8.3* 8.7* 8.5* 7.7*   < >  --    < > 7.8*  7.8*   < > 8.7*  8.7*   < > 9.6*   MCV  --   --  94  --   --   --   --  92  --  92  --  88   PLT  --   --   --   --   --   --   --  371  --  307  --  304   NA  --   --   --  133  --   --   --  130*  --  132*  --  135   POTASSIUM  --   --   --  4.2  --   --   --  4.1  --  4.6  --  3.7   CHLORIDE  --   --   --  99  --   --   --  96  --  99  --  103   CO2  --   --   --  25  --   --   --  27  --  25  --  26   BUN  --   --   --  29  --   --   --  41*  --  58*  --  32*   CR  --   --   --  4.23*  --   --   --  5.08*  --  4.85*  --  3.01*   ANIONGAP  --   --   --  9  --   --   --  7  --  8  --  6   JEWELL  --   --   --  8.7  --   --   --  9.5  --  9.4  --  8.7   GLC  --   --   --  94  --  87  --  99  --  84  --  82   ALBUMIN  --   --   --  2.1*  --   --   --   --   --   --   --   --     < > = values in this interval not displayed.       Imaging:   No results found for this or any previous visit (from the past 24 hour(s)).

## 2022-08-31 NOTE — PROGRESS NOTES
Assessment and Plan:   ESRD on dialysis: 3h, L CVC, 400 BFR, 2L UF, 3K 35 HCO3 138 Na. Hectorol and EPO on the run. No additional heparin.   Cont to run MWF while in hosp.            Interval History:   Lung cancer with SVC syndrome: XRT.        Anemia: due to ESRD and GI bleed.             Review of Systems:   No sx on dialysis.           Medications:       - MEDICATION INSTRUCTIONS for Dialysis Patients -   Does not apply See Admin Instructions     allopurinol  100 mg Oral QPM     atorvastatin  20 mg Oral Daily     calcium acetate  2,001 mg Oral TID w/meals     carvedilol  12.5 mg Oral BID w/meals     ceFAZolin  2 g Intravenous Pre-Op/Pre-procedure x 1 dose     sodium chloride (PF) 0.9%  10 mL Intracatheter Once in dialysis/CRRT    Followed by     heparin  1.3-2.6 mL Intracatheter Once in dialysis/CRRT     sodium chloride (PF) 0.9%  10 mL Intracatheter Once in dialysis/CRRT    Followed by     heparin  1.3-2.6 mL Intracatheter Once in dialysis/CRRT     multivitamin RENAL  1 capsule Oral Daily     - MEDICATION INSTRUCTIONS -   Does not apply Once     pantoprazole  40 mg Oral BID AC     sodium chloride (PF)  3 mL Intracatheter Q8H     sodium chloride (PF)  9 mL Intracatheter During Dialysis/CRRT (from stock)     sodium chloride (PF)  9 mL Intracatheter During Dialysis/CRRT (from stock)     sucralfate  1 g Oral 4x Daily AC & HS       heparin 900 Units/hr (08/30/22 2138)     - MEDICATION INSTRUCTIONS -       sodium chloride 0.9%       Current active medications and PTA medications reviewed, see medication list for details.            Physical Exam:   Vitals were reviewed  Patient Vitals for the past 24 hrs:   BP Temp Temp src Pulse Resp SpO2   08/31/22 1030 127/87 -- -- 110 -- --   08/31/22 1015 129/85 -- -- 108 -- --   08/31/22 0945 126/80 -- -- 107 -- --   08/31/22 0930 120/73 -- -- 106 -- --   08/31/22 0917 133/85 -- -- 106 -- --   08/31/22 0910 136/83 98.1  F (36.7  C) Oral 109 -- --   08/31/22 0700 134/87  98.5  F (36.9  C) Oral 111 18 90 %   22 108/62 97.6  F (36.4  C) Oral 107 18 91 %   22 1753 -- -- -- -- -- 94 %   22 174 109/66 -- -- 101 16 94 %   22 1748 109/66 -- -- 101 -- --   22 1540 115/66 97.8  F (36.6  C) Oral 99 18 96 %   22 1445 113/63 97.6  F (36.4  C) Oral 104 18 90 %       Temp:  [97.6  F (36.4  C)-98.5  F (36.9  C)] 98.1  F (36.7  C)  Pulse:  [] 110  Resp:  [16-18] 18  BP: (108-136)/(62-87) 127/87  SpO2:  [90 %-96 %] 90 %    Temperatures:  Current - Temp: 98.1  F (36.7  C); Max - Temp  Av.9  F (36.6  C)  Min: 97.6  F (36.4  C)  Max: 98.5  F (36.9  C)  Respiration range: Resp  Av.6  Min: 16  Max: 18  Pulse range: Pulse  Av.8  Min: 99  Max: 111  Blood pressure range: Systolic (24hrs), Av , Min:108 , Max:136   ; Diastolic (24hrs), Av, Min:62, Max:87    Pulse oximetry range: SpO2  Av.5 %  Min: 90 %  Max: 96 %    I/O last 3 completed shifts:  In: 180 [P.O.:180]  Out: -       Intake/Output Summary (Last 24 hours) at 2022 1047  Last data filed at 2022 0500  Gross per 24 hour   Intake 180 ml   Output --   Net 180 ml       Alert and responsive, resting in bed  L CVC with no redness or tenderness  UE with bilat 2-3+ edema       Wt Readings from Last 4 Encounters:   22 56.3 kg (124 lb 1.6 oz)   07/10/22 65 kg (143 lb 4.8 oz)   22 56.2 kg (124 lb)   22 56.7 kg (125 lb)          Data:          Lab Results   Component Value Date     2022     2022     2022      Lab Results   Component Value Date    CHLORIDE 99 2022    CHLORIDE 96 2022    CHLORIDE 99 2022    Lab Results   Component Value Date    BUN 29 2022    BUN 41 2022    BUN 58 2022      Lab Results   Component Value Date    POTASSIUM 4.2 2022    POTASSIUM 4.1 2022    POTASSIUM 4.6 2022    Lab Results   Component Value Date    CO2 25 2022    CO2 27 2022     CO2 25 08/26/2022    Lab Results   Component Value Date    CR 4.23 08/30/2022    CR 5.08 08/28/2022    CR 4.85 08/26/2022        Recent Labs   Lab Test 08/31/22  0422 08/30/22  1931 08/30/22  1136 08/28/22  1759 08/28/22  0610 08/26/22  1910 08/26/22  0541 08/24/22  2232 08/24/22  1453   WBC  --   --  10.0  --  9.6  --  9.0  --  12.5*   HGB 8.3* 8.7* 8.5*   < > 7.8*  7.8*   < > 8.7*  8.7*   < > 9.6*   HCT  --   --  26.4*  --  23.7*  --  26.8*  --  28.5*   MCV  --   --  94  --  92  --  92  --  88   PLT  --   --   --   --  371  --  307  --  304    < > = values in this interval not displayed.     Recent Labs   Lab Test 08/21/22  0530 08/20/22  1723 08/15/22  1115 08/15/22  0451   AST 96*  --  43 48*   ALT 20  --  19 22   ALKPHOS 34*  --  26* 32*   BILITOTAL 0.4  --  0.3 0.4   CALDERON  --  21  --   --        Recent Labs   Lab Test 07/11/22  0750   MAG 2.3     Recent Labs   Lab Test 08/30/22  0226 08/15/22  1641 07/12/22  0659   PHOS 3.4 4.8* 2.2*     Recent Labs   Lab Test 08/30/22  0226 08/28/22  0610 08/26/22  0541   JEWELL 8.7 9.5 9.4     Lab Results   Component Value Date    JEWELL 8.7 08/30/2022     Lab Results   Component Value Date    WBC 10.0 08/30/2022    HGB 8.3 (L) 08/31/2022    HCT 26.4 (L) 08/30/2022    MCV 94 08/30/2022    PLT  08/30/2022      Comment:      Platelets Clumped-Platelet Count Not Available     Lab Results   Component Value Date     08/30/2022    POTASSIUM 4.2 08/30/2022    CHLORIDE 99 08/30/2022    CO2 25 08/30/2022    GLC 94 08/30/2022     Lab Results   Component Value Date    BUN 29 08/30/2022    CR 4.23 (H) 08/30/2022     Lab Results   Component Value Date    MAG 2.3 07/11/2022     Lab Results   Component Value Date    PHOS 3.4 08/30/2022       Creatinine   Date Value Ref Range Status   08/30/2022 4.23 (H) 0.52 - 1.04 mg/dL Final   08/28/2022 5.08 (H) 0.52 - 1.04 mg/dL Final   08/26/2022 4.85 (H) 0.52 - 1.04 mg/dL Final   08/24/2022 3.01 (H) 0.52 - 1.04 mg/dL Final   08/21/2022 4.91 (H)  0.52 - 1.04 mg/dL Final   08/20/2022 4.30 (H) 0.52 - 1.04 mg/dL Final       Attestation:  Seen on dialysis.      Hari Louise MD

## 2022-08-31 NOTE — PLAN OF CARE
A/Ox4. VSS on RA. SBA. PRN oxy given once for pain. Mechanical soft diet. Continent BB. Hep Xa came back at 0.77, orders to pause for 60 mins then restart at 100u less;Xa recheck back at 0.37, no change in rate needed, recheck at 1100; currently infusing at 900u/hr; following hep Xa was 0.37, no rate change required. Hgb low but getting higher. PIV x1 infusing hep, IV site leaking from edema, not from flushing; CVC in L chest. +3-4 edema in upper extremities; +1-2 in lower extremities. Tele ST. Dialysis 8/31.

## 2022-08-31 NOTE — PROGRESS NOTES
SP[facility] Progress Note  [facility]  C    PT indicates that she wishes to have her daughter present for completion of Health Care Directive. PT occupied with other cares or daughter not present during several attempts to visit.    SH will continue to follow.    Alexandru Allen  Associate

## 2022-09-01 NOTE — PROGRESS NOTES
[facility]    HOSPITALIST PROGRESS NOTE :   --------------------------------------------------    Date of Admission:  8/14/2022    Cumulative Summary:   Gina Simpson is a pleasant 56 year old female with CAD, cardiomyopathy, hypertension, COPD, ESRD, failed RUE fistula, s/p creation of LUE AV fistula on 6/28/22 complicated by hematoma s/p evacuation, LUE DVT who presented as direct admission to [facility] from [place] ED after presenting with 2+weeks of facial swelling, fatigue, dysphagia. She was found to have prevertebral soft tissue swelling, lung mass with SVC syndrome and extensive adenopathy.     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, hepatic metastasis and  lymphadenopathy- new diagnosis: reported significant fatigue, 20lb weight loss, poor appetite for two months     SVC obstruction and SVC syndrome - secondary to large neuroendocrine tumor     Mediastinal mass measuring 10 x 8 x 6 cm with SVC obstruction and occlusion of right upper lobe pulmonary artery     Extensive cervical, supraclavicular, right thoracic inlet and right mediastinal/hilar adenopathy     New 9mm ARIA pulmonary nodule     * CT on 8/15 showed new mediastinal mass on right measuring 10 x 8 x 6 cm with mass-effect on trachea, SVC and occlusion of right upper lobe pulmonary bronchus and artery.  Also concern for lymphangitic spread of neoplasm. 2 solid nodules in left lung concerning for metastatic foci.  Multiple large hepatic metastasis.  SVC syndrome/compression secondary to large mediastinal mass/adenopathy, small left pleural effusion, small pericardial effusion      * Underwent liver biopsy on 8/16 that showed metastatic poorly differentiated non-small cell carcinoma with neuroendocrine differentiation     * CT abdomen/pelvis w/o CT 8/25 :   1. Multiple bilobar hepatic metastases measuring up to 4.1 cm.   2. Subcentimeter lucent lesion within the L5  vertebral body, indeterminate for a small osseous metastasis.    3. Moderate right pleural effusion, increased in volume since 08/15/2022.     * MRI brain w/wo 8/26/2022 did not show any evidence of metastatic disease    -- Appreciate recommendations by thoracic surgery, vascular surgery, IR and oncology  -- SVC stent was not recommended  -- Started on emergent radiation therapy for SVC syndrome.  Has received 10 of 10 treatments.  Facial swelling appears to be improving. Upper extremities edema persist  -- [place] oncology following -staging scans completed  -- Continue pain control with IV Dilaudid, oxycodone  -- Per oncology, prognosis is poor and treatment is palliative.  -- IR consult ordered for chemotherapy port placement.  -- IR recommended to repeat CT scan of the chest with contrast to follow-up on tumor burden and how and when to proceed with port placement  -- Repeat CT chest from August 29 showing progressive atelectasis in right lung most likely due to central obstruction, large effusion on the right increased from previous, similar left-sided nodule since the comparison study and decreased size of the mediastinal masses since comparison.  -- Patient is asking if she has malignancy , discussed with patient and daughter regarding the biopsy results , encouraged her to have further questions with oncology team  -- Systemic chemotherapy will start after completion of radiation treatments, oncology is planning to start treatment in  2 weeks   -- Smoking cessation advised  -- Heparin infusion is started on August 30, hemoglobin is stable around 8.5  -- Interventional radiology placed Port-A-Cath in Right lower extremity  -- Patient had questions regarding if she would need Port-A-Cath and hemodialysis catheter, discussed with her that most likely patient will not be able to get hemodialysis through her Port-A-Cath but we will confirm it from nephrologist  -- I also updated Daughter on phone regarding the  plan of care on August 30, I offered to call patient's daughter from the room when I was rounding so she can participate during our rounding but according to patient her daughter might be sleeping at that point.     Acute upper GI bleed due to duodenal ulcer, 8/15. Recurrent bleeding 8/22  Acute blood loss anemia due to upper GI bleed secondary to duodenal ulcer  Bleeding duodenal ulcer with visible vessel, EGD 8/15  Chronic anemia due to end-stage renal disease  S/p 8 units PRBC transfusion this admission    - Patient developed acute GI bleed on 8/15.    - Hemoglobin dropped to 5.6.  Required 2 units of PRBC on 8/15, 1 unit on 8/17, 8/18, 8/20, 2 units on 8/22, 1 unit 8/24   - EGD 8/15 showed - clotted blood in the entire stomach, spurting duoeneal ulcer with visible vessel, injected, clips placed, argon plasma coag used.   - IV heparin was resumed after EGD.  Hemoglobin continued to trend down due to anticoagulation with IV heparin.  Required frequent frequent PRBC transfusions.  - acutely worsened on 8/22 - had melena, hypotension and Hb drop to 5.6  - IV heparin stopped in 8/22 and she was transfused 2 units of PRBC.   - Underwent repeat EGD 8/23 that showed blood and clots in gastric body, non bleeding duodenal ulcer with pigmented material. Injected and clipped.    - Received 8th unit of transfusion on 8/24.  Hemoglobin improved to 9 after transfusion.  This has again declined to 7.5 - 7.8. IV heparin infusion continues to be on hold since 8/22.   -Was planning to restart IV heparin on 8/27 but did not due to decline in hemoglobin from 9 to 7.8. Hb stable in past 72 hours     -- Continue protonix BID, switch to po  -- Sucralfate for short term  -- Continue to monitor hemoglobin and transfuse as needed  -- Goal is to keep  Hb>7 , platelet > 50 and INR <2   -- Hemoglobin is down to 7.7 from 8.4     Left brachial vein DVT 7/6/22  Right internal jugular thrombus, on CT neck 8/14     -- Was started on warfarin in  July 2022.  INR subtheraputic on admission  -- started on IV heparin infusion but had to be discontinued on 8/22 due to continued severe hemorrhage from duodenal ulcer  -- Will need lifelong anticoagulation.   --Appreciate oncology input regarding anticoagulation, warfarin would not be a good choice for anticoagulation in the presence of hypercoagulability with malignancy.  Lovenox and Pradaxa is not recommended and renal failure  -- Most likely patient might need to be started on apixaban, does have a reversal agent of Andexxa if GI bleeding were to recur.  Other option would be subcutaneous heparin  -- As above , awaiting port placement       Severe bilateral upper extremity edema   -- Due to SVC syndrome and right internal jugular DVT      Symmetric prevertebral soft tissue swelling from C2-C5 level  --Noted on CT scan  --ENT consulted.  Did not feel patient has retropharyngeal abscess.  ENT felt that edema of retropharyngeal space is related to SVC syndrome.  No specific treatment advised.  On exam, airway was widely patent, both vocal cords were mobile  -- ENT noted exposed bone of right mandible with dental caries.  Patient will need dental evaluation as outpatient for this.     Dental caries  ENT noted exposed bone of right mandible with dental caries.    -- Patient will need dental evaluation as outpatient for this.      ESRD on HD q. M W F  S/p left AV fistula formation 6/28/22  S/p fistulogram with dilatation of L subclavian and junction of brachiocephalic and subclavian vein complicated by hematoma requiring evacuation 7/8/22  -- Dialyzes MW - Dr. Gonsales.  Left AV fistula is working.  She also has tunneled dialysis catheter in place.  Use of dialysis site as per nephrology.     -- She underwent outpatient fistulogram 8/11/22.   -- Continue dialysis as per nephrology  -- Getting hemodialysis this morning     CAD, s/p stenting in 2019  Chronic systolic CHF with EF of 40-45%, echo 2020    Hypertension  PTA:  hydralazine 50mg TID, carvedilol 25mg bid, lisinopril 20mg daily, amlodipine 10mg every evening  -- HOLD lisinopril, hydralazine, amlodipine due borderline BP   -- Coreg dose was decreased due to low BP, now increased to 12.5 mg twice daily.  At baseline she is on 25 mg twice daily         Hyperphosphatemia  --Cont PhosLo with meals      Severe malnutrition due to illness  --Nutrition services following.  -- Continue Ensure twice daily      Generalized weakness  Physical deconditioning  -- PT/OT consulted.  OT has recommended TCU versus home with home care  --Discussed with oncology regarding plan for restarting chemotherapy which will be started in 2 weeks.  --Discussed with patient that at this time patient can either decide to be discharged to home versus going to rehab for 10 to 12 days before her chemotherapy starts.    Clinically Significant Risk Factors Present on Admission       Diet: Mechanical/Dental Soft Diet    Antoine Catheter: Not present  DVT Prophylaxis: Pneumatic Compression Devices  Code Status: Full Code    The patient's care was discussed with the Patient.    Disposition Plan    Patient might be ready in the next 1 to 2 days when patient is safely transition to oral anticoagulation and her hemoglobin remains a stable.  Patient is recommended to be discharged to TCU versus home with home health care.  Patient is not due for her chemotherapy to be started for the next 2 weeks which might give her ample time to go to the rehab for 10 to 12 days and get stronger before she starts the chemotherapy  35 min were spent in direct patient care today including the floor time , more than 50% of the time was spent in patient care coordination and counseling.    Kemi Gallagher MD, MD, FACP  Text Page (7am - 6pm)    ----------------------------------------------------------------------------------------------------------------------    Interval History    Patient was seen and examined, sitting  in bed, feeling well.  Patient continues to have bilateral upper extremity swelling.  Discussed with patient if she would like me to get her daughter on phone also while I am rounding so she can participate in our conversation but according to patient it was too early  But daughter might be sleeping.  I reviewed the plan of care with patient discussed with her that currently she is on heparin infusion she has not noticed any signs and symptoms of bleeding but her hemoglobin is down to 7.7 and we will continue to monitor her closely.  Patient had question regarding removal of central venous catheter removal which is currently getting use for hemodialysis as now she has a Port-A-Cath, explained to patient that most likely bladder cancer not used for hemodialysis but we can double confirm it with nephrology team also.  Patient is otherwise denying any chest pain, palpitations or shortness of breath.  In the last 4 days I have been discussing with patient regarding her underlying malignancy diagnosis and ongoing plan of care.    -Data reviewed today: I reviewed all new labs and imaging results over the last 24 hours.    I personally reviewed no images or EKG's today.    Physical Exam   Temp: 98.7  F (37.1  C) Temp src: Oral BP: (!) 144/78 Pulse: 103   Resp: 16 SpO2: 95 % O2 Device: None (Room air) Oxygen Delivery: 2 LPM  Vitals:    08/21/22 0506 08/22/22 0509 08/26/22 0631   Weight: 57.9 kg (127 lb 10.3 oz) 56.9 kg (125 lb 7.1 oz) 56.3 kg (124 lb 1.6 oz)     Vital Signs with Ranges  Temp:  [97.6  F (36.4  C)-98.7  F (37.1  C)] 98.7  F (37.1  C)  Pulse:  [103-117] 103  Resp:  [11-35] 16  BP: (103-144)/(65-89) 144/78  SpO2:  [86 %-100 %] 95 %  I/O last 3 completed shifts:  In: 480 [P.O.:480]  Out: -     GENERAL: Alert , awake and oriented. NAD. Conversational, appropriate.   HEENT: Normocephalic. EOMI. No icterus or injection. Nares normal.   LUNGS: Clear to auscultation. No dyspnea at rest.  CVC catheter placed in  chest  HEART: Regular rate. Extremities perfused.   ABDOMEN: Soft, nontender, and nondistended. Positive bowel sounds.   EXTREMITIES: Bilateral upper extremity swellings, patient is not wearing Ace wraps at this point, Port-A-Cath placement and right thigh area  NEUROLOGIC: Moves extremities x4 on command. No acute focal neurologic abnormalities noted.     Medications     heparin 900 Units/hr (09/01/22 0815)     - MEDICATION INSTRUCTIONS -         - MEDICATION INSTRUCTIONS for Dialysis Patients -   Does not apply See Admin Instructions     allopurinol  100 mg Oral QPM     atorvastatin  20 mg Oral Daily     calcium acetate  2,001 mg Oral TID w/meals     carvedilol  12.5 mg Oral BID w/meals     multivitamin RENAL  1 capsule Oral Daily     pantoprazole  40 mg Oral BID AC     sodium chloride (PF)  3 mL Intracatheter Q8H     sucralfate  1 g Oral 4x Daily AC & HS       Data   Recent Labs   Lab 09/01/22  0503 08/31/22  1909 08/31/22  0422 08/30/22  1931 08/30/22  1136 08/30/22  0226 08/29/22  2312 08/29/22  1227 08/28/22  1759 08/28/22  0610 08/26/22  1910 08/26/22  0541   WBC  --   --   --   --  10.0  --   --   --   --  9.6  --  9.0   HGB 7.7* 8.4* 8.3*   < > 8.5* 7.7*   < >  --    < > 7.8*  7.8*   < > 8.7*  8.7*   MCV  --   --   --   --  94  --   --   --   --  92  --  92   PLT  --   --   --   --   --   --   --   --   --  371  --  307   NA  --   --   --   --   --  133  --   --   --  130*  --  132*   POTASSIUM  --   --   --   --   --  4.2  --   --   --  4.1  --  4.6   CHLORIDE  --   --   --   --   --  99  --   --   --  96  --  99   CO2  --   --   --   --   --  25  --   --   --  27  --  25   BUN  --   --   --   --   --  29  --   --   --  41*  --  58*   CR  --   --   --   --   --  4.23*  --   --   --  5.08*  --  4.85*   ANIONGAP  --   --   --   --   --  9  --   --   --  7  --  8   JEWELL  --   --   --   --   --  8.7  --   --   --  9.5  --  9.4   GLC  --   --   --   --   --  94  --  87  --  99  --  84   ALBUMIN  --   --   --    --   --  2.1*  --   --   --   --   --   --     < > = values in this interval not displayed.       Imaging:   Recent Results (from the past 24 hour(s))   IR Chest Port Placement > 5 Yrs of Age    Narrative    IR PORT PLACEMENT > 5 YRS OF AGE   8/31/2022 4:28 PM     CLINICAL HISTORY/INDICATION: Port-A-Cath placement, mediastinal mass.  Port needed for chemotherapy.    PROCEDURES PERFORMED:   1. Ultrasound-guided right common femoral venotomy.  2. Port placement.  3. Moderate sedation.    MODERATE SEDATION: Versed  0.5 mg IV; Fentanyl 25 mcg IV. During the  time out, immediately prior to the administration of medications, the  patient was reassessed for adequacy to receive conscious sedation.  Under physician supervision, Versed and fentanyl were administered for  moderate sedation. Pulse oximetry, heart rate and blood pressure were  continuously monitored by an independent trained observer. The  physician spent  28 minutes of face-to-face sedation time with the  patient.    CONTRAST: None    FLUORO: 1.5 minutes    AIR KERMA: 5.62 mGy    CONSENT: Following a discussion of the risks, benefits, indications  and alternatives to treatment, appropriate informed consent was  obtained.      TIMEOUT: A timeout was performed per universal protocol policy to  ensure correct patient, site, and procedure to be performed.     CENTRAL LINE STATEMENT: The patient was brought to the interventional  radiology suite and placed supine on the table. The patients right  groin and thigh region were prepped and draped in a sterile fashion  for tunneled line placement.  The procedure was performed using  maximal Sterile Barrier Technique Utilized: Cap AND mask AND sterile  gown AND sterile gloves AND sterile full body drape AND hand hygiene  AND skin preparation 2% chlorhexidine for cutaneous antisepsis (or  acceptable alternative antiseptics).  Sterile Ultrasound Technique  Utilized ?Sterile gel AND sterile probe covers    PROCEDURE AND  FINDINGS:   This central line was performed in accordance with the central line  bundle with the exception of vein selection. Following a discussion of  the risks, benefits, indications and alternatives to treatment,  appropriate informed consent was obtained.  The patient was brought to  the interventional radiology suite and placed supine on the table.     Ultrasound was utilized to evaluate the veins of the right thigh and a  permanent record of the image was obtained which demonstrated the  common femoral vein to be patent. Under direct ultrasound guidance, 1%  Lidocaine was infiltrated and access was gained easily into right  common femoral vein utilizing micropuncture technique. The wire was  advanced easily under fluoroscopic guidance and the tract was serially  dilated and a peel away sheath placed.     Attention was then directed to the creation of the subcutaneous  pocket. Next, 1% Lidocaine with Epinephrine was used to anesthetize  the skin and a 2 cm incision was made along the lateral thigh and the  pocket was created using sharp and blunt dissection. The catheter was  brought through a subcutaneous tunnel from the pocket to the venous  access site.  The catheter was measured and then advanced through the  peel away sheath under fluoroscopic guidance. The port was then tested  for leaks. When no leaks were identified, the port was flushed  thoroughly with heparinized saline and placed within the subcutaneous  pocket. A final placement film demonstrates the catheter tip in the  IVC with the patient supine. The wound was closed with 3-0 interrupted  Monocryl deep layer. Dermabond was then applied over the incision site  as were steri-strips.  The small incision at the groin was also closed  uneventfully.      Throughout the procedure, the patient was monitored by a radiology  nurse for cardiac rhythm which remained stable.  The patient tolerated  the procedure well and left the interventional radiology  suite in  stable condition.      Impression    IMPRESSION:  Placement of an implanted right common femoral vein 6 Israeli 35 cm  single lumen power injectable (rated to 5mL/sec) thigh port, as  described using ultrasound and fluoroscopic guidance.     SHAHAB MORALES DO         SYSTEM ID:  [id number]

## 2022-09-01 NOTE — PROGRESS NOTES
"    Gina seen briefly in her room s/p 8/31 right thigh port a catheter placement.     She had no c/o pain, site felt \"good\". The dressing is CD&I and IV heparin is running into the port.     Discussed importance of good securement of the IV tubing while accessed as the site would be more prone to being tugged or caught.     IR will sign off at this time. Please contact IR as needed.     Thanks, Marisol [facility] Interventional Radiology CNP ([phone]) (phone [phone])         "

## 2022-09-01 NOTE — PROVIDER NOTIFICATION
MD Notification    Notified Person: MD    Notified Person Name: Kemi Gallagher    Notification Date/Time: 9/1/22 1721    Notification Interaction: vocera    Purpose of Notification:    FYI rewrapped arms and below LUE incision pt has firm nodule on old incision that is more noticeable than before. Asymptomatic but wanted to let you know   Orders Received: Ok thanks will look into it tomorrow      Comments:

## 2022-09-01 NOTE — PLAN OF CARE
A/Ox4. VSS on 2L NC. SBA. Pt received PRN oxy once for pain at end of shift. Mechanical soft diet. Continent BB; no BM. Hep Xa was in range for second time, recheck scheduled for tomorrow AM; Hgb went down to 7.7. +3-4 edema in upper extremities, mild edema in ankles. L chest CVC, LUE incision, R thigh port infusing. Tele ST. Hemodialysis scheduled for today at 1015.

## 2022-09-01 NOTE — PROGRESS NOTES
Assessment and Plan:   ESRD: HD in am. Orders reviewed and written. S/P R thigh portacath. Nurses ask me if it can be used for dialysis which it cannot. We are using her L CVC for dialysis. She has an arm access on the left but due to SVC syndrome both arms are massively edematous and so the arm access would be difficult to cannulate.   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             Interval History:   Lung cancer with SVC syndrome: XRT.        Anemia: due to ESRD and GI bleed.                   Review of Systems:   No pain or SOB. Thinks she is going home tomorrow.           Medications:       - MEDICATION INSTRUCTIONS for Dialysis Patients -   Does not apply See Admin Instructions     allopurinol  100 mg Oral QPM     atorvastatin  20 mg Oral Daily     calcium acetate  2,001 mg Oral TID w/meals     carvedilol  12.5 mg Oral BID w/meals     multivitamin RENAL  1 capsule Oral Daily     pantoprazole  40 mg Oral BID AC     sodium chloride (PF)  3 mL Intracatheter Q8H     sucralfate  1 g Oral 4x Daily AC & HS       heparin 900 Units/hr (09/01/22 0815)     - MEDICATION INSTRUCTIONS -       Current active medications and PTA medications reviewed, see medication list for details.            Physical Exam:   Vitals were reviewed  Patient Vitals for the past 24 hrs:   BP Temp Temp src Pulse Resp SpO2   09/01/22 1209 103/58 98.4  F (36.9  C) Oral 98 16 91 %   09/01/22 1002 124/77 -- -- 105 -- --   09/01/22 0730 -- -- -- -- -- 95 %   09/01/22 0700 (!) 144/78 98.7  F (37.1  C) Oral 103 16 97 %   09/01/22 0500 117/70 98.1  F (36.7  C) Oral 103 16 98 %   08/31/22 2100 122/75 97.7  F (36.5  C) Axillary 105 16 100 %   08/31/22 1800 118/75 -- -- 111 -- 96 %   08/31/22 1728 117/71 -- -- 107 -- 99 %   08/31/22 1702 114/67 97.7  F (36.5  C) Oral 109 -- 100 %   08/31/22 1635 -- -- -- 111 14 --   08/31/22 1630 130/76 -- -- 111 20 --   08/31/22 1625 116/89 -- -- 111 22 94 %   08/31/22 1620 119/81 -- -- 106 18 94 %    22 1615 107/83 -- -- 112 11 (!) 86 %   22 1610 119/75 -- -- 112 22 92 %   22 1605 105/65 -- -- 112 13 96 %   22 1600 106/73 -- -- 113 26 99 %   22 1555 120/85 -- -- 117 (!) 35 100 %   22 1550 122/75 -- -- -- -- --       Temp:  [97.7  F (36.5  C)-98.7  F (37.1  C)] 98.4  F (36.9  C)  Pulse:  [] 98  Resp:  [11-35] 16  BP: (103-144)/(58-89) 103/58  SpO2:  [86 %-100 %] 91 %    Temperatures:  Current - Temp: 98.4  F (36.9  C); Max - Temp  Av.1  F (36.7  C)  Min: 97.7  F (36.5  C)  Max: 98.7  F (37.1  C)  Respiration range: Resp  Av.8  Min: 11  Max: 35  Pulse range: Pulse  Av.6  Min: 98  Max: 117  Blood pressure range: Systolic (24hrs), Av , Min:103 , Max:144   ; Diastolic (24hrs), Av, Min:58, Max:89    Pulse oximetry range: SpO2  Av.8 %  Min: 86 %  Max: 100 %    I/O last 3 completed shifts:  In: 480 [P.O.:480]  Out: -       Intake/Output Summary (Last 24 hours) at 2022 1334  Last data filed at 2022 0800  Gross per 24 hour   Intake 580 ml   Output --   Net 580 ml       Alert, cachectic  R thigh portacath in place  LUE tight edema, LAF with clean suture line, + bruit       Wt Readings from Last 4 Encounters:   22 56.3 kg (124 lb 1.6 oz)   07/10/22 65 kg (143 lb 4.8 oz)   22 56.2 kg (124 lb)   22 56.7 kg (125 lb)          Data:          Lab Results   Component Value Date     2022     2022     2022    Lab Results   Component Value Date    CHLORIDE 99 2022    CHLORIDE 96 2022    CHLORIDE 99 2022    Lab Results   Component Value Date    BUN 29 2022    BUN 41 2022    BUN 58 2022      Lab Results   Component Value Date    POTASSIUM 4.2 2022    POTASSIUM 4.1 2022    POTASSIUM 4.6 2022    Lab Results   Component Value Date    CO2 25 2022    CO2 27 2022    CO2 25 2022    Lab Results   Component Value Date    CR 4.23 2022     CR 5.08 08/28/2022    CR 4.85 08/26/2022        Recent Labs   Lab Test 09/01/22  0503 08/31/22  1909 08/31/22  0422 08/30/22  1931 08/30/22  1136 08/28/22  1759 08/28/22  0610 08/26/22  1910 08/26/22  0541 08/24/22  2232 08/24/22  1453   WBC  --   --   --   --  10.0  --  9.6  --  9.0  --  12.5*   HGB 7.7* 8.4* 8.3*   < > 8.5*   < > 7.8*  7.8*   < > 8.7*  8.7*   < > 9.6*   HCT  --   --   --   --  26.4*  --  23.7*  --  26.8*  --  28.5*   MCV  --   --   --   --  94  --  92  --  92  --  88   PLT  --   --   --   --   --   --  371  --  307  --  304    < > = values in this interval not displayed.     Recent Labs   Lab Test 08/21/22  0530 08/20/22  1723 08/15/22  1115 08/15/22  0451   AST 96*  --  43 48*   ALT 20  --  19 22   ALKPHOS 34*  --  26* 32*   BILITOTAL 0.4  --  0.3 0.4   CALDERON  --  21  --   --        Recent Labs   Lab Test 07/11/22  0750   MAG 2.3     Recent Labs   Lab Test 08/30/22  0226 08/15/22  1641 07/12/22  0659   PHOS 3.4 4.8* 2.2*     Recent Labs   Lab Test 08/30/22  0226 08/28/22  0610 08/26/22  0541   JEWELL 8.7 9.5 9.4       Lab Results   Component Value Date    JEWELL 8.7 08/30/2022     Lab Results   Component Value Date    WBC 10.0 08/30/2022    HGB 7.7 (L) 09/01/2022    HCT 26.4 (L) 08/30/2022    MCV 94 08/30/2022    PLT  08/30/2022      Comment:      Platelets Clumped-Platelet Count Not Available     Lab Results   Component Value Date     08/30/2022    POTASSIUM 4.2 08/30/2022    CHLORIDE 99 08/30/2022    CO2 25 08/30/2022    GLC 94 08/30/2022     Lab Results   Component Value Date    BUN 29 08/30/2022    CR 4.23 (H) 08/30/2022     Lab Results   Component Value Date    MAG 2.3 07/11/2022     Lab Results   Component Value Date    PHOS 3.4 08/30/2022       Creatinine   Date Value Ref Range Status   08/30/2022 4.23 (H) 0.52 - 1.04 mg/dL Final   08/28/2022 5.08 (H) 0.52 - 1.04 mg/dL Final   08/26/2022 4.85 (H) 0.52 - 1.04 mg/dL Final   08/24/2022 3.01 (H) 0.52 - 1.04 mg/dL Final   08/21/2022 4.91  (H) 0.52 - 1.04 mg/dL Final   08/20/2022 4.30 (H) 0.52 - 1.04 mg/dL Final       Attestation:  I have reviewed today's vital signs, notes, medications, labs and imaging.     Hari Louise MD

## 2022-09-01 NOTE — PLAN OF CARE
Goal Outcome Evaluation:    Plan of Care Reviewed With: patient     Overall Patient Progress: no change    Outcome Evaluation: mechanical/soft diet, continued poor PO intake. unable to visit with pt today as she was sleeping and did not wake. will send ensure TID as per last visit. received HD this AM    Elo Hurst RD, LD

## 2022-09-01 NOTE — PROVIDER NOTIFICATION
MD Notification    Notified Person: MD    Notified Person Name: JAMESON Tejada    Notification Date/Time: 8/31/22; 1955    Notification Interaction: amcom    Purpose of Notification: 324, [place]  Pt has an order for a 500u heparin injection, but she is currently on a hep gtt, which is infusing through her newly placed port. Should that injection order stay or can we remove that?    Orders Received: discontinued the heparin 500u injection    Comments:

## 2022-09-01 NOTE — PLAN OF CARE
Plan of Care Reviewed With: patient, daughter     Orientations: 4  12/30 on slums Tuesday  Vitals/Pain: elevated HR  Alternates between room air and 1L NC  Tele: ST  Lines/Drains: CVC to L upper chest, Port a cath to to R upper leg infusing heparin @900units/hr  Skin/Wounds: stitches to LUE, firm nodule below incision (marked and provider aware) 3+ edema to both arms ace wrap in place  GI/: no BM this shift or UO this shift, pt on hemodialysis  Labs: Abnormal/Trends: Hgb 7.7 Electrolyte Replacement- none. Heparin Xa recheck in AM  Ambulation/Assist: standby  Sleep Quality: very poor sleep in night, rested a lot today    Plan: TCU placement depending on financial situation, chemo starting in 2 weeks and goal to improve strength some before then. Possibly switching to oral anticoagulants tomorrow.  Daughter updated. Dialysis Friday

## 2022-09-01 NOTE — PROGRESS NOTES
[facility] Progress Note     Primary Oncologist/Hematologist:  Dr. Dreyer          Assessment and Plan:       Ms. Gina Simpson is a 56 year old woman who was admitted on 8/14/2022 with facial edema and dyspnea     1. 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 Imaging studies show evidence of metastatic disease involving cervical LNs and liver. Mediastinal mass measures 10 x 8 x 6 cm with SVC obstruction and occlusion of the RUL pulmonary artery  - The plan is for radiation treatment to help with SVC obstruction and to consider systemic therapy after completion of radiation therapy  -she had first radiation treatment on 8/20, plan for 10 fractions. Due to complete radiation today.   -tumor sent for NGS testing and PDL-1 testing on 8/31/22  -port placement planned.  CT scan  to re-assess mass prior to portacath placement shows a decrease size in mediastinal mass measuring 5 x 3.8 cm compared to 6.2 x 4.7 cm.  There is progressive atelectasis in the right upper lung likely due to central obstruction.  There is a large effusion on the right increased from previous imaging.   - Upon completion of radiation, pt will be considered for chemo/immunotherapy.      2. ESRD managed with hemodialysis via a LUE fistula.  -  HD M/W/F per nephrology team       3. Anemia due to CKD and bleeding upper GI ulcers  - Patient developed acute GI bleed on 8/15.    - Hemoglobin dropped to 5.6.  Required 2 units of PRBC on 8/15, 1 unit on 8/17, 8/18, 8/20, 2 units on 8/22, 1 unit 8/24   - EGD 8/15 showed - clotted blood in the entire stomach, spurting duoeneal ulcer with visible vessel, injected, clips placed, argon plasma coag used.   - IV heparin was resumed after EGD.  Hemoglobin continued to trend down due to anticoagulation with IV heparin.  Required frequent frequent PRBC transfusions.  - acutely worsened on 8/22 - had melena,  hypotension and Hb drop to 5.6  - IV heparin stopped in 8/22 and she was transfused 2 units of PRBC.   - Underwent repeat EGD 8/23 that showed blood and clots in gastric body, non bleeding duodenal ulcer with pigmented material. Injected and clipped.    - Received 8th unit of transfusion on 8/24.  Hemoglobin improved to 9 after transfusion.  This has again declined to 7.5 - 7.8. IV heparin infusion continues to be on hold since 8/22.  - hgb 6.1 on 8/29 is felt to be an error   -GI is ok with resumption of anticoagulation. Resumed heparin drip on 8/30  - if no bleeding, transition to Eliquis at discharge   Will monitor for recurrent bleeding has IV heparin is resumed.      4. Left brachial vein DVT (7/6/2022)  RIJ DVT (Ct neck 8/14/2022)    -anticoagulation on hold given recurrent GI bleed from bleeding duodenal ulcer necessitating total of 8 units of PRBC during this hospitalization     -Plan to resume heparin 8/30/22      PLAN  - Finished radiation 8/30   - Plan will be for outpatient chemotherapy + immunotherapy, although outpatient treatment may be challenging if she is discharged to TCU.  Next Gen sequencing is pending (ordered 8/31/22)  - She is hoping to ultimately be seen in our [place] office upon discharge, which is much closer to her home.   - S/p femoral chemoport placement in R thigh   - IV heparin has resumed  Monitor hgb.  If remains stable, anticipate transition to apixaban      Patrick Dreyer, DO   [facility]           Interval History:     No change in symptoms. Reports improved breathing from when I saw her last.                Review of Systems:     The 5 point Review of Systems is negative other than noted in the HPI                Medications:   Scheduled Medications    - MEDICATION INSTRUCTIONS for Dialysis Patients -   Does not apply See Admin Instructions     allopurinol  100 mg Oral QPM     atorvastatin  20 mg Oral Daily     calcium acetate  2,001 mg Oral TID w/meals     carvedilol   12.5 mg Oral BID w/meals     multivitamin RENAL  1 capsule Oral Daily     pantoprazole  40 mg Oral BID AC     sodium chloride (PF)  3 mL Intracatheter Q8H     sucralfate  1 g Oral 4x Daily AC & HS     PRN Medications  sodium chloride 0.9%, sodium chloride 0.9%, acetaminophen **OR** acetaminophen, HYDROmorphone, lidocaine 4%, lidocaine (buffered or not buffered), LORazepam, melatonin, naloxone **OR** naloxone **OR** naloxone **OR** naloxone, nitroGLYcerin, ondansetron **OR** ondansetron, oxyCODONE, - MEDICATION INSTRUCTIONS -, sodium chloride (PF), sodium chloride (PF)               Physical Exam:   Vitals were reviewed  Blood pressure 103/58, pulse 98, temperature 98.4  F (36.9  C), temperature source Oral, resp. rate 16, weight 56.3 kg (124 lb 1.6 oz), SpO2 91 %, not currently breastfeeding.  Wt Readings from Last 4 Encounters:   08/26/22 56.3 kg (124 lb 1.6 oz)   07/10/22 65 kg (143 lb 4.8 oz)   06/28/22 56.2 kg (124 lb)   05/31/22 56.7 kg (125 lb)       I/O last 3 completed shifts:  In: 580 [P.O.:580]  Out: 0                  Data:   All laboratory data and imaging studies reviewed.    CMP  Recent Labs   Lab 08/30/22  0226 08/29/22  1227 08/28/22  0610 08/26/22  0541     --  130* 132*   POTASSIUM 4.2  --  4.1 4.6   CHLORIDE 99  --  96 99   CO2 25  --  27 25   ANIONGAP 9  --  7 8   GLC 94 87 99 84   BUN 29  --  41* 58*   CR 4.23*  --  5.08* 4.85*   GFRESTIMATED 12*  --  9* 10*   JEWELL 8.7  --  9.5 9.4   PHOS 3.4  --   --   --    ALBUMIN 2.1*  --   --   --      CBC  Recent Labs   Lab 09/01/22  0503 08/31/22  1909 08/31/22  0422 08/30/22  1931 08/30/22  1136 08/28/22  1759 08/28/22  0610 08/26/22  1910 08/26/22  0541   WBC  --   --   --   --  10.0  --  9.6  --  9.0   RBC  --   --   --   --  2.82*  --  2.59*  --  2.92*   HGB 7.7* 8.4* 8.3* 8.7* 8.5*   < > 7.8*  7.8*   < > 8.7*  8.7*   HCT  --   --   --   --  26.4*  --  23.7*  --  26.8*   MCV  --   --   --   --  94  --  92  --  92   MCH  --   --   --   --  30.1   --  30.1  --  29.8   MCHC  --   --   --   --  32.2  --  32.9  --  32.5   RDW  --   --   --   --  17.2*  --  16.0*  --  15.9*   PLT  --   --   --   --   --   --  371  --  307    < > = values in this interval not displayed.     INRNo lab results found in last 7 days.

## 2022-09-01 NOTE — PROGRESS NOTES
CLINICAL NUTRITION SERVICES - REASSESSMENT NOTE      Recommendations Ordered by Registered Dietitian (RD):   - ordered ensure enlive strawberry with meals per previous note   Malnutrition:   % Weight Loss:  Up to 5% in 1 month (moderate malnutrition) - per 8/17 RD note  % Intake:   </= 50% for >/= 5 days (severe malnutrition) - continued from 8/17 RD note  Subcutaneous Fat Loss:  Orbital region mild depletion and Upper arm region moderate depletion  Muscle Loss:  Temporal region mild depletion, Clavicle bone region mild depletion, Patellar region moderate depletion and Posterior calf region moderate-severe depletion  Fluid Retention:  Trace to severe generalized edema - not nutrition related     Malnutrition Diagnosis: Severe malnutrition  In Context of:  Acute illness or injury       EVALUATION OF PROGRESS TOWARD GOALS   Diet: mechanical/dental soft     - 8/31: NPO --> CLD   - 8/24: FLD  - 8/22: NPO --> CLD  - 8/21: FLD  - 8/18: CLD  - 8/16: regular  - 8/15: NPO    Intake/Tolerance:   - attempted to visit with pt but she was sleeping and did not awake to voice  - per nursing flow sheet, 25% intakes documented  - per health touch, 6 meals received in past week      ASSESSED NUTRITION NEEDS:  Dosing Weight 58 kg   Estimated Energy Needs: 2260-8797 kcals (25-30 Kcal/Kg)  Justification: maintenance  Estimated Protein Needs: 70-90 grams protein (1.2-1.5 g pro/Kg)  Justification: hypercatabolism with acute illness and dialysis  Estimated Fluid Needs: 2535-5880 mL (1 mL/Kcal)  Justification: maintenance      NEW FINDINGS:   General: HD run this AM    Weight: last wt on 8/26 --> ordered updated wt    Labs: (8/30) creatinine 4.23 (H)    Medications: phoslo TID, renal multivitamin, protonix    GI: last BM x4 on 8/25    Resp.: 1.5 L NC      Previous Goals:   Patient to consume >50% of nutritionally adequate meals BID with supplements TID  Evaluation: Not met    Previous Nutrition Diagnosis:   Inadequate oral intake related to  decreased appetite, increased protein needs secondary to dialysis as evidenced by variable intakes documented over admit x15 days including multiple 0% intakes, continued need for oral nutrition supplements  Evaluation: No change      MALNUTRITION  % Weight Loss:  Up to 5% in 1 month (moderate malnutrition) - per 8/17 RD note  % Intake:   </= 50% for >/= 5 days (severe malnutrition) - continued from 8/17 RD note  Subcutaneous Fat Loss:  Orbital region mild depletion and Upper arm region moderate depletion  Muscle Loss:  Temporal region mild depletion, Clavicle bone region mild depletion, Patellar region moderate depletion and Posterior calf region moderate-severe depletion  Fluid Retention:  Trace to severe generalized edema - not nutrition related     Malnutrition Diagnosis: Severe malnutrition  In Context of:  Acute illness or injury      CURRENT NUTRITION DIAGNOSIS  Inadequate oral intake related to decreased appetite, increased protein needs secondary to dialysis as evidenced by variable intakes documented over admit x18 days including multiple 0-25% intakes, continued need for oral nutrition supplements    INTERVENTIONS  Recommendations / Nutrition Prescription  - ordered ensure enlive strawberry with meals per previous note    Implementation  Medical Food Supplement    Goals  Patient to consume >50% of nutritionally adequate meals BID with supplements TID      MONITORING AND EVALUATION:  Progress towards goals will be monitored and evaluated per protocol and Practice Guidelines      Elo Hurst, RD, LD

## 2022-09-01 NOTE — PLAN OF CARE
"A&O x4. VSS on 2L O2 (after IR procedure, RA prior to procedure). Tele ST. Up SBA. LS diminished. BS+.   Per pt voiding and last BM was yesterday.  Had dialysis today, reported that 2L fluid removed. Tolerated well.  CVC CDI, heparin locked per report.  LUE AVF with +B/T, sutures in place.  [place] placed on R thigh, completed 1 hour of bedrest as ordered.   Hgb drawn from port and Hep gtt being restarted by acuity RN now (due at 1900), next hep10 A to be ordered 6 hours after re-start.  Oxycodone given for pain, abd pain, back pain. Pt feels oxycodone helping some, but pt unable to give pain rating often, stated \"I don't know, I'm just trying to process\". Pt appears overwhelmed with everything including insurance situation. [facility] medical advisor assisting in contacting and getting insurance set up. Mood improved this evening when pt's family visited.  Pt eager to go home.    "

## 2022-09-02 NOTE — PROGRESS NOTES
Care Management Follow Up    Length of Stay (days): 19    Expected Discharge Date: 09/04/2022     Concerns to be Addressed: discharge planning     Patient plan of care discussed at interdisciplinary rounds: Yes    Anticipated Discharge Disposition: Home     Anticipated Discharge Services:    Anticipated Discharge DME:      Patient/family educated on Medicare website which has current facility and service quality ratings:    Education Provided on the Discharge Plan:    Patient/Family in Agreement with the Plan:      Referrals Placed by CM/SW:    Private pay costs discussed: Not applicable    Additional Information:  Writer received update from hospitalist that patient reported she wanted to return home today despite recommendation for TCU at discharge. Patient lives with her daughter Monica full time and she is her primary care taker.     Writer met with daughter Monica at bedside (patient at dialysis) to discuss discharge. Monica states if staff feels it would be best for patient to go to TCU that she would agree with that for discharge as she wants her mom to be safe. Monica also reports she has 2 flights of stairs to get into apartment with no elevator. Hospitalist is in agreement with therapy that it is safest for patient to discharge to TCU before she receives chemotherapy. Writer has outgoing message to oncology to inquire the start date of her chemotherapy to be able to tell patient/TCU. Patient would be in the TCU until chemo. Monica inquired if patient would be able to receive dialysis at the TCU or if she would need to transport patient to and from - writer educated that it cannot be done while in TCU and she would need to continue to bring her to dialysis appointments.     Writer requested PT order from hospitalist as patient is currently working with OT and want to assess mobility should she refuse TCU and return home.    Writer and bedside RN to come back and discuss discharge plans when patient is  present with daughter Monica.     Will continue to follow.    NEIDA Franco, SW    [facility]

## 2022-09-02 NOTE — PLAN OF CARE
Pt A&Ox 4. VSS on tachycardic. Lungs diminished. UE edema. Dialysis today 2L out. SBA. Taking oxycodone for pain. Possible discharge tomorrow. Voiding bedside commode. Continue to monitor.

## 2022-09-02 NOTE — PROGRESS NOTES
Heme onc quick note    No change in plan  Discussed with   Will arrange for outpatient follow up with Dr. Costa in either [place] or [place], I have already asked our schedulers to see her in follow up.     Patrick Dreyer, DO   [facility]

## 2022-09-02 NOTE — PLAN OF CARE
A/Ox4. VSS on 1L NC. SBA. Mechanical soft diet. Pt received PRN oxy once for pain. Pt having intermittent nausea. Continent BB, on hemodialysis. Hgb low but stable. +3 edema in upper  Extremities; LUE sutures from removed fistula; R thigh port infusing heparin at 900u/hr. Tele NSR/ST. Hemodialysis in AM.

## 2022-09-02 NOTE — PROGRESS NOTES
Assessment and Plan:   ESRD: leaving today after dialysis. Runs in the [facility]. Today: 3h 2 L UF, L  BFR, no heparin. 3K 35 HCO3 and 138 Na. Hectorol and EP on the run.   Ok for discharge per IM.             Interval History:   Lung cancer with SVC syndrome: XRT.        Anemia: due to ESRD and GI bleed.                 Review of Systems:   No sx on dialysis.           Medications:       - MEDICATION INSTRUCTIONS for Dialysis Patients -   Does not apply See Admin Instructions     sodium chloride 0.9%  250 mL Intravenous Once in dialysis/CRRT     sodium chloride 0.9%  300 mL Hemodialysis Machine Once     allopurinol  100 mg Oral QPM     atorvastatin  20 mg Oral Daily     calcium acetate  2,001 mg Oral TID w/meals     carvedilol  12.5 mg Oral BID w/meals     doxercalciferol  4 mcg Intravenous Once in dialysis/CRRT     epoetin delma-epbx  4,000 Units Intravenous Once in dialysis/CRRT     sodium chloride (PF) 0.9%  10 mL Intracatheter Once in dialysis/CRRT    Followed by     heparin  1.3-2.6 mL Intracatheter Once in dialysis/CRRT     sodium chloride (PF) 0.9%  10 mL Intracatheter Once in dialysis/CRRT    Followed by     heparin  1.3-2.6 mL Intracatheter Once in dialysis/CRRT     multivitamin RENAL  1 capsule Oral Daily     - MEDICATION INSTRUCTIONS -   Does not apply Once     pantoprazole  40 mg Oral BID AC     sodium chloride (PF)  3 mL Intracatheter Q8H     sodium chloride (PF)  9 mL Intracatheter During Dialysis/CRRT (from stock)     sodium chloride (PF)  9 mL Intracatheter During Dialysis/CRRT (from stock)     sucralfate  1 g Oral 4x Daily AC & HS       heparin 900 Units/hr (09/02/22 0729)     - MEDICATION INSTRUCTIONS -       Current active medications and PTA medications reviewed, see medication list for details.            Physical Exam:   Vitals were reviewed  Patient Vitals for the past 24 hrs:   BP Temp Temp src Pulse Resp SpO2   09/02/22 0900 122/77 -- -- 104 -- --   09/02/22 0835  120/66 97.5  F (36.4  C) Oral 108 16 95 %   22 0700 123/76 97.8  F (36.6  C) Oral 105 16 93 %   22 0500 113/71 97.8  F (36.6  C) Oral 103 16 92 %   22 1958 (!) 143/94 97.9  F (36.6  C) Oral 101 16 95 %   22 1620 129/76 98.5  F (36.9  C) Oral 99 18 98 %   22 1618 -- -- -- -- -- 93 %   22 1209 103/58 98.4  F (36.9  C) Oral 98 16 91 %   22 1002 124/77 -- -- 105 -- --       Temp:  [97.5  F (36.4  C)-98.5  F (36.9  C)] 97.5  F (36.4  C)  Pulse:  [] 104  Resp:  [16-18] 16  BP: (103-143)/(58-94) 122/77  SpO2:  [91 %-98 %] 95 %    Temperatures:  Current - Temp: 97.5  F (36.4  C); Max - Temp  Av  F (36.7  C)  Min: 97.5  F (36.4  C)  Max: 98.5  F (36.9  C)  Respiration range: Resp  Av.3  Min: 16  Max: 18  Pulse range: Pulse  Av.9  Min: 98  Max: 108  Blood pressure range: Systolic (24hrs), Av , Min:103 , Max:143   ; Diastolic (24hrs), Av, Min:58, Max:94    Pulse oximetry range: SpO2  Av.9 %  Min: 91 %  Max: 98 %    I/O last 3 completed shifts:  In: 540 [P.O.:540]  Out: 0       Intake/Output Summary (Last 24 hours) at 2022 0909  Last data filed at 2022 1700  Gross per 24 hour   Intake 440 ml   Output 0 ml   Net 440 ml       Alert and responsive  Resting comfortably in bed  L CVC with no redness or tenderness  UE 2+ edema       Wt Readings from Last 4 Encounters:   22 56.3 kg (124 lb 1.6 oz)   07/10/22 65 kg (143 lb 4.8 oz)   22 56.2 kg (124 lb)   22 56.7 kg (125 lb)          Data:          Lab Results   Component Value Date     2022     2022     2022    Lab Results   Component Value Date    CHLORIDE 99 2022    CHLORIDE 96 2022    CHLORIDE 99 2022    Lab Results   Component Value Date    BUN 29 2022    BUN 41 2022    BUN 58 2022      Lab Results   Component Value Date    POTASSIUM 4.2 2022    POTASSIUM 4.1 2022    POTASSIUM 4.6 2022     Lab Results   Component Value Date    CO2 25 08/30/2022    CO2 27 08/28/2022    CO2 25 08/26/2022    Lab Results   Component Value Date    CR 4.23 08/30/2022    CR 5.08 08/28/2022    CR 4.85 08/26/2022        Recent Labs   Lab Test 09/02/22  0459 09/01/22  1824 09/01/22  0503 08/30/22  1931 08/30/22  1136 08/28/22  1759 08/28/22  0610 08/26/22  1910 08/26/22  0541   WBC 7.9  --   --   --  10.0  --  9.6  --  9.0   HGB 7.8*  7.8* 8.0* 7.7*   < > 8.5*   < > 7.8*  7.8*   < > 8.7*  8.7*   HCT 24.5*  --   --   --  26.4*  --  23.7*  --  26.8*   MCV 96  --   --   --  94  --  92  --  92     --   --   --   --   --  371  --  307    < > = values in this interval not displayed.     Recent Labs   Lab Test 08/21/22  0530 08/20/22  1723 08/15/22  1115 08/15/22  0451   AST 96*  --  43 48*   ALT 20  --  19 22   ALKPHOS 34*  --  26* 32*   BILITOTAL 0.4  --  0.3 0.4   CALDERON  --  21  --   --        Recent Labs   Lab Test 07/11/22  0750   MAG 2.3     Recent Labs   Lab Test 08/30/22  0226 08/15/22  1641 07/12/22  0659   PHOS 3.4 4.8* 2.2*     Recent Labs   Lab Test 08/30/22  0226 08/28/22  0610 08/26/22  0541   JEWELL 8.7 9.5 9.4       Lab Results   Component Value Date    JEWELL 8.7 08/30/2022     Lab Results   Component Value Date    WBC 7.9 09/02/2022    HGB 7.8 (L) 09/02/2022    HGB 7.8 (L) 09/02/2022    HCT 24.5 (L) 09/02/2022    MCV 96 09/02/2022     09/02/2022     Lab Results   Component Value Date     08/30/2022    POTASSIUM 4.2 08/30/2022    CHLORIDE 99 08/30/2022    CO2 25 08/30/2022    GLC 94 08/30/2022     Lab Results   Component Value Date    BUN 29 08/30/2022    CR 4.23 (H) 08/30/2022     Lab Results   Component Value Date    MAG 2.3 07/11/2022     Lab Results   Component Value Date    PHOS 3.4 08/30/2022       Creatinine   Date Value Ref Range Status   08/30/2022 4.23 (H) 0.52 - 1.04 mg/dL Final   08/28/2022 5.08 (H) 0.52 - 1.04 mg/dL Final   08/26/2022 4.85 (H) 0.52 - 1.04 mg/dL Final   08/24/2022 3.01 (H)  0.52 - 1.04 mg/dL Final   08/21/2022 4.91 (H) 0.52 - 1.04 mg/dL Final   08/20/2022 4.30 (H) 0.52 - 1.04 mg/dL Final       Attestation:  I have reviewed today's vital signs, notes, medications, labs and imaging.  Seen on dialysis.      Hari Louise MD

## 2022-09-02 NOTE — PROGRESS NOTES
[facility]    HOSPITALIST PROGRESS NOTE :   --------------------------------------------------    Date of Admission:  8/14/2022    Cumulative Summary:   Gina Simpson is a pleasant 56 year old female with CAD, cardiomyopathy, hypertension, COPD, ESRD, failed RUE fistula, s/p creation of LUE AV fistula on 6/28/22 complicated by hematoma s/p evacuation, LUE DVT who presented as [facility] from [place] ED after presenting with 2+weeks of facial swelling, fatigue, dysphagia. She was found to have prevertebral soft tissue swelling, lung mass with SVC syndrome and extensive adenopathy.     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, hepatic metastasis and  lymphadenopathy- new diagnosis: reported significant fatigue, 20lb weight loss, poor appetite for two months     SVC obstruction and SVC syndrome - secondary to large neuroendocrine tumor     Mediastinal mass measuring 10 x 8 x 6 cm with SVC obstruction and occlusion of right upper lobe pulmonary artery     Extensive cervical, supraclavicular, right thoracic inlet and right mediastinal/hilar adenopathy     New 9mm ARIA pulmonary nodule     * CT on 8/15 showed new mediastinal mass on right measuring 10 x 8 x 6 cm with mass-effect on trachea, SVC and occlusion of right upper lobe pulmonary bronchus and artery.  Also concern for lymphangitic spread of neoplasm. 2 solid nodules in left lung concerning for metastatic foci.  Multiple large hepatic metastasis.  SVC syndrome/compression secondary to large mediastinal mass/adenopathy, small left pleural effusion, small pericardial effusion      * Underwent liver biopsy on 8/16 that showed metastatic poorly differentiated non-small cell carcinoma with neuroendocrine differentiation     * CT abdomen/pelvis w/o CT 8/25 :   1. Multiple bilobar hepatic metastases measuring up to 4.1 cm.   2. Subcentimeter lucent lesion within the L5  vertebral body, indeterminate for a small osseous metastasis.    3. Moderate right pleural effusion, increased in volume since 08/15/2022.     * MRI brain w/wo 8/26/2022 did not show any evidence of metastatic disease    -- Appreciate recommendations by thoracic surgery, vascular surgery, IR and oncology  -- SVC stent was not recommended  -- Started on emergent radiation therapy for SVC syndrome.  Has received 10 of 10 treatments.  Facial swelling appears to be improving. Upper extremities edema persist  -- [place] oncology following -staging scans completed  -- Continue pain control with IV Dilaudid, oxycodone  -- Per oncology, prognosis is poor and treatment is palliative.  -- IR consult ordered for chemotherapy port placement.  -- IR recommended to repeat CT scan of the chest with contrast to follow-up on tumor burden and how and when to proceed with port placement  -- Repeat CT chest from August 29 showing progressive atelectasis in right lung most likely due to central obstruction, large effusion on the right increased from previous, similar left-sided nodule since the comparison study and decreased size of the mediastinal masses since comparison.  -- Patient is asking if she has malignancy , discussed with patient and daughter regarding the biopsy results , encouraged her to have further questions with oncology team  -- Systemic chemotherapy will start after completion of radiation treatments, oncology is planning to start treatment in  2 weeks   -- Smoking cessation advised  -- Heparin infusion is started on August 30, hemoglobin is stable around 8.5  -- Interventional radiology placed Port-A-Cath in Right lower extremity  -- Patient had questions regarding if she would need Port-A-Cath and hemodialysis catheter, discussed with her that most likely patient will not be able to get hemodialysis through her Port-A-Cath but we will confirm it from nephrologist  -- I also updated Daughter on phone regarding the  plan of care on August 30  -- will recommend Oncology to continue to have discussion with patient and family regarding chemotherapy being the palliative treatment and long term prognosis , as at this time , I am not sure if they understand the prognosis completely   -- Continue on heparin infusion today , Hgb has been stable at 7.8  -- Plan to switch her to Apixaban from tomorrow morning if Hgb is stable   -- Patient might be medically ready to be discharged to TCU versus home with home therapies tomorrow if Hgb is stable      Acute upper GI bleed due to duodenal ulcer, 8/15. Recurrent bleeding 8/22  Acute blood loss anemia due to upper GI bleed secondary to duodenal ulcer  Bleeding duodenal ulcer with visible vessel, EGD 8/15  Chronic anemia due to end-stage renal disease  S/p 8 units PRBC transfusion this admission    - Patient developed acute GI bleed on 8/15.    - Hemoglobin dropped to 5.6.  Required 2 units of PRBC on 8/15, 1 unit on 8/17, 8/18, 8/20, 2 units on 8/22, 1 unit 8/24   - EGD 8/15 showed - clotted blood in the entire stomach, spurting duoeneal ulcer with visible vessel, injected, clips placed, argon plasma coag used.   - IV heparin was resumed after EGD.  Hemoglobin continued to trend down due to anticoagulation with IV heparin.  Required frequent frequent PRBC transfusions.  - acutely worsened on 8/22 - had melena, hypotension and Hb drop to 5.6  - IV heparin stopped in 8/22 and she was transfused 2 units of PRBC.   - Underwent repeat EGD 8/23 that showed blood and clots in gastric body, non bleeding duodenal ulcer with pigmented material. Injected and clipped.    - Received 8th unit of transfusion on 8/24.  Hemoglobin improved to 9 after transfusion.  This has again declined to 7.5 - 7.8. IV heparin infusion continues to be on hold since 8/22.   -Was planning to restart IV heparin on 8/27 but did not due to decline in hemoglobin from 9 to 7.8. Hb stable in past 72 hours     -- Continue protonix  BID, switch to po  -- Sucralfate for short term  -- Continue to monitor hemoglobin and transfuse as needed  -- Goal is to keep  Hb>7 , platelet > 50 and INR <2   -- Hemoglobin is stable between 7.5-8.5     Left brachial vein DVT 7/6/22  Right internal jugular thrombus, on CT neck 8/14     -- Was started on warfarin in July 2022.  INR subtheraputic on admission  -- started on IV heparin infusion but had to be discontinued on 8/22 due to continued severe hemorrhage from duodenal ulcer  -- Will need lifelong anticoagulation.   --Appreciate oncology input regarding anticoagulation, warfarin would not be a good choice for anticoagulation in the presence of hypercoagulability with malignancy.  Lovenox and Pradaxa is not recommended and renal failure  -- Plan to start patient might on Apixaban on discharge or in a day or so if still hospitalized ,as it does have reversal agent of Andexxa if GI bleeding were to recur per oncology recommendation   -- Status post Cindy cath placement in right thigh on 08/31     Severe bilateral upper extremity edema   -- Due to SVC syndrome and right internal jugular DVT      Symmetric prevertebral soft tissue swelling from C2-C5 level  -- Noted on CT scan  -- ENT consulted.  Did not feel patient has retropharyngeal abscess.  ENT felt that edema of retropharyngeal space is related to SVC syndrome.  No specific treatment advised.  On exam, airway was widely patent, both vocal cords were mobile  -- ENT noted exposed bone of right mandible with dental caries. Patient will need dental evaluation as outpatient for this.     Dental caries  ENT noted exposed bone of right mandible with dental caries.    -- Patient will need dental evaluation as outpatient for this.      ESRD on HD q. M W F  S/p left AV fistula formation 6/28/22  S/p fistulogram with dilatation of L subclavian and junction of brachiocephalic and subclavian vein complicated by hematoma requiring evacuation 7/8/22  -- Dialyzes MWF -  Dr. Gonsales.  Left AV fistula is working.  She also has tunneled dialysis catheter in place.  Use of dialysis site as per nephrology.     -- She underwent outpatient fistulogram 8/11/22.   -- Continue dialysis as per nephrology  -- Getting hemodialysis this morning      CAD, s/p stenting in 2019  Chronic systolic CHF with EF of 40-45%, echo 2020   Hypertension  PTA:  hydralazine 50mg TID, carvedilol 25mg bid, lisinopril 20mg daily, amlodipine 10mg every evening  -- HOLD lisinopril, hydralazine, amlodipine due borderline BP   -- Coreg dose was decreased due to low BP, now increased to 12.5 mg twice daily.  At baseline she was on 25 mg twice daily         Hyperphosphatemia  --Cont PhosLo with meals      Severe malnutrition due to illness  -- Nutrition services following.  -- Continue Ensure twice daily      Generalized weakness  Physical deconditioning  -- PT/OT consulted.  OT has recommended TCU versus home with home care  -- Discussed with oncology regarding plan for restarting chemotherapy which will be started in 2 weeks.  -- Discussed with patient that at this time patient can either decide to be discharged to home versus going to rehab for 10 to 12 days before her chemotherapy starts.  -- Patient is eager to go home while daughter thinks that its better for patient to go to rehab as they have 2 flights of stairs , CC and SW are following , recommended daughter to have further discussion with Patient , I did update the patient personally    Clinically Significant Risk Factors Present on Admission       Diet: Mechanical/Dental Soft Diet  Snacks/Supplements Adult: Ensure Enlive; Between Meals    Antoine Catheter: Not present  DVT Prophylaxis: Pneumatic Compression Devices  Code Status: Full Code    The patient's care was discussed with the Patient.    Disposition Plan    Tentative discharge tomorrow if safe disposition is available ,   Need to know if patient would be going home with HHC versus TCU , encouraged family  to have further discussion with patient and then provide care team with final decision   Chemotherapy is planned to be started in 2 weeks , Oncology is paged to find out the exact date of start of chemo   Daughter was updated personally.  35 min were spent in direct patient care today including the floor time , more than 50% of the time was spent in patient care coordination and counseling.      Kemi Gallagher MD, MD, FACP  Text Page (7am - 6pm)    ----------------------------------------------------------------------------------------------------------------------    Interval History      Patient was seen and examined this morning, feeling well, getting hemodialysis, continues to have some bilateral upper extremity swelling.  Patient is currently on heparin infusion and we discussed about switching her to oral apixaban tomorrow morning if hemoglobin remains a stable.  Once again I discussed with her regarding safe disposition planning, at this time patient is very eager to go home, told the patient that she should have further discussion with her family also to make sure that she is able to return home safely.  I also discussed the plan regarding disposition planning with care coordinator and , along with daughter and encouraged her to have further discussion with patient so they can provide the care coordinator team to plan if they should be start looking into rehab facility as it can take 1 to 2 days to find the bed       -Data reviewed today: I reviewed all new labs and imaging results over the last 24 hours.    I personally reviewed no images or EKG's today.    Physical Exam   Temp: 97.8  F (36.6  C) Temp src: Oral BP: 113/71 Pulse: 103   Resp: 16 SpO2: 92 % O2 Device: None (Room air) Oxygen Delivery: 1.5 LPM  Vitals:    08/21/22 0506 08/22/22 0509 08/26/22 0631   Weight: 57.9 kg (127 lb 10.3 oz) 56.9 kg (125 lb 7.1 oz) 56.3 kg (124 lb 1.6 oz)     Vital Signs with Ranges  Temp:  [97.8  F (36.6   C)-98.5  F (36.9  C)] 97.8  F (36.6  C)  Pulse:  [] 103  Resp:  [16-18] 16  BP: (103-143)/(58-94) 113/71  SpO2:  [91 %-98 %] 92 %  I/O last 3 completed shifts:  In: 540 [P.O.:540]  Out: 0     GENERAL: Alert , awake and oriented. NAD. Conversational, appropriate.   HEENT: Normocephalic. EOMI. No icterus or injection. Nares normal.   LUNGS: Clear to auscultation. No dyspnea at rest.  CVC catheter placed in chest  HEART: Regular rate. Extremities perfused.   ABDOMEN: Soft, nontender, and nondistended. Positive bowel sounds.   EXTREMITIES: Bilateral upper extremities swellings, patient is not wearing Ace wraps at this point, Port-A-Cath placement and right thigh area  NEUROLOGIC: Moves extremities x4 on command. No acute focal neurologic abnormalities noted.     Medications     heparin 900 Units/hr (09/02/22 0729)     - MEDICATION INSTRUCTIONS -         - MEDICATION INSTRUCTIONS for Dialysis Patients -   Does not apply See Admin Instructions     sodium chloride 0.9%  250 mL Intravenous Once in dialysis/CRRT     sodium chloride 0.9%  300 mL Hemodialysis Machine Once     allopurinol  100 mg Oral QPM     atorvastatin  20 mg Oral Daily     calcium acetate  2,001 mg Oral TID w/meals     carvedilol  12.5 mg Oral BID w/meals     doxercalciferol  4 mcg Intravenous Once in dialysis/CRRT     epoetin delma-epbx  4,000 Units Intravenous Once in dialysis/CRRT     sodium chloride (PF) 0.9%  10 mL Intracatheter Once in dialysis/CRRT    Followed by     heparin  1.3-2.6 mL Intracatheter Once in dialysis/CRRT     sodium chloride (PF) 0.9%  10 mL Intracatheter Once in dialysis/CRRT    Followed by     heparin  1.3-2.6 mL Intracatheter Once in dialysis/CRRT     multivitamin RENAL  1 capsule Oral Daily     - MEDICATION INSTRUCTIONS -   Does not apply Once     pantoprazole  40 mg Oral BID AC     sodium chloride (PF)  3 mL Intracatheter Q8H     sodium chloride (PF)  9 mL Intracatheter During Dialysis/CRRT (from stock)     sodium chloride  (PF)  9 mL Intracatheter During Dialysis/CRRT (from stock)     sucralfate  1 g Oral 4x Daily AC & HS       Data   Recent Labs   Lab 09/02/22  0459 09/01/22  1824 09/01/22  0503 08/30/22  1931 08/30/22  1136 08/30/22  0226 08/29/22  2312 08/29/22  1227 08/28/22  1759 08/28/22  0610   WBC 7.9  --   --   --  10.0  --   --   --   --  9.6   HGB 7.8*  7.8* 8.0* 7.7*   < > 8.5* 7.7*   < >  --    < > 7.8*  7.8*   MCV 96  --   --   --  94  --   --   --   --  92     --   --   --   --   --   --   --   --  371   NA  --   --   --   --   --  133  --   --   --  130*   POTASSIUM  --   --   --   --   --  4.2  --   --   --  4.1   CHLORIDE  --   --   --   --   --  99  --   --   --  96   CO2  --   --   --   --   --  25  --   --   --  27   BUN  --   --   --   --   --  29  --   --   --  41*   CR  --   --   --   --   --  4.23*  --   --   --  5.08*   ANIONGAP  --   --   --   --   --  9  --   --   --  7   JEWELL  --   --   --   --   --  8.7  --   --   --  9.5   GLC  --   --   --   --   --  94  --  87  --  99   ALBUMIN  --   --   --   --   --  2.1*  --   --   --   --     < > = values in this interval not displayed.       Imaging:   No results found for this or any previous visit (from the past 24 hour(s)).

## 2022-09-02 NOTE — PROGRESS NOTES
Potassium   Date Value Ref Range Status   08/30/2022 4.2 3.4 - 5.3 mmol/L Final     Hemoglobin   Date Value Ref Range Status   09/02/2022 7.8 (L) 11.7 - 15.7 g/dL Final   09/02/2022 7.8 (L) 11.7 - 15.7 g/dL Final     Creatinine   Date Value Ref Range Status   08/30/2022 4.23 (H) 0.52 - 1.04 mg/dL Final     Urea Nitrogen   Date Value Ref Range Status   08/30/2022 29 7 - 30 mg/dL Final     Sodium   Date Value Ref Range Status   08/30/2022 133 133 - 144 mmol/L Final     INR   Date Value Ref Range Status   08/23/2022 1.45 (H) 0.85 - 1.15 Final      Latest Reference Range & Units 08/18/22 15:50   Hep B Surface Agn Nonreactive  Nonreactive   Hepatitis B Surface Antibody Instrument Value <8.00 m[IU]/mL 103.04     DIALYSIS PROCEDURE NOTE  Hepatitis status of previous patient on machine log was checked and verified ok to use with this patients hepatitis status.  Patient dialyzed for 3 hrs. on a K3 bath with a net fluid removal of  2L.  A BFR of 400 ml/min was obtained via a Left CVC.      The treatment plan was discussed with Dr. Louise during the treatment.    Total heparin received during the treatment: 0 units.    Line flushed, clamped and capped with heparin 1:1000 2.2 mL and 2.3 (2200 units and 2300) per lumen    Meds  given: Retacrit and Hectorol   Complications: none        Person educated: patient. Knowledge base substancial. Barriers to learning: none. Educated on procedure via verbal mode. patient verbalized understanding. Pt prefers verbal education style.     ICEBOAT? Timeout performed pre-treatment  I: Patient was identified using 2 identifiers  C:  Consent Signed Yes  E: Equipment preventative maintenance is current and dialysis delivery system OK to use  B: See note above  O: Dialysis orders present and complete prior to treatment  A: Vascular access verified and assessed prior to treatment  T: Treatment was performed at a clinically appropriate time  ?: Patient was allowed to ask questions and address  concerns prior to treatment  See Adult Hemodialysis flowsheet in [facility] for further details and post assessment.  Machine water alarm in place and functioning. Transducer pods intact and checked every 15min.   Pt returned via bed.  Chlorine/Chloramine water system checked every 4 hours.  Outpatient Dialysis at [facility]      Post treatment report given to Isai Mullen RN regarding 2L of fluid removed, last BP of 128/75, and patient pain rating of 4/10.

## 2022-09-02 NOTE — PROGRESS NOTES
09/02/22 1400   Quick Adds   Type of Visit Initial PT Evaluation   Living Environment   People in Home child(cornelius), adult;grandchild(cornelius)   Current Living Arrangements condominium   Home Accessibility stairs to enter home   Number of Stairs, Main Entrance   (3 flights per pt, able to take breaks.)   Stair Railings, Main Entrance railings on both sides of stairs   Living Environment Comments Pt reports dtr is able to provide 24hr assist, help up the stairs. Best to have rehab in-home then getting to a clinic.   Self-Care   Usual Activity Tolerance moderate   Current Activity Tolerance fair   Equipment Currently Used at Home none   Fall history within last six months no  (per chart)   Activity/Exercise/Self-Care Comment Independent no AD baseline, open to using a walker if needed (would prefer it distributed from here), per chart: has walk in shower, walks to the pool on the grounds.   General Information   Onset of Illness/Injury or Date of Surgery 09/14/22   Referring Physician Kemi Gallagher MD   Patient/Family Therapy Goals Statement (PT) To go home.   Pertinent History of Current Problem (include personal factors and/or comorbidities that impact the POC) Stage 4 neuroendocrine carcinoma, DVT with edema, ESRD on HD, etc - see physician charting.   Existing Precautions/Restrictions fall   Cognition   Affect/Mental Status (Cognition) WFL   Follows Commands (Cognition) WFL   Safety Deficit (Cognition) insight into deficits/self-awareness   Pain Assessment   Patient Currently in Pain No   Posture    Posture Comments Impaired controlled mobility   Range of Motion (ROM)   ROM Comment WFL on observation   Strength (Manual Muscle Testing)   Strength Comments Alfred to stand with UE assist, rapid fatigue.   Bed Mobility   Comment, (Bed Mobility) Tarik increased time.   Transfers   Comment, (Transfers) Tarik sit-stands with UE assist, pivots SBA-CGA no AD given imbalance.   Gait/Stairs (Locomotion)   Distance in Feet  (Required for LE Total Joints) 150' Goran no AD - LOBs during DGI.   Comment, (Gait/Stairs) 4 stairs 2 rails up/down (limited by IV pole, fatigue after walk). Pt reports she is able to take breaks on the stairs and have dtr assist.   Balance   Balance Comments DGI 13/24 (falls risk < 20).   Coordination   Coordination Comments WFL on observation   Muscle Tone   Muscle Tone Comments WFL on observation   Clinical Impression   Criteria for Skilled Therapeutic Intervention Yes, treatment indicated   PT Diagnosis (PT) Impaired mobility   Influenced by the following impairments Impaired balance, activity tolerance   Functional limitations due to impairments Impaired independent mobility & living   Clinical Presentation (PT Evaluation Complexity) Stable/Uncomplicated   Clinical Decision Making (Complexity) low complexity   Planned Therapy Interventions (PT) balance training;gait training;home exercise program;neuromuscular re-education;patient/family education;postural re-education;stair training;strengthening;transfer training;progressive activity/exercise;risk factor education;home program guidelines   Anticipated Equipment Needs at Discharge (PT) walker, rolling   Risk & Benefits of therapy have been explained evaluation/treatment results reviewed;care plan/treatment goals reviewed;risks/benefits reviewed;current/potential barriers reviewed;participants voiced agreement with care plan;participants included;patient   PT Discharge Planning   PT Discharge Recommendation (DC Rec) home with assist;home with home care physical therapy;Transitional Care Facility   PT Rationale for DC Rec Pt appropriate to return home with RW and assist on stairs with breaks to access her home, and home PT to progress balance to PLOF (independent no AD no falls). If 24hr assist isn't available (OT also recommending this given low cognitive score) then recommend rehab first.   PT Brief overview of current status Tarik bed mob and stands, CGA  lizzy, Goran gait no AD DGI 13/24 (falls risk < 20).   Total Evaluation Time   Total Evaluation Time (Minutes) 10   Physical Therapy Goals   PT Frequency Daily   PT Predicted Duration/Target Date for Goal Attainment 09/06/22   PT Goals Transfers;Gait;Stairs;PT Goal 1   PT: Transfers Modified independent;Bed to/from chair  (least restrictive device)   PT: Gait Modified independent;Greater than 200 feet  (least restrictive device)   PT: Stairs Supervision/stand-by assist  (2-3 flights with 2 rails)   PT: Goal 1 Pt will score >= 20 on DGI to be low falls risk indoors, OR have AD/safe DC recs for safe DC home.

## 2022-09-02 NOTE — PROGRESS NOTES
Care Management Follow Up    Length of Stay (days): 19    Expected Discharge Date: 09/03/2022     Concerns to be Addressed: discharge planning     Patient plan of care discussed at interdisciplinary rounds: Yes    Anticipated Discharge Disposition: Home     Anticipated Discharge Services:    Anticipated Discharge DME:  Walker, wheelchair    Patient/family educated on Medicare website which has current facility and service quality ratings:    Education Provided on the Discharge Plan:    Patient/Family in Agreement with the Plan:      Referrals Placed by CM/SW:    Private pay costs discussed: Not applicable    Additional Information:  Met with pt and her daughter to discuss home with home care. Therapy assessed and with assist from daughter, recommended home with home care.  Pt and daughter open to any home care agency-referral sent to [facility].  Pt and daughter requesting walker and wheelchair-message sent to .  Pt does dialysis [facility] in [place] and they will need to be notified when pt leaves.    Addendum:[facility] ([phone] has accepted pt for RN/PT/OT.  Pt not discharging today, per MD.  Awaiting labs in the morning and then probable discharge tomorrow.    Pamela Sosa RN, BS  Care Coordinator  [email or web address]

## 2022-09-03 NOTE — PROGRESS NOTES
Care Management Follow Up    Length of Stay (days): 20    Expected Discharge Date: 09/03/2022     Concerns to be Addressed: discharge planning     Patient plan of care discussed at interdisciplinary rounds: Yes    Anticipated Discharge Disposition: Home     Anticipated Discharge Services:  Home care  Anticipated Discharge DME:  Walker, wheelchair    Patient/family educated on Medicare website which has current facility and service quality ratings:    Education Provided on the Discharge Plan:    Patient/Family in Agreement with the Plan:      Referrals Placed by CM/SW:    Private pay costs discussed: Not applicable    Additional Information:  Called [facility] at [phone] to inquire about wheelchair for patient.  [facility] stated they have wheelchairs and to fax face sheet, order and notes to [phone]. Faxed  to [facility].  Stated it may take til Wednesday to check coverage.  Informed pt is discharging today and they stated they will follow up with patient.      Pamela Sosa RN, BS  Care Coordinator  [email or web address]

## 2022-09-03 NOTE — DISCHARGE SUMMARY
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  SVC obstruction and SVC syndrome.  Mediastinal mass 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  Extensive cervical, supra's clavicular, right thoracic inlet and right mediastinal adenopathy.  New 9 mm left upper lobe pulmonary nodule.  Newly diagnosed metastatic poorly differentiated non-small cell carcinoma with neuroendocrine differentiation.  Acute upper GI bleed due to duodenal ulcer  Recurrent bleeding on August 22.  Bleeding duodenal ulcer with visible vessel, on EGD on August 15, stable.  Chronic anemia due to end-stage renal disease.  S/p 8 units packed red blood cell transfusion during this admission.  Left brachial vein DVT.  Right internal jugular thrombus, on anticoagulation now.    We are bilateral upper extremities edema, secondary to SVC syndrome and right internal jugular DVT.  Symmetric prevertebral soft tissue swelling from C2-C5 level.  Dental caries.  End-stage renal disease on hemodialysis on Monday Wednesday Friday.  S/p left AV fistula formation on June 28, 2022.  S/p fistulogram with dilatation of left subclavian and junction of brachiocephalic and subclavian vein complicated by hematoma requiring evacuation on July 8, 2022.  Coronary artery disease a s/p stenting in 2019.  Chronic systolic congestive heart failure with a EF of 40 to 45%, echo was 2020  Essential hypertension.  Hyperphosphatemia.  Nai malnutrition due to acute illness.  Generalized weakness and physical deconditioning from acute illness.    Follow-ups Needed After Discharge   Follow-up Appointments      Follow-up and recommended labs and tests      Follow up with primary care provider, Clari Mohr MD,   within 7 days for hospital follow- up.  The following labs/tests are   recommended: BMP and CBC  Follow-up with nephrology for outpatient hemodialysis and follow-up on   essential hypertension.  Follow-up with oncology for your newly diagnosed neuroendocrine cancer.  Follow-up with gastroenterology if you need any repeat EGD in the coming   months             Unresulted Labs Ordered in the Past 30 Days of this Admission     Date and Time Order Name Status Description    8/30/2022  1:00 AM Prepare red blood cells (unit) Preliminary     8/20/2022 12:45 PM Prepare red blood cells (unit) Preliminary     8/20/2022  6:45 AM Prepare red blood cells (unit) Preliminary     8/18/2022  9:00 AM Prepare red blood cells (unit) Preliminary     8/15/2022 10:45 AM Prepare red blood cells (unit) Preliminary       These results will be followed up by oncology and PCP    Discharge Disposition   Discharged to home with [facility]   Condition at discharge: Stable    Hospital Course   Cumulative Summary:   Gina Simpson is a pleasant 56 year old female with CAD, cardiomyopathy, hypertension, COPD, ESRD, failed RUE fistula, s/p creation of LUE AV fistula on 6/28/22 complicated by hematoma s/p evacuation, LUE DVT who presented as direct admission to [facility] from [facility] after presenting with 2+weeks of facial swelling, fatigue, dysphagia. She was found to have prevertebral soft tissue swelling, lung mass with SVC syndrome and extensive adenopathy.   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, hepatic metastasis and  lymphadenopathy- new diagnosis: reported significant fatigue, 20lb weight loss, poor appetite for two months     SVC obstruction and SVC syndrome - secondary to large neuroendocrine  tumor     Mediastinal mass measuring 10 x 8 x 6 cm with SVC obstruction and occlusion of right upper lobe pulmonary artery     Extensive cervical, supraclavicular, right thoracic inlet and right mediastinal/hilar adenopathy     New 9mm ARIA pulmonary nodule     * CT on 8/15 showed new mediastinal mass on right measuring 10 x 8 x 6 cm with mass-effect on trachea, SVC and occlusion of right upper lobe pulmonary bronchus and artery.  Also concern for lymphangitic spread of neoplasm. 2 solid nodules in left lung concerning for metastatic foci.  Multiple large hepatic metastasis.  SVC syndrome/compression secondary to large mediastinal mass/adenopathy, small left pleural effusion, small pericardial effusion      * Underwent liver biopsy on 8/16 that showed metastatic poorly differentiated non-small cell carcinoma with neuroendocrine differentiation     * CT abdomen/pelvis w/o CT 8/25 :   1. Multiple bilobar hepatic metastases measuring up to 4.1 cm.   2. Subcentimeter lucent lesion within the L5 vertebral body, indeterminate for a small osseous metastasis.    3. Moderate right pleural effusion, increased in volume since 08/15/2022.     * MRI brain w/wo 8/26/2022 did not show any evidence of metastatic disease     -- Appreciate recommendations by thoracic surgery, vascular surgery, IR and oncology  -- SVC stent was not recommended  -- Started on emergent radiation therapy for SVC syndrome.  Has received 10 of 10 treatments.  Facial swelling appears to be improving. Upper extremities edema persist  -- [place] oncology following -staging scans completed  -- Continue pain control with IV Dilaudid, oxycodone  -- Per oncology, prognosis is poor and treatment is palliative.  -- IR consult ordered for chemotherapy port placement.  -- IR recommended to repeat CT scan of the chest with contrast to follow-up on tumor burden and how and when to proceed with port placement  -- Repeat CT chest from August 29 showing progressive  atelectasis in right lung most likely due to central obstruction, large effusion on the right increased from previous, similar left-sided nodule since the comparison study and decreased size of the mediastinal masses since comparison.  -- Patient is asking if she has malignancy , discussed with patient and daughter regarding the biopsy results , encouraged her to have further questions with oncology team  -- Systemic chemotherapy will start after completion of radiation treatments, oncology is planning to start treatment in  2 weeks   -- Smoking cessation advised  -- Heparin infusion is started on August 30, hemoglobin is stable around 8.5  -- Interventional radiology placed Port-A-Cath in Right lower extremity  -- Patient had questions regarding if she would need Port-A-Cath and hemodialysis catheter, discussed with her that most likely patient will not be able to get hemodialysis through her Port-A-Cath but we will confirm it from nephrologist  -- I also updated Daughter on phone regarding the plan of care on August 30  -- will recommend Oncology to continue to have discussion with patient and family regarding chemotherapy being the palliative treatment and long term prognosis , as at this time , I am not sure if they understand the prognosis completely   -- Continue on heparin infusion today , Hgb has been stable at 7.8  -- Patient is started on Apixaban from today as Hgb is stable and in case of bleeding reversal agent is present   -- Patient might be medically ready to be discharged to TCU versus home with home therapies tomorrow if Hgb is stable      Acute upper GI bleed due to duodenal ulcer, 8/15. Recurrent bleeding 8/22  Acute blood loss anemia due to upper GI bleed secondary to duodenal ulcer  Bleeding duodenal ulcer with visible vessel, EGD 8/15  Chronic anemia due to end-stage renal disease  S/p 8 units PRBC transfusion this admission     - Patient developed acute GI bleed on 8/15.    - Hemoglobin  dropped to 5.6.  Required 2 units of PRBC on 8/15, 1 unit on 8/17, 8/18, 8/20, 2 units on 8/22, 1 unit 8/24   - EGD 8/15 showed - clotted blood in the entire stomach, spurting duoeneal ulcer with visible vessel, injected, clips placed, argon plasma coag used.   - IV heparin was resumed after EGD.  Hemoglobin continued to trend down due to anticoagulation with IV heparin.  Required frequent frequent PRBC transfusions.  - acutely worsened on 8/22 - had melena, hypotension and Hb drop to 5.6  - IV heparin stopped in 8/22 and she was transfused 2 units of PRBC.   - Underwent repeat EGD 8/23 that showed blood and clots in gastric body, non bleeding duodenal ulcer with pigmented material. Injected and clipped.    - Received 8th unit of transfusion on 8/24.  Hemoglobin improved to 9 after transfusion.  This has again declined to 7.5 - 7.8. IV heparin infusion continues to be on hold since 8/22.   -Was planning to restart IV heparin on 8/27 but did not due to decline in hemoglobin from 9 to 7.8. Hb stable in past 72 hours      -- Continue protonix BID, switch to po  -- Sucralfate for short term  -- Continue to monitor hemoglobin and transfuse as needed  -- Goal is to keep  Hb>7 , platelet > 50 and INR <2   -- Hemoglobin is stable between 7.5-8.5     Left brachial vein DVT 7/6/22  Right internal jugular thrombus, on CT neck 8/14      -- Was started on warfarin in July 2022.  INR subtheraputic on admission  -- started on IV heparin infusion but had to be discontinued on 8/22 due to continued severe hemorrhage from duodenal ulcer  -- Will need lifelong anticoagulation.   --Appreciate oncology input regarding anticoagulation, warfarin would not be a good choice for anticoagulation in the presence of hypercoagulability with malignancy.  Lovenox and Pradaxa is not recommended and renal failure  -- starting Apixaban on discharge ,as it does have reversal agent of Andexxa if GI bleeding were to recur per oncology recommendation    -- Status post Cindy cath placement in right thigh on 08/31     Severe bilateral upper extremity edema   -- Due to SVC syndrome and right internal jugular DVT      Symmetric prevertebral soft tissue swelling from C2-C5 level  -- Noted on CT scan  -- ENT consulted.  Did not feel patient has retropharyngeal abscess.  ENT felt that edema of retropharyngeal space is related to SVC syndrome.  No specific treatment advised.  On exam, airway was widely patent, both vocal cords were mobile  -- ENT noted exposed bone of right mandible with dental caries. Patient will need dental evaluation as outpatient for this.     Dental caries  ENT noted exposed bone of right mandible with dental caries.    -- Patient will need dental evaluation as outpatient for this.      ESRD on HD q. M W F  S/p left AV fistula formation 6/28/22  S/p fistulogram with dilatation of L subclavian and junction of brachiocephalic and subclavian vein complicated by hematoma requiring evacuation 7/8/22  -- Dialyzes MWF - Dr. Gonsales.  Left AV fistula is working.  She also has tunneled dialysis catheter in place.  Use of dialysis site as per nephrology.     -- She underwent outpatient fistulogram 8/11/22.   -- Continue dialysis as per nephrology  -- next HD on Mopnday     CAD, s/p stenting in 2019  Chronic systolic CHF with EF of 40-45%, echo 2020   Hypertension  PTA:  hydralazine 50mg TID, carvedilol 25mg bid, lisinopril 20mg daily, amlodipine 10mg every evening  -- HOLD lisinopril and hydralazine  -- start on Amlodipine 5 mg on discharge  -- Patient will go home on Coreg 12.5 mg twice daily.  At baseline she was on 25 mg twice daily         Hyperphosphatemia  --Cont PhosLo with meals      Severe malnutrition due to illness  -- Nutrition services following.  -- Continue Ensure twice daily      Generalized weakness  Physical deconditioning  -- PT/OT consulted.  OT has recommended home with home care  -- Discussed with oncology regarding plan for restarting  chemotherapy which will be started in 2 weeks.  -- Patient is eager to go home with family and [facility]    Patient was seen and examined on the day of discharge ,she is feeling well, does not have any complaints , I did review the discharge medications and instructions with the patient and plan for her to follow up with the PCP after the hospitalization .patient was in agreement , she is discharged in stable condition to her home with [facility]     Consultations This Hospital Stay   NEPHROLOGY IP CONSULT  PHARMACY IP CONSULT  PHARMACY IP CONSULT  INTERVENTIONAL RADIOLOGY ADULT/PEDS IP CONSULT  VASCULAR ACCESS ADULT IP CONSULT  ENT IP CONSULT  THORACIC SURGERY IP CONSULT  VASCULAR SURGERY IP CONSULT  SPEECH LANGUAGE PATH ADULT IP CONSULT  INTERVENTIONAL RADIOLOGY ADULT/PEDS IP CONSULT  INTERVENTIONAL RADIOLOGY ADULT/PEDS IP CONSULT  HEMATOLOGY & ONCOLOGY IP CONSULT  HEMATOLOGY & ONCOLOGY IP CONSULT  INTERVENTIONAL RADIOLOGY ADULT/PEDS IP CONSULT  GASTROENTEROLOGY IP CONSULT  VASCULAR ACCESS ADULT IP CONSULT  INTERVENTIONAL RADIOLOGY ADULT/PEDS IP CONSULT  CARE MANAGEMENT / SOCIAL WORK IP CONSULT  VASCULAR SURGERY IP CONSULT  RADIATION ONCOLOGY IP CONSULT  VASCULAR ACCESS ADULT IP CONSULT  HEMATOLOGY & ONCOLOGY IP CONSULT  PHARMACY IP CONSULT  PHARMACY IP CONSULT  PHARMACY IP CONSULT  PHARMACY IP CONSULT  PHYSICAL THERAPY ADULT IP CONSULT  OCCUPATIONAL THERAPY ADULT IP CONSULT  CARE MANAGEMENT / SOCIAL WORK IP CONSULT  VASCULAR ACCESS ADULT IP CONSULT  VASCULAR ACCESS ADULT IP CONSULT  VASCULAR ACCESS ADULT IP CONSULT  GASTROENTEROLOGY IP CONSULT  SPIRITUAL HEALTH SERVICES IP CONSULT  VASCULAR SURGERY IP CONSULT  INTERVENTIONAL RADIOLOGY ADULT/PEDS IP CONSULT  VASCULAR ACCESS ADULT IP CONSULT  VASCULAR ACCESS ADULT IP CONSULT  VASCULAR ACCESS ADULT IP CONSULT  PHARMACY IP CONSULT  PHARMACY IP CONSULT  SPIRITUAL HEALTH SERVICES IP CONSULT  PHYSICAL THERAPY ADULT IP CONSULT    Code Status   Full Code    Time Spent on this  Encounter   I, Kemi Gallagher MD, personally saw the patient today and spent greater than 30 minutes discharging this patient.       Kemi Gallagher MD  [address] [place] [address]  Phone: [phone]  ______________________________________________________________________    Physical Exam   Vital Signs: Temp: 97.8  F (36.6  C) Temp src: Axillary BP: (!) 162/102     Pulse: 107   Resp: 20 SpO2: 94 % O2 Device: None (Room air)    Weight: 129 lbs 13.62 oz     Physical Exam  Vitals and nursing note reviewed.   Constitutional:       Appearance: She is well-developed.   HENT:      Head: Normocephalic and atraumatic.   Eyes:      Conjunctiva/sclera: Conjunctivae normal.      Pupils: Pupils are equal, round, and reactive to light.   Neck:      Thyroid: No thyromegaly.   Cardiovascular:      Rate and Rhythm: Normal rate and regular rhythm.      Heart sounds: Normal heart sounds. No murmur heard.  Pulmonary:      Effort: Pulmonary effort is normal. No respiratory distress.      Breath sounds: Normal breath sounds. No wheezing.   Abdominal:      General: Bowel sounds are normal.      Palpations: Abdomen is soft.      Tenderness: There is no abdominal tenderness. There is no guarding or rebound.   Musculoskeletal:         General: No deformity. Normal range of motion.      Cervical back: Normal range of motion and neck supple.      Right lower leg: Edema present.      Left lower leg: Edema present.   Skin:     General: Skin is warm and dry.   Neurological:      Mental Status: She is alert and oriented to person, place, and time.   Psychiatric:         Behavior: Behavior normal.          Primary Care Physician   Clari Mohr MD    Discharge Orders      Home care nursing referral      Reason for your hospital stay    You were admitted to the hospital secondary to SVC syndrome secondary to new diagnosis of stage IV neuroendocrine carcinoma, you were also noticed to  have GI bleed during the hospitalization and underwent multiple units of blood transfusion.  Your bleeding has been stopped and you have been started on blood thinner for your blood clot present in upper extremity.  Your medications have been changed since you were admitted to the hospital, you will require close follow-up with your primary care physician, nephrology and oncology     Follow-up and recommended labs and tests    Follow up with primary care provider, Clari Mohr MD, within 7 days for hospital follow- up.  The following labs/tests are recommended: BMP and CBC  Follow-up with nephrology for outpatient hemodialysis and follow-up on essential hypertension.  Follow-up with oncology for your newly diagnosed neuroendocrine cancer.  Follow-up with gastroenterology if you need any repeat EGD in the coming months     Activity    Your activity upon discharge: activity as tolerated and no driving for today     Discharge Instructions    Your blood pressure medications have been changed, your Norvasc/amlodipine was decreased to 5 mg p.o. daily.  Your carvedilol will also be decreased to 12.5 mg p.o. twice daily.  New doses of these medications are given to you as a prescription.  All your prescriptions are for 30 days and you will need further refills on those medications by your primary care physician and on your oncology team.  Please make sure that you do not run out of your medications and get the refills from your providers.  Hydralazine, lisinopril and warfarin has been is stopped.  For anticoagulation he will be taking Eliquis apixaban 5 mg p.o. twice a day.  Your blood thinner prescription will also be renewed by your primary care physician or oncology.  You were also started on allopurinol by your nephrologist.  You have undergone Port-A-Cath placement for chemotherapy.  You will also continue to get outpatient hemodialysis.  At this time home health care, home nursing and physical and  occupational therapy is also ordered at the time of discharge.  Your blood work will be repeated at the follow-up.  You can take Tylenol for any discomfort after the discharge but I have given you 12 tablets of Roxicodone which is a narcotic pain medication in case if you have severe pain.  I have also given you some tablets of Zofran for any symptoms of nausea.     Wheelchair    Wheelchair Documentation:   Describe the reason for need to support medical necessity:   1. The patient has mobility limitations that impairs their ability to participate in one or more mobility related activities: Toileting, Grooming, and Bathing.  2. The patient's mobility limitations cannot be safely resolved by using a cane/walker: No  3. The patients home has adequate access to use a manual wheelchair: Yes  4. The use of a manual wheelchair on a regular basis will improve the patients ability to participate in mobility related ADL's at home: Yes  5. The patient is willing to use a manual wheelchair at home: Yes  6. The patient has adequate upper body strength and the mental capability to safely use a manual wheelchair and/or has a caregiver that is able to assist: Yes  7. Does the patient have a lower extremity injury or edema?: Yes, edema and weakness from stage 4 malignancy    Reason for Type of Wheelchair: Light Weight Wheelchair: Patient is unable to self-propel a standard wheelchair in the home but can self propel a light weight wheelchair.    **Use of a manual wheelchair will significantly improve the patient's ability to participate in MRADLs and the patient will use it on a regular basis in the home. The patient has not expressed an unwillingness to use the manual wheelchair that is provided in the home.**    I, the undersigned, certify that the above prescribed supplies are medically necessary for this patient and is both reasonable and necessary in reference to accepted standards of medical and necessary in reference to  accepted standards of medical practice in the treatment of this patient's condition and is not prescribed as a convenience.     MD Tito,[facility]     Diet    Follow this diet upon discharge: Orders Placed This Encounter      Snacks/Supplements Adult: Ensure Enlive; Between Meals      Mechanical/Dental Soft Diet         Significant Results and Procedures   Results for orders placed or performed during the hospital encounter of 08/14/22   CT Chest w Contrast    Narrative    EXAM: CT CHEST W CONTRAST  LOCATION: [facility]  DATE/TIME: 8/15/2022 12:14 AM    INDICATION: mediastinal mass  COMPARISON: CT soft tissue neck obtained earlier on 8/15/2022  TECHNIQUE: CT chest with IV contrast. Multiplanar reformats were obtained. Dose reduction techniques were used.    CONTRAST: 75mL Isovue 370    FINDINGS:   LUNGS AND PLEURA: There is a small right-sided pleural effusion. Masslike consolidation is seen within the right upper lobe measuring 4 x 2 x 4.8 cm, there is adjacent septal thickening which is somewhat nodular in appearance and architectural distortion   seen adjacent mass. Nodular thickening is also seen along the minor fissure (image 46, series 7). A 9 x 6 mm solid nodule seen in the left upper lobe. (Image 40, series 5).. There is right apical pleural thickening. Tree-in-bud opacities are seen in the   left lower lobe (image 137 &170, series 5). A 5.5 mm nodule solid nodule seen in the right lower lobe. (Image 242 series 5)    MEDIASTINUM/AXILLAE: There is a large mediastinal mass measuring roughly 10 x 8 x 6 cm, which has mass effect upon the trachea and and narrows the SVC as well as compresses and occludes the right upper lobe pulmonary are artery right upper lobe bronchus.   Hilar and mediastinal adenopathy is noted. The esophagus is unremarkable. The heart is normal in size. As a small pericardial effusion. A left IJ central venous catheter is seen in place with the tip  in the right atrium.    CORONARY ARTERY CALCIFICATION: Mild.    UPPER ABDOMEN: There are multiple large hypodense masses seen throughout the liver, the largest of which measures 4 cm in diameter, within the right lobe of the liver (image 60, series 3).    MUSCULOSKELETAL: Contrast reflux is seen along the right chest wall and upper abdomen. No worrisome osseous lesions are noted        Impression    IMPRESSION:   1.  10 x 8 x 6 cm large mediastinal mass which has mass effect upon the trachea, narrows the SVC and occludes the right upper lobe pulmonary bronchus and artery, likely reflecting conglomerate adenopathy secondary to neoplasm.  2.  Masslike thickening in the right upper lobe with architectural distortion as well as adjacent nodular septal thickening, concerning for a combination of atelectasis and neoplastic mass, with findings indicative of lymphangitic spread of neoplasm.  3.  Tree-in-bud opacities within the left lower lobe, concerning for endobronchial spread of the neoplastic process  4.  2 solid nodules within the left lung as described above, concerning for metastatic foci  5.  Multiple large hepatic masses, concerning for hepatic metastatic disease  6.  Contrast reflux within the subcutaneous vessels of the right chest wall and upper abdomen, consistent with SVC syndrome/compression secondary to the large mediastinal mass/adenopathy  7.  Small left-sided pleural effusion   8.  Small pericardial effusion   CT Soft Tissue Neck w Contrast    Narrative    EXAM: CT SOFT TISSUE NECK W CONTRAST  LOCATION: [facility]  DATE/TIME: 8/15/2022 12:14 AM    INDICATION: Mediastinal mass. Soft tissue swelling.  COMPARISON: None.  CONTRAST: 125 mL Isovue 370.  TECHNIQUE: Routine CT Soft Tissue Neck with IV contrast. Multiplanar reformats. Dose reduction techniques were used.    FINDINGS:     MUCOSAL SPACES/SOFT TISSUES: Multifocal dental and periodontal disease. There is a thin prevertebral  collection extending from C2-C3 through C6-C7, measuring up to 4 mm in thickness. No definite associated enhancement. Diffuse subcutaneous fat stranding.   No abnormal enhancement or discrete enhancing mass along the mucosal spaces of the upper aerodigestive tract. Normal vocal cords and infraglottic trachea. Normal parapharyngeal spaces. Normal oral cavity,  spaces, and floor of mouth   structures.    LYMPH NODES: Abnormally enlarged and cystic/necrotic appearing cervical lymph nodes, right greater than left. The largest lymph nodes on the right are paratracheal and supraclavicular in location, with a lymph node along the right thyroid that measures   2.3 x 1.9 x 3.0 cm (series 505 image 79, series 503 image 88). There is a centrally necrotic right paratracheal isis conglomeration that is incompletely evaluated, but measures at least 2.0 x 3.6 cm in AP x transverse dimension (series 503 image 100).   On the left the largest lymph nodes are in the left subclavicular region, measuring up to 1.3 x 1.9 x 1.7 cm (series 505 image 83, series 503 image 80).     SALIVARY GLANDS: Normal parotid and submandibular glands.    THYROID: Tiny subcentimeter nodules in the thyroid. Further follow-up is not indicated by size criteria.     VESSELS: There is a filling defect in the right internal jugular vein, measuring approximately 5 cm in craniocaudal dimension (series 505 image 73), concerning for intraluminal thrombus. Mild nonflow limiting atherosclerotic plaque at the carotid   bifurcations.    VISUALIZED INTRACRANIAL/ORBITS/SINUSES: Mild age-related changes of the visualized intracranial compartment. Mild mucosal thickening of the left maxillary sinus.    OTHER: No mastoid or middle ear effusion. See separately dictated chest CT.      Impression    IMPRESSION:   1.  Cystic/necrotic cervical adenopathy, concerning for metastatic disease.  2.  Filling defect within the right internal jugular vein, worrisome for  intraluminal thrombus.  3.  Diffuse third spacing with a nonenhancing retropharyngeal fluid collection. Findings may be related to SVC syndrome.  4.  No suspicious osseous lesions.  5.  Multifocal dental and periodontal disease.    Findings were discussed with Dr. Kan at 1:44 AM on 08/15/2022.   US Biopsy Liver    Narrative    EXAM:  1. PERCUTANEOUS CORE BIOPSY LIVER MASS  2. ULTRASOUND GUIDANCE  DATE/TIME: 8/16/2022 1:24 PM    INDICATION: Lung mass with multiple liver masses    PROCEDURE: Informed consent obtained. Site marked. Prior images  reviewed. Required items made available. Patient identity confirmed  verbally and with arm band. Universal protocol was followed. Time out  performed. The site was prepped and draped in sterile fashion. 10 mL  of 1% lidocaine was infused into the local soft tissues. Using  standard technique and under direct ultrasound guidance, 3 18-gauge  core biopsies obtained from a mass in the left hepatic lobe. The  patient tolerated the procedure well. No complications.    RADIOLOGIC SUPERVISION AND INTERPRETATION:   ULTRASOUND GUIDANCE: Images demonstrate the biopsy needle in  satisfactory position.      Impression    IMPRESSION:  Status post ultrasound-guided core biopsy of a liver mass.    MAURO KEYS MD         SYSTEM ID:  [id number]   CT Head w/o Contrast    Narrative    EXAM: CT HEAD W/O CONTRAST  LOCATION: [facility]  DATE/TIME: 8/20/2022 5:02 PM    INDICATION: On IV heparin, evaluate for head bleed  COMPARISON: None.  TECHNIQUE: Routine CT Head without IV contrast. Multiplanar reformats. Dose reduction techniques were used.    FINDINGS:  INTRACRANIAL CONTENTS: No intracranial hemorrhage, extraaxial collection, or mass effect.  No CT evidence of acute infarct. Normal parenchymal attenuation. Normal ventricles and sulci.     VISUALIZED ORBITS/SINUSES/MASTOIDS: No intraorbital abnormality. No significant paranasal sinus mucosal disease. No middle ear or  mastoid effusion.    BONES/SOFT TISSUES: No acute abnormality.      Impression    IMPRESSION:  1.  No acute intracranial process; specifically no acute intracranial hemorrhage.   MR Brain w/o & w Contrast    Narrative    EXAM: MR BRAIN W/O and W CONTRAST  LOCATION: [facility]  DATE/TIME: 8/26/2022 5:48 PM    INDICATION: NSCLC staging  COMPARISON: None.  CONTRAST: 6 mL Gadavist  TECHNIQUE: Routine multiplanar multisequence head MRI without and with intravenous contrast.    FINDINGS:  INTRACRANIAL CONTENTS: No acute or subacute infarct. No mass, acute hemorrhage, or extra-axial fluid collections. Scattered nonspecific T2/FLAIR hyperintensities within the cerebral white matter most consistent with mild chronic microvascular ischemic   change. Mild generalized cerebral atrophy. No hydrocephalus. Normal position of the cerebellar tonsils. No pathologic contrast enhancement.    SELLA: No abnormality accounting for technique.    OSSEOUS STRUCTURES/SOFT TISSUES: Normal marrow signal. The major intracranial vascular flow voids are maintained.     ORBITS: No abnormality accounting for technique.     SINUSES/MASTOIDS: Mucous retention cyst left maxillary sinus. No middle ear or mastoid effusion.       Impression    IMPRESSION:  1.  No evidence of metastatic disease.   CT Abdomen Pelvis w/o Contrast    Narrative    EXAM: CT ABDOMEN PELVIS W/O CONTRAST  LOCATION: [facility]  DATE/TIME: 8/25/2022 7:30 PM    INDICATION: Lung cancer staging.  COMPARISON: None.  TECHNIQUE: CT scan of the abdomen and pelvis was performed without IV contrast. Multiplanar reformats were obtained. Dose reduction techniques were used.  CONTRAST: None.    FINDINGS:   LOWER CHEST: Moderate right pleural effusion, increased since 08/15/2022, with adjacent atelectasis.    Evaluation of the abdominal viscera is limited without intravenous contrast material.    HEPATOBILIARY: Multiple (more than 15) masses  throughout both hepatic lobes, measuring up to 4.1 cm in segment 4. Gallbladder contains sludge or vicariously excreted contrast from the prior CT. No appreciable biliary ductal dilation.    PANCREAS: Normal.    SPLEEN: Normal.    ADRENAL GLANDS: Normal.    KIDNEYS/BLADDER: Normal.    BOWEL: Hemostatic clips within the gastric antrum. No bowel obstruction or signs of bowel inflammation. Small clip at the cecal base.    LYMPH NODES: No enlarged lymph nodes.    VASCULATURE: Moderate atherosclerosis throughout the abdominal aorta. No abdominal aortic aneurysm.    PELVIC ORGANS: Normal. Trace free pelvic fluid.    MUSCULOSKELETAL: 6 mm lucent lesion along the left superior endplate of the L5 vertebral body (4/#47). No other osseous lesions. Body wall edema.      Impression    IMPRESSION:    1.  Multiple bilobar hepatic metastases measuring up to 4.1 cm.    2.  Subcentimeter lucent lesion within the L5 vertebral body, indeterminate for a small osseous metastasis. Attention at follow-up.    3.  No other evidence of metastatic disease within the abdomen or pelvis, within the limitations of an exam without intravenous contrast.    4.  Moderate right pleural effusion, increased in volume since 08/15/2022.   CT Chest w Contrast    Narrative    CT CHEST WITH CONTRAST 8/29/2022 3:08 PM     HISTORY: Follow up on tumor burden, to assess for port placement.    COMPARISON: 8/15/2022    TECHNIQUE: Volumetric helical acquisition of CT images of the chest  from the clavicles to the kidneys were acquired after the  administration of 62  mL isovue 370 IV contrast. Radiation dose for  this scan was reduced using automated exposure control, adjustment of  the mA and/or kV according to patient size, or iterative  reconstruction technique.    FINDINGS:     LUNGS AND PLEURA: Large right effusion increased from previous. Lumbar  atelectasis of the right upper lobe. Probably only the right middle  lobe remains aerated. Trace pleural fluid on  the left. 7 mm nodule at  the left apex decreased in size measuring 9 mm previously. Fissural  nodule on the left image 176 measures 8 mm, previously 7 mm.  Essentially occluded upper lobe bronchus and probably occluded right  lower lobe bronchus.    MEDIASTINUM/AXILLAE: Mediastinal masses are less well-defined today,  the upper mediastinal mass measures 5.0 x 3.8 cm, previously 6.2 x 4.7  cm. Mass lesion in the mid mediastinum and right hilum very likely  similarly decreased in size, but its borders are obscured by the  atelectatic right upper lobe.    CORONARY ARTERY CALCIFICATIONS: Moderate.    UPPER ABDOMEN: No acute findings. Liver lesions better seen on recent  abdomen and pelvis CT.    MUSCULOSKELETAL: No frankly destructive bony lesions.      Impression    IMPRESSION:    1. Progressive atelectasis in the right lung compared to previous  likely due to central obstruction.  2. Large effusion on the right increased from previous.  3. Similar left-sided nodule since the comparison study.  4. Decreased size of the mediastinal masses since comparison.    LUCILA WAYNE MD         SYSTEM ID:  [id number]   IR Chest Port Placement > 5 Yrs of Age    Narrative    IR PORT PLACEMENT > 5 YRS OF AGE   8/31/2022 4:28 PM     CLINICAL HISTORY/INDICATION: Port-A-Cath placement, mediastinal mass.  Port needed for chemotherapy.    PROCEDURES PERFORMED:   1. Ultrasound-guided right common femoral venotomy.  2. Port placement.  3. Moderate sedation.    MODERATE SEDATION: Versed  0.5 mg IV; Fentanyl 25 mcg IV. During the  time out, immediately prior to the administration of medications, the  patient was reassessed for adequacy to receive conscious sedation.  Under physician supervision, Versed and fentanyl were administered for  moderate sedation. Pulse oximetry, heart rate and blood pressure were  continuously monitored by an independent trained observer. The  physician spent  28 minutes of face-to-face sedation time with  the  patient.    CONTRAST: None    FLUORO: 1.5 minutes    AIR KERMA: 5.62 mGy    CONSENT: Following a discussion of the risks, benefits, indications  and alternatives to treatment, appropriate informed consent was  obtained.      TIMEOUT: A timeout was performed per universal protocol policy to  ensure correct patient, site, and procedure to be performed.     CENTRAL LINE STATEMENT: The patient was brought to the interventional  radiology suite and placed supine on the table. The patients right  groin and thigh region were prepped and draped in a sterile fashion  for tunneled line placement.  The procedure was performed using  maximal Sterile Barrier Technique Utilized: Cap AND mask AND sterile  gown AND sterile gloves AND sterile full body drape AND hand hygiene  AND skin preparation 2% chlorhexidine for cutaneous antisepsis (or  acceptable alternative antiseptics).  Sterile Ultrasound Technique  Utilized ?Sterile gel AND sterile probe covers    PROCEDURE AND FINDINGS:   This central line was performed in accordance with the central line  bundle with the exception of vein selection. Following a discussion of  the risks, benefits, indications and alternatives to treatment,  appropriate informed consent was obtained.  The patient was brought to  the interventional radiology suite and placed supine on the table.     Ultrasound was utilized to evaluate the veins of the right thigh and a  permanent record of the image was obtained which demonstrated the  common femoral vein to be patent. Under direct ultrasound guidance, 1%  Lidocaine was infiltrated and access was gained easily into right  common femoral vein utilizing micropuncture technique. The wire was  advanced easily under fluoroscopic guidance and the tract was serially  dilated and a peel away sheath placed.     Attention was then directed to the creation of the subcutaneous  pocket. Next, 1% Lidocaine with Epinephrine was used to anesthetize  the skin and a 2  cm incision was made along the lateral thigh and the  pocket was created using sharp and blunt dissection. The catheter was  brought through a subcutaneous tunnel from the pocket to the venous  access site.  The catheter was measured and then advanced through the  peel away sheath under fluoroscopic guidance. The port was then tested  for leaks. When no leaks were identified, the port was flushed  thoroughly with heparinized saline and placed within the subcutaneous  pocket. A final placement film demonstrates the catheter tip in the  IVC with the patient supine. The wound was closed with 3-0 interrupted  Monocryl deep layer. Dermabond was then applied over the incision site  as were steri-strips.  The small incision at the groin was also closed  uneventfully.      Throughout the procedure, the patient was monitored by a radiology  nurse for cardiac rhythm which remained stable.  The patient tolerated  the procedure well and left the interventional radiology suite in  stable condition.      Impression    IMPRESSION:  Placement of an implanted right common femoral vein 6 Cuban 35 cm  single lumen power injectable (rated to 5mL/sec) thigh port, as  described using ultrasound and fluoroscopic guidance.     SHAHAB MORALES DO         SYSTEM ID:  [id number]   Echocardiogram Complete     Value    LVEF  75-80%    Narrative    [id number]  [id number]  [id number]  [id number]^REYES^CURTIS     [facility]  Echocardiography Laboratory  [address] [place] [address]     Name: GIO HAMMOND  MRN: [id number]  : 1965  Study Date: 2022 04:04 PM  Age: 56 yrs  Gender: Female  Patient [facility]  Reason For Study: CHF  Ordering Physician: CURTIS CHAMBERS  Referring Physician: Clari Garza  Performed By: Brian Turner RDCS     BSA: 1.6 m2  Height: 65 in  Weight: 124 lb  HR: 120  BP: 136/79  mmHg  ______________________________________________________________________________  Procedure  Complete Portable Echo Adult. Optison (NDC #3096-1781) given intravenously.  ______________________________________________________________________________  Interpretation Summary     The visual ejection fraction is estimated at 75-80%.  Hyperdynamic left ventricular function  No systolic anterior motion of the mitral valve.  Right ventricular systolic pressure is elevated, consistent with moderate  pulmonary hypertension.  The rhythm was sinus tachycardia.  The study was technically difficult.  ______________________________________________________________________________  Left Ventricle  The left ventricle is normal in size. There is borderline concentric left  ventricular hypertrophy. Hyperdynamic left ventricular function. Diastolic  Doppler findings (E/E' ratio and/or other parameters) suggest left ventricular  filling pressures are indeterminate. The visual ejection fraction is estimated  at 75-80%. Septal motion is consistent with conduction abnormality.     Right Ventricle  The right ventricle is normal in size and function.     Atria  Normal left atrial size. Right atrial size is normal. There is no color  Doppler evidence of an atrial shunt.     Mitral Valve  The mitral valve leaflets are mildly thickened. No systolic anterior motion of  the mitral valve. There is trace mitral regurgitation.     Tricuspid Valve  The right ventricular systolic pressure is approximated at 44.8 mmHg plus the  right atrial pressure. There is mild to moderate (1-2+) tricuspid  regurgitation. IVC diameter <2.1 cm collapsing >50% with sniff suggests a  normal RA pressure of 3 mmHg. Right ventricular systolic pressure is elevated,  consistent with moderate pulmonary hypertension.     Aortic Valve  The aortic valve is not well visualized. No aortic regurgitation is present.  No hemodynamically significant valvular aortic stenosis.      Pulmonic Valve  The pulmonic valve is not well visualized. There is no pulmonic valvular  regurgitation. Normal pulmonic valve velocity.     Vessels  The aortic root is normal size. Normal size ascending aorta. IVC diameter <2.1  cm collapsing >50% with sniff suggests a normal RA pressure of 3 mmHg. The  inferior vena cava was normal in size with preserved respiratory variability.     Pericardium  There is no pericardial effusion.     Rhythm  The rhythm was sinus tachycardia.  ______________________________________________________________________________  MMode/2D Measurements & Calculations  IVSd: 1.1 cm     LVIDd: 3.7 cm  LVIDs: 2.2 cm  LVPWd: 1.2 cm  FS: 40.6 %  LV mass(C)d: 135.8 grams  LV mass(C)dI: 84.1 grams/m2  Ao root diam: 2.8 cm  LA dimension: 2.3 cm  asc Aorta Diam: 3.3 cm  LA/Ao: 0.82  LA Volume (BP): 39.9 ml  LA Volume Index (BP): 24.8 ml/m2  RWT: 0.67     Doppler Measurements & Calculations  MV E max rafael: 95.1 cm/sec  MV A max rafael: 139.2 cm/sec  MV E/A: 0.68  MV max P.8 mmHg  MV mean P.8 mmHg  MV V2 VTI: 21.4 cm  PA acc time: 0.09 sec  TR max rafael: 334.5 cm/sec  TR max P.8 mmHg  E/E' av.0  Lateral E/e': 8.8  Medial E/e': 13.2     ______________________________________________________________________________  Report approved by: Ally Wilkinson 2022 04:50 PM               Discharge Medications   Current Discharge Medication List      START taking these medications    Details   acetaminophen (TYLENOL) 325 MG tablet Take 2 tablets (650 mg) by mouth every 6 hours as needed for mild pain or other (and adjunct with moderate or severe pain or per patient request)    Associated Diagnoses: SVC syndrome; Acute deep vein thrombosis (DVT) of brachial vein of left upper extremity (H); Neuroendocrine cancer (H)      allopurinol (ZYLOPRIM) 100 MG tablet Take 1 tablet (100 mg) by mouth every evening  Qty: 30 tablet, Refills: 0    Comments: Future refills by PCP Dr. Clari Santos  MD Onur with phone number None.  Associated Diagnoses: SVC syndrome; Acute deep vein thrombosis (DVT) of brachial vein of left upper extremity (H); Neuroendocrine cancer (H)      apixaban ANTICOAGULANT (ELIQUIS) 5 MG tablet Take 1 tablet (5 mg) by mouth 2 times daily  Qty: 60 tablet, Refills: 0    Comments: Future refills by PCP Dr. Clari Mohr MD with phone number None.  Associated Diagnoses: SVC syndrome; Acute deep vein thrombosis (DVT) of brachial vein of left upper extremity (H); Neuroendocrine cancer (H)      ondansetron (ZOFRAN ODT) 4 MG ODT tab Take 1 tablet (4 mg) by mouth every 6 hours as needed for nausea or vomiting  Qty: 12 tablet, Refills: 0    Comments: Future refills by PCP Dr. Clari Mohr MD with phone number None.  Associated Diagnoses: SVC syndrome; Acute deep vein thrombosis (DVT) of brachial vein of left upper extremity (H); Neuroendocrine cancer (H)      pantoprazole (PROTONIX) 40 MG EC tablet Take 1 tablet (40 mg) by mouth 2 times daily (before meals)  Qty: 60 tablet, Refills: 0    Comments: Future refills by PCP Dr. Clari Mohr MD with phone number None.  Associated Diagnoses: SVC syndrome; Acute deep vein thrombosis (DVT) of brachial vein of left upper extremity (H); Neuroendocrine cancer (H)      sucralfate (CARAFATE) 1 GM tablet Take 1 tablet (1 g) by mouth 4 times daily (before meals and nightly)  Qty: 120 tablet, Refills: 0    Comments: Future refills by PCP Dr. Clari Mohr MD with phone number None.  Associated Diagnoses: SVC syndrome; Acute deep vein thrombosis (DVT) of brachial vein of left upper extremity (H); Neuroendocrine cancer (H)         CONTINUE these medications which have CHANGED    Details   amLODIPine (NORVASC) 5 MG tablet Take 1 tablet (5 mg) by mouth every evening  Qty: 30 tablet, Refills: 0    Comments: Future refills by PCP Dr. Clari Mohr MD with phone number None.  Associated Diagnoses:  SVC syndrome; Acute deep vein thrombosis (DVT) of brachial vein of left upper extremity (H); Neuroendocrine cancer (H)      carvedilol (COREG) 12.5 MG tablet Take 1 tablet (12.5 mg) by mouth 2 times daily (with meals)  Qty: 60 tablet, Refills: 0    Comments: Future refills by PCP Dr. Clari Mohr MD with phone number None.  Associated Diagnoses: SVC syndrome; Acute deep vein thrombosis (DVT) of brachial vein of left upper extremity (H); Neuroendocrine cancer (H)      oxyCODONE (ROXICODONE) 5 MG tablet Take 1 tablet (5 mg) by mouth every 6 hours as needed for moderate to severe pain  Qty: 12 tablet, Refills: 0    Comments: Future refills by PCP Dr. Clari Mohr MD with phone number None.  Associated Diagnoses: SVC syndrome; Acute deep vein thrombosis (DVT) of brachial vein of left upper extremity (H); Neuroendocrine cancer (H)         CONTINUE these medications which have NOT CHANGED    Details   atorvastatin (LIPITOR) 20 MG tablet Take 20 mg by mouth daily      B Complex-C-Folic Acid (DEEPALI CAPS) 1 MG CAPS Take 1 capsule by mouth daily      calcium acetate (PHOSLO) 667 MG CAPS capsule Take 2,001 mg by mouth 3 times daily (with meals)         STOP taking these medications       hydrALAZINE (APRESOLINE) 50 MG tablet Comments:   Reason for Stopping:         lisinopril (ZESTRIL) 20 MG tablet Comments:   Reason for Stopping:         warfarin ANTICOAGULANT (COUMADIN) 2.5 MG tablet Comments:   Reason for Stopping:         warfarin ANTICOAGULANT (COUMADIN) 2.5 MG tablet Comments:   Reason for Stopping:             Allergies   No Known Allergies

## 2022-09-03 NOTE — PLAN OF CARE
Orientations: x4   Vitals/Pain: tachy at times. Pain managed with PRN oxycodone.   Tele: NSR W/BBB  Lines/Drains: R thigh port infusing heparin @ 900units/hr, L upper chest CVC.   Skin/Wounds:  LUE sutures from removed fistula.  3+edema in upper extremities.   GI/: BS +,abd soft. voiding via bedside commode. No BM this shift.   Labs: Abnormal/Trends, Electrolyte Replacement- N/A,  AM HG draw 7.7   Ambulation/Assist: SBA  Sleep Quality: poor.   Plan: possible discharge to home today        Goal Outcome Evaluation:    Plan of Care Reviewed With: patient     Overall Patient Progress: no change

## 2022-09-03 NOTE — PROGRESS NOTES
Wheelchair Documentation:   Describe the reason for need to support medical necessity:   1. The patient has mobility limitations that impairs their ability to participate in one or more mobility related activities: Toileting, Grooming, and Bathing.  2. The patient's mobility limitations cannot be safely resolved by using a cane/walker: No  3. The patients home has adequate access to use a manual wheelchair: Yes  4. The use of a manual wheelchair on a regular basis will improve the patients ability to participate in mobility related ADL's at home: Yes  5. The patient is willing to use a manual wheelchair at home: Yes  6. The patient has adequate upper body strength and the mental capability to safely use a manual wheelchair and/or has a caregiver that is able to assist: Yes  7. Does the patient have a lower extremity injury or edema?: Yes, edema and weakness from stage 4 malignancy    Reason for Type of Wheelchair: Light Weight Wheelchair: Patient is unable to self-propel a standard wheelchair in the home but can self propel a light weight wheelchair.    **Use of a manual wheelchair will significantly improve the patient's ability to participate in MRADLs and the patient will use it on a regular basis in the home. The patient has not expressed an unwillingness to use the manual wheelchair that is provided in the home.**    I, the undersigned, certify that the above prescribed supplies are medically necessary for this patient and is both reasonable and necessary in reference to accepted standards of medical and necessary in reference to accepted standards of medical practice in the treatment of this patient's condition and is not prescribed as a convenience.     MD Tito,[facility]

## 2022-09-03 NOTE — PLAN OF CARE
Review discharge instruction with patient and daughter. Questions answered. Patient discharged home with daughter, discharge instructions, medications, and belongings at this time

## 2022-09-03 NOTE — PROGRESS NOTES
Care Management Discharge Note    Discharge Date: 09/03/2022       Discharge Disposition: Home Care    Discharge Services:      Discharge DME: Walker, Wheelchair    Discharge Transportation: car, drives self    Private pay costs discussed: [facility] will contact pt regarding wheelchair.  Explained they are checking for coverage    PAS Confirmation Code:    Patient/family educated on Medicare website which has current facility and service quality ratings:      Education Provided on the Discharge Plan:    Persons Notified of Discharge Plans: pt, daughter, home care, [facility]  Patient/Family in Agreement with the Plan:      Handoff Referral Completed: Yes    Additional Information:  Pt discharging home with her daughter.  [facility] will be following for home care RN/PT/OT.    Discharge summary to be faxed to [facility] once it is complete, their fax is [phone].  Pt dialysis schedule prior to admission was [facility] to call and set up appointment with patient.    [facility] information put on pt chart to schedule followup if needed.  Pt to schedule follow up with primary.    Pamela Sosa RN, BS  Care Coordinator  [email or web address]             [phone]

## 2022-09-04 NOTE — PLAN OF CARE
Occupational Therapy Discharge Summary    Reason for therapy discharge:    Discharged to home with home therapy.    Progress towards therapy goal(s). See goals on Care Plan in [facility] electronic health record for goal details.  Goals partially met.  Barriers to achieving goals:   discharge from facility.    Therapy recommendation(s):    Continued therapy is recommended.  Rationale/Recommendations:  Continued OT to address safety and independence in I/ADLs. Will need  supervision in all I/ADLs.

## 2022-09-04 NOTE — PLAN OF CARE
Physical Therapy Discharge Summary    Reason for therapy discharge:    Discharged to home with home therapy.    Progress towards therapy goal(s). See goals on Care Plan in [facility] electronic health record for goal details.  Goals partially met.  Barriers to achieving goals:   discharge from facility.    Therapy recommendation(s):    Continued therapy is recommended.  Rationale/Recommendations:  home PT to further address deficits and optimize functional recovery.

## 2022-09-07 ENCOUNTER — MEDICAL CORRESPONDENCE (OUTPATIENT)
Dept: HEALTH INFORMATION MANAGEMENT | Facility: CLINIC | Age: 57
End: 2022-09-07

## 2022-09-07 NOTE — PROGRESS NOTES
Clinic Care Coordination Contact  [facility]/Voicemail       Clinical Data: Care Coordinator Outreach  Outreach attempted x 2.  Left message on patient's voicemail with call back information and requested return call.   Care Coordinator will do no further outreaches at this time.    Jaimie Curran  [phone]  Care

## 2022-09-27 PROBLEM — Z99.2 ESRD (END STAGE RENAL DISEASE) ON DIALYSIS (H): Status: ACTIVE | Noted: 2022-01-01

## 2022-09-27 PROBLEM — J90 PLEURAL EFFUSION ON RIGHT: Status: ACTIVE | Noted: 2022-01-01

## 2022-09-27 PROBLEM — I10 BENIGN ESSENTIAL HYPERTENSION: Status: ACTIVE | Noted: 2022-01-01

## 2022-09-27 PROBLEM — I25.5 ISCHEMIC CARDIOMYOPATHY: Status: ACTIVE | Noted: 2022-01-01

## 2022-09-27 PROBLEM — N18.6 ESRD (END STAGE RENAL DISEASE) ON DIALYSIS (H): Status: ACTIVE | Noted: 2022-01-01

## 2022-09-27 PROBLEM — R09.02 HYPOXIA: Status: ACTIVE | Noted: 2022-01-01

## 2022-09-27 NOTE — PLAN OF CARE
A&O x 4, but lethargic. hypotensive, otherwise VSS on 1L O2. Tele: ST. Assist of 1 with gait belt, limited mobility this shift. Clear liquid diet, tolerating well. PIV infusing NS @ 50ml/h, 250 bolus started. Denies nausea. Pain managed with prn oxycodone, repositioning. No void this shift, pt on hemodialysis. BS active. Plan for possible thoracentesis tomorrow, dialysis tomorrow. Continue plan of care.

## 2022-09-27 NOTE — PROVIDER NOTIFICATION
MD Notification    Notified Person: Glenna Denise NP    Notification Date/Time: 9/27/2022 6:06 PM     Notification Interaction: paged    Purpose of Notification: pts BPs 78/53. still lethargic. please advise.     Orders Received:     Comments:

## 2022-09-27 NOTE — H&P
[facility]    History and Physical - Hospitalist Service       Date of Admission:  9/27/2022    Assessment & Plan      Gina Simpson is a 57 year old female w/ PMH of CAD w/ PCI in 2019, HTN, cardiomyopathy/HFmEF (40-45%), hypertension, COPD, ESRD, remote failed RUE fistula, s/p creation of LUE AV fistula who was recently hospitalized from 8/14/22-9/3/22 for SVC syndrome and extensive adenopathy, ultimately diagnosed w/ stage IV metastatic non-small cell carcinoma with w/ liver mets and lymphadenopathy and is s/p urgent radiation.  Her course was complicated by GIB from DUw/ large volume blood product transfusions.  She presented to [place] ED 09/27/22 w/ progressively worsening shortness of breath and generalized weakness for the past couple of weeks. Imaging revealing large right-sided effusion. She was transferred to [facility] for further management.        Acute hypoxic respiratory insufficiency  large right pleural effusion on outside hospital CT chest  ARIA pulmonary nodule  Admit with acute hypoxia 2/2 new large right-sided pleural effusion with suspicion for malignant effusion. Noted to have also have some patchy airspace opacities in the right lung and nodular and consolidative opacities in the LLL that may be suggestive of PNA  Known mediastinal mass as below  -supplemental O2   -Nasal cannula 1-2 liters currently   -Antibiotics, as noted below  -IR consult for right-sided thoracentesis   -Eliquis on hold for now; heparin nomogram   -Suspect pleural fluid malignant versus infection; labs ordered post thora      Hypotension  -Blood pressures in the 70's to 80's systolic  -Suspect that dehydration contributing due to not eating or drinking since her discharge on 9/3, in addition to concerns for possible sepsis  -Volume resuscitation:   -Crystalloid IVF x 2 liters   -Albumin  -Work-up pending to r/o sepsis   -BC pending--difficult stick and lab only able to draw 1 of 2  bottles   -UA ordered and pending   -Lactate at [place] 1.8   -Will trend for now  -Empirically starting Vancomycin and Cefepime; given possible PNA on imaging  -If blood pressures do not improve after IVF, will need to transfer to ICU   -Nursing to notify provider if MAP <65, SBP <90   -If unable to maintain blood pressures, will need norepinephrine and transfer to   ICU   -Discussed with Intensivist  -Patient/family still wanting aggressive treatment measures and Full Code status; agreeable to ICU transfer      Hepatic metastases w/ Abnormal LFTs  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  -Per family, has not followed-up OP with Oncology for discussion on chemotherapy  -Likely not a candidate for chemo in this acute state--discussed with patient and daughter that chemotherapy would only be palliative and not curative with overall poor prognosis  -Repeat imaging performed at [facility] demonstrating bulky adenopathy in the lower left neck, mediastinum and bilateral hilar regions, multiple pulmonary nodules, bilaterally, extensive metastatic involvement of the liver with scattered lytic metastatic osseous lesions  -Consider therapeutic paracentesis for ascites   -Oncology & Palliative Care consulted   -Overall prognosis is poor   -Remains full code at this time, but daughter would like to discuss goals   Of care and symptom management   -At this time, unclear if family comprehends the overall prognosis and severity   Of the situation  -Discussed code status with patient and daughter. Both are in agreement of Full Code status and aggressive cares at this time      Chronic Anemia due to  ESRD  Thrombocytopenia  -Hgb 8.3 on admit (baseline past 3-weeks 7.7-8.0)  -PLT count 108 without signs of bleeding  -recheck CBC in am  -Consider transfusion for Hgb <7      CAD s/p PCI 2019  Cardiomyopathy, HFmEF (40-45%)  Hypertension  *PTA regimen is: amlodipine 5 mg daily, coreg 12.5mg bid  -Holding anti-hypertensive med's due to hypotension   -Restart when clinically indicated    COPD  Tobacco use  -encourage cessation of tobacco      ESRD on MWF HD  S/p left AV fistula formation 6/28/22  S/p fistulogram with dilatation of L subclavian and junction of brachiocephalic and subclavian vein complicated by hematoma requiring evacuation 7/8/22  Hypophosphatemia  -Dialyzes MWF  -Left AV fistula  -Nephrology consulted for HD, next due on Wed 9/28/22  -continue PTA phos low      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/14/22)  *PTA apixaban; needs lifelong anticoagulation  -Apixaban held on 9/27 in anticipation of thoracentesis  -Low dose heparin nomogram started without bolus or loading dose for coverage    -Will need to be held prior to procedure   -Hold heparin at 0600 on 9/28/22   -Restart anticoagulation when able      Severe Malnutrition  -Nutrition consult ordered to assess and provide recommendations      Physical Deconditioning  Generalized Weakness  -Per daughter, non-ambulatory and rapidly declining since discharge on 9/3  -PT/OT to assess  -Family expresses insurance/coverage concerns; SW consult to assist with discharge and financial assistance        Diet: Combination Diet Clear Liquid; Renal Diet (dialysis)  NPO for Medical/Clinical Reasons Except for: Meds  DVT Prophylaxis: Apixaban held 9/27; low intensity nomogram started 9/27  Antoine Catheter: Not present  Central Lines: PRESENT  CVC Double Lumen Left Subclavian-Site Assessment: Unable to visualize  Cardiac Monitoring: ACTIVE order. Indication: Acute decompensated heart failure (48 hours)  Code Status: Full Code    Clinically  "Significant Risk Factors Present on Admission               # Coagulation Defect: home medication list includes an anticoagulant medication  # Thrombocytopenia: Plts = 108 10e3/uL (Ref range: 150 - 450 10e3/uL) on admission, will monitor for bleeding           Disposition Plan      Expected Discharge Date: 09/29/2022                The patient's care was discussed with the Patient.      ______________________________________________________________________    Chief Complaint   Hypoxia  Right sided pleural effusion    History is obtained from the patient and EMR    History of Present Illness   Gina Simpson is a 57 year old female w/ PMH of CAD w/ PCI in 2019, HTN, cardiomyopathy/HFmEF (40-45%), hypertension, COPD, ESRD, remote failed RUE fistula, s/p creation of LUE AV fistula on 6/28/22 complicated by hematoma s/p evacuation, who was recently hospitalized from 8/14/22-9/3/22 for SVC syndrome and extensive adenopathy, ultimately diagnosed w/ stage IV metastatic non-small cell carcinoma with w/ liver mets and lymphadenopathy.  Her course was complicated by GIB from DU w/ large volume blood product transfusions.  She presented to [place] ED 09/27/22 w/ progressively worsening shortness of breath and generalized weakness for the past couple of weeks. Imaging revealing large right-sided effusion. She was transferred to [facility] for further management.      Patient is lethargic during examination. She reports that her \"bones hurt\" and that she has been feeling very weak and short of breath lately. She falls asleep during exam and needs to be awakened numerous times, as she is only able to provide simple answers and will fall asleep. Collateral information was obtained via phone discussion with her daughter, Monica. Daughter states that the patient has progressively declined since her discharge on 9/3/22. Reports that the patient resides with her and that she has been having difficulty caring for her. Endorses " that the patient has not been eating or drinking and that she can no longer walk due to weakness. She is becoming progressively short of breath. They have not had follow-up with Oncology, as daughter states no one had contacted them about initiation of chemotherapy. Daughter reports that the patient does not have insurance and she is needing help caring for the patient at home. She is agreeable to Palliative Care discussion about goals of care and symptom management, in addition to SW to assist with financial concerns and discharge planning. Code status reviewed. Denies further complaints.       Review of Systems    A complete 10 point ROS was obtained, see above for pertinent positives or negatives, all other systems are negative    Past Medical History    I have reviewed this patient's medical history and updated it with pertinent information if needed.   Past Medical History:   Diagnosis Date     Anemia      Cancer (H)      Coronary artery disease      Dialysis complication      Dialysis patient (H)      Embolism (H)      ESRD (end stage renal disease) (H)      Hypertension      Ischemic cardiomyopathy      Renal failure        Past Surgical History   I have reviewed this patient's surgical history and updated it with pertinent information if needed.  Past Surgical History:   Procedure Laterality Date     ANESTH,UPPER ARM AV FISTULA REPAIR       CREATE FISTULA ARTERIOVENOUS UPPER EXTREMITY Left 3/25/2022    Procedure: CREATION OF FIRST STAGE LEFT BRACHIOBASILIC ARTERIOVENOUS FISTULA;  Surgeon: Akash Miller MD;  Location: [facility] OR     CREATE FISTULA ARTERIOVENOUS UPPER EXTREMITY Left 6/28/2022    Procedure: LEFT BRACHIO-BASILIC ARTERIOVENOUS FISTULA WITH BIOPROSTHETIC GRAFT;  Surgeon: Akash Miller MD;  Location: [facility] OR     IR CHEST PORT PLACEMENT > 5 YRS OF AGE  8/31/2022     IR DIALYSIS FISTULOGRAM LEFT  5/31/2022     IR DIALYSIS FISTULOGRAM LEFT  7/8/2022     IR DIALYSIS FISTULOGRAM LEFT  8/11/2022      LIGATE FISTULA ARTERIOVENOUS UPPER EXTREMITY Left 6/28/2022    Procedure: [place] fistula arteriovenous upper extremity;  Surgeon: Akash Miller MD;  Location: [facility] OR     REPAIR FISTULA ARTERIOVENOUS UPPER EXTREMITY Left 7/8/2022    Procedure: EXPLORE LEFT ARM, REPAIR OF LEFT UPPER ARM AV DIALYSIS GRAFT, EVACUATE HEMATOMA;  Surgeon: Akash Miller MD;  Location: [facility] OR     WISDOM TEETH         Social History   I have reviewed this patient's social history and updated it with pertinent information if needed.  Social History     Tobacco Use     Smoking status: Current Every Day Smoker     Years: 40.00     Types: Cigarettes     Smokeless tobacco: Never Used     Tobacco comment: 6 cigarettes per day.    Substance Use Topics     Alcohol use: Never     Drug use: Never       Prior to Admission Medications   Prior to Admission Medications   Prescriptions Last Dose Informant Patient Reported? Taking?   B Complex-C-Folic Acid (DEEPALI CAPS) 1 MG CAPS 9/27/2022 at am  Yes Yes   Sig: Take 1 capsule by mouth daily   acetaminophen (TYLENOL) 325 MG tablet prn  No No   Sig: Take 2 tablets (650 mg) by mouth every 6 hours as needed for mild pain or other (and adjunct with moderate or severe pain or per patient request)   allopurinol (ZYLOPRIM) 100 MG tablet 9/26/2022 at pm  No Yes   Sig: Take 1 tablet (100 mg) by mouth every evening   amLODIPine (NORVASC) 5 MG tablet 9/26/2022 at pm  No Yes   Sig: Take 1 tablet (5 mg) by mouth every evening   apixaban ANTICOAGULANT (ELIQUIS) 5 MG tablet 9/27/2022 at am  No Yes   Sig: Take 1 tablet (5 mg) by mouth 2 times daily   atorvastatin (LIPITOR) 20 MG tablet 9/27/2022 at am  Yes Yes   Sig: Take 20 mg by mouth daily   calcium acetate (PHOSLO) 667 MG CAPS capsule 9/27/2022 at am  Yes Yes   Sig: Take 2,001 mg by mouth 3 times daily (with meals)   carvedilol (COREG) 12.5 MG tablet 9/27/2022 at am  No Yes   Sig: Take 1 tablet (12.5 mg) by mouth 2 times daily (with meals)   ondansetron (ZOFRAN  ODT) 4 MG ODT tab prn  No No   Sig: Take 1 tablet (4 mg) by mouth every 6 hours as needed for nausea or vomiting   oxyCODONE (ROXICODONE) 5 MG tablet prn  No No   Sig: Take 1 tablet (5 mg) by mouth every 6 hours as needed for moderate to severe pain   pantoprazole (PROTONIX) 40 MG EC tablet 9/27/2022 at am  No Yes   Sig: Take 1 tablet (40 mg) by mouth 2 times daily (before meals)   sucralfate (CARAFATE) 1 GM tablet 9/27/2022 at am  No Yes   Sig: Take 1 tablet (1 g) by mouth 4 times daily (before meals and nightly)      Facility-Administered Medications: None     Allergies   No Known Allergies    Physical Exam   EXAM:   Nursing Notes Reviewed.  BP (!) 77/53   Pulse 99   Temp 98.2  F (36.8  C) (Oral)   Resp 13   SpO2 96%    General:  Lethargic. Falls asleep easily. Cachectic.   Skin:  Warm, dry. No rashes or lesions on exposed skin.  HEENT:  Normocephalic, atraumatic; EOMs grossly intact. Mucum membranes are dry.   Chest: Minimal air movement to the right lung fields, left lung fields are diminished. No wheezing. Non-labored respiratory rate and effort. Nasal cannula.  Cardiovascular:  RRR--tachycardia, no rub or murmur. 1+ BLE edema.   Abdomen:  Soft, non-tender, non-distended.  Musculoskeletal:  Moves all four extremities. Muscle atrophy. Generalized weakness.   Neurological:  Lethargic. Arouses to vocal and tactile stimuli, alert and oriented. Moves all extremities, follows commands. Weakness 3/5 in all extremities.       LABS:    B Type Natr. Peptide 805 (H) <=99 pg/mL   09/27/2022 10:52 AM [facility] LABORATORY     WBC 4.6 3.5 - 10.5 x10(9)/L   09/27/2022 10:29 AM [facility]     RBC 3.16 (L) 3.90 - 5.03 x10(12)/L   09/27/2022 10:29 AM [facility]     Hemoglobin 9.3 (L) 12.0 - 15.5 g/dL   09/27/2022 10:29 AM [facility]     HCT 28.4 (L) 34.9 - 44.5 %   09/27/2022 10:29 AM [facility]     MCV 89.9 80.0 -  100.0 fL   09/27/2022 10:29 AM [facility]     MCH 29.4 27.6 - 33.3 pg   09/27/2022 10:29 AM [facility]     MCHC 32.7 31.5 - 35.2 g/dL   09/27/2022 10:29 AM [facility]     RDW 15.4 11.9 - 15.5 %   09/27/2022 10:29 AM [facility]     Platelets 122 (L) 150 - 450 x10(9)/L   09/27/2022 10:29 AM [facility]     Immature Gran % 0.7 (H) 0.0 - 0.5 %   09/27/2022 10:29 AM [facility]     Automated NRBC 0 <=0 /100 WBC   09/27/2022 10:29 AM [facility]     Neutrophil Absolute 3.6 1.7 - 7.0 10(9)/L   09/27/2022 10:29 AM [facility]     Lymphocyte Absolute 0.7 (L) 1.0 - 4.8 10(9)/L   09/27/2022 10:29 AM [facility]     Monocytes Absolute 0.2 0.2 - 0.9 10(9)/L   09/27/2022 10:29 AM [facility]     Eosinophil Absolute 0.0 0.0 - 0.5 10(9)/L   09/27/2022 10:29 AM [facility]     Basophil Absolute 0.0 0.0 - 0.3 10(9)/L   09/27/2022 10:29 AM [facility]         Protime 30.5 (H) 11.8 - 14.6 Seconds   09/27/2022 10:34 AM [facility]     INR 3.0 (H) 0.9 - 1.1   09/27/2022 10:34 AM [facility]       Lactate, Whole Blood 1.80 0.50 - 2.00 mmol/L   09/27/2022 10:14 AM [facility]       Alkaline Phosphatase 251 (H) 40 - 150 U/L   09/27/2022 10:46 AM [facility]     Bilirubin, Total 0.9 0.2 - 1.2 mg/dL   09/27/2022 10:46 AM [facility]     Bilirubin, Direct 0.4 0.0 - 0.5 mg/dL   09/27/2022 10:46 AM [facility]     AST (SGOT) 533 (H) 10 - 40 U/L   09/27/2022 10:46 AM [facility]     ALT (SGPT) 111 (H) 0 - 55 U/L   09/27/2022 10:46 AM [facility]     Protein, Total 5.6 (L) 6.4 - 8.3 g/dL    09/27/2022 10:46 AM [facility]     Albumin 2.3 (L) 3.5 - 5.0 g/dL   09/27/2022 10:46 AM [facility]      D Dimer, Quant 6.56 (H) <=0.50 ug/mL FEU   09/27/2022 10:34 AM [facility]     Troponin I 0.70 (H) 0.00 - 0.03 ng/mL   09/27/2022 10:52 AM [facility]       Sodium 137 136 - 145 mmol/L   09/27/2022 10:46 AM [facility]     Potassium 3.6 3.5 - 5.1 mmol/L   09/27/2022 10:46 AM [facility]     Chloride 97 (L) 98 - 109 mmol/L   09/27/2022 10:46 AM [facility]     CO2 28 20 - 29 mmol/L   09/27/2022 10:46 AM [facility]     Anion Gap 12 7 - 16 mmol/L   09/27/2022 10:46 AM [facility]     Calcium 9.6 8.4 - 10.4 mg/dL   09/27/2022 10:46 AM [facility]     BUN 21 7 - 26 mg/dL   09/27/2022 10:46 AM [facility]     Creatinine 5.07 (H) 0.55 - 1.02 mg/dL   09/27/2022 10:46 AM [facility]     GFR, Estimated 9 (L) >60 mL/min/1.73m2   09/27/2022 10:46 AM [facility]     Glucose 93 70 - 100 mg/dL   09/27/2022 10:46 AM [facility]           Data   Data reviewed today: I reviewed all medications, new labs and imaging results over the last 24 hours. I personally reviewed   09/27/2022 12:42 PM CDT    COMPARISON: Chest CT report 8/29/2022 [place], abdomen/pelvis CT report 8/25/2022 [place].    TECHNIQUE: Images were obtained through the chest following the administration of 75 mL IOPAMIDOL 76 % IV SOLN contrast using a pulmonary embolus protocol. Images were then obtained through the abdomen and pelvis.     FINDINGS:      CHEST: There is excellent opacification of the pulmonary arteries.  There is no pulmonary embolus.  There is extensive collateral vessel formation in the right chest wall. There is a left IJ  central venous catheter with tip near the atrial caval junction.    There are numerous enlarged lymph nodes in the visualized lower left neck and left supraclavicular region with the largest measuring 2.3 x 1.7 cm on image 4 series 4. There is bulky poorly defined mediastinal adenopathy. Mediastinal adenopathy appears contiguous with adenopathy in the right suprahilar region. Mild left hilar adenopathy.    There is a large right-sided pleural effusion. The right upper lobe bronchus is occluded with complete right upper lobe atelectasis. There is compressive atelectasis of the right middle and right lower lobes. There are patchy airspace opacities in the aerated right lung posteriorly.    There are multiple pulmonary nodules bilaterally. Some of the largest nodules include a 6 mm right lung nodule image 64 series 8. There is a 1 cm left upper lobe nodule image 61. There are nodular and consolidative opacities in the left lower lobe.    ABDOMEN/PELVIS: There is extensive metastatic involvement of the liver with innumerable low dense liver metastases. The liver is enlarged measuring 21 cm in length and demonstrates mass effect and numerous adjacent structures.    The spleen, adrenal glands, gallbladder, kidneys and pancreas are grossly unremarkable. There is no bowel obstruction. There is a port device in the right thigh with tip of the catheter terminating in the IVC.    There is a moderate amount of pelvic ascites. Ill-defined low-attenuation structures are present in both ovaries measuring up to 2.3 cm on the right and 1.6 cm on the left.    There is diffuse body wall edema.     BONES: There are scattered lytic skeletal lesions. Some representative lesions include the bilateral iliac bones on images 54-55 series 9. The L5 vertebral body image 47.    IMPRESSION:   1. Negative for pulmonary embolus.    2. Large right-sided pleural effusion with complete atelectasis of the right upper lobe.   3. Bulky adenopathy in the  lower left neck, mediastinum and bilateral hilar regions.  4. Multiple bilateral pulmonary nodules. There are patchy airspace opacities in the right lung and nodular and consolidative opacities in the left lower lobe which may represent superimposed pneumonia.  5. Extensive metastatic involvement of the liver. There are scattered lytic metastatic osseous lesions.  6. Moderate amount of pelvic ascites. There is diffuse body wall edema.  7. There are ill-defined low-attenuation structures in both ovaries which are indeterminate though may represent cysts, cannot exclude metastatic involvement.       Imaging Results - CT Angio Chest And CT Abd Pelvis W IV Cont (09/27/2022 11:50 AM CDT)  Procedure Note   Filippo Novoa MD - 09/27/2022    Formatting of this note might be different from the original.  IMPRESSION  COMPARISON: Chest CT report 8/29/2022 [place], abdomen/pelvis CT report 8/25/2022 [place].    TECHNIQUE: Images were obtained through the chest following the administration of 75 mL IOPAMIDOL 76 % IV SOLN contrast using a pulmonary embolus protocol. Images were then obtained through the abdomen and pelvis.     FINDINGS:     CHEST: There is excellent opacification of the pulmonary arteries. There is no pulmonary embolus. There is extensive collateral vessel formation in the right chest wall. There is a left IJ central venous catheter with tip near the atrial caval junction.    There are numerous enlarged lymph nodes in the visualized lower left neck and left supraclavicular region with the largest measuring 2.3 x 1.7 cm on image 4 series 4. There is bulky poorly defined mediastinal adenopathy. Mediastinal adenopathy appears contiguous with adenopathy in the right suprahilar region. Mild left hilar adenopathy.    There is a large right-sided pleural effusion. The right upper lobe bronchus is occluded with complete right upper lobe atelectasis. There is compressive atelectasis of the right middle and right lower  lobes. There are patchy airspace opacities in the aerated right lung posteriorly.    There are multiple pulmonary nodules bilaterally. Some of the largest nodules include a 6 mm right lung nodule image 64 series 8. There is a 1 cm left upper lobe nodule image 61. There are nodular and consolidative opacities in the left lower lobe.    ABDOMEN/PELVIS: There is extensive metastatic involvement of the liver with innumerable low dense liver metastases. The liver is enlarged measuring 21 cm in length and demonstrates mass effect and numerous adjacent structures.    The spleen, adrenal glands, gallbladder, kidneys and pancreas are grossly unremarkable. There is no bowel obstruction. There is a port device in the right thigh with tip of the catheter terminating in the IVC.    There is a moderate amount of pelvic ascites. Ill-defined low-attenuation structures are present in both ovaries measuring up to 2.3 cm on the right and 1.6 cm on the left.    There is diffuse body wall edema.     BONES: There are scattered lytic skeletal lesions. Some representative lesions include the bilateral iliac bones on images 54-55 series 9. The L5 vertebral body image 47.    IMPRESSION:   1. Negative for pulmonary embolus.   2. Large right-sided pleural effusion with complete atelectasis of the right upper lobe.   3. Bulky adenopathy in the lower left neck, mediastinum and bilateral hilar regions.  4. Multiple bilateral pulmonary nodules. There are patchy airspace opacities in the right lung and nodular and consolidative opacities in the left lower lobe which may represent superimposed pneumonia.  5. Extensive metastatic involvement of the liver. There are scattered lytic metastatic osseous lesions.  6. Moderate amount of pelvic ascites. There is diffuse body wall edema.  7. There are ill-defined low-attenuation structures in both ovaries which are indeterminate though may represent cysts, cannot exclude metastatic involvement.     Imaging  Results - CT Angio Chest And CT Abd Pelvis W IV Cont (09/27/2022 11:50 AM CDT)  Authorizing Provider Result Type   Cem Ro MD RAD CT     Back to top of Imaging Results       XR Portable Chest 1 View (09/27/2022 9:21 AM CDT)  Imaging Results - XR Portable Chest 1 View (09/27/2022 9:21 AM CDT)  Anatomical Region Laterality Modality   Chest, Lung   Computed Radiography     Imaging Results - XR Portable Chest 1 View (09/27/2022 9:21 AM CDT)  Specimen (Source) Anatomical Location / Laterality Collection Method / Volume Collection Time Received Time         09/27/2022 9:00 AM CDT       Imaging Results - XR Portable Chest 1 View (09/27/2022 9:21 AM CDT)  Impressions   09/27/2022 9:34 AM CDT    COMPARISON:  06/26/2020.    FINDINGS:  One view was obtained. Increased density within the medial right upper lobe obscuring the upper right heart border. Elevated right hemidiaphragm with tenting of the diaphragm. This is suspicious for right upper lobar atelectasis. Etiology is not apparent by radiograph. At minimum, chest x-ray follow-up will be needed. If this fails to resolve on follow-up, CT would be indicated to evaluate for a centrally obstructing lesion. Left lung clear. No pneumothorax or pleural effusion. Left IJ CVC tip in the upper right atrium.    Abnormal findings requiring follow-up.  Recommend: Chest x-ray follow-up when clinically indicated.     Recommendation: Imaging follow-up.       Imaging Results - XR Portable Chest 1 View (09/27/2022 9:21 AM CDT)  Procedure Note   Cem Painting MD - 09/27/2022    Formatting of this note might be different from the original.  IMPRESSION  COMPARISON: 06/26/2020.    FINDINGS: One view was obtained. Increased density within the medial right upper lobe obscuring the upper right heart border. Elevated right hemidiaphragm with tenting of the diaphragm. This is suspicious for right upper lobar atelectasis. Etiology is not apparent by radiograph. At minimum, chest x-ray  follow-up will be needed. If this fails to resolve on follow-up, CT would be indicated to evaluate for a centrally obstructing lesion. Left lung clear. No pneumothorax or pleural effusion. Left IJ CVC tip in the upper right atrium.    Abnormal findings requiring follow-up. Recommend: Chest x-ray follow-up when clinically indicated.     Recommendation: Imaging follow-up.     Imaging Results - XR Portable Chest 1 View (09/27/2022 9:21 AM CDT)  Authorizing Provider Result Type   Cem Ro MD RAD PORTABLE        (0) independent

## 2022-09-28 NOTE — PROGRESS NOTES
Potassium   Date Value Ref Range Status   09/28/2022 3.4 3.4 - 5.3 mmol/L Final     Hemoglobin   Date Value Ref Range Status   09/28/2022 7.9 (L) 11.7 - 15.7 g/dL Final     Creatinine   Date Value Ref Range Status   09/28/2022 4.99 (H) 0.52 - 1.04 mg/dL Final     Urea Nitrogen   Date Value Ref Range Status   09/28/2022 25 7 - 30 mg/dL Final     Sodium   Date Value Ref Range Status   09/28/2022 138 133 - 144 mmol/L Final     INR   Date Value Ref Range Status   09/28/2022 2.74 (H) 0.85 - 1.15 Final       DIALYSIS PROCEDURE NOTE  Hepatitis status of previous patient on machine log was checked and verified ok to use with this patients hepatitis status.  Patient dialyzed for 2.75 hrs. on a K4 bath with a net fluid removal of  1.5L.  A BFR of 400 ml/min was obtained via a Left CVC.     The treatment plan was discussed with Dr. Younger during the treatment.    Total heparin received during the treatment: 0 units.      Line flushed, clamped and capped with heparin 1:1000 2.3 mL (2300 units) per lumen    Meds  given: None   Complications: None      Person educated: Patient. Knowledge base Substantial. Barriers to learning: None. Educated on Procedure via Verbal mode. Patient verbalized understanding. Pt prefers Verbal education style.     ICEBOAT? Timeout performed pre-treatment  I: Patient was identified using 2 identifiers  C:  Consent Signed Yes  E: Equipment preventative maintenance is current and dialysis delivery system OK to use  B:    Latest Reference Range & Units 08/18/22 15:50   Hep B Surface Agn Nonreactive  Nonreactive   Hepatitis B Surface Antibody Instrument Value <8.00 m[IU]/mL 103.04     O: Dialysis orders present and complete prior to treatment  A: Vascular access verified and assessed prior to treatment  T: Treatment was performed at a clinically appropriate time  ?: Patient was allowed to ask questions and address concerns prior to treatment  See Adult Hemodialysis flowsheet in [facility] for further details  and post assessment.  Machine water alarm in place and functioning. Transducer pods intact and checked every 15min.   Pt returned via Bed.  Chlorine/Chloramine water system checked every 4 hours.  Outpatient Dialysis at [facility]      Post treatment report given to Joseluis HERNANDEZ RN regarding 1.5L of fluid removed, last BP of 87/67

## 2022-09-28 NOTE — PROGRESS NOTES
Palliative care consult received. Palliative care attempted to meet with patient today but she is off the floor having procedures. Spoke with daughter Monica today and she and other siblings would like to come in to meet for goals of care discussion tomorrow at noon. We will meet in [facility]'s room. Notified Dr Miller.  For any urgent symptom management questions or concerns before patient is seen, please call palliative care team pager ([phone]) during the hours of 8AM-4:30PM Monday-Friday. After  these hours please page on call palliative care physician at [phone].

## 2022-09-28 NOTE — CONSULTS
RENAL CONSULTATION NOTE      REFERRING MD:  Camelia    REASON FOR CONSULTATION:  ESRD management         A/P:     57-year-old female admitted in transfer from an outside hospital in the setting of acute hypoxic respiratory failure.  This in the setting of malignant neuroendocrine carcinoma   with malignant pleural effusion    Seen with respect to management of ESRD.      1.  ESRD   -MWF dialysis   -[facility]   -ran as scheduled 09/26/22   -Dr. Gonsales    Outpatient orders:    Treatment Type  Hemodialysis (procedure)    Dialyzer  : Warp 9 Series: Totus Power Model: 17H RENÉE Factor: 1455    Dialysate  Potassium 2k    Dialysate  Calcium 2.5 mEq/L    Dialysate  BiCarb 35 mEq/L    Dialysate  Base Sodium 138 mEq/L    Dialysate Flow Rate  500 ML/min    Blood Flow Rate  400 ML/min    Treatment Time  195 min    Temperature  Standard    Max UF Rate  2 L/Hr    Access  CVC Catheter Chest (Left)    Access Concurrent  No    Schedule  3 Times per week    Heparin Load  1000 Units (1:1,000 concentration)    Heparin Hourly Dose  500 Units/hr (1:1,000 concentration)        2.  Anemia   -no REMA given malignancy   3.  Access   -left IJ   -developing left AVF  4.  Metastatic neuroendocrine carcinoma     -liver and bone involvement   5.  Weight loss/poor appetite  6.  Hypoalbuminemia  7.  Hypotension    Hold BP meds  unlikely to require moving forward    Dialysis today  5% albumin prime  UF as allowed by BP   Orders entered    Prognosis guarded  Agree with palliative care consult          HPI:     Received in transfer from outside hospital facility.  Recent hospitalization at this facility late August.    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   Evaluation demonstrated metastatic disease including cervical lymph nodes, liver, mediastinal mass.    She now presents with increasing shortness of breath.    She dialyzes at [facility].  Monday Wednesday Friday schedule.  Dialyzed as scheduled dated  09/26/22.  Outpatient dialysis orders reviewed.  Previous target weight in the range of 53.5 kg.  No current weight.    For thoracentesis today.  Previously completed 10 course therapy of radiation for SVC syndrome  No subsequent oncology follow-up.    Resting in bed.  Short of breath.  No chest pain.  Edema noted    Daughter present with with whom she lives.  Appetite and intake has been poor.    Has been going to dialysis.      ROS:  A complete 10 point review of systems was performed and is negative except as noted above.    PMH:    Past Medical History:   Diagnosis Date     Anemia      Cancer (H)      Coronary artery disease      Dialysis complication      Dialysis patient (H)      Embolism (H)      ESRD (end stage renal disease) (H)      Hypertension      Ischemic cardiomyopathy      Renal failure        PSH:    Past Surgical History:   Procedure Laterality Date     ANESTH,UPPER ARM AV FISTULA REPAIR       CREATE FISTULA ARTERIOVENOUS UPPER EXTREMITY Left 3/25/2022    Procedure: CREATION OF FIRST STAGE LEFT BRACHIOBASILIC ARTERIOVENOUS FISTULA;  Surgeon: Akash Miller MD;  Location: [facility] OR     CREATE FISTULA ARTERIOVENOUS UPPER EXTREMITY Left 6/28/2022    Procedure: LEFT BRACHIO-BASILIC ARTERIOVENOUS FISTULA WITH BIOPROSTHETIC GRAFT;  Surgeon: Akash Miller MD;  Location: [facility] OR     IR CHEST PORT PLACEMENT > 5 YRS OF AGE  8/31/2022     IR DIALYSIS FISTULOGRAM LEFT  5/31/2022     IR DIALYSIS FISTULOGRAM LEFT  7/8/2022     IR DIALYSIS FISTULOGRAM LEFT  8/11/2022     LIGATE FISTULA ARTERIOVENOUS UPPER EXTREMITY Left 6/28/2022    Procedure: Ligate fistula arteriovenous upper extremity;  Surgeon: Akash Miller MD;  Location: [facility] OR     REPAIR FISTULA ARTERIOVENOUS UPPER EXTREMITY Left 7/8/2022    Procedure: EXPLORE LEFT ARM, REPAIR OF LEFT UPPER ARM AV DIALYSIS GRAFT, EVACUATE HEMATOMA;  Surgeon: Akash Miller MD;  Location: [facility] OR     WISDOM TEETH         MEDICATIONS:    No current outpatient  medications on file.       ALLERGIES:    Allergies as of 09/27/2022     (No Known Allergies)       FH:  No family history on file.    SH:    Social History     Socioeconomic History     Marital status: Single     Spouse name: Not on file     Number of children: Not on file     Years of education: Not on file     Highest education level: Not on file   Occupational History     Not on file   Tobacco Use     Smoking status: Current Every Day Smoker     Years: 40.00     Types: Cigarettes     Smokeless tobacco: Never Used     Tobacco comment: 6 cigarettes per day.    Substance and Sexual Activity     Alcohol use: Never     Drug use: Never     Sexual activity: Not on file   Other Topics Concern     Parent/sibling w/ CABG, MI or angioplasty before 65F 55M? Not Asked   Social History Narrative     Not on file     Social Determinants of Health     Financial Resource Strain: Not on file   Food Insecurity: Not on file   Transportation Needs: Not on file   Physical Activity: Not on file   Stress: Not on file   Social Connections: Not on file   Intimate Partner Violence: Not on file   Housing Stability: Not on file       PHYSICAL EXAM:      Vitals were reviewed  Patient Vitals for the past 8 hrs:   BP Temp Temp src Pulse Resp SpO2   09/28/22 0949 -- -- -- 101 10 96 %   09/28/22 0800 (!) 87/69 -- -- 99 15 --   09/28/22 0730 -- 98.1  F (36.7  C) Oral -- -- --   09/28/22 0600 (!) 89/56 -- -- 97 17 98 %   09/28/22 0410 (!) 87/58 97.9  F (36.6  C) -- 96 16 95 %   09/28/22 0210 (!) 88/60 -- -- 97 11 96 %     No intake/output data recorded.      There were no vitals filed for this visit.      GENERAL: awake, alert, follows  HEENT: NC/AT, PERRLA, EOMI, non icteric, pharynx moist without lesion  RESP:  clear anteriorly  CV: RRR, normal S1 S2  ABDOMEN: soft, nontender, no HSM or masses and bowel sounds normal  MS: no clubbing, cyanosis   SKIN: clear without significant rashes or lesions  NEURO: speech normal and cranial nerves 2-12  intact  PSYCH: affect flat  EXT: 1 plus edema       LABS:        Recent Labs   Lab 09/28/22 0823 09/28/22 0526   NA  --  138   POTASSIUM  --  3.4   CHLORIDE  --  103   CO2  --  24   ANIONGAP  --  11   GLC 97 70   BUN  --  25   CR  --  4.99*   GFRESTIMATED  --  9*   JEWELL  --  8.9     Recent Labs   Lab 09/28/22 0526   ALBUMIN 2.3*     Recent Labs   Lab 09/28/22 0526 09/27/22 1922   PHOS 3.9  --    HGB 7.9* 8.3*       DIAGNOSTICS:  Reviewed      ELTON Younger    [facility] consultants  [phone]

## 2022-09-28 NOTE — PHARMACY-ADMISSION MEDICATION HISTORY
Pharmacy Medication History  Admission medication history interview status for the 9/27/2022  admission is complete. See EPIC admission navigator for prior to admission medications     Medication history sources: Completed by a different pharmacist who used patient's own prescription bottles and her recent discharge summary.    Additional medication history information:   - Patient had all medication bottles with her except for Tylenol, Lipitor, and oxycodone.      Medication reconciliation completed by provider prior to medication history? Yes    Time spent in this activity: 10 minutes    Prior to Admission medications    Medication Sig Last Dose Taking? Auth Provider Long Term End Date   allopurinol (ZYLOPRIM) 100 MG tablet Take 1 tablet (100 mg) by mouth every evening 9/26/2022 at pm Yes Kemi Gallagher MD     amLODIPine (NORVASC) 5 MG tablet Take 1 tablet (5 mg) by mouth every evening 9/26/2022 at pm Yes Kemi Gallagher MD Yes    apixaban ANTICOAGULANT (ELIQUIS) 5 MG tablet Take 1 tablet (5 mg) by mouth 2 times daily 9/27/2022 at am Yes Kemi Gallagher MD     atorvastatin (LIPITOR) 20 MG tablet Take 20 mg by mouth daily 9/27/2022 at am Yes Reported, Patient     B Complex-C-Folic Acid (DEEPALI CAPS) 1 MG CAPS Take 1 capsule by mouth daily 9/27/2022 at am Yes Reported, Patient     calcium acetate (PHOSLO) 667 MG CAPS capsule Take 2,001 mg by mouth 3 times daily (with meals) 9/27/2022 at am Yes Unknown, Entered By History     carvedilol (COREG) 12.5 MG tablet Take 1 tablet (12.5 mg) by mouth 2 times daily (with meals) 9/27/2022 at am Yes Kemi Gallagher MD Yes    pantoprazole (PROTONIX) 40 MG EC tablet Take 1 tablet (40 mg) by mouth 2 times daily (before meals) 9/27/2022 at am Yes Kemi Gallagher MD     sucralfate (CARAFATE) 1 GM tablet Take 1 tablet (1 g) by mouth 4 times daily (before meals and nightly) 9/27/2022 at am Yes Kemi Gallagher MD     acetaminophen (TYLENOL) 325 MG tablet Take 2 tablets (650 mg) by mouth every 6  hours as needed for mild pain or other (and adjunct with moderate or severe pain or per patient request) prn  Kemi Gallagher MD     ondansetron (ZOFRAN ODT) 4 MG ODT tab Take 1 tablet (4 mg) by mouth every 6 hours as needed for nausea or vomiting prn  Kemi Gallagher MD     oxyCODONE (ROXICODONE) 5 MG tablet Take 1 tablet (5 mg) by mouth every 6 hours as needed for moderate to severe pain prn  Kemi Gallagher MD         The information provided in this note is only as accurate as the sources available at the time of update(s)     Sherry Garcia, PharmD, BCPS

## 2022-09-28 NOTE — PROGRESS NOTES
[facility]    Medicine Progress Note - Hospitalist Service    Date of Admission:  9/27/2022    Assessment & Plan        Gina Simpson is a 57 year old female w/ PMH of CAD w/ PCI in 2019, HTN, cardiomyopathy/HFmEF (40-45%), hypertension, COPD, ESRD, remote failed RUE fistula, s/p creation of LUE AV fistula who was recently hospitalized from 8/14/22-9/3/22 for SVC syndrome and extensive adenopathy, ultimately diagnosed w/ stage IV metastatic non-small cell carcinoma with w/ liver mets and lymphadenopathy and is s/p urgent radiation.  Her course was complicated by GIB from DUw/ large volume blood product transfusions.  She presented to [place] ED 09/27/22 w/ progressively worsening shortness of breath and generalized weakness for the past couple of weeks. Imaging revealing large right-sided effusion. She was transferred to [facility] for further management.          Acute hypoxic respiratory insufficiency  large right pleural effusion on outside hospital CT chest - s/p thoracentesis 9/28  ARIA pulmonary nodule  Admit with acute hypoxia 2/2 new large right-sided pleural effusion with suspicion for malignant effusion. Noted to have also have some patchy airspace opacities in the right lung and nodular and consolidative opacities in the LLL that may be suggestive of PNA  Known mediastinal mass as below  -supplemental O2              - Remains ok on nasal cannula 1-2 liters currently              - Antibiotics, as noted below (empirically vanc/cefepime)  - IR consult for right-sided thoracentesis              - Eliquis on hold for now; heparin nomogram to continue for today              - Suspect pleural fluid malignant versus infection; labs ordered post thora (pending)      Hypotension  Blood pressures in the 70's to 80's systolic, more stable in 80s today so far.  - Suspect that dehydration contributing due to not eating or drinking since her discharge on 9/3, in addition to concerns  for possible sepsis  - Volume resuscitation:              - Crystalloid IVF x 2 liters              - Albumin  - Work-up pending to r/o sepsis              - BC pending--difficult stick and lab only able to draw 1 of 2 bottles              - UA ordered and pending              - Lactate at [facility] 1.8 - remains in low 1 range, stop trending in absence of change in clinical picture  - Empirically continuing Vancomycin and Cefepime; given possible PNA on imaging --- add procalcitonin (ESRD may skew)  - If blood pressures do not improve after IVF, will need to transfer to ICU              - Nursing to notify provider if MAP <65, SBP <90              - If unable to maintain blood pressures, will need norepinephrine and transfer to              ICU              - Discussed with Intensivist upon admission  - Patient/family still wanting aggressive treatment measures and Full Code status; agreeable to ICU transfer  - 9/28 - Palliative discussed with pt/family - planning meeting noon 9/29     Hepatic metastases w/ Abnormal LFTs  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  - Per family, has not followed-up OP with Oncology for discussion on chemotherapy  - Likely not a candidate for chemo in this acute state--discussed with patient and daughter that chemotherapy would only be palliative and not curative with overall poor prognosis  - Repeat imaging performed at [facility] demonstrating bulky adenopathy in the lower left neck, mediastinum and bilateral hilar regions, multiple pulmonary nodules, bilaterally, extensive metastatic involvement of the liver with scattered lytic metastatic osseous lesions  -  Consider therapeutic paracentesis for ascites   - Oncology & Palliative Care consulted              - Overall prognosis is poor              - Remains full code at this time, but daughter would like to discuss goals              Of care and symptom management              - At this time, unclear if family comprehends the overall prognosis and severity              Of the situation  - Discussed code status with patient and daughter. Both are in agreement of Full Code status and aggressive cares at this time  - 9/28 - continue efforts as above - family and palliative planning in person meeting 9/29 noon      Chronic Anemia due to ESRD  Thrombocytopenia  - Hgb 8.3 on admit (baseline past 3-weeks 7.7-8.0) --> 9/29 fairly stable at 7.9  - PLT count 108 without signs of bleeding --> 104  - follow cbc  - Consider transfusion for Hgb <7        CAD s/p PCI 2019  Cardiomyopathy, HFmEF (40-45%)  Hypertension  *PTA regimen is: amlodipine 5 mg daily, coreg 12.5mg bid  - Holding anti-hypertensive med's due to hypotension              - Restart if/when clinically indicated     COPD  Tobacco use  - encourage cessation of tobacco        ESRD on MWF HD  S/p left AV fistula formation 6/28/22  S/p fistulogram with dilatation of L subclavian and junction of brachiocephalic and subclavian vein complicated by hematoma requiring evacuation 7/8/22  Hypophosphatemia  - Dialyzes MWF  - Left AV fistula  - Nephrology consulted for HD, next due on Wed 9/28/22  - continue PTA phos low     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/14/22)  *PTA apixaban; needs lifelong anticoagulation  - Apixaban held on 9/27 in anticipation of thoracentesis  - Low dose heparin nomogram started without bolus or loading dose for coverage               - Will need to be held prior to procedure              - Hold heparin at 0600 on 9/28/22              - Restart anticoagulation when able     Severe Malnutrition  - Nutrition consult ordered to  assess and provide recommendations      Physical Deconditioning  Generalized Weakness  - Per daughter, non-ambulatory and rapidly declining since discharge on 9/3  - PT/OT to assess  - Family expresses insurance/coverage concerns; SW consult to assist with discharge and financial assistance       Diet: NPO for Medical/Clinical Reasons Except for: Meds    DVT Prophylaxis: Heparin   Antoine Catheter: Not present  Central Lines: PRESENT  CVC Double Lumen Left Subclavian-Site Assessment: WDL  Cardiac Monitoring: ACTIVE order. Indication: Acute decompensated heart failure (48 hours)  Code Status: Full Code      Disposition Plan      Expected Discharge Date: 09/29/2022                The patient's care was discussed with the Bedside Nurse and Patient.    Des Miller MD  Hospitalist Service  [facility]  Securely message with the Vocera Web Console (learn more here)  Text page via Vessel Paging/Directory         Clinically Significant Risk Factors Present on Admission             # Hypoalbuminemia: Albumin = 2.3 g/dL (Ref range: 3.4 - 5.0 g/dL) on admission, will monitor as appropriate   # Coagulation Defect: home medication list includes an anticoagulant medication  # Thrombocytopenia: Plts = 104 10e3/uL (Ref range: 150 - 450 10e3/uL) on admission, will monitor for bleeding           ______________________________________________________________________    Interval History   Patient seen and examined midday.  Blood pressures remain low but stable in the 80s.  She is sleepy but awakens to questions and denies any new pain, fevers, or shortness of breath.    Data reviewed today: I reviewed all medications, new labs and imaging results over the last 24 hours. I personally reviewed no images or EKG's today.    Physical Exam   Vital Signs: Temp: 98.1  F (36.7  C) Temp src: Oral BP: (!) 89/56 Pulse: 97   Resp: 17 SpO2: 98 % O2 Device: Nasal cannula Oxygen Delivery: 1.5 LPM  Weight: 0 lbs 0 oz    Gen:  NAD, pleasant, sleepy but awakens to voice  HEENT: EOMI, MMM  Resp: no focal crackles,  no wheezes, no increased work of resp  CV: S1S2 heard, reg rhythm, reg rate  Abdo: soft, nontender, nondistended, bowel sounds present  Ext: calves nontender, well perfused  Neuro: aa, conversant but sleepy, CN grossly intact, no facial asymmetry      Data   Recent Labs   Lab 09/28/22  0823 09/28/22  0526 09/27/22  1922   WBC  --  4.4 4.8   HGB  --  7.9* 8.3*   MCV  --  94 93   PLT  --  104* 108*   INR  --  2.74*  --    NA  --  138 136   POTASSIUM  --  3.4 3.5   CHLORIDE  --  103 99   CO2  --  24 26   BUN  --  25 24   CR  --  4.99* 5.16*   ANIONGAP  --  11 11   JEWELL  --  8.9 9.1   GLC 97 70 93   ALBUMIN  --  2.3*  --    PROTTOTAL  --  5.1* 4.9*   BILITOTAL  --  0.8  --    ALKPHOS  --  235*  --    ALT  --  106*  --    AST  --  518*  --      Recent Results (from the past 24 hour(s))   US Thoracentesis    Narrative    ULTRASOUND GUIDED THORACENTESIS September 28, 2022 10:17 AM     HISTORY: Likely malignant large right sided pleural effusion with  hypoxia.    FINDINGS: Ultrasound was used to evaluate for the presence and best  approach for drainage of a pleural effusion. Written and oral informed  consent was obtained. A pause for the cause procedure to verify the  correct patient and correct procedure. The skin overlying the right  chest posteriorly was prepped and draped in the usual sterile fashion.  The subcutaneous tissues were anesthetized with 10 mL 1% lidocaine. A  catheter was advanced into the pleural space and 900 mL of  kay  colored fluid was drained. Patient was monitored by nurse under my  direct supervision throughout the exam. Ultrasound images were  permanently stored.  There were no immediate complications. Patient  left the ultrasound suite in satisfactory condition.      Impression    IMPRESSION: Technically successful thoracentesis without immediate  complications.    LUCILA WAYNE MD         SYSTEM ID:   [id number]

## 2022-09-28 NOTE — PROGRESS NOTES
"Port-a-cath in right lower extremity verified by IR placement note as power injectable.  19ga, 1\" power needle utilized for access.  One attempt, robust blood return, flushes easily, dressed under sterile technique.  Patient tolerates well.  Saline locked, primary nursing staff notified, to utilize for infusion.  "

## 2022-09-28 NOTE — PROGRESS NOTES
Renal Medicine Inpatient Dialysis Note                                Gina Simpson MRN# [id number]   Age: 57 year old YOB: 1965   Date of Admission: 9/27/2022 Hospital LOS: 1          Assessment/Plan:       57-year-old female admitted in transfer from an outside hospital in the setting of acute hypoxic respiratory failure.  This in the setting of malignant neuroendocrine carcinoma   with malignant pleural effusion    Seen with respect to management of ESRD.      1.  ESRD   -MWF dialysis   -Yo Taylor   -ran as scheduled 09/26/22   -Dr. Gonsales    Outpatient orders:    Treatment Type  Hemodialysis (procedure)    Dialyzer  : [employer] Series: Eoscene Model: 17H RENÉE Factor: 1455    Dialysate  Potassium 2k    Dialysate  Calcium 2.5 mEq/L    Dialysate  BiCarb 35 mEq/L    Dialysate  Base Sodium 138 mEq/L    Dialysate Flow Rate  500 ML/min    Blood Flow Rate  400 ML/min    Treatment Time  195 min    Temperature  Standard    Max UF Rate  2 L/Hr    Access  CVC Catheter Chest (Left)    Access Concurrent  No    Schedule  3 Times per week    Heparin Load  1000 Units (1:1,000 concentration)    Heparin Hourly Dose  500 Units/hr (1:1,000 concentration)        2.  Anemia   -no REMA given malignancy   3.  Access   -left IJ   -developing left AVF  4.  Metastatic neuroendocrine carcinoma     -liver and bone involvement   5.  Weight loss/poor appetite  6.  Hypoalbuminemia  7.  Hypotension    Hold BP meds  unlikely to require moving forward    Dialysis today  5% albumin prime  UF as allowed by BP   Orders entered    Prognosis guarded  Agree with palliative care consult          Interval History:     Dialysis run parameters reviewed with dialysis RN at patient bedside.  Seen on run    2.75 hours  5% albumin prime   4K  UF 1.5 liter range    BP 95 systolic      Stable run     ROS     CONSTITUTIONAL:por intake  R: NEGATIVE for significant cough or SOB  CV: NEGATIVE for chest pain, palpitations  EXT: no  change edema  ROS otherwise negative    Dialysis Parameters:     Vitals were reviewed  Patient Vitals for the past 8 hrs:   BP Temp Temp src Pulse Resp SpO2   09/28/22 0800 (!) 87/69 -- -- 99 15 --   09/28/22 0730 -- 98.1  F (36.7  C) Oral -- -- --   09/28/22 0600 (!) 89/56 -- -- 97 17 98 %   09/28/22 0410 (!) 87/58 97.9  F (36.6  C) -- 96 16 95 %   09/28/22 0210 (!) 88/60 -- -- 97 11 96 %     No intake/output data recorded.    There were no vitals filed for this visit.    Current Weight: no recorded  Dry Weight: ?  Dialysis Temp: 36.5  C  Access Device: IJ  Access Site: left  Dialyzer: Revaclear  Dialysis Bath: 4  Sodium Profile: n  UF Goal: 1.5  Blood Flow Rate (mL/min): 400  Total Treatment Time (hrs): 2.75  Heparin: Low dose as required      EPO dose: n  Zemplar: n  IV Fe: n      Medications and Allergies:     Reviewed      Physical Exam:     Seen and examined during course of dialysis run    BP (!) 89/56 (BP Location: Left leg)   Pulse 97   Temp 98.1  F (36.7  C) (Oral)   Resp 17   SpO2 98%     GENERAL: awake, alert, follows  HEENT: NC/AT, PERRLA, EOMI, non icteric, pharynx moist without lesion  RESP: clear  CV: RRR, normal S1 S2  ABDOMEN: S/NT, BS present  MS: 1 edema edema  SKIN: catheter site clean without drainage    Data:       Recent Labs   Lab 09/28/22  0823 09/28/22  0526   NA  --  138   POTASSIUM  --  3.4   CHLORIDE  --  103   CO2  --  24   ANIONGAP  --  11   GLC 97 70   BUN  --  25   CR  --  4.99*   GFRESTIMATED  --  9*   JEWELL  --  8.9         G Demetris Younger MD    [facility] - Nephrology  [phone]

## 2022-09-28 NOTE — PROGRESS NOTES
Brief House NP Note    Following-up on hypotensive patient. I ordered NICOM measurements to assess patient's fluid responsiveness which showed a change in SVI of 2.6% so patient no longer fluid responsive and will need vasopressor management if her MAP drops below 65 mmHg.    Added BNP and VBG to morning labs    For now patient remains hemodynamically stable and will stay on IMC status. Continue to closely monitor.    ERIC Franco, CNP  Hospitalist - House CALLIE  Text me on the [employer] callie for a textback  Text page with web based paging for a callback

## 2022-09-28 NOTE — PLAN OF CARE
DATE & TIME: 9/28/2022 0026-6499    Cognitive Concerns/ Orientation : A&O x4.    BEHAVIOR & AGGRESSION TOOL COLOR: green   CIWA SCORE: na    ABNL VS/O2: soft BP, able to keep MAP >60 (ok per MD, preferably 65 or greater).   MOBILITY: Ax2  PAIN MANAGMENT: 10/10 generalized pain, PRN oxycodone twice.   DIET: renal, likes apple juice, poor appetite.   BOWEL/BLADDER: pt stated she still made urine, but no UO this shift. Will need urine sample if able to obtain one.   ABNL LAB/BG: , heparin on hold, recheck at 1830. BNP 56340. Cr. 4.99. BG 70 this am. Apple juice 120 ml given--repeat BG 94. Hypoglycemic protocol ordered. Critical procal >200. Crossover MD notified via amcom.   DRAIN/DEVICES: kelvin cath on right upper thigh, PIV X2, fistula with bruit and thrill present. CVC tunneled catheter in place.   TELEMETRY RHYTHM: NSR.   SKIN: dry, 4+ edema on BLE and BUE.   TESTS/PROCEDURES: right thoracentesis 900 ml removed, site CDI. HD removed 1.5 liters.   D/C DATE: pending.   Discharge Barriers: complex medical.   OTHER IMPORTANT INFO: nephrology, hem/onc, and palliative following. Will have care conference at noon tomorrow with family and palliative care in pt's room.

## 2022-09-28 NOTE — PHARMACY-VANCOMYCIN DOSING SERVICE
Pharmacy Vancomycin Note  Date of Service 2022  Patient's  1965   57 year old, female    Indication: Sepsis  Day of Therapy: 2  Current vancomycin regimen: Intermittent dosing based on Vancomycin pre-HD level  Current vancomycin monitoring method: Renal Replacement Therapy  Current vancomycin therapeutic monitoring goal: 15-20 mg/L    Current estimated CrCl = Estimated Creatinine Clearance: 11.2 mL/min (A) (based on SCr of 4.99 mg/dL (H)).    Creatinine for last 3 days  2022:  7:22 PM Creatinine 5.16 mg/dL  2022:  5:26 AM Creatinine 4.99 mg/dL    Recent Vancomycin Levels (past 3 days)  2022:  5:26 AM Vancomycin 23.3 mg/L    Vancomycin IV Administrations (past 72 hours)                   vancomycin 1250 mg in 0.9% NaCl 250 mL intermittent infusion 1,250 mg (mg) 1,250 mg New Bag 22 2245                Nephrotoxins and other renal medications (From now, onward)    Start     Dose/Rate Route Frequency Ordered Stop    22 1700  vancomycin (VANCOCIN) 500 mg vial to attach to  mL bag         500 mg  over 1 Hours Intravenous ONCE 22 1251      22  vancomycin place kumar - receiving intermittent dosing         1 each Intravenous SEE ADMIN INSTRUCTIONS 22               Contrast Orders - past 72 hours (72h ago, onward)    None          Interpretation of levels and current regimen:  Vancomycin level is reflective of supratherapeutic level    Has serum creatinine changed greater than 50% in last 72 hours:  ESRD iHD    Urine output:  ESRD    Renal Function: ESRD on Dialysis    Plan:  1. Vancomycin level 23.3 prior to HD, redose with 500mg IV Vancomycin post-HD. 3  2. Vancomycin monitoring method: Renal Replacement Therapy  3. Vancomycin therapeutic monitoring goal: 15-20 mg/L  4. Pharmacy will check vancomycin levels as appropriate in Prior to next hemodialysis.  5. Serum creatinine levels will be ordered ESRD patient, per nephrology..    Velia  CHARMAINE Garcia, PharmD, BCPS

## 2022-09-28 NOTE — PHARMACY-VANCOMYCIN DOSING SERVICE
Pharmacy Vancomycin Initial Note  Date of Service 2022  Patient's  1965  57 year old, female    Indication: Sepsis    Current estimated CrCl = Estimated Creatinine Clearance: 10.9 mL/min (A) (based on SCr of 5.16 mg/dL (H)).    Creatinine for last 3 days  2022:  7:22 PM Creatinine 5.16 mg/dL    Recent Vancomycin Level(s) for last 3 days  No results found for requested labs within last 72 hours.      Vancomycin IV Administrations (past 72 hours)      No vancomycin orders with administrations in past 72 hours.                Nephrotoxins and other renal medications (From now, onward)    Start     Dose/Rate Route Frequency Ordered Stop    22  vancomycin 1250 mg in 0.9% NaCl 250 mL intermittent infusion 1,250 mg         1,250 mg  over 90 Minutes Intravenous ONCE 22  vancomycin place kumar - receiving intermittent dosing         1 each Intravenous SEE ADMIN INSTRUCTIONS 22            Contrast Orders - past 72 hours (72h ago, onward)    None               Plan:  1. Start vancomycin  1250 mg IV loading dose X1.   2. Vancomycin monitoring method: Renal Replacement Therapy  3. Vancomycin therapeutic monitoring goal: 15-20 mg/L  4. Pharmacy will check vancomycin levels as appropriate in am tomorrow (pre-dialysis).      Candy Trejo RP

## 2022-09-28 NOTE — CONSULTS
"CLINICAL NUTRITION SERVICES  -  ASSESSMENT NOTE      Recommendations Ordered by Registered Dietitian (RD): [place] Ensure BID between meals    Malnutrition: % Weight Loss:  > 7.5% in 3 months (severe malnutrition)  % Intake:  <75% for >/= 1 month (moderate malnutrition)  Subcutaneous Fat Loss:  Orbital region mild depletion and Upper arm region moderate depletion (8/17)  Muscle Loss:  Temporal region mild depletion, Clavicle bone region mild depletion, Patellar region moderate depletion and Posterior calf region moderate-severe depletion (8/17)  Fluid Retention:  Moderate as above     Malnutrition Diagnosis: Moderate malnutrition  In Context of:  Acute illness or injury        REASON FOR ASSESSMENT  Gina Simpson is a 57 year old female seen by Registered Dietitian for Admission Nutrition Risk Screen for Have you recently lost weight without trying? - Yes, 14-23#  Have you eaten poorly because of a decreased appetite? - Yes         NUTRITION HISTORY  - Information obtained from [facility] as patient off the floor and not available to visit with.  She was seen by this writer a little over a month ago and the following history was obtained at that time:  - Information obtained from patient.  She notes that her appetite has been very poor for about 1 month.  During this time period she has been eating about 50% of her normal intake and taking 2 bottles of Ensure per day (strawberry and/or banana).      CURRENT NUTRITION ORDERS  Diet Order:     Renal (dialysis)    Current Intake/Tolerance:  Patient has not ordered anything since admit (day #2)      NUTRITION FOCUSED PHYSICAL ASSESSMENT FOR DIAGNOSING MALNUTRITION)  Yes (8/17/22)             Observed:    Muscle wasting (refer to documentation in Malnutrition section) and Subcutaneous fat loss (refer to documentation in Malnutrition section)    Obtained from Chart/Interdisciplinary Team:  Edema 3+ in extremities    ANTHROPOMETRICS  Height: 5' 5\"  Weight: 58.2 kg " (128#)(9/28)  Body mass index is 21.35 kg/m   Weight Status:  Normal BMI  IBW: 56.8 kg   % IBW: 102%  Weight History:   Wt Readings from Last 10 Encounters:   09/28/22 58.2 kg (128 lb 4.9 oz)   09/03/22 58.9 kg (129 lb 13.6 oz)   07/10/22 65 kg (143 lb 4.8 oz)   06/28/22 56.2 kg (124 lb)   05/31/22 56.7 kg (125 lb)   03/25/22 62.6 kg (138 lb)   03/01/22 61.2 kg (135 lb)     Patient down 15# over the last 2.5 months (also indicated this last month) -- 10%    LABS  Labs reviewed    MEDICATIONS  Medications reviewed      ASSESSED NUTRITION NEEDS PER APPROVED PRACTICE GUIDELINES:    Dosing Weight 58.2 kg   Estimated Energy Needs: 2885-7040 kcals (25-30 Kcal/Kg)  Justification: maintenance  Estimated Protein Needs: 70-90 grams protein (1.2-1.5 g pro/Kg)  Justification: hypercatabolism with acute illness  Estimated Fluid Needs: 1450-9677 mL (1 mL/Kcal)  Justification: maintenance    MALNUTRITION:  % Weight Loss:  > 7.5% in 3 months (severe malnutrition)  % Intake:  <75% for >/= 1 month (moderate malnutrition)  Subcutaneous Fat Loss:  Orbital region mild depletion and Upper arm region moderate depletion (8/17)  Muscle Loss:  Temporal region mild depletion, Clavicle bone region mild depletion, Patellar region moderate depletion and Posterior calf region moderate-severe depletion (8/17)  Fluid Retention:  Moderate as above     Malnutrition Diagnosis: Moderate malnutrition  In Context of:  Acute illness or injury    NUTRITION DIAGNOSIS:  Inadequate oral intake related to recent poor appetite as evidenced by reduced intake prior to admit, recent weight loss       NUTRITION INTERVENTIONS  Recommendations / Nutrition Prescription  Renal/dialysis diet as tolerated  Strawberry Ensure BID between meals     Implementation  Nutrition education: Not appropriate at this time due to patient condition  Medical Food Supplement:  Ordered as above     Nutrition Goals  Patient will consistently consume at least 50-75% meals and supplements      MONITORING AND EVALUATION:  Progress towards goals will be monitored and evaluated per protocol and Practice Guidelines    Angela Lea RD, LD, CNSC   Clinical Dietitian - [facility]

## 2022-09-28 NOTE — PLAN OF CARE
Neuro: AO x4, lethargic  Tele/cardiac: SR/ST  Respiration: On 1.5 L NC  Activity: A2  Pain: Generalized pain, oxycodone given  Drips: NS @ 75 ml/hr, heparin paused, restart at 7.43 am at 400 units /hr, next PTT recheck at 1345 hours today  Drains/tubes: none  Skin: excoriation to coccyx  GI/: anuria, on HD  Aggression color: Green  Isolation: none  Plan: hemodialysis, possible thoracentesis today, goal MAP 65 and above   this AM, , MD Paged

## 2022-09-28 NOTE — PROVIDER NOTIFICATION
MD Notification    Notified Person: MD    Notified Person Name: Giles Farrell    Notification Date/Time: 9/28/2022 1842    Notification Interaction: amcom     Purpose of Notification: critical procal of >200    Orders Received:pending.     Comments:     no

## 2022-09-28 NOTE — CONSULTS
[facility]  Palliative Care Consultation Note    Patient: Gina Simpson  Date of Admission:  9/27/2022    Requesting Clinician / Team: Glenna Denise CNP/Medicine  Reason for consult: Symptom management  Goals of care    Recommendations:    Goals of Care (see in depth discussion below):    FCC with patient, daughter Monica (who is main caretaker); reviewed that metastatic cancer is terminal and her functional status is likely too poor for systemic cancer treatment.  Reported that her prognosis is terminal.  Patient and family continue to request restorative focused care at this time they continue to gather information regarding Gina's medical condition and treatment options.  The family is interested in speaking with oncology to review their recommendations and specifics of treatment recommendations. My take is that news of poor prognosis/terminal illness will take time for Gina and her family to accept and her body may declare itself in the process.  I recommend that treatment teams are consistent with patient/family regarding prognosis.    Daughter Monica would like information on how she can access and review her mother's medical records; this was family's most urgent request today.  Recommend that Monica may contact medical records regarding this request and documentation needed.  I also checked in with charge nurse and patient's RN and they stated they can provide release of information documents for patient/family.  There were also questions regarding power of ; I recommend family speak with social work regarding their recommendations.  Often families will need an  to assist with creation of such documents. Discussed family requests with SW/CC.     Code status: Full Code; discussed with patient and family benefits and burdens of CPR and they would like to maintain full code status at this time; explained that patient would likely not gain meaningful quality of life  if having a CPR event.     Spiritual health assisted with healthcare directive today.     Advanced Care Planning:    Patient has a completed Health Care Directive: No.     Weakness related to metastatic cancer, malnutrition.  Daughter states that patient is currently unable to ambulate over past 2 weeks.  -Assist with repositioning for comfort and to maintain skin integrity.  -PT/OT if tolerated.    Malnutrition related to metastatic cancer and poor nutritional intake.  -Appreciate nutrition expertise and recommendations.   -Encourage intake with meals and assist with meals/supplements.    Shortness of breath related to metastatic cancer, mediastinal mass, COPD, ESRD, pleural effusion.  Thoracentesis 9/29.  -Oxygen for comfort via oxime mask.  -Oxycodone 5 mg every 6 hours as needed (20 mg used over past 24 hrs) may be helpful for these sx as well as pain. Best to avoid morphine due to renal status.    Cancer-related pain.   -Agree with oxycodone 5 mg every 6 hours as needed (20 mg used over past 24 hrs).  Agree with hydromorphone 0.2 mg IV every 2 hours as needed if fast pain (0.2 mg today).  relief is needed or if unable to tolerate oral pain medication.    High risk for delirium  -Maintain day/night routines and orientation.  -Avoid medications such as steroids, cannabinoids and anticholinergics.   -Optimize hydration/nutrition, and sleep.  -Utilize sensory aids to maintain orientation such as eye glasses, hearing aids or pocket talkers.  -Calm environment, quiet/reassuring voices, orienting cues, minimized noise/night disruptions, when possible.   -Maximize mobility and prevent/treat pain.      Patient case was reviewed with Keagan De Leon/CNP, SW/CC, charge RN and patient's RN.    Paulina Lucero, [facility]  Contact information available via [facility] Paging/Directory        Thank you for the opportunity to participate in the care of this patient and family. Our team: will  continue to follow.     During regular M-F work hours ([phone]) -- if you are not sure who specifically to contact -- please contact us on [employer] Smart Web.     After regular work hours and on weekends/holidays, you can call our answering service at [phone].     Attestation:  Total time on the floor involved in the patient's care: 90 minutes  Total time spent in counseling/care coordination: >50% discussing patient condition in FCC with large group of family with patient in room, assessment of symptoms, reviewing current status, goals of care, discussion SW/CC, nurses, MDs.     Assessments:  Gina Simpson is a 57 year old female with PMH significant for ESRD on HD MWF, metastatic large cell neuroendocrine carcinoma, with cervical lymph node involvement, mets to liver, bones, mediastinal mass; COPD, hypertension, thrombocytopenia, bilateral upper extremity DVT, CAD s/p PCI in 2019, cardiomyopathy/EF 40 -45%, chronic anemia, history GI bleed.  Was seen at [place] ED 9/27/2022 for symptoms of worsening shortness of breath and weakness and she was found to have a large right-sided effusion.  She was transferred to [facility] for further management.    Today, the patient was seen for:   Goals of care    I introduced palliative care services and our multidisciplinary team to ángela Fuentes, granddaughter Vandana, son Stephanie, son Virgilio/and partner Taisha.. Explained that our service offers an extra layer of support for individuals who are experiencing serious, life threatening illnesses, which can include assistance with symptom management, emotional and spiritual support as well as complex medical decision making. Reviewed past medical history and patient/family understanding of the current medical situation.  Patient was unable to explain her medical situation but she knows she is very sick.  Daughter Monica was able to explain how her mother has grown very weak over past several weeks since her  "cancer diagnosis.  She has had radiation treatments but wonder what other cancer treatments are available for her mother.  Son Augustus states \"How can we get things moving?\"  for his mother.  I explained that patient's cancer is considered terminal and incurable as it is very advanced and the best hope would be to maintain some quality of life and comfort.  Due to her poor functional status she is a poor candidate for any chemotherapies as it would likely do more harm than good at this point. There continued to be many questions from family regarding oncology recommendations and possibilities.  I explained that I would contact oncology and let them know that family has questions and would appreciate a check in. The goal at this time is to continue restorative focused care as they learn more about Gina's condition and recommendations for medical treatments. Gina states that she wants to do \"anything that will keep her alive.\"      We discussed benefits and burdens of CPR given patient's frailty and poor functional status.  Reviewed how she would have poor outcomes from a CPR event given her advanced cancer and poor functional status.  Patient states that she wants to remain full code and family agrees with this decision.  I mentioned that code status is a decision that gets reviewed frequently during a hospital stay.       Prognosis, Goals, & Planning:        Prognosis, Goals, and/or Advance Care Planning were addressed today: Yes      Functional Status just prior to hospitalization: 3 (Capable of only limited self-care; needs help with ADLs; in bed/chair >50% of waking hours)          Patient has decision-making capacity today for complex decisions: Partial (needs assistance with complex decisions)      Patient's decision making preferences: shared with support from loved ones          I have concerns about the patient/family's health literacy today: Unable to assess today           Coping, Meaning, & " "Spirituality:   Mood, coping, and/or meaning in the context of serious illness were addressed today: Yes  Summary/Comments: Gina is a [employer]- would welcome  support. Daughter Monica is very close to her mother, stating \"she is my everything- we do everything together.\" Monica is not familiar with death and dying, funerals, etc. There are financial issues regarding how she would pay for burial costs.    Social:     Living situation: has lived with daughter Monica for past 2 years. She uses a walker; cannot go up/down stairs.     Key family / caregivers: Daughter Monica is main caregiver. Has 4 other adult children.    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    History of Present Illness:   History gathered today from: family/loved ones, medical chart  Adopted from H&P:  Gina Simpson is a 57 year old female with PMH significant for ESRD on HD MWF, metastatic large cell neuroendocrine carcinoma, with cervical lymph node involvement, mets to liver, bones, mediastinal mass; COPD, hypertension, thrombocytopenia, bilateral upper extremity DVT, CAD s/p PCI in 2019, cardiomyopathy/EF 40 -45%, chronic anemia, history GI bleed.  Was seen at [place] ED 2022 for symptoms of worsening shortness of breath and weakness and she was found to have a large right-sided effusion.  She was transferred to [facility] for further management.    Key Palliative Symptom Data:  We are not helping to manage these symptoms currently in this patient.    Patient is on opioids: bowels not assessed today.    ROS:  Comprehensive ROS is reviewed and is negative except as here & per HPI: N/A     Past Medical History:  Past Medical History:   Diagnosis Date     Anemia      Cancer (H)      Coronary artery disease      Dialysis complication      Dialysis patient (H)      Embolism (H)      ESRD (end stage renal disease) (H)      " Hypertension      Ischemic cardiomyopathy      Renal failure         Past Surgical History:  Past Surgical History:   Procedure Laterality Date     ANESTH,UPPER ARM AV FISTULA REPAIR       CREATE FISTULA ARTERIOVENOUS UPPER EXTREMITY Left 3/25/2022    Procedure: CREATION OF FIRST STAGE LEFT BRACHIOBASILIC ARTERIOVENOUS FISTULA;  Surgeon: Akash Miller MD;  Location: [facility] OR     CREATE FISTULA ARTERIOVENOUS UPPER EXTREMITY Left 6/28/2022    Procedure: LEFT BRACHIO-BASILIC ARTERIOVENOUS FISTULA WITH BIOPROSTHETIC GRAFT;  Surgeon: Akash Miller MD;  Location: [facility] OR     IR CHEST PORT PLACEMENT > 5 YRS OF AGE  8/31/2022     IR DIALYSIS FISTULOGRAM LEFT  5/31/2022     IR DIALYSIS FISTULOGRAM LEFT  7/8/2022     IR DIALYSIS FISTULOGRAM LEFT  8/11/2022     LIGATE FISTULA ARTERIOVENOUS UPPER EXTREMITY Left 6/28/2022    Procedure: Ligate fistula arteriovenous upper extremity;  Surgeon: Akash Miller MD;  Location: [facility] OR     REPAIR FISTULA ARTERIOVENOUS UPPER EXTREMITY Left 7/8/2022    Procedure: EXPLORE LEFT ARM, REPAIR OF LEFT UPPER ARM AV DIALYSIS GRAFT, EVACUATE HEMATOMA;  Surgeon: Akash Miller MD;  Location: [facility] OR     WISDOM TEETH           Family History:  No family history on file.      Allergies:  No Known Allergies     Medications:  I have reviewed this patient's medication profile and medications from this hospitalization.     Noted scheduled meds are:    - MEDICATION INSTRUCTIONS for Dialysis Patients -   Does not apply See Admin Instructions     allopurinol  100 mg Oral QPM     atorvastatin  20 mg Oral Daily     calcium acetate  2,001 mg Oral TID w/meals     ceFEPIme  1 g Intravenous Q24H     lidocaine (PF)  10 mL Subcutaneous Once     multivitamin RENAL  1 capsule Oral Daily     pantoprazole  40 mg Oral BID AC     sodium chloride (PF)  3 mL Intracatheter Q8H     vancomycin place kumar - receiving intermittent dosing  1 each Intravenous See Admin Instructions       Noted PRN meds  are:  bisacodyl, HYDROmorphone, lidocaine 4%, lidocaine (buffered or not buffered), melatonin, naloxone **OR** naloxone **OR** naloxone **OR** naloxone, ondansetron **OR** ondansetron, oxyCODONE, senna-docusate **OR** senna-docusate, sodium chloride (PF)    Physical Exam:  Vital Signs: Temp: 98.1  F (36.7  C) Temp src: Oral BP: 122/87 Pulse: 101   Resp: 10 SpO2: 96 % O2 Device: Nasal cannula Oxygen Delivery: 4 LPM  Weight: 0 lbs 0 oz    Physical Exam  GENERAL:  Alert, fatigued, no distress, sitting up in bed, eyes closed during most of meeting.   HEAD: Normocephalic atraumatic  SCLERA: Anicteric  EXTREMITIES: Warm; mild LE edema  ABDOMEN:  rounded  RESPIRATORY: Breathing non labored.  NEUROLOGIC: alert, fluctuation mental status.  PSYCH: Calm, inattentive.    Data reviewed:  Recent imaging reviewed.  No results found for this visit on 09/27/22.    Lab Results   Component Value Date    WBC 5.9 09/29/2022    WBC 4.4 09/28/2022    WBC 4.8 09/27/2022    HGB 8.0 (L) 09/29/2022    HGB 7.9 (L) 09/28/2022    HGB 8.3 (L) 09/27/2022    HCT 25.1 (L) 09/29/2022    HCT 25.1 (L) 09/28/2022    HCT 26.5 (L) 09/27/2022     (L) 09/29/2022     (L) 09/28/2022     (L) 09/27/2022     09/29/2022     09/28/2022     09/27/2022    POTASSIUM 4.0 09/29/2022    POTASSIUM 3.4 09/28/2022    POTASSIUM 3.5 09/27/2022    CHLORIDE 103 09/29/2022    CHLORIDE 103 09/28/2022    CHLORIDE 99 09/27/2022    CO2 25 09/29/2022    CO2 24 09/28/2022    CO2 26 09/27/2022    BUN 20 09/29/2022    BUN 25 09/28/2022    BUN 24 09/27/2022    CR 3.85 (H) 09/29/2022    CR 4.99 (H) 09/28/2022    CR 5.16 (H) 09/27/2022    GLC 98 09/29/2022    GLC 97 09/28/2022    GLC 70 09/28/2022    NTBNPI 31,206 (H) 09/28/2022    NTBNPI 3,583 (H) 08/15/2022     (HH) 09/29/2022     (HH) 09/28/2022    AST 96 (H) 08/21/2022     (H) 09/29/2022     (H) 09/28/2022    ALT 20 08/21/2022    ALKPHOS 257 (H) 09/29/2022    ALKPHOS  235 (H) 09/28/2022    ALKPHOS 34 (L) 08/21/2022    BILITOTAL 0.8 09/29/2022    BILITOTAL 0.8 09/28/2022    BILITOTAL 0.4 08/21/2022    CALDERON 21 08/20/2022    INR 2.74 (H) 09/28/2022    INR 1.45 (H) 08/23/2022    INR 1.68 (H) 08/15/2022     Lab Results   Component Value Date    ALBUMIN 2.3 09/28/2022          Recent Labs   Lab 09/28/22  0526   O2PER 4,000,933

## 2022-09-28 NOTE — CONSULTS
IR consult request for a R sided thoracentesis. Order placed. Consult completed.       Thanks, [facility] Interventional Radiology CNP ([phone]) (phone [phone])

## 2022-09-29 NOTE — PLAN OF CARE
Neuro: AO x4, lethargic, anxious when experiencing SOB, most comfortable sitting at the edge of bed with head resting on the table with pillows  Tele/cardiac: SR/ST,   Respiration: On 1.5 L oxymask, wean as able  Activity: A x2  Pain: Generalized pain, oxycodone given  Drips: heparin 550 units /hr, next PTT recheck at 10am  today  Drains/tubes: none  Skin: excoriation to coccyx  GI/: anuria, on HD, poor PO intake, no BM this shift  Aggression color: Green  Isolation: none  Plan: conference at noon today with family and palliative care in pt's room, BP has markedly improved, keep MAP >60, 65+ preferably

## 2022-09-29 NOTE — CONSULTS
Care Management Initial Consult    General Information  Assessment completed with: Children, Monica  Type of CM/SW Visit: Initial Assessment    Primary Care Provider verified and updated as needed:     Readmission within the last 30 days:        Reason for Consult: discharge planning  Advance Care Planning:            Communication Assessment  Patient's communication style: spoken language (English or Bilingual)    Hearing Difficulty or Deaf: no   Wear Glasses or Blind: no    Cognitive  Cognitive/Neuro/Behavioral: .WDL except, level of consciousness  Level of Consciousness: lethargic  Arousal Level: arouses to voice  Orientation: oriented x 4  Mood/Behavior: other (see comments) (lethargic)  Best Language: 0 - No aphasia  Speech: clear, slow    Living Environment:   People in home: child(cornelius), adult     Current living Arrangements: apartment      Able to return to prior arrangements: yes       Family/Social Support:  Care provided by: child(cornelius)  Provides care for: no one  Marital Status: Single             Description of Support System: Involved, Supportive    Support Assessment: Adequate family and caregiver support    Current Resources:   Patient receiving home care services: Yes  Skilled Home Care Services: Skilled Nursing  Community Resources: Dialysis Services  Equipment currently used at home: none  Supplies currently used at home:      Employment/Financial:  Employment Status: unemployed        Financial Concerns:     Referral to Financial Worker: No       Lifestyle & Psychosocial Needs:  Social Determinants of Health     Tobacco Use: High Risk     Smoking Tobacco Use: Current Every Day Smoker     Smokeless Tobacco Use: Never Used   Alcohol Use: Not on file   Financial Resource Strain: Not on file   Food Insecurity: Not on file   Transportation Needs: Not on file   Physical Activity: Not on file   Stress: Not on file   Social Connections: Not on file   Intimate Partner Violence: Not on file   Depression: Not  "on file   Housing Stability: Not on file       Functional Status:  Prior to admission patient needed assistance:              Mental Health Status:  Mental Health Status: No Current Concerns       Chemical Dependency Status:  Chemical Dependency Status: No Current Concerns             Values/Beliefs:  Spiritual, Cultural Beliefs, Yazidism Practices, Values that affect care: no               Additional Information:  SW reviewed chart. Palliative care met with pt and family today. No decisions made yet. SW met with pt and family in her room. Primarily spoke with daughter, Monica. Pt lives with daughter Monica in her home in [place]. Ema WILLIAMSON RN comes weekly. Monica states her goal is for pt to \"come home healthy.\" Pt and family have completed a health care directive with [facility]. Per the business office notes, pt's insurance is active and JUDY reviewed this with Monica. JUDY will follow for discharge planning.     NEIDA Tarango, LGSW  [phone] Desk phone  [phone] Cell/text (Preferred)  [facility]          "

## 2022-09-29 NOTE — PROVIDER NOTIFICATION
MD Notification     Notified Person: MD     Notified Person Name: MD Farrell     Notification Date/Time: 9/28/2022 1956     Notification Interaction: amcom      Purpose of Notification: PTT super therapeutic on 2 consecutive occassion     Orders Received:pending

## 2022-09-29 NOTE — PROGRESS NOTES
SPIRITUAL HEALTH SERVICES  SPIRITUAL ASSESSMENT Progress Note  [facility]     REFERRAL SOURCE: Admission request     Gina was sleepy during the visit waking to my voice, but unable to sustain focused and meaningful conversation. She expressed her wish to get some rest.     PLAN: [facility] remains available for emotional and spiritual support.    Skyla Membreno  Associate   Pager [phone]  Phone [phone]

## 2022-09-29 NOTE — PROGRESS NOTES
Renal Medicine       Following for ESRD management  Dialysis 09/28/22  UF 1.5 liters    Comfortable   Lethargic  1.5 LPM oxymask     Plan dialysis 09/30/22  Stop atorvastatin  Decrease CaAc-        Recent Labs   Lab 09/29/22  0924      POTASSIUM 4.0   CHLORIDE 103   CO2 25   ANIONGAP 8   GLC 98   BUN 20   CR 3.85*   GFRESTIMATED 13*   JEWELL 10.4*           ELTON Younger    [facility]  [phone]

## 2022-09-29 NOTE — CONSULTS
Consultation    Gina Simpson MRN# [id number]   YOB: 1965 Age: 57 year old   Date of Admission: 9/27/2022  Requesting physician: Dr. Des Miller  Reason for consult: Metastatic neuroendocrine carcinoma; would appreciate recommendations           Assessment and Plan:     Gina is a 57 year old admitted with shortness of breath    Shortness of breath  Hypoxic respiratory failure  Pleural effusion  - Effusion tapped 9/28, cytology pending  - On oxygen by face mask  - Clearly looks uncomfortable at rest, barely able to speak  - Could have underlying pneumonia, being treated with antibiotics    Hypotension  Probable sepsis  - Multifactorial  - BC NGTD  - Procalcitonin >200  - Being treated for sepsis with broad spectrum antibiotics  - IV fluids and IV albumin    Hypercalcemia  - Likely related to disease  - Calcium is 10.4 with albumin of 2.3, corrected calcium is 11.8  - Could add miacalcin, depending on clinical course    VTE  - On IV heparin    Metastatic large cell neuroendocrine carcinoma  - Followed by Dr. Dreyer  - She is clearly doing poorly and has symptoms of progressive disease  - Chemotherapy has been discussed, but options are limited due to her underlying renal disease and dialysis  - I let her know that she is not a candidate for chemotherapy in her current state and if we gave her chemotherapy it would most certainly cause significant toxicity and could precipitate further demise  - Care conference planned for today  - Continue to support Gina and her family through this process  - Best supportive care would be my current recommendation    Keagan REYES, CNP  [facility]  [phone] (office); [phone] (cell)              Chief Complaint:   Transfer from outside facilicy         History of Present Illness:   Gina is a 57 year old admitted with shortness of breath    She is only minimally able to have a conversation today due to her respiratory status. She  "came in with progressive shortness of breath.     ED records and events in the current hospitalization reviewed.     I let Gina know that active treatment for her cancer is not an option currently. I feel like it is unlikely that she will improve to the point where she can get treatment. A care conference is planned with her family and Palliative Care today. At the time of my visit none of her family was present with her.          Physical Exam:   Vitals were reviewed  Blood pressure 110/76, pulse 102, temperature 98.6  F (37  C), temperature source Oral, resp. rate 11, height 1.651 m (5' 5\"), weight 58.2 kg (128 lb 4.9 oz), SpO2 93 %, not currently breastfeeding.  Temperatures:  Current - Temp: 98.6  F (37  C); Max - Temp  Av.8  F (36.6  C)  Min: 97.1  F (36.2  C)  Max: 98.6  F (37  C)  Respiration range: Resp  Avg: 15.7  Min: 11  Max: 22  Pulse range: Pulse  Av.3  Min: 96  Max: 107  Blood pressure range: Systolic (24hrs), Av , Min:81 , Max:115   ; Diastolic (24hrs), Av, Min:55, Max:85    Pulse oximetry range: SpO2  Av.5 %  Min: 92 %  Max: 100 %    Intake/Output Summary (Last 24 hours) at 2022 1234  Last data filed at 2022 1857  Gross per 24 hour   Intake 340 ml   Output --   Net 340 ml       GENERAL: Acutely ill.   SKIN: No rashes or jaundice.  HEENT: Normocephalic, atraumatic. Eyes anicteric. Oropharynx is clear.  HEART: Regular rate and rhythm with no murmurs.  LUNGS: Oxygen by face mask. Wheezing. Looks uncomfortable. Tachypnea.   ABDOMEN: Soft, nontender, nondistended   EXTREMITIES: Dependent edema in all extremities.   MENTAL: Sedate.   NEURO: Cranial nerves II through XII grossly intact with no focal motor or sensory deficits.              Past Medical History:   I have reviewed this patient's past medical history  Past Medical History:   Diagnosis Date     Anemia      Cancer (H)      Coronary artery disease      Dialysis complication      Dialysis patient (H)      " Embolism (H)      ESRD (end stage renal disease) (H)      Hypertension      Ischemic cardiomyopathy      Renal failure              Past Surgical History:   I have reviewed this patient's past surgical history  Past Surgical History:   Procedure Laterality Date     ANESTH,UPPER ARM AV FISTULA REPAIR       CREATE FISTULA ARTERIOVENOUS UPPER EXTREMITY Left 3/25/2022    Procedure: CREATION OF FIRST STAGE LEFT BRACHIOBASILIC ARTERIOVENOUS FISTULA;  Surgeon: Akash Miller MD;  Location: [facility] OR     CREATE FISTULA ARTERIOVENOUS UPPER EXTREMITY Left 6/28/2022    Procedure: LEFT BRACHIO-BASILIC ARTERIOVENOUS FISTULA WITH BIOPROSTHETIC GRAFT;  Surgeon: Akash Miller MD;  Location: [facility] OR     IR CHEST PORT PLACEMENT > 5 YRS OF AGE  8/31/2022     IR DIALYSIS FISTULOGRAM LEFT  5/31/2022     IR DIALYSIS FISTULOGRAM LEFT  7/8/2022     IR DIALYSIS FISTULOGRAM LEFT  8/11/2022     LIGATE FISTULA ARTERIOVENOUS UPPER EXTREMITY Left 6/28/2022    Procedure: Ligate fistula arteriovenous upper extremity;  Surgeon: Akash Miller MD;  Location: [facility] OR     REPAIR FISTULA ARTERIOVENOUS UPPER EXTREMITY Left 7/8/2022    Procedure: EXPLORE LEFT ARM, REPAIR OF LEFT UPPER ARM AV DIALYSIS GRAFT, EVACUATE HEMATOMA;  Surgeon: Akash Miller MD;  Location: [facility] OR     WISDOM TEETH                 Social History:   I have reviewed this patient's social history  Social History     Tobacco Use     Smoking status: Current Every Day Smoker     Years: 40.00     Types: Cigarettes     Smokeless tobacco: Never Used     Tobacco comment: 6 cigarettes per day.    Substance Use Topics     Alcohol use: Never             Family History:   I have reviewed this patient's family history  No family history on file.          Allergies:   No Known Allergies          Medications:   I have reviewed this patient's current medications  Medications Prior to Admission   Medication Sig Dispense Refill Last Dose     allopurinol (ZYLOPRIM) 100 MG tablet Take 1  tablet (100 mg) by mouth every evening 30 tablet 0 9/26/2022 at pm     amLODIPine (NORVASC) 5 MG tablet Take 1 tablet (5 mg) by mouth every evening 30 tablet 0 9/26/2022 at pm     apixaban ANTICOAGULANT (ELIQUIS) 5 MG tablet Take 1 tablet (5 mg) by mouth 2 times daily 60 tablet 0 9/27/2022 at am     atorvastatin (LIPITOR) 20 MG tablet Take 20 mg by mouth daily   9/27/2022 at am     B Complex-C-Folic Acid (DEEPALI CAPS) 1 MG CAPS Take 1 capsule by mouth daily   9/27/2022 at am     calcium acetate (PHOSLO) 667 MG CAPS capsule Take 2,001 mg by mouth 3 times daily (with meals)   9/27/2022 at am     carvedilol (COREG) 12.5 MG tablet Take 1 tablet (12.5 mg) by mouth 2 times daily (with meals) 60 tablet 0 9/27/2022 at am     pantoprazole (PROTONIX) 40 MG EC tablet Take 1 tablet (40 mg) by mouth 2 times daily (before meals) 60 tablet 0 9/27/2022 at am     sucralfate (CARAFATE) 1 GM tablet Take 1 tablet (1 g) by mouth 4 times daily (before meals and nightly) 120 tablet 0 9/27/2022 at am     acetaminophen (TYLENOL) 325 MG tablet Take 2 tablets (650 mg) by mouth every 6 hours as needed for mild pain or other (and adjunct with moderate or severe pain or per patient request)   prn     ondansetron (ZOFRAN ODT) 4 MG ODT tab Take 1 tablet (4 mg) by mouth every 6 hours as needed for nausea or vomiting 12 tablet 0 prn     oxyCODONE (ROXICODONE) 5 MG tablet Take 1 tablet (5 mg) by mouth every 6 hours as needed for moderate to severe pain 12 tablet 0 prn     Current Facility-Administered Medications Ordered in Epic   Medication Dose Route Frequency Last Rate Last Admin     - MEDICATION INSTRUCTIONS for Dialysis Patients -   Does not apply See Admin Instructions         allopurinol (ZYLOPRIM) tablet 100 mg  100 mg Oral QPM   100 mg at 09/28/22 2036     bisacodyl (DULCOLAX) suppository 10 mg  10 mg Rectal Daily PRN         calcium acetate (PHOSLO) capsule 1,334 mg  1,334 mg Oral TID w/meals         ceFEPIme (MAXIPIME) 1g vial to attach to   ml bag for ADULTS or NS 50 ml bag for PEDS  1 g Intravenous Q24H   1 g at 09/28/22 2036     glucose gel 15-30 g  15-30 g Oral Q15 Min PRN        Or     dextrose 50 % injection 25-50 mL  25-50 mL Intravenous Q15 Min PRN        Or     glucagon injection 1 mg  1 mg Subcutaneous Q15 Min PRN         heparin 100 UNIT/ML injection 5-10 mL  5-10 mL Intracatheter Q28 Days         heparin 25,000 units in 0.45% NaCl 250 mL ANTICOAGULANT infusion  0-5,000 Units/hr Intravenous Continuous 5.5 mL/hr at 09/29/22 0358 550 Units/hr at 09/29/22 0358     heparin lock flush 10 UNIT/ML injection 5-10 mL  5-10 mL Intracatheter Q1H PRN         heparin lock flush 10 UNIT/ML injection 5-10 mL  5-10 mL Intracatheter Q24H         HYDROmorphone (DILAUDID) injection 0.2 mg  0.2 mg Intravenous Q2H PRN   0.2 mg at 09/29/22 0839     lidocaine (LMX4) cream   Topical Q1H PRN         lidocaine 1 % 0.1-1 mL  0.1-1 mL Other Q1H PRN         melatonin tablet 1 mg  1 mg Oral At Bedtime PRN   1 mg at 09/28/22 2036     multivitamin RENAL (NEPHROCAPS/TRIPHROCAPS) capsule 1 capsule  1 capsule Oral Daily   1 capsule at 09/29/22 0841     naloxone (NARCAN) injection 0.2 mg  0.2 mg Intravenous Q2 Min PRN        Or     naloxone (NARCAN) injection 0.4 mg  0.4 mg Intravenous Q2 Min PRN        Or     naloxone (NARCAN) injection 0.2 mg  0.2 mg Intramuscular Q2 Min PRN        Or     naloxone (NARCAN) injection 0.4 mg  0.4 mg Intramuscular Q2 Min PRN         ondansetron (ZOFRAN ODT) ODT tab 4 mg  4 mg Oral Q6H PRN   4 mg at 09/29/22 0912    Or     ondansetron (ZOFRAN) injection 4 mg  4 mg Intravenous Q6H PRN         oxyCODONE (ROXICODONE) tablet 5 mg  5 mg Oral Q6H PRN   5 mg at 09/29/22 0400     pantoprazole (PROTONIX) EC tablet 40 mg  40 mg Oral BID AC   40 mg at 09/29/22 0841     senna-docusate (SENOKOT-S/PERICOLACE) 8.6-50 MG per tablet 1 tablet  1 tablet Oral BID PRN        Or     senna-docusate (SENOKOT-S/PERICOLACE) 8.6-50 MG per tablet 2 tablet  2 tablet  Oral BID PRN         sodium chloride (PF) 0.9% PF flush 10-20 mL  10-20 mL Intracatheter q1 min prn         sodium chloride (PF) 0.9% PF flush 10-20 mL  10-20 mL Intracatheter Q1H PRN         sodium chloride (PF) 0.9% PF flush 10-20 mL  10-20 mL Intracatheter Q28 Days         sodium chloride (PF) 0.9% PF flush 3 mL  3 mL Intracatheter Q8H   3 mL at 09/29/22 0848     sodium chloride (PF) 0.9% PF flush 3 mL  3 mL Intracatheter q1 min prn         vancomycin place kumar - receiving intermittent dosing  1 each Intravenous See Admin Instructions         No current Epic-ordered outpatient medications on file.             Review of Systems:     The 10 point Review of Systems is negative other than noted in the HPI.            Data:   Data   Results for orders placed or performed during the hospital encounter of 09/27/22 (from the past 24 hour(s))   Partial thromboplastin time   Result Value Ref Range    aPTT 204 (HH) 22 - 38 Seconds   Partial thromboplastin time   Result Value Ref Range    aPTT 158 (HH) 22 - 38 Seconds   Partial thromboplastin time   Result Value Ref Range    aPTT 43 (H) 22 - 38 Seconds   Blood gas venous   Result Value Ref Range    pH Venous 7.36 7.32 - 7.43    pCO2 Venous 48 40 - 50 mm Hg    pO2 Venous 37 25 - 47 mm Hg    Bicarbonate Venous 27 21 - 28 mmol/L    Base Excess/Deficit (+/-) 1.1 -7.7 - 1.9 mmol/L    FIO2 0    Partial thromboplastin time   Result Value Ref Range    aPTT 73 (H) 22 - 38 Seconds   CBC with platelets   Result Value Ref Range    WBC Count 5.9 4.0 - 11.0 10e3/uL    RBC Count 2.74 (L) 3.80 - 5.20 10e6/uL    Hemoglobin 8.0 (L) 11.7 - 15.7 g/dL    Hematocrit 25.1 (L) 35.0 - 47.0 %    MCV 92 78 - 100 fL    MCH 29.2 26.5 - 33.0 pg    MCHC 31.9 31.5 - 36.5 g/dL    RDW 16.5 (H) 10.0 - 15.0 %    Platelet Count 103 (L) 150 - 450 10e3/uL   Comprehensive metabolic panel   Result Value Ref Range    Sodium 136 133 - 144 mmol/L    Potassium 4.0 3.4 - 5.3 mmol/L    Chloride 103 94 - 109 mmol/L     Carbon Dioxide (CO2) 25 20 - 32 mmol/L    Anion Gap 8 3 - 14 mmol/L    Urea Nitrogen 20 7 - 30 mg/dL    Creatinine 3.85 (H) 0.52 - 1.04 mg/dL    Calcium 10.4 (H) 8.5 - 10.1 mg/dL    Glucose 98 70 - 99 mg/dL    Alkaline Phosphatase 257 (H) 40 - 150 U/L     (HH) 0 - 45 U/L     (H) 0 - 50 U/L    Protein Total 5.2 (L) 6.8 - 8.8 g/dL    Albumin 2.3 (L) 3.4 - 5.0 g/dL    Bilirubin Total 0.8 0.2 - 1.3 mg/dL    GFR Estimate 13 (L) >60 mL/min/1.73m2

## 2022-09-29 NOTE — PROGRESS NOTES
[facility]    Medicine Progress Note - Hospitalist Service    Date of Admission:  9/27/2022    Assessment & Plan          Gina Simpson is a 57 year old female w/ PMH of CAD w/ PCI in 2019, HTN, cardiomyopathy/HFmEF (40-45%), hypertension, COPD, ESRD, remote failed RUE fistula, s/p creation of LUE AV fistula who was recently hospitalized from 8/14/22-9/3/22 for SVC syndrome and extensive adenopathy, ultimately diagnosed w/ stage IV metastatic non-small cell carcinoma with w/ liver mets and lymphadenopathy and is s/p urgent radiation.  Her course was complicated by GIB from DUw/ large volume blood product transfusions.  She presented to [facility] ED 09/27/22 w/ progressively worsening shortness of breath and generalized weakness for the past couple of weeks. Imaging revealing large right-sided effusion. She was transferred to [facility] for further management.        Acute hypoxic respiratory insufficiency  large right pleural effusion on outside hospital CT chest   ARIA pulmonary nodule  Admit with acute hypoxia 2/2 new large right-sided pleural effusion with suspicion for malignant effusion. Noted to have also have some patchy airspace opacities in the right lung and nodular and consolidative opacities in the LLL that may be suggestive of PNA  Known mediastinal mass as below  -supplemental O2              - ct Antibiotics vanc/cefepime  - IR consult for right-sided thoracentesis-9/28:  s/p thoracentesis 9/28 (900 mL of  kay colored fluid was drained.)               - Eliquis on hold for now; ct heparin nomogram to continue for today              - Suspect pleural fluid malignant versus infection; labs ordered post thora              -Repeat x-ray tomorrow      Hypotension  Blood pressures in the 70's to 80's systolic, more stable in 80s today so far.  - Suspect that dehydration contributing due to not eating or drinking since her discharge on 9/3, in addition to concerns for  possible sepsis  - s/p Volume resuscitation: Crystalloid IVF x 2 liters, Albumin  - Work-up pending to r/o sepsis              - BC: ngtd            - UA  ;neg              - Lactate at [place] 1.8 - remains in low 1 range, stop trending in absence of change in clinical picture  - Empirically continuing Vancomycin and Cefepime; given possible PNA on imaging               - Nursing to notify provider if MAP <65, SBP <90              - If unable to maintain blood pressures, will need norepinephrine and transfer to              ICU    - Patient/family still wanting aggressive treatment measures and Full Code status; agreeable to ICU transfer  - 9/28 and 9/29 - Palliative discussed with pt/family    Hepatic metastases w/ Abnormal LFTs  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  - Per family, has not followed-up OP with Oncology for discussion on chemotherapy  - Likely not a candidate for chemo in this acute state--discussed with patient and daughter that chemotherapy would only be palliative and not curative with overall poor prognosis  - Repeat imaging performed at [place] demonstrating bulky adenopathy in the lower left neck, mediastinum and bilateral hilar regions, multiple pulmonary nodules, bilaterally, extensive metastatic involvement of the liver with scattered lytic metastatic osseous lesions  - Consider therapeutic paracentesis for ascites   - Oncology & Palliative Care consult appreciated              - Overall prognosis is poor              - Remains full code at this time, but daughter would like to discuss goals              Of care and symptom management              - At this time,  unclear if family comprehends the overall prognosis and severity              Of the situation  - Discussed code status with patient and daughter. Both are in agreement of Full Code status and aggressive cares at this time     Chronic Anemia due to ESRD  Thrombocytopenia  - Hgb 8.3 on admit (baseline past 3-weeks 7.7-8.0) --> 9/29 fairly stable at 7.9  - PLT count 108 without signs of bleeding --> 104  - follow cbc  - Consider transfusion for Hgb <7      CAD s/p PCI 2019  Cardiomyopathy, HFmEF (40-45%)  Hypertension  *PTA regimen is: amlodipine 5 mg daily, coreg 12.5mg bid  - Holding anti-hypertensive med's due to hypotension              - Restart if/when clinically indicated     COPD  Tobacco use  - encourage cessation of tobacco        ESRD on MWF HD  S/p left AV fistula formation 6/28/22  S/p fistulogram with dilatation of L subclavian and junction of brachiocephalic and subclavian vein complicated by hematoma requiring evacuation 7/8/22  Hypophosphatemia  - Dialyzes MWF  - Left AV fistula  - Nephrology consulted for HD, next due on Wed 9/28/22  - continue PTA phos low     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/14/22)  *PTA apixaban; needs lifelong anticoagulation  - Apixaban held on 9/27 in anticipation of thoracentesis  - Low dose heparin nomogram started without bolus or loading dose for coverage               - Will need to be held prior to procedure              - Restart anticoagulation when able     Severe Malnutrition  - Nutrition consult ordered to assess and provide recommendations      Physical Deconditioning  Generalized Weakness  - Per daughter, non-ambulatory and rapidly declining since discharge on 9/3  - PT/OT to assess  - Family expresses insurance/coverage concerns; SW consult to assist with discharge and financial assistance        Diet: Renal Diet (dialysis)  Snacks/Supplements Pediatric: Ensure Enlive; Between Meals    DVT Prophylaxis: on heparin gtt  Antoine Catheter:  "Not present  Central Lines: PRESENT  CVC Double Lumen Left Subclavian-Site Assessment: WDL  Cardiac Monitoring: ACTIVE order. Indication: Acute decompensated heart failure (48 hours)  Code Status: Full Code      Disposition Plan      Expected Discharge Date: 10/03/2022        Discharge Comments: 9/29 Palliative care conference with pt/family at noon today        The patient's care was discussed with the Bedside Nurse, Patient and Patient's Family.    Severiano Johnson MD, MD  Hospitalist Service  [facility]  Securely message with the Vocera Web Console (learn more here)  Text page via [employer] Paging/Directory         Clinically Significant Risk Factors Present on Admission                     ______________________________________________________________________    Interval History   Denies any significant difference compared to admission.   Feels short of breath  4 point ROS done and negative unless mentioned    Data reviewed today: I reviewed all medications, new labs and imaging results over the last 24 hours. I personally reviewed no images or EKG's today.    Physical Exam   BP (!) 88/57   Pulse 102   Temp 98.6  F (37  C) (Oral)   Resp (!) 5   Ht 1.651 m (5' 5\")   Wt 58.2 kg (128 lb 4.9 oz)   SpO2 100%   BMI 21.35 kg/m    Gen- pleasant  lying in bed  HEENT- NAD, ALEENA  Neck- supple, no JVD elevation, no thyromegaly  CVS- I+II+ no m/r/g  RS- Decreased   Abdo- soft, no tenderness . No g/r/r  Ext- anasarca    Dialysis catheter Lt upper chest    Data    BMPRecent Labs   Lab 09/29/22  0924 09/28/22  0823 09/28/22  0526 09/27/22  1922     --  138 136   POTASSIUM 4.0  --  3.4 3.5   CHLORIDE 103  --  103 99   JEWELL 10.4*  --  8.9 9.1   CO2 25  --  24 26   BUN 20  --  25 24   CR 3.85*  --  4.99* 5.16*   GLC 98 97 70 93     CBC  Recent Labs   Lab 09/29/22  0924 09/28/22  0526 09/27/22  1922   WBC 5.9 4.4 4.8   RBC 2.74* 2.66* 2.85*   HGB 8.0* 7.9* 8.3*   HCT 25.1* 25.1* 26.5*   MCV 92 94 93 "   MCH 29.2 29.7 29.1   MCHC 31.9 31.5 31.3*   RDW 16.5* 16.0* 15.9*   * 104* 108*     INR  Recent Labs   Lab 09/28/22 0526   INR 2.74*     LFTs  Recent Labs   Lab 09/29/22 0924 09/28/22 0526 09/27/22 1922   ALKPHOS 257* 235*  --    * 518*  --    * 106*  --    BILITOTAL 0.8 0.8  --    PROTTOTAL 5.2* 5.1* 4.9*   ALBUMIN 2.3* 2.3*  --       PANCNo lab results found in last 7 days.    No results found for this or any previous visit (from the past 24 hour(s)).

## 2022-09-29 NOTE — PROGRESS NOTES
SPIRITUAL HEALTH SERVICES Progress Note  [facility]    Referral: Request for health care directive communicated bu unit staff via  referral line.    Delivered form and supporting documentation to patient and family at bedside. Briefly described the requirements and process for completion of document.    PT indicates that she will complete the document and notify  through bedside nurse when ready for notarization/witnessing.    Alexandru Allen  Associate

## 2022-09-29 NOTE — PROVIDER NOTIFICATION
MD Notification    Notified Person: MD    Notified Person Name: Severiano Johnson    Notification Date/Time: 9/29/22 1620    Notification Interaction: [employer] Messaging    Purpose of Notification: Pt BP 80/66    Orders Received: MD requested another BP at 1646, BP 85/55. Pt's MAP is 65, if BP continues to be low, contact house physician to evaluate.    Comments:

## 2022-09-30 NOTE — PROVIDER NOTIFICATION
MD Notification    Notified Person: MD    Notified Person Name: Boby Perez    Notification Date/Time: 9/29/22 1927    Notification Interaction: [employer] Messaging    Purpose of Notification: Pt's SBP declining, notified Dr. Johnson at 1600 when BP was 80/66, map 65. He said if pt's BP continues to decline, call house physician to come evaluate. Pt's last BP at 1800 was 67/53, map 56.    Orders Received: MD came to evaluate pt at 1833      Comments:

## 2022-09-30 NOTE — PROGRESS NOTES
Consultation    Gina Simpson MRN# [id number]   YOB: 1965 Age: 57 year old   Date of Admission: 9/27/2022  Requesting physician: Dr. Des Miller  Reason for consult: Metastatic neuroendocrine carcinoma; would appreciate recommendations           Assessment and Plan:     Gina is a 57 year old admitted with shortness of breath    Shortness of breath  Hypoxic respiratory failure  Pleural effusion  - Effusion tapped 9/28, cytology pending  - On oxygen by face mask  - Clearly looks uncomfortable at rest, barely able to speak  - Could have underlying pneumonia, being treated with antibiotics    Hypotension  Probable sepsis  - Multifactorial  - BC NGTD  - Procalcitonin >200  - Being treated for sepsis with broad spectrum antibiotics  - IV fluids and IV albumin    Hypercalcemia  - Likely related to disease  - Calcium is 10.4 with albumin of 2.3, corrected calcium is 11.8  - Management per primary    Tumor thrombus / VTE  - On IV heparin    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 Stent was unable to be placed due to her vascular access issues with dialysis catheter.   - Followed by Dr. Dreyer -> attempted to get outpatient follow up with Dr. Costa (closer to her home), now readmitted with SOB before that was able to take place.    - She is clinically not doing well, and has symptoms of progressive disease  - Chemotherapy has been discussed, which is complicated by her underlying renal disease and dialysis  - She is not a candidate for chemotherapy in her current state and could cause significant toxicity and precipitate further deterioration   - Metastatic large cell NEC of the lung carries a particularly poor prognosis, with most studies finding a median overall survival of around 6 months. This was discussed with the patient during her previous hospital stay.   - For now the plan  "is continue supportive measures   - Consider repeating her brain MRI if mentation does not improve with treatment of infection     Anemia  - Previous hospital stay complicated by upper GI ulcer and recurrent bleeding when placed on anticoagulation   - Transfuse Hg <7 or <8 and significantly symptomatic     Code status: Currently full code   Prognosis: Poor, would require clinical improvement in her respiratory status before considering further treatment. With no improvement after thoracentesis this seems to be less likely. May have some improvement with treatment of infection.     Patrick Dreyer, DO   [facility]                Interval History    Gina is confused, somnolent. Able to tell me she does not have a headache.          Physical Exam:   Vitals were reviewed  Blood pressure (!) 81/50, pulse 99, temperature 97.6  F (36.4  C), temperature source Axillary, resp. rate 10, height 1.651 m (5' 5\"), weight 58.2 kg (128 lb 4.9 oz), SpO2 100 %, not currently breastfeeding.  Temperatures:  Current - Temp: 98.6  F (37  C); Max - Temp  Av.8  F (36.6  C)  Min: 97.1  F (36.2  C)  Max: 98.6  F (37  C)  Respiration range: Resp  Avg: 15.7  Min: 11  Max: 22  Pulse range: Pulse  Av.3  Min: 96  Max: 107  Blood pressure range: Systolic (24hrs), Av , Min:81 , Max:115   ; Diastolic (24hrs), Av, Min:55, Max:85    Pulse oximetry range: SpO2  Av.5 %  Min: 92 %  Max: 100 %    Intake/Output Summary (Last 24 hours) at 2022 1234  Last data filed at 2022 1857  Gross per 24 hour   Intake 340 ml   Output --   Net 340 ml       GENERAL: Acutely ill.   SKIN: No rashes or jaundice.  HEENT: Normocephalic, atraumatic. Eyes anicteric. Oropharynx is clear.  HEART: Regular rate and rhythm with no murmurs.  LUNGS: Oxygen by face mask. Wheezing. Looks uncomfortable. Tachypnea.   ABDOMEN: Soft, nontender, nondistended   EXTREMITIES: Dependent edema in all extremities.   MENTAL: Sedate.   NEURO: Cranial nerves " II through XII grossly intact with no focal motor or sensory deficits.              Past Medical History:   I have reviewed this patient's past medical history  Past Medical History:   Diagnosis Date     Anemia      Cancer (H)      Coronary artery disease      Dialysis complication      Dialysis patient (H)      Embolism (H)      ESRD (end stage renal disease) (H)      Hypertension      Ischemic cardiomyopathy      Renal failure              Past Surgical History:   I have reviewed this patient's past surgical history  Past Surgical History:   Procedure Laterality Date     ANESTH,UPPER ARM AV FISTULA REPAIR       CREATE FISTULA ARTERIOVENOUS UPPER EXTREMITY Left 3/25/2022    Procedure: CREATION OF FIRST STAGE LEFT BRACHIOBASILIC ARTERIOVENOUS FISTULA;  Surgeon: Akash Miller MD;  Location: [facility] OR     CREATE FISTULA ARTERIOVENOUS UPPER EXTREMITY Left 6/28/2022    Procedure: LEFT BRACHIO-BASILIC ARTERIOVENOUS FISTULA WITH BIOPROSTHETIC GRAFT;  Surgeon: Akash Miller MD;  Location: [facility] OR     IR CHEST PORT PLACEMENT > 5 YRS OF AGE  8/31/2022     IR DIALYSIS FISTULOGRAM LEFT  5/31/2022     IR DIALYSIS FISTULOGRAM LEFT  7/8/2022     IR DIALYSIS FISTULOGRAM LEFT  8/11/2022     LIGATE FISTULA ARTERIOVENOUS UPPER EXTREMITY Left 6/28/2022    Procedure: Ligate fistula arteriovenous upper extremity;  Surgeon: Akash Miller MD;  Location: [facility] OR     REPAIR FISTULA ARTERIOVENOUS UPPER EXTREMITY Left 7/8/2022    Procedure: EXPLORE LEFT ARM, REPAIR OF LEFT UPPER ARM AV DIALYSIS GRAFT, EVACUATE HEMATOMA;  Surgeon: Akash Miller MD;  Location: [facility] OR     WISDOM TEETH                 Social History:   I have reviewed this patient's social history  Social History     Tobacco Use     Smoking status: Current Every Day Smoker     Years: 40.00     Types: Cigarettes     Smokeless tobacco: Never Used     Tobacco comment: 6 cigarettes per day.    Substance Use Topics     Alcohol use: Never             Family History:   I  have reviewed this patient's family history  No family history on file.          Allergies:   No Known Allergies          Medications:   I have reviewed this patient's current medications  Medications Prior to Admission   Medication Sig Dispense Refill Last Dose     allopurinol (ZYLOPRIM) 100 MG tablet Take 1 tablet (100 mg) by mouth every evening 30 tablet 0 9/26/2022 at pm     amLODIPine (NORVASC) 5 MG tablet Take 1 tablet (5 mg) by mouth every evening 30 tablet 0 9/26/2022 at pm     apixaban ANTICOAGULANT (ELIQUIS) 5 MG tablet Take 1 tablet (5 mg) by mouth 2 times daily 60 tablet 0 9/27/2022 at am     atorvastatin (LIPITOR) 20 MG tablet Take 20 mg by mouth daily   9/27/2022 at am     B Complex-C-Folic Acid (DEEPALI CAPS) 1 MG CAPS Take 1 capsule by mouth daily   9/27/2022 at am     calcium acetate (PHOSLO) 667 MG CAPS capsule Take 2,001 mg by mouth 3 times daily (with meals)   9/27/2022 at am     carvedilol (COREG) 12.5 MG tablet Take 1 tablet (12.5 mg) by mouth 2 times daily (with meals) 60 tablet 0 9/27/2022 at am     pantoprazole (PROTONIX) 40 MG EC tablet Take 1 tablet (40 mg) by mouth 2 times daily (before meals) 60 tablet 0 9/27/2022 at am     sucralfate (CARAFATE) 1 GM tablet Take 1 tablet (1 g) by mouth 4 times daily (before meals and nightly) 120 tablet 0 9/27/2022 at am     acetaminophen (TYLENOL) 325 MG tablet Take 2 tablets (650 mg) by mouth every 6 hours as needed for mild pain or other (and adjunct with moderate or severe pain or per patient request)   prn     ondansetron (ZOFRAN ODT) 4 MG ODT tab Take 1 tablet (4 mg) by mouth every 6 hours as needed for nausea or vomiting 12 tablet 0 prn     oxyCODONE (ROXICODONE) 5 MG tablet Take 1 tablet (5 mg) by mouth every 6 hours as needed for moderate to severe pain 12 tablet 0 prn     Current Facility-Administered Medications Ordered in Epic   Medication Dose Route Frequency Last Rate Last Admin     - MEDICATION INSTRUCTIONS for Dialysis Patients -   Does not  apply See Admin Instructions         allopurinol (ZYLOPRIM) tablet 100 mg  100 mg Oral QPM   100 mg at 09/28/22 2036     bisacodyl (DULCOLAX) suppository 10 mg  10 mg Rectal Daily PRN         calcium acetate (PHOSLO) capsule 1,334 mg  1,334 mg Oral TID w/meals         ceFEPIme (MAXIPIME) 1g vial to attach to  ml bag for ADULTS or NS 50 ml bag for PEDS  1 g Intravenous Q24H   1 g at 09/28/22 2036     glucose gel 15-30 g  15-30 g Oral Q15 Min PRN        Or     dextrose 50 % injection 25-50 mL  25-50 mL Intravenous Q15 Min PRN        Or     glucagon injection 1 mg  1 mg Subcutaneous Q15 Min PRN         heparin 100 UNIT/ML injection 5-10 mL  5-10 mL Intracatheter Q28 Days         heparin 25,000 units in 0.45% NaCl 250 mL ANTICOAGULANT infusion  0-5,000 Units/hr Intravenous Continuous 5.5 mL/hr at 09/29/22 0358 550 Units/hr at 09/29/22 0358     heparin lock flush 10 UNIT/ML injection 5-10 mL  5-10 mL Intracatheter Q1H PRN         heparin lock flush 10 UNIT/ML injection 5-10 mL  5-10 mL Intracatheter Q24H         HYDROmorphone (DILAUDID) injection 0.2 mg  0.2 mg Intravenous Q2H PRN   0.2 mg at 09/29/22 0839     lidocaine (LMX4) cream   Topical Q1H PRN         lidocaine 1 % 0.1-1 mL  0.1-1 mL Other Q1H PRN         melatonin tablet 1 mg  1 mg Oral At Bedtime PRN   1 mg at 09/28/22 2036     multivitamin RENAL (NEPHROCAPS/TRIPHROCAPS) capsule 1 capsule  1 capsule Oral Daily   1 capsule at 09/29/22 0841     naloxone (NARCAN) injection 0.2 mg  0.2 mg Intravenous Q2 Min PRN        Or     naloxone (NARCAN) injection 0.4 mg  0.4 mg Intravenous Q2 Min PRN        Or     naloxone (NARCAN) injection 0.2 mg  0.2 mg Intramuscular Q2 Min PRN        Or     naloxone (NARCAN) injection 0.4 mg  0.4 mg Intramuscular Q2 Min PRN         ondansetron (ZOFRAN ODT) ODT tab 4 mg  4 mg Oral Q6H PRN   4 mg at 09/29/22 0912    Or     ondansetron (ZOFRAN) injection 4 mg  4 mg Intravenous Q6H PRN         oxyCODONE (ROXICODONE) tablet 5 mg  5 mg  Oral Q6H PRN   5 mg at 09/29/22 0400     pantoprazole (PROTONIX) EC tablet 40 mg  40 mg Oral BID AC   40 mg at 09/29/22 0841     senna-docusate (SENOKOT-S/PERICOLACE) 8.6-50 MG per tablet 1 tablet  1 tablet Oral BID PRN        Or     senna-docusate (SENOKOT-S/PERICOLACE) 8.6-50 MG per tablet 2 tablet  2 tablet Oral BID PRN         sodium chloride (PF) 0.9% PF flush 10-20 mL  10-20 mL Intracatheter q1 min prn         sodium chloride (PF) 0.9% PF flush 10-20 mL  10-20 mL Intracatheter Q1H PRN         sodium chloride (PF) 0.9% PF flush 10-20 mL  10-20 mL Intracatheter Q28 Days         sodium chloride (PF) 0.9% PF flush 3 mL  3 mL Intracatheter Q8H   3 mL at 09/29/22 0848     sodium chloride (PF) 0.9% PF flush 3 mL  3 mL Intracatheter q1 min prn         vancomycin place kumar - receiving intermittent dosing  1 each Intravenous See Admin Instructions         No current [facility]-ordered outpatient medications on file.             Review of Systems:     The 10 point Review of Systems is negative other than noted in the HPI.            Data:   Data   Results for orders placed or performed during the hospital encounter of 09/27/22 (from the past 24 hour(s))   Partial thromboplastin time   Result Value Ref Range    aPTT 117 (H) 22 - 38 Seconds   CBC with platelets   Result Value Ref Range    WBC Count 5.0 4.0 - 11.0 10e3/uL    RBC Count 2.31 (L) 3.80 - 5.20 10e6/uL    Hemoglobin 7.0 (L) 11.7 - 15.7 g/dL    Hematocrit 22.1 (L) 35.0 - 47.0 %    MCV 96 78 - 100 fL    MCH 30.3 26.5 - 33.0 pg    MCHC 31.7 31.5 - 36.5 g/dL    RDW 16.8 (H) 10.0 - 15.0 %    Platelet Count 100 (L) 150 - 450 10e3/uL   Vancomycin level   Result Value Ref Range    Vancomycin 18.5   mg/L   Comprehensive metabolic panel   Result Value Ref Range    Sodium 136 133 - 144 mmol/L    Potassium 4.3 3.4 - 5.3 mmol/L    Chloride 103 94 - 109 mmol/L    Carbon Dioxide (CO2) 24 20 - 32 mmol/L    Anion Gap 9 3 - 14 mmol/L    Urea Nitrogen 25 7 - 30 mg/dL    Creatinine  "4.58 (H) 0.52 - 1.04 mg/dL    Calcium 10.8 (H) 8.5 - 10.1 mg/dL    Glucose 99 70 - 99 mg/dL    Alkaline Phosphatase 211 (H) 40 - 150 U/L     (HH) 0 - 45 U/L     (H) 0 - 50 U/L    Protein Total 5.6 (L) 6.8 - 8.8 g/dL    Albumin 2.8 (L) 3.4 - 5.0 g/dL    Bilirubin Total 0.8 0.2 - 1.3 mg/dL    GFR Estimate 10 (L) >60 mL/min/1.73m2   Blood gas venous   Result Value Ref Range    pH Venous 7.31 (L) 7.32 - 7.43    pCO2 Venous 50 40 - 50 mm Hg    pO2 Venous 36 25 - 47 mm Hg    Bicarbonate Venous 26 21 - 28 mmol/L    Base Excess/Deficit (+/-) -0.9 -7.7 - 1.9 mmol/L    FIO2 40    Partial thromboplastin time   Result Value Ref Range    aPTT 74 (H) 22 - 38 Seconds   Prepare red blood cells (unit)   Result Value Ref Range    Blood Component Type Red Blood Cells     Product Code G2612G93     Unit Status Transfused     Unit Number [id number]     CROSSMATCH Compatible     CODING SYSTEM [id number]     ISSUE DATE AND TIME [id number]     UNIT ABO/RH O+     UNIT TYPE ISBT 5100    XR Chest 2 Views    Narrative    XR CHEST 2 VIEWS  9/30/2022 1:41 PM       INDICATION: follow up on effusion  COMPARISON: 9/28/2022, 8/29/2022       Impression    IMPRESSION: Right-sided hydropneumothorax. A small to moderate  remaining right pleural effusion has significantly decreased. The  small apical pneumothorax component is new. Overall volume of the  right lung however has improved with better aeration. The pneumothorax  may be due to a \"trapped lung\" phenomenon. The left lung remains  clear. Left IJ central venous catheter tip is in the right atrium.    MAURO KEYS MD         SYSTEM ID:  [id number]   Partial thromboplastin time   Result Value Ref Range    aPTT 71 (H) 22 - 38 Seconds         "

## 2022-09-30 NOTE — PROVIDER NOTIFICATION
"MD Notification    Notified Person: MD    Notified Person Name: Boby Perez    Notification Date/Time: 2222    Notification Interaction: Amcon    \"Albumin is done. Pt BP before was 85/48, after was 90/61 w/ MAP of 69.\"    Purpose of Notification: update     Orders Received:    Comments:  "

## 2022-09-30 NOTE — PLAN OF CARE
Goal Outcome Evaluation:    Plan of Care Reviewed With: patient     Overall Patient Progress: no change       Oriented x3, forget sometimes. Pt was hypotensive this shift, hospitalist aware. Pt has expiratory wheezes. - BM, no urine output this shift. Oxymizer @4L. Assist of x2, did not get out of bed this shift. Complained of back pain this shift, oxycodone given. Heparin is running 250/ unit/ hr. Tele: Sinus.

## 2022-09-30 NOTE — PROGRESS NOTES
[facility] brief note:    Ongoing hypotension suspect multifactorial 2/2 prerenal (decreased PO intake), possible sepsis in setting of ?CAP (WBC WNL, lactic acid WNL, HR low 100s, afebrile, lethargic), chronic anemia, SVC obstruction.     Was paged by nursing at 1926 noting pt's SBP 67, MAP 56, requesting bedside evaluation.  Upon arrival, pt lying in bed at 30-40 degrees, resting, in no overt distress, with SBP 60s (obtained in R forearm and R calf), HR low 100s, RR 10s, O2 sats > 92% on 1.5L O2 via oximyzer, afebrile.  Pt able to open eyes to voice, state name but not able to answer questions, repeats same single word.  Pt reports no pain.  Pt reports no SOB.  Pt with noted upper airway wheezes, no air movement throughout R lung field, clear throughout L lung field.  Noted pt's family has met with palliative care today in care conference; pt remains FULL CODE, restorative cares.  Contacted pt's daughter, Monica, regarding above; Monica confirms FULL CODE and requests for pt to be transferred to ICU for vasopressor therapy if deemed appropriate.  Reviewed pt's clinical case with intensivist; appreciate curbside discussion and recommendations.  At this time, will trial additional IV albumin 5% 12.5 g.  Also notes could discuss with oncology if would recommend midodrine and octreotide in setting of neuroendocrine carcinoma.  After discussion with JULIAN Carrasco, notes uses octreotide in setting of neuroendocrine carcinoma to help control disease progression or with diarrhea; however, has not used octreotide in this setting to assist with BP management, appreciate Dr. Macias's expertise and review of pt's clinical status.  If pt's MAP remains < 60-65 despite additional albumin, will likely transfer pt to ICU at that time.     Addendum: 0009: Paged by nursing noting pt's BP 79/50, MAP 64.  In setting of pt responding well to recent albumin infusion (SBP 90s), will trial repeat dose albumin 5% 12.5g now.  Noted  pt received 2L crystalloid and 25g albumin on 9/27 and 300 ml NS bolus and 12.5g albumin while in HD on 9/28.  Noted pt had 1.5L fluid removal while in HD on 9/28.  Will defer to rounding hospitalist if initiating pt on midodrine deemed appropriate.       Addendum: 0515: While present on unit, nursing notes pt's MAP 50s, SBP 70s.  Pt's mentation remains unchanged, lethargic but opens eyes to voice, in no apparent respiratory distress, on 4L O2 via oximyzer.  Nursing notes after last dose of albumin, pt's SBP had trended up to low 100s.  After discussion with intensivist, will trial an additional 12.5g IV albumin 5% as pt has been responsive after both doses this evening.  Will also initiate pt on midodrine.          ERIC Leija, CNP  [facility]    I spent 15, 5 min at pt's bedside, on unit and in discussion with specialists managing and coordinating pt's overall plan of care.

## 2022-09-30 NOTE — PROVIDER NOTIFICATION
MD Notification    Notified Person: MD    Notified Person Name: Severiano Johnson    Notification Date/Time: 9/30/22, 1346    Notification Interaction: Flux Messaging    Purpose of Notification: Dr. Johnson notified writer that pt needs blood transfusion d/t Hgb 7.0    Orders Received: Writer acknowledged, released order for 1 unit blood to be transfused.    Comments:

## 2022-09-30 NOTE — PROGRESS NOTES
Potassium   Date Value Ref Range Status   09/30/2022 4.3 3.4 - 5.3 mmol/L Final     Hemoglobin   Date Value Ref Range Status   09/30/2022 7.0 (L) 11.7 - 15.7 g/dL Final     Creatinine   Date Value Ref Range Status   09/30/2022 4.58 (H) 0.52 - 1.04 mg/dL Final     Urea Nitrogen   Date Value Ref Range Status   09/30/2022 25 7 - 30 mg/dL Final     Sodium   Date Value Ref Range Status   09/30/2022 136 133 - 144 mmol/L Final     INR   Date Value Ref Range Status   09/28/2022 2.74 (H) 0.85 - 1.15 Final       DIALYSIS PROCEDURE NOTE  Hepatitis status of previous patient on machine log was checked and verified ok to use with this patients hepatitis status.  Patient dialyzed for 3 hrs. on a K3 bath with a net fluid removal of  1L.  A BFR of 400 ml/min was obtained via a CVC Tunelled.The treatment plan was discussed with Dr. Lujan during the treatment.    Total heparin received during the treatment: 0 units.     Line flushed, clamped and capped with heparin 1:1000 2.3 mL (2300 units) per lumen    Meds  given: albumin 5% 250mL   Complications: UF paused for hypotension. Pt denies dizziness/lightheadedness. 5% Albumin administered during run and factored into UF goal. No further complications      Person educated: person. Barriers to learning: lethargic. Educated on procedure via verbal mode.    ICEBOAT? Timeout performed pre-treatment  I: Patient was identified using 2 identifiers  C:  Consent Signed Yes  E: Equipment preventative maintenance is current and dialysis delivery system OK to use  B: Hepatitis B Surface Antigen: NONREACTIVE; Draw Date: 8/18/2022      Hepatitis B Surface Antibody: IMMUNE; Draw Date: 8/18/2022  O: Dialysis orders present and complete prior to treatment  A: Vascular access verified and assessed prior to treatment  T: Treatment was performed at a clinically appropriate time  ?: Patient was allowed to ask questions and address concerns prior to treatment  See Adult Hemodialysis flowsheet in EPIC for  further details and post assessment.  Machine water alarm in place and functioning. Transducer pods intact and checked every 15min.   Pt returned via bed.  Chlorine/Chloramine water system checked every 4 hours.  Outpatient Dialysis at [facility]      Post treatment report given to DEEPALI Platt RN regarding 1L of fluid removed, post dialysis BP of 89/61.

## 2022-09-30 NOTE — PHARMACY-VANCOMYCIN DOSING SERVICE
Pharmacy Vancomycin Note  Date of Service 2022  Patient's  1965   57 year old, female    Indication: Sepsis  Day of Therapy: day 4  Current vancomycin regimen: intermittent dosing based on pre-hemodialysis level  Current vancomycin monitoring method: Renal Replacement Therapy  Current vancomycin therapeutic monitoring goal: > 15 mg/L pre-hemodialysis     Current estimated CrCl = Estimated Creatinine Clearance: 12.2 mL/min (A) (based on SCr of 4.58 mg/dL (H)).    Creatinine for last 3 days  2022:  7:22 PM Creatinine 5.16 mg/dL  2022:  5:26 AM Creatinine 4.99 mg/dL  2022:  9:24 AM Creatinine 3.85 mg/dL  2022:  7:08 AM Creatinine 4.58 mg/dL    Recent Vancomycin Levels (past 3 days)  2022:  5:26 AM Vancomycin 23.3 mg/L  2022:  7:08 AM Vancomycin 18.5 mg/L    Vancomycin IV Administrations (past 72 hours)                   vancomycin (VANCOCIN) 500 mg vial to attach to  mL bag (mg) 500 mg New Bag 22 1642    vancomycin 1250 mg in 0.9% NaCl 250 mL intermittent infusion 1,250 mg (mg) 1,250 mg New Bag 22 2245                Nephrotoxins and other renal medications (From now, onward)    Start     Dose/Rate Route Frequency Ordered Stop    22 1600  vancomycin (VANCOCIN) 750 mg in sodium chloride 0.9 % 250 mL intermittent infusion         750 mg  over 60-90 Minutes Intravenous ONCE 22 1435      22  vancomycin place kumar - receiving intermittent dosing         1 each Intravenous SEE ADMIN INSTRUCTIONS 22               Contrast Orders - past 72 hours (72h ago, onward)    None          Interpretation of levels and current regimen:  Vancomycin level is reflective of therapeutic level    Has serum creatinine changed greater than 50% in last 72 hours: ESRD patient     Urine output:  ESRD patient     Renal Function: ESRD on Dialysis      Plan:  1. One time post-hemodialysis dose of Vancomycin 750 mg   2. Vancomycin monitoring method:  Renal Replacement Therapy  3. Vancomycin therapeutic monitoring goal: > 15 mg/L pre-hemodialysis   4. Pharmacy will check vancomycin levels as appropriate in before next hemodialysis .  5. Serum creatinine levels will be ordered ESRD patient - serum creatinine levels per nephrology .    Alyce Segura, pharmacy intern

## 2022-09-30 NOTE — ED PROVIDER NOTES
"MD Notification    Notified Person: MD    Notified Person Name: Boby Perez     Notification Date/Time: 0418 9/30/22    Notification Interaction: Amcon     Purpose of Notification:\" Update: BP is 78/53 @0400 MAP ranges 60-65.  FYI -- Pt has scheduled dialysis apt today. \"    Orders Received:    Comments:  "

## 2022-09-30 NOTE — PROGRESS NOTES
[facility] note    Initially consulted for increased LFTs, patient is with [facility] (they did procedures in August 2022). Discussed with Dr. Johnson, he will consult [facility].    Brian Lozano MD   [facility]  [phone]

## 2022-09-30 NOTE — CONSULTS
[facility]  Gastroenterology Consultation         Gina Simpson  [address]   [facility] [address]  57 year old female    Admission Date/Time: 9/27/2022  Primary Care Provider: Clari Garza MD  Referring / Attending Physician: Dr. Johnson    We were asked to see the patient in consultation by Dr. Johnson for evaluation of worsening of liver function tests.      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  Patient is in respiratory failure patient has progressive worsening of her ALT.  Patient is not communicating most of the information was obtained by reviewing medical record chart and discussing with the hospitalist team.    ROS: A comprehensive ten point review of systems was negative aside from those in mentioned in the HPI.      PAST MED HX:  I have reviewed this patient's medical history and updated it with pertinent information if needed.   Past Medical History:   Diagnosis Date     Anemia      Cancer (H)      Coronary artery disease      Dialysis complication      Dialysis patient (H)      Embolism (H)      ESRD (end stage renal disease) (H)      Hypertension      Ischemic cardiomyopathy      Renal failure        MEDICATIONS:   Prior to Admission Medications   Prescriptions Last Dose Informant Patient Reported? Taking?   B Complex-C-Folic Acid (DEEPALI CAPS) 1 MG CAPS 9/27/2022 at am  Yes Yes   Sig: Take 1 capsule by  mouth daily   acetaminophen (TYLENOL) 325 MG tablet prn  No No   Sig: Take 2 tablets (650 mg) by mouth every 6 hours as needed for mild pain or other (and adjunct with moderate or severe pain or per patient request)   allopurinol (ZYLOPRIM) 100 MG tablet 9/26/2022 at pm  No Yes   Sig: Take 1 tablet (100 mg) by mouth every evening   amLODIPine (NORVASC) 5 MG tablet 9/26/2022 at pm  No Yes   Sig: Take 1 tablet (5 mg) by mouth every evening   apixaban ANTICOAGULANT (ELIQUIS) 5 MG tablet 9/27/2022 at am  No Yes   Sig: Take 1 tablet (5 mg) by mouth 2 times daily   atorvastatin (LIPITOR) 20 MG tablet 9/27/2022 at am  Yes Yes   Sig: Take 20 mg by mouth daily   calcium acetate (PHOSLO) 667 MG CAPS capsule 9/27/2022 at am  Yes Yes   Sig: Take 2,001 mg by mouth 3 times daily (with meals)   carvedilol (COREG) 12.5 MG tablet 9/27/2022 at am  No Yes   Sig: Take 1 tablet (12.5 mg) by mouth 2 times daily (with meals)   ondansetron (ZOFRAN ODT) 4 MG ODT tab prn  No No   Sig: Take 1 tablet (4 mg) by mouth every 6 hours as needed for nausea or vomiting   oxyCODONE (ROXICODONE) 5 MG tablet prn  No No   Sig: Take 1 tablet (5 mg) by mouth every 6 hours as needed for moderate to severe pain   pantoprazole (PROTONIX) 40 MG EC tablet 9/27/2022 at am  No Yes   Sig: Take 1 tablet (40 mg) by mouth 2 times daily (before meals)   sucralfate (CARAFATE) 1 GM tablet 9/27/2022 at am  No Yes   Sig: Take 1 tablet (1 g) by mouth 4 times daily (before meals and nightly)      Facility-Administered Medications: None       ALLERGIES: No Known Allergies    SOCIAL HISTORY:  Social History     Tobacco Use     Smoking status: Current Every Day Smoker     Years: 40.00     Types: Cigarettes     Smokeless tobacco: Never Used     Tobacco comment: 6 cigarettes per day.    Substance Use Topics     Alcohol use: Never     Drug use: Never       FAMILY HISTORY:  No family history on file.    PHYSICAL EXAM:   General patient is in respiratory distress and  hypotensive  Vital Signs with Ranges  Temp: 97.6  F (36.4  C) Temp src: Axillary BP: (!) 81/50 Pulse: 99   Resp: 10 SpO2: 100 % O2 Device: Oxymizer cannula Oxygen Delivery: 4 LPM  I/O last 3 completed shifts:  In: 120 [P.O.:120]  Out: 1000 [Other:1000]    Cardiovascular: negative, PMI normal. No lifts, heaves, or thrills. RRR. No murmurs, clicks gallops or rub  Respiratory: negative, Percussion normal. Good diaphragmatic excursion. Lungs clear  Abdomen: Abdomen soft, non-tender. BS normal. No masses, organomegaly          ADDITIONAL COMMENTS:   I reviewed the patient's new clinical lab test results.   Recent Labs   Lab Test 09/30/22  0708 09/29/22  0924 09/28/22  0526 08/23/22  0546 08/23/22  0011 08/15/22  1115 08/15/22  0451   WBC 5.0 5.9 4.4   < >  --    < > 6.7   HGB 7.0* 8.0* 7.9*   < > 8.5*   < > 7.0*   MCV 96 92 94   < >  --    < > 90   * 103* 104*   < > 264   < > 376   INR  --   --  2.74*  --  1.45*  --  1.68*    < > = values in this interval not displayed.     Recent Labs   Lab Test 09/30/22  0708 09/29/22  0924 09/28/22  0526   POTASSIUM 4.3 4.0 3.4   CHLORIDE 103 103 103   CO2 24 25 24   BUN 25 20 25   ANIONGAP 9 8 11     Recent Labs   Lab Test 09/30/22  0708 09/29/22  0924 09/28/22  0526   ALBUMIN 2.8* 2.3* 2.3*   BILITOTAL 0.8 0.8 0.8   * 114* 106*   * 631* 518*       I reviewed the patient's new imaging results.        CONSULTATION ASSESSMENT AND PLAN:    Principal Problem:    Hypoxia    Assessment: 57-year-old unfortunate female with complicated history of fairly advanced non-small cell lung cancer along with multiple other chronic health problem currently hypoxic hypotensive with possible systemic hypoxia and tissue damage leading to elevated AST levels.  At this point I do suspect patient liver function abnormalities are part of significantly advanced decompensated cardiorespiratory system leading to systemic hypoxia and tissue damage leading to above-mentioned findings I will  recommend to check her CPK levels continue on supportive care optimize her blood pressure and respiratory status.  Given patient's finding patient has fairly grave prognosis.  Finding discussed in detail with hospitalist team.                Raul Camacho MD, FACP  [facility].  Office: [phone]  Cell : [phone]      [facility]  Ph: [phone] Fax: [phone]

## 2022-09-30 NOTE — PROGRESS NOTES
SPIRITUAL HEALTH SERVICES  SPIRITUAL ASSESSMENT Progress Note (Palliative Focus)  [facility]    REFERRAL SOURCE: Palliative/HCD follow-up    Followed-up with daughter, Monica, to scan completed health care directive and email it to Mick Borges. Monica has the hard copy with her. As [facility] is available Monday-Friday, 6am to 5pm, the hcd may not be scanned into Epic until Monday morning.    PLAN: Spiritual Health remains available per need/request.    Floridalma Rodriguez  Associate   Phone: [phone]

## 2022-09-30 NOTE — PROGRESS NOTES
Potassium   Date Value Ref Range Status   09/30/2022 4.3 3.4 - 5.3 mmol/L Final     Hemoglobin   Date Value Ref Range Status   09/30/2022 7.0 (L) 11.7 - 15.7 g/dL Final     Creatinine   Date Value Ref Range Status   09/30/2022 4.58 (H) 0.52 - 1.04 mg/dL Final     Urea Nitrogen   Date Value Ref Range Status   09/30/2022 25 7 - 30 mg/dL Final     Sodium   Date Value Ref Range Status   09/30/2022 136 133 - 144 mmol/L Final     INR   Date Value Ref Range Status   09/28/2022 2.74 (H) 0.85 - 1.15 Final      Latest Reference Range & Units 08/18/22 15:50   Hep B Surface Agn Nonreactive  Nonreactive   Hepatitis B Surface Antibody Instrument Value <8.00 m[IU]/mL 103.04   Hepatitis B Surface Antibody  Reactive     DIALYSIS PROCEDURE NOTE  Hepatitis status of previous patient on machine log was checked and verified ok to use with this patients hepatitis status.  Patient dialyzed for 3 hrs. on a K3 bath with a net fluid removal of  1L.  A BFR of 350-400 ml/min was obtained via a CVC Tunelled.  The treatment plan was discussed with Dr. Lujan during the treatment.    Total heparin received during the treatment: 0 units.     Line flushed, clamped and capped with heparin 1:1000 2.3 mL (2300 units) per lumen    Meds  given: albumin 5% 250mL   Complications: UF paused intermittently throughout treatment for hypotension. 300mL NS administered throughout run, (150ml x2 as per hypotension protocol) with good effect. Pt denies dizziness/lightheadedness. 5% Albumin administered during run and factored into UF goal . 1L net fluid taken off.     Person educated: person. Barriers to learning: lethargy. Educated on procedure via verbal mode.    ICEBOAT? Timeout performed pre-treatment  I: Patient was identified using 2 identifiers  C:  Consent Signed Yes  E: Equipment preventative maintenance is current and dialysis delivery system OK to use  B: Hepatitis B Surface Antigen: NONREACTIVE; Draw Date: 8/18/2022      Hepatitis B Surface Antibody:  IMMUNE; Draw Date: 8/18/2022  O: Dialysis orders present and complete prior to treatment  A: Vascular access verified and assessed prior to treatment  T: Treatment was performed at a clinically appropriate time  ?: Patient was allowed to ask questions and address concerns prior to treatment  See Adult Hemodialysis flowsheet in [facility] for further details and post assessment.  Machine water alarm in place and functioning. Transducer pods intact and checked every 15min.   Pt returned via bed.  Chlorine/Chloramine water system checked every 4 hours.  Outpatient Dialysis at [facility]      Post treatment report given to DEEPALI Platt RN regarding 1L of fluid removed, post dialysis BP of 89/61.

## 2022-09-30 NOTE — PROVIDER NOTIFICATION
MD Notification    Notified Person: MD    Notified Person Name: Severiano Johnson    Notification Date/Time: 9/30/22 1650    Notification Interaction: [employer] Messaging    Purpose of Notification: Pt's BP is 76/56, map is 67. Continue to notify if BP is below 90s and map below 60s?    Orders Received: Yes    Comments:

## 2022-09-30 NOTE — PROGRESS NOTES
Thoracic Surgery:    Chart reviewed and imaging review-- d/w Dr. Johnson and Dr. 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     Patient did not improve her respiratory status after thoracentesis and today's CXR demonstrates a right hydropneumothorax due to trapped lung phenomenon. No intervention indicated, as little/no reported symptomatic benefit from thoracentesis and lung would not re-expand with PleurX; not a surgical candidate.     Nicci Bains PA-C with Dr. Eris Silverio  [facility]  Cell [phone]

## 2022-09-30 NOTE — PROGRESS NOTES
[facility]    Medicine Progress Note - Hospitalist Service    Date of Admission:  9/27/2022    Assessment & Plan          Gina Simpson is a 57 year old female w/ PMH of CAD w/ PCI in 2019, HTN, cardiomyopathy/HFmEF (40-45%), hypertension, COPD, ESRD, remote failed RUE fistula, s/p creation of LUE AV fistula who was recently hospitalized from 8/14/22-9/3/22 for SVC syndrome and extensive adenopathy, ultimately diagnosed w/ stage IV metastatic non-small cell carcinoma with w/ liver mets and lymphadenopathy and is s/p urgent radiation.  Her course was complicated by GIB from DUw/ large volume blood product transfusions.  She presented to [facility] ED 09/27/22 w/ progressively worsening shortness of breath and generalized weakness for the past couple of weeks. Imaging revealing large right-sided effusion. She was transferred to [facility] for further management.        Acute hypoxic respiratory insufficiency  large right pleural effusion on outside hospital CT chest   ARIA pulmonary nodule  Admit with acute hypoxia 2/2 new large right-sided pleural effusion with suspicion for malignant effusion. Noted to have also have some patchy airspace opacities in the right lung and nodular and consolidative opacities in the LLL that may be suggestive of PNA  Known mediastinal mass as below  -supplemental O2              - ct Antibiotics vanc/cefepime  - IR consult for right-sided thoracentesis-9/28:  s/p thoracentesis 9/28 (900 mL of  kay colored fluid was drained.) .  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               - Eliquis on hold for now; ct heparin nomogram to continue for today              -Repeat x-ray     Hypotension  Blood pressures in the 70's to 80's systolic, more stable in 80s today so far.  - Suspect that dehydration contributing due to not eating or drinking since her discharge on 9/3, in  addition to concerns for possible sepsis  - s/p Volume resuscitation: Crystalloid IVF x 2 liters, Albumin  - Work-up pending to r/o sepsis              - BC: ngtd            - UA  ;neg              - Lactate at [facility] 1.8 - remains in low 1 range, stop trending in absence of change in clinical picture  - Empirically continuing Vancomycin and Cefepime; given possible PNA on imaging               - Nursing to notify provider if MAP <65, SBP <90              - If unable to maintain blood pressures, will need norepinephrine and transfer to              ICU  - started on midodrine    - Patient/family still wanting aggressive treatment measures and Full Code status; agreeable to ICU transfer  - 9/28 and 9/29 - Palliative discussed with pt/family    Hepatic metastases w/ Abnormal LFTs  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  - Per family, has not followed-up OP with Oncology for discussion on chemotherapy  - Likely not a candidate for chemo in this acute state--discussed with patient and daughter that chemotherapy would only be palliative and not curative with overall poor prognosis  - Repeat imaging performed at [facility] demonstrating bulky adenopathy in the lower left neck, mediastinum and bilateral hilar regions, multiple pulmonary nodules, bilaterally, extensive metastatic involvement of the liver with scattered lytic metastatic osseous lesions  - Oncology & Palliative Care consult appreciated              - Overall prognosis is poor              - Remains full code at this time              - At this time, unclear if family comprehends the overall prognosis and severity              Of the  situation  -Worsened LFT: Patient remains critical with very poor prognosis, will get GI to review for completion but doubt any intervention     Chronic Anemia due to ESRD  Thrombocytopenia  - Hgb 8.3 on admit (baseline past 3-weeks 7.7-8.0)  - PLT count 108 without signs of bleeding --> 104  - Consider transfusion for Hgb <7    9/30: Hb 7: no s/s of bleeding. Will transfuse 1 U PRBC  (Verbal consent from daughter in presence of RN. Indication, risks, benefit, alternatives explained)    CAD s/p PCI 2019  Cardiomyopathy, HFmEF (40-45%)  Hypertension  *PTA regimen is: amlodipine 5 mg daily, coreg 12.5mg bid  - Holding anti-hypertensive med's due to hypotension              - Restart if/when clinically indicated     COPD  Tobacco use  - encourage cessation of tobacco        ESRD on MWF HD  S/p left AV fistula formation 6/28/22  S/p fistulogram with dilatation of L subclavian and junction of brachiocephalic and subclavian vein complicated by hematoma requiring evacuation 7/8/22  Hypophosphatemia  - Dialyzes MWF  - Left AV fistula  - Nephrology consulted for HD, next due on Wed 9/28/22  - continue PTA phos low     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/14/22)  *PTA apixaban; needs lifelong anticoagulation  - Apixaban held on 9/27 in anticipation of thoracentesis  - Low dose heparin started without bolus or loading dose for coverage                         - Restart anticoagulation when able     Severe Malnutrition  - Nutrition consult ordered to assess and provide recommendations      Physical Deconditioning  Generalized Weakness  - Per daughter, non-ambulatory and rapidly declining since discharge on 9/3  - PT/OT to assess  - Family expresses insurance/coverage concerns; SW consult to assist with discharge and financial assistance        Diet: Renal Diet (dialysis)  Snacks/Supplements Pediatric: Ensure Enlive; Between Meals    DVT Prophylaxis: on heparin gtt  Antoine Catheter: Not present  Central  "Lines: PRESENT  CVC Double Lumen Left Subclavian-Site Assessment: WDL  Cardiac Monitoring: ACTIVE order. Indication: Acute decompensated heart failure (48 hours)  Code Status: Full Code      Disposition Plan         The patient's care was discussed with the Bedside Nurse, Patient and Patient's Family.    Severiano Johnson MD, MD  Hospitalist Service  [facility]  Securely message with the Vocera Web Console (learn more here)  Text page via [employer] Paging/Directory     \"This dictation was performed with voice recognition software and may contain errors,  omissions and inadvertent word substitution.\"         Clinically Significant Risk Factors Present on Admission                     ______________________________________________________________________    Interval History   Last 24-hour events noted.  Continued to require intermittent albumin challenges to maintain MAP greater than 65  Seen in dialysis  Pain controlled  Feels confused    4 point ROS done and negative unless mentioned    Data reviewed today: I reviewed all medications, new labs and imaging results over the last 24 hours. I personally reviewed no images or EKG's today.    Physical Exam   BP (!) 81/64 (BP Location: Left leg)   Pulse 99   Temp 97.6  F (36.4  C)   Resp 8   Ht 1.651 m (5' 5\")   Wt 58.2 kg (128 lb 4.9 oz)   SpO2 100%   BMI 21.35 kg/m    Gen- pleasant  lying in bed  HEENT- NAD, ALEENA  Neck- supple, no JVD elevation, no thyromegaly  CVS- I+II+ no m/r/g  RS- Decreased significantly at base  Abdo- soft, no tenderness . No g/r/r  Ext- anasarca  CNS: Remembers me but appears confused    Dialysis catheter Lt upper chest    Data    BMP  Recent Labs   Lab 09/30/22  0708 09/29/22  0924 09/28/22  0823 09/28/22  0526 09/27/22  1922    136  --  138 136   POTASSIUM 4.3 4.0  --  3.4 3.5   CHLORIDE 103 103  --  103 99   JEWELL 10.8* 10.4*  --  8.9 9.1   CO2 24 25  --  24 26   BUN 25 20  --  25 24   CR 4.58* 3.85*  --  4.99* " 5.16*   GLC 99 98 97 70 93     CBC  Recent Labs   Lab 09/30/22  0708 09/29/22 0924 09/28/22 0526 09/27/22 1922   WBC 5.0 5.9 4.4 4.8   RBC 2.31* 2.74* 2.66* 2.85*   HGB 7.0* 8.0* 7.9* 8.3*   HCT 22.1* 25.1* 25.1* 26.5*   MCV 96 92 94 93   MCH 30.3 29.2 29.7 29.1   MCHC 31.7 31.9 31.5 31.3*   RDW 16.8* 16.5* 16.0* 15.9*   * 103* 104* 108*     INR  Recent Labs   Lab 09/28/22 0526   INR 2.74*     LFTs  Recent Labs   Lab 09/30/22 0708 09/29/22 0924 09/28/22 0526 09/27/22 1922   ALKPHOS 211* 257* 235*  --    * 631* 518*  --    * 114* 106*  --    BILITOTAL 0.8 0.8 0.8  --    PROTTOTAL 5.6* 5.2* 5.1* 4.9*   ALBUMIN 2.8* 2.3* 2.3*  --       PANCNo lab results found in last 7 days.    No results found for this or any previous visit (from the past 24 hour(s)).

## 2022-09-30 NOTE — PROGRESS NOTES
Renal Medicine Inpatient Dialysis Note                                Gina Simpson MRN# [id number]   Age: 57 year old YOB: 1965   Date of Admission: 9/27/2022 Hospital LOS: 3          Assessment/Plan:       57-year-old female admitted in transfer from an outside hospital in the setting of acute hypoxic respiratory failure.  This in the setting of malignant neuroendocrine carcinoma   with malignant pleural effusion    Seen with respect to management of ESRD.      1.  ESRD   -MWF dialysis   -Yo Taylor   -ran as scheduled 09/26/22   -Dr. Gonsales    Outpatient orders:    Treatment Type  Hemodialysis (procedure)    Dialyzer  : Arradiance Series: Mobilitus Model: 17H RENÉE Factor: 1455    Dialysate  Potassium 2k    Dialysate  Calcium 2.5 mEq/L    Dialysate  BiCarb 35 mEq/L    Dialysate  Base Sodium 138 mEq/L    Dialysate Flow Rate  500 ML/min    Blood Flow Rate  400 ML/min    Treatment Time  195 min    Temperature  Standard    Max UF Rate  2 L/Hr    Access  CVC Catheter Chest (Left)    Access Concurrent  No    Schedule  3 Times per week    Heparin Load  1000 Units (1:1,000 concentration)    Heparin Hourly Dose  500 Units/hr (1:1,000 concentration)        2.  Anemia   -no REMA given malignancy   3.  Access   -left IJ   -developing left AVF  4.  Metastatic neuroendocrine carcinoma     -liver and bone involvement   5.  Weight loss/poor appetite  6.  Hypoalbuminemia  7.  Hypotension    Hold BP meds  unlikely to require moving forward    Dialysis today  5% albumin prime  UF as allowed by BP   Orders entered    Prognosis guarded  Agree with palliative care consult    Stopping dialysis would be appropriate in this setting          Interval History:     Dialysis run parameters reviewed with dialysis RN at patient bedside.  Seen on run  Lethargic      2.75 hours  5% albumin prime   4K  UF 1.5 liter range    BP 90 systolic      Stable run     ROS     CONSTITUTIONAL:por intake  R: NEGATIVE for  "significant cough or SOB  CV: NEGATIVE for chest pain, palpitations  EXT: no change edema  ROS otherwise negative    Dialysis Parameters:     Vitals were reviewed  Patient Vitals for the past 8 hrs:   BP Temp Temp src Pulse Resp SpO2   09/30/22 0909 90/62 -- -- 101 -- 100 %   09/30/22 0907 (!) 70/48 -- -- 101 -- 100 %   09/30/22 0900 (!) 82/65 -- -- 100 -- 100 %   09/30/22 0845 91/70 -- -- 98 -- 100 %   09/30/22 0832 95/63 -- -- 98 -- 100 %   09/30/22 0800 99/66 96.8  F (36  C) Axillary 101 13 100 %   09/30/22 0645 117/71 -- -- 99 (!) 6 100 %   09/30/22 0630 (!) 132/114 -- -- 106 -- 90 %   09/30/22 0615 (!) 80/50 -- -- 96 12 100 %   09/30/22 0600 (!) 80/47 -- -- 98 23 100 %   09/30/22 0500 (!) 72/47 -- -- 95 (!) 6 100 %   09/30/22 0400 (!) 78/53 97.8  F (36.6  C) Axillary 97 16 96 %   09/30/22 0315 (!) 82/52 -- -- 97 (!) 5 100 %   09/30/22 0145 (!) 108/93 -- -- 103 25 100 %   09/30/22 0130 93/56 -- -- 101 13 91 %     I/O last 3 completed shifts:  In: 120 [P.O.:120]  Out: -     Vitals:    09/28/22 1132   Weight: 58.2 kg (128 lb 4.9 oz)       Current Weight: no recorded  Dry Weight: ?  Dialysis Temp: 36.5  C  Access Device: IJ  Access Site: left  Dialyzer: Revaclear  Dialysis Bath: 4  Sodium Profile: n  UF Goal: 1.5  Blood Flow Rate (mL/min): 400  Total Treatment Time (hrs): 2.75  Heparin: Low dose as required      EPO dose: n  Zemplar: n  IV Fe: n      Medications and Allergies:     Reviewed      Physical Exam:     Seen and examined during course of dialysis run    BP 90/62   Pulse 101   Temp 96.8  F (36  C) (Axillary)   Resp 13   Ht 1.651 m (5' 5\")   Wt 58.2 kg (128 lb 4.9 oz)   SpO2 100%   BMI 21.35 kg/m      GENERAL: awake, alert, follows  HEENT: NC/AT, PERRLA, EOMI, non icteric, pharynx moist without lesion  RESP: clear  CV: RRR, normal S1 S2  ABDOMEN: S/NT, BS present  MS: 1 edema edema  SKIN: catheter site clean without drainage    Data:       Recent Labs   Lab 09/30/22  0708      POTASSIUM 4.3 "   CHLORIDE 103   CO2 24   ANIONGAP 9   GLC 99   BUN 25   CR 4.58*   GFRESTIMATED 10*   JEWELL 10.8*         G Demetris Younger MD    [facility]rology  [phone]

## 2022-09-30 NOTE — PLAN OF CARE
"AOx3, forgetful. Hypotensive throughout shift, SBP <90, map <60, MDs notfied. 5L Oxymizer, de-sats quickly to 70s when pt removes. Pt repeatedly reminded to keep oxygen on. LS diminished, crackles in LLL. Tele ST, bowl sounds hypoactive. Pt on hemodialysis, no urine output this shift, no BM this shift. Renal diet, poor appetite. Ambulates w/ assist x 2 but did not leave bed today. Heparin running at 550 units. C/O back pain on scale of 10, oxycodone and dilaudid given with some relief.  Palliative care consult today with children present. Children agreed they wanted to keep their mother full code and pt's daughter Monica is in the process of obtaining power of  and medical records of pt.  Transferred pt from Rm 326 to Rm 306 to closer monitor pt. Pt's anxiety, impulsiveness, and mental status declining throughout evening. Says things like \"I thought I saw a bunch of puppies running around in the room.\" Pt pulled periph IV at 1900.   PTT 73 @ 0924, recheck tonight at 2200.  Continue to monitor.  "

## 2022-10-01 NOTE — PLAN OF CARE
Pt oriented to self and intermittently situation. Lethargic and sleeping between cares most of shift. When awake, pt often restless and uncomfortable. BP's soft in 's, goal to keep MAP above 65. On 4-6L oxymizer cannula, destats quickly to 60's-70's when moving. ABG released d/t increased WOB and use of accessory muscles throughout shift and worsening respiratory status. BS hypoactive, no BM. On renal diet, no solid intake, only small sips of water w/ meds. Hemodialysis pt, no urine output this shift. PIV in R arm running Heparin at 250 unit/hr. Port on R groin, and CVC on left subclavian.  Blanchable redness on coccyx, +2 edema on BLE and arms. Chest x-ray completed, family opted to hold off on head CT. Turn/repo as tolerated. No OOB this shift, sat at bedside w/ family present. Hospitalist and Oncology MD met w/ pt and family to discuss next steps. ABO type and screen completed, plan to transfuse 1 unit. PRN dilaudid given X1 for pain. Ethics consult placed, awaiting decision from family on pursing comfort/pallative care and continued discussion of code status. Monica the primary representative of pt is at bedside

## 2022-10-01 NOTE — PROVIDER NOTIFICATION
MD Notification    Notified Person: MD    Notified Person Name: Severiano Johnson     Notification Date/Time: 10/1 1010    Notification Interaction: Paged    Purpose of Notification: Pt having increased WOB, destats quickly w/o O2 and using accessory muscles. She looks to be very uncomfortable. Could you assess for more O2 interventions? Intermittent Bipap, ABG's ??    Orders Received:     Comments:

## 2022-10-01 NOTE — PROGRESS NOTES
House AJAY brief note:    Paged by nursing at 0255 noting pt's SBP 70s, MAPs 50s, inquiring about further management.  Presented to pt's bedside, pt resting in bed, HOB at 45-50 degrees, in no overt distress, VS noting HR low 100s, SBP 80s, MAPs low 60s, O2 sats 100% on 5L O2 via oximyzer.  Pt reports no current pain, notes difficulty obtaining full deep breath.      Interventions:  - Noted pt had 1.5L removed with HD today, had received 1U PRBC today  - Will order 12.5 g IV albumin 5% to administer now  - Will also repeat Hgb now to ensure stable and does not require additional PRBC transfusion  - Remains IMC status    ERIC Leija, CNP  House AJAY    I spent 10 min at pt's bedside and on unit managing and coordinating pt's overall plan of care.

## 2022-10-01 NOTE — PROGRESS NOTES
[facility]  Gastroenterology Progress Note     Gina Simpson MRN# [id number]   YOB: 1965 Age: 57 year old          Assessment and Plan:       Hypoxia    Acute deep vein thrombosis (DVT) of brachial vein of left upper extremity (H)    Lymphadenopathy    Pleural effusion on right    Benign essential hypertension    ESRD (end stage renal disease) on dialysis (H)    Ischemic cardiomyopathy  Overall no significant change patient is having significant respiratory distress, hypotension patient has progressive worsening of her AST I suspect most of patient's lab abnormalities are part of multi organ involvement possible rhabdomyolysis chronic hypoxia ischemia hypotension poor oxygenation continue on supportive care and optimize patient's overall cardio respiratory status and oxygenation finding discussed in detail with hospitalist team.  Patient may benefit from transfusion and possible intubation.         Data Unavailable      Interval History:     no new complaints              Review of Systems:     C: NEGATIVE for fever, chills, change in weight  E/M: NEGATIVE for ear, mouth and throat problems  R: NEGATIVE for significant cough or SOB  CV: NEGATIVE for chest pain, palpitations or peripheral edema             Medications:   I have reviewed this patient's current medications    - MEDICATION INSTRUCTIONS for Dialysis Patients -   Does not apply See Admin Instructions     allopurinol  100 mg Oral QPM     calcium acetate  1,334 mg Oral TID w/meals     ceFEPIme  1 g Intravenous Q24H     heparin  5-10 mL Intracatheter Q28 Days     heparin lock flush  5-10 mL Intracatheter Q24H     midodrine  5 mg Oral TID w/meals     multivitamin RENAL  1 capsule Oral Daily     pantoprazole  40 mg Oral BID AC     sodium chloride (PF)  10-20 mL Intracatheter Q28 Days     sodium chloride (PF)  3 mL Intracatheter Q8H     vancomycin place kumar - receiving intermittent dosing  1 each Intravenous See  Admin Instructions                  Physical Exam:   Vitals were reviewed  Vital Signs with Ranges  Temp:  [96.7  F (35.9  C)-97.6  F (36.4  C)] 97.4  F (36.3  C)  Pulse:  [] 101  Resp:  [0-21] 9  BP: ()/(42-79) 103/62  SpO2:  [95 %-100 %] 100 %  I/O last 3 completed shifts:  In: 451.25 [P.O.:170]  Out: 1000 [Other:1000]             Data:   I reviewed the patient's new clinical lab test results.   Recent Labs   Lab Test 10/01/22  0452 09/30/22  2237 09/30/22  0708 09/29/22  0924 09/28/22  0526 08/23/22  0546 08/23/22  0011   WBC 4.9  --  5.0 5.9 4.4   < >  --    HGB 7.6* 7.8* 7.0* 8.0* 7.9*   < > 8.5*   MCV 95  --  96 92 94   < >  --    PLT 84*  --  100* 103* 104*   < > 264   INR 1.82*  --   --   --  2.74*  --  1.45*    < > = values in this interval not displayed.     Recent Labs   Lab Test 10/01/22  0452 09/30/22  0708 09/29/22  0924   POTASSIUM 3.8 4.3 4.0   CHLORIDE 100 103 103   CO2 29 24 25   BUN 16 25 20   ANIONGAP 7 9 8     Recent Labs   Lab Test 10/01/22  0452 09/30/22  0708 09/29/22  0924   ALBUMIN 2.8* 2.8* 2.3*   BILITOTAL 1.2 0.8 0.8   * 114* 114*   * 637* 631*       I reviewed the patient's new imaging results.    All laboratory data reviewed  All imaging studies reviewed by me.    Raul Camacho MD,  10/1/2022  [facility]  Office : [phone]  Cell: [phone]      [facility]  Ph: [phone] Fax: [phone]

## 2022-10-01 NOTE — PROGRESS NOTES
Cross Cover Note    Page re low BP.   Per note from today:  If unable to maintain blood pressures, will need norepinephrine and transfer to ICU    Nurse noted that House Officer is being notified.    Paola Miller PA-C

## 2022-10-01 NOTE — PROGRESS NOTES
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  Her course was complicated by GIB from DUw/ large volume blood product transfusions.  She presented to [place] ED 09/27/22 w/ progressively worsening shortness of breath and generalized weakness for the past couple of weeks. Imaging revealing large right-sided effusion. She was transferred to [facility] for further management.        Acute hypoxic respiratory insufficiency  large right pleural effusion on outside hospital CT chest   ARIA pulmonary nodule  right hydropneumothorax due to trapped lung phenomenon 9/30  Admit with acute hypoxia 2/2 new large right-sided pleural effusion with suspicion for malignant effusion. Noted to have also have some patchy airspace opacities in the right lung and nodular and consolidative opacities in the LLL that may be suggestive of PNA  Known mediastinal mass as below  -supplemental O2              - ct Antibiotics vanc/cefepime  - IR consult for right-sided thoracentesis-9/28:  s/p thoracentesis 9/28 (900 mL of  kay colored fluid was drained.) .  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               - Eliquis on hold for now; ct heparin nomogram to continue for today              -Repeat x-ray reviewed and discussed with thoracic surgery 9/30: \"No intervention indicated, as little/no reported symptomatic benefit from thoracentesis and " "lung would not re-expand with PleurX; not a surgical candidate\"     - CT Head : likely mild confusion is Toxic metabolic encephalopathy but she is on blood thinner         Hypotension  - Suspect that dehydration contributing due to not eating or drinking since her discharge on 9/3, in addition to concerns for  sepsis  - s/p Volume resuscitation:  - Work-up pending to r/o sepsis              - BC: ngtd            - UA  ;neg              - Lactate at [facility] 1.8 - remains in low 1 range  - Empirically continuing Vancomycin and Cefepime; given possible PNA on imaging               - Nursing to notify provider if MAP <65, SBP <90  - started on midodrine. Increase to 10mg tid              - If unable to maintain blood pressures, will need presors and transfer to              ICU.  Patient/family still wanting aggressive treatment measures and Full Code status; difficult situation and may need ethics involvement on Monday    *We will recheck lactic acid as a marker for hypoperfusion.     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  - Per family, has not followed-up OP with Oncology for discussion on chemotherapy  - Likely not a candidate for chemo in this acute state--discussed with patient and daughter that chemotherapy would only be palliative and not curative with overall poor prognosis  - Repeat imaging performed at [facility] demonstrating bulky adenopathy in the lower left neck, mediastinum and bilateral hilar regions, multiple pulmonary nodules, bilaterally, extensive metastatic involvement of the liver with scattered lytic metastatic osseous lesions  - Oncology " & Palliative Care consult appreciated              - Overall prognosis is poor              - Remains full code at this time              - At this time, unclear if family comprehends the overall prognosis and severity              Of the situation  -Worsened LFT: Patient remains critical with very poor prognosis, appreciate GI review  We will transfuse 1 more unit PRBC to goal hemoglobin around 8. Last /62.      Chronic Anemia due to ESRD  Thrombocytopenia  - Hgb 8.3 on admit (baseline past 3-weeks 7.7-8.0)  - PLT count 108 without signs of bleeding --> 104  - Consider transfusion for Hgb <7    9/30: Hb 7: no s/s of bleeding. transfused 1 U PRBC  (Verbal consent from daughter in presence of RN. Indication, risks, benefit, alternatives explained)  10/1: transfuse 1 U PRBC    CAD s/p PCI 2019  Cardiomyopathy, HFmEF (40-45%)  Hypertension  *PTA regimen is: amlodipine 5 mg daily, coreg 12.5mg bid  - Holding anti-hypertensive med's due to hypotension              - Restart if/when clinically indicated     COPD  Tobacco use  - encourage cessation of tobacco      ESRD on MWF HD  S/p left AV fistula formation 6/28/22  S/p fistulogram with dilatation of L subclavian and junction of brachiocephalic and subclavian vein complicated by hematoma requiring evacuation 7/8/22  Hypophosphatemia  - Dialyzes MWF  - Left AV fistula  - Nephrology consulted for HD  - continue PTA phos low     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/14/22)  *PTA apixaban; needs lifelong anticoagulation  - Apixaban held on 9/27 in anticipation of thoracentesis  - Low dose heparin started without bolus or loading dose for coverage                         - transition to Eliquis once stable     Severe Malnutrition  - Nutrition consult ordered to assess and provide recommendations      Physical Deconditioning  Generalized Weakness  - Per daughter, non-ambulatory and rapidly declining since discharge on 9/3  - PT/OT to assess      "  Diet: Renal Diet (dialysis)  Snacks/Supplements Pediatric: Ensure Enlive; Between Meals    DVT Prophylaxis: on heparin gtt  Antoine Catheter: Not present  Central Lines: PRESENT  CVC Double Lumen Left Subclavian-Site Assessment: WDL  Cardiac Monitoring: ACTIVE order. Indication: Acute decompensated heart failure (48 hours)  Code Status: Full Code      Disposition Plan         The patient's care was discussed with the Bedside Nurse, Patient and Patient's Family.    Severiano Johnson MD, MD  Hospitalist Service  [facility]  Securely message with the Vocera Web Console (learn more here)  Text page via [employer] Paging/Directory     \"This dictation was performed with voice recognition software and may contain errors,  omissions and inadvertent word substitution.\"         Clinically Significant Risk Factors Present on Admission                     ______________________________________________________________________    Interval History   Last 24-hour events noted.  Continued to require intermittent albumin challenges to maintain MAP greater than 65  Continues to feel unwell as if something stuck in chest    4 point ROS done and negative unless mentioned    Data reviewed today: I reviewed all medications, new labs and imaging results over the last 24 hours. I personally reviewed no images or EKG's today.    Physical Exam   /62   Pulse 101   Temp 97.4  F (36.3  C) (Oral)   Resp 9   Ht 1.651 m (5' 5\")   Wt 58.2 kg (128 lb 4.9 oz)   SpO2 100%   BMI 21.35 kg/m    Gen- pleasant  lying in bed.  Looks poorly  HEENT- NAD, ALEENA  Neck- supple  CVS- I+II+ no m/r/g  RS- Decreased significantly at base  Abdo- soft, no tenderness . No g/r/r  Ext- anasarca    Dialysis catheter Lt upper chest    Data    BMP  Recent Labs   Lab 10/01/22  0452 09/30/22  2156 09/30/22  0708 09/29/22  0924 09/28/22  0823 09/28/22  0526     --  136 136  --  138   POTASSIUM 3.8  --  4.3 4.0  --  3.4   CHLORIDE 100  --  " "103 103  --  103   JEWELL 9.7  --  10.8* 10.4*  --  8.9   CO2 29  --  24 25  --  24   BUN 16  --  25 20  --  25   CR 2.95*  --  4.58* 3.85*  --  4.99*   GLC 74 80 99 98   < > 70    < > = values in this interval not displayed.     CBC  Recent Labs   Lab 10/01/22  0452 09/30/22  2237 09/30/22  0708 09/29/22  0924 09/28/22  0526   WBC 4.9  --  5.0 5.9 4.4   RBC 2.55*  --  2.31* 2.74* 2.66*   HGB 7.6*   < > 7.0* 8.0* 7.9*   HCT 24.1*  --  22.1* 25.1* 25.1*   MCV 95  --  96 92 94   MCH 29.8  --  30.3 29.2 29.7   MCHC 31.5  --  31.7 31.9 31.5   RDW 15.7*  --  16.8* 16.5* 16.0*   PLT 84*  --  100* 103* 104*    < > = values in this interval not displayed.     INR  Recent Labs   Lab 10/01/22  0452 09/28/22  0526   INR 1.82* 2.74*     LFTs  Recent Labs   Lab 10/01/22  0452 09/30/22  0708 09/29/22  0924 09/28/22  0526   ALKPHOS 202* 211* 257* 235*   * 637* 631* 518*   * 114* 114* 106*   BILITOTAL 1.2 0.8 0.8 0.8   PROTTOTAL 5.3* 5.6* 5.2* 5.1*   ALBUMIN 2.8* 2.8* 2.3* 2.3*      PANCNo lab results found in last 7 days.    Recent Results (from the past 24 hour(s))   XR Chest 2 Views    Narrative    XR CHEST 2 VIEWS  9/30/2022 1:41 PM       INDICATION: follow up on effusion  COMPARISON: 9/28/2022, 8/29/2022       Impression    IMPRESSION: Right-sided hydropneumothorax. A small to moderate  remaining right pleural effusion has significantly decreased. The  small apical pneumothorax component is new. Overall volume of the  right lung however has improved with better aeration. The pneumothorax  may be due to a \"trapped lung\" phenomenon. The left lung remains  clear. Left IJ central venous catheter tip is in the right atrium.    MAURO KEYS MD         SYSTEM ID:  [id number]     "

## 2022-10-01 NOTE — PLAN OF CARE
Goal Outcome Evaluation:        Assumed care from [id number]    Up A-2 with LIFT to recliner. Oriented to self and granddaughter. BP soft. Labored, shallow breathing. Pt appears to be uncomfortable, pillows in use. Pt restless in chair. PRN oxycodone given. O2 stable on 7L oxymizer cannula. Bilateral upper and lower extremities edematous, elevated on pillows. Poor fluid intake. No BM. Frequent reminders to keep O2 in place. Daughter at bedside.

## 2022-10-01 NOTE — PROVIDER NOTIFICATION
Page to OnCall Hospitalist    Patient BP SYS 70-80's MAP low 60's - order to page <90 sys and <65 MAP last BP at 2110 74/56 MAP of 60       Last night they were Silva Landis [place] NP regarding this patient. She is aware of her condition    Orders:  No orders/page received.     Page to [place] KWESI Landis - at 2130     Orders: Stat HGB, Albumin ordered.

## 2022-10-01 NOTE — PLAN OF CARE
"Disoriented to time, lethargic, occasionally restless. Bps very soft, 70s-80s throughout shift. Scheduled midodrine given x 3. Maps 60s. 4-5L oxymizer cannula. Sepsis protocol fired during shift, declined d/t pt's baseline trending \"normal\" tachycardia and low BP. Inspiratory and expiratory wheezes auscultated in RUL and ARIA tele ST, bowel sounds hypoactive, no BM this shift. Hemodialysis pt, no urine this shift. Pt c/o pain in dialysis, oxycodone 5mg given x 1. Dialysis pulled 1.5 L. CXR today revealed R hydropneumothorax, pt not surgical candidate. HgB 7.0, 1 unit blood transfused. PTT and HgB recheck in AM. Heparin @ 2.5u. Adrienne from pt's previous assisted living facility in [place] called today and reported they had spot still open for her if interested. Advanced directives provided today by daughter, Monica, primary representative of pt. Continue to monitor and plan of care.    "

## 2022-10-01 NOTE — PLAN OF CARE
Pt. Alert and oriented x1 orientated to self. Vital signs unstable on 4-5L Oximiser cannula, soft BP Sys in the 70's-80's with MAPS in low 60's and below. Paged for orders. x1 Albumin given - BP were responsive. 1 episode of SOB and increased WOB with anxiety - once patient was settled breathing improved. Assist of 2 turn and repo in bed, patient is able to repo self at times. Not tolerating Renal diet - very poor appetite, no intake this shift. Some sips with meds. Lung sounds: diminished with some BUL fine crackles, shallow breathing. Bowel sounds: Hypoactive, RIGO flatus. BM (-), No urine output due to hemodialysis. Skin BUE edema +2 and BLE edema R>L. Pain managed with Oxi 5mg x1. Denies nausea. Tele Sinus Tach

## 2022-10-02 NOTE — PROGRESS NOTES
Cross Cover Note    Called re: anemia. Family requesting blood transfusion this evening that was declined earlier. Type and screen completed earlier today. Consent was taken yesterday per Dr. Johnson's note.    Transfusion for 1 unit PRBC placed.    Paola Miller PA-C

## 2022-10-02 NOTE — PROGRESS NOTES
[facility] Follow Up Note:    Assessment and Plan:    Gina is a 57 year old admitted with shortness of breath     Shortness of breath  Hypoxic respiratory failure  Pleural effusion  - Effusion tapped 9/28, cytology pending  - On oxygen by oxymizer  - Clearly looks uncomfortable at rest, barely able to speak  - Could have underlying pneumonia, being treated with antibiotics     Hypotension  Probable sepsis  - Multifactorial  - BC NGTD  - Procalcitonin >200  - Being treated for sepsis with broad spectrum antibiotics  - IV fluids and IV albumin  - Question raised of pressors     Hypercalcemia  - Likely related to disease  - Calcium is 10.4 with albumin of 2.3, corrected calcium is 11.8  - Management per primary     Tumor thrombus / VTE  - On IV heparin     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 Stent was unable to be placed due to her vascular access issues with dialysis catheter.   - Followed by Dr. Dreyer -> attempted to get outpatient follow up with Dr. Costa (closer to her home), now readmitted with SOB before that was able to take place.    - She is clinically not doing well, and has symptoms of progressive disease  - Chemotherapy has been discussed, which is complicated by her underlying renal disease and dialysis- I do not recommend chemotherapy with her current PS, mentation, renal function etc  - She is not a candidate for chemotherapy in her current state and could cause significant toxicity and precipitate further deterioration   - Metastatic large cell NEC of the lung carries a particularly poor prognosis, with most studies finding a median overall survival of around 6 months. This was discussed with the patient during her previous hospital stay.   - For now the plan is continue supportive measures   - Consider  repeating her brain MRI if mentation does not improve with treatment of infection -- too weak for this  - Reviewed thoracic note and do not recommend PleurX as her status did not change/ improve with thoracentesis.     Anemia  - Previous hospital stay complicated by upper GI ulcer and recurrent bleeding when placed on anticoagulation   - Transfuse Hg <7 or <8 and significantly symptomatic         ESRD on MWF HD     Code status: Currently full code   10/1--Prognosis: Poor, Long discussion with patient, daughter, granddaughter, other family members on speaker phone.  I stressed again the poor prognosis.  I recommended DNR, DNI.  Family is not ready to make a decision yet they will continue to discuss amongst each other.  - Patient is in pain and distress I also recommended a focus on comfort for better pain management.     10/2: Appreciate camacho Anderson d/w family DNR/ comfort/ hospice  D/w son and daughter and family in room. tearful but understanding    Subjective:     Much better iwth regards to pain, not agitated    Scheduled Medications:  Reviewed active medications    Labs:  CBC RESULTS: Recent Labs   Lab Test 10/02/22  0503 10/01/22  0452 09/30/22  2237 09/30/22  0708   WBC 5.3 4.9  --  5.0   HGB 9.2* 7.6* 7.8* 7.0*   HCT 28.7* 24.1*  --  22.1*   MCV 93 95  --  96   PLT 80* 84*  --  100*       CMP  Recent Labs   Lab 10/02/22  0636 10/02/22  0615 10/02/22  0503 10/01/22  0452 09/30/22  2156 09/30/22  0708 09/29/22  0924 09/28/22  0823 09/28/22  0526   NA  --   --  137 136  --  136 136  --  138   POTASSIUM  --   --  4.2 3.8  --  4.3 4.0  --  3.4   CHLORIDE  --   --  101 100  --  103 103  --  103   CO2  --   --  25 29  --  24 25  --  24   ANIONGAP  --   --  11 7  --  9 8  --  11   * 60* 67* 74   < > 99 98   < > 70   BUN  --   --  25 16  --  25 20  --  25   CR  --   --  3.84* 2.95*  --  4.58* 3.85*  --  4.99*   GFRESTIMATED  --   --  13* 18*  --  10* 13*  --  9*   JEWELL  --   --  10.7* 9.7  --  10.8*  10.4*  --  8.9   MAG  --   --   --   --   --   --   --   --  1.7   PHOS  --   --   --   --   --   --   --   --  3.9   PROTTOTAL  --   --  5.5* 5.3*  --  5.6* 5.2*  --  5.1*   ALBUMIN  --   --  2.6* 2.8*  --  2.8* 2.3*  --  2.3*   BILITOTAL  --   --  1.1 1.2  --  0.8 0.8  --  0.8   ALKPHOS  --   --  215* 202*  --  211* 257*  --  235*   AST  --   --  864* 716*  --  637* 631*  --  518*   ALT  --   --  107* 106*  --  114* 114*  --  106*    < > = values in this interval not displayed.       INR  Recent Labs   Lab 10/02/22  0503 10/01/22  0452 09/28/22  0526   INR 1.72* 1.82* 2.74*       Objective/Physical Exam:  not currently breastfeeding.  Time: 35 minutes with patient , 30 minutes in counseling and coordination of care  Devaughn Pradhan MD  [facility]  10/2/2022 2:32 PM

## 2022-10-02 NOTE — H&P
[facility]    History and Physical - Hospitalist Service       Date of Admission:  10/2/2022    Assessment & Plan   Gina Simpson is a 57 year old female w/ PMH of CAD w/ PCI in 2019, HTN, cardiomyopathy/HFmEF (40-45%), hypertension, COPD, ESRD, remote failed RUE fistula, s/p creation of LUE AV fistula who was recently hospitalized from 8/14/22-9/3/22 for SVC syndrome and extensive adenopathy, ultimately diagnosed w/ stage IV metastatic non-small cell carcinoma with w/ liver mets and lymphadenopathy and is s/p urgent radiation.  Her course was complicated by GIB from DUw/ large volume blood product transfusions.  She presented to [facility] ED 09/27/22 w/ progressively worsening shortness of breath and generalized weakness for the past couple of weeks. Imaging revealing large right-sided effusion. She was transferred to [facility] for further management.           Goals of care  Chart reviewed, pt examined, discussed acute and chronic medical issues, terminal prognosis regarding lung cancer and very guarded poor short term prognosis including not being candidate for palliative chemotherapy with patient's daughter and her son Elwerd. Both in agreement with dnr/dni status. Discussed that the patient is in the dying process and that continued restorative care will not relieve her current symptoms and associated suffering. Pt's family feels patient is suffering and both want a focus on her comfort.  Informed family that a hospice/comfort care approach would allow us to fully address her comfort and improve her quality of life. Continued restorative care would not improve her quality of life, would not slow progression of her cancer, infection, dying process and make addressing her comfort difficult. Both son and daughter in agreement with proceeding with comfort/hospice care. I discussed the details of comfort care with both of them including stopping dialysis and likely anticipated  "effects.      Pt now dnr/dni. In patient hospice consulted and will see pt/family shortly. Discussed with nephrology and supportive of comfort care and no further dialysis as per plan discussed with family today. Oncology has recommended comfort care.  Will convert to comfort care after famil meets with hospice team.   Pt and family met with in patient hospice team. Plan is to proceed with comfort cares. Cont oxygen for now. Stopping other medications not directly related to comfort. Stopping dialysis.   Family interested in having patient go home with hospice. In patient hospice for now. sw consult for outpatient hospice coordination for Monday if family still wants to care for her at home.  They are able as a family to care for her at home.            Acute hypoxic respiratory insufficiency  large right pleural effusion on outside hospital CT chest   ARIA pulmonary nodule  right hydropneumothorax due to trapped lung phenomenon 9/30  Admit with acute hypoxia 2/2 new large right-sided pleural effusion with suspicion for malignant effusion. Noted to have also have some patchy airspace opacities in the right lung and nodular and consolidative opacities in the LLL that may be suggestive of PNA  Known mediastinal mass as below  -supplemental O2   -  Antibiotics vanc/cefepime  - IR consult for right-sided thoracentesis-9/28:  s/p thoracentesis 9/28 (900 mL of  kay colored fluid was drained.) .  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 cx ngtd     - Eliquis held; ct heparin nomogram to continue for today   -Repeat x-ray reviewed and discussed with thoracic surgery 9/30: \"No intervention indicated, as little/no reported symptomatic benefit from thoracentesis and lung would not re-expand with PleurX; not a surgical candidate\"   - CT Head : likely mild confusion is Toxic metabolic encephalopathy but she is on blood thinner-   - abx changed: " cefepime changed to zosyn  -oxygen needs up to 7L oximizer  -vbg 10/2 normal. No resp failure     -cont oxygen for now but abx to stop given transition to comfort cares             Hypotension-resolved  - Suspect that dehydration contributing due to not eating or drinking since her discharge on 9/3, in addition to concerns for  sepsis  - s/p Volume resuscitation:  - being treated for sepsis    - BC: ngtd            - UA  ;neg  - Empirically broad spectrum abx given possible PNA on imaging   -pt's family had wanted aggressive measures  -has needed intermittent albumin boluses  - started on midodrine. 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  - Per family, has not followed-up OP with Oncology for discussion on chemotherapy  -  not a candidate for chemo in this acute state--team discussed with patient and daughter that chemotherapy would only be palliative and not curative with overall poor prognosis  - Repeat imaging performed at [facility] demonstrating bulky adenopathy in the lower left neck, mediastinum and bilateral hilar regions, multiple pulmonary nodules, bilaterally, extensive metastatic involvement of the liver with scattered lytic metastatic osseous lesions  - Oncology & Palliative Care consult appreciated   - Overall prognosis is very  poor   -Worsened LFT: Patient remains critical with very poor prognosis,  -transitioned to comfort cares  -seen by Oncology today, case discussed, they met with family            Chronic Anemia due to ESRD  Thrombocytopenia  - Hgb 8.3 on admit  (baseline past 3-weeks 7.7-8.0)  - PLT count 108 without signs of bleeding --> 104  - Consider transfusion for Hgb <7    9/30: Hb 7: no s/s of bleeding. transfused 1 U PRBC  (Verbal consent from daughter in presence of RN. Indication, risks, benefit, alternatives explained)  10/1: transfuse 1 U PRBC  -Hb 9 10/2 post 1 unit blood transfusion  Pt transitioned to comfort cares     LFT changes:   Followed by Doug MATIAS.   -2/2 to acute illness, hypotension, ischemia, pulmonary process and mets  ALT stable in 100 range   range--> 800 range today.   Alk phos stable 200 range. 34 last month  Bili wnl  Transitioning to comfort cares. No further bld transfusions. discussed with family      CAD s/p PCI 2019  Cardiomyopathy, HFmEF (40-45%)  Hypertension  meds held given hypotension     COPD  Tobacco use      ESRD on MWF HD  S/p left AV fistula formation 6/28/22  S/p fistulogram with dilatation of L subclavian and junction of brachiocephalic and subclavian vein complicated by hematoma requiring evacuation 7/8/22  Hypophosphatemia  - Dialyzes MWF  - Left AV fistula  -patient will discontinue dialysis given transition to comfort care. Discussed with family members and nephrology     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/14/22)  Stopping anticoagulation given transition to hospice care     Severe Malnutrition  - on renal diet      Physical Deconditioning  Generalized Weakness           Diet: Renal Diet (dialysis) as tolerated.   Snacks/Supplements Pediatric: Ensure Enlive; Between Meals    DVT Prophylaxis: anticoagulation stopped  Antoine Catheter: Not present  Central Lines: PRESENT  CVC Double Lumen Left Subclavian-Site Assessment: WDL  Code Status: comfort care, dnr/dni as of 10/2        Disposition Plan- family interested in potentially bringing patient home with hospice SW consult for Monday Am.   Discussed with care team and family bedside     Diet: Renal Diet (dialysis)    Antoine Catheter: Not  present  Central Lines: PRESENT     Code Status: No CPR- Do NOT Intubate    Expected Discharge: working on discharge with hospice    Surya Sherman MD  Hospitalist Service  [facility]  Securely message with the Vocera Web Console (learn more here)  Text page via [place] Paging/Directory         ______________________________________________________________________    Chief Complaint   Transitioning to inpatient hospice    History of Present Illness   Gina Simpson is a 57 year old female w/ PMH of CAD w/ PCI in 2019, HTN, cardiomyopathy/HFmEF (40-45%), hypertension, COPD, ESRD, remote failed RUE fistula, s/p creation of LUE AV fistula who was recently hospitalized from 8/14/22-9/3/22 for SVC syndrome and extensive adenopathy, ultimately diagnosed w/ stage IV metastatic non-small cell carcinoma with w/ liver mets and lymphadenopathy and is s/p urgent radiation.  Her course was complicated by GIB from DUw/ large volume blood product transfusions.  She presented to [facility] ED 09/27/22 w/ progressively worsening shortness of breath and generalized weakness for the past couple of weeks. Imaging revealing large right-sided effusion. She was transferred to [facility] for further management.     Review of Systems    Review of systems was not needed on this patient    Past Medical History    Please see the medical & surgical history outlined in the H&P from the previous hospital encounter    Social History   Please see the social history outlined in the H&P from the previous hospital encounter    Family History   Please see the family history outlined in the H&P from the previous hospital encounter    Prior to Admission Medications   Prior to Admission Medications   Prescriptions Last Dose Informant Patient Reported? Taking?   B Complex-C-Folic Acid (EDEPALI CAPS) 1 MG CAPS   Yes No   Sig: Take 1 capsule by mouth daily   acetaminophen (TYLENOL) 325 MG tablet   No No   Sig: Take 2 tablets (650  mg) by mouth every 6 hours as needed for mild pain or other (and adjunct with moderate or severe pain or per patient request)   allopurinol (ZYLOPRIM) 100 MG tablet   No No   Sig: Take 1 tablet (100 mg) by mouth every evening   amLODIPine (NORVASC) 5 MG tablet   No No   Sig: Take 1 tablet (5 mg) by mouth every evening   apixaban ANTICOAGULANT (ELIQUIS) 5 MG tablet   No No   Sig: Take 1 tablet (5 mg) by mouth 2 times daily   atorvastatin (LIPITOR) 20 MG tablet   Yes No   Sig: Take 20 mg by mouth daily   calcium acetate (PHOSLO) 667 MG CAPS capsule   Yes No   Sig: Take 2,001 mg by mouth 3 times daily (with meals)   carvedilol (COREG) 12.5 MG tablet   No No   Sig: Take 1 tablet (12.5 mg) by mouth 2 times daily (with meals)   ondansetron (ZOFRAN ODT) 4 MG ODT tab   No No   Sig: Take 1 tablet (4 mg) by mouth every 6 hours as needed for nausea or vomiting   oxyCODONE (ROXICODONE) 5 MG tablet   No No   Sig: Take 1 tablet (5 mg) by mouth every 6 hours as needed for moderate to severe pain   pantoprazole (PROTONIX) 40 MG EC tablet   No No   Sig: Take 1 tablet (40 mg) by mouth 2 times daily (before meals)   sucralfate (CARAFATE) 1 GM tablet   No No   Sig: Take 1 tablet (1 g) by mouth 4 times daily (before meals and nightly)      Facility-Administered Medications: None     Allergies   No Known Allergies    Physical Exam   Vital Signs: Temp: 98.6  F (37  C) Temp src: Axillary BP: (!) 75/51 Pulse: 106   Resp: 14 SpO2: 98 % O2 Device: Nasal cannula Oxygen Delivery: 5 LPM  Weight: 0 lbs 0 oz    Please see physical exam from my PROGRESS NOTE on the same date

## 2022-10-02 NOTE — CONSULTS
"Writer received consult for patient possibly transitioning home. Writer talked with PAOLA Kerr from [facility] and [facility] will be taking care of that if patient is stable to move home.     SW did assist in calling patients son  today and leaving message. See note from patients previous chart.     \"Writer was asked to call patients sons  to see if they would allow him to come to the hospital due to patient signing onto GIP. Writer called troy Patel  Gurvinder Conner at [phone] and left a message. Writer also called [facility] for [facility] at [phone] asking for a return call as soon as possible to see if we can make arrangements for patients son to visit before patient passes.\"    TREVOR Schaeffer    "

## 2022-10-02 NOTE — PROVIDER NOTIFICATION
MD Notification    Notified Person: MD    Notified Person Name: On-call PA     Notification Date/Time: 2031    Notification Interaction: Web-based paging    Purpose of Notification: Pt. had a unit of blood that was to be transfused at 1300 today. Per report, pt. family did not want the blood at that time. Family is now requesting the blood transfusion. No order, only a note supporting this.      Orders Received: OK to transfuse 1 unit PRBC    Comments:

## 2022-10-02 NOTE — PROGRESS NOTES
[facility]    Hospitalist Progress Note    Date of Admission:  9/27/2022    Assessment & Plan   Gina Simpson is a 57 year old female w/ PMH of CAD w/ PCI in 2019, HTN, cardiomyopathy/HFmEF (40-45%), hypertension, COPD, ESRD, remote failed RUE fistula, s/p creation of LUE AV fistula who was recently hospitalized from 8/14/22-9/3/22 for SVC syndrome and extensive adenopathy, ultimately diagnosed w/ stage IV metastatic non-small cell carcinoma with w/ liver mets and lymphadenopathy and is s/p urgent radiation.  Her course was complicated by GIB from DUw/ large volume blood product transfusions.  She presented to [place] ED 09/27/22 w/ progressively worsening shortness of breath and generalized weakness for the past couple of weeks. Imaging revealing large right-sided effusion. She was transferred to [facility] for further management.         Goals of care  Chart reviewed, pt examined, discussed acute and chronic medical issues, terminal prognosis regarding lung cancer and very guarded poor short term prognosis including not being candidate for palliative chemotherapy with patient's daughter and her son Elwerd. Both in agreement with dnr/dni status. Discussed that the patient is in the dying process and that continued restorative care will not relieve her current symptoms and associated suffering. Pt's family feels patient is suffering and both want a focus on her comfort.  Informed family that a hospice/comfort care approach would allow us to fully address her comfort and improve her quality of life. Continued restorative care would not improve her quality of life, would not slow progression of her cancer, infection, dying process and make addressing her comfort difficult. Both son and daughter in agreement with proceeding with comfort/hospice care. I discussed the details of comfort care with both of them including stopping dialysis and likely anticipated effects.     Pt now dnr/dni.  "In patient hospice consulted and will see pt/family shortly. Discussed with nephrology and supportive of comfort care and no further dialysis as per plan discussed with family today. Oncology has recommended comfort care.  Will convert to comfort care after famil meets with hospice team.          Acute hypoxic respiratory insufficiency  large right pleural effusion on outside hospital CT chest   ARIA pulmonary nodule  right hydropneumothorax due to trapped lung phenomenon 9/30  Admit with acute hypoxia 2/2 new large right-sided pleural effusion with suspicion for malignant effusion. Noted to have also have some patchy airspace opacities in the right lung and nodular and consolidative opacities in the LLL that may be suggestive of PNA  Known mediastinal mass as below  -supplemental O2   -  Antibiotics vanc/cefepime  - IR consult for right-sided thoracentesis-9/28:  s/p thoracentesis 9/28 (900 mL of  kay colored fluid was drained.) .  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 cx ngtd     - Eliquis on hold for now; ct heparin nomogram to continue for today   -Repeat x-ray reviewed and discussed with thoracic surgery 9/30: \"No intervention indicated, as little/no reported symptomatic benefit from thoracentesis and lung would not re-expand with PleurX; not a surgical candidate\"   - CT Head : likely mild confusion is Toxic metabolic encephalopathy but she is on blood thinner-   - abx changed: cefepime changed to zosyn  -oxygen needs up to 7L oximizer  -vbg 10/2 normal. No resp failure    Plan;   - oxygen as needed  - daily labs  - Vanco, cefipime changed to zosyn, renal dosed  -IS, mobilize as able,           Hypotension-resolved  - Suspect that dehydration contributing due to not eating or drinking since her discharge on 9/3, in addition to concerns for  sepsis  - s/p Volume resuscitation:  - Work-up pending to r/o sepsis    - BC: " ngtd            - UA  ;neg    - Lactate at [facility] 1.8 - remains in low 1 range  - Empirically broad spectrum abx given possible PNA on imaging   -pt's family has wanted aggressive measures. Remains full code  -has needed intermittent albumin boluses  Plan;   - IMC  - Nursing to notify provider if MAP <65, SBP <90  - started on midodrine. 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  - Per family, has not followed-up OP with Oncology for discussion on chemotherapy  -  not a candidate for chemo in this acute state--team discussed with patient and daughter that chemotherapy would only be palliative and not curative with overall poor prognosis  - Repeat imaging performed at [facility] demonstrating bulky adenopathy in the lower left neck, mediastinum and bilateral hilar regions, multiple pulmonary nodules, bilaterally, extensive metastatic involvement of the liver with scattered lytic metastatic osseous lesions  - Oncology & Palliative Care consult appreciated   - Overall prognosis is poor  - Remains full code at this time   - At this time, unclear if family comprehends the overall prognosis and severity of the situation  -Worsened LFT: Patient remains critical with very poor prognosis, appreciate GI review  - transfused 1 more unit PRBC 10/1 to goal hemoglobin around 8.             Chronic Anemia due to ESRD  Thrombocytopenia  - Hgb 8.3 on admit (baseline past 3-weeks 7.7-8.0)  - PLT count 108 without signs of bleeding --> 104  - Consider transfusion for Hgb <7    9/30: Hb 7: no s/s of bleeding. transfused 1 U PRBC  (Verbal consent from  daughter in presence of RN. Indication, risks, benefit, alternatives explained)  10/1: transfuse 1 U PRBC  -Hb 9 10/2 post 1 unit blood transfusion    LFT changes:   Followed by Doug MATIAS.   -2/2 to acute illness, hypotension, ischemia, pulmonary process  ALT stable in 100 range   range--> 800 range today.   Alk phos stable 200 range. 34 last month  Bili wnl  Plan   Doug MATIAS following  Follow [facility].        CAD s/p PCI 2019  Cardiomyopathy, HFmEF (40-45%)  Hypertension  *PTA regimen is: amlodipine 5 mg daily, coreg 12.5mg bid  - Holding anti-hypertensive med's due to hypotension              - Restart if/when clinically indicated     COPD  Tobacco use  - encourage cessation of tobacco      ESRD on MWF HD  S/p left AV fistula formation 6/28/22  S/p fistulogram with dilatation of L subclavian and junction of brachiocephalic and subclavian vein complicated by hematoma requiring evacuation 7/8/22  Hypophosphatemia  - Dialyzes MWF  - Left AV fistula  - Nephrology consulted for HD  - continue PTA phos low     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/14/22)  *PTA apixaban; needs lifelong anticoagulation  - Apixaban held on 9/27 in anticipation of thoracentesis  - Low dose heparin started without bolus or loading dose for coverage   - transition to Eliquis once stable  -cont heparin gtt     Severe Malnutrition  - Nutrition consult ordered to assess and provide recommendations  -on renal diet      Physical Deconditioning  Generalized Weakness  - Per daughter, non-ambulatory and rapidly declining since discharge on 9/3  - PT/OT to assess           Diet: Renal Diet (dialysis)  Snacks/Supplements Pediatric: Ensure Enlive; Between Meals    DVT Prophylaxis: on heparin gtt  Antoine Catheter: Not present  Central Lines: PRESENT  CVC Double Lumen Left Subclavian-Site Assessment: WDL  Cardiac Monitoring: ACTIVE order. Indication: Acute decompensated heart failure (48 hours)  Code Status: Full Code           Disposition Plan- >2 days. TBD            Surya Sherman MD, MD  Text Page  (7am to 6pm)  Interval History    Increased to 7L oximizer since yesterday    -Data reviewed today: I reviewed all new labs and imaging results over the last 24 hours. I personally reviewed     pcXR 10/1;   : Redemonstration of right hydropneumothorax with interval increased fluid component. The air component appears similar with about 2.4 cm of pleural separation at the right lung apex. Hazy opacities in the right lung may be due to atelectasis,   edema or infiltrate. Left IJ tunneled dialysis catheter tip in the right atrium. Mild pulmonary edema in the left lung. No left pleural effusion.  Physical Exam   Temp: 98  F (36.7  C) Temp src: Oral BP: 131/84 Pulse: 100   Resp: (!) 6 SpO2: 100 % O2 Device: Oxymizer cannula Oxygen Delivery: 7 LPM  Vitals:    09/28/22 1132   Weight: 58.2 kg (128 lb 4.9 oz)     Vital Signs with Ranges  Temp:  [97.4  F (36.3  C)-98  F (36.7  C)] 98  F (36.7  C)  Pulse:  [] 100  Resp:  [6-30] 6  BP: ()/(46-84) 131/84  SpO2:  [96 %-100 %] 100 %  I/O last 3 completed shifts:  In: 400 [P.O.:100]  Out: 0     Constitutional: Frail, confused, restless, tachypnea, cachectic  Respiratory: No accessory muscle use. Decreased BS bibasilar. No wheezing. Some crackles R base  Cardiovascular: RRR no r/g/m, tachy  GI: soft, nt,   Skin/Integumen: no rash, swelling LUE- NT  Neuro: awake, oriented to self and dtr only, grossly nonfocal, diffusely weak,   Psych: restless, no agitation       Medications     heparin 400 Units/hr (10/02/22 0644)       - MEDICATION INSTRUCTIONS for Dialysis Patients -   Does not apply See Admin Instructions     allopurinol  100 mg Oral QPM     calcium acetate  1,334 mg Oral TID w/meals     heparin  5-10 mL Intracatheter Q28 Days     heparin lock flush  5-10 mL Intracatheter Q24H     midodrine  10 mg Oral TID w/meals     multivitamin RENAL  1 capsule Oral Daily     pantoprazole  40  mg Oral BID AC     piperacillin-tazobactam  2.25 g Intravenous Q6H     sodium chloride (PF)  10-20 mL Intracatheter Q28 Days     sodium chloride (PF)  3 mL Intracatheter Q8H     vancomycin place kumar - receiving intermittent dosing  1 each Intravenous See Admin Instructions       Data   Recent Labs   Lab 10/02/22  0636 10/02/22  0615 10/02/22  0503 10/01/22  0452 09/30/22  2237 09/30/22  2156 09/30/22  0708 09/28/22  0823 09/28/22  0526   WBC  --   --  5.3 4.9  --   --  5.0   < > 4.4   HGB  --   --  9.2* 7.6* 7.8*  --  7.0*   < > 7.9*   MCV  --   --  93 95  --   --  96   < > 94   PLT  --   --  80* 84*  --   --  100*   < > 104*   INR  --   --  1.72* 1.82*  --   --   --   --  2.74*   NA  --   --  137 136  --   --  136   < > 138   POTASSIUM  --   --  4.2 3.8  --   --  4.3   < > 3.4   CHLORIDE  --   --  101 100  --   --  103   < > 103   CO2  --   --  25 29  --   --  24   < > 24   BUN  --   --  25 16  --   --  25   < > 25   CR  --   --  3.84* 2.95*  --   --  4.58*   < > 4.99*   ANIONGAP  --   --  11 7  --   --  9   < > 11   JEWELL  --   --  10.7* 9.7  --   --  10.8*   < > 8.9   * 60* 67* 74  --    < > 99   < > 70   ALBUMIN  --   --  2.6* 2.8*  --   --  2.8*   < > 2.3*   PROTTOTAL  --   --  5.5* 5.3*  --   --  5.6*   < > 5.1*   BILITOTAL  --   --  1.1 1.2  --   --  0.8   < > 0.8   ALKPHOS  --   --  215* 202*  --   --  211*   < > 235*   ALT  --   --  107* 106*  --   --  114*   < > 106*   AST  --   --  864* 716*  --   --  637*   < > 518*    < > = values in this interval not displayed.       Imaging:   Recent Results (from the past 24 hour(s))   XR Chest Port 1 View    Narrative    EXAM: XR CHEST PORT 1 VIEW  LOCATION: [facility]  DATE/TIME: 10/1/2022 12:30 PM    INDICATION: SOB  COMPARISON: 09/30/2021      Impression    IMPRESSION: Redemonstration of right hydropneumothorax with interval increased fluid component. The air component appears similar with about 2.4 cm of pleural separation at  the right lung apex. Hazy opacities in the right lung may be due to atelectasis,   edema or infiltrate. Left IJ tunneled dialysis catheter tip in the right atrium. Mild pulmonary edema in the left lung. No left pleural effusion.

## 2022-10-02 NOTE — PROGRESS NOTES
Care Management Follow Up    Length of Stay (days): 5    Expected Discharge Date:       Concerns to be Addressed:       Patient plan of care discussed at interdisciplinary rounds: Yes    Anticipated Discharge Disposition: Home     Anticipated Discharge Services:    Anticipated Discharge DME:      Patient/family educated on Medicare website which has current facility and service quality ratings:    Education Provided on the Discharge Plan:    Patient/Family in Agreement with the Plan: yes    Referrals Placed by CM/SW:    Private pay costs discussed: Not applicable    Additional Information:  Writer contacted Usha with [facility] to see when they could come assess and sign patient on. Waiting a call back    Writer was asked to call patients sons  to see if they would allow him to come to the hospital due to patient signing onto GIP. Writer called troy Patel  Gurvinder Conner at [phone] and left a message. Writer also called [facility] at [phone] asking for a return call as soon as possible to see if we can make arrangements for patients son to visit before patient passes.    TREVOR Schaeffer

## 2022-10-02 NOTE — PLAN OF CARE
Hospice care    Vitals deferred, CC. On 5 L O2  Pain -dilaudid given for respiratory distress, discomfort and restlessness   A&O to self  Voiding- HD pt, no output on shift  Mobility Ax2 lift  Tele- N/A  CMS- deferred CC  Lung Sounds N/A deferred CC  GI N/A deferred CC      Orders Placed This Encounter      Regular Diet Adult       Plan: Cont to monitor per comfort care protocol

## 2022-10-02 NOTE — PROGRESS NOTES
"SPIRITUAL HEALTH SERVICES Progress Note  [facility] Unit Oncology     Saw ptnt per SH request. Upon my introduction, family declined SH services stating \"I don't want you to scare my mom.\"     SH services remain available.     QUIQUE MalloyDiv  Chaplain Resident   [phone]   "

## 2022-10-02 NOTE — PROVIDER NOTIFICATION
MD Notification    Notified Person: MD Zhu - on-call    Notified Person Name: Audra    Notification Date/Time: 0351    Notification Interaction: Web based paging     Purpose of Notification: Can I get an order for 2.5mg oxy? Patient has 5 mg ordered, and has soft pressures. Not experiencing a lot of pain, but is having discomfort.     Orders Received: 2.5-5mg oxy ordered    Comments:

## 2022-10-02 NOTE — PLAN OF CARE
Goal Outcome Evaluation:    Overall Patient Progress: declining    Outcome Evaluation: Increased BUE edema, confusion, tugging at lines, more difficult to console.    Patient alert to self and family in the room only, lethargic off and on throughout the shift, awakens to voice and occasionally open eyes spontaneously. Lungs: diminished. No Urine output - hemo dialysis patient, no BM, Renal diet - no intake other than 2 small bites of applesauce with pills, not drinking any water. Heparin running at 400 units/hour, 1 unit of PRBC transfused (see providers notes), Tele: sinus/sinus tach, 7L Oxymiser sats in the mid to high 90's, BP soft, 80-90's with maps in the low to mid 60's.     Patient restless [id number] - 2.5 mg oxi given for discomfort. Unable to verbalize wants/needs, rambling of words and not making sense.     PORT to the RLE - dressing changed due to seeping/leaking from the access site.    BG at 0600 = 60  x1 dose of 25ml dextrose given, re-check of BG = 124     Daughter remains at the patients bedside.

## 2022-10-02 NOTE — PHARMACY-ADMISSION MEDICATION HISTORY
Admission medication history completed at [facility]. Please see Pharmacy - Admission Medication History note from 09/28/2022.

## 2022-10-02 NOTE — PROGRESS NOTES
Family agrees to comfort care measures and pt will discharge and be readmitted under [facility] hospice for further care.

## 2022-10-02 NOTE — DISCHARGE SUMMARY
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  CAD s/p PCI 2019  Cardiomyopathy, HFmEF (40-45%)  Hypertension  ESRD on MWF HD  S/p left AV fistula formation 6/28/22  S/p fistulogram with dilatation of L subclavian and junction of brachiocephalic and subclavian vein complicated by hematoma requiring evacuation 7/8/22  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/14/22)      Follow-ups Needed After Discharge     Gina Simpson is being readmitted to inpatient hospice    Discharge Disposition   Discharged to inpatient hospice  Condition at discharge: Guarded    Hospital Course   Date of Admission:  9/27/2022        Assessment & Plan     Gina Simpson is a 57 year old female w/ PMH of CAD w/ PCI in 2019, HTN, cardiomyopathy/HFmEF (40-45%), hypertension, COPD, ESRD, remote failed RUE fistula, s/p creation of LUE AV fistula who was recently hospitalized from 8/14/22-9/3/22 for SVC syndrome and extensive adenopathy, ultimately diagnosed w/ stage IV metastatic non-small cell carcinoma with w/ liver mets and lymphadenopathy and is s/p urgent radiation.  Her course was complicated by GIB from DUw/ large volume blood product transfusions.  She presented to [facility] 09/27/22 w/ progressively worsening shortness of breath and generalized weakness for the past couple of weeks. Imaging revealing large right-sided effusion. She was transferred to [facility] for further management.           Goals of care  Chart reviewed, pt examined, discussed acute and chronic medical issues, terminal prognosis regarding lung cancer and very guarded poor short term prognosis including not being candidate for palliative chemotherapy with patient's daughter and her son Elwerd. Both in agreement with dnr/dni status. Discussed that the patient is in the dying process and that continued restorative care will not relieve her current symptoms and associated suffering. Pt's family feels patient is suffering and both want a focus on her comfort.  Informed family that a hospice/comfort care approach would allow us to fully address her comfort and improve her quality of life. Continued restorative care would not improve her quality of life, would not slow progression of her cancer, infection, dying process and make addressing her comfort difficult. Both son and daughter in agreement with proceeding with comfort/hospice care. I discussed the details of comfort care with both of them including stopping dialysis and likely anticipated effects.      Pt now dnr/dni. In patient hospice consulted and will see pt/family shortly. Discussed with nephrology and supportive of comfort care and no further dialysis as per plan discussed with family today. Oncology has recommended comfort care.  Will convert to comfort care after famil meets with hospice team.   Pt and family met with in patient hospice team. Plan is to proceed with comfort cares. Cont oxygen for now. Stopping other medications not directly related to comfort. Stopping dialysis.   Family interested in having patient go home with hospice. In patient hospice for now. sw consult for outpatient hospice coordination for Monday if family still wants to care for  "her at home.  They are able as a family to care for her at home.            Acute hypoxic respiratory insufficiency  large right pleural effusion on outside hospital CT chest   ARIA pulmonary nodule  right hydropneumothorax due to trapped lung phenomenon 9/30  Admit with acute hypoxia 2/2 new large right-sided pleural effusion with suspicion for malignant effusion. Noted to have also have some patchy airspace opacities in the right lung and nodular and consolidative opacities in the LLL that may be suggestive of PNA  Known mediastinal mass as below  -supplemental O2   -  Antibiotics vanc/cefepime  - IR consult for right-sided thoracentesis-9/28:  s/p thoracentesis 9/28 (900 mL of  kay colored fluid was drained.) .  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 cx ngtd     - Eliquis on hold for now; ct heparin nomogram to continue for today   -Repeat x-ray reviewed and discussed with thoracic surgery 9/30: \"No intervention indicated, as little/no reported symptomatic benefit from thoracentesis and lung would not re-expand with PleurX; not a surgical candidate\"   - CT Head : likely mild confusion is Toxic metabolic encephalopathy but she is on blood thinner-   - abx changed: cefepime changed to zosyn  -oxygen needs up to 7L oximizer  -vbg 10/2 normal. No resp failure    -cont oxygen for now but abx to stop given transition to comfort cares             Hypotension-resolved  - Suspect that dehydration contributing due to not eating or drinking since her discharge on 9/3, in addition to concerns for  sepsis  - s/p Volume resuscitation:  - Work-up pending to r/o sepsis    - BC: ngtd            - UA  ;neg    - Lactate at [place] 1.8 - remains in low 1 range  - Empirically broad spectrum abx given possible PNA on imaging   -pt's family has wanted aggressive measures. Remains full code  -has needed intermittent albumin boluses  Plan;   - [facility]  - " Nursing to notify provider if MAP <65, SBP <90  - started on midodrine. 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  - Per family, has not followed-up OP with Oncology for discussion on chemotherapy  -  not a candidate for chemo in this acute state--team discussed with patient and daughter that chemotherapy would only be palliative and not curative with overall poor prognosis  - Repeat imaging performed at [place] demonstrating bulky adenopathy in the lower left neck, mediastinum and bilateral hilar regions, multiple pulmonary nodules, bilaterally, extensive metastatic involvement of the liver with scattered lytic metastatic osseous lesions  - Oncology & Palliative Care consult appreciated   - Overall prognosis is poor  - Remains full code at this time   - At this time, unclear if family comprehends the overall prognosis and severity of the situation  -Worsened LFT: Patient remains critical with very poor prognosis, appreciate GI review  - transfused 1 more unit PRBC 10/1 to goal hemoglobin around 8.   -transitioned to comfort cares               Chronic Anemia due to ESRD  Thrombocytopenia  - Hgb 8.3 on admit (baseline past 3-weeks 7.7-8.0)  - PLT count 108 without signs of bleeding --> 104  - Consider transfusion for Hgb <7    9/30: Hb 7: no s/s of bleeding. transfused 1 U PRBC  (Verbal consent from daughter in presence of RN. Indication, risks, benefit, alternatives explained)  10/1: transfuse 1 U PRBC  -Hb 9 10/2 post 1 unit blood transfusion     LFT changes:   Followed by [facility].   -2/2 to acute illness, hypotension, ischemia, pulmonary process  ALT stable in 100 range   range--> 800 range today.   Alk phos stable 200 range. 34 last month  Bili wnl  Transitioning to comfort cares. No further bld transfusions. discussed with family      CAD s/p PCI 2019  Cardiomyopathy, HFmEF (40-45%)  Hypertension  meds held given hypotension     COPD  Tobacco use  -      ESRD on MWF HD  S/p left AV fistula formation 6/28/22  S/p fistulogram with dilatation of L subclavian and junction of brachiocephalic and subclavian vein complicated by hematoma requiring evacuation 7/8/22  Hypophosphatemia  - Dialyzes MWF  - Left AV fistula  -patient will discontinue dialysis given transition to comfort care. Discussed with family members and nephrology     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/14/22)  Stopping anticoagulation given transition to hospice care     Severe Malnutrition  -   -on renal diet      Physical Deconditioning  Generalized Weakness           Diet: Renal Diet (dialysis)  Snacks/Supplements Pediatric: Ensure Enlive; Between Meals    DVT Prophylaxis: on heparin gtt  Antoine Catheter: Not present  Central Lines: PRESENT  CVC Double Lumen Left Subclavian-Site Assessment: WDL  Cardiac Monitoring: ACTIVE order. Indication: Acute decompensated heart failure (48 hours)  Code Status: comfort care        Disposition Plan- >2 days. TBD  Consultations This Hospital Stay   CARE MANAGEMENT / SOCIAL WORK IP CONSULT  NEPHROLOGY IP CONSULT  INTERVENTIONAL RADIOLOGY ADULT/PEDS IP CONSULT  PALLIATIVE CARE ADULT IP CONSULT  CARE MANAGEMENT / SOCIAL WORK IP CONSULT  PHARMACY IP CONSULT  PHARMACY IP CONSULT  HEMATOLOGY & ONCOLOGY IP CONSULT  PHARMACY TO DOSE VANCO  VASCULAR ACCESS ADULT IP CONSULT  HEMATOLOGY & ONCOLOGY IP CONSULT  VASCULAR ACCESS ADULT IP CONSULT  GASTROENTEROLOGY IP CONSULT  GASTROENTEROLOGY IP CONSULT  THORACIC SURGERY IP CONSULT  THORACIC SURGERY IP CONSULT  [facility] INPATIENT  HOSPICE ADULT CONSULT  SPIRITUAL HEALTH SERVICES IP CONSULT  CARE MANAGEMENT / SOCIAL WORK IP CONSULT  [facility] INPATIENT HOSPICE ADULT CONSULT  CARE MANAGEMENT / SOCIAL WORK IP CONSULT  SPIRITUAL HEALTH SERVICES IP CONSULT    Code Status   No CPR- Do NOT Intubate    Time Spent on this Encounter   I, Surya Sherman MD, personally saw the patient today and spent greater than 30 minutes discharging this patient.       Surya Sherman MD  [facility]  [address] [place] [address]  Phone: [phone]  ______________________________________________________________________  Discharge Medications   Current Discharge Medication List      CONTINUE these medications which have NOT CHANGED    Details   allopurinol (ZYLOPRIM) 100 MG tablet Take 1 tablet (100 mg) by mouth every evening  Qty: 30 tablet, Refills: 0    Comments: Future refills by PCP Dr. Clari Mohr MD with phone number None.  Associated Diagnoses: SVC syndrome; Acute deep vein thrombosis (DVT) of brachial vein of left upper extremity (H); Neuroendocrine cancer (H)      amLODIPine (NORVASC) 5 MG tablet Take 1 tablet (5 mg) by mouth every evening  Qty: 30 tablet, Refills: 0    Comments: Future refills by PCP Dr. Clari Mohr MD with phone number None.  Associated Diagnoses: SVC syndrome; Acute deep vein thrombosis (DVT) of brachial vein of left upper extremity (H); Neuroendocrine cancer (H)      apixaban ANTICOAGULANT (ELIQUIS) 5 MG tablet Take 1 tablet (5 mg) by mouth 2 times daily  Qty: 60 tablet, Refills: 0    Comments: Future refills by PCP Dr. Clari Mohr MD with phone number None.  Associated Diagnoses: SVC syndrome; Acute deep vein thrombosis (DVT) of brachial vein of left upper extremity (H); Neuroendocrine cancer (H)      atorvastatin (LIPITOR) 20 MG tablet Take 20 mg by mouth daily      B Complex-C-Folic Acid (DEEPALI CAPS) 1 MG CAPS Take 1 capsule by mouth daily       calcium acetate (PHOSLO) 667 MG CAPS capsule Take 2,001 mg by mouth 3 times daily (with meals)      carvedilol (COREG) 12.5 MG tablet Take 1 tablet (12.5 mg) by mouth 2 times daily (with meals)  Qty: 60 tablet, Refills: 0    Comments: Future refills by PCP Dr. Clari Mohr MD with phone number None.  Associated Diagnoses: SVC syndrome; Acute deep vein thrombosis (DVT) of brachial vein of left upper extremity (H); Neuroendocrine cancer (H)      pantoprazole (PROTONIX) 40 MG EC tablet Take 1 tablet (40 mg) by mouth 2 times daily (before meals)  Qty: 60 tablet, Refills: 0    Comments: Future refills by PCP Dr. Clari Mohr MD with phone number None.  Associated Diagnoses: SVC syndrome; Acute deep vein thrombosis (DVT) of brachial vein of left upper extremity (H); Neuroendocrine cancer (H)      sucralfate (CARAFATE) 1 GM tablet Take 1 tablet (1 g) by mouth 4 times daily (before meals and nightly)  Qty: 120 tablet, Refills: 0    Comments: Future refills by PCP Dr. Clari Mohr MD with phone number None.  Associated Diagnoses: SVC syndrome; Acute deep vein thrombosis (DVT) of brachial vein of left upper extremity (H); Neuroendocrine cancer (H)      acetaminophen (TYLENOL) 325 MG tablet Take 2 tablets (650 mg) by mouth every 6 hours as needed for mild pain or other (and adjunct with moderate or severe pain or per patient request)    Associated Diagnoses: SVC syndrome; Acute deep vein thrombosis (DVT) of brachial vein of left upper extremity (H); Neuroendocrine cancer (H)      ondansetron (ZOFRAN ODT) 4 MG ODT tab Take 1 tablet (4 mg) by mouth every 6 hours as needed for nausea or vomiting  Qty: 12 tablet, Refills: 0    Comments: Future refills by PCP Dr. Clari Mohr MD with phone number None.  Associated Diagnoses: SVC syndrome; Acute deep vein thrombosis (DVT) of brachial vein of left upper extremity (H); Neuroendocrine cancer (H)      oxyCODONE (ROXICODONE) 5 MG  tablet Take 1 tablet (5 mg) by mouth every 6 hours as needed for moderate to severe pain  Qty: 12 tablet, Refills: 0    Comments: Future refills by PCP Dr. Clari Mohr MD with phone number None.  Associated Diagnoses: SVC syndrome; Acute deep vein thrombosis (DVT) of brachial vein of left upper extremity (H); Neuroendocrine cancer (H)             Physical Exam   Vital Signs: Temp: 97.3  F (36.3  C) Temp src: Axillary BP: 131/84 Pulse: 103   Resp: 12 SpO2: 100 % O2 Device: Oxymizer cannula Oxygen Delivery: 7 LPM  Weight: 128 lbs 4.92 oz    Please see physical exam from my PROGRESS NOTE on the same date       Primary Care Physician   Clari Mohr MD    Discharge Orders   No discharge procedures on file.    Significant Results and Procedures     Results for orders placed or performed during the hospital encounter of 09/27/22   US Thoracentesis    Narrative    ULTRASOUND GUIDED THORACENTESIS September 28, 2022 10:17 AM     HISTORY: Likely malignant large right sided pleural effusion with  hypoxia.    FINDINGS: Ultrasound was used to evaluate for the presence and best  approach for drainage of a pleural effusion. Written and oral informed  consent was obtained. A pause for the cause procedure to verify the  correct patient and correct procedure. The skin overlying the right  chest posteriorly was prepped and draped in the usual sterile fashion.  The subcutaneous tissues were anesthetized with 10 mL 1% lidocaine. A  catheter was advanced into the pleural space and 900 mL of  kay  colored fluid was drained. Patient was monitored by nurse under my  direct supervision throughout the exam. Ultrasound images were  permanently stored.  There were no immediate complications. Patient  left the ultrasound suite in satisfactory condition.      Impression    IMPRESSION: Technically successful thoracentesis without immediate  complications.    LUCILA WAYNE MD         SYSTEM ID:  [id number]   XR Chest 2  "Views    Narrative    XR CHEST 2 VIEWS  9/30/2022 1:41 PM       INDICATION: follow up on effusion  COMPARISON: 9/28/2022, 8/29/2022       Impression    IMPRESSION: Right-sided hydropneumothorax. A small to moderate  remaining right pleural effusion has significantly decreased. The  small apical pneumothorax component is new. Overall volume of the  right lung however has improved with better aeration. The pneumothorax  may be due to a \"trapped lung\" phenomenon. The left lung remains  clear. Left IJ central venous catheter tip is in the right atrium.    MAURO KEYS MD         SYSTEM ID:  [id number]   XR Chest Port 1 View    Narrative    EXAM: XR CHEST PORT 1 VIEW  LOCATION: [facility]  DATE/TIME: 10/1/2022 12:30 PM    INDICATION: SOB  COMPARISON: 09/30/2021      Impression    IMPRESSION: Redemonstration of right hydropneumothorax with interval increased fluid component. The air component appears similar with about 2.4 cm of pleural separation at the right lung apex. Hazy opacities in the right lung may be due to atelectasis,   edema or infiltrate. Left IJ tunneled dialysis catheter tip in the right atrium. Mild pulmonary edema in the left lung. No left pleural effusion.       Allergies   No Known Allergies  "

## 2022-10-03 NOTE — PLAN OF CARE
Comfort cares. Vitals & assessment deferred. 3.5L NC on for comfort. Gave PRN SL ativan & IV dilaudid for restlessness per family request. Up A2/lift, assisting with repos as needed in chair & bed. Inpt hospice following. Family at bedside throughout day & assisting with cares.    Goal Outcome Evaluation: comfort

## 2022-10-03 NOTE — PROGRESS NOTES
[facility]    Hospitalist Progress Note    Assessment & Plan   Date of Admission:  10/2/2022        Assessment & Plan     Gina Simpson is a 57 year old female w/ PMH of CAD w/ PCI in 2019, HTN, cardiomyopathy/HFmEF (40-45%), hypertension, COPD, ESRD, remote failed RUE fistula, s/p creation of LUE AV fistula who was recently hospitalized from 8/14/22-9/3/22 for SVC syndrome and extensive adenopathy, ultimately diagnosed w/ stage IV metastatic non-small cell carcinoma with w/ liver mets and lymphadenopathy and is s/p urgent radiation.  Her course was complicated by GIB from DUw/ large volume blood product transfusions.  She presented to [place] ED 09/27/22 w/ progressively worsening shortness of breath and generalized weakness for the past couple of weeks. Imaging revealing large right-sided effusion. She was transferred to [facility] for further management.           Goals of care  Comfort care  Chart reviewed, pt examined, discussed acute and chronic medical issues, terminal prognosis regarding lung cancer and very guarded poor short term prognosis including not being candidate for palliative chemotherapy with patient's daughter and her son Elwerd. Both in agreement with dnr/dni status. Discussed that the patient is in the dying process and that continued restorative care will not relieve her current symptoms and associated suffering. Pt's family feels patient is suffering and both want a focus on her comfort.  Informed family that a hospice/comfort care approach would allow us to fully address her comfort and improve her quality of life. Continued restorative care would not improve her quality of life, would not slow progression of her cancer, infection, dying process and make addressing her comfort difficult. Both son and daughter in agreement with proceeding with comfort/hospice care. I discussed the details of comfort care with both of them including stopping dialysis and  likely anticipated effects.      Pt now dnr/dni. In patient hospice consulted and will see pt/family shortly. Discussed with nephrology and supportive of comfort care and no further dialysis as per plan discussed with family today. Oncology has recommended comfort care.  Will convert to comfort care after famil meets with hospice team.   Pt and family met with in patient hospice team. Plan is to proceed with comfort cares. Cont oxygen for now. Stopping other medications not directly related to comfort. Stopping dialysis.   Family interested in having patient go home with hospice. In patient hospice for now. sw consult for outpatient hospice coordination for Monday if family still wants to care for her at home.  They are able as a family to care for her at home.     No rn concerns. Received iv dilaudid to good effect overnight.   Pt's dtr thinks anxiety and restlessness not fully treated  -pt's dtr prefers to have patient stay in hospital rather than discharge to son's home.   Pt's son home not have beds and 2 flight stairs.     Plan;   Comfort care meds in place  In patient hospice following. Appreciate in put  Alerted RN to prn ativan. Will give dose now  Pt's son and dtr to discuss if they want pt to transition home with outpatient hospice, alerted dtr that coordination and intake with outpatient hospice can take atleast a day.   -if discharge home, pt would need hospital bed,   -may benefit from scheduled ativan and dilaudid. Await hospice in put               Acute hypoxic respiratory insufficiency  large right pleural effusion on outside hospital CT chest   ARIA pulmonary nodule  right hydropneumothorax due to trapped lung phenomenon 9/30  Admit with acute hypoxia 2/2 new large right-sided pleural effusion with suspicion for malignant effusion. Noted to have also have some patchy airspace opacities in the right lung and nodular and consolidative opacities in the LLL that may be suggestive of PNA  Known  "mediastinal mass as below  -supplemental O2   -  Antibiotics vanc/cefepime  - IR consult for right-sided thoracentesis-9/28:  s/p thoracentesis 9/28 (900 mL of  kay colored fluid was drained.) .  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 cx ngtd     - Eliquis held; ct heparin nomogram to continue for today   -Repeat x-ray reviewed and discussed with thoracic surgery 9/30: \"No intervention indicated, as little/no reported symptomatic benefit from thoracentesis and lung would not re-expand with PleurX; not a surgical candidate\"   - CT Head : likely mild confusion is Toxic metabolic encephalopathy but she is on blood thinner-   - abx changed: cefepime changed to zosyn  -oxygen needs up to 7L oximizer  -vbg 10/2 normal. No resp failure     -cont oxygen for now but abx to stop given transition to comfort cares             Hypotension-resolved  - Suspect that dehydration contributing due to not eating or drinking since her discharge on 9/3, in addition to concerns for  sepsis  - s/p Volume resuscitation:  - being treated for sepsis    - BC: ngtd            - UA  ;neg  - Empirically broad spectrum abx given possible PNA on imaging   -pt's family had wanted aggressive measures  -has needed intermittent albumin boluses  - started on midodrine. 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  - Per family, has not followed-up OP with Oncology for discussion on chemotherapy  -  not a candidate for chemo in this acute " state--team discussed with patient and daughter that chemotherapy would only be palliative and not curative with overall poor prognosis  - Repeat imaging performed at [place] demonstrating bulky adenopathy in the lower left neck, mediastinum and bilateral hilar regions, multiple pulmonary nodules, bilaterally, extensive metastatic involvement of the liver with scattered lytic metastatic osseous lesions  - Oncology & Palliative Care consult appreciated   - Overall prognosis is very  poor   -Worsened LFT: Patient remains critical with very poor prognosis,  -transitioned to comfort cares  -seen by Oncology today, case discussed, they met with family             Chronic Anemia due to ESRD  Thrombocytopenia  - Hgb 8.3 on admit (baseline past 3-weeks 7.7-8.0)  - PLT count 108 without signs of bleeding --> 104  - Consider transfusion for Hgb <7    9/30: Hb 7: no s/s of bleeding. transfused 1 U PRBC  (Verbal consent from daughter in presence of RN. Indication, risks, benefit, alternatives explained)  10/1: transfuse 1 U PRBC  -Hb 9 10/2 post 1 unit blood transfusion  Pt transitioned to comfort cares     LFT changes:   Followed by Doug MATIAS.   -2/2 to acute illness, hypotension, ischemia, pulmonary process and mets  ALT stable in 100 range   range--> 800 range today.   Alk phos stable 200 range. 34 last month  Bili wnl  Transitioning to comfort cares. No further bld transfusions. discussed with family      CAD s/p PCI 2019  Cardiomyopathy, HFmEF (40-45%)  Hypertension  meds held given hypotension     COPD  Tobacco use      ESRD on MWF HD  S/p left AV fistula formation 6/28/22  S/p fistulogram with dilatation of L subclavian and junction of brachiocephalic and subclavian vein complicated by hematoma requiring evacuation 7/8/22  Hypophosphatemia  - Dialyzes MWF  - Left AV fistula  -patient will discontinue dialysis given transition to comfort care. Discussed with family members and nephrology     Bilat UE DVT (L  brachial 7/6/22 and right internal jugular 8/14/22)  Stopped anticoagulation given transition to hospice care     Severe Malnutrition  - on renal diet as tolerated      Physical Deconditioning  Generalized Weakness           Diet: Renal Diet (dialysis) as tolerated.   Snacks/Supplements Pediatric: Ensure Enlive; Between Meals    DVT Prophylaxis: anticoagulation stopped  Antoine Catheter: Not present  Central Lines: PRESENT  CVC Double Lumen Left Subclavian-Site Assessment: WDL  Code Status: comfort care, dnr/dni as of 10/2        Disposition Plan- family interested in potentially bringing patient home with hospice SW consult for Monday Am.   Discussed with care team and family bedside        Diet: Renal Diet (dialysis)    Antoine Catheter: Not present  Central Lines: PRESENT     Code Status: No CPR- Do NOT Intubate    Expected Discharge: working on discharge with hospice  Pt's son and dtr to discuss if want pt to transition to home with home hospice        Surya Sherman MD, MD  Text Page  (7am to 6pm)  Interval History   Good control of dyspnea overnight.   dtr thinks control of her anxiety/restlessness could be improved.   Dtr prefers that patient remain in hospital rather than discharge back to pt son's home. He doesn't have beds and has 2 flight stairs.   dtr and her brother to discuss if want pt to go home and informed her that it takes time to coordinate with outpatient hospice.     -Physical Exam   Temp: 98.6  F (37  C) Temp src: Axillary BP: (!) 75/51 Pulse: 106   Resp: 14 SpO2: 98 % O2 Device: Nasal cannula Oxygen Delivery: 5 LPM  There were no vitals filed for this visit.  Vital Signs with Ranges  Temp:  [97.3  F (36.3  C)-98.6  F (37  C)] 98.6  F (37  C)  Pulse:  [103-106] 106  Resp:  [12-14] 14  BP: (75)/(51) 75/51  SpO2:  [98 %-100 %] 98 %  No intake/output data recorded.    Constitutional: In chair, nad, slightly restless when awakeed  Respiratory: Clear anteriorly, breathing easily   Cardiovascular:  RRR,   GI: soft, nt  Skin/Integumen: no rash, edema, warm  Neuro: sleepy, nonverbal.     Medications       [START ON 10/26/2022] sodium chloride (PF)  10-20 mL Intracatheter Q28 Days     sodium chloride (PF)  3 mL Intracatheter Q8H       Data   Recent Labs   Lab 10/02/22  0636 10/02/22  0615 10/02/22  0503 10/01/22  0452 09/30/22  2237 09/30/22  2156 09/30/22  0708 09/28/22  0823 09/28/22  0526   WBC  --   --  5.3 4.9  --   --  5.0   < > 4.4   HGB  --   --  9.2* 7.6* 7.8*  --  7.0*   < > 7.9*   MCV  --   --  93 95  --   --  96   < > 94   PLT  --   --  80* 84*  --   --  100*   < > 104*   INR  --   --  1.72* 1.82*  --   --   --   --  2.74*   NA  --   --  137 136  --   --  136   < > 138   POTASSIUM  --   --  4.2 3.8  --   --  4.3   < > 3.4   CHLORIDE  --   --  101 100  --   --  103   < > 103   CO2  --   --  25 29  --   --  24   < > 24   BUN  --   --  25 16  --   --  25   < > 25   CR  --   --  3.84* 2.95*  --   --  4.58*   < > 4.99*   ANIONGAP  --   --  11 7  --   --  9   < > 11   JEWELL  --   --  10.7* 9.7  --   --  10.8*   < > 8.9   * 60* 67* 74  --    < > 99   < > 70   ALBUMIN  --   --  2.6* 2.8*  --   --  2.8*   < > 2.3*   PROTTOTAL  --   --  5.5* 5.3*  --   --  5.6*   < > 5.1*   BILITOTAL  --   --  1.1 1.2  --   --  0.8   < > 0.8   ALKPHOS  --   --  215* 202*  --   --  211*   < > 235*   ALT  --   --  107* 106*  --   --  114*   < > 106*   AST  --   --  864* 716*  --   --  637*   < > 518*    < > = values in this interval not displayed.       Imaging:   No results found for this or any previous visit (from the past 24 hour(s)).

## 2022-10-03 NOTE — PROGRESS NOTES
"[facility]    Social Work Progress Note - [facility]    ______________________________________________________________________    [facility]/7 Contact Number: [phone]    - Providers: Please contact [facility] with changes in orders or clinical plan of care   - Nursing: Please contact [facility] with significant changes in patient condition  - Social Work: Please contact [facility] for discharge phanning/updates  ______________________________________________________________________      Writer was informed that family wanted to meet to discuss potentially discharging Gina home. Writer met with her daughter Monica in pt's room to discuss. Monica stated that her brother wanted to bring Gina somewhere but \"I just want her to be in the place where she is best cared for. Even if it's here\".     Writer explained that Gina continues to require medication to manage her anxiety, agitation and tearfulness. Writer said that should her mother's condition stabilize they can discuss discharge planning. Daughter voiced understanding.    Plan of Care Discussed with the Following:   - Nurse: Venita  - Hospitalist/Rounding Provider: Dr. Sherman   - Gina's Family/Preferred Contact: Monica  - Hospice Provider: Dr. Anmol Cho, [facility]  [phone]  "

## 2022-10-03 NOTE — PLAN OF CARE
Lots of visitors early in shift; family at bedside all night.  Restless at times, PRN dilaudid and valium given.  3.5L NC for comfort.

## 2022-10-04 NOTE — PROGRESS NOTES
[facility]    Progress Note - [facility]    ______________________________________________________________________    [facility] 24/[facility] Contact Number: [phone]    - Providers: Please contact [facility] with changes in orders or clinical plan of care   - Nursing: Please contact [facility] with significant changes in patient condition  ______________________________________________________________________        Plan of Care Discussed with the Following:   - Nurse: PAOLA Negron  - Hospitalist/Rounding Provider: Dr. Surya Sherman    - [facility]'s Family/Preferred Contact: Daughter Monica   - Hospice Provider: Dr. Anmol Sandy    Summary of Visit (includes assessment, medications and any new orders):   [facility] daily assessment visit. Patient is non responsive. Appears very comfortable. Patient appears to be actively dying. RR 20. No urine output.Pedal pulses noted. Patient received 3 PRN IV doses of Dilaudid with effective results and 1 PRN IV dose of Robiniul for oral secretions. Patient is repositioned every 2 hours for comfort. Patient is on 3.5 L oxygen for comfort. Patient continues to be eligible for [facility] hospice AEB patient is actively dying, not stable for transport at this time. However, if patient becomes stable for transport, patient will need to be transitioned to oral medications 24 hours prior to discharge  Hospice team will continue to visit patient daily for symptom management and to support family.        Keena Orellana, RN

## 2022-10-04 NOTE — PROGRESS NOTES
[facility]    Hospitalist Progress Note    Assessment & Plan   Date of Admission:  10/2/2022        Assessment & Plan     Gina Simpson is a 57 year old female w/ PMH of CAD w/ PCI in 2019, HTN, cardiomyopathy/HFmEF (40-45%), hypertension, COPD, ESRD, remote failed RUE fistula, s/p creation of LUE AV fistula who was recently hospitalized from 8/14/22-9/3/22 for SVC syndrome and extensive adenopathy, ultimately diagnosed w/ stage IV metastatic non-small cell carcinoma with w/ liver mets and lymphadenopathy and is s/p urgent radiation.  Her course was complicated by GIB from DUw/ large volume blood product transfusions.  She presented to [facility] ED 09/27/22 w/ progressively worsening shortness of breath and generalized weakness for the past couple of weeks. Imaging revealing large right-sided effusion. She was transferred to [facility] for further management.           Goals of care  Comfort care  Chart reviewed, pt examined, discussed acute and chronic medical issues, terminal prognosis regarding lung cancer and very guarded poor short term prognosis including not being candidate for palliative chemotherapy with patient's daughter and her son Elwerd. Both in agreement with dnr/dni status. Discussed that the patient is in the dying process and that continued restorative care will not relieve her current symptoms and associated suffering. Pt's family feels patient is suffering and both want a focus on her comfort.  Informed family that a hospice/comfort care approach would allow us to fully address her comfort and improve her quality of life. Continued restorative care would not improve her quality of life, would not slow progression of her cancer, infection, dying process and make addressing her comfort difficult. Both son and daughter in agreement with proceeding with comfort/hospice care. I discussed the details of comfort care with both of them including stopping dialysis and  likely anticipated effects.      Pt now dnr/dni. In patient hospice consulted and will see pt/family shortly. Discussed with nephrology and supportive of comfort care and no further dialysis as per plan discussed with family today. Oncology has recommended comfort care.  Will convert to comfort care after famil meets with hospice team.   Pt and family met with in patient hospice team. Plan is to proceed with comfort cares. Cont oxygen for now. Stopping other medications not directly related to comfort. Stopping dialysis.   Family interested in having patient go home with hospice. In patient hospice for now. sw consult for outpatient hospice coordination for Monday if family still wants to care for her at home.  They are able as a family to care for her at home.     No rn concerns. Received iv dilaudid to good effect overnight.   Pt's dtr thinks anxiety and restlessness not fully treated  -pt's dtr prefers to have patient stay in hospital rather than discharge to son's home.   Pt's son home not have beds and 2 flight stairs.     Today,   - family at bedside overnight.   - pt no longer responsive. Breathing easily, appears comfortable this am.   - talked with Rn, family has been at bedside, son able to be reassured by RN team that comfort care was appropriate decision, he is readily reassured. Family pleased with care per RN  - suspect pt will likely pass today.     Plan;   Comfort care meds in place, no changes needed today.   In patient hospice following. Appreciate in put                     Acute hypoxic respiratory insufficiency  large right pleural effusion on outside hospital CT chest   ARIA pulmonary nodule  right hydropneumothorax due to trapped lung phenomenon 9/30  Admit with acute hypoxia 2/2 new large right-sided pleural effusion with suspicion for malignant effusion. Noted to have also have some patchy airspace opacities in the right lung and nodular and consolidative opacities in the LLL that may be  "suggestive of PNA  Known mediastinal mass as below  -supplemental O2   -  Antibiotics vanc/cefepime  - IR consult for right-sided thoracentesis-9/28:  s/p thoracentesis 9/28 (900 mL of  kay colored fluid was drained.) .  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 cx ngtd     - Eliquis held; ct heparin nomogram to continue for today   -Repeat x-ray reviewed and discussed with thoracic surgery 9/30: \"No intervention indicated, as little/no reported symptomatic benefit from thoracentesis and lung would not re-expand with PleurX; not a surgical candidate\"   - CT Head : likely mild confusion is Toxic metabolic encephalopathy but she is on blood thinner-   - abx changed: cefepime changed to zosyn  -oxygen needs up to 7L oximizer  -vbg 10/2 normal. No resp failure  -breathing easily with comfort care meds.                 Hypotension-resolved  - Suspect that dehydration contributing due to not eating or drinking since her discharge on 9/3, in addition to concerns for  sepsis  - s/p Volume resuscitation:  - being treated for sepsis    - BC: ngtd            - UA  ;neg  - Empirically broad spectrum abx given possible PNA on imaging   -pt's family had wanted aggressive measures  -has needed intermittent albumin boluses  - started on midodrine. 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  - Per family, has not followed-up OP with Oncology for discussion on chemotherapy  -  not a candidate for chemo in this acute " state--team discussed with patient and daughter that chemotherapy would only be palliative and not curative with overall poor prognosis  - Repeat imaging performed at [facility] demonstrating bulky adenopathy in the lower left neck, mediastinum and bilateral hilar regions, multiple pulmonary nodules, bilaterally, extensive metastatic involvement of the liver with scattered lytic metastatic osseous lesions  - Oncology & Palliative Care consult appreciated   - Overall prognosis is very  poor   -Worsened LFT: Patient remains critical with very poor prognosis,  -transitioned to comfort cares  -seen by Oncology , case discussed, they met with family   -followd by [facility] hospice team.  Pt's restlessness and tachypnea have been well controlled with prn ativan narcotics.   - family at bedside  - pt no longer responsive. Breathing easily, appears comfortable this am.   - talked with Rn, family has been at bedside, son able to be reassured by RN team that comfort care was appropriate decision, he is readily reassured. Family pleased with care per RN  - suspect pt will likely pass today.             Chronic Anemia due to ESRD  Thrombocytopenia  - Hgb 8.3 on admit (baseline past 3-weeks 7.7-8.0)  - PLT count 108 without signs of bleeding --> 104  - Consider transfusion for Hgb <7    9/30: Hb 7: no s/s of bleeding. transfused 1 U PRBC  (Verbal consent from daughter in presence of RN. Indication, risks, benefit, alternatives explained)  10/1: transfuse 1 U PRBC  -Hb 9 10/2 post 1 unit blood transfusion  Pt transitioned to comfort cares     LFT changes:   Followed by Doug MATIAS.   -2/2 to acute illness, hypotension, ischemia, pulmonary process and mets  ALT stable in 100 range   range--> 800 range today.   Alk phos stable 200 range. 34 last month  Bili wnl  Transitioning to comfort cares. No further bld transfusions. discussed with family      CAD s/p PCI 2019  Cardiomyopathy, HFmEF (40-45%)  Hypertension  meds held given  hypotension     COPD  Tobacco use      ESRD on MWF HD  S/p left AV fistula formation 6/28/22  S/p fistulogram with dilatation of L subclavian and junction of brachiocephalic and subclavian vein complicated by hematoma requiring evacuation 7/8/22  Hypophosphatemia  - Dialyzes MWF  - Left AV fistula  -patient will discontinue dialysis given transition to comfort care. Discussed with family members and nephrology     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/14/22)  Stopped anticoagulation given transition to hospice care     Severe Malnutrition  - on renal diet as tolerated      Physical Deconditioning  Generalized Weakness           Diet: Renal Diet (dialysis) as tolerated.   Snacks/Supplements Pediatric: Ensure Enlive; Between Meals    DVT Prophylaxis: anticoagulation stopped  Antoine Catheter: Not present  Central Lines: PRESENT  CVC Double Lumen Left Subclavian-Site Assessment: WDL  Code Status: comfort care, dnr/dni as of 10/2        Disposition Plan-suspect pt pass later today      Diet: Renal Diet (dialysis)    Antoine Catheter: Not present  Central Lines: PRESENT     Code Status: No CPR- Do NOT Intubate    Expected Discharge: working on discharge with hospice  Pt's son and dtr to discuss if want pt to transition to home with home hospice      Discussed care plan with RN    Surya Sherman MD, MD  Text Page  (7am to 6pm)  Interval History    sxs have been well controlled. Family at bedside overnight. Son reassured overnight that comfort care was the appropriate decision.   Son readily reassured per RN.      -Physical Exam            Resp: 17        There were no vitals filed for this visit.  Vital Signs with Ranges  Resp:  [12] 12  No intake/output data recorded.    Constitutional: In bed, nad, calm  Respiratory: Clear anteriorly, breathing easily   Cardiovascular: RRR,   GI: soft, nt  Skin/Integumen: no rash, edema, warm  Neuro: unresponsive    Medications       [START ON 10/26/2022] sodium  chloride (PF)  10-20 mL Intracatheter Q28 Days     sodium chloride (PF)  3 mL Intracatheter Q8H       Data   Recent Labs   Lab 10/02/22  0636 10/02/22  0615 10/02/22  0503 10/01/22  0452 09/30/22 2237 09/30/22  2156 09/30/22  0708 09/28/22  0823 09/28/22  0526   WBC  --   --  5.3 4.9  --   --  5.0   < > 4.4   HGB  --   --  9.2* 7.6* 7.8*  --  7.0*   < > 7.9*   MCV  --   --  93 95  --   --  96   < > 94   PLT  --   --  80* 84*  --   --  100*   < > 104*   INR  --   --  1.72* 1.82*  --   --   --   --  2.74*   NA  --   --  137 136  --   --  136   < > 138   POTASSIUM  --   --  4.2 3.8  --   --  4.3   < > 3.4   CHLORIDE  --   --  101 100  --   --  103   < > 103   CO2  --   --  25 29  --   --  24   < > 24   BUN  --   --  25 16  --   --  25   < > 25   CR  --   --  3.84* 2.95*  --   --  4.58*   < > 4.99*   ANIONGAP  --   --  11 7  --   --  9   < > 11   JEWELL  --   --  10.7* 9.7  --   --  10.8*   < > 8.9   * 60* 67* 74  --    < > 99   < > 70   ALBUMIN  --   --  2.6* 2.8*  --   --  2.8*   < > 2.3*   PROTTOTAL  --   --  5.5* 5.3*  --   --  5.6*   < > 5.1*   BILITOTAL  --   --  1.1 1.2  --   --  0.8   < > 0.8   ALKPHOS  --   --  215* 202*  --   --  211*   < > 235*   ALT  --   --  107* 106*  --   --  114*   < > 106*   AST  --   --  864* 716*  --   --  637*   < > 518*    < > = values in this interval not displayed.       Imaging:   No results found for this or any previous visit (from the past 24 hour(s)).

## 2022-10-04 NOTE — PLAN OF CARE
Patient comfort cares, on Inpatient Hospice. 3.5L O2 for comfort. PRN dilaudid given for restlessness. Bedrest, repositioned when family allows. Patient becoming more lethargic & somnolent as night progressed, groans at times otherwise does not respond much. Breathing more labored & shallow throughout night but appears comfortable. Robinul given x1 for oral secretions. Family circulating throughout night, son remains at beside.

## 2022-10-04 NOTE — CONSULTS
[facility]    Consult Note - [facility]    ______________________________________________________________________    [facility] Contact Number: [phone]    - Providers: Please contact [facility] with changes in orders or clinical plan of care   - Nursing: Please contact [facility] with significant changes in patient condition    Hospice will notify the care team (including the hospitalist) to confirm date of inpatient hospice (GIP) admission.    New Epic encounter will not be created until hospice completes admission.   ______________________________________________________________________        Hospice Diagnosis: Acute hypoxic respiratory insufficiency , heptatic mets     Indication for Inpatient Hospice: Respiratory distress, pain    Goals for Hospital Discharge: manage pain and resp distress to promote comfort cares     Plan of Care Discussed with the Following:   - Nurse: Mercedes  - Hospitalist/Rounding Provider: Surya Sherman     - Gina's Family Preferred: Monica Simpson  - Hospice Provider: Dr. Anmol Sandy     Summary of Visit (includes assessment, medications and any new orders):   S:  Patient was non responsive , had family in room with her  B: mets neuroendocrine cancer, lung and hepatic  A:, Patient has access via a implanted Port in her left chest. Sh has oxygen on, Her Hr is 84 and RR is 16. Unable to palpate a radial artery on the left , Pulses bilateral pedal are palpable , She has used lorazepam x1 and diazepam x1 , She appears at this visit very comfortable. She has dialudid and has received 3 does of 0.2. Her lungs are coarse.  R: patient is comfortable at present . Will continue to assess her need for meds due to her restlessness/anxiety .             Candy Owens RN

## 2022-10-04 NOTE — PLAN OF CARE
Goal Outcome Evaluation:    Pt unresponsive, somnolent. Appears comfortable, breaths shallow with occasional tachypnea. On 3.5L NC for comfort. Prn IV dilaudid given x3, which was effective. Repositioned and incontinence cares provided prn. Family at bedside on and off throughout day.   Aimee Marcum RN                  Ness Marlow is a 37year old male. HPI:   Patient presents with:  Lump: right inguinal lump and bilateral inguinal rash. Onset: 2-3days. c/o itching, burning sometimes.   Medication Request: see pending refill request.      36 y/o M with c/o rash and bum pain, constipation, decreased appetite, diarrhea and vomiting; no melena or hematochezia  All other review of systems are negative.         PHYSICAL EXAM:   Blood pressure 112/76, pulse 72, temperature 97.9 °F (36.6 °C), temperature source Oral, height 6' 1

## 2022-10-05 LAB — BACTERIA PLR CULT: NORMAL

## 2022-10-05 ASSESSMENT — ACTIVITIES OF DAILY LIVING (ADL)
ADLS_ACUITY_SCORE: 39

## 2022-10-05 NOTE — DISCHARGE SUMMARY
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, hepatic metastasis and lymphadenopathy  Acute hypoxic respiratory insufficiency  large right pleural effusion  ARIA pulmonary nodule  right hydropneumothorax due to trapped lung phenomenon 9/30  -Hypotension with concern for sepsis syndrome  ESRD on HD  Bilateral UE DVT    History of Present Illness     Gina Simpson is a 57 year old female w/ PMH of CAD w/ PCI in 2019, HTN, cardiomyopathy/HFmEF (40-45%), hypertension, COPD, ESRD, remote failed RUE fistula, s/p creation of LUE AV fistula on 6/28/22 complicated by hematoma s/p evacuation, who was recently hospitalized from 8/14/22-9/3/22 for SVC syndrome and extensive adenopathy, ultimately diagnosed w/ stage IV metastatic non-small cell carcinoma with w/ liver mets and lymphadenopathy.  Her course was complicated by GIB from DU w/ large volume blood product transfusions.  She presented to [place] ED 09/27/22 w/ progressively worsening shortness of breath and generalized weakness for the past couple of weeks. Imaging revealing large right-sided effusion. She was transferred to [facility] for further management.       Patient is lethargic during examination. She reports that her \"bones hurt\" and that she has been feeling very weak and short of breath lately. She falls asleep during exam and needs to be awakened numerous times, as she is only able to provide simple answers and will fall asleep. Collateral information was obtained via phone discussion with her daughter, Monica. Daughter states that the patient has progressively declined since her discharge on 9/3/22. Reports that the patient resides with her and that she has been having difficulty caring for her. " Endorses that the patient has not been eating or drinking and that she can no longer walk due to weakness. She is becoming progressively short of breath. They have not had follow-up with Oncology, as daughter states no one had contacted them about initiation of chemotherapy. Daughter reports that the patient does not have insurance and she is needing help caring for the patient at home. She is agreeable to Palliative Care discussion about goals of care and symptom management, in addition to SW to assist with financial concerns and discharge planning. Code status reviewed. Denies further complaints.         Hospital Course     Gina Simpson is a 57 year old female w/ PMH of CAD w/ PCI in 2019, HTN, cardiomyopathy/HFmEF (40-45%), hypertension, COPD, ESRD, remote failed RUE fistula, s/p creation of LUE AV fistula who was recently hospitalized from 8/14/22-9/3/22 for SVC syndrome and extensive adenopathy, ultimately diagnosed w/ stage IV metastatic non-small cell carcinoma with w/ liver mets and lymphadenopathy and is s/p urgent radiation.  Her course was complicated by GIB from DUw/ large volume blood product transfusions.  She presented to [place] ED 09/27/22 w/ progressively worsening shortness of breath and generalized weakness for the past couple of weeks. Imaging revealing large right-sided effusion. She was transferred to [facility] for further management.           Goals of care  Comfort care  Chart reviewed, pt examined, discussed acute and chronic medical issues, terminal prognosis regarding lung cancer and very guarded poor short term prognosis including not being candidate for palliative chemotherapy with patient's daughter and her son Elwerd. Both in agreement with dnr/dni status. Discussed that the patient is in the dying process and that continued restorative care will not relieve her current symptoms and associated suffering. Pt's family feels patient is suffering and both want a focus on her  comfort.  Informed family that a hospice/comfort care approach would allow us to fully address her comfort and improve her quality of life. Continued restorative care would not improve her quality of life, would not slow progression of her cancer, infection, dying process and make addressing her comfort difficult. Both son and daughter in agreement with proceeding with comfort/hospice care. I discussed the details of comfort care with both of them including stopping dialysis and likely anticipated effects.      Pt now dnr/dni. In patient hospice consulted and will see pt/family shortly. Discussed with nephrology and supportive of comfort care and no further dialysis as per plan discussed with family today. Oncology has recommended comfort care.  Will convert to comfort care after famil meets with hospice team.   Pt and family met with in patient hospice team. Plan is to proceed with comfort cares. Cont oxygen for now. Stopping other medications not directly related to comfort. Stopping dialysis.   Family interested in having patient go home with hospice. In patient hospice for now. sw consult for outpatient hospice coordination for Monday if family still wants to care for her at home.  They are able as a family to care for her at home.      No rn concerns. Received iv dilaudid to good effect overnight.   Pt's dtr thinks anxiety and restlessness not fully treated  -pt's dtr prefers to have patient stay in hospital rather than discharge to son's home.   Pt's son home not have beds and 2 flight stairs.   -pt was comfortable with comfort care measures in place. Pt followed by hospitalist service and in patient hospice service. Family often at bedside with patient. Her symptoms were well controlled with prn narcotics and ativan.   Pt passed on 10/4 at 8:03pm.         Acute hypoxic respiratory insufficiency  large right pleural effusion on outside hospital CT chest   ARIA pulmonary nodule  right hydropneumothorax due to  "trapped lung phenomenon 9/30  Admit with acute hypoxia 2/2 new large right-sided pleural effusion with suspicion for malignant effusion. Noted to have also have some patchy airspace opacities in the right lung and nodular and consolidative opacities in the LLL that may be suggestive of PNA  Known mediastinal mass as below  -supplemental O2   -  Antibiotics vanc/cefepime  - IR consult for right-sided thoracentesis-9/28:  s/p thoracentesis 9/28 (900 mL of  kay colored fluid was drained.) .  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 cx ngtd     - Eliquis held; ct heparin nomogram to continue for today   -Repeat x-ray reviewed and discussed with thoracic surgery 9/30: \"No intervention indicated, as little/no reported symptomatic benefit from thoracentesis and lung would not re-expand with PleurX; not a surgical candidate\"   - CT Head : likely mild confusion is Toxic metabolic encephalopathy but she is on blood thinner-   - abx changed: cefepime changed to zosyn  -oxygen needs up to 7L oximizer  -vbg 10/2 normal. No resp failure  -breathing easily with comfort care meds.                 Hypotension-resolved  - Suspect that dehydration contributing due to not eating or drinking since her discharge on 9/3, in addition to concerns for  sepsis  - s/p Volume resuscitation:  - being treated for sepsis    - BC: ngtd            - UA  ;neg  - Empirically broad spectrum abx given possible PNA on imaging   -pt's family had wanted aggressive measures  -has needed intermittent albumin boluses  - started on midodrine. 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  - Per family, has not followed-up OP with Oncology for discussion on chemotherapy  -  not a candidate for chemo in this acute state--team discussed with patient and daughter that chemotherapy would only be palliative and not curative with overall poor prognosis  - Repeat imaging performed at [place] demonstrating bulky adenopathy in the lower left neck, mediastinum and bilateral hilar regions, multiple pulmonary nodules, bilaterally, extensive metastatic involvement of the liver with scattered lytic metastatic osseous lesions  - Oncology & Palliative Care consult appreciated   - Overall prognosis is very  poor   -Worsened LFT: Patient remains critical with very poor prognosis,  -transitioned to comfort cares  -seen by Oncology , case discussed, they met with family   -followd by [facility] hospice team.  Pt's restlessness and tachypnea have been well controlled with prn ativan narcotics.   - family at bedside  - pt no longer responsive. Breathing easily, appears comfortable this am.   - talked with Rn, family has been at bedside, son able to be reassured by RN team that comfort care was appropriate decision, he is readily reassured. Family pleased with care per RN  - suspect pt will likely pass today.             Chronic Anemia due to ESRD  Thrombocytopenia  - Hgb 8.3 on admit (baseline past 3-weeks 7.7-8.0)  - PLT count 108 without signs of bleeding --> 104  - Consider transfusion for Hgb <7    9/30: Hb 7: no s/s of bleeding. transfused 1 U PRBC  (Verbal consent from daughter in presence of RN. Indication, risks, benefit, alternatives explained)  10/1: transfuse 1 U PRBC  -Hb 9 10/2 post 1 unit blood transfusion  Pt transitioned to comfort cares     LFT changes:   Followed by Doug MATIAS.   -2/2 to acute illness, hypotension, ischemia, pulmonary process and mets  ALT stable in 100  range   range--> 800 range today.   Alk phos stable 200 range. 34 last month  Bili wnl  Transitioning to comfort cares. No further bld transfusions. discussed with family      CAD s/p PCI 2019  Cardiomyopathy, HFmEF (40-45%)  Hypertension  meds held given hypotension     COPD  Tobacco use      ESRD on MWF HD  S/p left AV fistula formation 6/28/22  S/p fistulogram with dilatation of L subclavian and junction of brachiocephalic and subclavian vein complicated by hematoma requiring evacuation 7/8/22  Hypophosphatemia  - Dialyzes MWF  - Left AV fistula  -patient will discontinue dialysis given transition to comfort care. Discussed with family members and nephrology     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/14/22)  Stopped anticoagulation given transition to hospice care     Severe Malnutrition  - on renal diet as tolerated      Physical Deconditioning  Generalized Weakness           Diet: Renal Diet (dialysis) as tolerated.   Snacks/Supplements Pediatric: Ensure Enlive; Between Meals    DVT Prophylaxis: anticoagulation stopped  Antoine Catheter: Not present  Central Lines: PRESENT  CVC Double Lumen Left Subclavian-Site Assessment: WDL  Code Status: comfort care, dnr/dni as of 10/2       Cause of death: respiratory failure, hypotension and concern for sepsis syndrome. 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, hepatic metastasis and lymphadenopathy      Surya Sherman MD, MD         Pending Results   Unresulted Labs Ordered in the Past 30 Days of this Admission     Date and Time Order Name Status Description    9/27/2022  5:18 PM Acid-Fast Bacilli Culture and Stain In process     9/9/2022 10:39 AM PATHOLOGY ADDITIONAL TESTING In process     9/9/2022 10:31 AM Tumor profile In process     8/30/2022  1:00 AM Prepare red blood cells (unit) Preliminary     8/20/2022 12:45 PM Prepare red blood cells (unit) Preliminary     8/20/2022  6:45 AM Prepare red blood cells  (unit) Preliminary     8/18/2022  9:00 AM Prepare red blood cells (unit) Preliminary     8/15/2022 10:45 AM Prepare red blood cells (unit) Preliminary           Primary Care Physician   Clari Mohr MD    Consultations This Hospital Stay   [facility] ADULT CONSULT  CARE MANAGEMENT / SOCIAL WORK IP CONSULT  SPIRITUAL HEALTH SERVICES IP CONSULT  PHARMACY IP CONSULT    Time Spent on this Encounter   I, Surya Sherman MD, personally saw the patient today and spent less than or equal to 30 minutes discharging this patient.    Data   Most Recent 3 CBC's:Recent Labs   Lab Test 10/02/22  0503 10/01/22  0452 09/30/22  2237 09/30/22  0708   WBC 5.3 4.9  --  5.0   HGB 9.2* 7.6* 7.8* 7.0*   MCV 93 95  --  96   PLT 80* 84*  --  100*      Most Recent 3 BMP's:  Recent Labs   Lab Test 10/02/22  0636 10/02/22  0615 10/02/22  0503 10/01/22  0452 09/30/22  2156 09/30/22  0708   NA  --   --  137 136  --  136   POTASSIUM  --   --  4.2 3.8  --  4.3   CHLORIDE  --   --  101 100  --  103   CO2  --   --  25 29  --  24   BUN  --   --  25 16  --  25   CR  --   --  3.84* 2.95*  --  4.58*   ANIONGAP  --   --  11 7  --  9   JEWELL  --   --  10.7* 9.7  --  10.8*   * 60* 67* 74   < > 99    < > = values in this interval not displayed.     Most Recent 2 LFT's:  Recent Labs   Lab Test 10/02/22  0503 10/01/22  0452   * 716*   * 106*   ALKPHOS 215* 202*   BILITOTAL 1.1 1.2     Most Recent INR's and Anticoagulation Dosing History:  Anticoagulation Dose History     Recent Dosing and Labs Latest Ref Rng & Units 7/13/2022 8/11/2022 8/15/2022 8/23/2022 9/28/2022 10/1/2022 10/2/2022    INR 0.85 - 1.15 1.98(H) 2.52(H) 1.68(H) 1.45(H) 2.74(H) 1.82(H) 1.72(H)        Most Recent 3 Troponin's:No lab results found.  Most Recent Cholesterol Panel:No lab results found.  Most Recent 6 Bacteria Isolates From Any Culture (See EPIC Reports for Culture Details):No lab results found.  Most Recent TSH, T4 and A1c Labs:  Recent  "Labs   Lab Test 06/28/22  0649   A1C 5.5     Results for orders placed or performed during the hospital encounter of 09/27/22   US Thoracentesis    Narrative    ULTRASOUND GUIDED THORACENTESIS September 28, 2022 10:17 AM     HISTORY: Likely malignant large right sided pleural effusion with  hypoxia.    FINDINGS: Ultrasound was used to evaluate for the presence and best  approach for drainage of a pleural effusion. Written and oral informed  consent was obtained. A pause for the cause procedure to verify the  correct patient and correct procedure. The skin overlying the right  chest posteriorly was prepped and draped in the usual sterile fashion.  The subcutaneous tissues were anesthetized with 10 mL 1% lidocaine. A  catheter was advanced into the pleural space and 900 mL of  kay  colored fluid was drained. Patient was monitored by nurse under my  direct supervision throughout the exam. Ultrasound images were  permanently stored.  There were no immediate complications. Patient  left the ultrasound suite in satisfactory condition.      Impression    IMPRESSION: Technically successful thoracentesis without immediate  complications.    LUCILA WAYNE MD         SYSTEM ID:  [id number]   XR Chest 2 Views    Narrative    XR CHEST 2 VIEWS  9/30/2022 1:41 PM       INDICATION: follow up on effusion  COMPARISON: 9/28/2022, 8/29/2022       Impression    IMPRESSION: Right-sided hydropneumothorax. A small to moderate  remaining right pleural effusion has significantly decreased. The  small apical pneumothorax component is new. Overall volume of the  right lung however has improved with better aeration. The pneumothorax  may be due to a \"trapped lung\" phenomenon. The left lung remains  clear. Left IJ central venous catheter tip is in the right atrium.    MAURO KEYS MD         SYSTEM ID:  [id number]   XR Chest Port 1 View    Narrative    EXAM: XR CHEST PORT 1 VIEW  LOCATION: [facility]  DATE/TIME: " 10/1/2022 12:30 PM    INDICATION: SOB  COMPARISON: 09/30/2021      Impression    IMPRESSION: Redemonstration of right hydropneumothorax with interval increased fluid component. The air component appears similar with about 2.4 cm of pleural separation at the right lung apex. Hazy opacities in the right lung may be due to atelectasis,   edema or infiltrate. Left IJ tunneled dialysis catheter tip in the right atrium. Mild pulmonary edema in the left lung. No left pleural effusion.

## 2022-10-05 NOTE — PROGRESS NOTES
[facility] Death Pronouncement    Called by nursing staff to pronounce Gina Simpson dead.    Physical Exam: Unresponsive to noxious stimuli, Spontaneous respirations absent, Breath sounds absent, Carotid pulse absent, Heart sounds absent and Corneal blink reflex absent    Patient was pronounced dead at 8:03 PM, 2022.    No data filed       Active Problems:    * No active hospital problems. *       Infectious disease present?: NO    Communicable disease present? (examples: HIV, chicken pox, TB, Ebola, CJD) :  NO    Multi-drug resistant organism present? (example: MRSA): NO    Please consider an autopsy if any of the following exist:  NO Unexpected or unexplained death during or following any dental, medical, or surgical diagnostic treatment procedures.   NO Death of mother at or up to seven days after delivery.     NO All  and pediatric deaths.     NO Death where the cause is sufficiently obscure to delay completion of the death certificate.   NO Deaths in which autopsy would confirm a suspected illness/condition that would affect surviving family members or recipients of transplanted organs.     The following deaths must be reported to the 's Office:  NO A death that may be due entirely or in part to any factors other than natural disease (recent surgery, recent trauma, suspected abuse/neglect).   NO A death that may be an accident, suicide, or homicide.     NO Any sudden, unexpected death in which there is no prior history of significant heart disease or any other condition associated with sudden death.   NO A death under suspicious, unusual, or unexpected circumstances.    NO Any death which is apparently due to natural causes but in which the  does not have a personal physician familiar with the patient s medical history, social, or environmental situation or the circumstances of the terminal event.   NO Any death apparently due to Sudden Infant Death Syndrome.     NO  Deaths that occur during, in association with, or as consequences of a diagnostic, therapeutic, or anesthetic procedure.   NO Any death in which a fracture of a major bone has occurred within the past (6) six months.   NO A death of persons note seen by their physician within 120 days of demise.     NO Any death in which the  was an inmate of a public institution or was in the custody of Law Enforcement personnel.   NO  All unexpected deaths of children   NO Solid organ donors   NO Unidentified bodies   YES Deaths of persons whose bodies are to be cremated or otherwise disposed of so that the bodies will later be unavailable for examination;   NO Deaths unattended by a physician outside of a licensed healthcare facility or licensed residential hospice program   NO Deaths occurring within 24 hours of arrival to a health care facility if death is unexpected.    NO Deaths associated with the decedent s employment.   NO Deaths attributed to acts of terrorism.   NO Any death in which there is uncertainty as to whether it is a medical examiner s care should be discussed with the medical investigator.      Death Certificate to be directed to Dr. Surya Sherman, hospitalist    Body disposition: Autopsy was discussed with family member:  Daughter in person.  Permission for autopsy is pending at this time.  [facility].    ERIC Leija, CNP  [facility]

## 2022-10-05 NOTE — PROGRESS NOTES
SPIRITUAL HEALTH SERVICES Significant Event  [facility]    At request of family for prayer support from a , Father Jose Carlos Farrell offered prayers at the bedside with family after Pt's death.    Kisha Maya MDiv  Chaplain Resident  Pager: [phone]

## 2022-10-05 NOTE — PROGRESS NOTES
This writer entered room to check on patient at 2003 and noted patient was not breathing. Heart sounds auscultated and not heard. House NP, donation center, hospice, and  were notified per family request. Several family members at bedside.

## 2022-10-06 NOTE — PROGRESS NOTES
Follow up note    Paged by RN supervisor regarding consent for autopsy for a patient that  on hospice 2 days ago.  Autopsy decision had not been made with family at that time. Concern of delay in body getting into morgue (and therefore refrigeration to preserve tissues) from time of death in the evening on 10/4 until sometime around 5 am on 10/5.  I discussed with daughter on the phone who, in summary, asked me to call her brother to confirm exactly what kind (full body, brain only, etc) of autopsy they desire.  The brother (patient's son) was unable to be reached on multiple calling attempts.  I then called daughter Monica again, which went to voicemail.  This was done in presence of RN supervisor.  We will have to try calling again later today or await their phone calls.  More to come.     ADDENDUM    Shortly before noon, myself and RN supervisor were able to discuss with Monica.  In a lengthy and detailed discussion, we completed the necessary paperwork for autopsy consent with telephone consent provided. We reiterated that based on our evaluation and the recommendation of pathologist that would be performing autopsy, it would not be likely to get any meaningful results that would alter the understanding that she unfortunately passed away from metastatic cancer. I explained again that oncology had previously completed tissue biopsy diagnosis for her nonsmall cell neuroendocrine cancer that had metastasized.  Monica voiced understanding but expressed that she nonetheless wanted to pursue autopsy.  She was explained that results/report often take several weeks to be finalized.

## 2022-10-13 LAB
BKR LAB AP ADD'L TEST STATUS: NORMAL
BKR PATH ADDL TEST FINAL COMMENTS: NORMAL

## 2022-10-13 NOTE — PROGRESS NOTES
- mentation baseline per daughter    [facility] Progress Note          Assessment and Plan:   Ms. Gina Simpson is a 56 year old woman who was admitted on 8/14/2022 with facial edema and dyspnea     1. 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 Imaging studies show evidence of metastatic disease involving cervical LNs and liver. Mediastinal mass measures 10 x 8 x 6 cm with SVC obstruction and occlusion of the RUL pulmonary artery  - The plan is for radiation treatment to help with SVC obstruction and to consider systemic therapy after completion of radiation therapy  -she had first radiation treatment on 8/20, plan for 10 fractions   -tumor sent for NGS testing     2. ESRD managed with hemodialysis via a LUE fistula.      3. Anemia due to CKD and bleeding upper GI ulcers  - s/p repeat endoscopy 8/23 with evidence of recently bleeding duodenal ulcer s/p epi injection and clip      4. Intraluminal thrombosis treated previously with IV heparin  - anticoagulation on hold now for at least 2 days     PLAN  - Continue radiation, plan for 10 fractions   - Before discharge, will request chemoport placement   - Plan will be for outpatient chemotherapy + immunotherapy  - Brain MRI and CT a/p ordered today  - Continue supportive transfusions, PPI, holding heparin until hemostasis achieved              Interval History:   Doing well. SOB improved. Fatigue improved.        Medications:       - MEDICATION INSTRUCTIONS for Dialysis Patients -   Does not apply See Admin Instructions     sodium chloride 0.9%  250 mL Intravenous Once in dialysis/CRRT     sodium chloride 0.9%  300 mL Hemodialysis Machine Once     allopurinol  100 mg Oral QPM     atorvastatin  20 mg Oral Daily     calcium acetate  2,001 mg Oral TID w/meals     carvedilol  3.125 mg Oral BID w/meals     doxercalciferol  4 mcg Intravenous Once in dialysis/CRRT     epoetin delma-epbx (RETACRIT) inj ESRD   4,000 Units Intravenous Once in dialysis/CRRT     multivitamin RENAL  1 capsule Oral Daily     - MEDICATION INSTRUCTIONS -   Does not apply Once     pantoprazole (PROTONIX) IV  40 mg Intravenous Q12H     sodium chloride (PF)  3 mL Intracatheter Q8H     sodium chloride (PF)  3 mL Intracatheter Q8H     sucralfate  1 g Oral 4x Daily AC & HS     sodium chloride 0.9%, sodium chloride 0.9%, sodium chloride 0.9%, acetaminophen **OR** acetaminophen, albumin human, HYDROmorphone, lidocaine 4%, lidocaine (buffered or not buffered), lidocaine (buffered or not buffered), lidocaine (buffered or not buffered), LORazepam, melatonin, naloxone **OR** naloxone **OR** naloxone **OR** naloxone, nitroGLYcerin, ondansetron **OR** ondansetron, oxyCODONE, - MEDICATION INSTRUCTIONS -, sodium chloride (PF), sodium chloride (PF), - MEDICATION INSTRUCTIONS -               Physical Exam:   Blood pressure 119/82, pulse 113, temperature 98.2  F (36.8  C), temperature source Oral, resp. rate 10, weight 56.3 kg (124 lb 1.6 oz), SpO2 97 %, not currently breastfeeding.  Wt Readings from Last 4 Encounters:   22 56.3 kg (124 lb 1.6 oz)   07/10/22 65 kg (143 lb 4.8 oz)   22 56.2 kg (124 lb)   22 56.7 kg (125 lb)         Vital Sign Ranges  Temperature Temp  Av.8  F (36.6  C)  Min: 97.8  F (36.6  C)  Max: 97.9  F (36.6  C)   Blood pressure Systolic (24hrs), Av , Min:94 , Max:129        Diastolic (24hrs), Av, Min:57, Max:80      Pulse Pulse  Av.8  Min: 89  Max: 106   Respirations Resp  Av  Min: 11  Max: 26   Pulse oximetry SpO2  Av.4 %  Min: 90 %  Max: 100 %         Intake/Output Summary (Last 24 hours) at 2022 1316  Last data filed at 2022 1000  Gross per 24 hour   Intake 680 ml   Output --   Net 680 ml       Constitutional: Awake, alert, cooperative, no apparent distress but appears sleepy   Lungs: Bilateral rhonchi   Cardiovascular: Regular rate and rhythm, normal S1 and S2, and no murmur noted    Abdomen: Normal bowel sounds, soft, non-distended, non-tender   Skin: No rashes, no cyanosis, upper extremity edema    Other:           Data:   Laboratory:  CMP  Recent Labs   Lab 08/26/22  0541 08/24/22  1453 08/21/22  0530 08/20/22  1723   * 135 133 135   POTASSIUM 4.6 3.7 3.7 3.9   CHLORIDE 99 103 98 98   CO2 25 26 29 28   ANIONGAP 8 6 6 9   GLC 84 82 128* 112*   BUN 58* 32* 62* 59*   CR 4.85* 3.01* 4.91* 4.30*   GFRESTIMATED 10* 17* 10* 11*   JEWELL 9.4 8.7 9.5 9.9   PROTTOTAL  --   --  5.2*  --    ALBUMIN  --   --  1.8*  --    BILITOTAL  --   --  0.4  --    ALKPHOS  --   --  34*  --    AST  --   --  96*  --    ALT  --   --  20  --      CBC  Recent Labs   Lab 08/26/22  0541 08/25/22  2231 08/25/22  1434 08/25/22  0545 08/24/22  2232 08/24/22  1453 08/23/22  0546 08/23/22  0011 08/21/22  2119 08/21/22  0530   WBC 9.0  --   --   --   --  12.5*  --   --   --  14.1*   RBC 2.92*  --   --   --   --  3.24*  --   --   --  2.55*   HGB 8.7*  8.7* 8.7* 8.9* 9.2*   < > 9.6*   < > 8.5*   < > 7.9*   HCT 26.8*  --   --   --   --  28.5*  --   --   --  23.9*   MCV 92  --   --   --   --  88  --   --   --  94   MCH 29.8  --   --   --   --  29.6  --   --   --  31.0   MCHC 32.5  --   --   --   --  33.7  --   --   --  33.1   RDW 15.9*  --   --   --   --  15.6*  --   --   --  14.5     --   --   --   --  304  --  264  --  314    < > = values in this interval not displayed.     INR  Recent Labs   Lab 08/23/22  0011   INR 1.45*       Imaging data:  Recent Results (from the past 48 hour(s))   CT Head w/o Contrast    Narrative    EXAM: CT HEAD W/O CONTRAST  LOCATION: [facility]  DATE/TIME: 8/20/2022 5:02 PM    INDICATION: On IV heparin, evaluate for head bleed  COMPARISON: None.  TECHNIQUE: Routine CT Head without IV contrast. Multiplanar reformats. Dose reduction techniques were used.    FINDINGS:  INTRACRANIAL CONTENTS: No intracranial hemorrhage, extraaxial collection, or mass effect.  No CT  evidence of acute infarct. Normal parenchymal attenuation. Normal ventricles and sulci.     VISUALIZED ORBITS/SINUSES/MASTOIDS: No intraorbital abnormality. No significant paranasal sinus mucosal disease. No middle ear or mastoid effusion.    BONES/SOFT TISSUES: No acute abnormality.      Impression    IMPRESSION:  1.  No acute intracranial process; specifically no acute intracranial hemorrhage.         Patrick Dreyer, DO

## 2022-11-23 LAB
ACID FAST STAIN (ARUP): NORMAL
ACID FAST STAIN (ARUP): NORMAL

## 2023-03-03 NOTE — DISCHARGE INSTRUCTIONS
Same Day Surgery Discharge Instructions for  Sedation and General Anesthesia     It's not unusual to feel dizzy, light-headed or faint for up to 24 hours after surgery or while taking pain medication.  If you have these symptoms: sit for a few minutes before standing and have someone assist you when you get up to walk or use the bathroom.    You should rest and relax for the next 24 hours. We recommend you make arrangements to have an adult stay with you for at least 24 hours after your discharge.  Avoid hazardous and strenuous activity.    DO NOT DRIVE any vehicle or operate mechanical equipment for 24 hours following the end of your surgery.  Even though you may feel normal, your reactions may be affected by the medication you have received.    Do not drink alcoholic beverages for 24 hours following surgery.     Slowly progress to your regular diet as you feel able. It's not unusual to feel nauseated and/or vomit after receiving anesthesia.  If you develop these symptoms, drink clear liquids (apple juice, ginger ale, broth, 7-up, etc. ) until you feel better.  If your nausea and vomiting persists for 24 hours, please notify your surgeon.      All narcotic pain medications, along with inactivity and anesthesia, can cause constipation. Drinking plenty of liquids and increasing fiber intake will help.    For any questions of a medical nature, call your surgeon.    Do not make important decisions for 24 hours.    If you had general anesthesia, you may have a sore throat for a couple of days related to the breathing tube used during surgery.  You may use Cepacol lozenges to help with this discomfort.  If it worsens or if you develop a fever, contact your surgeon.     If you feel your pain is not well managed with the pain medications prescribed by your surgeon, please contact your surgeon's office to let them know so they can address your concerns.      ARTERIAL VENOUS FISTULA  Discharge Instructions     You may be  "able to feel the blood flowing through your fistula, it feels similar to a purring cat. This is called a \"thrill\"  Remove outer dressing in 48 hours, leave steri strips (small white pieces of tape) on.  Let them fall off on their own or gently remove them in 7 days.  Okay to shower once outer dressing is removed.  No soaking for 4 weeks.    Call your Surgeon If You Have Any of the Following:  Fever of 100.4 F or higher  Signs of infection at the incision site, such as increased redness or swelling, warmth, worsening pain, bleeding, or foul-smelling drainage  Lack of a \"thrill\" (you can t feel it)  Pain or numbness in your fingers, hand, or arm  Bleeding, redness, or warmth around your fistula  Sudden bulging of the fistula (more than usual; a slight bulge is normal)   Follow-Up  The doctor will check your fistula within 1 to 2 weeks after the procedure.     It will likely take about 6 to 8 weeks for the fistula to enlarge enough to start dialysis. After that, make sure the fistula is checked each time you have dialysis.      " no

## 2023-04-25 LAB — SCANNED LAB RESULT: NORMAL

## 2024-01-11 NOTE — IR NOTE
Patient Name: Gina Simpson  Medical Record Number: [id number]  Today's Date: 8/11/2022    Procedure: Left Arm Fistulogram  Proceduralist: Dr. Magana  Pathology present: no    Procedure Start: 0954  Procedure end: 1027  Sedation medications administered: fentanyl 100 mcg, Versed 2 mg, Lidocaine 5 ml's.     Report given to: ESPERANZA Thomson Rn  : no    Other Notes: Pt arrived to IR room 1 from CS 8. Consent reviewed. Pt denies any questions or concerns regarding procedure. Pt positioned supine and monitored per protocol. Pt tolerated procedure without any noted complications.    98

## 2024-10-22 NOTE — PROGRESS NOTES
Assessment and Plan:   ESRD on HD: MWF outpatient dialysis at [facility] and the [facility].  Today seen on dialysis.   HD: 3h, 16 ga LAG, 350 BFR. 3K 35 HCO3 140 Na.No heparin.   UF limited by hypotension. Will give 12.5 gm 25% alb IV X 1.    Getting transusion 1 U PRBC on run. Hgb 5.6 today.    On phoslo.    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    S/p left brachiobasilic fistula 2022 with complicatons of arm swelling, s/p innominate vein venoplasty. Last procedure 6/28/22 revision but admission 7/5 with left arm swelling, s/p repeat venoplasty complicated by hematoma requiring evacuation.      Successful dialysis session 8/17 with cannulation of AVG but not 8/19.   Has CVC in place.             Interval History:   Lung Cancer : complicated by SVC syndrome. Cancer is metastatic.     Hypotension: perhaps due to severe anemia. BP meds on hold.            Review of Systems:   No sx on dialysis.          Medications:       - MEDICATION INSTRUCTIONS for Dialysis Patients -   Does not apply See Admin Instructions     sodium chloride 0.9%  250 mL Intravenous Once in dialysis/CRRT     sodium chloride 0.9%  300 mL Hemodialysis Machine Once     allopurinol  100 mg Oral QPM     [Held by provider] amLODIPine  10 mg Oral QPM     atorvastatin  20 mg Oral Daily     calcium acetate  2,001 mg Oral TID w/meals     carvedilol  25 mg Oral BID w/meals     [Held by provider] hydrALAZINE  50 mg Oral TID     [Held by provider] lisinopril  20 mg Oral Daily     multivitamin RENAL  1 capsule Oral Daily     - MEDICATION INSTRUCTIONS -   Does not apply Once     pantoprazole  40 mg Oral BID AC     sodium chloride (PF)  3 mL Intracatheter Q8H       - MEDICATION INSTRUCTIONS -       Current active medications and PTA medications reviewed, see medication list for details.            Physical Exam:   Vitals were reviewed  Patient Vitals for the past 24 hrs:   BP  Surgeon (Optional): Adriano Stevenson Biopsy Photograph Reviewed: Yes Temp Temp src Pulse Resp SpO2 Weight   22 1445 (!) 80/52 -- -- 100 20 -- --   22 1430 (!) 89/51 98.1  F (36.7  C) Oral 104 20 100 % --   22 1100 99/53 97.8  F (36.6  C) Oral 107 20 100 % --   22 0729 93/55 98  F (36.7  C) Oral 112 16 96 % --   22 0509 105/60 98  F (36.7  C) Oral 107 16 95 % 56.9 kg (125 lb 7.1 oz)   22 1935 92/51 98.1  F (36.7  C) Oral 106 16 97 % --   22 1551 110/67 97.5  F (36.4  C) Axillary 98 20 100 % --       Temp:  [97.5  F (36.4  C)-98.1  F (36.7  C)] 98.1  F (36.7  C)  Pulse:  [] 100  Resp:  [16-20] 20  BP: ()/(51-67) 80/52  SpO2:  [95 %-100 %] 100 %    Temperatures:  Current - Temp: 98.1  F (36.7  C); Max - Temp  Av.9  F (36.6  C)  Min: 97.5  F (36.4  C)  Max: 98.1  F (36.7  C)  Respiration range: Resp  Av.3  Min: 16  Max: 20  Pulse range: Pulse  Av.9  Min: 98  Max: 112  Blood pressure range: Systolic (24hrs), Av , Min:80 , Max:110   ; Diastolic (24hrs), Av, Min:51, Max:67    Pulse oximetry range: SpO2  Av %  Min: 95 %  Max: 100 %    I/O last 3 completed shifts:  In: 810 [P.O.:800; I.V.:10]  Out: -       Intake/Output Summary (Last 24 hours) at 2022 1459  Last data filed at 2022 1100  Gross per 24 hour   Intake 660 ml   Output --   Net 660 ml       Alert and responsive  LAG with needles in place  Both UE have 1-2+ edema.  Neck vein distention but no facial edema.       Wt Readings from Last 4 Encounters:   22 56.9 kg (125 lb 7.1 oz)   07/10/22 65 kg (143 lb 4.8 oz)   22 56.2 kg (124 lb)   22 56.7 kg (125 lb)          Data:          Lab Results   Component Value Date     2022     2022     2022    Lab Results   Component Value Date    CHLORIDE 98 2022    CHLORIDE 98 2022    CHLORIDE 99 2022    Lab Results   Component Value Date    BUN 62 2022    BUN 59 2022    BUN 63 2022      Lab Results   Component Value Date     POTASSIUM 3.7 08/21/2022    POTASSIUM 3.9 08/20/2022    POTASSIUM 3.8 08/18/2022    Lab Results   Component Value Date    CO2 29 08/21/2022    CO2 28 08/20/2022    CO2 29 08/18/2022    Lab Results   Component Value Date    CR 4.91 08/21/2022    CR 4.30 08/20/2022    CR 3.55 08/18/2022        Recent Labs   Lab Test 08/22/22  1244 08/21/22  2253 08/21/22  2119 08/21/22  0530 08/19/22  1307 08/19/22  0559 08/18/22  0553   WBC  --   --   --  14.1*  --  18.3* 15.9*   HGB 5.6* 7.3* 7.5* 7.9*   < > 8.1*  8.1* 6.6*   HCT  --   --   --  23.9*  --  25.1* 19.4*   MCV  --   --   --  94  --  93 89   PLT  --   --   --  314  --  220 194    < > = values in this interval not displayed.     Recent Labs   Lab Test 08/21/22  0530 08/20/22  1723 08/15/22  1115 08/15/22  0451   AST 96*  --  43 48*   ALT 20  --  19 22   ALKPHOS 34*  --  26* 32*   BILITOTAL 0.4  --  0.3 0.4   CALDERON  --  21  --   --        Recent Labs   Lab Test 07/11/22  0750   MAG 2.3     Recent Labs   Lab Test 08/15/22  1641 07/12/22  0659 07/11/22  0750   PHOS 4.8* 2.2* 2.4*     Recent Labs   Lab Test 08/21/22  0530 08/20/22  1723 08/18/22  0553   JEWELL 9.5 9.9 8.6       Lab Results   Component Value Date    JEWELL 9.5 08/21/2022     Lab Results   Component Value Date    WBC 14.1 (H) 08/21/2022    HGB 5.6 (LL) 08/22/2022    HCT 23.9 (L) 08/21/2022    MCV 94 08/21/2022     08/21/2022     Lab Results   Component Value Date     08/21/2022    POTASSIUM 3.7 08/21/2022    CHLORIDE 98 08/21/2022    CO2 29 08/21/2022     (H) 08/21/2022     Lab Results   Component Value Date    BUN 62 (H) 08/21/2022    CR 4.91 (H) 08/21/2022     Lab Results   Component Value Date    MAG 2.3 07/11/2022     Lab Results   Component Value Date    PHOS 4.8 (H) 08/15/2022       Creatinine   Date Value Ref Range Status   08/21/2022 4.91 (H) 0.52 - 1.04 mg/dL Final   08/20/2022 4.30 (H) 0.52 - 1.04 mg/dL Final   08/18/2022 3.55 (H) 0.52 - 1.04 mg/dL Final   08/17/2022 4.45 (H) 0.52 -  Previous Accession (Optional): [id number] 1.04 mg/dL Final   08/16/2022 5.97 (H) 0.52 - 1.04 mg/dL Final   08/15/2022 4.73 (H) 0.52 - 1.04 mg/dL Final       Attestation:  I have reviewed today's vital signs, notes, medications, labs and imaging.     Hari Louise MD             Date Of Previous Biopsy (Optional): 8/1/24 Size Of Lesion In Cm: 1.3 X Size Of Lesion In Cm (Optional): 0.8 Size Of Margin In Cm: 0.4 Anesthesia Volume In Cc: 12 Was An Eye Clamp Used?: No Eye Clamp Note Details: An eye clamp was used during the procedure. Excision Method: Fusiform Saucerization Depth: dermis and superficial adipose tissue Repair Type: Intermediate Intermediate / Complex Repair - Final Wound Length In Cm: 5.7 Suturegard Retention Suture: 2-0 Nylon Retention Suture Bite Size: 3 mm Length To Time In Minutes Device Was In Place: 10 Number Of Hemigard Strips Per Side: 1 Undermining Type: Entire Wound Debridement Text: The wound edges were debrided prior to proceeding with the closure to facilitate wound healing. Helical Rim Text: The closure involved the helical rim. Vermilion Border Text: The closure involved the vermilion border. Nostril Rim Text: The closure involved the nostril rim. Retention Suture Text: Retention sutures were placed to support the closure and prevent dehiscence. Primary Defect Length (In Cm): 0 Suture Removal: 7 days Lab: [id number] Lab Facility: [id number] Graft Donor Site Bandage (Optional-Leave Blank If You Don't Want In Note): Steri-strips and a pressure bandage were applied to the donor site. Epidermal Closure Graft Donor Site (Optional): simple interrupted Billing Type: Third-Party Bill Excision Depth: fascia Scalpel Size: 15 blade Anesthesia Type: 1% lidocaine with epinephrine Hemostasis: Electrodesiccation Estimated Blood Loss (Cc): minimal Detail Level: Detailed Repair Depth: use same depth as excision depth Repair Anesthesia Method: local infiltration Deep Sutures: 4-0 Vicryl Dermal Closure: buried vertical mattress Epidermal Sutures: 5-0 Prolene Epidermal Closure: running Wound Care: Petrolatum Dressing: pressure dressing with telfa Suturegard Intro: Intraoperative tissue expansion was performed, utilizing the SUTUREGARD device, in order to reduce wound tension. Suturegard Body: The suture ends were repeatedly re-tightened and re-clamped to achieve the desired tissue expansion. Hemigard Intro: Due to skin fragility and wound tension, it was decided to use HEMIGARD adhesive retention suture devices to permit a linear closure. The skin was cleaned and dried for a 6cm distance away from the wound. Excessive hair, if present, was removed to allow for adhesion. Hemigard Postcare Instructions: The HEMIGARD strips are to remain completely dry for at least 5-7 days. Positioning (Leave Blank If You Do Not Want): The patient was placed in a comfortable position exposing the surgical site. Pre-Excision Curettage Text (Leave Blank If You Do Not Want): Prior to drawing the surgical margin the visible lesion was removed with electrodesiccation and curettage to clearly define the lesion size. Complex Repair Preamble Text (Leave Blank If You Do Not Want): Extensive wide undermining was performed. Intermediate Repair Preamble Text (Leave Blank If You Do Not Want): Undermining was performed with blunt dissection. Curvilinear Excision Additional Text (Leave Blank If You Do Not Want): The margin was drawn around the clinically apparent lesion.  A curvilinear shape was then drawn on the skin incorporating the lesion and margins.  Incisions were then made along these lines to the appropriate tissue plane and the lesion was extirpated. Fusiform Excision Additional Text (Leave Blank If You Do Not Want): The margin was drawn around the clinically apparent lesion.  A fusiform shape was then drawn on the skin incorporating the lesion and margins.  Incisions were then made along these lines to the appropriate tissue plane and the lesion was extirpated. Elliptical Excision Additional Text (Leave Blank If You Do Not Want): The margin was drawn around the clinically apparent lesion.  An elliptical shape was then drawn on the skin incorporating the lesion and margins.  Incisions were then made along these lines to the appropriate tissue plane and the lesion was extirpated. Saucerization Excision Additional Text (Leave Blank If You Do Not Want): The margin was drawn around the clinically apparent lesion.  Incisions were then made along these lines, in a tangential fashion, to the appropriate tissue plane and the lesion was extirpated. Slit Excision Additional Text (Leave Blank If You Do Not Want): A linear line was drawn on the skin overlying the lesion. An incision was made slowly until the lesion was visualized.  Once visualized, the lesion was removed with blunt dissection. Excisional Biopsy Additional Text (Leave Blank If You Do Not Want): The margin was drawn around the clinically apparent lesion. An elliptical shape was then drawn on the skin incorporating the lesion and margins.  Incisions were then made along these lines to the appropriate tissue plane and the lesion was extirpated. Perilesional Excision Additional Text (Leave Blank If You Do Not Want): The margin was drawn around the clinically apparent lesion. Incisions were then made along these lines to the appropriate tissue plane and the lesion was extirpated. Repair Performed By Another Provider Text (Leave Blank If You Do Not Want): After the tissue was excised the defect was repaired by another provider. No Repair - Repaired With Adjacent Surgical Defect Text (Leave Blank If You Do Not Want): After the excision the defect was repaired concurrently with another surgical defect which was in close approximation. Adjacent Tissue Transfer Text: The defect edges were debeveled with a #15 scalpel blade. Given the location of the defect and the proximity to free margins an adjacent tissue transfer was deemed most appropriate. Using a sterile surgical marker, an appropriate flap was drawn incorporating the defect and placing the expected incisions within the relaxed skin tension lines where possible. The area thus outlined was incised deep to adipose tissue with a #15 scalpel blade. The skin margins were undermined to an appropriate distance in all directions utilizing iris scissors and carried over to close the primary defect. Advancement Flap (Single) Text: The defect edges were debeveled with a #15 scalpel blade.  Given the location of the defect and the proximity to free margins a single advancement flap was deemed most appropriate.  Using a sterile surgical marker, an appropriate advancement flap was drawn incorporating the defect and placing the expected incisions within the relaxed skin tension lines where possible.    The area thus outlined was incised deep to adipose tissue with a #15 scalpel blade.  The skin margins were undermined to an appropriate distance in all directions utilizing iris scissors. Advancement Flap (Double) Text: The defect edges were debeveled with a #15 scalpel blade.  Given the location of the defect and the proximity to free margins a double advancement flap was deemed most appropriate.  Using a sterile surgical marker, the appropriate advancement flaps were drawn incorporating the defect and placing the expected incisions within the relaxed skin tension lines where possible.    The area thus outlined was incised deep to adipose tissue with a #15 scalpel blade.  The skin margins were undermined to an appropriate distance in all directions utilizing iris scissors. Burow's Advancement Flap Text: The defect edges were debeveled with a #15 scalpel blade.  Given the location of the defect and the proximity to free margins a Burow's advancement flap was deemed most appropriate.  Using a sterile surgical marker, the appropriate advancement flap was drawn incorporating the defect and placing the expected incisions within the relaxed skin tension lines where possible.    The area thus outlined was incised deep to adipose tissue with a #15 scalpel blade.  The skin margins were undermined to an appropriate distance in all directions utilizing iris scissors. Chonodrocutaneous Helical Advancement Flap Text: The defect edges were debeveled with a #15 scalpel blade.  Given the location of the defect and the proximity to free margins a chondrocutaneous helical advancement flap was deemed most appropriate.  Using a sterile surgical marker, the appropriate advancement flap was drawn incorporating the defect and placing the expected incisions within the relaxed skin tension lines where possible.    The area thus outlined was incised deep to adipose tissue with a #15 scalpel blade.  The skin margins were undermined to an appropriate distance in all directions utilizing iris scissors. Crescentic Advancement Flap Text: The defect edges were debeveled with a #15 scalpel blade.  Given the location of the defect and the proximity to free margins a crescentic advancement flap was deemed most appropriate.  Using a sterile surgical marker, the appropriate advancement flap was drawn incorporating the defect and placing the expected incisions within the relaxed skin tension lines where possible.    The area thus outlined was incised deep to adipose tissue with a #15 scalpel blade.  The skin margins were undermined to an appropriate distance in all directions utilizing iris scissors. A-T Advancement Flap Text: The defect edges were debeveled with a #15 scalpel blade.  Given the location of the defect, shape of the defect and the proximity to free margins an A-T advancement flap was deemed most appropriate.  Using a sterile surgical marker, an appropriate advancement flap was drawn incorporating the defect and placing the expected incisions within the relaxed skin tension lines where possible.    The area thus outlined was incised deep to adipose tissue with a #15 scalpel blade.  The skin margins were undermined to an appropriate distance in all directions utilizing iris scissors. O-T Advancement Flap Text: The defect edges were debeveled with a #15 scalpel blade.  Given the location of the defect, shape of the defect and the proximity to free margins an O-T advancement flap was deemed most appropriate.  Using a sterile surgical marker, an appropriate advancement flap was drawn incorporating the defect and placing the expected incisions within the relaxed skin tension lines where possible.    The area thus outlined was incised deep to adipose tissue with a #15 scalpel blade.  The skin margins were undermined to an appropriate distance in all directions utilizing iris scissors. O-L Flap Text: The defect edges were debeveled with a #15 scalpel blade.  Given the location of the defect, shape of the defect and the proximity to free margins an O-L flap was deemed most appropriate.  Using a sterile surgical marker, an appropriate advancement flap was drawn incorporating the defect and placing the expected incisions within the relaxed skin tension lines where possible.    The area thus outlined was incised deep to adipose tissue with a #15 scalpel blade.  The skin margins were undermined to an appropriate distance in all directions utilizing iris scissors. O-Z Flap Text: The defect edges were debeveled with a #15 scalpel blade.  Given the location of the defect, shape of the defect and the proximity to free margins an O-Z flap was deemed most appropriate.  Using a sterile surgical marker, an appropriate transposition flap was drawn incorporating the defect and placing the expected incisions within the relaxed skin tension lines where possible. The area thus outlined was incised deep to adipose tissue with a #15 scalpel blade.  The skin margins were undermined to an appropriate distance in all directions utilizing iris scissors. Double O-Z Flap Text: The defect edges were debeveled with a #15 scalpel blade.  Given the location of the defect, shape of the defect and the proximity to free margins a Double O-Z flap was deemed most appropriate.  Using a sterile surgical marker, an appropriate transposition flap was drawn incorporating the defect and placing the expected incisions within the relaxed skin tension lines where possible. The area thus outlined was incised deep to adipose tissue with a #15 scalpel blade.  The skin margins were undermined to an appropriate distance in all directions utilizing iris scissors. V-Y Flap Text: The defect edges were debeveled with a #15 scalpel blade.  Given the location of the defect, shape of the defect and the proximity to free margins a V-Y flap was deemed most appropriate.  Using a sterile surgical marker, an appropriate advancement flap was drawn incorporating the defect and placing the expected incisions within the relaxed skin tension lines where possible.    The area thus outlined was incised deep to adipose tissue with a #15 scalpel blade.  The skin margins were undermined to an appropriate distance in all directions utilizing iris scissors. Advancement-Rotation Flap Text: The defect edges were debeveled with a #15 scalpel blade.  Given the location of the defect, shape of the defect and the proximity to free margins an advancement-rotation flap was deemed most appropriate.  Using a sterile surgical marker, an appropriate flap was drawn incorporating the defect and placing the expected incisions within the relaxed skin tension lines where possible. The area thus outlined was incised deep to adipose tissue with a #15 scalpel blade.  The skin margins were undermined to an appropriate distance in all directions utilizing iris scissors. Mercedes Flap Text: The defect edges were debeveled with a #15 scalpel blade.  Given the location of the defect, shape of the defect and the proximity to free margins a Mercedes flap was deemed most appropriate.  Using a sterile surgical marker, an appropriate advancement flap was drawn incorporating the defect and placing the expected incisions within the relaxed skin tension lines where possible. The area thus outlined was incised deep to adipose tissue with a #15 scalpel blade.  The skin margins were undermined to an appropriate distance in all directions utilizing iris scissors. Modified Advancement Flap Text: The defect edges were debeveled with a #15 scalpel blade.  Given the location of the defect, shape of the defect and the proximity to free margins a modified advancement flap was deemed most appropriate.  Using a sterile surgical marker, an appropriate advancement flap was drawn incorporating the defect and placing the expected incisions within the relaxed skin tension lines where possible.    The area thus outlined was incised deep to adipose tissue with a #15 scalpel blade.  The skin margins were undermined to an appropriate distance in all directions utilizing iris scissors. Mucosal Advancement Flap Text: Given the location of the defect, shape of the defect and the proximity to free margins a mucosal advancement flap was deemed most appropriate. Incisions were made with a 15 blade scalpel in the appropriate fashion along the cutaneous vermilion border and the mucosal lip. The remaining actinically damaged mucosal tissue was excised.  The mucosal advancement flap was then elevated to the gingival sulcus with care taken to preserve the neurovascular structures and advanced into the primary defect. Care was taken to ensure that precise realignment of the vermilion border was achieved. Peng Advancement Flap Text: The defect edges were debeveled with a #15 scalpel blade.  Given the location of the defect, shape of the defect and the proximity to free margins a Peng advancement flap was deemed most appropriate.  Using a sterile surgical marker, an appropriate advancement flap was drawn incorporating the defect and placing the expected incisions within the relaxed skin tension lines where possible. The area thus outlined was incised deep to adipose tissue with a #15 scalpel blade.  The skin margins were undermined to an appropriate distance in all directions utilizing iris scissors. Hatchet Flap Text: The defect edges were debeveled with a #15 scalpel blade.  Given the location of the defect, shape of the defect and the proximity to free margins a hatchet flap was deemed most appropriate.  Using a sterile surgical marker, an appropriate hatchet flap was drawn incorporating the defect and placing the expected incisions within the relaxed skin tension lines where possible.    The area thus outlined was incised deep to adipose tissue with a #15 scalpel blade.  The skin margins were undermined to an appropriate distance in all directions utilizing iris scissors. Rotation Flap Text: The defect edges were debeveled with a #15 scalpel blade.  Given the location of the defect, shape of the defect and the proximity to free margins a rotation flap was deemed most appropriate.  Using a sterile surgical marker, an appropriate rotation flap was drawn incorporating the defect and placing the expected incisions within the relaxed skin tension lines where possible.    The area thus outlined was incised deep to adipose tissue with a #15 scalpel blade.  The skin margins were undermined to an appropriate distance in all directions utilizing iris scissors. Bilateral Rotation Flap Text: The defect edges were debeveled with a #15 scalpel blade. Given the location of the defect, shape of the defect and the proximity to free margins a bilateral rotation flap was deemed most appropriate. Using a sterile surgical marker, an appropriate rotation flap was drawn incorporating the defect and placing the expected incisions within the relaxed skin tension lines where possible. The area thus outlined was incised deep to adipose tissue with a #15 scalpel blade. The skin margins were undermined to an appropriate distance in all directions utilizing iris scissors. Following this, the designed flap was carried over into the primary defect and sutured into place. Spiral Flap Text: The defect edges were debeveled with a #15 scalpel blade.  Given the location of the defect, shape of the defect and the proximity to free margins a spiral flap was deemed most appropriate.  Using a sterile surgical marker, an appropriate rotation flap was drawn incorporating the defect and placing the expected incisions within the relaxed skin tension lines where possible. The area thus outlined was incised deep to adipose tissue with a #15 scalpel blade.  The skin margins were undermined to an appropriate distance in all directions utilizing iris scissors. Staged Advancement Flap Text: The defect edges were debeveled with a #15 scalpel blade.  Given the location of the defect, shape of the defect and the proximity to free margins a staged advancement flap was deemed most appropriate.  Using a sterile surgical marker, an appropriate advancement flap was drawn incorporating the defect and placing the expected incisions within the relaxed skin tension lines where possible. The area thus outlined was incised deep to adipose tissue with a #15 scalpel blade.  The skin margins were undermined to an appropriate distance in all directions utilizing iris scissors. Star Wedge Flap Text: The defect edges were debeveled with a #15 scalpel blade.  Given the location of the defect, shape of the defect and the proximity to free margins a star wedge flap was deemed most appropriate.  Using a sterile surgical marker, an appropriate rotation flap was drawn incorporating the defect and placing the expected incisions within the relaxed skin tension lines where possible. The area thus outlined was incised deep to adipose tissue with a #15 scalpel blade.  The skin margins were undermined to an appropriate distance in all directions utilizing iris scissors. Transposition Flap Text: The defect edges were debeveled with a #15 scalpel blade.  Given the location of the defect and the proximity to free margins a transposition flap was deemed most appropriate.  Using a sterile surgical marker, an appropriate transposition flap was drawn incorporating the defect.    The area thus outlined was incised deep to adipose tissue with a #15 scalpel blade.  The skin margins were undermined to an appropriate distance in all directions utilizing iris scissors. Muscle Hinge Flap Text: The defect edges were debeveled with a #15 scalpel blade.  Given the size, depth and location of the defect and the proximity to free margins a muscle hinge flap was deemed most appropriate.  Using a sterile surgical marker, an appropriate hinge flap was drawn incorporating the defect. The area thus outlined was incised with a #15 scalpel blade.  The skin margins were undermined to an appropriate distance in all directions utilizing iris scissors. Mustarde Flap Text: The defect edges were debeveled with a #15 scalpel blade.  Given the size, depth and location of the defect and the proximity to free margins a Mustarde flap was deemed most appropriate. Using a sterile surgical marker, an appropriate flap was drawn incorporating the defect. The area thus outlined was incised with a #15 scalpel blade. The skin margins were undermined to an appropriate distance in all directions utilizing iris scissors. Following this, the designed flap was carried into the primary defect and sutured into place. Nasal Turnover Hinge Flap Text: The defect edges were debeveled with a #15 scalpel blade.  Given the size, depth, location of the defect and the defect being full thickness a nasal turnover hinge flap was deemed most appropriate.  Using a sterile surgical marker, an appropriate hinge flap was drawn incorporating the defect. The area thus outlined was incised with a #15 scalpel blade. The flap was designed to recreate the nasal mucosal lining and the alar rim. The skin margins were undermined to an appropriate distance in all directions utilizing iris scissors. Nasalis-Muscle-Based Myocutaneous Island Pedicle Flap Text: Using a #15 blade, an incision was made around the donor flap to the level of the nasalis muscle. Wide lateral undermining was then performed in both the subcutaneous plane above the nasalis muscle, and in a submuscular plane just above periosteum. This allowed the formation of a free nasalis muscle axial pedicle (based on the angular artery) which was still attached to the actual cutaneous flap, increasing its mobility and vascular viability. Hemostasis was obtained with pinpoint electrocoagulation. The flap was mobilized into position and the pivotal anchor points positioned and stabilized with buried interrupted sutures. Subcutaneous and dermal tissues were closed in a multilayered fashion with sutures. Tissue redundancies were excised, and the epidermal edges were apposed without significant tension and sutured with sutures. Nasalis Myocutaneous Flap Text: Using a #15 blade, an incision was made around the donor flap to the level of the nasalis muscle. Wide lateral undermining was then performed in both the subcutaneous plane above the nasalis muscle, and in a submuscular plane just above periosteum. This allowed the formation of a free nasalis muscle axial pedicle which was still attached to the actual cutaneous flap, increasing its mobility and vascular viability. Hemostasis was obtained with pinpoint electrocoagulation. The flap was mobilized into position and the pivotal anchor points positioned and stabilized with buried interrupted sutures. Subcutaneous and dermal tissues were closed in a multilayered fashion with sutures. Tissue redundancies were excised, and the epidermal edges were apposed without significant tension and sutured with sutures. Nasolabial Transposition Flap Text: The defect edges were debeveled with a #15 scalpel blade.  Given the size, depth and location of the defect and the proximity to free margins a nasolabial transposition flap was deemed most appropriate. Using a sterile surgical marker, an appropriate flap was drawn incorporating the defect. The area thus outlined was incised with a #15 scalpel blade. The skin margins were undermined to an appropriate distance in all directions utilizing iris scissors. Following this, the designed flap was carried into the primary defect and sutured into place. Orbicularis Oris Muscle Flap Text: The defect edges were debeveled with a #15 scalpel blade.  Given that the defect affected the competency of the oral sphincter an orbicularis oris muscle flap was deemed most appropriate to restore this competency and normal muscle function.  Using a sterile surgical marker, an appropriate flap was drawn incorporating the defect. The area thus outlined was incised with a #15 scalpel blade. Melolabial Transposition Flap Text: The defect edges were debeveled with a #15 scalpel blade.  Given the location of the defect and the proximity to free margins a melolabial flap was deemed most appropriate.  Using a sterile surgical marker, an appropriate melolabial transposition flap was drawn incorporating the defect.    The area thus outlined was incised deep to adipose tissue with a #15 scalpel blade.  The skin margins were undermined to an appropriate distance in all directions utilizing iris scissors. Rectangular Flap Text: The defect edges were debeveled with a #15 scalpel blade. Given the location of the defect and the proximity to free margins a rectangular flap was deemed most appropriate. Using a sterile surgical marker, an appropriate rectangular flap was drawn incorporating the defect. The area thus outlined was incised deep to adipose tissue with a #15 scalpel blade. The skin margins were undermined to an appropriate distance in all directions utilizing iris scissors. Following this, the designed flap was carried over into the primary defect and sutured into place. Rhombic Flap Text: The defect edges were debeveled with a #15 scalpel blade.  Given the location of the defect and the proximity to free margins a rhombic flap was deemed most appropriate.  Using a sterile surgical marker, an appropriate rhombic flap was drawn incorporating the defect.    The area thus outlined was incised deep to adipose tissue with a #15 scalpel blade.  The skin margins were undermined to an appropriate distance in all directions utilizing iris scissors. Rhomboid Transposition Flap Text: The defect edges were debeveled with a #15 scalpel blade.  Given the location of the defect and the proximity to free margins a rhomboid transposition flap was deemed most appropriate.  Using a sterile surgical marker, an appropriate rhomboid flap was drawn incorporating the defect.    The area thus outlined was incised deep to adipose tissue with a #15 scalpel blade.  The skin margins were undermined to an appropriate distance in all directions utilizing iris scissors. Bi-Rhombic Flap Text: The defect edges were debeveled with a #15 scalpel blade.  Given the location of the defect and the proximity to free margins a bi-rhombic flap was deemed most appropriate.  Using a sterile surgical marker, an appropriate rhombic flap was drawn incorporating the defect. The area thus outlined was incised deep to adipose tissue with a #15 scalpel blade.  The skin margins were undermined to an appropriate distance in all directions utilizing iris scissors. Helical Rim Advancement Flap Text: The defect edges were debeveled with a #15 blade scalpel.  Given the location of the defect and the proximity to free margins (helical rim) a double helical rim advancement flap was deemed most appropriate.  Using a sterile surgical marker, the appropriate advancement flaps were drawn incorporating the defect and placing the expected incisions between the helical rim and antihelix where possible.  The area thus outlined was incised through and through with a #15 scalpel blade.  With a skin hook and iris scissors, the flaps were gently and sharply undermined and freed up. Bilateral Helical Rim Advancement Flap Text: The defect edges were debeveled with a #15 blade scalpel.  Given the location of the defect and the proximity to free margins (helical rim) a bilateral helical rim advancement flap was deemed most appropriate.  Using a sterile surgical marker, the appropriate advancement flaps were drawn incorporating the defect and placing the expected incisions between the helical rim and antihelix where possible.  The area thus outlined was incised through and through with a #15 scalpel blade.  With a skin hook and iris scissors, the flaps were gently and sharply undermined and freed up. Ear Star Wedge Flap Text: The defect edges were debeveled with a #15 blade scalpel.  Given the location of the defect and the proximity to free margins (helical rim) an ear star wedge flap was deemed most appropriate.  Using a sterile surgical marker, the appropriate flap was drawn incorporating the defect and placing the expected incisions between the helical rim and antihelix where possible.  The area thus outlined was incised through and through with a #15 scalpel blade. Flip-Flop Flap Text: The defect edges were debeveled with a #15 blade scalpel.  Given the location of the defect and the proximity to free margins a flip-flop flap was deemed most appropriate. Using a sterile surgical marker, the appropriate flap was drawn incorporating the defect and placing the expected incisions between the helical rim and antihelix where possible.  The area thus outlined was incised through and through with a #15 scalpel blade. Following this, the designed flap was carried over into the primary defect and sutured into place. Banner Transposition Flap Text: The defect edges were debeveled with a #15 scalpel blade.  Given the location of the defect and the proximity to free margins a Banner transposition flap was deemed most appropriate.  Using a sterile surgical marker, an appropriate flap drawn around the defect. The area thus outlined was incised deep to adipose tissue with a #15 scalpel blade.  The skin margins were undermined to an appropriate distance in all directions utilizing iris scissors. Bilobed Flap Text: The defect edges were debeveled with a #15 scalpel blade.  Given the location of the defect and the proximity to free margins a bilobe flap was deemed most appropriate.  Using a sterile surgical marker, an appropriate bilobe flap drawn around the defect.    The area thus outlined was incised deep to adipose tissue with a #15 scalpel blade.  The skin margins were undermined to an appropriate distance in all directions utilizing iris scissors. Bilobed Transposition Flap Text: The defect edges were debeveled with a #15 scalpel blade.  Given the location of the defect and the proximity to free margins a bilobed transposition flap was deemed most appropriate.  Using a sterile surgical marker, an appropriate bilobe flap drawn around the defect.    The area thus outlined was incised deep to adipose tissue with a #15 scalpel blade.  The skin margins were undermined to an appropriate distance in all directions utilizing iris scissors. Trilobed Flap Text: The defect edges were debeveled with a #15 scalpel blade.  Given the location of the defect and the proximity to free margins a trilobed flap was deemed most appropriate.  Using a sterile surgical marker, an appropriate trilobed flap drawn around the defect.    The area thus outlined was incised deep to adipose tissue with a #15 scalpel blade.  The skin margins were undermined to an appropriate distance in all directions utilizing iris scissors. Dorsal Nasal Flap Text: The defect edges were debeveled with a #15 scalpel blade.  Given the location of the defect and the proximity to free margins a dorsal nasal flap was deemed most appropriate.  Using a sterile surgical marker, an appropriate dorsal nasal flap was drawn around the defect.    The area thus outlined was incised deep to adipose tissue with a #15 scalpel blade.  The skin margins were undermined to an appropriate distance in all directions utilizing iris scissors. Island Pedicle Flap Text: The defect edges were debeveled with a #15 scalpel blade.  Given the location of the defect, shape of the defect and the proximity to free margins an island pedicle advancement flap was deemed most appropriate.  Using a sterile surgical marker, an appropriate advancement flap was drawn incorporating the defect, outlining the appropriate donor tissue and placing the expected incisions within the relaxed skin tension lines where possible.    The area thus outlined was incised deep to adipose tissue with a #15 scalpel blade.  The skin margins were undermined to an appropriate distance in all directions around the primary defect and laterally outward around the island pedicle utilizing iris scissors.  There was minimal undermining beneath the pedicle flap. Island Pedicle Flap With Canthal Suspension Text: The defect edges were debeveled with a #15 scalpel blade.  Given the location of the defect, shape of the defect and the proximity to free margins an island pedicle advancement flap was deemed most appropriate.  Using a sterile surgical marker, an appropriate advancement flap was drawn incorporating the defect, outlining the appropriate donor tissue and placing the expected incisions within the relaxed skin tension lines where possible. The area thus outlined was incised deep to adipose tissue with a #15 scalpel blade.  The skin margins were undermined to an appropriate distance in all directions around the primary defect and laterally outward around the island pedicle utilizing iris scissors.  There was minimal undermining beneath the pedicle flap. A suspension suture was placed in the canthal tendon to prevent tension and prevent ectropion. Alar Island Pedicle Flap Text: The defect edges were debeveled with a #15 scalpel blade.  Given the location of the defect, shape of the defect and the proximity to the alar rim an island pedicle advancement flap was deemed most appropriate.  Using a sterile surgical marker, an appropriate advancement flap was drawn incorporating the defect, outlining the appropriate donor tissue and placing the expected incisions within the nasal ala running parallel to the alar rim. The area thus outlined was incised with a #15 scalpel blade.  The skin margins were undermined minimally to an appropriate distance in all directions around the primary defect and laterally outward around the island pedicle utilizing iris scissors.  There was minimal undermining beneath the pedicle flap. Double Island Pedicle Flap Text: The defect edges were debeveled with a #15 scalpel blade.  Given the location of the defect, shape of the defect and the proximity to free margins a double island pedicle advancement flap was deemed most appropriate.  Using a sterile surgical marker, an appropriate advancement flap was drawn incorporating the defect, outlining the appropriate donor tissue and placing the expected incisions within the relaxed skin tension lines where possible.    The area thus outlined was incised deep to adipose tissue with a #15 scalpel blade.  The skin margins were undermined to an appropriate distance in all directions around the primary defect and laterally outward around the island pedicle utilizing iris scissors.  There was minimal undermining beneath the pedicle flap. Island Pedicle Flap-Requiring Vessel Identification Text: The defect edges were debeveled with a #15 scalpel blade.  Given the location of the defect, shape of the defect and the proximity to free margins an island pedicle advancement flap was deemed most appropriate.  Using a sterile surgical marker, an appropriate advancement flap was drawn, based on the axial vessel mentioned above, incorporating the defect, outlining the appropriate donor tissue and placing the expected incisions within the relaxed skin tension lines where possible.    The area thus outlined was incised deep to adipose tissue with a #15 scalpel blade.  The skin margins were undermined to an appropriate distance in all directions around the primary defect and laterally outward around the island pedicle utilizing iris scissors.  There was minimal undermining beneath the pedicle flap. Keystone Flap Text: The defect edges were debeveled with a #15 scalpel blade.  Given the location of the defect, shape of the defect a keystone flap was deemed most appropriate.  Using a sterile surgical marker, an appropriate keystone flap was drawn incorporating the defect, outlining the appropriate donor tissue and placing the expected incisions within the relaxed skin tension lines where possible. The area thus outlined was incised deep to adipose tissue with a #15 scalpel blade.  The skin margins were undermined to an appropriate distance in all directions around the primary defect and laterally outward around the flap utilizing iris scissors. O-T Plasty Text: The defect edges were debeveled with a #15 scalpel blade.  Given the location of the defect, shape of the defect and the proximity to free margins an O-T plasty was deemed most appropriate.  Using a sterile surgical marker, an appropriate O-T plasty was drawn incorporating the defect and placing the expected incisions within the relaxed skin tension lines where possible.    The area thus outlined was incised deep to adipose tissue with a #15 scalpel blade.  The skin margins were undermined to an appropriate distance in all directions utilizing iris scissors. O-Z Plasty Text: The defect edges were debeveled with a #15 scalpel blade.  Given the location of the defect, shape of the defect and the proximity to free margins an O-Z plasty (double transposition flap) was deemed most appropriate.  Using a sterile surgical marker, the appropriate transposition flaps were drawn incorporating the defect and placing the expected incisions within the relaxed skin tension lines where possible.    The area thus outlined was incised deep to adipose tissue with a #15 scalpel blade.  The skin margins were undermined to an appropriate distance in all directions utilizing iris scissors.  Hemostasis was achieved with electrocautery.  The flaps were then transposed into place, one clockwise and the other counterclockwise, and anchored with interrupted buried subcutaneous sutures. Double O-Z Plasty Text: The defect edges were debeveled with a #15 scalpel blade.  Given the location of the defect, shape of the defect and the proximity to free margins a Double O-Z plasty (double transposition flap) was deemed most appropriate.  Using a sterile surgical marker, the appropriate transposition flaps were drawn incorporating the defect and placing the expected incisions within the relaxed skin tension lines where possible. The area thus outlined was incised deep to adipose tissue with a #15 scalpel blade.  The skin margins were undermined to an appropriate distance in all directions utilizing iris scissors.  Hemostasis was achieved with electrocautery.  The flaps were then transposed into place, one clockwise and the other counterclockwise, and anchored with interrupted buried subcutaneous sutures. V-Y Plasty Text: The defect edges were debeveled with a #15 scalpel blade.  Given the location of the defect, shape of the defect and the proximity to free margins an V-Y advancement flap was deemed most appropriate.  Using a sterile surgical marker, an appropriate advancement flap was drawn incorporating the defect and placing the expected incisions within the relaxed skin tension lines where possible.    The area thus outlined was incised deep to adipose tissue with a #15 scalpel blade.  The skin margins were undermined to an appropriate distance in all directions utilizing iris scissors. H Plasty Text: Given the location of the defect, shape of the defect and the proximity to free margins a H-plasty was deemed most appropriate for repair.  Using a sterile surgical marker, the appropriate advancement arms of the H-plasty were drawn incorporating the defect and placing the expected incisions within the relaxed skin tension lines where possible. The area thus outlined was incised deep to adipose tissue with a #15 scalpel blade. The skin margins were undermined to an appropriate distance in all directions utilizing iris scissors.  The opposing advancement arms were then advanced into place in opposite direction and anchored with interrupted buried subcutaneous sutures. W Plasty Text: The lesion was extirpated to the level of the fat with a #15 scalpel blade.  Given the location of the defect, shape of the defect and the proximity to free margins a W-plasty was deemed most appropriate for repair.  Using a sterile surgical marker, the appropriate transposition arms of the W-plasty were drawn incorporating the defect and placing the expected incisions within the relaxed skin tension lines where possible.    The area thus outlined was incised deep to adipose tissue with a #15 scalpel blade.  The skin margins were undermined to an appropriate distance in all directions utilizing iris scissors.  The opposing transposition arms were then transposed into place in opposite direction and anchored with interrupted buried subcutaneous sutures. Z Plasty Text: The lesion was extirpated to the level of the fat with a #15 scalpel blade.  Given the location of the defect, shape of the defect and the proximity to free margins a Z-plasty was deemed most appropriate for repair.  Using a sterile surgical marker, the appropriate transposition arms of the Z-plasty were drawn incorporating the defect and placing the expected incisions within the relaxed skin tension lines where possible.    The area thus outlined was incised deep to adipose tissue with a #15 scalpel blade.  The skin margins were undermined to an appropriate distance in all directions utilizing iris scissors.  The opposing transposition arms were then transposed into place in opposite direction and anchored with interrupted buried subcutaneous sutures. Double Z Plasty Text: The lesion was extirpated to the level of the fat with a #15 scalpel blade. Given the location of the defect, shape of the defect and the proximity to free margins a double Z-plasty was deemed most appropriate for repair. Using a sterile surgical marker, the appropriate transposition arms of the double Z-plasty were drawn incorporating the defect and placing the expected incisions within the relaxed skin tension lines where possible. The area thus outlined was incised deep to adipose tissue with a #15 scalpel blade. The skin margins were undermined to an appropriate distance in all directions utilizing iris scissors. The opposing transposition arms were then transposed and carried over into place in opposite direction and anchored with interrupted buried subcutaneous sutures. Zygomaticofacial Flap Text: Given the location of the defect, shape of the defect and the proximity to free margins a zygomaticofacial flap was deemed most appropriate for repair.  Using a sterile surgical marker, the appropriate flap was drawn incorporating the defect and placing the expected incisions within the relaxed skin tension lines where possible. The area thus outlined was incised deep to adipose tissue with a #15 scalpel blade with preservation of a vascular pedicle.  The skin margins were undermined to an appropriate distance in all directions utilizing iris scissors.  The flap was then placed into the defect and anchored with interrupted buried subcutaneous sutures. Cheek Interpolation Flap Text: A decision was made to reconstruct the defect utilizing an interpolation axial flap and a staged reconstruction.  A telfa template was made of the defect.  This telfa template was then used to outline the Cheek Interpolation flap.  The donor area for the pedicle flap was then injected with anesthesia.  The flap was excised through the skin and subcutaneous tissue down to the layer of the underlying musculature.  The interpolation flap was carefully excised within this deep plane to maintain its blood supply.  The edges of the donor site were undermined.   The donor site was closed in a primary fashion.  The pedicle was then rotated into position and sutured.  Once the tube was sutured into place, adequate blood supply was confirmed with blanching and refill.  The pedicle was then wrapped with xeroform gauze and dressed appropriately with a telfa and gauze bandage to ensure continued blood supply and protect the attached pedicle. Cheek-To-Nose Interpolation Flap Text: A decision was made to reconstruct the defect utilizing an interpolation axial flap and a staged reconstruction.  A telfa template was made of the defect.  This telfa template was then used to outline the Cheek-To-Nose Interpolation flap.  The donor area for the pedicle flap was then injected with anesthesia.  The flap was excised through the skin and subcutaneous tissue down to the layer of the underlying musculature.  The interpolation flap was carefully excised within this deep plane to maintain its blood supply.  The edges of the donor site were undermined.   The donor site was closed in a primary fashion.  The pedicle was then rotated into position and sutured.  Once the tube was sutured into place, adequate blood supply was confirmed with blanching and refill.  The pedicle was then wrapped with xeroform gauze and dressed appropriately with a telfa and gauze bandage to ensure continued blood supply and protect the attached pedicle. Interpolation Flap Text: A decision was made to reconstruct the defect utilizing an interpolation axial flap and a staged reconstruction.  A telfa template was made of the defect.  This telfa template was then used to outline the interpolation flap.  The donor area for the pedicle flap was then injected with anesthesia.  The flap was excised through the skin and subcutaneous tissue down to the layer of the underlying musculature.  The interpolation flap was carefully excised within this deep plane to maintain its blood supply.  The edges of the donor site were undermined.   The donor site was closed in a primary fashion.  The pedicle was then rotated into position and sutured.  Once the tube was sutured into place, adequate blood supply was confirmed with blanching and refill.  The pedicle was then wrapped with xeroform gauze and dressed appropriately with a telfa and gauze bandage to ensure continued blood supply and protect the attached pedicle. Melolabial Interpolation Flap Text: A decision was made to reconstruct the defect utilizing an interpolation axial flap and a staged reconstruction.  A telfa template was made of the defect.  This telfa template was then used to outline the melolabial interpolation flap.  The donor area for the pedicle flap was then injected with anesthesia.  The flap was excised through the skin and subcutaneous tissue down to the layer of the underlying musculature.  The pedicle flap was carefully excised within this deep plane to maintain its blood supply.  The edges of the donor site were undermined.   The donor site was closed in a primary fashion.  The pedicle was then rotated into position and sutured.  Once the tube was sutured into place, adequate blood supply was confirmed with blanching and refill.  The pedicle was then wrapped with xeroform gauze and dressed appropriately with a telfa and gauze bandage to ensure continued blood supply and protect the attached pedicle. Mastoid Interpolation Flap Text: A decision was made to reconstruct the defect utilizing an interpolation axial flap and a staged reconstruction.  A telfa template was made of the defect.  This telfa template was then used to outline the mastoid interpolation flap.  The donor area for the pedicle flap was then injected with anesthesia.  The flap was excised through the skin and subcutaneous tissue down to the layer of the underlying musculature.  The pedicle flap was carefully excised within this deep plane to maintain its blood supply.  The edges of the donor site were undermined.   The donor site was closed in a primary fashion.  The pedicle was then rotated into position and sutured.  Once the tube was sutured into place, adequate blood supply was confirmed with blanching and refill.  The pedicle was then wrapped with xeroform gauze and dressed appropriately with a telfa and gauze bandage to ensure continued blood supply and protect the attached pedicle. Posterior Auricular Interpolation Flap Text: A decision was made to reconstruct the defect utilizing an interpolation axial flap and a staged reconstruction.  A telfa template was made of the defect.  This telfa template was then used to outline the posterior auricular interpolation flap.  The donor area for the pedicle flap was then injected with anesthesia.  The flap was excised through the skin and subcutaneous tissue down to the layer of the underlying musculature.  The pedicle flap was carefully excised within this deep plane to maintain its blood supply.  The edges of the donor site were undermined.   The donor site was closed in a primary fashion.  The pedicle was then rotated into position and sutured.  Once the tube was sutured into place, adequate blood supply was confirmed with blanching and refill.  The pedicle was then wrapped with xeroform gauze and dressed appropriately with a telfa and gauze bandage to ensure continued blood supply and protect the attached pedicle. Paramedian Forehead Flap Text: A decision was made to reconstruct the defect utilizing an interpolation axial flap and a staged reconstruction.  A telfa template was made of the defect.  This telfa template was then used to outline the paramedian forehead pedicle flap.  The donor area for the pedicle flap was then injected with anesthesia.  The flap was excised through the skin and subcutaneous tissue down to the layer of the underlying musculature.  The pedicle flap was carefully excised within this deep plane to maintain its blood supply.  The edges of the donor site were undermined.   The donor site was closed in a primary fashion.  The pedicle was then rotated into position and sutured.  Once the tube was sutured into place, adequate blood supply was confirmed with blanching and refill.  The pedicle was then wrapped with xeroform gauze and dressed appropriately with a telfa and gauze bandage to ensure continued blood supply and protect the attached pedicle. Abbe Flap (Upper To Lower Lip) Text: The defect of the lower lip was assessed and measured.  Given the location and size of the defect, an Abbe flap was deemed most appropriate. Using a sterile surgical marker, an appropriate Abbe flap was measured and drawn on the upper lip. Local anesthesia was then infiltrated.  A scalpel was then used to incise the upper lip through and through the skin, vermilion, muscle and mucosa, leaving the flap pedicled on the opposite side.  The flap was then rotated and transferred to the lower lip defect.  The flap was then sutured into place with a three layer technique, closing the orbicularis oris muscle layer with subcutaneous buried sutures, followed by a mucosal layer and an epidermal layer. Abbe Flap (Lower To Upper Lip) Text: The defect of the upper lip was assessed and measured.  Given the location and size of the defect, an Abbe flap was deemed most appropriate. Using a sterile surgical marker, an appropriate Abbe flap was measured and drawn on the lower lip. Local anesthesia was then infiltrated. A scalpel was then used to incise the upper lip through and through the skin, vermilion, muscle and mucosa, leaving the flap pedicled on the opposite side.  The flap was then rotated and transferred to the lower lip defect.  The flap was then sutured into place with a three layer technique, closing the orbicularis oris muscle layer with subcutaneous buried sutures, followed by a mucosal layer and an epidermal layer. Estlander Flap (Upper To Lower Lip) Text: The defect of the lower lip was assessed and measured.  Given the location and size of the defect, an Estlander flap was deemed most appropriate. Using a sterile surgical marker, an appropriate Estlander flap was measured and drawn on the upper lip. Local anesthesia was then infiltrated. A scalpel was then used to incise the lateral aspect of the flap, through skin, muscle and mucosa, leaving the flap pedicled medially.  The flap was then rotated and positioned to fill the lower lip defect.  The flap was then sutured into place with a three layer technique, closing the orbicularis oris muscle layer with subcutaneous buried sutures, followed by a mucosal layer and an epidermal layer. Lip Wedge Excision Repair Text: Given the location of the defect and the proximity to free margins a full thickness wedge repair was deemed most appropriate.  Using a sterile surgical marker, the appropriate repair was drawn incorporating the defect and placing the expected incisions perpendicular to the vermilion border.  The vermilion border was also meticulously outlined to ensure appropriate reapproximation during the repair.  The area thus outlined was incised through and through with a #15 scalpel blade.  The muscularis and dermis were reaproximated with deep sutures following hemostasis. Care was taken to realign the vermilion border before proceeding with the superficial closure.  Once the vermilion was realigned the superfical and mucosal closure was finished. Ftsg Text: The defect edges were debeveled with a #15 scalpel blade.  Given the location of the defect, shape of the defect and the proximity to free margins a full thickness skin graft was deemed most appropriate.  Using a sterile surgical marker, the primary defect shape was transferred to the donor site. The area thus outlined was incised deep to adipose tissue with a #15 scalpel blade.  The harvested graft was then trimmed of adipose tissue until only dermis and epidermis was left.  The skin margins of the secondary defect were undermined to an appropriate distance in all directions utilizing iris scissors.  The secondary defect was closed with interrupted buried subcutaneous sutures.  The skin edges were then re-apposed with running  sutures.  The skin graft was then placed in the primary defect and oriented appropriately. Split-Thickness Skin Graft Text: The defect edges were debeveled with a #15 scalpel blade.  Given the location of the defect, shape of the defect and the proximity to free margins a split thickness skin graft was deemed most appropriate.  Using a sterile surgical marker, the primary defect shape was transferred to the donor site. The split thickness graft was then harvested.  The skin graft was then placed in the primary defect and oriented appropriately. Pinch Graft Text: The defect edges were debeveled with a #15 scalpel blade. Given the location of the defect, shape of the defect and the proximity to free margins a pinch graft was deemed most appropriate. Using a sterile surgical marker, the primary defect shape was transferred to the donor site. The area thus outlined was incised deep to adipose tissue with a #15 scalpel blade.  The harvested graft was then trimmed of adipose tissue until only dermis and epidermis was left. The skin graft was then placed in the primary defect and oriented appropriately. Burow's Graft Text: The defect edges were debeveled with a #15 scalpel blade.  Given the location of the defect, shape of the defect, the proximity to free margins and the presence of a standing cone deformity a Burow's skin graft was deemed most appropriate. The standing cone was removed and this tissue was then trimmed to the shape of the primary defect. The adipose tissue was also removed until only dermis and epidermis were left.  The skin margins of the secondary defect were undermined to an appropriate distance in all directions utilizing iris scissors.  The secondary defect was closed with interrupted buried subcutaneous sutures.  The skin edges were then re-apposed with running  sutures.  The skin graft was then placed in the primary defect and oriented appropriately. Cartilage Graft Text: The defect edges were debeveled with a #15 scalpel blade.  Given the location of the defect, shape of the defect, the fact the defect involved a full thickness cartilage defect a cartilage graft was deemed most appropriate.  An appropriate donor site was identified, cleansed, and anesthetized. The cartilage graft was then harvested and transferred to the recipient site, oriented appropriately and then sutured into place.  The secondary defect was then repaired using a primary closure. Composite Graft Text: The defect edges were debeveled with a #15 scalpel blade.  Given the location of the defect, shape of the defect, the proximity to free margins and the fact the defect was full thickness a composite graft was deemed most appropriate.  The defect was outline and then transferred to the donor site.  A full thickness graft was then excised from the donor site. The graft was then placed in the primary defect, oriented appropriately and then sutured into place.  The secondary defect was then repaired using a primary closure. Epidermal Autograft Text: The defect edges were debeveled with a #15 scalpel blade.  Given the location of the defect, shape of the defect and the proximity to free margins an epidermal autograft was deemed most appropriate.  Using a sterile surgical marker, the primary defect shape was transferred to the donor site. The epidermal graft was then harvested.  The skin graft was then placed in the primary defect and oriented appropriately. Dermal Autograft Text: The defect edges were debeveled with a #15 scalpel blade.  Given the location of the defect, shape of the defect and the proximity to free margins a dermal autograft was deemed most appropriate.  Using a sterile surgical marker, the primary defect shape was transferred to the donor site. The area thus outlined was incised deep to adipose tissue with a #15 scalpel blade.  The harvested graft was then trimmed of adipose and epidermal tissue until only dermis was left.  The skin graft was then placed in the primary defect and oriented appropriately. Skin Substitute Text: The defect edges were debeveled with a #15 scalpel blade.  Given the location of the defect, shape of the defect and the proximity to free margins a skin substitute graft was deemed most appropriate.  The graft material was trimmed to fit the size of the defect. The graft was then placed in the primary defect and oriented appropriately. Tissue Cultured Epidermal Autograft Text: The defect edges were debeveled with a #15 scalpel blade.  Given the location of the defect, shape of the defect and the proximity to free margins a tissue cultured epidermal autograft was deemed most appropriate.  The graft was then trimmed to fit the size of the defect.  The graft was then placed in the primary defect and oriented appropriately. Xenograft Text: The defect edges were debeveled with a #15 scalpel blade.  Given the location of the defect, shape of the defect and the proximity to free margins a xenograft was deemed most appropriate.  The graft was then trimmed to fit the size of the defect.  The graft was then placed in the primary defect and oriented appropriately. Purse String (Intermediate) Text: Given the location of the defect and the characteristics of the surrounding skin a purse string intermediate closure was deemed most appropriate.  Undermining was performed circumfirentially around the surgical defect.  A purse string suture was then placed and tightened. Purse String (Simple) Text: Given the location of the defect and the characteristics of the surrounding skin a purse string simple closure was deemed most appropriate.  Undermining was performed circumferentially around the surgical defect.  A purse string suture was then placed and tightened. Partial Purse String (Intermediate) Text: Given the location of the defect and the characteristics of the surrounding skin an intermediate purse string closure was deemed most appropriate.  Undermining was performed circumferentially around the surgical defect.  A purse string suture was then placed and tightened. Wound tension of the circular defect prevented complete closure of the wound. Partial Purse String (Simple) Text: Given the location of the defect and the characteristics of the surrounding skin a simple purse string closure was deemed most appropriate.  Undermining was performed circumferentially around the surgical defect.  A purse string suture was then placed and tightened. Wound tension of the circular defect prevented complete closure of the wound. Complex Repair And Single Advancement Flap Text: The defect edges were debeveled with a #15 scalpel blade.  The primary defect was closed partially with a complex linear closure.  Given the location of the remaining defect, shape of the defect and the proximity to free margins a single advancement flap was deemed most appropriate for complete closure of the defect.  Using a sterile surgical marker, an appropriate advancement flap was drawn incorporating the defect and placing the expected incisions within the relaxed skin tension lines where possible.    The area thus outlined was incised deep to adipose tissue with a #15 scalpel blade.  The skin margins were undermined to an appropriate distance in all directions utilizing iris scissors. Complex Repair And Double Advancement Flap Text: The defect edges were debeveled with a #15 scalpel blade.  The primary defect was closed partially with a complex linear closure.  Given the location of the remaining defect, shape of the defect and the proximity to free margins a double advancement flap was deemed most appropriate for complete closure of the defect.  Using a sterile surgical marker, an appropriate advancement flap was drawn incorporating the defect and placing the expected incisions within the relaxed skin tension lines where possible.    The area thus outlined was incised deep to adipose tissue with a #15 scalpel blade.  The skin margins were undermined to an appropriate distance in all directions utilizing iris scissors. Complex Repair And Modified Advancement Flap Text: The defect edges were debeveled with a #15 scalpel blade.  The primary defect was closed partially with a complex linear closure.  Given the location of the remaining defect, shape of the defect and the proximity to free margins a modified advancement flap was deemed most appropriate for complete closure of the defect.  Using a sterile surgical marker, an appropriate advancement flap was drawn incorporating the defect and placing the expected incisions within the relaxed skin tension lines where possible.    The area thus outlined was incised deep to adipose tissue with a #15 scalpel blade.  The skin margins were undermined to an appropriate distance in all directions utilizing iris scissors. Complex Repair And A-T Advancement Flap Text: The defect edges were debeveled with a #15 scalpel blade.  The primary defect was closed partially with a complex linear closure.  Given the location of the remaining defect, shape of the defect and the proximity to free margins an A-T advancement flap was deemed most appropriate for complete closure of the defect.  Using a sterile surgical marker, an appropriate advancement flap was drawn incorporating the defect and placing the expected incisions within the relaxed skin tension lines where possible.    The area thus outlined was incised deep to adipose tissue with a #15 scalpel blade.  The skin margins were undermined to an appropriate distance in all directions utilizing iris scissors. Complex Repair And O-T Advancement Flap Text: The defect edges were debeveled with a #15 scalpel blade.  The primary defect was closed partially with a complex linear closure.  Given the location of the remaining defect, shape of the defect and the proximity to free margins an O-T advancement flap was deemed most appropriate for complete closure of the defect.  Using a sterile surgical marker, an appropriate advancement flap was drawn incorporating the defect and placing the expected incisions within the relaxed skin tension lines where possible.    The area thus outlined was incised deep to adipose tissue with a #15 scalpel blade.  The skin margins were undermined to an appropriate distance in all directions utilizing iris scissors. Complex Repair And O-L Flap Text: The defect edges were debeveled with a #15 scalpel blade.  The primary defect was closed partially with a complex linear closure.  Given the location of the remaining defect, shape of the defect and the proximity to free margins an O-L flap was deemed most appropriate for complete closure of the defect.  Using a sterile surgical marker, an appropriate flap was drawn incorporating the defect and placing the expected incisions within the relaxed skin tension lines where possible.    The area thus outlined was incised deep to adipose tissue with a #15 scalpel blade.  The skin margins were undermined to an appropriate distance in all directions utilizing iris scissors. Complex Repair And Bilobe Flap Text: The defect edges were debeveled with a #15 scalpel blade.  The primary defect was closed partially with a complex linear closure.  Given the location of the remaining defect, shape of the defect and the proximity to free margins a bilobe flap was deemed most appropriate for complete closure of the defect.  Using a sterile surgical marker, an appropriate advancement flap was drawn incorporating the defect and placing the expected incisions within the relaxed skin tension lines where possible.    The area thus outlined was incised deep to adipose tissue with a #15 scalpel blade.  The skin margins were undermined to an appropriate distance in all directions utilizing iris scissors. Complex Repair And Melolabial Flap Text: The defect edges were debeveled with a #15 scalpel blade.  The primary defect was closed partially with a complex linear closure.  Given the location of the remaining defect, shape of the defect and the proximity to free margins a melolabial flap was deemed most appropriate for complete closure of the defect.  Using a sterile surgical marker, an appropriate advancement flap was drawn incorporating the defect and placing the expected incisions within the relaxed skin tension lines where possible.    The area thus outlined was incised deep to adipose tissue with a #15 scalpel blade.  The skin margins were undermined to an appropriate distance in all directions utilizing iris scissors. Complex Repair And Rotation Flap Text: The defect edges were debeveled with a #15 scalpel blade.  The primary defect was closed partially with a complex linear closure.  Given the location of the remaining defect, shape of the defect and the proximity to free margins a rotation flap was deemed most appropriate for complete closure of the defect.  Using a sterile surgical marker, an appropriate advancement flap was drawn incorporating the defect and placing the expected incisions within the relaxed skin tension lines where possible.    The area thus outlined was incised deep to adipose tissue with a #15 scalpel blade.  The skin margins were undermined to an appropriate distance in all directions utilizing iris scissors. Complex Repair And Rhombic Flap Text: The defect edges were debeveled with a #15 scalpel blade.  The primary defect was closed partially with a complex linear closure.  Given the location of the remaining defect, shape of the defect and the proximity to free margins a rhombic flap was deemed most appropriate for complete closure of the defect.  Using a sterile surgical marker, an appropriate advancement flap was drawn incorporating the defect and placing the expected incisions within the relaxed skin tension lines where possible.    The area thus outlined was incised deep to adipose tissue with a #15 scalpel blade.  The skin margins were undermined to an appropriate distance in all directions utilizing iris scissors. Complex Repair And Transposition Flap Text: The defect edges were debeveled with a #15 scalpel blade.  The primary defect was closed partially with a complex linear closure.  Given the location of the remaining defect, shape of the defect and the proximity to free margins a transposition flap was deemed most appropriate for complete closure of the defect.  Using a sterile surgical marker, an appropriate advancement flap was drawn incorporating the defect and placing the expected incisions within the relaxed skin tension lines where possible.    The area thus outlined was incised deep to adipose tissue with a #15 scalpel blade.  The skin margins were undermined to an appropriate distance in all directions utilizing iris scissors. Complex Repair And V-Y Plasty Text: The defect edges were debeveled with a #15 scalpel blade.  The primary defect was closed partially with a complex linear closure.  Given the location of the remaining defect, shape of the defect and the proximity to free margins a V-Y plasty was deemed most appropriate for complete closure of the defect.  Using a sterile surgical marker, an appropriate advancement flap was drawn incorporating the defect and placing the expected incisions within the relaxed skin tension lines where possible.    The area thus outlined was incised deep to adipose tissue with a #15 scalpel blade.  The skin margins were undermined to an appropriate distance in all directions utilizing iris scissors. Complex Repair And M Plasty Text: The defect edges were debeveled with a #15 scalpel blade.  The primary defect was closed partially with a complex linear closure.  Given the location of the remaining defect, shape of the defect and the proximity to free margins an M plasty was deemed most appropriate for complete closure of the defect.  Using a sterile surgical marker, an appropriate advancement flap was drawn incorporating the defect and placing the expected incisions within the relaxed skin tension lines where possible.    The area thus outlined was incised deep to adipose tissue with a #15 scalpel blade.  The skin margins were undermined to an appropriate distance in all directions utilizing iris scissors. Complex Repair And Double M Plasty Text: The defect edges were debeveled with a #15 scalpel blade.  The primary defect was closed partially with a complex linear closure.  Given the location of the remaining defect, shape of the defect and the proximity to free margins a double M plasty was deemed most appropriate for complete closure of the defect.  Using a sterile surgical marker, an appropriate advancement flap was drawn incorporating the defect and placing the expected incisions within the relaxed skin tension lines where possible.    The area thus outlined was incised deep to adipose tissue with a #15 scalpel blade.  The skin margins were undermined to an appropriate distance in all directions utilizing iris scissors. Complex Repair And W Plasty Text: The defect edges were debeveled with a #15 scalpel blade.  The primary defect was closed partially with a complex linear closure.  Given the location of the remaining defect, shape of the defect and the proximity to free margins a W plasty was deemed most appropriate for complete closure of the defect.  Using a sterile surgical marker, an appropriate advancement flap was drawn incorporating the defect and placing the expected incisions within the relaxed skin tension lines where possible.    The area thus outlined was incised deep to adipose tissue with a #15 scalpel blade.  The skin margins were undermined to an appropriate distance in all directions utilizing iris scissors. Complex Repair And Z Plasty Text: The defect edges were debeveled with a #15 scalpel blade.  The primary defect was closed partially with a complex linear closure.  Given the location of the remaining defect, shape of the defect and the proximity to free margins a Z plasty was deemed most appropriate for complete closure of the defect.  Using a sterile surgical marker, an appropriate advancement flap was drawn incorporating the defect and placing the expected incisions within the relaxed skin tension lines where possible.    The area thus outlined was incised deep to adipose tissue with a #15 scalpel blade.  The skin margins were undermined to an appropriate distance in all directions utilizing iris scissors. Complex Repair And Dorsal Nasal Flap Text: The defect edges were debeveled with a #15 scalpel blade.  The primary defect was closed partially with a complex linear closure.  Given the location of the remaining defect, shape of the defect and the proximity to free margins a dorsal nasal flap was deemed most appropriate for complete closure of the defect.  Using a sterile surgical marker, an appropriate flap was drawn incorporating the defect and placing the expected incisions within the relaxed skin tension lines where possible.    The area thus outlined was incised deep to adipose tissue with a #15 scalpel blade.  The skin margins were undermined to an appropriate distance in all directions utilizing iris scissors. Complex Repair And Ftsg Text: The defect edges were debeveled with a #15 scalpel blade.  The primary defect was closed partially with a complex linear closure.  Given the location of the defect, shape of the defect and the proximity to free margins a full thickness skin graft was deemed most appropriate to repair the remaining defect.  The graft was trimmed to fit the size of the remaining defect.  The graft was then placed in the primary defect, oriented appropriately, and sutured into place. Complex Repair And Burow's Graft Text: The defect edges were debeveled with a #15 scalpel blade.  The primary defect was closed partially with a complex linear closure.  Given the location of the defect, shape of the defect, the proximity to free margins and the presence of a standing cone deformity a Burow's graft was deemed most appropriate to repair the remaining defect.  The graft was trimmed to fit the size of the remaining defect.  The graft was then placed in the primary defect, oriented appropriately, and sutured into place. Complex Repair And Split-Thickness Skin Graft Text: The defect edges were debeveled with a #15 scalpel blade.  The primary defect was closed partially with a complex linear closure.  Given the location of the defect, shape of the defect and the proximity to free margins a split thickness skin graft was deemed most appropriate to repair the remaining defect.  The graft was trimmed to fit the size of the remaining defect.  The graft was then placed in the primary defect, oriented appropriately, and sutured into place. Complex Repair And Epidermal Autograft Text: The defect edges were debeveled with a #15 scalpel blade.  The primary defect was closed partially with a complex linear closure.  Given the location of the defect, shape of the defect and the proximity to free margins an epidermal autograft was deemed most appropriate to repair the remaining defect.  The graft was trimmed to fit the size of the remaining defect.  The graft was then placed in the primary defect, oriented appropriately, and sutured into place. Complex Repair And Dermal Autograft Text: The defect edges were debeveled with a #15 scalpel blade.  The primary defect was closed partially with a complex linear closure.  Given the location of the defect, shape of the defect and the proximity to free margins an dermal autograft was deemed most appropriate to repair the remaining defect.  The graft was trimmed to fit the size of the remaining defect.  The graft was then placed in the primary defect, oriented appropriately, and sutured into place. Complex Repair And Tissue Cultured Epidermal Autograft Text: The defect edges were debeveled with a #15 scalpel blade.  The primary defect was closed partially with a complex linear closure.  Given the location of the defect, shape of the defect and the proximity to free margins an tissue cultured epidermal autograft was deemed most appropriate to repair the remaining defect.  The graft was trimmed to fit the size of the remaining defect.  The graft was then placed in the primary defect, oriented appropriately, and sutured into place. Complex Repair And Xenograft Text: The defect edges were debeveled with a #15 scalpel blade.  The primary defect was closed partially with a complex linear closure.  Given the location of the defect, shape of the defect and the proximity to free margins a xenograft was deemed most appropriate to repair the remaining defect.  The graft was trimmed to fit the size of the remaining defect.  The graft was then placed in the primary defect, oriented appropriately, and sutured into place. Complex Repair And Skin Substitute Graft Text: The defect edges were debeveled with a #15 scalpel blade.  The primary defect was closed partially with a complex linear closure.  Given the location of the remaining defect, shape of the defect and the proximity to free margins a skin substitute graft was deemed most appropriate to repair the remaining defect.  The graft was trimmed to fit the size of the remaining defect.  The graft was then placed in the primary defect, oriented appropriately, and sutured into place. Path Notes (To The Dermatopathologist): Please check margins. Consent was obtained from the patient. The risks and benefits to therapy were discussed in detail. Specifically, the risks of infection, scarring, bleeding, dehiscence, allergy to anesthesia or topical anti microbial agents, sensory or motor nerve injury and recurrence were addressed. Prior to the procedure, the treatment site was clearly identified and confirmed by the patient. All components of Universal Protocol/PAUSE Rule completed. Post-Care Instructions: I reviewed with the patient in detail post-care instructions. Patient is not to engage in any heavy lifting, exercise, or swimming for the next 14 days. Should the patient develop any fevers, chills, bleeding, severe pain patient will contact the office immediately. Home Suture Removal Text: Patient was provided a home suture removal kit and will remove their sutures at home.  If they have any questions or difficulties they will call the office. Where Do You Want The Question To Include Opioid Counseling Located?: Case Summary Tab Information: Selecting Yes will display possible errors in your note based on the variables you have selected. This validation is only offered as a suggestion for you. PLEASE NOTE THAT THE VALIDATION TEXT WILL BE REMOVED WHEN YOU FINALIZE YOUR NOTE. IF YOU WANT TO FAX A PRELIMINARY NOTE YOU WILL NEED TO TOGGLE THIS TO 'NO' IF YOU DO NOT WANT IT IN YOUR FAXED NOTE.

## (undated) DEVICE — SU PROLENE 6-0 BV-1DA 18" 8709H

## (undated) DEVICE — SOL WATER IRRIG 1000ML BOTTLE 2F7114

## (undated) DEVICE — DRAPE SHEET REV FOLD 3/4 9349

## (undated) DEVICE — SOL NACL 0.9% IRRIG 1000ML BOTTLE 2F7124

## (undated) DEVICE — PACK AV FISTULA CUSTOM SCV15AVFS1

## (undated) DEVICE — PACK UNIVERSAL SPLIT 29131

## (undated) DEVICE — DECANTER VIAL 2006S

## (undated) DEVICE — DECANTER BAG 2002S

## (undated) DEVICE — SPONGE LAP 18X18" X8435

## (undated) DEVICE — SU SILK 3-0 TIE 24" SA74H

## (undated) DEVICE — BLADE KNIFE SURG 10 371110

## (undated) DEVICE — VESSEL LOOPS RED MAXI

## (undated) DEVICE — SUCTION TIP YANKAUER STR K87

## (undated) DEVICE — LINEN TOWEL PACK X5 5464

## (undated) DEVICE — SU SILK 2-0 TIE 24" SA75H

## (undated) DEVICE — NDL 19GA 1.5"

## (undated) DEVICE — SU VICRYL 3-0 SH 27" J316H

## (undated) DEVICE — SU VICRYL 2-0 SH 27" J317H

## (undated) DEVICE — SU SILK 4-0 TIE 24" SA73H

## (undated) DEVICE — DRSG ISLAND 4X4" SQUARE LF MSC3244

## (undated) DEVICE — NDL ANGIOCATH 20GA 1.25" 4056

## (undated) DEVICE — DRAPE EXTREMITY W/ARMBOARD 29405

## (undated) DEVICE — ESU GROUND PAD UNIVERSAL W/O CORD

## (undated) DEVICE — TUBING SUCTION MEDI-VAC SOFT 3/16"X20' N520A

## (undated) DEVICE — GEL ULTRASOUND AQUASONIC 20GM 01-01

## (undated) DEVICE — SU PROLENE 7-0 BV-1DA 24" 8702H

## (undated) DEVICE — SYR 03ML LL W/O NDL 309657

## (undated) DEVICE — SU MONOCRYL 4-0 PS-2 18" UND Y496G

## (undated) DEVICE — SU ETHILON 3-0 FS-1 18" 669H

## (undated) DEVICE — SOL NACL 0.9% INJ 250ML BAG 2B1322Q

## (undated) DEVICE — ADH SKIN CLOSURE PREMIERPRO EXOFIN 1.0ML 3470

## (undated) DEVICE — GLOVE PROTEXIS POWDER FREE 8.0 ORTHOPEDIC 2D73ET80

## (undated) DEVICE — SUCTION CANISTER MEDIVAC LINER 3000ML W/LID 65651-530

## (undated) DEVICE — SYR 10ML SLIP TIP W/O NDL

## (undated) DEVICE — CLIP SPRING FOGARTY SOFTJAW CSOFT6

## (undated) DEVICE — SURGICEL HEMOSTAT 2X14" 1951

## (undated) DEVICE — DRSG STERI STRIP 1/2X4" R1547

## (undated) DEVICE — CLIP ETHICON LIGACLIP SM BLUE LT100

## (undated) DEVICE — SYR BULB IRRIG DOVER 60 ML LATEX FREE 67000

## (undated) DEVICE — SU PROLENE 6-0 C-1DA 30" 8706H

## (undated) DEVICE — Device

## (undated) DEVICE — GOWN IMPERVIOUS SPECIALTY XL/XLONG 39049

## (undated) DEVICE — GOWN XXL 9575

## (undated) DEVICE — PREP CHLORAPREP 26ML TINTED ORANGE  260815

## (undated) DEVICE — SURGICEL HEMOSTAT 2X3" 1953

## (undated) DEVICE — TUBING SUCTION 12"X1/4" N612

## (undated) DEVICE — PREP CHLORAPREP 26ML TINTED HI-LITE ORANGE 930815

## (undated) RX ORDER — FENTANYL CITRATE 50 UG/ML
INJECTION, SOLUTION INTRAMUSCULAR; INTRAVENOUS
Status: DISPENSED
Start: 2022-01-01

## (undated) RX ORDER — LIDOCAINE HYDROCHLORIDE 10 MG/ML
INJECTION, SOLUTION INFILTRATION; PERINEURAL
Status: DISPENSED
Start: 2022-01-01

## (undated) RX ORDER — LIDOCAINE HYDROCHLORIDE 10 MG/ML
INJECTION, SOLUTION EPIDURAL; INFILTRATION; INTRACAUDAL; PERINEURAL
Status: DISPENSED
Start: 2022-01-01

## (undated) RX ORDER — SODIUM CHLORIDE, SODIUM GLUCONATE, SODIUM ACETATE, POTASSIUM CHLORIDE AND MAGNESIUM CHLORIDE 526; 502; 368; 37; 30 MG/100ML; MG/100ML; MG/100ML; MG/100ML; MG/100ML
INJECTION, SOLUTION INTRAVENOUS
Status: DISPENSED
Start: 2022-01-01

## (undated) RX ORDER — FLUMAZENIL 0.1 MG/ML
INJECTION, SOLUTION INTRAVENOUS
Status: DISPENSED
Start: 2022-01-01

## (undated) RX ORDER — EPINEPHRINE IN SOD CHLOR,ISO 1 MG/10 ML
SYRINGE (ML) INTRAVENOUS
Status: DISPENSED
Start: 2022-01-01

## (undated) RX ORDER — NALOXONE HYDROCHLORIDE 0.4 MG/ML
INJECTION, SOLUTION INTRAMUSCULAR; INTRAVENOUS; SUBCUTANEOUS
Status: DISPENSED
Start: 2022-01-01

## (undated) RX ORDER — PROPOFOL 10 MG/ML
INJECTION, EMULSION INTRAVENOUS
Status: DISPENSED
Start: 2022-01-01

## (undated) RX ORDER — ONDANSETRON 2 MG/ML
INJECTION INTRAMUSCULAR; INTRAVENOUS
Status: DISPENSED
Start: 2022-01-01

## (undated) RX ORDER — HEPARIN SODIUM 200 [USP'U]/100ML
INJECTION, SOLUTION INTRAVENOUS
Status: DISPENSED
Start: 2022-01-01

## (undated) RX ORDER — OXYCODONE AND ACETAMINOPHEN 5; 325 MG/1; MG/1
TABLET ORAL
Status: DISPENSED
Start: 2022-01-01

## (undated) RX ORDER — PAPAVERINE HYDROCHLORIDE 30 MG/ML
INJECTION INTRAMUSCULAR; INTRAVENOUS
Status: DISPENSED
Start: 2022-01-01

## (undated) RX ORDER — ACETAMINOPHEN 325 MG/1
TABLET ORAL
Status: DISPENSED
Start: 2022-01-01

## (undated) RX ORDER — BUPIVACAINE HYDROCHLORIDE 2.5 MG/ML
INJECTION, SOLUTION EPIDURAL; INFILTRATION; INTRACAUDAL
Status: DISPENSED
Start: 2022-01-01

## (undated) RX ORDER — LIDOCAINE HYDROCHLORIDE 20 MG/ML
INJECTION, SOLUTION EPIDURAL; INFILTRATION; INTRACAUDAL; PERINEURAL
Status: DISPENSED
Start: 2022-01-01

## (undated) RX ORDER — OXYCODONE HYDROCHLORIDE 5 MG/1
TABLET ORAL
Status: DISPENSED
Start: 2022-01-01

## (undated) RX ORDER — HEPARIN SODIUM 1000 [USP'U]/ML
INJECTION, SOLUTION INTRAVENOUS; SUBCUTANEOUS
Status: DISPENSED
Start: 2022-01-01